# Patient Record
Sex: FEMALE | Employment: UNEMPLOYED | URBAN - NONMETROPOLITAN AREA
[De-identification: names, ages, dates, MRNs, and addresses within clinical notes are randomized per-mention and may not be internally consistent; named-entity substitution may affect disease eponyms.]

---

## 2017-01-03 ENCOUNTER — MED REC SCAN ONLY (OUTPATIENT)
Dept: FAMILY MEDICINE CLINIC | Facility: CLINIC | Age: 47
End: 2017-01-03

## 2017-01-03 ENCOUNTER — TELEPHONE (OUTPATIENT)
Dept: FAMILY MEDICINE CLINIC | Facility: CLINIC | Age: 47
End: 2017-01-03

## 2017-01-03 RX ORDER — NITROFURANTOIN 25; 75 MG/1; MG/1
100 CAPSULE ORAL 2 TIMES DAILY
Qty: 28 CAPSULE | Refills: 0 | COMMUNITY
Start: 2017-01-03

## 2017-01-03 NOTE — TELEPHONE ENCOUNTER
Per Dr. Elias Henderson start Macrobid 100 mg BID for 14 days and recheck UA/CS in 72 hrs after treatment is complete, Verbal order given to [facility] and result/order faxed to [facility] [phone].

## 2017-01-23 ENCOUNTER — TELEPHONE (OUTPATIENT)
Dept: FAMILY MEDICINE CLINIC | Facility: CLINIC | Age: 47
End: 2017-01-23

## 2017-01-24 NOTE — TELEPHONE ENCOUNTER
OK per Dr. Bakari Maldonado, Order faxed to [facility] at [phone].

## 2017-01-26 ENCOUNTER — TELEPHONE (OUTPATIENT)
Dept: FAMILY MEDICINE CLINIC | Facility: CLINIC | Age: 47
End: 2017-01-26

## 2017-01-30 NOTE — TELEPHONE ENCOUNTER
Per Dr Tena start Augmentin 500 mg BID for 15 days and refer to Urology. Verbal order given to [facility] and verbal confirmation received.

## 2017-02-01 ENCOUNTER — MED REC SCAN ONLY (OUTPATIENT)
Dept: FAMILY MEDICINE CLINIC | Facility: CLINIC | Age: 47
End: 2017-02-01

## 2017-02-02 ENCOUNTER — CHARTING TRANS (OUTPATIENT)
Dept: NEPHROLOGY | Age: 47
End: 2017-02-02

## 2017-02-13 RX ORDER — CLONAZEPAM 1 MG/1
1 TABLET ORAL DAILY PRN
Qty: 90 TABLET | Refills: 0 | COMMUNITY
Start: 2017-02-13 | End: 2017-02-21

## 2017-02-16 ENCOUNTER — TELEPHONE (OUTPATIENT)
Dept: FAMILY MEDICINE CLINIC | Facility: CLINIC | Age: 47
End: 2017-02-16

## 2017-02-16 NOTE — TELEPHONE ENCOUNTER
Patient has a temp of 102.0, having abdominal pain that she is rating a 10/10 on the pain scale, that is worse with any activity. Patient had her supra pubic catheter changed yesterday but she has had adequate output per ZINA Awad.  Patient is asking to go to

## 2017-02-21 ENCOUNTER — TELEPHONE (OUTPATIENT)
Dept: FAMILY MEDICINE CLINIC | Facility: CLINIC | Age: 47
End: 2017-02-21

## 2017-02-21 RX ORDER — HYDROCODONE BITARTRATE AND ACETAMINOPHEN 10; 325 MG/1; MG/1
1 TABLET ORAL EVERY 4 HOURS PRN
Qty: 120 TABLET | Refills: 0 | COMMUNITY
Start: 2017-02-21 | End: 2017-03-14

## 2017-02-21 RX ORDER — CLONAZEPAM 1 MG/1
1 TABLET ORAL DAILY PRN
Qty: 90 TABLET | Refills: 0 | COMMUNITY
Start: 2017-02-21 | End: 2017-03-14

## 2017-02-21 NOTE — TELEPHONE ENCOUNTER
Per discharge orders, they wrote 12.5 mg but for patient to take 50 mg , they would like an order for patient to resume metoprolol 50 mg? Also, they did not resume her Trazadone or Seroquel BID, would you like to resume ?    The hospital discontinued the pa

## 2017-03-02 ENCOUNTER — TELEPHONE (OUTPATIENT)
Dept: FAMILY MEDICINE CLINIC | Facility: CLINIC | Age: 47
End: 2017-03-02

## 2017-03-13 ENCOUNTER — TELEPHONE (OUTPATIENT)
Dept: FAMILY MEDICINE CLINIC | Facility: CLINIC | Age: 47
End: 2017-03-13

## 2017-03-13 NOTE — TELEPHONE ENCOUNTER
Per Dr. Elias Henderson this was ordered by Dr. Shayy Miles [phone]  Infectious disease MD and since she is following up with labs and it was changed from Vancomycin to Daptomycin they need to contact her to advise if there is an alternative.  Verbal o

## 2017-03-14 ENCOUNTER — TELEPHONE (OUTPATIENT)
Dept: FAMILY MEDICINE CLINIC | Facility: CLINIC | Age: 47
End: 2017-03-14

## 2017-03-14 RX ORDER — HYDROCODONE BITARTRATE AND ACETAMINOPHEN 10; 325 MG/1; MG/1
1 TABLET ORAL EVERY 4 HOURS PRN
Qty: 120 TABLET | Refills: 0 | COMMUNITY
Start: 2017-03-14 | End: 2017-04-22

## 2017-03-14 RX ORDER — FLUCONAZOLE 150 MG/1
150 TABLET ORAL ONCE
Qty: 2 TABLET | Refills: 0 | COMMUNITY
Start: 2017-03-14

## 2017-03-14 RX ORDER — CLONAZEPAM 1 MG/1
1 TABLET ORAL DAILY PRN
Qty: 90 TABLET | Refills: 0 | COMMUNITY
Start: 2017-03-14 | End: 2017-07-24

## 2017-03-14 NOTE — TELEPHONE ENCOUNTER
[facility] from [facility] called back stating that patient is requesting a prescription for a yeast infection due to the IV Antibiotics she is currently taking, also she was looking for a script for a powder for her groin area and a script for Triamcinolon

## 2017-03-14 NOTE — TELEPHONE ENCOUNTER
Per Arik Moser, patient did have a PRN order for Zofran. She has been nauseous and refusing to take her meds. Per Dr. Leonie Adam, Verbal order given to ZINA Aparicio for Zofran 4 mg PO every 4 hrs PRN for Nausea.

## 2017-03-27 ENCOUNTER — TELEPHONE (OUTPATIENT)
Dept: FAMILY MEDICINE CLINIC | Facility: CLINIC | Age: 47
End: 2017-03-27

## 2017-03-27 NOTE — TELEPHONE ENCOUNTER
Fax received from [facility], \"patient c/o abdominal pain & cramping. She has elvira blood per her vagina. She said, 'I haven't had my period in fifteen years.' [id number]\".     Fax returned with following orders: Needs appointment with

## 2017-03-31 ENCOUNTER — TELEPHONE (OUTPATIENT)
Dept: FAMILY MEDICINE CLINIC | Facility: CLINIC | Age: 47
End: 2017-03-31

## 2017-03-31 DIAGNOSIS — R10.30 LOWER ABDOMINAL PAIN: ICD-10-CM

## 2017-03-31 DIAGNOSIS — N93.9 VAGINAL BLEEDING: Primary | ICD-10-CM

## 2017-03-31 DIAGNOSIS — R10.9 ABDOMINAL CRAMPING: ICD-10-CM

## 2017-03-31 NOTE — TELEPHONE ENCOUNTER
Order placed and faxed to [address] [place] [address] [address]  Per 3/27/2017 phone note: Fax returned with following orders: Needs appointment with gyne.  Can schedule pelvic u/s at hospital.  V/o Dr. Merlin Garcia faxed to [facility] at [phone].

## 2017-04-03 ENCOUNTER — TELEPHONE (OUTPATIENT)
Dept: FAMILY MEDICINE CLINIC | Facility: CLINIC | Age: 47
End: 2017-04-03

## 2017-04-03 ENCOUNTER — MED REC SCAN ONLY (OUTPATIENT)
Dept: FAMILY MEDICINE CLINIC | Facility: CLINIC | Age: 47
End: 2017-04-03

## 2017-04-03 NOTE — TELEPHONE ENCOUNTER
Per Dr. Tena: pt needs to see ob/gyne. Can fax records if needed  Fabian Malik of Dr. [address] recommendation.  Per Latha pt has appointment on 4/6/2017 at [facility] on [facility]  Report faxed to [phone]

## 2017-04-04 ENCOUNTER — TELEPHONE (OUTPATIENT)
Dept: FAMILY MEDICINE CLINIC | Facility: CLINIC | Age: 47
End: 2017-04-04

## 2017-04-04 DIAGNOSIS — N93.9 VAGINAL BLEEDING: Primary | ICD-10-CM

## 2017-04-04 NOTE — TELEPHONE ENCOUNTER
Went to [facility]ER re: vaginal bleeding. They only have a verbal order, need a written order.

## 2017-04-05 ENCOUNTER — CHARTING TRANS (OUTPATIENT)
Dept: OTHER | Age: 47
End: 2017-04-05

## 2017-04-06 ENCOUNTER — LAB SERVICES (OUTPATIENT)
Dept: OTHER | Age: 47
End: 2017-04-06

## 2017-04-06 ENCOUNTER — CHARTING TRANS (OUTPATIENT)
Dept: OTHER | Age: 47
End: 2017-04-06

## 2017-04-06 LAB — FOLLICLE STIMULATING: 9.06 M[IU]/ML (ref 2–25)

## 2017-04-22 RX ORDER — HYDROCODONE BITARTRATE AND ACETAMINOPHEN 10; 325 MG/1; MG/1
1 TABLET ORAL EVERY 4 HOURS PRN
Qty: 120 TABLET | Refills: 0 | COMMUNITY
Start: 2017-04-22 | End: 2017-06-19

## 2017-04-25 ENCOUNTER — TELEPHONE (OUTPATIENT)
Dept: FAMILY MEDICINE CLINIC | Facility: CLINIC | Age: 47
End: 2017-04-25

## 2017-04-25 NOTE — TELEPHONE ENCOUNTER
Advised that his office staff instructed us that he returned from vacation on 4/20/17 and to call his office again to review symptoms.

## 2017-04-25 NOTE — TELEPHONE ENCOUNTER
Update: per Mickey Moreno stared the patient on Keflex even with her allergy to PCN and Sulfa as she has tolerated this in the past. They will monitor closely.

## 2017-04-26 ENCOUNTER — SNF VISIT (OUTPATIENT)
Dept: FAMILY MEDICINE CLINIC | Facility: CLINIC | Age: 47
End: 2017-04-26

## 2017-04-26 DIAGNOSIS — F31.9 BIPOLAR 1 DISORDER (HCC): ICD-10-CM

## 2017-04-26 DIAGNOSIS — Z79.4 TYPE 2 DIABETES MELLITUS WITHOUT COMPLICATION, WITH LONG-TERM CURRENT USE OF INSULIN (HCC): ICD-10-CM

## 2017-04-26 DIAGNOSIS — Z93.59 CHRONIC SUPRAPUBIC CATHETER (HCC): ICD-10-CM

## 2017-04-26 DIAGNOSIS — G82.20 PARAPLEGIA (HCC): ICD-10-CM

## 2017-04-26 DIAGNOSIS — E11.9 TYPE 2 DIABETES MELLITUS WITHOUT COMPLICATION, WITH LONG-TERM CURRENT USE OF INSULIN (HCC): ICD-10-CM

## 2017-04-26 DIAGNOSIS — L03.311 CELLULITIS OF ABDOMINAL WALL: Primary | ICD-10-CM

## 2017-04-26 DIAGNOSIS — G56.03 BILATERAL CARPAL TUNNEL SYNDROME: ICD-10-CM

## 2017-04-26 DIAGNOSIS — F31.30 BIPOLAR I DISORDER DEPRESSED WITH ATYPICAL FEATURES (HCC): ICD-10-CM

## 2017-04-26 PROCEDURE — 99309 SBSQ NF CARE MODERATE MDM 30: CPT | Performed by: FAMILY MEDICINE

## 2017-04-28 ENCOUNTER — TELEPHONE (OUTPATIENT)
Dept: FAMILY MEDICINE CLINIC | Facility: CLINIC | Age: 47
End: 2017-04-28

## 2017-04-28 RX ORDER — FLUCONAZOLE 100 MG/1
100 TABLET ORAL DAILY
Qty: 3 TABLET | Refills: 0 | OUTPATIENT
Start: 2017-04-28

## 2017-04-28 NOTE — PROGRESS NOTES
Robel Calix is a 55year old female. No chief complaint on file.       HPI:   EXAM DONE AT [facility], [facility]  Denies pain and also denies  Issues with sleep and feeding    entire chart reviewed  And POC reviewed with the nu STRENGTH) 500 MG Oral Tab Take 2 tablets q 6 hrs as needed for pain Disp:  Rfl: 0   modafinil 200 MG Oral Tab Take 1 tablet (200 mg total) by mouth daily.  Disp:  Rfl: 0   Ciprofloxacin HCl 250 MG Oral Tab Take 1 tablet (250 mg total) by mouth 2 (two) times daily. Disp:  Rfl:    furosemide 40 MG Oral Tab Take 40 mg by mouth daily. Disp:  Rfl:    SITagliptin Phosphate 100 MG Oral Tab Take 100 mg by mouth daily. Disp:  Rfl:    Nystatin External Powder Apply topically as needed.  Disp:  Rfl:    gabapentin 400 MG Smoking Status: Former Smoker                   Packs/Day: 1.00  Years: 5       Comment: quit 2006    Alcohol Use: No                 BP Readings from Last 6 Encounters:  08/15/16 : 119/69      Wt Readings from Last 6 Encounters:  08/15/16 : 229 lb 15 oz BS's,no masses, HSM or tenderness  --  Suprapubic in place with mild cellulitis  Is resolving    EXTREMITIES: no cyanosis, clubbing or edema    ASSESSMENT AND PLAN:     Cellulitis of abdominal wall  (primary encounter diagnosis)  Chronic suprapubic cathete

## 2017-04-28 NOTE — TELEPHONE ENCOUNTER
Pt is currently on Keflex and ss requesting a diflucan for itching.    Dr. Wojciech Santizo ok'd diflucan 100mg x3 days  Aneudy Sandoval at [facility] advised and verbalized understanding

## 2017-05-04 ENCOUNTER — TELEPHONE (OUTPATIENT)
Dept: FAMILY MEDICINE CLINIC | Facility: CLINIC | Age: 47
End: 2017-05-04

## 2017-05-04 NOTE — TELEPHONE ENCOUNTER
Patient is on her last day of Keflex and the staff noticed that she has a rash on both arms, would it be ok to discontinue the Keflex and get an order for PRN Benadryl 25 mg Q 6 hrs PRN for rash/allergy , per Dr. Huy Brock ok for these orders , verbal order

## 2017-05-08 ENCOUNTER — TELEPHONE (OUTPATIENT)
Dept: FAMILY MEDICINE CLINIC | Facility: CLINIC | Age: 47
End: 2017-05-08

## 2017-05-08 NOTE — TELEPHONE ENCOUNTER
Per Dr. Suhas Shepard apply BID until rash is gone, verbal order given to [facility].

## 2017-05-23 ENCOUNTER — TELEPHONE (OUTPATIENT)
Dept: FAMILY MEDICINE CLINIC | Facility: CLINIC | Age: 47
End: 2017-05-23

## 2017-05-23 NOTE — TELEPHONE ENCOUNTER
Patient was prescribed Cipro for UTI however it can cause a prolonged QT if taken with Zofran and Trazodone, ok to give together or would you like the Zofran and Trazodone held during antibiotic course?

## 2017-05-25 ENCOUNTER — TELEPHONE (OUTPATIENT)
Dept: FAMILY MEDICINE CLINIC | Facility: CLINIC | Age: 47
End: 2017-05-25

## 2017-05-25 NOTE — TELEPHONE ENCOUNTER
LathaRN advised that the patient can not be refused care and if she wants to go to the ER, we can not stop her. She voiced understanding. FyI to Dr. Lynda Sarah.

## 2017-05-26 ENCOUNTER — LAB SERVICES (OUTPATIENT)
Dept: OTHER | Age: 47
End: 2017-05-26

## 2017-05-26 ENCOUNTER — CHARTING TRANS (OUTPATIENT)
Dept: OTHER | Age: 47
End: 2017-05-26

## 2017-05-26 LAB — MRSA SCREEN PCR: ABNORMAL

## 2017-05-27 ENCOUNTER — LAB SERVICES (OUTPATIENT)
Dept: OTHER | Age: 47
End: 2017-05-27

## 2017-05-27 ENCOUNTER — CHARTING TRANS (OUTPATIENT)
Dept: OTHER | Age: 47
End: 2017-05-27

## 2017-05-28 LAB — CULTURE URINE: ABNORMAL

## 2017-05-29 ENCOUNTER — CHARTING TRANS (OUTPATIENT)
Dept: OTHER | Age: 47
End: 2017-05-29

## 2017-05-31 LAB
CULTURE BLOOD: NORMAL
CULTURE BLOOD: NORMAL

## 2017-06-01 LAB
CULTURE BLOOD: NORMAL
CULTURE BLOOD: NORMAL

## 2017-06-06 ENCOUNTER — TELEPHONE (OUTPATIENT)
Dept: FAMILY MEDICINE CLINIC | Facility: CLINIC | Age: 47
End: 2017-06-06

## 2017-06-19 ENCOUNTER — TELEPHONE (OUTPATIENT)
Dept: FAMILY MEDICINE CLINIC | Facility: CLINIC | Age: 47
End: 2017-06-19

## 2017-06-19 RX ORDER — HYDROCODONE BITARTRATE AND ACETAMINOPHEN 10; 325 MG/1; MG/1
1 TABLET ORAL EVERY 6 HOURS PRN
Qty: 120 TABLET | Refills: 0 | COMMUNITY
Start: 2017-06-19 | End: 2017-08-02

## 2017-06-24 ENCOUNTER — TELEPHONE (OUTPATIENT)
Dept: FAMILY MEDICINE CLINIC | Facility: CLINIC | Age: 47
End: 2017-06-24

## 2017-06-24 DIAGNOSIS — Z00.00 PREVENTATIVE HEALTH CARE: Primary | ICD-10-CM

## 2017-07-03 ENCOUNTER — TELEPHONE (OUTPATIENT)
Dept: FAMILY MEDICINE CLINIC | Facility: CLINIC | Age: 47
End: 2017-07-03

## 2017-07-03 RX ORDER — AMPICILLIN 500 MG/1
500 CAPSULE ORAL 4 TIMES DAILY
Qty: 40 CAPSULE | Refills: 0 | OUTPATIENT
Start: 2017-07-03 | End: 2017-07-03 | Stop reason: CLARIF

## 2017-07-03 RX ORDER — NITROFURANTOIN 25; 75 MG/1; MG/1
100 CAPSULE ORAL 2 TIMES DAILY
Qty: 20 CAPSULE | Refills: 0 | OUTPATIENT
Start: 2017-07-03

## 2017-07-03 NOTE — TELEPHONE ENCOUNTER
Urine culture results received , patient is not currently taking any antibiotics per 1300 Raffaele Dia RN. Placed for Dr. So Lorenzo to review . Patient has allergies to sulfa and PCN, please advise. Sensitive to : Ampicillin, Nitrofurantoin, Vancomycin.

## 2017-07-03 NOTE — TELEPHONE ENCOUNTER
Macrobid 500 mg BID X 10 days, written order faxed to [facility]. Informed Zerita Mohs that order was faxed and she will call if it is not received.

## 2017-07-05 ENCOUNTER — TELEPHONE (OUTPATIENT)
Dept: FAMILY MEDICINE CLINIC | Facility: CLINIC | Age: 47
End: 2017-07-05

## 2017-07-05 ENCOUNTER — MED REC SCAN ONLY (OUTPATIENT)
Dept: FAMILY MEDICINE CLINIC | Facility: CLINIC | Age: 47
End: 2017-07-05

## 2017-07-06 NOTE — TELEPHONE ENCOUNTER
Patient is refusing CPAP Tx a NOC. Patient states ' I dont need it\". Order faxed to [facility] : notify the patient that Dr. Benitez Kennedy thinks it is very important and Dr. Kira Fabian should be notified as he is managing.

## 2017-07-07 ENCOUNTER — MED REC SCAN ONLY (OUTPATIENT)
Dept: FAMILY MEDICINE CLINIC | Facility: CLINIC | Age: 47
End: 2017-07-07

## 2017-07-12 ENCOUNTER — TELEPHONE (OUTPATIENT)
Dept: FAMILY MEDICINE CLINIC | Facility: CLINIC | Age: 47
End: 2017-07-12

## 2017-07-12 NOTE — TELEPHONE ENCOUNTER
Centrahoma Duane left great toe is red, tender and swollen around the nail, possible ingrown toe nail. Can we have Dr. Donavan Ghosh evaluate when he is here? Per Dr. Ct Martinez ok for Dr. Donavan Ghosh to evaluate and treat. Order faxed to [facility].

## 2017-07-17 ENCOUNTER — TELEPHONE (OUTPATIENT)
Dept: FAMILY MEDICINE CLINIC | Facility: CLINIC | Age: 47
End: 2017-07-17

## 2017-07-17 NOTE — TELEPHONE ENCOUNTER
Patient complaining of indigestion and gas.    Brigette Richardson is requesting an order for Skye SIMMONS to treat both-  May speak with Sergio Ziegler regarding order

## 2017-07-20 ENCOUNTER — TELEPHONE (OUTPATIENT)
Dept: FAMILY MEDICINE CLINIC | Facility: CLINIC | Age: 47
End: 2017-07-20

## 2017-07-20 NOTE — TELEPHONE ENCOUNTER
Recent uti  Is  VRE  sens only to daptomycin  And  linezolid    He will refer to dr alondra rolle      Inf disease

## 2017-07-21 ENCOUNTER — TELEPHONE (OUTPATIENT)
Dept: FAMILY MEDICINE CLINIC | Facility: CLINIC | Age: 47
End: 2017-07-21

## 2017-07-21 NOTE — TELEPHONE ENCOUNTER
The facility podiatrist  Or,  Dr Margorie Hatchet  [phone]    Or    Dr Marika Valles  [phone]        But most podiatrists are not allowed to see medicaid patients  .

## 2017-07-24 ENCOUNTER — TELEPHONE (OUTPATIENT)
Dept: FAMILY MEDICINE CLINIC | Facility: CLINIC | Age: 47
End: 2017-07-24

## 2017-07-24 RX ORDER — CLONAZEPAM 1 MG/1
1 TABLET ORAL DAILY PRN
Qty: 90 TABLET | Refills: 0 | COMMUNITY
Start: 2017-07-24 | End: 2017-08-21

## 2017-07-24 NOTE — TELEPHONE ENCOUNTER
Patient is having multiple watery loose stools daily for 7 days. Resident recently + VRE in Urine not on current treatment for this. Per Dr. Nirmala Bailon, for the VRE patient is to see an infectious disease MD for RX.  Per ZINA Azul this order was already Time Jamey

## 2017-07-25 ENCOUNTER — MED REC SCAN ONLY (OUTPATIENT)
Dept: FAMILY MEDICINE CLINIC | Facility: CLINIC | Age: 47
End: 2017-07-25

## 2017-08-01 ENCOUNTER — SNF VISIT (OUTPATIENT)
Dept: FAMILY MEDICINE CLINIC | Facility: CLINIC | Age: 47
End: 2017-08-01

## 2017-08-01 DIAGNOSIS — Z79.899 ENCOUNTER FOR LONG-TERM (CURRENT) USE OF MEDICATIONS: Primary | ICD-10-CM

## 2017-08-01 DIAGNOSIS — N39.0 RECURRENT UTI: ICD-10-CM

## 2017-08-01 DIAGNOSIS — Z93.59 CHRONIC SUPRAPUBIC CATHETER (HCC): ICD-10-CM

## 2017-08-01 DIAGNOSIS — Z22.9 COLONIZATION STATUS: ICD-10-CM

## 2017-08-01 PROCEDURE — 99309 SBSQ NF CARE MODERATE MDM 30: CPT | Performed by: FAMILY MEDICINE

## 2017-08-01 NOTE — PROGRESS NOTES
Susanna Small is a 55year old female.     EXAM DONE AT [facility]  Denies pain and also denies  Issues with sleep and feeding    entire chart reviewed  And POC reviewed with the nursing staff      HPI:     Patient Rfl: 0   HYDROcodone-acetaminophen  MG Oral Tab Take 1 tablet by mouth every 6 (six) hours as needed for Pain. Disp: 120 tablet Rfl: 0   fluconazole (DIFLUCAN) 100 MG Oral Tab Take 1 tablet (100 mg total) by mouth daily.  Disp: 3 tablet Rfl: 0   [facility]  Disp:  Rfl:    divalproex Sodium  MG Oral Tablet 24 Hr Take 500 mg by mouth 2 (two) times daily. Disp:  Rfl:    Metoprolol Tartrate 50 MG Oral Tab Take 50 mg by mouth 2 (two) times daily.  Disp:  Rfl:    Potassium Chloride ER 10 MEQ Oral MG Oral Tab Take 1 tablet by mouth 2 (two) times daily. Disp:  Rfl: 0   hydrALAzine HCl (APRESOLINE) 25 MG Oral Tab Take 1 tablet by mouth 2 (two) times daily. Disp:  Rfl: 0   Polyethylene Glycol 3350 (MIRALAX) Oral Powder Take 17 g by mouth daily.    Disp: icterus    LUNGS: clear to auscultation  CARDIO: RRR without murmur  GI: good BS's,no masses, HSM or tenderness  Obese  Soft no tympany no mass no guarding no rebound  EXTREMITIES: no cyanosis, clubbing or edema  Amount of hyperemic skin coloration of the

## 2017-08-01 NOTE — ASSESSMENT & PLAN NOTE
Dr. Taya Gipson manages all of her antibiotic care and catheter care issues. I will not be prescribing any care for urologic issues.   This includes phenazopyridine, which I will leave to the judgment of Dr. Taya Gipson

## 2017-08-02 RX ORDER — HYDROCODONE BITARTRATE AND ACETAMINOPHEN 10; 325 MG/1; MG/1
1 TABLET ORAL EVERY 6 HOURS PRN
Qty: 120 TABLET | Refills: 0 | COMMUNITY
Start: 2017-08-02 | End: 2017-08-22

## 2017-08-12 ENCOUNTER — TELEPHONE (OUTPATIENT)
Dept: FAMILY MEDICINE CLINIC | Facility: CLINIC | Age: 47
End: 2017-08-12

## 2017-08-12 NOTE — TELEPHONE ENCOUNTER
S\Patient is insisting she have a GI appointment for \"colon pain\". She also reports a change in bowel habits x 2 weeks. Staff has not noted any change in her bowel habits. Francie Villafuerte explained to her that her PCP would have to give her a referral for that.  Ayden Stallworth

## 2017-08-21 RX ORDER — CLONAZEPAM 1 MG/1
1 TABLET ORAL DAILY PRN
Qty: 90 TABLET | Refills: 0 | COMMUNITY
Start: 2017-08-21 | End: 2017-10-09

## 2017-08-22 RX ORDER — HYDROCODONE BITARTRATE AND ACETAMINOPHEN 10; 325 MG/1; MG/1
1 TABLET ORAL EVERY 6 HOURS PRN
Qty: 120 TABLET | Refills: 0 | COMMUNITY
Start: 2017-08-22 | End: 2017-10-19

## 2017-08-23 ENCOUNTER — TELEPHONE (OUTPATIENT)
Dept: FAMILY MEDICINE CLINIC | Facility: CLINIC | Age: 47
End: 2017-08-23
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
## 2017-08-31 LAB
AMB EXT CHOL/HDL RATIO: 5
AMB EXT CHOLESTEROL, TOTAL: 140 MG/DL
AMB EXT HDL CHOLESTEROL: 31 MG/DL
AMB EXT LDL CHOLESTEROL, DIRECT: 55 MG/DL
AMB EXT NON HDL CHOL: 109 MG/DL
AMB EXT TRIGLYCERIDES: 271 MG/DL
AMB EXT VLDL: 54 MG/DL

## 2017-09-01 ENCOUNTER — TELEPHONE (OUTPATIENT)
Dept: FAMILY MEDICINE CLINIC | Facility: CLINIC | Age: 47
End: 2017-09-01

## 2017-09-01 NOTE — TELEPHONE ENCOUNTER
Sara Clark at [facility] advised of Dr. Daly Whitney comments on lipids  Per Dr. Tena: no change recheck in 6 months  Labs entered

## 2017-09-18 ENCOUNTER — CHARTING TRANS (OUTPATIENT)
Dept: OTHER | Age: 47
End: 2017-09-18

## 2017-10-09 ENCOUNTER — TELEPHONE (OUTPATIENT)
Dept: FAMILY MEDICINE CLINIC | Facility: CLINIC | Age: 47
End: 2017-10-09

## 2017-10-09 RX ORDER — CLONAZEPAM 1 MG/1
1 TABLET ORAL DAILY PRN
Qty: 90 TABLET | Refills: 0 | COMMUNITY
Start: 2017-10-09

## 2017-10-10 RX ORDER — CLONAZEPAM 1 MG/1
1 TABLET ORAL 2 TIMES DAILY PRN
Qty: 60 TABLET | Refills: 1 | COMMUNITY
Start: 2017-10-10

## 2017-10-11 ENCOUNTER — SNF VISIT (OUTPATIENT)
Dept: FAMILY MEDICINE CLINIC | Facility: CLINIC | Age: 47
End: 2017-10-11

## 2017-10-11 DIAGNOSIS — Z93.59 CHRONIC SUPRAPUBIC CATHETER (HCC): ICD-10-CM

## 2017-10-11 DIAGNOSIS — N39.0 RECURRENT UTI: ICD-10-CM

## 2017-10-11 DIAGNOSIS — Z79.899 ENCOUNTER FOR LONG-TERM (CURRENT) USE OF MEDICATIONS: Primary | ICD-10-CM

## 2017-10-11 DIAGNOSIS — E11.9 TYPE 2 DIABETES MELLITUS WITHOUT COMPLICATION, WITH LONG-TERM CURRENT USE OF INSULIN (HCC): ICD-10-CM

## 2017-10-11 DIAGNOSIS — Z79.4 TYPE 2 DIABETES MELLITUS WITHOUT COMPLICATION, WITH LONG-TERM CURRENT USE OF INSULIN (HCC): ICD-10-CM

## 2017-10-11 DIAGNOSIS — D36.9 TUBULAR ADENOMA: ICD-10-CM

## 2017-10-11 PROCEDURE — 99309 SBSQ NF CARE MODERATE MDM 30: CPT | Performed by: FAMILY MEDICINE

## 2017-10-11 NOTE — PROGRESS NOTES
Marshall Sandoval is a 55year old female.     EXAM DONE AT [facility]  Denies pain and also denies  Issues with sleep and feeding    entire chart reviewed  And POC reviewed with the nursing staff      HPI:   Patient st capsule (100 mg total) by mouth 2 (two) times daily.  Disp: 28 capsule Rfl: 0   Insulin Aspart 100 UNIT/ML Subcutaneous Solution Cartridge SS: 150-200 2units, 201-250 4 units, 251-300 6 units, 301-350 8 units, 351-400 10 units, over 401 call md Disp: 3 mL R mg by mouth every 4 (four) hours as needed for Nausea. Disp:  Rfl:    TraZODone HCl 100 MG Oral Tab Take 100 mg by mouth nightly as needed for Sleep.  Disp:  Rfl:    fenofibrate micronized 200 MG Oral Cap Take 200 mg by mouth every morning before breakfast. (generalized anxiety disorder)    • High blood pressure    • History of diverticulitis of colon 10/26/2014   • Manipulative behavior 10/26/2014   • Neurogenic bladder    • Obesity (BMI 30-39.9) 10/26/2014   • Paraplegia ([address]    • Sepsis following intra-ab Encounter: [id number] Location: [place] Person Dr: Gabriela Jamison  Attending and Copy To Physician(s):   [facility]          Diagnosis:    A.  DUODENUM BIOPSY:      - DUODENAL MUCOSA WITH MILD NONSPECIFIC CHRONIC IN BIOPSY  B:  GASTRIC BIOPSY  C:  LOWER ESOPHAGUS BIOPSY  D:  UPPER ESOPHAGUS BIOPSY  E:  TRANSVERSE COLON POLYP  F:  RANDOM COLON BIOPSY    Preoperative Diagnosis:  Nausea, change in bowel habits.      Gross Description:  A.  Received in formalin are two bro FAAFP

## 2017-10-16 ENCOUNTER — TELEPHONE (OUTPATIENT)
Dept: FAMILY MEDICINE CLINIC | Facility: CLINIC | Age: 47
End: 2017-10-16

## 2017-10-16 NOTE — TELEPHONE ENCOUNTER
Fax received from [facility]; Sergio RN: patient has been refusing CPAP mask every night for more than 2 weeks. According to pt, she doesn't  need it. Also refusing fluticasone nasal spray every morning.  According to pt causes sinus HA

## 2017-10-18 ENCOUNTER — TELEPHONE (OUTPATIENT)
Dept: FAMILY MEDICINE CLINIC | Facility: CLINIC | Age: 47
End: 2017-10-18

## 2017-10-18 NOTE — TELEPHONE ENCOUNTER
Per Evelin Schroeder at [facility]; order dent for med list update  Med list updated

## 2017-10-19 RX ORDER — HYDROCODONE BITARTRATE AND ACETAMINOPHEN 10; 325 MG/1; MG/1
1 TABLET ORAL EVERY 6 HOURS PRN
Qty: 120 TABLET | Refills: 0 | COMMUNITY
Start: 2017-10-19

## 2017-11-14 ENCOUNTER — TELEPHONE (OUTPATIENT)
Dept: FAMILY MEDICINE CLINIC | Facility: CLINIC | Age: 47
End: 2017-11-14

## 2017-11-14 NOTE — TELEPHONE ENCOUNTER
Attempted to call back. Office closed. Will try tomorrow  Called [facility] and spoke with [facility] RN who states that pt is taking provigil 200mg every morning.  [facility] states that she is not sure who has been refilling the medication but she will have the DON me kit

## 2017-11-16 NOTE — TELEPHONE ENCOUNTER
Kenan Fulton called from Dr. Toussaint Jessie office who states that the modanifil was approved from todays' date to 11/16/2018

## 2017-11-21 ENCOUNTER — TELEPHONE (OUTPATIENT)
Dept: FAMILY MEDICINE CLINIC | Facility: CLINIC | Age: 47
End: 2017-11-21

## 2017-11-21 NOTE — TELEPHONE ENCOUNTER
I have noted all of these things---    No treatment for the rectal pain  Unless stools become hard      No treatment for yeast infection  There is no syndrome that this fits    She may have seen neurology in the past    Often her speech has been rushed or

## 2017-11-21 NOTE — TELEPHONE ENCOUNTER
Zaid Yip at [facility] advised and verbalized understanding of the information provided

## 2017-11-21 NOTE — TELEPHONE ENCOUNTER
Fax received from [facility]: Sarkis Bolaños, pt is c/o rectal pain and increased BM/s. BM's are not hard. No redness noted. Resident also states that she feels she is \"losing my words\" and requests referral for a neurology consult.  Resident also feels that she has

## 2017-11-29 ENCOUNTER — TELEPHONE (OUTPATIENT)
Dept: FAMILY MEDICINE CLINIC | Facility: CLINIC | Age: 47
End: 2017-11-29

## 2017-11-29 NOTE — TELEPHONE ENCOUNTER
Deya Eric states that pt is currently at the ER for bipolar issues and the hospital is wanting to discharge her.  Deya Eric is requesting an order to have pt discharged to [facility] as patient has become accusatory of the staff, become argumentative, monopol

## 2017-11-29 NOTE — TELEPHONE ENCOUNTER
They are wanting to know if they can send Scott Rollins to [facility] from the hospital. They are asking for a order so they can send her to [facility].

## 2017-12-01 ENCOUNTER — TELEPHONE (OUTPATIENT)
Dept: FAMILY MEDICINE CLINIC | Facility: CLINIC | Age: 47
End: 2017-12-01

## 2017-12-01 NOTE — TELEPHONE ENCOUNTER
Keiko Tatum noticed on the patient's hospital discharge papers that the [facility] directions were different (QID PRN)  then how Dr Keyonna Barry has prescribed in the past.  Will send over form to be signed but wants clarification of the [facility]

## 2017-12-04 ENCOUNTER — TELEPHONE (OUTPATIENT)
Dept: FAMILY MEDICINE CLINIC | Facility: CLINIC | Age: 47
End: 2017-12-04

## 2017-12-04 NOTE — TELEPHONE ENCOUNTER
Received second fax stating: can we have an order for a type of powder to help her w/ brennen \"excoriation\" in her pelvis area. She has been using excessive amounts of A+D ointment.  I believe this is making it worse    Per Dr. Tena: only nystatin powder

## 2017-12-04 NOTE — TELEPHONE ENCOUNTER
Spoke with Ene Lewis at [facility] who states that pt is c/o chest pain with a burning sensation in her lungs. Ene Lewis states vitals are WNL, lungs are clear bilat. Pt is demanding to be sent out.  Ene Lewis is wanting to know if pt can be sent to [place] ED wh

## 2017-12-05 ENCOUNTER — MED REC SCAN ONLY (OUTPATIENT)
Dept: FAMILY MEDICINE CLINIC | Facility: CLINIC | Age: 47
End: 2017-12-05

## 2017-12-18 ENCOUNTER — CHARTING TRANS (OUTPATIENT)
Dept: OBGYN | Age: 47
End: 2017-12-18

## 2017-12-18 ENCOUNTER — LAB SERVICES (OUTPATIENT)
Dept: OTHER | Age: 47
End: 2017-12-18

## 2017-12-18 LAB — FOLLICLE STIMULATING: 13.8 M[IU]/ML (ref 2–25)

## 2017-12-19 ENCOUNTER — TELEPHONE (OUTPATIENT)
Dept: FAMILY MEDICINE CLINIC | Facility: CLINIC | Age: 47
End: 2017-12-19

## 2017-12-19 NOTE — TELEPHONE ENCOUNTER
MICHAEL CAN BE TRANSFERRED TO [facility] ON 1/1/18 AND THEY ARE ASKING IF YOU WILL STILL FOLLOW HER

## 2017-12-26 ENCOUNTER — TELEPHONE (OUTPATIENT)
Dept: FAMILY MEDICINE CLINIC | Facility: CLINIC | Age: 47
End: 2017-12-26

## 2017-12-26 NOTE — TELEPHONE ENCOUNTER
Fax received from [facility]: Viral RN: residents family brought in [place]. Resident would like an order for this for urinary trait pain.  Please advise

## 2017-12-29 ENCOUNTER — TELEPHONE (OUTPATIENT)
Dept: FAMILY MEDICINE CLINIC | Facility: CLINIC | Age: 47
End: 2017-12-29

## 2017-12-29 NOTE — TELEPHONE ENCOUNTER
Currently on macrobid for another 2-3 days. Was treated in the [facility] ER , had pyridium, ran out, now she wants it refilled. Advised Dr. Junior Hernández out of the office this afternoon, message from 12/26 requesting pyridium was refused.  Dr. Junior Hernández deferred to

## 2018-01-02 ENCOUNTER — CHARTING TRANS (OUTPATIENT)
Dept: OTHER | Age: 48
End: 2018-01-02

## 2018-01-09 NOTE — TELEPHONE ENCOUNTER
Received a call from [facility] regarding Patient's Supra pubic Catheter. Spoke with the nurse who states Patient has a UTI. States her suprapubic catheter was replaced today and somehow it got displaced.  States Patient is in a lot of vivek

## 2018-03-07 ENCOUNTER — TELEPHONE (OUTPATIENT)
Dept: FAMILY MEDICINE CLINIC | Facility: CLINIC | Age: 48
End: 2018-03-07

## 2018-03-07 NOTE — TELEPHONE ENCOUNTER
Phone call to April at [facility]. Confirmed that patient has transferred to another facility and is now under the care of a different physician. Dr. Leonie Adam is no longer her attending physician.

## 2018-11-23 ENCOUNTER — IMAGING SERVICES (OUTPATIENT)
Dept: OTHER | Age: 48
End: 2018-11-23

## 2018-11-27 VITALS
SYSTOLIC BLOOD PRESSURE: 118 MMHG | BODY MASS INDEX: 45.16 KG/M2 | HEIGHT: 60 IN | WEIGHT: 230 LBS | DIASTOLIC BLOOD PRESSURE: 70 MMHG

## 2018-11-29 VITALS
SYSTOLIC BLOOD PRESSURE: 96 MMHG | WEIGHT: 230 LBS | BODY MASS INDEX: 45.16 KG/M2 | HEART RATE: 80 BPM | HEIGHT: 60 IN | DIASTOLIC BLOOD PRESSURE: 60 MMHG

## 2023-12-05 ENCOUNTER — HOSPITAL ENCOUNTER (INPATIENT)
Facility: HOSPITAL | Age: 53
LOS: 6 days | Discharge: NURSING, REHAB OR LONG-TERM CARE | End: 2023-12-12
Attending: EMERGENCY MEDICINE | Admitting: HOSPITALIST
Payer: MEDICARE

## 2023-12-05 ENCOUNTER — APPOINTMENT (OUTPATIENT)
Dept: GENERAL RADIOLOGY | Facility: HOSPITAL | Age: 53
End: 2023-12-05
Attending: EMERGENCY MEDICINE
Payer: MEDICARE

## 2023-12-05 DIAGNOSIS — I26.99 OTHER ACUTE PULMONARY EMBOLISM, UNSPECIFIED WHETHER ACUTE COR PULMONALE PRESENT (HCC): Primary | ICD-10-CM

## 2023-12-05 DIAGNOSIS — R73.9 HYPERGLYCEMIA: ICD-10-CM

## 2023-12-05 LAB
ALBUMIN SERPL-MCNC: 3.6 G/DL (ref 3.2–4.8)
ALP LIVER SERPL-CCNC: 106 U/L
ALT SERPL-CCNC: 11 U/L
ANION GAP SERPL CALC-SCNC: 7 MMOL/L (ref 0–18)
AST SERPL-CCNC: 24 U/L (ref ?–34)
BILIRUB DIRECT SERPL-MCNC: <0.1 MG/DL (ref ?–0.3)
BILIRUB SERPL-MCNC: 0.2 MG/DL (ref 0.3–1.2)
BUN BLD-MCNC: 13 MG/DL (ref 9–23)
BUN/CREAT SERPL: 12.7 (ref 10–20)
CALCIUM BLD-MCNC: 8.8 MG/DL (ref 8.7–10.4)
CHLORIDE SERPL-SCNC: 97 MMOL/L (ref 98–112)
CO2 SERPL-SCNC: 24 MMOL/L (ref 21–32)
CREAT BLD-MCNC: 1.02 MG/DL
D DIMER PPP FEU-MCNC: 0.92 UG/ML FEU (ref ?–0.53)
DEPRECATED RDW RBC AUTO: 49.6 FL (ref 35.1–46.3)
EGFRCR SERPLBLD CKD-EPI 2021: 66 ML/MIN/1.73M2 (ref 60–?)
ERYTHROCYTE [DISTWIDTH] IN BLOOD BY AUTOMATED COUNT: 15.3 % (ref 11–15)
GLUCOSE BLD-MCNC: 443 MG/DL (ref 70–99)
HCT VFR BLD AUTO: 34 %
HGB BLD-MCNC: 11.1 G/DL
LIPASE SERPL-CCNC: 36 U/L (ref 13–75)
MCH RBC QN AUTO: 29.3 PG (ref 26–34)
MCHC RBC AUTO-ENTMCNC: 32.6 G/DL (ref 31–37)
MCV RBC AUTO: 89.7 FL
NEUTROPHILS # BLD AUTO: 2.79 X10 (3) UL (ref 1.5–7.7)
OSMOLALITY SERPL CALC.SUM OF ELEC: 285 MOSM/KG (ref 275–295)
PLATELET # BLD AUTO: 210 10(3)UL (ref 150–450)
POTASSIUM SERPL-SCNC: 4.6 MMOL/L (ref 3.5–5.1)
PROT SERPL-MCNC: 6.8 G/DL (ref 5.7–8.2)
RBC # BLD AUTO: 3.79 X10(6)UL
SODIUM SERPL-SCNC: 128 MMOL/L (ref 136–145)
TROPONIN I SERPL HS-MCNC: <3 NG/L
WBC # BLD AUTO: 6.6 X10(3) UL (ref 4–11)

## 2023-12-05 PROCEDURE — 99285 EMERGENCY DEPT VISIT HI MDM: CPT

## 2023-12-05 PROCEDURE — 85379 FIBRIN DEGRADATION QUANT: CPT | Performed by: EMERGENCY MEDICINE

## 2023-12-05 PROCEDURE — 93005 ELECTROCARDIOGRAM TRACING: CPT

## 2023-12-05 PROCEDURE — 85730 THROMBOPLASTIN TIME PARTIAL: CPT | Performed by: EMERGENCY MEDICINE

## 2023-12-05 PROCEDURE — 80048 BASIC METABOLIC PNL TOTAL CA: CPT | Performed by: EMERGENCY MEDICINE

## 2023-12-05 PROCEDURE — 85025 COMPLETE CBC W/AUTO DIFF WBC: CPT | Performed by: EMERGENCY MEDICINE

## 2023-12-05 PROCEDURE — 85007 BL SMEAR W/DIFF WBC COUNT: CPT | Performed by: EMERGENCY MEDICINE

## 2023-12-05 PROCEDURE — 85027 COMPLETE CBC AUTOMATED: CPT | Performed by: EMERGENCY MEDICINE

## 2023-12-05 PROCEDURE — 94640 AIRWAY INHALATION TREATMENT: CPT

## 2023-12-05 PROCEDURE — 80076 HEPATIC FUNCTION PANEL: CPT | Performed by: EMERGENCY MEDICINE

## 2023-12-05 PROCEDURE — 96372 THER/PROPH/DIAG INJ SC/IM: CPT

## 2023-12-05 PROCEDURE — 94799 UNLISTED PULMONARY SVC/PX: CPT

## 2023-12-05 PROCEDURE — 84484 ASSAY OF TROPONIN QUANT: CPT | Performed by: EMERGENCY MEDICINE

## 2023-12-05 PROCEDURE — 93010 ELECTROCARDIOGRAM REPORT: CPT

## 2023-12-05 PROCEDURE — 83036 HEMOGLOBIN GLYCOSYLATED A1C: CPT | Performed by: HOSPITALIST

## 2023-12-05 PROCEDURE — 83690 ASSAY OF LIPASE: CPT | Performed by: EMERGENCY MEDICINE

## 2023-12-05 PROCEDURE — 71045 X-RAY EXAM CHEST 1 VIEW: CPT | Performed by: EMERGENCY MEDICINE

## 2023-12-05 RX ORDER — IPRATROPIUM BROMIDE AND ALBUTEROL SULFATE 2.5; .5 MG/3ML; MG/3ML
3 SOLUTION RESPIRATORY (INHALATION) EVERY 6 HOURS PRN
Status: DISCONTINUED | OUTPATIENT
Start: 2023-12-05 | End: 2023-12-12

## 2023-12-06 ENCOUNTER — APPOINTMENT (OUTPATIENT)
Dept: CV DIAGNOSTICS | Facility: HOSPITAL | Age: 53
End: 2023-12-06
Attending: EMERGENCY MEDICINE
Payer: MEDICARE

## 2023-12-06 ENCOUNTER — APPOINTMENT (OUTPATIENT)
Dept: ULTRASOUND IMAGING | Facility: HOSPITAL | Age: 53
End: 2023-12-06
Attending: HOSPITALIST
Payer: MEDICARE

## 2023-12-06 ENCOUNTER — APPOINTMENT (OUTPATIENT)
Dept: CT IMAGING | Facility: HOSPITAL | Age: 53
End: 2023-12-06
Attending: EMERGENCY MEDICINE
Payer: MEDICARE

## 2023-12-06 PROBLEM — I26.99 OTHER ACUTE PULMONARY EMBOLISM, UNSPECIFIED WHETHER ACUTE COR PULMONALE PRESENT (HCC): Status: ACTIVE | Noted: 2023-12-06

## 2023-12-06 PROBLEM — R73.9 HYPERGLYCEMIA: Status: ACTIVE | Noted: 2023-12-06

## 2023-12-06 LAB
APTT PPP: 26.4 SECONDS (ref 23.3–35.6)
ATRIAL RATE: 101 BPM
BASOPHILS # BLD: 0 X10(3) UL (ref 0–0.2)
BASOPHILS NFR BLD: 0 %
BILIRUB UR QL: NEGATIVE
BNP SERPL-MCNC: 15 PG/ML
CLARITY UR: CLEAR
COLOR UR: COLORLESS
EOSINOPHIL # BLD: 0.07 X10(3) UL (ref 0–0.7)
EOSINOPHIL NFR BLD: 1 %
EST. AVERAGE GLUCOSE BLD GHB EST-MCNC: 151 MG/DL (ref 68–126)
GLUCOSE BLDC GLUCOMTR-MCNC: 287 MG/DL (ref 70–99)
GLUCOSE BLDC GLUCOMTR-MCNC: 295 MG/DL (ref 70–99)
GLUCOSE BLDC GLUCOMTR-MCNC: 319 MG/DL (ref 70–99)
GLUCOSE BLDC GLUCOMTR-MCNC: 331 MG/DL (ref 70–99)
GLUCOSE BLDC GLUCOMTR-MCNC: 332 MG/DL (ref 70–99)
GLUCOSE BLDC GLUCOMTR-MCNC: 371 MG/DL (ref 70–99)
GLUCOSE BLDC GLUCOMTR-MCNC: 374 MG/DL (ref 70–99)
GLUCOSE BLDC GLUCOMTR-MCNC: 425 MG/DL (ref 70–99)
GLUCOSE BLDC GLUCOMTR-MCNC: 428 MG/DL (ref 70–99)
GLUCOSE BLDC GLUCOMTR-MCNC: 439 MG/DL (ref 70–99)
GLUCOSE UR-MCNC: >1000 MG/DL
HBA1C MFR BLD: 6.9 % (ref ?–5.7)
KETONES UR-MCNC: NEGATIVE MG/DL
LEUKOCYTE ESTERASE UR QL STRIP.AUTO: 75
LYMPHOCYTES NFR BLD: 3.04 X10(3) UL (ref 1–4)
LYMPHOCYTES NFR BLD: 46 %
METAMYELOCYTES # BLD: 0.07 X10(3) UL
METAMYELOCYTES NFR BLD: 1 %
MONOCYTES # BLD: 0.4 X10(3) UL (ref 0.1–1)
MONOCYTES NFR BLD: 6 %
MORPHOLOGY: NORMAL
MRSA DNA SPEC QL NAA+PROBE: POSITIVE
MYELOCYTES # BLD: 0.07 X10(3) UL
MYELOCYTES NFR BLD: 1 %
NEUTROPHILS NFR BLD: 44 %
NEUTS BAND NFR BLD: 1 %
NEUTS HYPERSEG # BLD: 2.97 X10(3) UL (ref 1.5–7.7)
NITRITE UR QL STRIP.AUTO: NEGATIVE
P AXIS: 42 DEGREES
P-R INTERVAL: 184 MS
PH UR: 6.5 [PH] (ref 5–8)
PLATELET MORPHOLOGY: NORMAL
PROT UR-MCNC: NEGATIVE MG/DL
Q-T INTERVAL: 382 MS
QRS DURATION: 82 MS
QTC CALCULATION (BEZET): 495 MS
R AXIS: -16 DEGREES
SARS-COV-2 RNA RESP QL NAA+PROBE: NOT DETECTED
SP GR UR STRIP: 1.02 (ref 1–1.03)
T AXIS: 49 DEGREES
TOTAL CELLS COUNTED BLD: 100
TROPONIN I SERPL HS-MCNC: <3 NG/L
UROBILINOGEN UR STRIP-ACNC: NORMAL
VENTRICULAR RATE: 101 BPM

## 2023-12-06 PROCEDURE — 83880 ASSAY OF NATRIURETIC PEPTIDE: CPT | Performed by: INTERNAL MEDICINE

## 2023-12-06 PROCEDURE — 96375 TX/PRO/DX INJ NEW DRUG ADDON: CPT

## 2023-12-06 PROCEDURE — 81001 URINALYSIS AUTO W/SCOPE: CPT | Performed by: HOSPITALIST

## 2023-12-06 PROCEDURE — 82962 GLUCOSE BLOOD TEST: CPT

## 2023-12-06 PROCEDURE — 93970 EXTREMITY STUDY: CPT | Performed by: HOSPITALIST

## 2023-12-06 PROCEDURE — 96374 THER/PROPH/DIAG INJ IV PUSH: CPT

## 2023-12-06 PROCEDURE — 84484 ASSAY OF TROPONIN QUANT: CPT | Performed by: HOSPITALIST

## 2023-12-06 PROCEDURE — 87641 MR-STAPH DNA AMP PROBE: CPT | Performed by: EMERGENCY MEDICINE

## 2023-12-06 PROCEDURE — 71260 CT THORAX DX C+: CPT | Performed by: EMERGENCY MEDICINE

## 2023-12-06 PROCEDURE — 93306 TTE W/DOPPLER COMPLETE: CPT | Performed by: EMERGENCY MEDICINE

## 2023-12-06 PROCEDURE — S0028 INJECTION, FAMOTIDINE, 20 MG: HCPCS | Performed by: EMERGENCY MEDICINE

## 2023-12-06 PROCEDURE — 94660 CPAP INITIATION&MGMT: CPT

## 2023-12-06 RX ORDER — CYCLOBENZAPRINE HCL 5 MG
10 TABLET ORAL 3 TIMES DAILY PRN
Status: DISCONTINUED | OUTPATIENT
Start: 2023-12-06 | End: 2023-12-12

## 2023-12-06 RX ORDER — MONTELUKAST SODIUM 10 MG/1
10 TABLET ORAL DAILY
Status: DISCONTINUED | OUTPATIENT
Start: 2023-12-06 | End: 2023-12-12

## 2023-12-06 RX ORDER — BUPROPION HYDROCHLORIDE 150 MG/1
300 TABLET ORAL DAILY
Status: DISCONTINUED | OUTPATIENT
Start: 2023-12-06 | End: 2023-12-12

## 2023-12-06 RX ORDER — ACETAMINOPHEN 325 MG/1
325 TABLET ORAL EVERY 4 HOURS PRN
Status: DISCONTINUED | OUTPATIENT
Start: 2023-12-06 | End: 2023-12-12

## 2023-12-06 RX ORDER — FAMOTIDINE 10 MG/ML
20 INJECTION, SOLUTION INTRAVENOUS ONCE
Status: COMPLETED | OUTPATIENT
Start: 2023-12-06 | End: 2023-12-06

## 2023-12-06 RX ORDER — OXYBUTYNIN CHLORIDE 5 MG/1
5 TABLET, EXTENDED RELEASE ORAL DAILY
COMMUNITY

## 2023-12-06 RX ORDER — METHENAMINE HIPPURATE 1000 MG/1
0.5 TABLET ORAL 2 TIMES DAILY WITH MEALS
COMMUNITY

## 2023-12-06 RX ORDER — GABAPENTIN 600 MG/1
600 TABLET ORAL 3 TIMES DAILY
Status: DISCONTINUED | OUTPATIENT
Start: 2023-12-06 | End: 2023-12-12

## 2023-12-06 RX ORDER — CYCLOBENZAPRINE HCL 10 MG
10 TABLET ORAL 3 TIMES DAILY PRN
COMMUNITY

## 2023-12-06 RX ORDER — PHENAZOPYRIDINE HYDROCHLORIDE 200 MG/1
200 TABLET, FILM COATED ORAL 3 TIMES DAILY
COMMUNITY

## 2023-12-06 RX ORDER — ALBUTEROL SULFATE 90 UG/1
2 AEROSOL, METERED RESPIRATORY (INHALATION) EVERY 6 HOURS PRN
Status: DISCONTINUED | OUTPATIENT
Start: 2023-12-06 | End: 2023-12-12

## 2023-12-06 RX ORDER — DEXTROSE MONOHYDRATE 25 G/50ML
50 INJECTION, SOLUTION INTRAVENOUS
Status: DISCONTINUED | OUTPATIENT
Start: 2023-12-06 | End: 2023-12-12

## 2023-12-06 RX ORDER — BETHANECHOL CHLORIDE 10 MG/1
10 TABLET ORAL 3 TIMES DAILY
COMMUNITY

## 2023-12-06 RX ORDER — ACETAMINOPHEN 500 MG
500 TABLET ORAL EVERY 4 HOURS PRN
Status: DISCONTINUED | OUTPATIENT
Start: 2023-12-06 | End: 2023-12-12

## 2023-12-06 RX ORDER — ONDANSETRON 2 MG/ML
4 INJECTION INTRAMUSCULAR; INTRAVENOUS EVERY 6 HOURS PRN
Status: DISCONTINUED | OUTPATIENT
Start: 2023-12-06 | End: 2023-12-12

## 2023-12-06 RX ORDER — ENOXAPARIN SODIUM 100 MG/ML
100 INJECTION SUBCUTANEOUS ONCE
Status: COMPLETED | OUTPATIENT
Start: 2023-12-06 | End: 2023-12-06

## 2023-12-06 RX ORDER — PROCHLORPERAZINE EDISYLATE 5 MG/ML
5 INJECTION INTRAMUSCULAR; INTRAVENOUS EVERY 8 HOURS PRN
Status: DISCONTINUED | OUTPATIENT
Start: 2023-12-06 | End: 2023-12-12

## 2023-12-06 RX ORDER — INSULIN ASPART 100 [IU]/ML
0.2 INJECTION, SOLUTION INTRAVENOUS; SUBCUTANEOUS ONCE
Status: COMPLETED | OUTPATIENT
Start: 2023-12-06 | End: 2023-12-06

## 2023-12-06 RX ORDER — CLONAZEPAM 1 MG/1
1 TABLET ORAL 2 TIMES DAILY
Status: DISCONTINUED | OUTPATIENT
Start: 2023-12-06 | End: 2023-12-12

## 2023-12-06 RX ORDER — NICOTINE POLACRILEX 4 MG
30 LOZENGE BUCCAL
Status: DISCONTINUED | OUTPATIENT
Start: 2023-12-06 | End: 2023-12-12

## 2023-12-06 RX ORDER — DIPHENHYDRAMINE HYDROCHLORIDE 50 MG/ML
25 INJECTION INTRAMUSCULAR; INTRAVENOUS ONCE
Status: COMPLETED | OUTPATIENT
Start: 2023-12-06 | End: 2023-12-06

## 2023-12-06 RX ORDER — CLONAZEPAM 1 MG/1
1 TABLET ORAL DAILY PRN
Status: DISCONTINUED | OUTPATIENT
Start: 2023-12-06 | End: 2023-12-12

## 2023-12-06 RX ORDER — QUETIAPINE FUMARATE 300 MG/1
300 TABLET, FILM COATED ORAL 2 TIMES DAILY
COMMUNITY

## 2023-12-06 RX ORDER — ENOXAPARIN SODIUM 100 MG/ML
1 INJECTION SUBCUTANEOUS EVERY 12 HOURS SCHEDULED
Status: DISCONTINUED | OUTPATIENT
Start: 2023-12-06 | End: 2023-12-12

## 2023-12-06 RX ORDER — DIPHENHYDRAMINE HCL 25 MG
25 CAPSULE ORAL EVERY 8 HOURS PRN
Status: DISCONTINUED | OUTPATIENT
Start: 2023-12-06 | End: 2023-12-12

## 2023-12-06 RX ORDER — OXYBUTYNIN CHLORIDE 5 MG/1
5 TABLET, EXTENDED RELEASE ORAL DAILY
Status: DISCONTINUED | OUTPATIENT
Start: 2023-12-06 | End: 2023-12-12

## 2023-12-06 RX ORDER — BETHANECHOL CHLORIDE 5 MG
10 TABLET ORAL 3 TIMES DAILY
Status: DISCONTINUED | OUTPATIENT
Start: 2023-12-06 | End: 2023-12-12

## 2023-12-06 RX ORDER — ALBUTEROL SULFATE 90 UG/1
2 AEROSOL, METERED RESPIRATORY (INHALATION) EVERY 6 HOURS PRN
COMMUNITY

## 2023-12-06 RX ORDER — ERGOCALCIFEROL 1.25 MG/1
50000 CAPSULE ORAL
COMMUNITY

## 2023-12-06 RX ORDER — INSULIN ASPART 100 [IU]/ML
0.15 INJECTION, SOLUTION INTRAVENOUS; SUBCUTANEOUS ONCE
Status: COMPLETED | OUTPATIENT
Start: 2023-12-06 | End: 2023-12-06

## 2023-12-06 RX ORDER — LORATADINE 10 MG/1
10 TABLET ORAL DAILY
COMMUNITY

## 2023-12-06 RX ORDER — TRAZODONE HYDROCHLORIDE 50 MG/1
100 TABLET ORAL NIGHTLY PRN
Status: DISCONTINUED | OUTPATIENT
Start: 2023-12-06 | End: 2023-12-12

## 2023-12-06 RX ORDER — DIVALPROEX SODIUM 500 MG/1
500 TABLET, EXTENDED RELEASE ORAL 2 TIMES DAILY
Status: DISCONTINUED | OUTPATIENT
Start: 2023-12-06 | End: 2023-12-12

## 2023-12-06 RX ORDER — INSULIN GLARGINE 100 [IU]/ML
20 INJECTION, SOLUTION SUBCUTANEOUS EVERY MORNING
COMMUNITY

## 2023-12-06 RX ORDER — INSULIN LISPRO 100 [IU]/ML
14 INJECTION, SOLUTION INTRAVENOUS; SUBCUTANEOUS DAILY
COMMUNITY
End: 2023-12-12

## 2023-12-06 RX ORDER — NICOTINE POLACRILEX 4 MG
15 LOZENGE BUCCAL
Status: DISCONTINUED | OUTPATIENT
Start: 2023-12-06 | End: 2023-12-12

## 2023-12-06 RX ORDER — METHENAMINE HIPPURATE 1000 MG/1
0.5 TABLET ORAL 2 TIMES DAILY WITH MEALS
Status: DISCONTINUED | OUTPATIENT
Start: 2023-12-06 | End: 2023-12-12

## 2023-12-06 RX ORDER — PHENAZOPYRIDINE HYDROCHLORIDE 100 MG/1
200 TABLET, FILM COATED ORAL 3 TIMES DAILY
Status: COMPLETED | OUTPATIENT
Start: 2023-12-06 | End: 2023-12-07

## 2023-12-06 RX ORDER — INSULIN GLARGINE 100 [IU]/ML
56 INJECTION, SOLUTION SUBCUTANEOUS NIGHTLY
COMMUNITY

## 2023-12-06 NOTE — ED INITIAL ASSESSMENT (HPI)
Pt to ED via [facility] for c/o abdominal pain and diarrhea. Pt also states that she has a UTI and is COVID positive. Pt reported chest pain PTA to ED. Pt reports difficulty breathing, SPO2 in the upper 90's on room air, pt speaking in full sentences with no difficulty breathing.

## 2023-12-06 NOTE — PLAN OF CARE
AxOx4, RA CPAP at night, no home O2, NSR/ST, supra pubiv cath chronic, Wheelchair bound baseline. ACHS. PLAN: Anticoagulation, ECHO was 65-70%, US doppler BLE neg for DVT  Problem: Patient Centered Care  Goal: Patient preferences are identified and integrated in the patient's plan of care  Description: Interventions:  - What would you like us to know as we care for you? From [facility]  - Provide timely, complete, and accurate information to patient/family  - Incorporate patient and family knowledge, values, beliefs, and cultural backgrounds into the planning and delivery of care  - Encourage patient/family to participate in care and decision-making at the level they choose  - Honor patient and family perspectives and choices  Outcome: Progressing     Problem: SAFETY ADULT - FALL  Goal: Free from fall injury  Description: INTERVENTIONS:  - Assess pt frequently for physical needs  - Identify cognitive and physical deficits and behaviors that affect risk of falls.   - Sophia fall precautions as indicated by assessment.  - Educate pt/family on patient safety including physical limitations  - Instruct pt to call for assistance with activity based on assessment  - Modify environment to reduce risk of injury  - Provide assistive devices as appropriate  - Consider OT/PT consult to assist with strengthening/mobility  - Encourage toileting schedule  Outcome: Progressing     Problem: Patient/Family Goals  Goal: Patient/Family Long Term Goal  Description: Patient's Long Term Goal: to go back to [place]    Interventions:  - See additional Care Plan goals for specific interventions  Outcome: Progressing  Goal: Patient/Family Short Term Goal  Description: Patient's Short Term Goal: have less pain to my bladder    Interventions:   - resume pyridium  - See additional Care Plan goals for specific interventions  Outcome: Progressing     Problem: RESPIRATORY - ADULT  Goal: Achieves optimal ventilation and oxygenation  Description: INTERVENTIONS:  - Assess for changes in respiratory status  - Assess for changes in mentation and behavior  - Position to facilitate oxygenation and minimize respiratory effort  - Oxygen supplementation based on oxygen saturation or ABGs  - Provide Smoking Cessation handout, if applicable  - Encourage broncho-pulmonary hygiene including cough, deep breathe, Incentive Spirometry  - Assess the need for suctioning and perform as needed  - Assess and instruct to report SOB or any respiratory difficulty  - Respiratory Therapy support as indicated  - Manage/alleviate anxiety  - Monitor for signs/symptoms of CO2 retention  Outcome: Progressing

## 2023-12-06 NOTE — IMAGING NOTE
IR Brief Note    47 y/o F with PMHx of  ANAID, HTN,  COPD now presenting with SOB/Chest pain. Currently on room air, HD stable, slight tachycardia. Imaging Personally Reviewed  CT PE prelim:  Acute PE in right lower lobe >upper lobe branches with minimal extension to right main pulmonary artery. Small to moderate clot burden (per radiology). Equivocal Findings for right heart strain (per radiology). 12/6/2023     1:32 AM 12/6/2023     1:45 AM   Vitals History   BP  132/91   Pulse  107   Resp  18   SpO2  97 %   Weight 208 lbs 12 oz    BMI 40.77 kg/m2           Recent Labs   Lab 12/05/23  2220   TROPHS <3       Recent Labs   Lab 12/05/23  2220   RBC 3.79*   HGB 11.1*   HCT 34.0*   MCV 89.7   MCH 29.3   MCHC 32.6   RDW 15.3*   NEPRELIM 2.79   WBC 6.6   .0       Recent Labs   Lab 12/05/23  2220   *   BUN 13   CREATSERUM 1.02   EGFRCR 66   CA 8.8   *   K 4.6   CL 97*   CO2 24.0       Plan:  47 y/o F with PMHx of  ANAID, HTN,  COPD now presenting with chest pain/SOB with segmental right lower>right upper lobe PE with minimal extension to right main pulmonary artery. Equivocal findings of right heart strain on imaging, with normal troponin. --recommend treatment with systemic anticoagulation per primary service, no indication for IR thrombectomy/thrombolytics at this time.   --plan discussed with Dr. Caity Paz  --please call with questions    Saida Nichole MD

## 2023-12-06 NOTE — CDS QUERY
How to Answer this Query    1.) Click \"Edit Button\" on the toolbar  2.) Type an \"X\" in the bracket for the diagnosis that applies. (You may also add additional clinical details as you feel necessary to substantiate your response). 3.) Finally click \"Sign\" to complete response. Thank you     Alessio Kennedy  Dear Doctor Mona Nava:  Clinical information (provided below) suggests condition(s) associated with Potential for Impaired Pulmonary and Cardiac Function. PLEASE (X) ALL DIAGNOSES THAT APPLY. SELECTION BY PHYSICIAN ONLY      ( )  Acute Cor Pulmonale    ( )  Chronic Cor Pulmonale    ( )  Pulmonary Hypertension with Acute Right Ventricular Strain and/or Failure    ( x )  Pulmonary Hypertension without Right Ventricular Strain and/or Failure    ( )  Right Heart Failure due to Left Heart Failure    ( )  Other (please specify):           Clinical Indicators:     ED provider: Other acute pulmonary embolism, unspecified whether acute cor pulmonale present     H&P: Pulmonary embolus  - likely 2/2 recent COVID infection  - concern for RH strain, IR consulted and review images, no need for catheter directed thrombolytics, normal troponin    Echo: 12-6 Left ventricle: The cavity size was normal. Wall thickness was normal.      Systolic function was normal. The estimated ejection fraction was 65-70%,  by visual assessment. Wall motion is normal; there are no regional wall  motion abnormalities. Doppler parameters are consistent with abnormal  left ventricular relaxation - grade 1 diastolic dysfunction. 2. Right ventricle: The cavity size was normal. Systolic function was  normal.   3. Pulmonary arteries: The peak systolic pressure is 07YB Hg. 4. Pericardium, extracardiac: A trivial pericardial effusion was identified. 12-6 CT Chest: Acute branching embolus extending from the right main pulmonary artery into segmental branches in the right upper and lower lobes.   There is a small to moderate clot burden. The RV and LV ratio measures slightly greater than 1 with bowing of the interventricular septum. No reflux of contrast into the IVC. Findings are equivocal for right heart strain. Meds include lovenox  If you have any questions, please contact Clinical :  Nurys Leos RN CCDS at [phone]     Thank You!     THIS FORM IS A PERMANENT PART OF THE MEDICAL RECORD

## 2023-12-06 NOTE — PLAN OF CARE
Patient admitted from ED, A&Ox4 on 2L/NC, no home O2. Lovenox to resume on floor for small acute PE. Wounds to left flank and Bottom traced and dressed. Suprapubic cathether present on admission. [address] of [place] notified of admission. Safety precautions maintained, call light and personal belongings within reach. Plan is for Echocardiogram today. Problem: Patient Centered Care  Goal: Patient preferences are identified and integrated in the patient's plan of care  Description: Interventions:  - What would you like us to know as we care for you? From [facility] care [facility]  - Provide timely, complete, and accurate information to patient/family  - Incorporate patient and family knowledge, values, beliefs, and cultural backgrounds into the planning and delivery of care  - Encourage patient/family to participate in care and decision-making at the level they choose  - Honor patient and family perspectives and choices  12/6/2023 0736 by Marilyn Quintanilla RN  Outcome: Progressing  12/6/2023 0736 by Marilyn Quintanilla RN  Outcome: Progressing     Problem: SAFETY ADULT - FALL  Goal: Free from fall injury  Description: INTERVENTIONS:  - Assess pt frequently for physical needs  - Identify cognitive and physical deficits and behaviors that affect risk of falls.   - [place] fall precautions as indicated by assessment.  - Educate pt/family on patient safety including physical limitations  - Instruct pt to call for assistance with activity based on assessment  - Modify environment to reduce risk of injury  - Provide assistive devices as appropriate  - Consider OT/PT consult to assist with strengthening/mobility  - Encourage toileting schedule  12/6/2023 0736 by Marilyn Quintanilla RN  Outcome: Progressing  12/6/2023 0736 by Marilyn Quintanilla RN  Outcome: Progressing     Problem: Patient/Family Goals  Goal: Patient/Family Long Term Goal  Description: Patient's Long Term Goal: to go back to [facility]    Interventions:  - See additional Care Plan goals for specific interventions  12/6/2023 0736 by Stefania Reyez RN  Outcome: Progressing  12/6/2023 0736 by Stefania Reyez RN  Outcome: Progressing  Goal: Patient/Family Short Term Goal  Description: Patient's Short Term Goal: have less pain to my bladder    Interventions:   - resume pyridium  - See additional Care Plan goals for specific interventions  12/6/2023 0736 by Stefania Reyez RN  Outcome: Progressing  12/6/2023 0736 by Stefania Reyez RN  Outcome: Progressing     Problem: RESPIRATORY - ADULT  Goal: Achieves optimal ventilation and oxygenation  Description: INTERVENTIONS:  - Assess for changes in respiratory status  - Assess for changes in mentation and behavior  - Position to facilitate oxygenation and minimize respiratory effort  - Oxygen supplementation based on oxygen saturation or ABGs  - Provide Smoking Cessation handout, if applicable  - Encourage broncho-pulmonary hygiene including cough, deep breathe, Incentive Spirometry  - Assess the need for suctioning and perform as needed  - Assess and instruct to report SOB or any respiratory difficulty  - Respiratory Therapy support as indicated  - Manage/alleviate anxiety  - Monitor for signs/symptoms of CO2 retention  12/6/2023 0736 by Stefania Reyez RN  Outcome: Progressing  12/6/2023 0736 by Stefania Reyez RN  Outcome: Progressing

## 2023-12-06 NOTE — PROGRESS NOTES
PT orders received via functional mobility screen, chart reviewed. Pt is a LTC resident and complete care in 2016 per previous rehab notes. Pt is care home level of care. PT will complete current orders. Return to LTC is recommended as medical progress allows.

## 2023-12-06 NOTE — CDS QUERY
How to answer this Query:     1.) DON'T CLICK COSIGN BUTTON FIRST  2.) Click \"3 dots. Jailyn Led Jailyn Led \" to the right of cosign button and click EDIT on the toolbar.  2.) Type an \"X\" in the bracket for the diagnosis that applies. (You may also add additional clinical details as you feel necessary to substantiate your response). 3.) Finally click \"Sign\" to complete response. Thank Brian Milan    Dear Dr. Yulisa Robles   Please provide additional documentation in the patient's medical record supportive of your documented diagnosis of Acute respiratory failure         (  )     __ Acute Respiratory Failure remains a known or suspected condition for this patient and is further supported by ( provide additional documentation ) ________________                 ( x )     Acute Respiratory Failure was ruled out         (  )     other: please specify _______________     __________________________________________________________________   Clinical Indicators:  ED provider: pulmonary embolus    H&P: Pulmonary Embolus - likely 2/2 recent COVID infection  - concern for RH strain,  Pulmonary consult:   Acute respiratory failure - due to PE  - off oxygen this morning  - further as below  Acute PE - hemodynamically stable, normal troponin and BNP, no prior VTE,  provoking factors are immobility     12-5 Admit O2 sat 94% on room air   12-6 placed on 2 L O2/NC during night O2 sat ranged 95-97%  12-6 O2 sat 91% on room air. Remained on room air O2 sat 93%  12-6 CT Chest: Acute branching embolus extending from the right main pulmonary artery into segmental branches in the right upper and lower lobes. There is a small to moderate clot burden. The RV and LV ratio measures slightly greater than 1 with bowing of the interventricular septum. No reflux of contrast into the IVC. Findings are equivocal for right heart strain.   If you have any questions, please contact Clinical : Kyrie Novoa RN CCDS  at  [phone]   Thank You!      THIS FORM IS A PERMANENT PART OF THE MEDICAL RECORD

## 2023-12-06 NOTE — ED QUICK NOTES
Orders for admission, patient is aware of plan and ready to go upstairs. Any questions, please call ED RN rock at extension [phone].      Patient Covid vaccination status: Unvaccinated     COVID Test Ordered in ED: None    COVID Suspicion at Admission: N/A    Running Infusions:  None    Mental Status/LOC at time of transport: a & o x4    Other pertinent information:   CIWA score: N/A   NIH score:  N/A

## 2023-12-06 NOTE — OCCUPATIONAL THERAPY NOTE
Orders received via functional mobility screen. Pt is a LTC resident and complete care in 2016 per previous OT notes. Pt continues with status. Will complete current orders.       Annie Rodriguez, [address]  Inpatient Rehabilitation  Occupational Therapy  [phone]

## 2023-12-07 LAB
ANION GAP SERPL CALC-SCNC: 7 MMOL/L (ref 0–18)
BILIRUB UR QL: NEGATIVE
BUN BLD-MCNC: 13 MG/DL (ref 9–23)
BUN/CREAT SERPL: 12.5 (ref 10–20)
CALCIUM BLD-MCNC: 9.6 MG/DL (ref 8.7–10.4)
CHLORIDE SERPL-SCNC: 97 MMOL/L (ref 98–112)
CLARITY UR: CLEAR
CO2 SERPL-SCNC: 28 MMOL/L (ref 21–32)
COLOR UR: YELLOW
CREAT BLD-MCNC: 1.04 MG/DL
DEPRECATED RDW RBC AUTO: 49.6 FL (ref 35.1–46.3)
EGFRCR SERPLBLD CKD-EPI 2021: 64 ML/MIN/1.73M2 (ref 60–?)
ERYTHROCYTE [DISTWIDTH] IN BLOOD BY AUTOMATED COUNT: 15.4 % (ref 11–15)
GLUCOSE BLD-MCNC: 306 MG/DL (ref 70–99)
GLUCOSE BLDC GLUCOMTR-MCNC: 275 MG/DL (ref 70–99)
GLUCOSE BLDC GLUCOMTR-MCNC: 312 MG/DL (ref 70–99)
GLUCOSE BLDC GLUCOMTR-MCNC: 365 MG/DL (ref 70–99)
GLUCOSE BLDC GLUCOMTR-MCNC: 379 MG/DL (ref 70–99)
GLUCOSE UR-MCNC: 500 MG/DL
HCT VFR BLD AUTO: 33.9 %
HGB BLD-MCNC: 11.2 G/DL
KETONES UR-MCNC: NEGATIVE MG/DL
LEUKOCYTE ESTERASE UR QL STRIP.AUTO: 25
MCH RBC QN AUTO: 29.6 PG (ref 26–34)
MCHC RBC AUTO-ENTMCNC: 33 G/DL (ref 31–37)
MCV RBC AUTO: 89.7 FL
NITRITE UR QL STRIP.AUTO: NEGATIVE
OSMOLALITY SERPL CALC.SUM OF ELEC: 286 MOSM/KG (ref 275–295)
PH UR: 7 [PH] (ref 5–8)
PLATELET # BLD AUTO: 179 10(3)UL (ref 150–450)
POTASSIUM SERPL-SCNC: 4.6 MMOL/L (ref 3.5–5.1)
PROT UR-MCNC: NEGATIVE MG/DL
RBC # BLD AUTO: 3.78 X10(6)UL
SODIUM SERPL-SCNC: 132 MMOL/L (ref 136–145)
SP GR UR STRIP: <1.005 (ref 1–1.03)
UROBILINOGEN UR STRIP-ACNC: NORMAL
WBC # BLD AUTO: 7.5 X10(3) UL (ref 4–11)

## 2023-12-07 PROCEDURE — 94799 UNLISTED PULMONARY SVC/PX: CPT

## 2023-12-07 PROCEDURE — 87086 URINE CULTURE/COLONY COUNT: CPT | Performed by: HOSPITALIST

## 2023-12-07 PROCEDURE — 82962 GLUCOSE BLOOD TEST: CPT

## 2023-12-07 PROCEDURE — 80048 BASIC METABOLIC PNL TOTAL CA: CPT | Performed by: HOSPITALIST

## 2023-12-07 PROCEDURE — 85027 COMPLETE CBC AUTOMATED: CPT | Performed by: HOSPITALIST

## 2023-12-07 PROCEDURE — 81001 URINALYSIS AUTO W/SCOPE: CPT | Performed by: HOSPITALIST

## 2023-12-07 PROCEDURE — 87493 C DIFF AMPLIFIED PROBE: CPT | Performed by: INTERNAL MEDICINE

## 2023-12-07 RX ORDER — VANCOMYCIN HYDROCHLORIDE 50 MG/ML
125 KIT ORAL DAILY
Status: DISCONTINUED | OUTPATIENT
Start: 2023-12-07 | End: 2023-12-12

## 2023-12-07 RX ORDER — DOXYCYCLINE HYCLATE 100 MG/1
100 CAPSULE ORAL EVERY 12 HOURS SCHEDULED
Status: DISCONTINUED | OUTPATIENT
Start: 2023-12-07 | End: 2023-12-12

## 2023-12-07 NOTE — PLAN OF CARE
Problem: Patient Centered Care  Goal: Patient preferences are identified and integrated in the patient's plan of care  Description: Interventions:  - What would you like us to know as we care for you? From [facility]  - Provide timely, complete, and accurate information to patient/family  - Incorporate patient and family knowledge, values, beliefs, and cultural backgrounds into the planning and delivery of care  - Encourage patient/family to participate in care and decision-making at the level they choose  - Honor patient and family perspectives and choices  Outcome: Progressing     Problem: SAFETY ADULT - FALL  Goal: Free from fall injury  Description: INTERVENTIONS:  - Assess pt frequently for physical needs  - Identify cognitive and physical deficits and behaviors that affect risk of falls.   - [place] fall precautions as indicated by assessment.  - Educate pt/family on patient safety including physical limitations  - Instruct pt to call for assistance with activity based on assessment  - Modify environment to reduce risk of injury  - Provide assistive devices as appropriate  - Consider OT/PT consult to assist with strengthening/mobility  - Encourage toileting schedule  Outcome: Progressing     Problem: Patient/Family Goals  Goal: Patient/Family Long Term Goal  Description: Patient's Long Term Goal: to go back to [facility]    Interventions:  - See additional Care Plan goals for specific interventions  Outcome: Progressing  Goal: Patient/Family Short Term Goal  Description: Patient's Short Term Goal: have less pain to my bladder    Interventions:   - resume pyridium  - See additional Care Plan goals for specific interventions  Outcome: Progressing     Problem: RESPIRATORY - ADULT  Goal: Achieves optimal ventilation and oxygenation  Description: INTERVENTIONS:  - Assess for changes in respiratory status  - Assess for changes in mentation and behavior  - Position to facilitate oxygenation and minimize respiratory effort  - Oxygen supplementation based on oxygen saturation or ABGs  - Provide Smoking Cessation handout, if applicable  - Encourage broncho-pulmonary hygiene including cough, deep breathe, Incentive Spirometry  - Assess the need for suctioning and perform as needed  - Assess and instruct to report SOB or any respiratory difficulty  - Respiratory Therapy support as indicated  - Manage/alleviate anxiety  - Monitor for signs/symptoms of CO2 retention  Outcome: Progressing     Problem: CARDIOVASCULAR - ADULT  Goal: Maintains optimal cardiac output and hemodynamic stability  Description: INTERVENTIONS:  - Monitor vital signs, rhythm, and trends  - Monitor for bleeding, hypotension and signs of decreased cardiac output  - Evaluate effectiveness of vasoactive medications to optimize hemodynamic stability  - Monitor arterial and/or venous puncture sites for bleeding and/or hematoma  - Assess quality of pulses, skin color and temperature  - Assess for signs of decreased coronary artery perfusion - ex. Angina  - Evaluate fluid balance, assess for edema, trend weights  Outcome: Progressing  Goal: Absence of cardiac arrhythmias or at baseline  Description: INTERVENTIONS:  - Continuous cardiac monitoring, monitor vital signs, obtain 12 lead EKG if indicated  - Evaluate effectiveness of antiarrhythmic and heart rate control medications as ordered  - Initiate emergency measures for life threatening arrhythmias  - Monitor electrolytes and administer replacement therapy as ordered  Outcome: Progressing     Monitoring vital signs per unit routine. UA/CX sent. Suprapubic catheter in place and draining, split gauze changed. Blood glucose monitoring. Denies pain. Safety precautions in place, call light within reach. Bed locked and in lowest position. Frequent rounding by nursing staff.

## 2023-12-07 NOTE — CM/SW NOTE
12/07/23 1100   CM/SW Referral Data   Referral Source    Reason for Referral Discharge planning   Informant EMR     Chart reviewed for discharge planning needs. Noted pt from [facility]- updates sent by SW via [employer]. / to remain available for support and/or discharge planning.      Thais Gaston MBA MSN, RN CTL/  [id number]

## 2023-12-07 NOTE — PLAN OF CARE
Problem: SAFETY ADULT - FALL  Goal: Free from fall injury  Description: INTERVENTIONS:  - Assess pt frequently for physical needs  - Identify cognitive and physical deficits and behaviors that affect risk of falls. - [place] fall precautions as indicated by assessment.  - Educate pt/family on patient safety including physical limitations  - Instruct pt to call for assistance with activity based on assessment  - Modify environment to reduce risk of injury  - Provide assistive devices as appropriate  - Consider OT/PT consult to assist with strengthening/mobility  - Encourage toileting schedule  Outcome: Progressing     Problem: RESPIRATORY - ADULT  Goal: Achieves optimal ventilation and oxygenation  Description: INTERVENTIONS:  - Assess for changes in respiratory status  - Assess for changes in mentation and behavior  - Position to facilitate oxygenation and minimize respiratory effort  - Oxygen supplementation based on oxygen saturation or ABGs  - Provide Smoking Cessation handout, if applicable  - Encourage broncho-pulmonary hygiene including cough, deep breathe, Incentive Spirometry  - Assess the need for suctioning and perform as needed  - Assess and instruct to report SOB or any respiratory difficulty  - Respiratory Therapy support as indicated  - Manage/alleviate anxiety  - Monitor for signs/symptoms of CO2 retention  Outcome: Progressing    Received awake,oriented. Slept fairly during the night. HR 's. Blood sugar still elevated-insulin coverage givne. Plans to go back to LTAC once cleared. Suprapubic catheter maintained. Will continue to monitor patient.

## 2023-12-08 LAB
ANION GAP SERPL CALC-SCNC: 6 MMOL/L (ref 0–18)
BUN BLD-MCNC: 19 MG/DL (ref 9–23)
BUN/CREAT SERPL: 15.4 (ref 10–20)
C DIFF TOX B STL QL: NEGATIVE
CALCIUM BLD-MCNC: 9.8 MG/DL (ref 8.7–10.4)
CHLORIDE SERPL-SCNC: 93 MMOL/L (ref 98–112)
CO2 SERPL-SCNC: 29 MMOL/L (ref 21–32)
CREAT BLD-MCNC: 1.23 MG/DL
DEPRECATED RDW RBC AUTO: 49.6 FL (ref 35.1–46.3)
EGFRCR SERPLBLD CKD-EPI 2021: 53 ML/MIN/1.73M2 (ref 60–?)
ERYTHROCYTE [DISTWIDTH] IN BLOOD BY AUTOMATED COUNT: 15.4 % (ref 11–15)
GLUCOSE BLD-MCNC: 342 MG/DL (ref 70–99)
GLUCOSE BLDC GLUCOMTR-MCNC: 272 MG/DL (ref 70–99)
GLUCOSE BLDC GLUCOMTR-MCNC: 323 MG/DL (ref 70–99)
GLUCOSE BLDC GLUCOMTR-MCNC: 334 MG/DL (ref 70–99)
GLUCOSE BLDC GLUCOMTR-MCNC: 349 MG/DL (ref 70–99)
GLUCOSE BLDC GLUCOMTR-MCNC: 357 MG/DL (ref 70–99)
GLUCOSE BLDC GLUCOMTR-MCNC: 383 MG/DL (ref 70–99)
GLUCOSE BLDC GLUCOMTR-MCNC: 419 MG/DL (ref 70–99)
HCT VFR BLD AUTO: 36.2 %
HGB BLD-MCNC: 11.9 G/DL
MCH RBC QN AUTO: 29.3 PG (ref 26–34)
MCHC RBC AUTO-ENTMCNC: 32.9 G/DL (ref 31–37)
MCV RBC AUTO: 89.2 FL
OSMOLALITY SERPL CALC.SUM OF ELEC: 282 MOSM/KG (ref 275–295)
PLATELET # BLD AUTO: 204 10(3)UL (ref 150–450)
POTASSIUM SERPL-SCNC: 4.5 MMOL/L (ref 3.5–5.1)
RBC # BLD AUTO: 4.06 X10(6)UL
SODIUM SERPL-SCNC: 128 MMOL/L (ref 136–145)
WBC # BLD AUTO: 11 X10(3) UL (ref 4–11)

## 2023-12-08 PROCEDURE — 82962 GLUCOSE BLOOD TEST: CPT

## 2023-12-08 PROCEDURE — 80048 BASIC METABOLIC PNL TOTAL CA: CPT | Performed by: HOSPITALIST

## 2023-12-08 PROCEDURE — 85027 COMPLETE CBC AUTOMATED: CPT | Performed by: HOSPITALIST

## 2023-12-08 NOTE — CM/SW NOTE
Per mounika Sanchez w/ [address] - they will need rapid COVID test completed prior to DC. SW sent test from 12/6 - pending if that is sufficient for them or if additional needed. JAH spoke to [place] w/ [address] (bed bound) set on WILL CALL through 12/10. PCS completed and will need date added day of actual DC. UPDATE: Per mounika Frances - additional COVID test is NOT needed prior to DC. PLAN: [facility], Ambulance on [facility], PCS needs date - pending med clear      SW/JASON to remain available for support and/or discharge planning.          Yudi Caballero, MSW, [address] [facility]

## 2023-12-08 NOTE — PLAN OF CARE
Patient A&Ox4 on room air. Hypergylcemic overnight, MD paged blood sugar over sliding scale parameters. Additional novolog given at midnight when blood sugar rechecked per MD orders. Stool sample sent for rule out c.diff. Tylenol also given prn headache. Split gauze for suprapubic catheter changed due to thick yellow drainage and saturated. Urine culture still pending. Safety precautions maintained, call light and personal belongings within reach. Plan is for back to [address] [place] [address] when medically cleared. Problem: Patient Centered Care  Goal: Patient preferences are identified and integrated in the patient's plan of care  Description: Interventions:  - What would you like us to know as we care for you? From [facility]  - Provide timely, complete, and accurate information to patient/family  - Incorporate patient and family knowledge, values, beliefs, and cultural backgrounds into the planning and delivery of care  - Encourage patient/family to participate in care and decision-making at the level they choose  - Honor patient and family perspectives and choices  Outcome: Progressing     Problem: SAFETY ADULT - FALL  Goal: Free from fall injury  Description: INTERVENTIONS:  - Assess pt frequently for physical needs  - Identify cognitive and physical deficits and behaviors that affect risk of falls.   - [place] fall precautions as indicated by assessment.  - Educate pt/family on patient safety including physical limitations  - Instruct pt to call for assistance with activity based on assessment  - Modify environment to reduce risk of injury  - Provide assistive devices as appropriate  - Consider OT/PT consult to assist with strengthening/mobility  - Encourage toileting schedule  Outcome: Progressing     Problem: Patient/Family Goals  Goal: Patient/Family Long Term Goal  Description: Patient's Long Term Goal: to go back to [facility]    Interventions:  - See additional Care Plan goals for specific interventions  Outcome: Progressing  Goal: Patient/Family Short Term Goal  Description: Patient's Short Term Goal: have less pain to my bladder    Interventions:   - resume pyridium  - See additional Care Plan goals for specific interventions  Outcome: Progressing     Problem: RESPIRATORY - ADULT  Goal: Achieves optimal ventilation and oxygenation  Description: INTERVENTIONS:  - Assess for changes in respiratory status  - Assess for changes in mentation and behavior  - Position to facilitate oxygenation and minimize respiratory effort  - Oxygen supplementation based on oxygen saturation or ABGs  - Provide Smoking Cessation handout, if applicable  - Encourage broncho-pulmonary hygiene including cough, deep breathe, Incentive Spirometry  - Assess the need for suctioning and perform as needed  - Assess and instruct to report SOB or any respiratory difficulty  - Respiratory Therapy support as indicated  - Manage/alleviate anxiety  - Monitor for signs/symptoms of CO2 retention  Outcome: Progressing     Problem: CARDIOVASCULAR - ADULT  Goal: Maintains optimal cardiac output and hemodynamic stability  Description: INTERVENTIONS:  - Monitor vital signs, rhythm, and trends  - Monitor for bleeding, hypotension and signs of decreased cardiac output  - Evaluate effectiveness of vasoactive medications to optimize hemodynamic stability  - Monitor arterial and/or venous puncture sites for bleeding and/or hematoma  - Assess quality of pulses, skin color and temperature  - Assess for signs of decreased coronary artery perfusion - ex.  Angina  - Evaluate fluid balance, assess for edema, trend weights  Outcome: Progressing  Goal: Absence of cardiac arrhythmias or at baseline  Description: INTERVENTIONS:  - Continuous cardiac monitoring, monitor vital signs, obtain 12 lead EKG if indicated  - Evaluate effectiveness of antiarrhythmic and heart rate control medications as ordered  - Initiate emergency measures for life threatening arrhythmias  - Monitor electrolytes and administer replacement therapy as ordered  Outcome: Progressing

## 2023-12-09 LAB
ANION GAP SERPL CALC-SCNC: 9 MMOL/L (ref 0–18)
ANION GAP SERPL CALC-SCNC: 9 MMOL/L (ref 0–18)
BUN BLD-MCNC: 21 MG/DL (ref 9–23)
BUN BLD-MCNC: 21 MG/DL (ref 9–23)
BUN/CREAT SERPL: 17.8 (ref 10–20)
BUN/CREAT SERPL: 20 (ref 10–20)
CALCIUM BLD-MCNC: 9 MG/DL (ref 8.7–10.4)
CALCIUM BLD-MCNC: 9.4 MG/DL (ref 8.7–10.4)
CHLORIDE SERPL-SCNC: 90 MMOL/L (ref 98–112)
CHLORIDE SERPL-SCNC: 93 MMOL/L (ref 98–112)
CO2 SERPL-SCNC: 25 MMOL/L (ref 21–32)
CO2 SERPL-SCNC: 26 MMOL/L (ref 21–32)
CREAT BLD-MCNC: 1.05 MG/DL
CREAT BLD-MCNC: 1.18 MG/DL
DEPRECATED RDW RBC AUTO: 49.3 FL (ref 35.1–46.3)
EGFRCR SERPLBLD CKD-EPI 2021: 55 ML/MIN/1.73M2 (ref 60–?)
EGFRCR SERPLBLD CKD-EPI 2021: 64 ML/MIN/1.73M2 (ref 60–?)
ERYTHROCYTE [DISTWIDTH] IN BLOOD BY AUTOMATED COUNT: 15.6 % (ref 11–15)
GLUCOSE BLD-MCNC: 288 MG/DL (ref 70–99)
GLUCOSE BLD-MCNC: 380 MG/DL (ref 70–99)
GLUCOSE BLDC GLUCOMTR-MCNC: 301 MG/DL (ref 70–99)
GLUCOSE BLDC GLUCOMTR-MCNC: 348 MG/DL (ref 70–99)
GLUCOSE BLDC GLUCOMTR-MCNC: 387 MG/DL (ref 70–99)
GLUCOSE BLDC GLUCOMTR-MCNC: 415 MG/DL (ref 70–99)
HCT VFR BLD AUTO: 35.7 %
HGB BLD-MCNC: 11.8 G/DL
MCH RBC QN AUTO: 28.9 PG (ref 26–34)
MCHC RBC AUTO-ENTMCNC: 33.1 G/DL (ref 31–37)
MCV RBC AUTO: 87.3 FL
OSMOLALITY SERPL CALC.SUM OF ELEC: 278 MOSM/KG (ref 275–295)
OSMOLALITY SERPL CALC.SUM OF ELEC: 279 MOSM/KG (ref 275–295)
PLATELET # BLD AUTO: 173 10(3)UL (ref 150–450)
POTASSIUM SERPL-SCNC: 3.9 MMOL/L (ref 3.5–5.1)
POTASSIUM SERPL-SCNC: 4.2 MMOL/L (ref 3.5–5.1)
RBC # BLD AUTO: 4.09 X10(6)UL
SODIUM SERPL-SCNC: 125 MMOL/L (ref 136–145)
SODIUM SERPL-SCNC: 127 MMOL/L (ref 136–145)
WBC # BLD AUTO: 9.2 X10(3) UL (ref 4–11)

## 2023-12-09 PROCEDURE — 82962 GLUCOSE BLOOD TEST: CPT

## 2023-12-09 PROCEDURE — 80048 BASIC METABOLIC PNL TOTAL CA: CPT | Performed by: INTERNAL MEDICINE

## 2023-12-09 PROCEDURE — 80048 BASIC METABOLIC PNL TOTAL CA: CPT | Performed by: HOSPITALIST

## 2023-12-09 PROCEDURE — 94799 UNLISTED PULMONARY SVC/PX: CPT

## 2023-12-09 PROCEDURE — 85027 COMPLETE CBC AUTOMATED: CPT | Performed by: HOSPITALIST

## 2023-12-09 RX ORDER — SODIUM CHLORIDE 9 MG/ML
INJECTION, SOLUTION INTRAVENOUS CONTINUOUS
Status: DISCONTINUED | OUTPATIENT
Start: 2023-12-09 | End: 2023-12-12

## 2023-12-09 NOTE — PLAN OF CARE
Problem: Patient Centered Care  Goal: Patient preferences are identified and integrated in the patient's plan of care  Description: Interventions:  - What would you like us to know as we care for you? From [facility]  - Provide timely, complete, and accurate information to patient/family  - Incorporate patient and family knowledge, values, beliefs, and cultural backgrounds into the planning and delivery of care  - Encourage patient/family to participate in care and decision-making at the level they choose  - Honor patient and family perspectives and choices  Outcome: Progressing     Problem: SAFETY ADULT - FALL  Goal: Free from fall injury  Description: INTERVENTIONS:  - Assess pt frequently for physical needs  - Identify cognitive and physical deficits and behaviors that affect risk of falls.   - [place] fall precautions as indicated by assessment.  - Educate pt/family on patient safety including physical limitations  - Instruct pt to call for assistance with activity based on assessment  - Modify environment to reduce risk of injury  - Provide assistive devices as appropriate  - Consider OT/PT consult to assist with strengthening/mobility  - Encourage toileting schedule  Outcome: Progressing     Problem: Patient/Family Goals  Goal: Patient/Family Long Term Goal  Description: Patient's Long Term Goal: to go back to [facility]    Interventions:  - See additional Care Plan goals for specific interventions  Outcome: Progressing  Goal: Patient/Family Short Term Goal  Description: Patient's Short Term Goal: have less pain to my bladder    Interventions:   - resume pyridium  - See additional Care Plan goals for specific interventions  Outcome: Progressing     Problem: RESPIRATORY - ADULT  Goal: Achieves optimal ventilation and oxygenation  Description: INTERVENTIONS:  - Assess for changes in respiratory status  - Assess for changes in mentation and behavior  - Position to facilitate oxygenation and minimize respiratory effort  - Oxygen supplementation based on oxygen saturation or ABGs  - Provide Smoking Cessation handout, if applicable  - Encourage broncho-pulmonary hygiene including cough, deep breathe, Incentive Spirometry  - Assess the need for suctioning and perform as needed  - Assess and instruct to report SOB or any respiratory difficulty  - Respiratory Therapy support as indicated  - Manage/alleviate anxiety  - Monitor for signs/symptoms of CO2 retention  Outcome: Progressing     Problem: CARDIOVASCULAR - ADULT  Goal: Maintains optimal cardiac output and hemodynamic stability  Description: INTERVENTIONS:  - Monitor vital signs, rhythm, and trends  - Monitor for bleeding, hypotension and signs of decreased cardiac output  - Evaluate effectiveness of vasoactive medications to optimize hemodynamic stability  - Monitor arterial and/or venous puncture sites for bleeding and/or hematoma  - Assess quality of pulses, skin color and temperature  - Assess for signs of decreased coronary artery perfusion - ex.  Angina  - Evaluate fluid balance, assess for edema, trend weights  Outcome: Progressing  Goal: Absence of cardiac arrhythmias or at baseline  Description: INTERVENTIONS:  - Continuous cardiac monitoring, monitor vital signs, obtain 12 lead EKG if indicated  - Evaluate effectiveness of antiarrhythmic and heart rate control medications as ordered  - Initiate emergency measures for life threatening arrhythmias  - Monitor electrolytes and administer replacement therapy as ordered  Outcome: Progressing

## 2023-12-09 NOTE — PLAN OF CARE
Problem: Patient Centered Care  Goal: Patient preferences are identified and integrated in the patient's plan of care  Description: Interventions:  - What would you like us to know as we care for you?  From [facility]  - Provide timely, complete, and accurate information to patient/family  - Incorporate patient and family knowledge, values, beliefs, and cultural backgrounds into the planning and delivery of care  - Encourage patient/family to participate in care and decision-making at the level they choose  - Honor patient and family perspectives and choices  Outcome: Progressing

## 2023-12-10 LAB
ANION GAP SERPL CALC-SCNC: 6 MMOL/L (ref 0–18)
BUN BLD-MCNC: 18 MG/DL (ref 9–23)
BUN/CREAT SERPL: 17.8 (ref 10–20)
CALCIUM BLD-MCNC: 9 MG/DL (ref 8.7–10.4)
CHLORIDE SERPL-SCNC: 99 MMOL/L (ref 98–112)
CO2 SERPL-SCNC: 26 MMOL/L (ref 21–32)
CREAT BLD-MCNC: 1.01 MG/DL
DEPRECATED RDW RBC AUTO: 52.9 FL (ref 35.1–46.3)
EGFRCR SERPLBLD CKD-EPI 2021: 67 ML/MIN/1.73M2 (ref 60–?)
ERYTHROCYTE [DISTWIDTH] IN BLOOD BY AUTOMATED COUNT: 15.9 % (ref 11–15)
GLUCOSE BLD-MCNC: 313 MG/DL (ref 70–99)
GLUCOSE BLDC GLUCOMTR-MCNC: 185 MG/DL (ref 70–99)
GLUCOSE BLDC GLUCOMTR-MCNC: 228 MG/DL (ref 70–99)
GLUCOSE BLDC GLUCOMTR-MCNC: 313 MG/DL (ref 70–99)
GLUCOSE BLDC GLUCOMTR-MCNC: 314 MG/DL (ref 70–99)
HCT VFR BLD AUTO: 34.3 %
HGB BLD-MCNC: 10.8 G/DL
MCH RBC QN AUTO: 29 PG (ref 26–34)
MCHC RBC AUTO-ENTMCNC: 31.5 G/DL (ref 31–37)
MCV RBC AUTO: 92 FL
OSMOLALITY SERPL CALC.SUM OF ELEC: 286 MOSM/KG (ref 275–295)
PLATELET # BLD AUTO: 173 10(3)UL (ref 150–450)
POTASSIUM SERPL-SCNC: 4.4 MMOL/L (ref 3.5–5.1)
RBC # BLD AUTO: 3.73 X10(6)UL
SODIUM SERPL-SCNC: 131 MMOL/L (ref 136–145)
WBC # BLD AUTO: 9.4 X10(3) UL (ref 4–11)

## 2023-12-10 PROCEDURE — 82962 GLUCOSE BLOOD TEST: CPT

## 2023-12-10 PROCEDURE — 94799 UNLISTED PULMONARY SVC/PX: CPT

## 2023-12-10 PROCEDURE — 80048 BASIC METABOLIC PNL TOTAL CA: CPT | Performed by: HOSPITALIST

## 2023-12-10 PROCEDURE — 85027 COMPLETE CBC AUTOMATED: CPT | Performed by: HOSPITALIST

## 2023-12-10 RX ORDER — FLUCONAZOLE 150 MG/1
150 TABLET ORAL ONCE
Status: COMPLETED | OUTPATIENT
Start: 2023-12-10 | End: 2023-12-10

## 2023-12-10 NOTE — PLAN OF CARE
Problem: Patient Centered Care  Goal: Patient preferences are identified and integrated in the patient's plan of care  Description: Interventions:  - What would you like us to know as we care for you? From [facility]  - Provide timely, complete, and accurate information to patient/family  - Incorporate patient and family knowledge, values, beliefs, and cultural backgrounds into the planning and delivery of care  - Encourage patient/family to participate in care and decision-making at the level they choose  - Honor patient and family perspectives and choices  Outcome: Progressing   Patient has been having high blood sugar, insulin coverage was adjusted few times during the shift. Patient has been having large urine output. started on IV fluid. Repeat BMP done this evening, Md aware of results. Safety precautions in place.

## 2023-12-10 NOTE — PLAN OF CARE
Problem: SAFETY ADULT - FALL  Goal: Free from fall injury  Description: INTERVENTIONS:  - Assess pt frequently for physical needs  - Identify cognitive and physical deficits and behaviors that affect risk of falls.   - [place] fall precautions as indicated by assessment.  - Educate pt/family on patient safety including physical limitations  - Instruct pt to call for assistance with activity based on assessment  - Modify environment to reduce risk of injury  - Provide assistive devices as appropriate  - Consider OT/PT consult to assist with strengthening/mobility  - Encourage toileting schedule  Outcome: Progressing     Problem: Patient/Family Goals  Goal: Patient/Family Long Term Goal  Description: Patient's Long Term Goal: to go back to [place]    Interventions:  - See additional Care Plan goals for specific interventions  Outcome: Progressing  Goal: Patient/Family Short Term Goal  Description: Patient's Short Term Goal: have less pain to my bladder    Interventions:   - resume pyridium  - See additional Care Plan goals for specific interventions  Outcome: Progressing     Problem: RESPIRATORY - ADULT  Goal: Achieves optimal ventilation and oxygenation  Description: INTERVENTIONS:  - Assess for changes in respiratory status  - Assess for changes in mentation and behavior  - Position to facilitate oxygenation and minimize respiratory effort  - Oxygen supplementation based on oxygen saturation or ABGs  - Provide Smoking Cessation handout, if applicable  - Encourage broncho-pulmonary hygiene including cough, deep breathe, Incentive Spirometry  - Assess the need for suctioning and perform as needed  - Assess and instruct to report SOB or any respiratory difficulty  - Respiratory Therapy support as indicated  - Manage/alleviate anxiety  - Monitor for signs/symptoms of CO2 retention  Outcome: Progressing     Problem: CARDIOVASCULAR - ADULT  Goal: Maintains optimal cardiac output and hemodynamic stability  Description: INTERVENTIONS:  - Monitor vital signs, rhythm, and trends  - Monitor for bleeding, hypotension and signs of decreased cardiac output  - Evaluate effectiveness of vasoactive medications to optimize hemodynamic stability  - Monitor arterial and/or venous puncture sites for bleeding and/or hematoma  - Assess quality of pulses, skin color and temperature  - Assess for signs of decreased coronary artery perfusion - ex.  Angina  - Evaluate fluid balance, assess for edema, trend weights  Outcome: Progressing  Goal: Absence of cardiac arrhythmias or at baseline  Description: INTERVENTIONS:  - Continuous cardiac monitoring, monitor vital signs, obtain 12 lead EKG if indicated  - Evaluate effectiveness of antiarrhythmic and heart rate control medications as ordered  - Initiate emergency measures for life threatening arrhythmias  - Monitor electrolytes and administer replacement therapy as ordered  Outcome: Progressing

## 2023-12-10 NOTE — PLAN OF CARE
Pt alert and oriented x4. Max assist, wheelchair bound at baseline. Suprapubic catheter in place. Pt updated on plan of care. Plan to continue anticoagulation. Consult to endocrinology. Safety precautions in place. Call light within reach. Problem: Patient Centered Care  Goal: Patient preferences are identified and integrated in the patient's plan of care  Description: Interventions:  - What would you like us to know as we care for you? From [facility]  - Provide timely, complete, and accurate information to patient/family  - Incorporate patient and family knowledge, values, beliefs, and cultural backgrounds into the planning and delivery of care  - Encourage patient/family to participate in care and decision-making at the level they choose  - Honor patient and family perspectives and choices  Outcome: Progressing     Problem: SAFETY ADULT - FALL  Goal: Free from fall injury  Description: INTERVENTIONS:  - Assess pt frequently for physical needs  - Identify cognitive and physical deficits and behaviors that affect risk of falls.   - [place] fall precautions as indicated by assessment.  - Educate pt/family on patient safety including physical limitations  - Instruct pt to call for assistance with activity based on assessment  - Modify environment to reduce risk of injury  - Provide assistive devices as appropriate  - Consider OT/PT consult to assist with strengthening/mobility  - Encourage toileting schedule  Outcome: Progressing     Problem: Patient/Family Goals  Goal: Patient/Family Long Term Goal  Description: Patient's Long Term Goal: to go back to [place]    Interventions:  - See additional Care Plan goals for specific interventions  Outcome: Progressing  Goal: Patient/Family Short Term Goal  Description: Patient's Short Term Goal: have less pain to my bladder    Interventions:   - resume pyridium  - See additional Care Plan goals for specific interventions  Outcome: Progressing     Problem: RESPIRATORY - ADULT  Goal: Achieves optimal ventilation and oxygenation  Description: INTERVENTIONS:  - Assess for changes in respiratory status  - Assess for changes in mentation and behavior  - Position to facilitate oxygenation and minimize respiratory effort  - Oxygen supplementation based on oxygen saturation or ABGs  - Provide Smoking Cessation handout, if applicable  - Encourage broncho-pulmonary hygiene including cough, deep breathe, Incentive Spirometry  - Assess the need for suctioning and perform as needed  - Assess and instruct to report SOB or any respiratory difficulty  - Respiratory Therapy support as indicated  - Manage/alleviate anxiety  - Monitor for signs/symptoms of CO2 retention  Outcome: Progressing     Problem: CARDIOVASCULAR - ADULT  Goal: Maintains optimal cardiac output and hemodynamic stability  Description: INTERVENTIONS:  - Monitor vital signs, rhythm, and trends  - Monitor for bleeding, hypotension and signs of decreased cardiac output  - Evaluate effectiveness of vasoactive medications to optimize hemodynamic stability  - Monitor arterial and/or venous puncture sites for bleeding and/or hematoma  - Assess quality of pulses, skin color and temperature  - Assess for signs of decreased coronary artery perfusion - ex.  Angina  - Evaluate fluid balance, assess for edema, trend weights  Outcome: Progressing  Goal: Absence of cardiac arrhythmias or at baseline  Description: INTERVENTIONS:  - Continuous cardiac monitoring, monitor vital signs, obtain 12 lead EKG if indicated  - Evaluate effectiveness of antiarrhythmic and heart rate control medications as ordered  - Initiate emergency measures for life threatening arrhythmias  - Monitor electrolytes and administer replacement therapy as ordered  Outcome: Progressing

## 2023-12-11 LAB
ANION GAP SERPL CALC-SCNC: 8 MMOL/L (ref 0–18)
BUN BLD-MCNC: 19 MG/DL (ref 9–23)
CALCIUM BLD-MCNC: 8.7 MG/DL (ref 8.7–10.4)
CHLORIDE SERPL-SCNC: 99 MMOL/L (ref 98–112)
CO2 SERPL-SCNC: 23 MMOL/L (ref 21–32)
CREAT BLD-MCNC: 0.97 MG/DL
EGFRCR SERPLBLD CKD-EPI 2021: 70 ML/MIN/1.73M2 (ref 60–?)
GLUCOSE BLD-MCNC: 293 MG/DL (ref 70–99)
GLUCOSE BLDC GLUCOMTR-MCNC: 267 MG/DL (ref 70–99)
GLUCOSE BLDC GLUCOMTR-MCNC: 279 MG/DL (ref 70–99)
GLUCOSE BLDC GLUCOMTR-MCNC: 280 MG/DL (ref 70–99)
GLUCOSE BLDC GLUCOMTR-MCNC: 314 MG/DL (ref 70–99)
POTASSIUM SERPL-SCNC: 4.6 MMOL/L (ref 3.5–5.1)
SODIUM SERPL-SCNC: 130 MMOL/L (ref 136–145)

## 2023-12-11 PROCEDURE — 84520 ASSAY OF UREA NITROGEN: CPT | Performed by: INTERNAL MEDICINE

## 2023-12-11 PROCEDURE — 84132 ASSAY OF SERUM POTASSIUM: CPT | Performed by: INTERNAL MEDICINE

## 2023-12-11 PROCEDURE — 94799 UNLISTED PULMONARY SVC/PX: CPT

## 2023-12-11 PROCEDURE — 80048 BASIC METABOLIC PNL TOTAL CA: CPT | Performed by: INTERNAL MEDICINE

## 2023-12-11 PROCEDURE — 82962 GLUCOSE BLOOD TEST: CPT

## 2023-12-12 VITALS
DIASTOLIC BLOOD PRESSURE: 83 MMHG | TEMPERATURE: 99 F | BODY MASS INDEX: 40.99 KG/M2 | HEIGHT: 60 IN | RESPIRATION RATE: 18 BRPM | OXYGEN SATURATION: 95 % | WEIGHT: 208.81 LBS | SYSTOLIC BLOOD PRESSURE: 111 MMHG | HEART RATE: 99 BPM

## 2023-12-12 LAB
ANION GAP SERPL CALC-SCNC: 6 MMOL/L (ref 0–18)
BUN BLD-MCNC: 17 MG/DL (ref 9–23)
BUN/CREAT SERPL: 17.2 (ref 10–20)
CALCIUM BLD-MCNC: 9.4 MG/DL (ref 8.7–10.4)
CHLORIDE SERPL-SCNC: 100 MMOL/L (ref 98–112)
CO2 SERPL-SCNC: 26 MMOL/L (ref 21–32)
CREAT BLD-MCNC: 0.99 MG/DL
EGFRCR SERPLBLD CKD-EPI 2021: 68 ML/MIN/1.73M2 (ref 60–?)
GLUCOSE BLD-MCNC: 273 MG/DL (ref 70–99)
GLUCOSE BLDC GLUCOMTR-MCNC: 207 MG/DL (ref 70–99)
GLUCOSE BLDC GLUCOMTR-MCNC: 256 MG/DL (ref 70–99)
GLUCOSE BLDC GLUCOMTR-MCNC: 266 MG/DL (ref 70–99)
GLUCOSE BLDC GLUCOMTR-MCNC: 289 MG/DL (ref 70–99)
MAGNESIUM SERPL-MCNC: 1.7 MG/DL (ref 1.6–2.6)
OSMOLALITY SERPL CALC.SUM OF ELEC: 285 MOSM/KG (ref 275–295)
POTASSIUM SERPL-SCNC: 5.1 MMOL/L (ref 3.5–5.1)
SARS-COV-2 RNA RESP QL NAA+PROBE: NOT DETECTED
SODIUM SERPL-SCNC: 132 MMOL/L (ref 136–145)

## 2023-12-12 PROCEDURE — 83735 ASSAY OF MAGNESIUM: CPT | Performed by: INTERNAL MEDICINE

## 2023-12-12 PROCEDURE — 82962 GLUCOSE BLOOD TEST: CPT

## 2023-12-12 PROCEDURE — 80048 BASIC METABOLIC PNL TOTAL CA: CPT | Performed by: INTERNAL MEDICINE

## 2023-12-12 RX ORDER — MAGNESIUM OXIDE 400 MG/1
400 TABLET ORAL ONCE
Status: COMPLETED | OUTPATIENT
Start: 2023-12-12 | End: 2023-12-12

## 2023-12-12 RX ORDER — ACETAMINOPHEN 500 MG
500 TABLET ORAL EVERY 4 HOURS PRN
Status: DISCONTINUED | OUTPATIENT
Start: 2023-12-12 | End: 2023-12-12

## 2023-12-12 NOTE — PLAN OF CARE
Patient affirms pain and discomfort( Headache). IV Fluids, safety precautions in place call light within reach, reinforced use of call light. Plan monitor blood sugar, metformin 500 bid. Problem: Patient Centered Care  Goal: Patient preferences are identified and integrated in the patient's plan of care  Description: Interventions:  - What would you like us to know as we care for you? From [facility] [place]  - Provide timely, complete, and accurate information to patient/family  - Incorporate patient and family knowledge, values, beliefs, and cultural backgrounds into the planning and delivery of care  - Encourage patient/family to participate in care and decision-making at the level they choose  - Honor patient and family perspectives and choices  Outcome: Progressing     Problem: SAFETY ADULT - FALL  Goal: Free from fall injury  Description: INTERVENTIONS:  - Assess pt frequently for physical needs  - Identify cognitive and physical deficits and behaviors that affect risk of falls.   - [place] fall precautions as indicated by assessment.  - Educate pt/family on patient safety including physical limitations  - Instruct pt to call for assistance with activity based on assessment  - Modify environment to reduce risk of injury  - Provide assistive devices as appropriate  - Consider OT/PT consult to assist with strengthening/mobility  - Encourage toileting schedule  Outcome: Progressing     Problem: Patient/Family Goals  Goal: Patient/Family Long Term Goal  Description: Patient's Long Term Goal: to go back to [place]    Interventions:  - See additional Care Plan goals for specific interventions  Outcome: Progressing  Goal: Patient/Family Short Term Goal  Description: Patient's Short Term Goal: have less pain to my bladder    Interventions:   - resume pyridium  - See additional Care Plan goals for specific interventions  Outcome: Progressing     Problem: RESPIRATORY - ADULT  Goal: Achieves optimal ventilation and oxygenation  Description: INTERVENTIONS:  - Assess for changes in respiratory status  - Assess for changes in mentation and behavior  - Position to facilitate oxygenation and minimize respiratory effort  - Oxygen supplementation based on oxygen saturation or ABGs  - Provide Smoking Cessation handout, if applicable  - Encourage broncho-pulmonary hygiene including cough, deep breathe, Incentive Spirometry  - Assess the need for suctioning and perform as needed  - Assess and instruct to report SOB or any respiratory difficulty  - Respiratory Therapy support as indicated  - Manage/alleviate anxiety  - Monitor for signs/symptoms of CO2 retention  Outcome: Progressing     Problem: CARDIOVASCULAR - ADULT  Goal: Maintains optimal cardiac output and hemodynamic stability  Description: INTERVENTIONS:  - Monitor vital signs, rhythm, and trends  - Monitor for bleeding, hypotension and signs of decreased cardiac output  - Evaluate effectiveness of vasoactive medications to optimize hemodynamic stability  - Monitor arterial and/or venous puncture sites for bleeding and/or hematoma  - Assess quality of pulses, skin color and temperature  - Assess for signs of decreased coronary artery perfusion - ex.  Angina  - Evaluate fluid balance, assess for edema, trend weights  Outcome: Progressing  Goal: Absence of cardiac arrhythmias or at baseline  Description: INTERVENTIONS:  - Continuous cardiac monitoring, monitor vital signs, obtain 12 lead EKG if indicated  - Evaluate effectiveness of antiarrhythmic and heart rate control medications as ordered  - Initiate emergency measures for life threatening arrhythmias  - Monitor electrolytes and administer replacement therapy as ordered  Outcome: Progressing

## 2023-12-12 NOTE — CM/SW NOTE
12/12/23 1517   Discharge disposition   Expected discharge disposition [facility] Provider Other  [facility])   Discharge transportation [facility]     The patient received a MDO for discharge. The patient will be transported to [facility] via [address] [place] [address] at 6pm.  PCS complete. The number to call report is [phone]  Mario Cox in admissions has been notified of transport time. RN to inform patient. SW/CM to remain available for support and/or discharge planning.      Qamar Currie MSW, [facility]  Discharge Planner [id number]

## 2023-12-12 NOTE — PROGRESS NOTES
Writer called [facility] and spoke with nurse Tg Duran to give report. Patient is scheduled for discharge and  via [facility] to transport to [place] [id number].

## 2023-12-12 NOTE — DISCHARGE INSTRUCTIONS
Keep all follow up appointments and continue current medications. Call to make an appointment for breast ultrasound.

## 2024-01-06 ENCOUNTER — APPOINTMENT (OUTPATIENT)
Dept: CT IMAGING | Facility: HOSPITAL | Age: 54
End: 2024-01-06
Attending: EMERGENCY MEDICINE
Payer: MEDICARE

## 2024-01-06 ENCOUNTER — HOSPITAL ENCOUNTER (INPATIENT)
Facility: HOSPITAL | Age: 54
LOS: 4 days | Discharge: NURSING, REHAB OR LONG-TERM CARE | End: 2024-01-11
Attending: EMERGENCY MEDICINE | Admitting: HOSPITALIST
Payer: MEDICARE

## 2024-01-06 ENCOUNTER — APPOINTMENT (OUTPATIENT)
Dept: GENERAL RADIOLOGY | Facility: HOSPITAL | Age: 54
End: 2024-01-06
Attending: EMERGENCY MEDICINE
Payer: MEDICARE

## 2024-01-06 DIAGNOSIS — F41.1 GENERALIZED ANXIETY DISORDER: ICD-10-CM

## 2024-01-06 DIAGNOSIS — A41.9 SEPSIS DUE TO UNDETERMINED ORGANISM (HCC): Primary | ICD-10-CM

## 2024-01-06 DIAGNOSIS — R46.89 MANIPULATIVE BEHAVIOR: ICD-10-CM

## 2024-01-06 DIAGNOSIS — F31.30 BIPOLAR I DISORDER DEPRESSED WITH ATYPICAL FEATURES (HCC): ICD-10-CM

## 2024-01-06 DIAGNOSIS — F33.2 SEVERE EPISODE OF RECURRENT MAJOR DEPRESSIVE DISORDER, WITHOUT PSYCHOTIC FEATURES (HCC): ICD-10-CM

## 2024-01-06 LAB
ALBUMIN SERPL-MCNC: 4 G/DL (ref 3.2–4.8)
ALBUMIN/GLOB SERPL: 1.3 {RATIO} (ref 1–2)
ALP LIVER SERPL-CCNC: 91 U/L
ALT SERPL-CCNC: 20 U/L
ANION GAP SERPL CALC-SCNC: 11 MMOL/L (ref 0–18)
AST SERPL-CCNC: 35 U/L (ref ?–34)
BASOPHILS # BLD AUTO: 0.08 X10(3) UL (ref 0–0.2)
BASOPHILS NFR BLD AUTO: 0.9 %
BILIRUB SERPL-MCNC: 0.4 MG/DL (ref 0.3–1.2)
BILIRUB UR QL: NEGATIVE
BUN BLD-MCNC: 15 MG/DL (ref 9–23)
BUN/CREAT SERPL: 14.3 (ref 10–20)
CALCIUM BLD-MCNC: 9.4 MG/DL (ref 8.7–10.4)
CHLORIDE SERPL-SCNC: 99 MMOL/L (ref 98–112)
CLARITY UR: CLEAR
CO2 SERPL-SCNC: 27 MMOL/L (ref 21–32)
CREAT BLD-MCNC: 1.05 MG/DL
DEPRECATED RDW RBC AUTO: 57.7 FL (ref 35.1–46.3)
EGFRCR SERPLBLD CKD-EPI 2021: 64 ML/MIN/1.73M2 (ref 60–?)
EOSINOPHIL # BLD AUTO: 0.25 X10(3) UL (ref 0–0.7)
EOSINOPHIL NFR BLD AUTO: 2.8 %
ERYTHROCYTE [DISTWIDTH] IN BLOOD BY AUTOMATED COUNT: 17.3 % (ref 11–15)
FLUAV + FLUBV RNA SPEC NAA+PROBE: NEGATIVE
FLUAV + FLUBV RNA SPEC NAA+PROBE: NEGATIVE
GLOBULIN PLAS-MCNC: 3.1 G/DL (ref 2.8–4.4)
GLUCOSE BLD-MCNC: 242 MG/DL (ref 70–99)
GLUCOSE UR-MCNC: 200 MG/DL
HCT VFR BLD AUTO: 36.7 %
HGB BLD-MCNC: 11.3 G/DL
IMM GRANULOCYTES # BLD AUTO: 0.27 X10(3) UL (ref 0–1)
IMM GRANULOCYTES NFR BLD: 3 %
KETONES UR-MCNC: NEGATIVE MG/DL
LACTATE SERPL-SCNC: 3.7 MMOL/L (ref 0.5–2)
LEUKOCYTE ESTERASE UR QL STRIP.AUTO: NEGATIVE
LYMPHOCYTES # BLD AUTO: 3.59 X10(3) UL (ref 1–4)
LYMPHOCYTES NFR BLD AUTO: 39.7 %
MCH RBC QN AUTO: 27.8 PG (ref 26–34)
MCHC RBC AUTO-ENTMCNC: 30.8 G/DL (ref 31–37)
MCV RBC AUTO: 90.4 FL
MONOCYTES # BLD AUTO: 0.43 X10(3) UL (ref 0.1–1)
MONOCYTES NFR BLD AUTO: 4.8 %
NEUTROPHILS # BLD AUTO: 4.42 X10 (3) UL (ref 1.5–7.7)
NEUTROPHILS # BLD AUTO: 4.42 X10(3) UL (ref 1.5–7.7)
NEUTROPHILS NFR BLD AUTO: 48.8 %
OSMOLALITY SERPL CALC.SUM OF ELEC: 293 MOSM/KG (ref 275–295)
PH UR: 5.5 [PH] (ref 5–8)
PLATELET # BLD AUTO: 268 10(3)UL (ref 150–450)
POTASSIUM SERPL-SCNC: 4.6 MMOL/L (ref 3.5–5.1)
PROT SERPL-MCNC: 7.1 G/DL (ref 5.7–8.2)
PROT UR-MCNC: NEGATIVE MG/DL
RBC # BLD AUTO: 4.06 X10(6)UL
RSV RNA SPEC NAA+PROBE: NEGATIVE
SARS-COV-2 RNA RESP QL NAA+PROBE: NOT DETECTED
SODIUM SERPL-SCNC: 137 MMOL/L (ref 136–145)
SP GR UR STRIP: <1.005 (ref 1–1.03)
UROBILINOGEN UR STRIP-ACNC: NORMAL
WBC # BLD AUTO: 9 X10(3) UL (ref 4–11)

## 2024-01-06 PROCEDURE — 71045 X-RAY EXAM CHEST 1 VIEW: CPT | Performed by: EMERGENCY MEDICINE

## 2024-01-06 PROCEDURE — 74176 CT ABD & PELVIS W/O CONTRAST: CPT | Performed by: EMERGENCY MEDICINE

## 2024-01-06 RX ORDER — METHENAMINE HIPPURATE 1000 MG/1
0.5 TABLET ORAL ONCE
Status: COMPLETED | OUTPATIENT
Start: 2024-01-06 | End: 2024-01-07

## 2024-01-07 ENCOUNTER — APPOINTMENT (OUTPATIENT)
Dept: CT IMAGING | Facility: HOSPITAL | Age: 54
End: 2024-01-07
Attending: INTERNAL MEDICINE
Payer: MEDICARE

## 2024-01-07 LAB
GLUCOSE BLDC GLUCOMTR-MCNC: 278 MG/DL (ref 70–99)
GLUCOSE BLDC GLUCOMTR-MCNC: 296 MG/DL (ref 70–99)
GLUCOSE BLDC GLUCOMTR-MCNC: 321 MG/DL (ref 70–99)
GLUCOSE BLDC GLUCOMTR-MCNC: 327 MG/DL (ref 70–99)
GLUCOSE BLDC GLUCOMTR-MCNC: 354 MG/DL (ref 70–99)
LACTATE SERPL-SCNC: 3 MMOL/L (ref 0.5–2)
LACTATE SERPL-SCNC: 3.3 MMOL/L (ref 0.5–2)
LACTATE SERPL-SCNC: 3.5 MMOL/L (ref 0.5–2)
LACTATE SERPL-SCNC: 3.8 MMOL/L (ref 0.5–2)
LACTATE SERPL-SCNC: 3.8 MMOL/L (ref 0.5–2)

## 2024-01-07 PROCEDURE — 70486 CT MAXILLOFACIAL W/O DYE: CPT | Performed by: INTERNAL MEDICINE

## 2024-01-07 RX ORDER — ALBUTEROL SULFATE 90 UG/1
2 AEROSOL, METERED RESPIRATORY (INHALATION) EVERY 6 HOURS PRN
Status: DISCONTINUED | OUTPATIENT
Start: 2024-01-07 | End: 2024-01-11

## 2024-01-07 RX ORDER — HYDROMORPHONE HYDROCHLORIDE 1 MG/ML
1 INJECTION, SOLUTION INTRAMUSCULAR; INTRAVENOUS; SUBCUTANEOUS ONCE AS NEEDED
Status: ACTIVE | OUTPATIENT
Start: 2024-01-08 | End: 2024-01-08

## 2024-01-07 RX ORDER — ICOSAPENT ETHYL 1000 MG/1
1 CAPSULE ORAL 4 TIMES DAILY
Status: DISCONTINUED | OUTPATIENT
Start: 2024-01-07 | End: 2024-01-07 | Stop reason: RX

## 2024-01-07 RX ORDER — QUETIAPINE FUMARATE 100 MG/1
300 TABLET, FILM COATED ORAL 2 TIMES DAILY
Status: DISCONTINUED | OUTPATIENT
Start: 2024-01-07 | End: 2024-01-09

## 2024-01-07 RX ORDER — GLUCAGON 3 MG/1
3 POWDER NASAL ONCE AS NEEDED
COMMUNITY

## 2024-01-07 RX ORDER — ONDANSETRON 2 MG/ML
4 INJECTION INTRAMUSCULAR; INTRAVENOUS EVERY 6 HOURS PRN
Status: DISCONTINUED | OUTPATIENT
Start: 2024-01-07 | End: 2024-01-11

## 2024-01-07 RX ORDER — PROCHLORPERAZINE EDISYLATE 5 MG/ML
5 INJECTION INTRAMUSCULAR; INTRAVENOUS EVERY 8 HOURS PRN
Status: DISCONTINUED | OUTPATIENT
Start: 2024-01-07 | End: 2024-01-11

## 2024-01-07 RX ORDER — DEXTROSE MONOHYDRATE 25 G/50ML
50 INJECTION, SOLUTION INTRAVENOUS
Status: DISCONTINUED | OUTPATIENT
Start: 2024-01-07 | End: 2024-01-11

## 2024-01-07 RX ORDER — TRAZODONE HYDROCHLORIDE 50 MG/1
100 TABLET ORAL EVERY 12 HOURS PRN
Status: DISCONTINUED | OUTPATIENT
Start: 2024-01-07 | End: 2024-01-11

## 2024-01-07 RX ORDER — ACETAMINOPHEN 500 MG
500 TABLET ORAL EVERY 4 HOURS PRN
Status: DISCONTINUED | OUTPATIENT
Start: 2024-01-07 | End: 2024-01-07

## 2024-01-07 RX ORDER — NYSTATIN 100000 [USP'U]/G
1 POWDER TOPICAL AS NEEDED
COMMUNITY

## 2024-01-07 RX ORDER — ATORVASTATIN CALCIUM 40 MG/1
40 TABLET, FILM COATED ORAL NIGHTLY
Status: DISCONTINUED | OUTPATIENT
Start: 2024-01-07 | End: 2024-01-11

## 2024-01-07 RX ORDER — ATORVASTATIN CALCIUM 40 MG/1
40 TABLET, FILM COATED ORAL NIGHTLY
COMMUNITY

## 2024-01-07 RX ORDER — NICOTINE POLACRILEX 4 MG
30 LOZENGE BUCCAL
Status: DISCONTINUED | OUTPATIENT
Start: 2024-01-07 | End: 2024-01-11

## 2024-01-07 RX ORDER — CETIRIZINE HYDROCHLORIDE 10 MG/1
10 TABLET ORAL DAILY
Status: DISCONTINUED | OUTPATIENT
Start: 2024-01-07 | End: 2024-01-11

## 2024-01-07 RX ORDER — BUPROPION HYDROCHLORIDE 150 MG/1
300 TABLET ORAL DAILY
Status: DISCONTINUED | OUTPATIENT
Start: 2024-01-07 | End: 2024-01-09

## 2024-01-07 RX ORDER — SODIUM CHLORIDE 9 MG/ML
INJECTION, SOLUTION INTRAVENOUS CONTINUOUS
Status: DISCONTINUED | OUTPATIENT
Start: 2024-01-07 | End: 2024-01-09

## 2024-01-07 RX ORDER — TRAMADOL HYDROCHLORIDE 50 MG/1
50 TABLET ORAL EVERY 6 HOURS PRN
Status: DISCONTINUED | OUTPATIENT
Start: 2024-01-07 | End: 2024-01-11

## 2024-01-07 RX ORDER — ACETAMINOPHEN 500 MG
500 TABLET ORAL EVERY 4 HOURS PRN
Status: DISCONTINUED | OUTPATIENT
Start: 2024-01-07 | End: 2024-01-11

## 2024-01-07 RX ORDER — CLOTRIMAZOLE 1 %
CREAM (GRAM) TOPICAL 2 TIMES DAILY
Status: DISCONTINUED | OUTPATIENT
Start: 2024-01-07 | End: 2024-01-11

## 2024-01-07 RX ORDER — ICOSAPENT ETHYL 1000 MG/1
1 CAPSULE ORAL 4 TIMES DAILY
COMMUNITY

## 2024-01-07 RX ORDER — VANCOMYCIN HYDROCHLORIDE 125 MG/1
125 CAPSULE ORAL DAILY
Status: DISCONTINUED | OUTPATIENT
Start: 2024-01-08 | End: 2024-01-11

## 2024-01-07 RX ORDER — CLONAZEPAM 1 MG/1
1 TABLET ORAL EVERY 6 HOURS
Status: DISCONTINUED | OUTPATIENT
Start: 2024-01-07 | End: 2024-01-11

## 2024-01-07 RX ORDER — GABAPENTIN 600 MG/1
600 TABLET ORAL 3 TIMES DAILY
Status: DISCONTINUED | OUTPATIENT
Start: 2024-01-07 | End: 2024-01-11

## 2024-01-07 RX ORDER — GUAIFENESIN 600 MG/1
600 TABLET, EXTENDED RELEASE ORAL 2 TIMES DAILY
Status: DISCONTINUED | OUTPATIENT
Start: 2024-01-07 | End: 2024-01-11

## 2024-01-07 RX ORDER — BETHANECHOL CHLORIDE 5 MG
10 TABLET ORAL 3 TIMES DAILY
Status: DISCONTINUED | OUTPATIENT
Start: 2024-01-07 | End: 2024-01-11

## 2024-01-07 RX ORDER — CLONAZEPAM 1 MG/1
1 TABLET ORAL 2 TIMES DAILY PRN
Status: DISCONTINUED | OUTPATIENT
Start: 2024-01-07 | End: 2024-01-07

## 2024-01-07 RX ORDER — DIVALPROEX SODIUM 500 MG/1
500 TABLET, EXTENDED RELEASE ORAL 2 TIMES DAILY
Status: DISCONTINUED | OUTPATIENT
Start: 2024-01-07 | End: 2024-01-11

## 2024-01-07 RX ORDER — NICOTINE POLACRILEX 4 MG
15 LOZENGE BUCCAL
Status: DISCONTINUED | OUTPATIENT
Start: 2024-01-07 | End: 2024-01-11

## 2024-01-07 RX ORDER — ACETAMINOPHEN 325 MG/1
650 TABLET ORAL EVERY 6 HOURS PRN
COMMUNITY

## 2024-01-07 RX ORDER — SENNOSIDES 8.6 MG
17.2 TABLET ORAL 2 TIMES DAILY
COMMUNITY

## 2024-01-07 RX ORDER — LEVOFLOXACIN 250 MG/1
250 TABLET, FILM COATED ORAL DAILY
COMMUNITY
Start: 2024-01-05 | End: 2024-01-11

## 2024-01-07 RX ORDER — OXYBUTYNIN CHLORIDE 5 MG/1
5 TABLET, EXTENDED RELEASE ORAL DAILY
Status: DISCONTINUED | OUTPATIENT
Start: 2024-01-07 | End: 2024-01-11

## 2024-01-07 NOTE — H&P
[facility]   INTERNAL MEDICINE HISTORY & PHYSICAL      CHIEF COMPLAINT   UTI    HISTORY OF THE PRESENT ILLNESS    Clari Rosa - 53 year old female presenting with above CC.    History obtained from patient w/ additional history from review of outside records in care everywhere.    LTC resident that recently moved to the area    Primarily came in because she thinks she has UTI. States this is her 20th admission for sepsis & she felt septic. Has had multiple pseudomonas infections in past. Has chronic suprapubic mark, can't tell me when it was changed last. Doesn;t have urologist in area.Has needed zosyn for infections in past    Also with months long sinus infection, has taken multiple abx courses to no avail.    Pain all over, catheter hurts, head hurts - chronic, morphine doesn't usually help    Athletes foot - needs cream    Anxiety - takes klonopin 1mg 4x daily - wants to see psychiatry    In ED, afebrile, , RR 22, lactate 3.7 -> 3.8. CMP w elev glucose. CBC stable. Cov/flu neg. CXR w/o acute process. CT A/P w perivesical stranding. Started on ceftriaxone, admitted for further care.      REVIEW OF SYSTEMS   Remainder of 12-point ROS negative unless discussed in HPI.     PATIENT HISTORIES  PMHx:  She has a past medical history of Bipolar 1 disorder ([facility]), Dental caries (10/26/2014), Diabetes ([facility]), MIKE (generalized anxiety disorder), High blood pressure, History of diverticulitis of colon (10/26/2014), Manipulative behavior (10/26/2014), Neurogenic bladder, Obesity (BMI 30-39.9) (10/26/2014), Paraplegia ([facility]), Sepsis following intra-abdominal surgery ([facility]) (10/26/2014), Serotonin syndrome, Sleep apnea, Suprapubic catheter ([facility]), and Transverse myelitis ([facility]).  PSHx:  She has a past surgical history that includes part removal colon w end colostomy; tonsillectomy; arthroscopy of joint unlisted; cholecystectomy; and .   FHx:  Her family history is not on file. She was adopted.   SHx: She  reports that she has quit smoking. She has a 5.00 pack-year smoking history. She does not have any smokeless tobacco history on file. She reports that she does not drink alcohol and does not use drugs.    Allergies: She is allergic to pcn [penicillins], radiology contrast iodinated dyes, and sulfa antibiotics.     Current Outpatient Medications   Medication Instructions    acetaminophen (TYLENOL EXTRA STRENGTH) 325 mg, Oral, Every 4 hours PRN    acetaminophen (TYLENOL) 650 mg, Oral, Every 6 hours PRN    Acidophilus/Pectin Oral Cap 1 capsule, Oral, 2 times daily    albuterol 108 (90 Base) MCG/ACT Inhalation Aero Soln 2 puffs, Inhalation, Every 6 hours PRN    atorvastatin (LIPITOR) 40 mg, Oral, Nightly    Baqsimi One Pack 3 mg, Nasal, Once as needed    bethanechol (URECHOLINE) 10 mg, Oral, 3 times daily    buPROPion ER (WELLBUTRIN XL) 300 mg, Oral, Daily    clonazePAM (KLONOPIN) 1 mg, Oral, 4 times daily    cyclobenzaprine (FLEXERIL) 10 mg, Oral, 3 times daily PRN    divalproex ER (DEPAKOTE ER) 500 mg, Oral, 2 times daily    ergocalciferol (VITAMIN D2) 50,000 Units, Oral, Every 7 days, Every monday    gabapentin (NEURONTIN) 600 mg, Oral, 3 times daily    Icosapent Ethyl 1 g Oral Cap 1 capsule, Oral, 4 times daily    insulin aspart (NOVOLOG) 24 Units, Subcutaneous, 3 times daily with meals    Insulin Aspart 100 UNIT/ML Subcutaneous Solution Cartridge SS: 150-200 2units, 201-250 4 units, 251-300 6 units, 301-350 8 units, 351-400 10 units, over 401 call md    insulin glargine (LANTUS) 56 Units, Subcutaneous, Nightly    insulin glargine (LANTUS) 20 Units, Subcutaneous, Every morning    levoFLOXacin (LEVAQUIN) 250 mg, Oral, Daily    loratadine (CLARITIN) 10 mg, Oral, Daily    Melatonin 3 mg, Oral, Nightly    metFORMIN ER (GLUCOPHAGE XR) 750 mg, Oral, Daily with breakfast    methenamine (HIPREX) 0.5 g, Oral, 2 times daily with meals    montelukast (SINGULAIR) 10 mg, Oral, Daily    Multiple Vitamins-Minerals (THERA-M) Oral  Tab 1 tablet, Oral, Daily    Nystatin 317309 UNIT/GM External Powder 1 Application, Topical, As needed, Under breast and groin area<BR>    ondansetron (ZOFRAN-ODT) 8 mg, Oral, Every 8 hours PRN    oxybutynin ER (DITROPAN-XL) 5 mg, Oral, Daily    phenazopyridine (PYRIDUM) 200 mg, Oral, 3 times daily    psyllium 28 % Oral Powd Pack 1 packet, Oral, Every 8 hours PRN    QUEtiapine (SEROQUEL) 300 mg, Oral, 2 times daily    rivaroxaban (XARELTO) 20 mg, Oral, Every 24 hours    sennosides (SENOKOT) 17.2 mg, Oral, 2 times daily    traZODone (DESYREL) 100 mg, Oral, Every 12 hours PRN       PHYSICAL EXAMINATION   Vitals: /81 (BP Location: Right arm)   Pulse 104   Temp 98.2 °F (36.8 °C) (Oral)   Resp 20   Wt 219 lb 11.2 oz (99.7 kg)   SpO2 95%   BMI 42.91 kg/m²   Gen: NAD, morbidly obese, appears chronically ill, verbose  Eyes: PERRLA, normal conjunctivae  ENMT: Moist mucous membranes, trachea midline, no pharyngeal erythema, no thyromegaly  CV: RRR, no M/R/G, no peripheral edema  Resp: CTAB, non-labored respirations, symmetric expansion  GI: Soft, NT, ND, + BS, no masses, no HSM  MSK:  No C/C/E, normal active/passive ROM in C-spine, 5/5 strength in all extremities  Skin: No rashes, visible skin w/o worrisome lesions   Neuro: CN 2-12 grossly intact, sensation intact   Psych: A&Ox3, appropriate mood, conversant w/ linear thought process     LABORATORY VALUES   Recent Labs   Lab 01/06/24 2018      K 4.6   CL 99   CO2 27.0   BUN 15   CREATSERUM 1.05*   ANIONGAP 11   *   CA 9.4   TP 7.1   ALB 4.0   AST 35*   ALT 20   ALKPHO 91   BILT 0.4   EGFRCR 64     Recent Labs   Lab 01/06/24 2018   WBC 9.0   HGB 11.3*   HCT 36.7   .0   MCV 90.4   MOPERCENT 4.8   EOPERCENT 2.8   BAPERCENT 0.9     ASSESSMENT & PLAN   Clari Rosa - 53 year old female, LTC resident w neurogenic bladder & chronic suprapubic catheter w recurring pseudomonas UTIs, bipolar d/o MIKE, benzo dependence, DM2, ANAID, [place]  admitted 1/6/2024 for  recurring UTI    Neurogenic bladder  Indwelling suprapubic catheter  Presumed catheter assoc UTI  H/o recurring pseudomonas UTIs  - Afebrile, not tachycardic, no leukocytosis  - Ua w poss infection  [  ] F/u cx  - Cefepime ( 1/7 - )  - Not established w urology in area. Needs catheter exchanged. Urology consulted, d/w Dr. Golden. 1 time IVP dilaudid for catheter exchange  [  ] Refusing catheter exchange today. PRN dilaudid for tomorrow    Hx. C.diff  - PO vanc prophy dose for now while on abx for UTI    Chronic sinusitis  - Ongoing for months - terrible headaches  [  ] CT sinus  - abx as above  - Tramadol for headaches    H/o pulmonary embolus  - Continue PTA xarelto      Bipolar  MIKE  Benzo dependence  - Continue PTA meds  - Klonopin  - Pt requesting psych consult     ANAID  - CPAP    [place] [facility]  Chronic hunger  - Body mass index is 42.91 kg/m².  - Healthy diet & wt loss encouraged   - RD consult for diet strategies  - Consider OP endo eval (grehlin/leptin levels checked etc)    Ppx: PTA meds  Dispo  EDoD: Tentative plan for DC back to LTC facility pending UTI tx. Suspect ~ 1/9     F/u:   - PCP: Brian Bob MD  - Urology  - Psych  - Endo    Available via bubble chat or perfect serve 7A - 7P.     Zeus Rodriguez MD  Internal Medicine - Hospitalist  [facility]

## 2024-01-07 NOTE — PHYSICAL THERAPY NOTE
Therapy orders received via functional mobility screen automatic order, chart reviewed. Patient is a LTC resident, per old documentation has been functionally bedbound since 2016. Acute care PT services not indicated at this time, will sign off. Thank you.     Megan Colunga, PT

## 2024-01-07 NOTE — ED QUICK NOTES
Orders for admission, patient is aware of plan and ready to go upstairs. Any questions, please call ED ZINA Storm at extension [phone].     Patient Covid vaccination status: Unvaccinated     COVID Test Ordered in ED: SARS-CoV-2/Flu A and B/RSV by PCR (GeneXpert)    COVID Suspicion at Admission: N/A    Running Infusions:      Mental Status/LOC at time of transport: A&Ox4,   Other pertinent information: suprapubic cath, on 2L NC, assistance with ADL's   CIWA score: N/A   NIH score:  N/A

## 2024-01-07 NOTE — ED QUICK NOTES
Pt dislodged IV while eating   Pt is difficult to establish PIV  Will attempt to place PIV prior to pt transported to IP ready bed

## 2024-01-07 NOTE — OCCUPATIONAL THERAPY NOTE
Therapy orders received via functional mobility screen automatic order, chart reviewed. Patient is a LTC resident, per previous documentation has been functionally bedbound since 2016. Acute care OT services not indicated at this time, will sign off. Thank you.

## 2024-01-07 NOTE — PLAN OF CARE
Problem: Patient Centered Care  Goal: Patient preferences are identified and integrated in the patient's plan of care  Description: Interventions:  - What would you like us to know as we care for you? \"I am from [facility].\"  - Provide timely, complete, and accurate information to patient/family  - Incorporate patient and family knowledge, values, beliefs, and cultural backgrounds into the planning and delivery of care  - Encourage patient/family to participate in care and decision-making at the level they choose  - Honor patient and family perspectives and choices  Outcome: Progressing     Problem: RESPIRATORY - ADULT  Goal: Achieves optimal ventilation and oxygenation  Description: INTERVENTIONS:  - Assess for changes in respiratory status  - Assess for changes in mentation and behavior  - Position to facilitate oxygenation and minimize respiratory effort  - Oxygen supplementation based on oxygen saturation or ABGs  - Provide Smoking Cessation handout, if applicable  - Encourage broncho-pulmonary hygiene including cough, deep breathe, Incentive Spirometry  - Assess the need for suctioning and perform as needed  - Assess and instruct to report SOB or any respiratory difficulty  - Respiratory Therapy support as indicated  - Manage/alleviate anxiety  - Monitor for signs/symptoms of CO2 retention  Outcome: Progressing     Problem: GENITOURINARY - ADULT  Goal: Absence of urinary retention  Description: INTERVENTIONS:  - Assess patient’s ability to void and empty bladder  - Monitor intake/output and perform bladder scan as needed  - Follow urinary retention protocol/standard of care  - Consider collaborating with pharmacy to review patient's medication profile  - Implement strategies to promote bladder emptying  Outcome: Progressing     Problem: SKIN/TISSUE INTEGRITY - ADULT  Goal: Skin integrity remains intact  Description: INTERVENTIONS  - Assess and document risk factors for pressure ulcer development  -  Assess and document skin integrity  - Monitor for areas of redness and/or skin breakdown  - Initiate interventions, skin care algorithm/standards of care as needed  Outcome: Progressing  Goal: Incision(s), wounds(s) or drain site(s) healing without S/S of infection  Description: INTERVENTIONS:  - Assess and document risk factors for pressure ulcer development  - Assess and document skin integrity  - Assess and document dressing/incision, wound bed, drain sites and surrounding tissue  - Implement wound care per orders  - Initiate isolation precautions as appropriate  - Initiate Pressure Ulcer prevention bundle as indicated  Outcome: Progressing     Problem: PAIN - ADULT  Goal: Verbalizes/displays adequate comfort level or patient's stated pain goal  Description: INTERVENTIONS:  - Encourage pt to monitor pain and request assistance  - Assess pain using appropriate pain scale  - Administer analgesics based on type and severity of pain and evaluate response  - Implement non-pharmacological measures as appropriate and evaluate response  - Consider cultural and social influences on pain and pain management  - Manage/alleviate anxiety  - Utilize distraction and/or relaxation techniques  - Monitor for opioid side effects  - Notify MD/LIP if interventions unsuccessful or patient reports new pain  - Anticipate increased pain with activity and pre-medicate as appropriate  Outcome: Progressing     Problem: RISK FOR INFECTION - ADULT  Goal: Absence of fever/infection during anticipated neutropenic period  Description: INTERVENTIONS  - Monitor WBC  - Administer growth factors as ordered  - Implement neutropenic guidelines  Outcome: Progressing     Problem: SAFETY ADULT - FALL  Goal: Free from fall injury  Description: INTERVENTIONS:  - Assess pt frequently for physical needs  - Identify cognitive and physical deficits and behaviors that affect risk of falls.  - Moody fall precautions as indicated by assessment.  - Educate  pt/family on patient safety including physical limitations  - Instruct pt to call for assistance with activity based on assessment  - Modify environment to reduce risk of injury  - Provide assistive devices as appropriate  - Consider OT/PT consult to assist with strengthening/mobility  - Encourage toileting schedule  Outcome: Progressing     Problem: DISCHARGE PLANNING  Goal: Discharge to home or other facility with appropriate resources  Description: INTERVENTIONS:  - Identify barriers to discharge w/pt and caregiver  - Include patient/family/discharge partner in discharge planning  - Arrange for needed discharge resources and transportation as appropriate  - Identify discharge learning needs (meds, wound care, etc)  - Arrange for interpreters to assist at discharge as needed  - Consider post-discharge preferences of patient/family/discharge partner  - Complete POLST form as appropriate  - Assess patient's ability to be responsible for managing their own health  - Refer to Case Management Department for coordinating discharge planning if the patient needs post-hospital services based on physician/LIP order or complex needs related to functional status, cognitive ability or social support system  Outcome: Progressing     Patient is alert and oriented x 4, admitted with dx of sepsis d/t UTI. She reported 7/10 pain. Pt has suprapubic catheter in place, draining kenia urine. Pt has elevated lactic acid, MD aware and ordered for IV bolus. Oriented to room. Safety and fall precautions initiated bed alarm on. Call light within reach. Frequent rounding by nursing staff.

## 2024-01-07 NOTE — ED QUICK NOTES
Recent message history  IP pharmacy messaged to fill medication Methenamine (See MAR)   View All Kvng as unread   Status: Unread by pharmacy   Reason: Refill Request   Message: Please send medication to ED Tube # [phone]    Thank you   Sent: Today [id number] by Barbi Schmitz RN

## 2024-01-07 NOTE — CM/SW NOTE
Pt is resides at [facility], and is bed bound at baseline.  Return referral uploaded via Aidin.    JAH/JASON to remain available for support and/or discharge planning.      JACKIE Pinzon, LSW  Social Work   Ext:#[phone]

## 2024-01-07 NOTE — ED QUICK NOTES
This Rn at pt's bedside for 15+ minutes tending to all pt's needsd  Pt provided with, new gown, linen, sandwich (assisted pt with tray setup, and feeding, pt personal belongings cleaned per pt's request, pt given multiple gloved of hand  per pt request.   Pt updated on POC: Need PIV for transport to IP ready bed.   ED charge notified and aware need US IV d/t pt difficult to establish PIV.

## 2024-01-07 NOTE — ED PROVIDER NOTES
Patient Seen in: [facility] Emergency Department      History     Chief Complaint   Patient presents with    UTI     Stated Complaint:     Subjective:   HPI  53-year-old female with indwelling suprapubic catheter for neurogenic bladder, diabetes, ANAID, bipolar, recent diagnosis of PE on Xarelto, presents for evaluation of urinary symptoms.  She reports burning with urination, and left low back pain.  She also notes that the urine in her catheter bag has been more clear than normal.  She also states she has ongoing upper respiratory infection and sinusitis symptoms for which she was treated during recent hospitalization.  No cough or shortness of breath.  She has been compliant with her DOAC and has not missed any doses from the nursing home.  She reports a history of urosepsis several years ago.    Objective:   No pertinent past medical history.            No pertinent past surgical history.              No pertinent social history.            Review of Systems    Positive for stated complaint:   Other systems are as noted in HPI.  Constitutional and vital signs reviewed.      All other systems reviewed and negative except as noted above.    Physical Exam     ED Triage Vitals [01/06/24 1841]   /80   Pulse (!) 121   Resp 22   Temp 98.8 °F (37.1 °C)   Temp src Oral   SpO2 91 %   O2 Device None (Room air)       Current:/70   Pulse 107   Temp 98.8 °F (37.1 °C) (Oral)   Resp 22   SpO2 (S) 96%         Physical Exam  Vitals and nursing note reviewed.   Constitutional:       Appearance: She is well-developed. She is obese.   HENT:      Head: Normocephalic and atraumatic.      Nose: Nose normal.      Mouth/Throat:      Mouth: Mucous membranes are moist.      Pharynx: Oropharynx is clear.   Eyes:      Extraocular Movements: Extraocular movements intact.      Pupils: Pupils are equal, round, and reactive to light.   Cardiovascular:      Rate and Rhythm: Regular rhythm. Tachycardia present.      Heart  sounds: Normal heart sounds.   Pulmonary:      Effort: Pulmonary effort is normal.      Breath sounds: Normal breath sounds.   Abdominal:      General: There is no distension.      Palpations: Abdomen is soft.      Tenderness: There is no abdominal tenderness.   Genitourinary:     Comments: Indwelling suprapubic catheter in place without surrounding erythema or discharge  Musculoskeletal:         General: Normal range of motion.      Cervical back: Normal range of motion.   Skin:     General: Skin is warm.      Capillary Refill: Capillary refill takes less than 2 seconds.   Neurological:      Mental Status: She is alert.      Comments: No focal deficits       Differential diagnosis includes but is not limited to UTI, pyelonephritis, obstructing ureteral stone, sepsis, bacteremia        ED Course     Labs Reviewed   COMP METABOLIC PANEL (14) - Abnormal; Notable for the following components:       Result Value    Glucose 242 (*)     Creatinine 1.05 (*)     AST 35 (*)     All other components within normal limits   URINALYSIS WITH CULTURE REFLEX - Abnormal; Notable for the following components:    Spec Gravity <1.005 (*)     Glucose Urine 200 (*)     Blood Urine 1+ (*)     Nitrite Urine 2+ (*)     Bacteria Urine 3+ (*)     All other components within normal limits   LACTIC ACID, PLASMA - Abnormal; Notable for the following components:    Lactic Acid 3.7 (*)     All other components within normal limits   CBC W/ DIFFERENTIAL - Abnormal; Notable for the following components:    HGB 11.3 (*)     MCHC 30.8 (*)     RDW-SD 57.7 (*)     RDW 17.3 (*)     All other components within normal limits   SARS-COV-2/FLU A AND B/RSV BY PCR (GENEXPERT) - Normal    Narrative:     This test is intended for the qualitative detection and differentiation of SARS-CoV-2, influenza A, influenza B, and respiratory syncytial virus (RSV) viral RNA in nasopharyngeal or nares swabs from individuals suspected of respiratory viral infection consistent  with COVID-19 by their healthcare provider. Signs and symptoms of respiratory viral infection due to SARS-CoV-2, influenza, and RSV can be similar.    Test performed using the Xpert Xpress SARS-CoV-2/FLU/RSV (real time RT-PCR)  assay on the GeneXpert instrument, [employer], [place] [address].   This test is being used under the Food and Drug Administration's Emergency Use Authorization.    The authorized Fact Sheet for Healthcare Providers for this assay is available upon request from the laboratory.   CBC WITH DIFFERENTIAL WITH PLATELET    Narrative:     The following orders were created for panel order CBC With Differential With Platelet.  Procedure                               Abnormality         Status                     ---------                               -----------         ------                     CBC W/ DIFFERENTIAL[[id number]]          Abnormal            Final result                 Please view results for these tests on the individual orders.   LACTIC ACID REFLEX POST POSTIVE   RAINBOW DRAW BLUE   RAINBOW DRAW GOLD   URINE CULTURE, ROUTINE   BLOOD CULTURE   BLOOD CULTURE               Preliminary Radiology Report  [facility], [facility]  [phone] - Phone    [facility]    NAME: SHELBIE GUTIERREZ    DATE OF EXAM: 01/06/2024  Patient No:  [id number]  Physician:  LENKA  YOB: 1989    Past Medical History (entered by Technologist):    Reason For Exam (entered by Technologist):  dizziness, forgetful  Other Notes (entered by Technologist): [phone]    Additional Information (per Vision Radiologist):    CT head without contrast    Comparison: Head CT of 11/15/22    IMPRESSION:    No acute intracranial process.  No intracranial bleed.    No calvarial fracture.       Results finalized at 11:32 PM KULWANT Hopkins M.D.  This report has been electronically signed and verified by the Radiologist whose name is printed above.    DD:  01/06/2024/DT:  01/06/2024        MDM        Admission disposition: 1/6/2024 10:41 PM                  [facility]      Sepsis Reassessment Note    /72   Pulse 112   Temp 98.8 °F (37.1 °C) (Oral)   Resp 22   SpO2 93%      I completed the sepsis reassessment at 10:15pm    Cardiac:  Regularity: Regular  Rate: Tachycardic  Heart Sounds: S1,S2    Lungs:   Right: Clear  Left: Clear    Peripheral Pulses:  Radial: Right 1+ or Left 1+      Capillary Refill:  <3 Secs    Skin:  Temp/Moisture: Warm and Dry  Color: Normal      Bibi Ellis MD  1/6/2024  10:41 PM        Medical Decision Making  Patient is well-appearing, speaks in full sentences, no acute distress.  Lactic acid elevated to 3.7 and sepsis fluid bolus initiated.  There is no leukocytosis.  She is afebrile but is tachycardic.  Urinalysis concerning for UTI and urine culture will be sent.  Blood cultures also in process.  Will treat with Rocephin.  Per my independent interpretation of CT and pelvis there is no obstructing ureteral stone.  Patient will be admitted to the hospital for further management.  Discussed with Dr. Brandon baron hospitalist.    Problems Addressed:  Sepsis due to undetermined organism (HCC): complicated acute illness or injury with systemic symptoms that poses a threat to life or bodily functions    Amount and/or Complexity of Data Reviewed  External Data Reviewed: notes.     Details:   I reviewed discharge summary from 12/12/2023 where patient was diagnosed with PE, placed on Xarelto for treatment, also treated with course of doxycycline for sinusitis and completed 5 days    Labs: ordered. Decision-making details documented in ED Course.  Radiology: ordered and independent interpretation performed. Decision-making details documented in ED Course.  Discussion of management or test interpretation with external provider(s): As above    Risk  Decision regarding hospitalization.        Disposition and Plan     Clinical Impression:  1. Sepsis due to  undetermined organism (HCC)         Disposition:  Admit  1/6/2024 10:41 pm    Follow-up:  No follow-up provider specified.  We recommend that you schedule follow up care with a primary care provider within the next three months to obtain basic health screening including reassessment of your blood pressure.      Medications Prescribed:  Current Discharge Medication List                            Hospital Problems       Present on Admission             ICD-10-CM Noted POA    * (Principal) Sepsis due to undetermined organism (HCC) A41.9 1/6/2024 Unknown

## 2024-01-07 NOTE — CONSULTS
[facility]    Consultation    Clari Rosa Patient Status:  Inpatient    1970 MRN [id number]   Location [facility] 5SW/SE Attending Zeus Rodriguez MD   Hosp Day # 0 PCP Brian Bob MD     History of Present Illness:  Clari Rosa is a(n) 53 year old female. She has had a neurogenic bladder and [facility]    History:  Past Medical History:   Diagnosis Date    Bipolar 1 disorder (HCC)     Dental caries 10/26/2014    Diabetes (HCC)     MIKE (generalized anxiety disorder)     High blood pressure     History of diverticulitis of colon 10/26/2014    Manipulative behavior 10/26/2014    Neurogenic bladder     Obesity (BMI 30-39.9) 10/26/2014    Paraplegia ([facility])     Sepsis following intra-abdominal surgery ([facility]) 10/26/2014    Serotonin syndrome     Sleep apnea     Suprapubic catheter (HCC)     Transverse myelitis (HCC)      Past Surgical History:   Procedure Laterality Date    ARTHROSCOPY OF JOINT UNLISTED      knee          CHOLECYSTECTOMY      PART REMOVAL COLON W END COLOSTOMY          TONSILLECTOMY       No family history on file.   reports that she has quit smoking. She has a 5.00 pack-year smoking history. She does not have any smokeless tobacco history on file. She reports that she does not drink alcohol and does not use drugs.    Allergies:  Allergies   Allergen Reactions    Pcn [Penicillins]     Radiology Contrast Iodinated Dyes     Sulfa Antibiotics        Home Medications:  No current outpatient medications on file.       Review of Systems:  Pertinent items are noted in HPI.    Physical Exam:  /60 (BP Location: Left arm)   Pulse 94   Temp 98 °F (36.7 °C) (Oral)   Resp 20   Wt 219 lb 11.2 oz (99.7 kg)   SpO2 96%   BMI 42.91 kg/m²     General Appearance: Alert, cooperative, no distress, appears stated age  Head: Normocephalic, without obvious abnormality, atraumatic  Abdomen: Soft, non-tender, bowel sounds active all four quadrants, no  masses,  no organomegaly-18 Fr SPT in place and draining      Laboratory Data:   Lab Results   Component Value Date    WBC 9.0 01/06/2024    HGB 11.3 01/06/2024    HCT 36.7 01/06/2024    .0 01/06/2024    CREATSERUM 1.05 01/06/2024    BUN 15 01/06/2024     01/06/2024    K 4.6 01/06/2024    CL 99 01/06/2024    CO2 27.0 01/06/2024     01/06/2024    CA 9.4 01/06/2024    ALB 4.0 01/06/2024    ALKPHO 91 01/06/2024    BILT 0.4 01/06/2024    TP 7.1 01/06/2024    AST 35 01/06/2024    ALT 20 01/06/2024    PGLU 278 01/07/2024     Lab Results   Component Value Date    COLORUR Dark-Yellow 01/06/2024    CLARITY Clear 01/06/2024    SPECGRAVITY <1.005 01/06/2024    GLUUR 200 01/06/2024    BILUR Negative 01/06/2024    KETUR Negative 01/06/2024    BLOODURINE 1+ 01/06/2024    PHURINE 5.5 01/06/2024    PROUR Negative 01/06/2024    UROBILINOGEN Normal 01/06/2024    NITRITE 2+ 01/06/2024    LEUUR Negative 01/06/2024       Imaging:  Impression   CONCLUSION:  1. No urolithiasis or renal obstruction.  Left renal atrophy.  2. Right paramedian suprapubic catheter with decompressed urinary bladder.  Perivesical stranding may be related to cystitis or scarring.  3. Post right hemicolectomy.  Large amount of stool in the colon suggests constipation.  4. Moderate hepatic steatosis and mild hepatomegaly.  5. Mild splenomegaly       Assessment:    Neurogenic Bladder    Plan:  -treat UTI  -she refuses an SPT change at present-this can be changed in the future    Nolvia Golden MD  1/7/2024  11:59 AM

## 2024-01-08 LAB
ANION GAP SERPL CALC-SCNC: 9 MMOL/L (ref 0–18)
BASOPHILS # BLD AUTO: 0.05 X10(3) UL (ref 0–0.2)
BASOPHILS NFR BLD AUTO: 0.8 %
BUN BLD-MCNC: 12 MG/DL (ref 9–23)
BUN/CREAT SERPL: 12.8 (ref 10–20)
CALCIUM BLD-MCNC: 9.3 MG/DL (ref 8.7–10.4)
CHLORIDE SERPL-SCNC: 102 MMOL/L (ref 98–112)
CO2 SERPL-SCNC: 22 MMOL/L (ref 21–32)
CREAT BLD-MCNC: 0.94 MG/DL
DEPRECATED RDW RBC AUTO: 57.9 FL (ref 35.1–46.3)
EGFRCR SERPLBLD CKD-EPI 2021: 73 ML/MIN/1.73M2 (ref 60–?)
EOSINOPHIL # BLD AUTO: 0.16 X10(3) UL (ref 0–0.7)
EOSINOPHIL NFR BLD AUTO: 2.6 %
ERYTHROCYTE [DISTWIDTH] IN BLOOD BY AUTOMATED COUNT: 17.2 % (ref 11–15)
GLUCOSE BLD-MCNC: 362 MG/DL (ref 70–99)
GLUCOSE BLDC GLUCOMTR-MCNC: 257 MG/DL (ref 70–99)
GLUCOSE BLDC GLUCOMTR-MCNC: 265 MG/DL (ref 70–99)
GLUCOSE BLDC GLUCOMTR-MCNC: 352 MG/DL (ref 70–99)
GLUCOSE BLDC GLUCOMTR-MCNC: 386 MG/DL (ref 70–99)
HCT VFR BLD AUTO: 35.5 %
HGB BLD-MCNC: 11.3 G/DL
IMM GRANULOCYTES # BLD AUTO: 0.12 X10(3) UL (ref 0–1)
IMM GRANULOCYTES NFR BLD: 1.9 %
LYMPHOCYTES # BLD AUTO: 1.56 X10(3) UL (ref 1–4)
LYMPHOCYTES NFR BLD AUTO: 25 %
MCH RBC QN AUTO: 29.1 PG (ref 26–34)
MCHC RBC AUTO-ENTMCNC: 31.8 G/DL (ref 31–37)
MCV RBC AUTO: 91.5 FL
MONOCYTES # BLD AUTO: 0.35 X10(3) UL (ref 0.1–1)
MONOCYTES NFR BLD AUTO: 5.6 %
NEUTROPHILS # BLD AUTO: 3.99 X10 (3) UL (ref 1.5–7.7)
NEUTROPHILS # BLD AUTO: 3.99 X10(3) UL (ref 1.5–7.7)
NEUTROPHILS NFR BLD AUTO: 64.1 %
OSMOLALITY SERPL CALC.SUM OF ELEC: 290 MOSM/KG (ref 275–295)
PLATELET # BLD AUTO: 214 10(3)UL (ref 150–450)
POTASSIUM SERPL-SCNC: 4.1 MMOL/L (ref 3.5–5.1)
PROCALCITONIN SERPL-MCNC: 0.1 NG/ML (ref ?–0.05)
RBC # BLD AUTO: 3.88 X10(6)UL
SODIUM SERPL-SCNC: 133 MMOL/L (ref 136–145)
WBC # BLD AUTO: 6.2 X10(3) UL (ref 4–11)

## 2024-01-08 RX ORDER — CHOLECALCIFEROL (VITAMIN D3) 125 MCG
1000 CAPSULE ORAL DAILY
Status: DISCONTINUED | OUTPATIENT
Start: 2024-01-08 | End: 2024-01-11

## 2024-01-08 NOTE — DIETARY NOTE
Education Nutrition Note    Received order for diet education d/t increased food intake beyond 1800/60 gcarb controlled diet.   BG this am 362 , 356 mg/dl.  A1C 6.9% on 12/5/23. Hyperglycemia likely r/t infection and no basal insulin given last night.    Pt on Wellbutrin, also on seroquel, Basal (levemir 20 units in am)  and other boluses insulin noted. Today, new order added  Levemir at 56 units at HS ( same regimen now as in [facility], where pt resides). Anticipate better blood sugar tomorrow AM.   Provided pt diet instructions on basic DM diet therapy and hand out on \" Ready, Set, start counting\" and Diabetic Patient education survival skill packet for balancing meal intake to match in Insulin and OHA regimen.   Allowed Protein (no simple carbs)  snacks in PM. --discussed with RN. Pt appreciative of visit and diet information.     Pt verbalized understanding. Contact information provided if with questions.     Angelica Roblero RD, LDN, [facility]  Clinical Dietitian  [phone]

## 2024-01-08 NOTE — CONSULTS
Spiritual Care Visit Note    Visited With: Patient    Writer report consulting with RN. Writer offered empathic listening and support. Patient spoke about her current health situation and love for God and her family. Patient mentioned she wants to get better to be able to continually help family and others. Writer extended words of comfort and prayer for healing. Patient requested a Bible. Writer delivered a Bible to patient. Patient thanked writer for prayer, Bible and visit. No other need at this time. Follow up as requested.    Spiritual Care Taxonomy:    Intended Effects: Establish rapport and connectedness    Methods: Collaborate with care team member;Offer spiritual/Scientology support;Assist with spiritual/Scientology practices    Interventions: Active listening;Ask guided questions;Prayer for healing;Provide a Scientology item(s);Provide compassionate touch      WESTLEY Ventura   [id number]     On call  services [phone]

## 2024-01-08 NOTE — PROGRESS NOTES
[facility]   INTERNAL MEDICINE PROGRESS NOTE    CHIEF COMPLAINT   UTI    SUBJECTIVE  24 HR EVENTS   Lots of pain - couldn't do catheter exchange  Not feeling better  Requesting psych consult for uncontrolled anxiety  Requesting to bj hays  Requesting B12    INPATIENT MEDICATIONS  Scheduled Medications:  insulin detemir, 20 Units, Daily  insulin aspart, 1-11 Units, TID CC  insulin aspart, 20 Units, TID CC  insulin detemir, 56 Units, Nightly  insulin aspart, 10 Units, Once  atorvastatin, 40 mg, Nightly  bethanechol, 10 mg, TID  buPROPion ER, 300 mg, Daily  divalproex ER, 500 mg, BID  gabapentin, 600 mg, TID  oxybutynin ER, 5 mg, Daily  QUEtiapine, 300 mg, BID  rivaroxaban, 20 mg, QPM  cetirizine, 10 mg, Daily  clotrimazole, , BID  clonazePAM, 1 mg, q6h  guaiFENesin ER, 600 mg, BID  cefepime, 2 g, Q12H  vancomycin, 125 mg, Daily     Drips:   sodium chloride, Last Rate: 75 mL/hr at 01/07/24 0200       PRN Medications:   ondansetron, 4 mg, Q6H PRN  prochlorperazine, 5 mg, Q8H PRN  albuterol, 2 puff, Q6H PRN  traZODone, 100 mg, Q12H PRN  glucose, 15 g, Q15 Min PRN   Or  glucose, 15 g, Q15 Min PRN   Or  glucose-vitamin C, 4 tablet, Q15 Min PRN   Or  dextrose, 50 mL, Q15 Min PRN   Or  glucose, 30 g, Q15 Min PRN   Or  glucose, 30 g, Q15 Min PRN   Or  glucose-vitamin C, 8 tablet, Q15 Min PRN  traMADol, 50 mg, Q6H PRN  HYDROmorphone, 1 mg, Once PRN  acetaminophen, 500 mg, Q4H PRN       PHYSICAL EXAMINATION   Vitals: /81 (BP Location: Right arm)   Pulse 108   Temp 98.3 °F (36.8 °C) (Oral)   Resp 20   Wt 216 lb 1.6 oz (98 kg)   SpO2 95%   BMI 42.20 kg/m²   Gen: NAD, morbidly obese, appears chronically ill, verbose  Eyes: PERRLA, normal conjunctivae  ENMT: Moist mucous membranes, trachea midline  CV: RRR, no M/R/G, no peripheral edema  Resp: CTAB, non-labored respirations, symmetric expansion  GI: Soft, NT, ND, + BS  MSK:  No C/C/E, normal active/passive ROM in C-spine  Skin: No rashes, visible skin  w/o worrisome lesions   Neuro: CN 2-12 grossly intact, sensation intact   Psych: A&Ox3, appropriate mood, conversant w/ linear thought process     LABORATORY VALUES   Recent Labs   Lab 01/06/24  2018 01/08/24  0643    133*   K 4.6 4.1   CL 99 102   CO2 27.0 22.0   BUN 15 12   CREATSERUM 1.05* 0.94   ANIONGAP 11 9   * 362*   CA 9.4 9.3   TP 7.1  --    ALB 4.0  --    AST 35*  --    ALT 20  --    ALKPHO 91  --    BILT 0.4  --    EGFRCR 64 73     Recent Labs   Lab 01/06/24 2018 01/08/24  0643   WBC 9.0 6.2   HGB 11.3* 11.3*   HCT 36.7 35.5   .0 214.0   MCV 90.4 91.5   MOPERCENT 4.8 5.6   EOPERCENT 2.8 2.6   BAPERCENT 0.9 0.8     11/2023 E. Coli     Antibiotic                 RSLT#1    RSLT#2    RSLT#3    RSLT#4   Amoxicillin/Clavulanic Acid    S =4   Ampicillin                     R>=32   Ampicillin/Sulbactam          S =4   Cefazolin                      R>=64   Cefepime                      S<=1   Cefoxitin                     S<=4   Ceftazidime                   S<=1   Ceftriaxone                   R>=64   Ciprofloxacin                 R>=4   Ertapenem                     S<=0.5   Gentamicin                    S<=1   Imipenem                      S<=0.25   Levofloxacin                  R>=8   Meropenem                     S<=0.25   Nitrofurantoin                S<=16   Piperacillin/Tazobactam       S<=4   Tobramycin                    S<=1   Trimethoprim/Sulfa            R>=320     ASSESSMENT & PLAN   Clari Rosa - 53 year old female, LTC resident w neurogenic bladder & chronic suprapubic catheter w recurring pseudomonas UTIs, bipolar d/o MIKE, benzo dependence, DM2, ANAID, [place]  admitted 1/6/2024 for recurring UTI    Neurogenic bladder  Indwelling suprapubic catheter  Catheter assoc UTI - providencia stuartii  H/o recurring UTIs - pseudomonas per pt, MDRO e. Coli & VRE noted in careeverywhere  - Afebrile, not tachycardic, no leukocytosis  - Ua infection  [  ] F/u cx  - Cefepime ( 1/7 - )  - Not  established w urology in area. Needs catheter exchanged. Urology consulted, d/w Dr. Golden.   [  ] Agreeable for catheter exchange today, please give IV dilaudid 1 hr prior (ordered)  - Complicated prior UTIs. Poss ESBL organisms. Per PharmD - carbapenems interact with valproate. ID consulted - d/w Dr. Billy Carroll C.diff  - PO vanc prophy dose for now while on abx for UTI    Chronic sinusitis   - Ongoing for months - terrible headaches  - CT sinus w/o infection  - APAP/tramadol for headaches    H/o pulmonary embolus  - Continue PTA xarelto      Bipolar  MIKE  Benzo dependence  - Continue PTA meds  - Klonopin  - Pt requesting psych consult - message sent to Dr. Qureshi     ANAID  - CPAP    Morbid obesity w [facility]  Chronic hunger  - Body mass index is 42.2 kg/m².  - Healthy diet & wt loss encouraged   - RD consult for diet strategies  - Consider OP endo eval (grehlin/leptin levels checked etc)    B12 tabs per pt request    Ppx: PTA meds  Dispo  EDoD: Tentative plan for DC back to LTC facility pending UTI tx. Suspect ~ 1/10    F/u:   - PCP: Brian Bob MD  - Urology  - Psych  - Endo  - ID    Available via bubble chat or perfect serve 7A - 7P.     Zeus Rodriguez MD  Internal Medicine - Hospitalist  [facility]

## 2024-01-08 NOTE — CONSULTS
[facility] Infectious Disease  Report of Consultation    Clari Rosa Patient Status:  Inpatient    1970 MRN [id number]   Location [facility] 5SW/SE Attending Zeus Rodriguez MD   Hosp Day # 1 PCP Brian Bob MD     Date of Admission:  2024  Date of Consult:  2024    Reason for Consultation:  ESBL UTI    History of Present Illness:  Clari Rosa is a a(n) 53 year old female being seen at your request regarding and ESBL UTI.  Patient with a h/o very frequent presentations for sepsis and, upon arrival, declared that she felt herself to be septic and has had \"20 admissions\" for the same.  She has a h/o chronic SP tube in place with recurrent pseudomonas infections.  She had been residing in [place] in the past and just moved into the area.      Also of note, patient complains of a sinus infection for the past month.  CT with minimal mucosal thickening.    In the ED she also had a CT done showing some perivesical stranding.  Bacteriauria noted without pyuria.  Cultures with >100K providencia with inducible beta lactamases.    She has been started on IV cefepime and we are asked to see and assist.    History:  Past Medical History:   Diagnosis Date    Bipolar 1 disorder (HCC)     Dental caries 10/26/2014    Diabetes (HCC)     MIKE (generalized anxiety disorder)     High blood pressure     History of diverticulitis of colon 10/26/2014    Manipulative behavior 10/26/2014    Neurogenic bladder     Obesity (BMI 30-39.9) 10/26/2014    Paraplegia (HCC)     Sepsis following intra-abdominal surgery (HCC) 10/26/2014    Serotonin syndrome     Sleep apnea     Suprapubic catheter (HCC)     Transverse myelitis (HCC)      Past Surgical History:   Procedure Laterality Date    ARTHROSCOPY OF JOINT UNLISTED      knee          CHOLECYSTECTOMY      PART REMOVAL COLON W END COLOSTOMY          TONSILLECTOMY       Family History   Adopted: Yes      reports that she has  quit smoking. She has a 5.00 pack-year smoking history. She does not have any smokeless tobacco history on file. She reports that she does not drink alcohol and does not use drugs.    Allergies:  Allergies   Allergen Reactions    Pcn [Penicillins]     Radiology Contrast Iodinated Dyes     Sulfa Antibiotics        Medications:    Current Facility-Administered Medications:     insulin detemir (Levemir) 100 UNIT/ML FlexPen/FlexTouch 20 Units, 20 Units, Subcutaneous, Daily    insulin aspart (NovoLOG) 100 Units/mL FlexPen 1-11 Units, 1-11 Units, Subcutaneous, TID CC    insulin aspart (NovoLOG) 100 Units/mL FlexPen 20 Units, 20 Units, Subcutaneous, TID CC    insulin detemir (Levemir) 100 UNIT/ML FlexPen/FlexTouch 56 Units, 56 Units, Subcutaneous, Nightly    cyanocobalamin (Vitamin B12) tab 1,000 mcg, 1,000 mcg, Oral, Daily    sodium chloride 0.9% infusion, , Intravenous, Continuous    ondansetron (Zofran) 4 MG/2ML injection 4 mg, 4 mg, Intravenous, Q6H PRN    prochlorperazine (Compazine) 10 MG/2ML injection 5 mg, 5 mg, Intravenous, Q8H PRN    albuterol (Ventolin HFA) 108 (90 Base) MCG/ACT inhaler 2 puff, 2 puff, Inhalation, Q6H PRN    atorvastatin (Lipitor) tab 40 mg, 40 mg, Oral, Nightly    bethanechol (Urecholine) tab 10 mg, 10 mg, Oral, TID    buPROPion ER (Wellbutrin XL) 24 hr tab 300 mg, 300 mg, Oral, Daily    divalproex ER (Depakote ER) 24 hr tab 500 mg, 500 mg, Oral, BID    gabapentin (Neurontin) tab 600 mg, 600 mg, Oral, TID    traZODone (Desyrel) tab 100 mg, 100 mg, Oral, Q12H PRN    oxybutynin ER (Ditropan-XL) 24 hr tab 5 mg, 5 mg, Oral, Daily    QUEtiapine (SEROquel) tab 300 mg, 300 mg, Oral, BID    rivaroxaban (Xarelto) tab 20 mg, 20 mg, Oral, QPM    glucose (Dex4) 15 GM/59ML oral liquid 15 g, 15 g, Oral, Q15 Min PRN **OR** glucose (Glutose) 40% oral gel 15 g, 15 g, Oral, Q15 Min PRN **OR** glucose-vitamin C (Dex-4) chewable tab 4 tablet, 4 tablet, Oral, Q15 Min PRN **OR** dextrose 50% injection 50 mL, 50 mL,  Intravenous, Q15 Min PRN **OR** glucose (Dex4) 15 GM/59ML oral liquid 30 g, 30 g, Oral, Q15 Min PRN **OR** glucose (Glutose) 40% oral gel 30 g, 30 g, Oral, Q15 Min PRN **OR** glucose-vitamin C (Dex-4) chewable tab 8 tablet, 8 tablet, Oral, Q15 Min PRN    cetirizine (ZyrTEC) tab 10 mg, 10 mg, Oral, Daily    clotrimazole (Lotrimin) 1 % cream, , Topical, BID    clonazePAM (KlonoPIN) tab 1 mg, 1 mg, Oral, q6h    traMADol (Ultram) tab 50 mg, 50 mg, Oral, Q6H PRN    HYDROmorphone (Dilaudid) 1 MG/ML injection 1 mg, 1 mg, Intravenous, Once PRN    guaiFENesin ER (Mucinex) 12 hr tab 600 mg, 600 mg, Oral, BID    ceFEPIme (Maxpime) 2 g in sodium chloride 0.9% 100 mL IVPB-MBP, 2 g, Intravenous, Q12H    acetaminophen (Tylenol Extra Strength) tab 500 mg, 500 mg, Oral, Q4H PRN    vancomycin (Vancocin) cap 125 mg, 125 mg, Oral, Daily    Review of Systems:    Constitutional:  No fevers, chills, diaphoresis, weight changes.   HEENT:  No visual changes, oral ulcers, sore throat, difficulty swallowing.   Respiratory: Negative for cough, sputum, hemoptysis, chest pain, wheezing, dyspnea on exertion, or stridor.   Cardiovascular: Negative for chest pain, palpitations, irregular heart beats.   Gastrointestinal:  No abdominal pain, nausea, vomiting, diarrhea, or constipation.   Genitourinary:  SP tube, concern for  infection.   Integument/breast: Negative for rash, skin lesions, and pruritus.   Hematologic/lymphatic: Negative for easy bruising, bleeding, and lymphadenopathy.   Musculoskeletal: Negative for myalgias, arthralgias, muscle weakness.   Neurological: No focal neurologic deficits, seizures, tremors.   Psych:  No h/o anxiety, depression, other psych d/o.   Endocrine: No history of of diabetes, thyroid disorder.    Remainder of 12 point review of systems otherwise negative.    Vital signs in last 24 hours:  Patient Vitals for the past 24 hrs:   BP Temp Temp src Pulse Resp SpO2 Weight   01/08/24 0811 133/81 98.3 °F (36.8 °C) Oral  108 20 95 % --   01/08/24 0559 130/88 98.2 °F (36.8 °C) Oral 106 18 94 % 216 lb 1.6 oz (98 kg)   01/07/24 2200 (!) 126/99 98.2 °F (36.8 °C) Oral 98 18 94 % --       Intake/Output:  I/O this shift:  In: 700 [P.O.:700]  Out: 1535 [Urine:1535]    Physical Exam:   General: Awake, alert, non-tox and in NAD.   Head: Normocephalic, without obvious abnormality, atraumatic.   Eyes: Conjunctivae/corneas clear.  No scleral icterus.  No conjunctival     hemorrhage.   Nose: Nares normal.   Throat:  Oropharynx clear, MMs moist.   Neck: Trachea ML, no masses.   Lungs: CTA b/l no rhonchi, rales, wheezes.   Chest wall: No tenderness or deformity.   Heart: Regular rate and rhythm, normal S1S2, no murmurs.   Abdomen: SP tube in place, some purulent drainage noted.   Extremity: No edema.   Skin: No rashes or lesions.   Neurological: No focal neurologic deficits.    Lab Data Review:  Lab Results   Component Value Date    WBC 6.2 01/08/2024    HGB 11.3 01/08/2024    HCT 35.5 01/08/2024    .0 01/08/2024    CREATSERUM 0.94 01/08/2024    BUN 12 01/08/2024     01/08/2024    K 4.1 01/08/2024     01/08/2024    CO2 22.0 01/08/2024     01/08/2024    CA 9.3 01/08/2024      Cultures:   Urine >100K providencia    Blood cultures negative    Flu/COVID/RSV negative    Radiology:  CONCLUSION:   1. No urolithiasis or renal obstruction.  Left renal atrophy.   2. Right paramedian suprapubic catheter with decompressed urinary bladder.  Perivesical stranding may be related to cystitis or scarring.   3. Post right hemicolectomy.  Large amount of stool in the colon suggests constipation.   4. Moderate hepatic steatosis and mild hepatomegaly.   5. Mild splenomegaly.     Assessment and Plan:    Neurogenic bladder with SP tube in place and chronic bacterial colonization suspected  - Patient reports a history of \"20 admissions for sepsis\" in the past, primarily with pseudomonas  - Current urine with only 1-5 WBCs but with 3+ bacteria,  patient currently declined SP tube change per urology  - Cultures with providencia with inducible beta lactamases  - IV cefepime day #3 ongoing    2.  No F/C or leukocytosis to suggest sepsis  - Will check PCT    3.  Chronic sinusitis  - CT sinuses without evidence of infection, minimal mucosal thickening only    4.  H/o c.diff  - p.o. vancomycin ppx    5.  Disposition - inpatient.  Multiple medical issues noted.  Continue IV cefepime for now, day #3.  Plan for eventual discharge on cefdinir 300mg p.o. BID to complete a short course as colonization>infection by my view in this instance.  Needs SP tube exchanged and this is planned for today.  Will follow.    Francine Cervantes DO, [facility] Infectious Disease  [phone]    1/8/2024  3:00 PM

## 2024-01-08 NOTE — PLAN OF CARE
Problem: Patient Centered Care  Goal: Patient preferences are identified and integrated in the patient's plan of care  Description: Interventions:  - What would you like us to know as we care for you? \"I am from [facility].\"  - Provide timely, complete, and accurate information to patient/family  - Incorporate patient and family knowledge, values, beliefs, and cultural backgrounds into the planning and delivery of care  - Encourage patient/family to participate in care and decision-making at the level they choose  - Honor patient and family perspectives and choices  Outcome: Progressing     Problem: RESPIRATORY - ADULT  Goal: Achieves optimal ventilation and oxygenation  Description: INTERVENTIONS:  - Assess for changes in respiratory status  - Assess for changes in mentation and behavior  - Position to facilitate oxygenation and minimize respiratory effort  - Oxygen supplementation based on oxygen saturation or ABGs  - Provide Smoking Cessation handout, if applicable  - Encourage broncho-pulmonary hygiene including cough, deep breathe, Incentive Spirometry  - Assess the need for suctioning and perform as needed  - Assess and instruct to report SOB or any respiratory difficulty  - Respiratory Therapy support as indicated  - Manage/alleviate anxiety  - Monitor for signs/symptoms of CO2 retention  Outcome: Progressing     Problem: GENITOURINARY - ADULT  Goal: Absence of urinary retention  Description: INTERVENTIONS:  - Assess patient’s ability to void and empty bladder  - Monitor intake/output and perform bladder scan as needed  - Follow urinary retention protocol/standard of care  - Consider collaborating with pharmacy to review patient's medication profile  - Implement strategies to promote bladder emptying  Outcome: Progressing     Problem: SKIN/TISSUE INTEGRITY - ADULT  Goal: Skin integrity remains intact  Description: INTERVENTIONS  - Assess and document risk factors for pressure ulcer development  -  Assess and document skin integrity  - Monitor for areas of redness and/or skin breakdown  - Initiate interventions, skin care algorithm/standards of care as needed  Outcome: Progressing  Goal: Incision(s), wounds(s) or drain site(s) healing without S/S of infection  Description: INTERVENTIONS:  - Assess and document risk factors for pressure ulcer development  - Assess and document skin integrity  - Assess and document dressing/incision, wound bed, drain sites and surrounding tissue  - Implement wound care per orders  - Initiate isolation precautions as appropriate  - Initiate Pressure Ulcer prevention bundle as indicated  Outcome: Progressing     Problem: PAIN - ADULT  Goal: Verbalizes/displays adequate comfort level or patient's stated pain goal  Description: INTERVENTIONS:  - Encourage pt to monitor pain and request assistance  - Assess pain using appropriate pain scale  - Administer analgesics based on type and severity of pain and evaluate response  - Implement non-pharmacological measures as appropriate and evaluate response  - Consider cultural and social influences on pain and pain management  - Manage/alleviate anxiety  - Utilize distraction and/or relaxation techniques  - Monitor for opioid side effects  - Notify MD/LIP if interventions unsuccessful or patient reports new pain  - Anticipate increased pain with activity and pre-medicate as appropriate  Outcome: Progressing     Problem: RISK FOR INFECTION - ADULT  Goal: Absence of fever/infection during anticipated neutropenic period  Description: INTERVENTIONS  - Monitor WBC  - Administer growth factors as ordered  - Implement neutropenic guidelines  Outcome: Progressing     Problem: SAFETY ADULT - FALL  Goal: Free from fall injury  Description: INTERVENTIONS:  - Assess pt frequently for physical needs  - Identify cognitive and physical deficits and behaviors that affect risk of falls.  - [place] fall precautions as indicated by assessment.  - Educate  pt/family on patient safety including physical limitations  - Instruct pt to call for assistance with activity based on assessment  - Modify environment to reduce risk of injury  - Provide assistive devices as appropriate  - Consider OT/PT consult to assist with strengthening/mobility  - Encourage toileting schedule  Outcome: Progressing     Problem: DISCHARGE PLANNING  Goal: Discharge to home or other facility with appropriate resources  Description: INTERVENTIONS:  - Identify barriers to discharge w/pt and caregiver  - Include patient/family/discharge partner in discharge planning  - Arrange for needed discharge resources and transportation as appropriate  - Identify discharge learning needs (meds, wound care, etc)  - Arrange for interpreters to assist at discharge as needed  - Consider post-discharge preferences of patient/family/discharge partner  - Complete POLST form as appropriate  - Assess patient's ability to be responsible for managing their own health  - Refer to Case Management Department for coordinating discharge planning if the patient needs post-hospital services based on physician/LIP order or complex needs related to functional status, cognitive ability or social support system  Outcome: Progressing   PRN pain meds given, pt went for CT overnight. Safety precautions in place, call light within reach

## 2024-01-08 NOTE — PLAN OF CARE
Pt a/o x 4. Pt states always hungry and thirsty. General diet changed to diabetic diet. Urine clear yellow. Suprapubic insertion site purulent drainage. Urologist arrived today to change catheter. Pt refused. Said too painful. Refused premedication for catheter change. Dr. Rodriguez notified. Site cleaned with saline and split gauze applied. Pt moves frequently in the bed. Tramadol ordered for pain. Tylenol for headache.     Problem: GENITOURINARY - ADULT  Goal: Absence of urinary retention  Description: INTERVENTIONS:  - Assess patient’s ability to void and empty bladder  - Monitor intake/output and perform bladder scan as needed  - Follow urinary retention protocol/standard of care  - Consider collaborating with pharmacy to review patient's medication profile  - Implement strategies to promote bladder emptying  Outcome: Not Progressing     Problem: PAIN - ADULT  Goal: Verbalizes/displays adequate comfort level or patient's stated pain goal  Description: INTERVENTIONS:  - Encourage pt to monitor pain and request assistance  - Assess pain using appropriate pain scale  - Administer analgesics based on type and severity of pain and evaluate response  - Implement non-pharmacological measures as appropriate and evaluate response  - Consider cultural and social influences on pain and pain management  - Manage/alleviate anxiety  - Utilize distraction and/or relaxation techniques  - Monitor for opioid side effects  - Notify MD/LIP if interventions unsuccessful or patient reports new pain  - Anticipate increased pain with activity and pre-medicate as appropriate  Outcome: Progressing

## 2024-01-09 PROBLEM — F33.2 SEVERE EPISODE OF RECURRENT MAJOR DEPRESSIVE DISORDER, WITHOUT PSYCHOTIC FEATURES (HCC): Status: ACTIVE | Noted: 2024-01-09

## 2024-01-09 PROBLEM — F41.1 GENERALIZED ANXIETY DISORDER: Status: ACTIVE | Noted: 2024-01-09

## 2024-01-09 LAB
GLUCOSE BLDC GLUCOMTR-MCNC: 133 MG/DL (ref 70–99)
GLUCOSE BLDC GLUCOMTR-MCNC: 157 MG/DL (ref 70–99)
GLUCOSE BLDC GLUCOMTR-MCNC: 207 MG/DL (ref 70–99)
GLUCOSE BLDC GLUCOMTR-MCNC: 308 MG/DL (ref 70–99)

## 2024-01-09 PROCEDURE — 90792 PSYCH DIAG EVAL W/MED SRVCS: CPT | Performed by: OTHER

## 2024-01-09 PROCEDURE — 0T2BX0Z CHANGE DRAINAGE DEVICE IN BLADDER, EXTERNAL APPROACH: ICD-10-PCS | Performed by: UROLOGY

## 2024-01-09 RX ORDER — QUETIAPINE FUMARATE 100 MG/1
200 TABLET, FILM COATED ORAL 2 TIMES DAILY
Status: DISCONTINUED | OUTPATIENT
Start: 2024-01-10 | End: 2024-01-11

## 2024-01-09 RX ORDER — ARIPIPRAZOLE 2 MG/1
2 TABLET ORAL DAILY
Status: DISCONTINUED | OUTPATIENT
Start: 2024-01-09 | End: 2024-01-11

## 2024-01-09 RX ORDER — BUPROPION HYDROCHLORIDE 150 MG/1
150 TABLET ORAL DAILY
Status: DISCONTINUED | OUTPATIENT
Start: 2024-01-10 | End: 2024-01-11

## 2024-01-09 RX ORDER — HYDROMORPHONE HYDROCHLORIDE 1 MG/ML
1 INJECTION, SOLUTION INTRAMUSCULAR; INTRAVENOUS; SUBCUTANEOUS
Status: COMPLETED | OUTPATIENT
Start: 2024-01-09 | End: 2024-01-09

## 2024-01-09 RX ORDER — DULOXETIN HYDROCHLORIDE 20 MG/1
20 CAPSULE, DELAYED RELEASE ORAL DAILY
Status: DISCONTINUED | OUTPATIENT
Start: 2024-01-10 | End: 2024-01-11

## 2024-01-09 NOTE — CONSULTS
Consults    [facility]    Report of Consultation    Clari Rosa Patient Status:  Inpatient    1970 MRN [id number]   Location [facility] 5SW/SE Attending Zeus Rodriguez MD   Hosp Day # 2 PCP Brian Bob MD     Date of Admission:  2024  Date of Consult:  24   Reason for Consultation:   Patient presented with anxiety and depression, Zeus Soares MD requested psychiatric consult for evaluation and advice.    Consult Duration     The patient seen for initial psychiatric consult evaluation.   Record reviewed, communication with attending, communication with RN and patient seen face to face evaluation.    History of Present Illness:   Patient is a 53 year old, , single, female with medical history of DM, Bipolar 1 disorder, Depression, MIKE, HTN, Diverticulitis, Neurogenic bladder, Obesity, sepsis, suprapubic catheter and transverse myelitis who was admitted to the hospital for Sepsis due to undetermined organism (HCC): The patient has been demonstrating increased anxiety and depressive symptoms.  Patient indicated for psych consult for evaluation and advise.    Per chart review, the patient presented to the hospital due to concern for UTI. She states this is a common occurrence which if left untreated develops into sepsis. She has a suprapubic catheter. In the ED she also had a CT done showing some perivesical stranding.  Bacteriauria noted without pyuria.  Cultures with >100K providencia with inducible beta lactamases. She has been started on IV cefepime, ceftriaxone, meropenem and was admitted to the floor for ESBL UTI. The patient was demonstrating increased anxiety and depression and requested to see psychiatry.     The patient received the following psychotropic medications Wellbutrin XL 24 hr tab 300mg, Klonopin 1mg q6h, Seroquel 300mg BID,     Labs and imaging reviewed: Glucose 207, AST 35, HGB 11.3     The patient seen today laying in hospital bed mildly  distressed, visibly upset and crying.     The patient is alert and oriented to person, place, date and condition. The patient answers questions and engages in appropriate conversation with no impairment in cognition or distortion in thought process.     The patient reporting feelings of hopelessness, helplessness, worthlessness, low mood, low energy, decreased motivation.    The patient reporting increased anxiety, worry, ruminations with impairment in sleep. States she has difficulty falling asleep which causes her to sleep late and wake up late. She displays concerns that she has difficulty waking up and doesn't feel better until the afternoon.     Patient states she has been living in a nursing home due to her suprapubic catheter and recurrent episodes of UTIs. She is single and has never  but has one living son. States she has been depressed \"for a very long time\" and does not know why her depression has worsened recently. She states the Nursing home has an psychiatrist that comes every 2 weeks and has been managing her depression and anxiety. Since a young age she states she had panic attacks and OCD and would rearrange and clean her room and house everyday.     The patient is not demonstrating any carmel or psychosis  The patient denies auditory or visual hallucinations  The patient denies any current or past suicidal ideations or self harm.     The patient has been demonstrating feelings of constant sadness, restlessness, hopelessness, helplessness, worthlessness, low mood, low energy, decreased motivation, increased anxiety, worry, ruminations with impairment in sleep. The patient denied any history of self harm or any suicidal ideations. She is agreeable to medication changed to help balance her mood and emotions.     Past Psychiatric/Medication History:  1. Prior diagnoses: MIKE, depression, Bipolar 1 disorder, Manipulative behavior. Patient states she also has OCD and Panic attacks sine adolescent  years.  2. Past psychiatric inpatient: none reported  3. Past outpatient history: Patient reports seeing a psychiatris t at Nursing Home   4. Past suicide history: Patient denies   5. Medication history: Wellbutrin XL 24 hr tab 300mg, Klonopin 1mg q6h, Seroquel 300mg BID,     Social History:   The patient states the she lives in a Nursing home due to medical conditions. She is single and has never  but has one living son.     Denies any ETOH abuse, or illicit substance abuse. Previous smoker (5 pack history)    Family History:  No family history reported   Medical History:   Past Medical History  Past Medical History:   Diagnosis Date    Bipolar 1 disorder (HCC)     Dental caries 10/26/2014    Diabetes (HCC)     MIKE (generalized anxiety disorder)     High blood pressure     History of diverticulitis of colon 10/26/2014    Manipulative behavior 10/26/2014    Neurogenic bladder     Obesity (BMI 30-39.9) 10/26/2014    Paraplegia (HCC)     Sepsis following intra-abdominal surgery ([facility]) 10/26/2014    Serotonin syndrome     Sleep apnea     Suprapubic catheter (HCC)     Transverse myelitis (HCC)        Past Surgical History  Past Surgical History:   Procedure Laterality Date    ARTHROSCOPY OF JOINT UNLISTED      knee          CHOLECYSTECTOMY      PART REMOVAL COLON W END COLOSTOMY      2013    TONSILLECTOMY         Family History  Family History   Adopted: Yes       Social History  Social History     Socioeconomic History    Marital status: Single   Tobacco Use    Smoking status: Former     Packs/day: 1.00     Years: 5.00     Additional pack years: 0.00     Total pack years: 5.00     Types: Cigarettes    Tobacco comments:     quit 2006   Substance and Sexual Activity    Alcohol use: No    Drug use: No     Social Determinants of Health     Food Insecurity: No Food Insecurity (2024)    Food Insecurity     Food Insecurity: Never true   Transportation Needs: No Transportation Needs (2024)     Transportation Needs     Lack of Transportation: No   Housing Stability: Low Risk  (1/7/2024)    Housing Stability     Housing Instability: No           Current Medications:  Current Facility-Administered Medications   Medication Dose Route Frequency    [START ON 1/10/2024] insulin detemir (Levemir) 100 UNIT/ML FlexPen/FlexTouch 25 Units  25 Units Subcutaneous Daily    insulin aspart (NovoLOG) 100 Units/mL FlexPen 25 Units  25 Units Subcutaneous TID CC    insulin detemir (Levemir) 100 UNIT/ML FlexPen/FlexTouch 60 Units  60 Units Subcutaneous Nightly    insulin aspart (NovoLOG) 100 Units/mL FlexPen 1-11 Units  1-11 Units Subcutaneous TID CC    cyanocobalamin (Vitamin B12) tab 1,000 mcg  1,000 mcg Oral Daily    psyllium (Metamucil) 28 % oral packet 1 packet  1 packet Oral TID    ondansetron (Zofran) 4 MG/2ML injection 4 mg  4 mg Intravenous Q6H PRN    prochlorperazine (Compazine) 10 MG/2ML injection 5 mg  5 mg Intravenous Q8H PRN    albuterol (Ventolin HFA) 108 (90 Base) MCG/ACT inhaler 2 puff  2 puff Inhalation Q6H PRN    atorvastatin (Lipitor) tab 40 mg  40 mg Oral Nightly    bethanechol (Urecholine) tab 10 mg  10 mg Oral TID    buPROPion ER (Wellbutrin XL) 24 hr tab 300 mg  300 mg Oral Daily    divalproex ER (Depakote ER) 24 hr tab 500 mg  500 mg Oral BID    gabapentin (Neurontin) tab 600 mg  600 mg Oral TID    traZODone (Desyrel) tab 100 mg  100 mg Oral Q12H PRN    oxybutynin ER (Ditropan-XL) 24 hr tab 5 mg  5 mg Oral Daily    QUEtiapine (SEROquel) tab 300 mg  300 mg Oral BID    rivaroxaban (Xarelto) tab 20 mg  20 mg Oral QPM    glucose (Dex4) 15 GM/59ML oral liquid 15 g  15 g Oral Q15 Min PRN    Or    glucose (Glutose) 40% oral gel 15 g  15 g Oral Q15 Min PRN    Or    glucose-vitamin C (Dex-4) chewable tab 4 tablet  4 tablet Oral Q15 Min PRN    Or    dextrose 50% injection 50 mL  50 mL Intravenous Q15 Min PRN    Or    glucose (Dex4) 15 GM/59ML oral liquid 30 g  30 g Oral Q15 Min PRN    Or    glucose (Glutose) 40%  oral gel 30 g  30 g Oral Q15 Min PRN    Or    glucose-vitamin C (Dex-4) chewable tab 8 tablet  8 tablet Oral Q15 Min PRN    cetirizine (ZyrTEC) tab 10 mg  10 mg Oral Daily    clotrimazole (Lotrimin) 1 % cream   Topical BID    clonazePAM (KlonoPIN) tab 1 mg  1 mg Oral q6h    traMADol (Ultram) tab 50 mg  50 mg Oral Q6H PRN    guaiFENesin ER (Mucinex) 12 hr tab 600 mg  600 mg Oral BID    ceFEPIme (Maxpime) 2 g in sodium chloride 0.9% 100 mL IVPB-MBP  2 g Intravenous Q12H    acetaminophen (Tylenol Extra Strength) tab 500 mg  500 mg Oral Q4H PRN    vancomycin (Vancocin) cap 125 mg  125 mg Oral Daily     Medications Prior to Admission   Medication Sig    psyllium 28 % Oral Powd Pack Take 1 packet by mouth every 8 (eight) hours as needed (constipation).    Nystatin 225797 UNIT/GM External Powder Apply 1 Application topically as needed for Dry Skin. Under breast and groin area    glucagon (BAQSIMI ONE PACK) 3 MG/DOSE Nasal nasal powder 3 mg by Nasal route once as needed for Low blood glucose.    levoFLOXacin 250 MG Oral Tab Take 1 tablet (250 mg total) by mouth daily.    atorvastatin 40 MG Oral Tab Take 1 tablet (40 mg total) by mouth nightly.    sennosides 8.6 MG Oral Tab Take 2 tablets (17.2 mg total) by mouth 2 (two) times daily.    acetaminophen 325 MG Oral Tab Take 2 tablets (650 mg total) by mouth every 6 (six) hours as needed for Pain.    Icosapent Ethyl 1 g Oral Cap Take 1 capsule by mouth 4 (four) times daily.    insulin aspart 100 Units/mL Subcutaneous Solution Pen-injector Inject 24 Units into the skin 3 (three) times daily with meals.    rivaroxaban (XARELTO) 20 MG Oral Tab Take 1 tablet (20 mg total) by mouth daily.    QUEtiapine 300 MG Oral Tab Take 1 tablet (300 mg total) by mouth 2 (two) times daily.    insulin glargine 100 UNIT/ML Subcutaneous Solution Inject 56 Units into the skin nightly.    insulin glargine 100 UNIT/ML Subcutaneous Solution Inject 20 Units into the skin every morning.    Melatonin 3 MG  Oral Cap Take 1 capsule (3 mg total) by mouth at bedtime.    ergocalciferol 1.25 MG (38552 UT) Oral Cap Take 1 capsule (50,000 Units total) by mouth every 7 days. Every monday    oxybutynin ER 5 MG Oral Tablet 24 Hr Take 1 tablet (5 mg total) by mouth daily.    methenamine 1 g Oral Tab Take 0.5 tablets (0.5 g total) by mouth 2 (two) times daily with meals.    bethanechol 10 MG Oral Tab Take 1 tablet (10 mg total) by mouth 3 (three) times daily.    loratadine 10 MG Oral Tab Take 1 tablet (10 mg total) by mouth daily.    cyclobenzaprine 10 MG Oral Tab Take 1 tablet (10 mg total) by mouth 3 (three) times daily as needed for Muscle spasms.    albuterol 108 (90 Base) MCG/ACT Inhalation Aero Soln Inhale 2 puffs into the lungs every 6 (six) hours as needed for Wheezing or Shortness of Breath.    phenazopyridine 200 MG Oral Tab Take 1 tablet (200 mg total) by mouth 3 (three) times daily.    ClonazePAM 1 MG Oral Tab Take 1 tablet (1 mg total) by mouth 4 (four) times daily.    Insulin Aspart 100 UNIT/ML Subcutaneous Solution Cartridge SS: 150-200 2units, 201-250 4 units, 251-300 6 units, 301-350 8 units, 351-400 10 units, over 401 call md    acetaminophen (TYLENOL EXTRA STRENGTH) 500 MG Oral Tab Take 325 mg by mouth every 4 (four) hours as needed for Pain or Fever.    Montelukast Sodium 10 MG Oral Tab Take 1 tablet (10 mg total) by mouth daily.    BuPROPion HCl ER, XL, 300 MG Oral Tablet 24 Hr Take 1 tablet (300 mg total) by mouth daily.    divalproex Sodium  MG Oral Tablet 24 Hr Take 1 tablet (500 mg total) by mouth 2 (two) times daily.    Acidophilus/Pectin Oral Cap Take 1 capsule by mouth 2 (two) times daily.    ondansetron 4 MG Oral Tablet Dispersible Take 2 tablets (8 mg total) by mouth every 8 (eight) hours as needed for Nausea.    TraZODone HCl 100 MG Oral Tab Take 1 tablet (100 mg total) by mouth every 12 (twelve) hours as needed for Sleep.    gabapentin 600 MG Oral Tab Take 1 tablet (600 mg total) by mouth 3  (three) times daily.    MetFORMIN HCl ER (GLUCOPHAGE-XR) 750 MG Oral Tablet 24 Hr Take 1 tablet (750 mg total) by mouth daily with breakfast.    Multiple Vitamins-Minerals (THERA-M) Oral Tab Take 1 tablet by mouth daily.       Allergies  Allergies   Allergen Reactions    Pcn [Penicillins]     Radiology Contrast Iodinated Dyes     Sulfa Antibiotics        Review of Systems:   As by Admitting/Attending    Results:   Laboratory Data:  Lab Results   Component Value Date    WBC 6.2 01/08/2024    HGB 11.3 (L) 01/08/2024    HCT 35.5 01/08/2024    .0 01/08/2024    CREATSERUM 0.94 01/08/2024    BUN 12 01/08/2024     (L) 01/08/2024    K 4.1 01/08/2024     01/08/2024    CO2 22.0 01/08/2024     (H) 01/08/2024    CA 9.3 01/08/2024    ALB 4.0 01/06/2024    ALKPHO 91 01/06/2024    TP 7.1 01/06/2024    AST 35 (H) 01/06/2024    ALT 20 01/06/2024    PTT 26.4 12/05/2023    LIP 36 12/05/2023    DDIMER 0.92 (H) 12/05/2023    MG 1.7 12/12/2023         Imaging:  CT SINUS (CPT=70486)    Result Date: 1/8/2024  CONCLUSION: Minimal mucosal thickening in the right maxillary sinus  [facility] provided a prelim report for this exam. This final report has no significant discrepancies with the [facility] report.    Dictated by (CST): Yordan Guerrero MD on 1/08/2024 at 8:07 AM     Finalized by (CST): Yordan Guerrero MD on 1/08/2024 at 8:14 AM          CT ABDOMEN+PELVIS KIDNEYSTONE 2D RNDR(NO IV,NO ORAL)(CPT=74176)    Result Date: 1/7/2024  CONCLUSION:  1. No urolithiasis or renal obstruction.  Left renal atrophy. 2. Right paramedian suprapubic catheter with decompressed urinary bladder.  Perivesical stranding may be related to cystitis or scarring. 3. Post right hemicolectomy.  Large amount of stool in the colon suggests constipation. 4. Moderate hepatic steatosis and mild hepatomegaly. 5. Mild splenomegaly.    Dictated by (CST): Parish Pritchard MD on 1/07/2024 at 6:27 AM     Finalized by (CST): Parish Pritchard MD on 1/07/2024  at 6:34 AM          XR CHEST AP PORTABLE  (CPT=71045)    Result Date: 1/6/2024  CONCLUSION:  No radiographic evidence of acute cardiopulmonary process.    Dictated by (CST): Ene Pérez MD on 1/06/2024 at 8:22 PM     Finalized by (CST): Ene Pérez MD on 1/06/2024 at 8:23 PM           Vital Signs:   Blood pressure 115/84, pulse 93, temperature 98.5 °F (36.9 °C), temperature source Oral, resp. rate 18, weight 217 lb 3.2 oz, SpO2 92%.    Mental Status Exam:   Appearance: Stated age 53, in hospital gown, laying down in hospital bed.  Psychomotor: No psychomotor agitation, or retardation.   Orientation: Alert and oriented to person, place, time and condition.  Gait: Not evaluated.  Attitude/Coorperation: Cooperative and attentive.  Behavior: Appropriate.  Speech: Regular rate and rhythm speech.  Mood: Patient reporting depressed and anxious mood.  Affect: Congruent with the mood.  Thought process: Linear and appropriate.  Thought content: Patient denies any suicidal or homicidal ideation. Patient denies any history of self harm.   Perceptions: Patient denies any auditory or visual hallucinations.  Concentration: Grossly intact.  Memory: Grossly intact.  Intellect: Average.  Judgment and Insight: Questionable.     Impression:     Depression  Anxiety     Sepsis due to undetermined organism (HCC)    Patient is a 53 year old, , single, female with medical history of DM, Bipolar 1 disorder, Depression, MIKE, HTN, Diverticulitis, Neurogenic bladder, Obesity, sepsis, suprapubic catheter and transverse myelitis who was admitted to the hospital for Sepsis due to undetermined organism (HCC): The patient has been demonstrating increased anxiety and depressive symptoms.    The patient has been demonstrating feelings of constant sadness, restlessness, hopelessness, helplessness, worthlessness, low mood, low energy, decreased motivation, increased anxiety, worry, ruminations with impairment in sleep. The patient  denied any history of self harm or any suicidal ideations. She is agreeable to medication changed to help balance her mood and emotions.     Discussed risk and benefit, acknowledging the current symptom and severity.  At this point, I would recommend the following approach:     Focus on safety  Focus on education and support.  Focus on insight orientation helping the patient understand diagnosis and treatment plan.  Changes in psychiatric medication regime   Processed with patient at length, the initiation of the above psychotropic medications I advised the patient of the risks, benefits, alternatives and potential side effects. The patient consents to administration of the medications and understands the right to refuse medications at any time. The patient verbalized understanding.   Coordinate plan with team    Orders This Visit:  Orders Placed This Encounter   Procedures    CBC With Differential With Platelet    Comp Metabolic Panel (14)    Urinalysis with Culture Reflex    Lactic Acid, Plasma    Lactic Acid Reflex Post Positive    Lactic Acid Post Positive    Lactic Acid, Plasma    Lactic Acid Reflex Post Positive    Lactic Acid Post Positive    Basic Metabolic Panel (8)    CBC With Differential With Platelet    Procalcitonin    SARS-CoV-2/Flu A and B/RSV by PCR (GeneXpert)    Urine Culture, Routine    Blood Culture       Meds This Visit:  Requested Prescriptions      No prescriptions requested or ordered in this encounter       Fede Briceñogunjanjung  1/9/2024    Note to Patient: The 21st Century Cures Act makes medical notes like these available to patients in the interest of transparency. However, be advised this is a medical document. It is intended as peer to peer communication. It is written in medical language and may contain abbreviations or verbiage that are unfamiliar. It may appear blunt or direct. Medical documents are intended to carry relevant information, facts as evident, and the clinical opinion of  the practitioner. This note may have been transcribed using a voice dictation system. Voice recognition errors may occur. This should not be taken to alter the content or meaning of this note.

## 2024-01-09 NOTE — PLAN OF CARE
Pt hungry all night, upset about not having more food for dinner. Educated on topic of diabetes. Frequent rounding by nursing staff. Safety precautions in place, call light within reach.    Problem: Patient Centered Care  Goal: Patient preferences are identified and integrated in the patient's plan of care  Description: Interventions:  - What would you like us to know as we care for you? \"I am from [facility].\"  - Provide timely, complete, and accurate information to patient/family  - Incorporate patient and family knowledge, values, beliefs, and cultural backgrounds into the planning and delivery of care  - Encourage patient/family to participate in care and decision-making at the level they choose  - Honor patient and family perspectives and choices  Outcome: Progressing     Problem: RESPIRATORY - ADULT  Goal: Achieves optimal ventilation and oxygenation  Description: INTERVENTIONS:  - Assess for changes in respiratory status  - Assess for changes in mentation and behavior  - Position to facilitate oxygenation and minimize respiratory effort  - Oxygen supplementation based on oxygen saturation or ABGs  - Provide Smoking Cessation handout, if applicable  - Encourage broncho-pulmonary hygiene including cough, deep breathe, Incentive Spirometry  - Assess the need for suctioning and perform as needed  - Assess and instruct to report SOB or any respiratory difficulty  - Respiratory Therapy support as indicated  - Manage/alleviate anxiety  - Monitor for signs/symptoms of CO2 retention  Outcome: Progressing     Problem: GENITOURINARY - ADULT  Goal: Absence of urinary retention  Description: INTERVENTIONS:  - Assess patient’s ability to void and empty bladder  - Monitor intake/output and perform bladder scan as needed  - Follow urinary retention protocol/standard of care  - Consider collaborating with pharmacy to review patient's medication profile  - Implement strategies to promote bladder emptying  Outcome:  Progressing     Problem: SKIN/TISSUE INTEGRITY - ADULT  Goal: Skin integrity remains intact  Description: INTERVENTIONS  - Assess and document risk factors for pressure ulcer development  - Assess and document skin integrity  - Monitor for areas of redness and/or skin breakdown  - Initiate interventions, skin care algorithm/standards of care as needed  Outcome: Progressing  Goal: Incision(s), wounds(s) or drain site(s) healing without S/S of infection  Description: INTERVENTIONS:  - Assess and document risk factors for pressure ulcer development  - Assess and document skin integrity  - Assess and document dressing/incision, wound bed, drain sites and surrounding tissue  - Implement wound care per orders  - Initiate isolation precautions as appropriate  - Initiate Pressure Ulcer prevention bundle as indicated  Outcome: Progressing     Problem: PAIN - ADULT  Goal: Verbalizes/displays adequate comfort level or patient's stated pain goal  Description: INTERVENTIONS:  - Encourage pt to monitor pain and request assistance  - Assess pain using appropriate pain scale  - Administer analgesics based on type and severity of pain and evaluate response  - Implement non-pharmacological measures as appropriate and evaluate response  - Consider cultural and social influences on pain and pain management  - Manage/alleviate anxiety  - Utilize distraction and/or relaxation techniques  - Monitor for opioid side effects  - Notify MD/LIP if interventions unsuccessful or patient reports new pain  - Anticipate increased pain with activity and pre-medicate as appropriate  Outcome: Progressing     Problem: RISK FOR INFECTION - ADULT  Goal: Absence of fever/infection during anticipated neutropenic period  Description: INTERVENTIONS  - Monitor WBC  - Administer growth factors as ordered  - Implement neutropenic guidelines  Outcome: Progressing     Problem: SAFETY ADULT - FALL  Goal: Free from fall injury  Description: INTERVENTIONS:  - Assess  pt frequently for physical needs  - Identify cognitive and physical deficits and behaviors that affect risk of falls.  - [place] fall precautions as indicated by assessment.  - Educate pt/family on patient safety including physical limitations  - Instruct pt to call for assistance with activity based on assessment  - Modify environment to reduce risk of injury  - Provide assistive devices as appropriate  - Consider OT/PT consult to assist with strengthening/mobility  - Encourage toileting schedule  Outcome: Progressing     Problem: DISCHARGE PLANNING  Goal: Discharge to home or other facility with appropriate resources  Description: INTERVENTIONS:  - Identify barriers to discharge w/pt and caregiver  - Include patient/family/discharge partner in discharge planning  - Arrange for needed discharge resources and transportation as appropriate  - Identify discharge learning needs (meds, wound care, etc)  - Arrange for interpreters to assist at discharge as needed  - Consider post-discharge preferences of patient/family/discharge partner  - Complete POLST form as appropriate  - Assess patient's ability to be responsible for managing their own health  - Refer to Case Management Department for coordinating discharge planning if the patient needs post-hospital services based on physician/LIP order or complex needs related to functional status, cognitive ability or social support system  Outcome: Progressing

## 2024-01-09 NOTE — PROGRESS NOTES
[facility]   INTERNAL MEDICINE PROGRESS NOTE    CHIEF COMPLAINT   UTI    SUBJECTIVE  24 HR EVENTS   Lots of pain - cath exchange today  Not feeling better  Requesting psych consult for uncontrolled anxiety  Requesting to bj hays  Requesting B12    INPATIENT MEDICATIONS  Scheduled Medications:  [START ON 1/10/2024] insulin detemir, 25 Units, Daily  insulin detemir, 60 Units, Nightly  [START ON 1/10/2024] insulin aspart, 20 Units, TID CC  insulin aspart, 1-11 Units, TID CC  cyanocobalamin, 1,000 mcg, Daily  psyllium, 1 packet, TID  atorvastatin, 40 mg, Nightly  bethanechol, 10 mg, TID  buPROPion ER, 300 mg, Daily  divalproex ER, 500 mg, BID  gabapentin, 600 mg, TID  oxybutynin ER, 5 mg, Daily  QUEtiapine, 300 mg, BID  rivaroxaban, 20 mg, QPM  cetirizine, 10 mg, Daily  clotrimazole, , BID  clonazePAM, 1 mg, q6h  guaiFENesin ER, 600 mg, BID  cefepime, 2 g, Q12H  vancomycin, 125 mg, Daily     Drips:         PRN Medications:   ondansetron, 4 mg, Q6H PRN  prochlorperazine, 5 mg, Q8H PRN  albuterol, 2 puff, Q6H PRN  traZODone, 100 mg, Q12H PRN  glucose, 15 g, Q15 Min PRN   Or  glucose, 15 g, Q15 Min PRN   Or  glucose-vitamin C, 4 tablet, Q15 Min PRN   Or  dextrose, 50 mL, Q15 Min PRN   Or  glucose, 30 g, Q15 Min PRN   Or  glucose, 30 g, Q15 Min PRN   Or  glucose-vitamin C, 8 tablet, Q15 Min PRN  traMADol, 50 mg, Q6H PRN  acetaminophen, 500 mg, Q4H PRN       PHYSICAL EXAMINATION   Vitals: BP (!) 142/93 (BP Location: Right arm)   Pulse 105   Temp 98.3 °F (36.8 °C) (Oral)   Resp 18   Wt 217 lb 3.2 oz (98.5 kg)   SpO2 93%   BMI 42.42 kg/m²   Gen: NAD, morbidly obese, appears chronically ill, verbose  Eyes: PERRLA, normal conjunctivae  ENMT: Moist mucous membranes, trachea midline  CV: RRR, no M/R/G, no peripheral edema  Resp: CTAB, non-labored respirations, symmetric expansion  GI: Soft, NT, ND, + BS  MSK:  No C/C/E, normal active/passive ROM in C-spine  Skin: No rashes, visible skin w/o worrisome  lesions   Neuro: CN 2-12 grossly intact, sensation intact   Psych: A&Ox3, appropriate mood, conversant w/ linear thought process     LABORATORY VALUES   Recent Labs   Lab 01/06/24  2018 01/08/24  0643    133*   K 4.6 4.1   CL 99 102   CO2 27.0 22.0   BUN 15 12   CREATSERUM 1.05* 0.94   ANIONGAP 11 9   * 362*   CA 9.4 9.3   TP 7.1  --    ALB 4.0  --    AST 35*  --    ALT 20  --    ALKPHO 91  --    BILT 0.4  --    EGFRCR 64 73     Recent Labs   Lab 01/06/24 2018 01/08/24  0643   WBC 9.0 6.2   HGB 11.3* 11.3*   HCT 36.7 35.5   .0 214.0   MCV 90.4 91.5   MOPERCENT 4.8 5.6   EOPERCENT 2.8 2.6   BAPERCENT 0.9 0.8     11/2023 E. Coli     Antibiotic                 RSLT#1    RSLT#2    RSLT#3    RSLT#4   Amoxicillin/Clavulanic Acid    S =4   Ampicillin                     R>=32   Ampicillin/Sulbactam          S =4   Cefazolin                      R>=64   Cefepime                      S<=1   Cefoxitin                     S<=4   Ceftazidime                   S<=1   Ceftriaxone                   R>=64   Ciprofloxacin                 R>=4   Ertapenem                     S<=0.5   Gentamicin                    S<=1   Imipenem                      S<=0.25   Levofloxacin                  R>=8   Meropenem                     S<=0.25   Nitrofurantoin                S<=16   Piperacillin/Tazobactam       S<=4   Tobramycin                    S<=1   Trimethoprim/Sulfa            R>=320     ASSESSMENT & PLAN   Clari Rosa - 53 year old female, LTC resident w neurogenic bladder & chronic suprapubic catheter w recurring pseudomonas UTIs, bipolar d/o MIKE, benzo dependence, DM2, ANAID, [place]  admitted 1/6/2024 for recurring UTI    Neurogenic bladder  Indwelling suprapubic catheter  Catheter assoc UTI - providencia stuartii  H/o recurring UTIs - pseudomonas per pt. MDRO e. Coli & VRE noted in careeverywhere  - Afebrile, not tachycardic, no leukocytosis  - Ua infection  [  ] F/u cx  - Cefepime ( 1/7 - )  - Urology consulted, s/p  SPC exchange 1/9  - Complicated prior UTIs. Poss ESBL organisms. Per PharmD - carbapenems interact with valproate. ID consulted - d/w Dr. Billy Carroll C.diff  - PO vanc prophy dose for now while on abx for UTI    Chronic sinusitis   - Ongoing for months - terrible headaches  - CT sinus w/o infection  - APAP/tramadol for headaches    H/o pulmonary embolus  - Continue PTA xarelto      Bipolar  MIKE  Benzo dependence  - Continue PTA meds  - Klonopin  - Pt requesting psych consult - message sent to Dr. Qureshi     ANAID  - CPAP    Morbid obesity w prison  Chronic hunger  - Body mass index is 42.42 kg/m².  - Healthy diet & wt loss encouraged   - RD consult for diet strategies  - Consider OP endo eval (grehlin/leptin levels checked etc)    B12 tabs per pt request    Ppx: PTA meds  Dispo  EDoD: Tentative plan for DC back to LTC facility pending UTI tx. Suspect ~ 1/10    F/u:   - PCP: Brian Bob MD  - Urology  - Psych  - Endo  - ID    Available via bubble chat or perfect serve 7A - 7P.     Zeus Rodriguez MD  Internal Medicine - Hospitalist  [facility]

## 2024-01-09 NOTE — PROGRESS NOTES
[facility]  Urology Progress Note    Clari Rosa Patient Status:  Inpatient    1970 MRN [id number]   Location [facility] 5SW/SE Attending Zeus Rodriguez MD   Hosp Day # 2 PCP Brian Bob MD     Subjective:  Clari Rosa is a(n) 53 year old female.      Current complaints: No new  complaints. The patient agreed to have her SP tube changed today.    Objective:  General appearance: alert, cooperative, and no distress  Blood pressure 115/84, pulse 93, temperature 98.5 °F (36.9 °C), temperature source Oral, resp. rate 18, weight 217 lb 3.2 oz (98.5 kg), SpO2 92%.  Lungs:  Cardiac:  Abdomen:  Lab Results   Component Value Date    PGLU 308 2024     The SP tube was changed to another 18 Yi catheter without difficulty.     Assessment:  Neurogenic bladder    Plan:  The SP tube was changed and she may resume catheter changes monthly in her care facility. Complete antibiotics per ID.    Terrell Liz MD  2024  1:16 PM

## 2024-01-09 NOTE — PROGRESS NOTES
[facility] Infectious Disease  Progress Note    Clari Rosa Patient Status:  Inpatient    1970 MRN [id number]   Location [facility] 5SW/SE Attending Zeus Rodriguez MD   Hosp Day # 2 PCP Brian Bob MD     Subjective:  Patient seen and examined in bed. Ongoing fatigue and malaise. No acute overnight events. Tolerating abx therapy. Otherwise no new complaints.     Objective:  Blood pressure 124/74, pulse 106, temperature 98 °F (36.7 °C), temperature source Oral, resp. rate 18, weight 216 lb 1.6 oz (98 kg), SpO2 94%.    Intake/Output:    Intake/Output Summary (Last 24 hours) at 2024 0838  Last data filed at 2024 0643  Gross per 24 hour   Intake 1040 ml   Output 6985 ml   Net -5945 ml       Physical Exam:  General: Awake, alert, non-tox, NAD.  HEENT:  Oropharynx clear, trachea ML.  Heart: RRR S1S2 no murmurs.  Lungs: Essentially CTA b/l, no rhonchi, rales, wheezes.  Abdomen: Soft, NT/ND.  SP tube in place.  Extremity: No edema.  Neurological: No focal deficits.  Derm:  Warm and dry.    Lab Data Review:        Cultures:  Hospital Encounter on 24   1. Blood Culture     Status: None (Preliminary result)    Collection Time: 24  9:33 PM    Specimen: Blood,peripheral   Result Value Ref Range    Blood Culture Result No Growth 2 Days N/A   2. Urine Culture, Routine     Status: Abnormal    Collection Time: 24  8:17 PM    Specimen: Urine, clean catch   Result Value Ref Range    Urine Culture >100,000 CFU/ML Providencia stuartii (A) N/A       Susceptibility    Providencia stuartii -  (no method available)     Amikacin  Resistant      Ampicillin 16 Intermediate      Cefazolin >=64 Resistant      Cefepime <=1 Sensitive      Ceftriaxone <=1 Sensitive      Ciprofloxacin >=4 Resistant      Gentamicin  Resistant      Meropenem <=0.25 Sensitive      Levofloxacin 4 Intermediate      Nitrofurantoin 256 Resistant      Piperacillin + Tazobactam <=4 Sensitive       Tobramycin  Resistant      Trimethoprim/Sulfa <=20 Sensitive        Radiology:  CT SINUS (DSC=43676)    Result Date: 1/8/2024  CONCLUSION: Minimal mucosal thickening in the right maxillary sinus  [facility] provided a prelim report for this exam. This final report has no significant discrepancies with the [facility] report.    Dictated by (CST): Yordan Guerrero MD on 1/08/2024 at 8:07 AM     Finalized by (CST): Yordan Guerrero MD on 1/08/2024 at 8:14 AM          CT ABDOMEN+PELVIS KIDNEYSTONE 2D RNDR(NO IV,NO ORAL)(CPT=74176)    Result Date: 1/7/2024  CONCLUSION:  1. No urolithiasis or renal obstruction.  Left renal atrophy. 2. Right paramedian suprapubic catheter with decompressed urinary bladder.  Perivesical stranding may be related to cystitis or scarring. 3. Post right hemicolectomy.  Large amount of stool in the colon suggests constipation. 4. Moderate hepatic steatosis and mild hepatomegaly. 5. Mild splenomegaly.    Dictated by (CST): Parish Pritchard MD on 1/07/2024 at 6:27 AM     Finalized by (CST): Parish Pritchard MD on 1/07/2024 at 6:34 AM          XR CHEST AP PORTABLE  (CPT=71045)    Result Date: 1/6/2024  CONCLUSION:  No radiographic evidence of acute cardiopulmonary process.    Dictated by (CST): Ene Pérez MD on 1/06/2024 at 8:22 PM     Finalized by (CST): Ene Pérez MD on 1/06/2024 at 8:23 PM           Assessment and Plan:  1.  Neurogenic bladder with SP tube in place and suspected chronic bacterial colonization  - Patient reports h/o \"20 admissions for sepsis,\" primarily with Pseudomonas.  - UA with 1-5 WBCs, but with 3+ bacteria -- patient declined SP tube exchange per urology.  - Urine culture isolating Providencia stuartii -- this isolate is known to possess inducible beta-lactamases.  - CT ab/pelvis with perivesical stranding may be r/t cystitis or scarring.  Moderate hepatic steatosis and mild hepatosplenomegaly.  No urolithiasis or renal obstruction.  - Urology following.  - IV  cefepime, ongoing, day #4.    2.  No F/C or leukocytosis to suggest sepsis  - Procalcitonin mildly elevated at 0.1  - WBC WNL.  - Temp (24hrs), Av.4 °F (36.9 °C), Min:98 °F (36.7 °C), Max:98.6 °F (37 °C)    3.  Chronic sinusitis  - CT sinus with minimal mucosal thickening in the right maxillary sinus without evidence of infection.    4.  History of C. difficile  - Oral vancomycin ppx, ongoing.    5.  Recs  - Continue IV cefepime for now.  Plan to discharge on oral cefdinir 300 mg twice daily on discharge when ready.  To complete a short course as colonization > infection at this time.   - F/u WBC and fever curve.  - Supportive care as per the primary team.  - Discussed plan of care with nursing.   - Discussed plan of care with patient. All questions addressed and understanding verbalized.  - Further recommendations pending clinical course.    Please note that this report has been produced using speech recognition software and may contain errors related to that system including, but not limited to, errors in grammar, punctuation, and spelling, as well as words and phrases that possibly may have been recognized inappropriately.  If there are any questions or concerns, contact the dictating provider for clarification.     The 21st Century Cures Act makes medical notes like these available to patients in the interest of transparency. Please be advised this is a medical document. Medical documents are intended to carry relevant information, facts as evident, and the clinical opinion of the practitioner. The medical note is intended as peer to peer communication and may appear blunt or direct. It is written in medical language and may contain abbreviations or verbiage that are unfamiliar.     If you have any questions or concerns please call [facility] Infectious Disease at [phone].     Jeison Hopper, APRN    2024  8:38 AM

## 2024-01-09 NOTE — PLAN OF CARE
Patient updated on plan of care. PRN tramadol and tylenol given throughout shift for sinus headache. Patient repositioned. Suprapubic catheter in place. Urology made aware that patient is now agreeable to having catheter exchanged. Patient with hyperglycemia episodes, MD made aware, insulin adjusted. Plan to continue IV antibiotics and switch to PO antibiotics at discharge when medically cleared. Call light within reach.     Problem: Patient Centered Care  Goal: Patient preferences are identified and integrated in the patient's plan of care  Description: Interventions:  - What would you like us to know as we care for you? \"I am from [facility].\"  - Provide timely, complete, and accurate information to patient/family  - Incorporate patient and family knowledge, values, beliefs, and cultural backgrounds into the planning and delivery of care  - Encourage patient/family to participate in care and decision-making at the level they choose  - Honor patient and family perspectives and choices  Outcome: Progressing     Problem: RESPIRATORY - ADULT  Goal: Achieves optimal ventilation and oxygenation  Description: INTERVENTIONS:  - Assess for changes in respiratory status  - Assess for changes in mentation and behavior  - Position to facilitate oxygenation and minimize respiratory effort  - Oxygen supplementation based on oxygen saturation or ABGs  - Provide Smoking Cessation handout, if applicable  - Encourage broncho-pulmonary hygiene including cough, deep breathe, Incentive Spirometry  - Assess the need for suctioning and perform as needed  - Assess and instruct to report SOB or any respiratory difficulty  - Respiratory Therapy support as indicated  - Manage/alleviate anxiety  - Monitor for signs/symptoms of CO2 retention  Outcome: Progressing     Problem: GENITOURINARY - ADULT  Goal: Absence of urinary retention  Description: INTERVENTIONS:  - Assess patient’s ability to void and empty bladder  - Monitor  intake/output and perform bladder scan as needed  - Follow urinary retention protocol/standard of care  - Consider collaborating with pharmacy to review patient's medication profile  - Implement strategies to promote bladder emptying  Outcome: Progressing     Problem: SKIN/TISSUE INTEGRITY - ADULT  Goal: Skin integrity remains intact  Description: INTERVENTIONS  - Assess and document risk factors for pressure ulcer development  - Assess and document skin integrity  - Monitor for areas of redness and/or skin breakdown  - Initiate interventions, skin care algorithm/standards of care as needed  Outcome: Progressing  Goal: Incision(s), wounds(s) or drain site(s) healing without S/S of infection  Description: INTERVENTIONS:  - Assess and document risk factors for pressure ulcer development  - Assess and document skin integrity  - Assess and document dressing/incision, wound bed, drain sites and surrounding tissue  - Implement wound care per orders  - Initiate isolation precautions as appropriate  - Initiate Pressure Ulcer prevention bundle as indicated  Outcome: Progressing     Problem: PAIN - ADULT  Goal: Verbalizes/displays adequate comfort level or patient's stated pain goal  Description: INTERVENTIONS:  - Encourage pt to monitor pain and request assistance  - Assess pain using appropriate pain scale  - Administer analgesics based on type and severity of pain and evaluate response  - Implement non-pharmacological measures as appropriate and evaluate response  - Consider cultural and social influences on pain and pain management  - Manage/alleviate anxiety  - Utilize distraction and/or relaxation techniques  - Monitor for opioid side effects  - Notify MD/LIP if interventions unsuccessful or patient reports new pain  - Anticipate increased pain with activity and pre-medicate as appropriate  Outcome: Progressing     Problem: RISK FOR INFECTION - ADULT  Goal: Absence of fever/infection during anticipated neutropenic  period  Description: INTERVENTIONS  - Monitor WBC  - Administer growth factors as ordered  - Implement neutropenic guidelines  Outcome: Progressing     Problem: SAFETY ADULT - FALL  Goal: Free from fall injury  Description: INTERVENTIONS:  - Assess pt frequently for physical needs  - Identify cognitive and physical deficits and behaviors that affect risk of falls.  - Turner fall precautions as indicated by assessment.  - Educate pt/family on patient safety including physical limitations  - Instruct pt to call for assistance with activity based on assessment  - Modify environment to reduce risk of injury  - Provide assistive devices as appropriate  - Consider OT/PT consult to assist with strengthening/mobility  - Encourage toileting schedule  Outcome: Progressing     Problem: DISCHARGE PLANNING  Goal: Discharge to home or other facility with appropriate resources  Description: INTERVENTIONS:  - Identify barriers to discharge w/pt and caregiver  - Include patient/family/discharge partner in discharge planning  - Arrange for needed discharge resources and transportation as appropriate  - Identify discharge learning needs (meds, wound care, etc)  - Arrange for interpreters to assist at discharge as needed  - Consider post-discharge preferences of patient/family/discharge partner  - Complete POLST form as appropriate  - Assess patient's ability to be responsible for managing their own health  - Refer to Case Management Department for coordinating discharge planning if the patient needs post-hospital services based on physician/LIP order or complex needs related to functional status, cognitive ability or social support system  Outcome: Progressing

## 2024-01-10 LAB
ANION GAP SERPL CALC-SCNC: 6 MMOL/L (ref 0–18)
BILIRUB UR QL: NEGATIVE
BUN BLD-MCNC: 14 MG/DL (ref 9–23)
BUN/CREAT SERPL: 16.1 (ref 10–20)
CALCIUM BLD-MCNC: 9.7 MG/DL (ref 8.7–10.4)
CHLORIDE SERPL-SCNC: 99 MMOL/L (ref 98–112)
CLARITY UR: CLEAR
CO2 SERPL-SCNC: 28 MMOL/L (ref 21–32)
COLOR UR: COLORLESS
CREAT BLD-MCNC: 0.87 MG/DL
DEPRECATED RDW RBC AUTO: 54.6 FL (ref 35.1–46.3)
EGFRCR SERPLBLD CKD-EPI 2021: 80 ML/MIN/1.73M2 (ref 60–?)
ERYTHROCYTE [DISTWIDTH] IN BLOOD BY AUTOMATED COUNT: 17.1 % (ref 11–15)
GLUCOSE BLD-MCNC: 232 MG/DL (ref 70–99)
GLUCOSE BLDC GLUCOMTR-MCNC: 205 MG/DL (ref 70–99)
GLUCOSE BLDC GLUCOMTR-MCNC: 227 MG/DL (ref 70–99)
GLUCOSE BLDC GLUCOMTR-MCNC: 251 MG/DL (ref 70–99)
GLUCOSE BLDC GLUCOMTR-MCNC: 291 MG/DL (ref 70–99)
GLUCOSE UR-MCNC: 300 MG/DL
HCT VFR BLD AUTO: 37.5 %
HGB BLD-MCNC: 12.5 G/DL
KETONES UR-MCNC: NEGATIVE MG/DL
LEUKOCYTE ESTERASE UR QL STRIP.AUTO: 25
MCH RBC QN AUTO: 29.3 PG (ref 26–34)
MCHC RBC AUTO-ENTMCNC: 33.3 G/DL (ref 31–37)
MCV RBC AUTO: 88 FL
NITRITE UR QL STRIP.AUTO: NEGATIVE
OSMOLALITY SERPL CALC.SUM OF ELEC: 284 MOSM/KG (ref 275–295)
PH UR: 6.5 [PH] (ref 5–8)
PLATELET # BLD AUTO: 232 10(3)UL (ref 150–450)
POTASSIUM SERPL-SCNC: 4.1 MMOL/L (ref 3.5–5.1)
PROT UR-MCNC: NEGATIVE MG/DL
RBC # BLD AUTO: 4.26 X10(6)UL
SODIUM SERPL-SCNC: 133 MMOL/L (ref 136–145)
SP GR UR STRIP: 1 (ref 1–1.03)
UROBILINOGEN UR STRIP-ACNC: NORMAL
WBC # BLD AUTO: 12.4 X10(3) UL (ref 4–11)

## 2024-01-10 PROCEDURE — 99233 SBSQ HOSP IP/OBS HIGH 50: CPT | Performed by: OTHER

## 2024-01-10 RX ORDER — SUMATRIPTAN 6 MG/.5ML
6 INJECTION, SOLUTION SUBCUTANEOUS ONCE
Status: COMPLETED | OUTPATIENT
Start: 2024-01-10 | End: 2024-01-10

## 2024-01-10 RX ORDER — PHENAZOPYRIDINE HYDROCHLORIDE 100 MG/1
100 TABLET, FILM COATED ORAL
Status: DISCONTINUED | OUTPATIENT
Start: 2024-01-10 | End: 2024-01-11

## 2024-01-10 RX ORDER — SODIUM CHLORIDE 9 MG/ML
INJECTION, SOLUTION INTRAVENOUS CONTINUOUS
Status: DISCONTINUED | OUTPATIENT
Start: 2024-01-10 | End: 2024-01-11

## 2024-01-10 RX ORDER — CEFDINIR 300 MG/1
300 CAPSULE ORAL 2 TIMES DAILY
Qty: 10 CAPSULE | Refills: 0 | Status: SHIPPED | OUTPATIENT
Start: 2024-01-10 | End: 2024-01-15

## 2024-01-10 NOTE — CONSULTS
[facility]    Report of Consultation    Clari Rosa Patient Status:  Inpatient    1970 MRN [id number]   Location [facility] 5SW/SE Attending Zeus Rodriguez MD   Hosp Day # 2 PCP Brian Bob MD     Date of Admission:  2024  Date of Consult: 2024  Reason for Consultation:   Patient presented with anxiety and depression, Zeus Soares MD requested psychiatric consult for evaluation and advice.    Consult Duration     The patient seen for initial psychiatric consult evaluation.   Record reviewed, communication with attending, communication with RN and patient seen face to face evaluation.    History of Present Illness:   Patient is a 53 year old , single, female with medical history of DM, Bipolar 1 disorder, Depression, MIKE, HTN, Diverticulitis, Neurogenic bladder, Obesity, sepsis, suprapubic catheter and transverse myelitis who was admitted to the hospital for Sepsis due to undetermined organism (HCC): The patient has been demonstrating increased anxiety and depressive symptoms.  Patient indicated for psych consult for evaluation and advise.     Per chart review, the patient presented to the hospital due to concern for UTI. She states this is a common occurrence which if left untreated develops into sepsis. She has a suprapubic catheter. In the ED she also had a CT done showing some perivesical stranding.  Bacteriauria noted without pyuria.  Cultures with >100K providencia with inducible beta lactamases. She has been started on IV cefepime, ceftriaxone, meropenem and was admitted to the floor for ESBL UTI. The patient was demonstrating increased anxiety and depression and requested to see psychiatry.      The patient received the following psychotropic medications Wellbutrin XL 24 hr tab 300mg, Klonopin 1mg q6h, Seroquel 300mg BID,      Labs and imaging reviewed: Glucose 207, AST 35, HGB 11.3      The patient seen today laying in hospital bed mildly distressed,  visibly upset and crying.      The patient is alert and oriented to person, place, date and condition. The patient answers questions and engages in appropriate conversation with no impairment in cognition or distortion in thought process.      The patient reporting feelings of hopelessness, helplessness, worthlessness, low mood, low energy, decreased motivation.     The patient reporting increased anxiety, worry, ruminations with impairment in sleep. States she has difficulty falling asleep which causes her to sleep late and wake up late. She displays concerns that she has difficulty waking up and doesn't feel better until the afternoon.      Patient states she has been living in a nursing home due to her suprapubic catheter and recurrent episodes of UTIs. She is single and has never  but has one living son. States she has been depressed \"for a very long time\" and does not know why her depression has worsened recently. She states the Nursing home has an psychiatrist that comes every 2 weeks and has been managing her depression and anxiety. Since a young age she states she had panic attacks and OCD and would rearrange and clean her room and house everyday.      The patient is not demonstrating any carmel or psychosis  The patient denies auditory or visual hallucinations  The patient denies any current or past suicidal ideations or self harm.      The patient has been demonstrating feelings of constant sadness, restlessness, hopelessness, helplessness, worthlessness, low mood, low energy, decreased motivation, increased anxiety, worry, ruminations with impairment in sleep. The patient denied any history of self harm or any suicidal ideations. She is agreeable to medication changed to help balance her mood and emotions.      Past Psychiatric/Medication History:  1. Prior diagnoses: MIKE, depression, Bipolar 1 disorder, Manipulative behavior. Patient states she also has OCD and Panic attacks sine adolescent  years.  2. Past psychiatric inpatient: none reported  3. Past outpatient history: Patient reports seeing a psychiatris       t at Nursing Home   4. Past suicide history: Patient denies   5. Medication history: Wellbutrin XL 24 hr tab 300mg, Klonopin 1mg q6h, Seroquel 300mg BID,      Social History:   The patient states the she lives in a Nursing home due to medical conditions. She is single and has never  but has one living son.      Denies any ETOH abuse, or illicit substance abuse. Previous smoker (5 pack history)     Family History:  No family history reported       Medical History:   Past Medical History  Past Medical History:   Diagnosis Date    Bipolar 1 disorder (HCC)     Dental caries 10/26/2014    Diabetes (HCC)     MIKE (generalized anxiety disorder)     High blood pressure     History of diverticulitis of colon 10/26/2014    Manipulative behavior 10/26/2014    Neurogenic bladder     Obesity (BMI 30-39.9) 10/26/2014    Paraplegia (HCC)     Sepsis following intra-abdominal surgery ([facility]) 10/26/2014    Serotonin syndrome     Sleep apnea     Suprapubic catheter (HCC)     Transverse myelitis (HCC)        Past Surgical History  Past Surgical History:   Procedure Laterality Date    ARTHROSCOPY OF JOINT UNLISTED      knee          CHOLECYSTECTOMY      PART REMOVAL COLON W END COLOSTOMY      2013    TONSILLECTOMY         Family History  Family History   Adopted: Yes       Social History  Social History     Socioeconomic History    Marital status: Single   Tobacco Use    Smoking status: Former     Packs/day: 1.00     Years: 5.00     Additional pack years: 0.00     Total pack years: 5.00     Types: Cigarettes    Tobacco comments:     quit 2006   Substance and Sexual Activity    Alcohol use: No    Drug use: No     Social Determinants of Health     Food Insecurity: No Food Insecurity (2024)    Food Insecurity     Food Insecurity: Never true   Transportation Needs: No Transportation Needs (2024)     Transportation Needs     Lack of Transportation: No   Housing Stability: Low Risk  (1/7/2024)    Housing Stability     Housing Instability: No           Current Medications:  Current Facility-Administered Medications   Medication Dose Route Frequency    [START ON 1/10/2024] insulin detemir (Levemir) 100 UNIT/ML FlexPen/FlexTouch 25 Units  25 Units Subcutaneous Daily    insulin detemir (Levemir) 100 UNIT/ML FlexPen/FlexTouch 60 Units  60 Units Subcutaneous Nightly    [START ON 1/10/2024] insulin aspart (NovoLOG) 100 Units/mL FlexPen 20 Units  20 Units Subcutaneous TID CC    insulin aspart (NovoLOG) 100 Units/mL FlexPen 1-11 Units  1-11 Units Subcutaneous TID CC    cyanocobalamin (Vitamin B12) tab 1,000 mcg  1,000 mcg Oral Daily    psyllium (Metamucil) 28 % oral packet 1 packet  1 packet Oral TID    ondansetron (Zofran) 4 MG/2ML injection 4 mg  4 mg Intravenous Q6H PRN    prochlorperazine (Compazine) 10 MG/2ML injection 5 mg  5 mg Intravenous Q8H PRN    albuterol (Ventolin HFA) 108 (90 Base) MCG/ACT inhaler 2 puff  2 puff Inhalation Q6H PRN    atorvastatin (Lipitor) tab 40 mg  40 mg Oral Nightly    bethanechol (Urecholine) tab 10 mg  10 mg Oral TID    buPROPion ER (Wellbutrin XL) 24 hr tab 300 mg  300 mg Oral Daily    divalproex ER (Depakote ER) 24 hr tab 500 mg  500 mg Oral BID    gabapentin (Neurontin) tab 600 mg  600 mg Oral TID    traZODone (Desyrel) tab 100 mg  100 mg Oral Q12H PRN    oxybutynin ER (Ditropan-XL) 24 hr tab 5 mg  5 mg Oral Daily    QUEtiapine (SEROquel) tab 300 mg  300 mg Oral BID    rivaroxaban (Xarelto) tab 20 mg  20 mg Oral QPM    glucose (Dex4) 15 GM/59ML oral liquid 15 g  15 g Oral Q15 Min PRN    Or    glucose (Glutose) 40% oral gel 15 g  15 g Oral Q15 Min PRN    Or    glucose-vitamin C (Dex-4) chewable tab 4 tablet  4 tablet Oral Q15 Min PRN    Or    dextrose 50% injection 50 mL  50 mL Intravenous Q15 Min PRN    Or    glucose (Dex4) 15 GM/59ML oral liquid 30 g  30 g Oral Q15 Min PRN    Or     glucose (Glutose) 40% oral gel 30 g  30 g Oral Q15 Min PRN    Or    glucose-vitamin C (Dex-4) chewable tab 8 tablet  8 tablet Oral Q15 Min PRN    cetirizine (ZyrTEC) tab 10 mg  10 mg Oral Daily    clotrimazole (Lotrimin) 1 % cream   Topical BID    clonazePAM (KlonoPIN) tab 1 mg  1 mg Oral q6h    traMADol (Ultram) tab 50 mg  50 mg Oral Q6H PRN    guaiFENesin ER (Mucinex) 12 hr tab 600 mg  600 mg Oral BID    ceFEPIme (Maxpime) 2 g in sodium chloride 0.9% 100 mL IVPB-MBP  2 g Intravenous Q12H    acetaminophen (Tylenol Extra Strength) tab 500 mg  500 mg Oral Q4H PRN    vancomycin (Vancocin) cap 125 mg  125 mg Oral Daily     Medications Prior to Admission   Medication Sig    psyllium 28 % Oral Powd Pack Take 1 packet by mouth every 8 (eight) hours as needed (constipation).    Nystatin 424236 UNIT/GM External Powder Apply 1 Application topically as needed for Dry Skin. Under breast and groin area    glucagon (BAQSIMI ONE PACK) 3 MG/DOSE Nasal nasal powder 3 mg by Nasal route once as needed for Low blood glucose.    levoFLOXacin 250 MG Oral Tab Take 1 tablet (250 mg total) by mouth daily.    atorvastatin 40 MG Oral Tab Take 1 tablet (40 mg total) by mouth nightly.    sennosides 8.6 MG Oral Tab Take 2 tablets (17.2 mg total) by mouth 2 (two) times daily.    acetaminophen 325 MG Oral Tab Take 2 tablets (650 mg total) by mouth every 6 (six) hours as needed for Pain.    Icosapent Ethyl 1 g Oral Cap Take 1 capsule by mouth 4 (four) times daily.    insulin aspart 100 Units/mL Subcutaneous Solution Pen-injector Inject 24 Units into the skin 3 (three) times daily with meals.    rivaroxaban (XARELTO) 20 MG Oral Tab Take 1 tablet (20 mg total) by mouth daily.    QUEtiapine 300 MG Oral Tab Take 1 tablet (300 mg total) by mouth 2 (two) times daily.    insulin glargine 100 UNIT/ML Subcutaneous Solution Inject 56 Units into the skin nightly.    insulin glargine 100 UNIT/ML Subcutaneous Solution Inject 20 Units into the skin every  morning.    Melatonin 3 MG Oral Cap Take 1 capsule (3 mg total) by mouth at bedtime.    ergocalciferol 1.25 MG (37723 UT) Oral Cap Take 1 capsule (50,000 Units total) by mouth every 7 days. Every monday    oxybutynin ER 5 MG Oral Tablet 24 Hr Take 1 tablet (5 mg total) by mouth daily.    methenamine 1 g Oral Tab Take 0.5 tablets (0.5 g total) by mouth 2 (two) times daily with meals.    bethanechol 10 MG Oral Tab Take 1 tablet (10 mg total) by mouth 3 (three) times daily.    loratadine 10 MG Oral Tab Take 1 tablet (10 mg total) by mouth daily.    cyclobenzaprine 10 MG Oral Tab Take 1 tablet (10 mg total) by mouth 3 (three) times daily as needed for Muscle spasms.    albuterol 108 (90 Base) MCG/ACT Inhalation Aero Soln Inhale 2 puffs into the lungs every 6 (six) hours as needed for Wheezing or Shortness of Breath.    phenazopyridine 200 MG Oral Tab Take 1 tablet (200 mg total) by mouth 3 (three) times daily.    ClonazePAM 1 MG Oral Tab Take 1 tablet (1 mg total) by mouth 4 (four) times daily.    Insulin Aspart 100 UNIT/ML Subcutaneous Solution Cartridge SS: 150-200 2units, 201-250 4 units, 251-300 6 units, 301-350 8 units, 351-400 10 units, over 401 call md    acetaminophen (TYLENOL EXTRA STRENGTH) 500 MG Oral Tab Take 325 mg by mouth every 4 (four) hours as needed for Pain or Fever.    Montelukast Sodium 10 MG Oral Tab Take 1 tablet (10 mg total) by mouth daily.    BuPROPion HCl ER, XL, 300 MG Oral Tablet 24 Hr Take 1 tablet (300 mg total) by mouth daily.    divalproex Sodium  MG Oral Tablet 24 Hr Take 1 tablet (500 mg total) by mouth 2 (two) times daily.    Acidophilus/Pectin Oral Cap Take 1 capsule by mouth 2 (two) times daily.    ondansetron 4 MG Oral Tablet Dispersible Take 2 tablets (8 mg total) by mouth every 8 (eight) hours as needed for Nausea.    TraZODone HCl 100 MG Oral Tab Take 1 tablet (100 mg total) by mouth every 12 (twelve) hours as needed for Sleep.    gabapentin 600 MG Oral Tab Take 1 tablet  (600 mg total) by mouth 3 (three) times daily.    MetFORMIN HCl ER (GLUCOPHAGE-XR) 750 MG Oral Tablet 24 Hr Take 1 tablet (750 mg total) by mouth daily with breakfast.    Multiple Vitamins-Minerals (THERA-M) Oral Tab Take 1 tablet by mouth daily.       Allergies  Allergies   Allergen Reactions    Pcn [Penicillins]     Radiology Contrast Iodinated Dyes     Sulfa Antibiotics        Review of Systems:   As by Admitting/Attending    Results:   Laboratory Data:  Lab Results   Component Value Date    WBC 6.2 01/08/2024    HGB 11.3 (L) 01/08/2024    HCT 35.5 01/08/2024    .0 01/08/2024    CREATSERUM 0.94 01/08/2024    BUN 12 01/08/2024     (L) 01/08/2024    K 4.1 01/08/2024     01/08/2024    CO2 22.0 01/08/2024     (H) 01/08/2024    CA 9.3 01/08/2024    ALB 4.0 01/06/2024    ALKPHO 91 01/06/2024    TP 7.1 01/06/2024    AST 35 (H) 01/06/2024    ALT 20 01/06/2024    PTT 26.4 12/05/2023    LIP 36 12/05/2023    DDIMER 0.92 (H) 12/05/2023    MG 1.7 12/12/2023         Imaging:  CT SINUS (CPT=70486)    Result Date: 1/8/2024  CONCLUSION: Minimal mucosal thickening in the right maxillary sinus  [facility] provided a prelim report for this exam. This final report has no significant discrepancies with the [facility] report.    Dictated by (CST): Yordan Guerrero MD on 1/08/2024 at 8:07 AM     Finalized by (CST): Yordan Guerrero MD on 1/08/2024 at 8:14 AM          CT ABDOMEN+PELVIS KIDNEYSTONE 2D RNDR(NO IV,NO ORAL)(CPT=74176)    Result Date: 1/7/2024  CONCLUSION:  1. No urolithiasis or renal obstruction.  Left renal atrophy. 2. Right paramedian suprapubic catheter with decompressed urinary bladder.  Perivesical stranding may be related to cystitis or scarring. 3. Post right hemicolectomy.  Large amount of stool in the colon suggests constipation. 4. Moderate hepatic steatosis and mild hepatomegaly. 5. Mild splenomegaly.    Dictated by (CST): Parish Pritchard MD on 1/07/2024 at 6:27 AM     Finalized by (CST):  Parish Pritchard MD on 1/07/2024 at 6:34 AM           Vital Signs:   Blood pressure (!) 142/93, pulse 105, temperature 98.3 °F (36.8 °C), temperature source Oral, resp. rate 18, weight 98.5 kg (217 lb 3.2 oz), SpO2 93%.    Mental Status Exam:   Appearance: Stated age 53, in hospital gown, obese female, in hospital gown laying down in hospital bed.  Psychomotor: No psychomotor agitation, or retardation.   Orientation: Alert and oriented to person, place, time and condition.  Gait: Not evaluated.  Attitude/Coorperation: Cooperative and attentive.  Behavior: Appropriate.  Tearful throughout the evaluation  Speech: Regular rate and rhythm speech.  Mood: Patient reporting depressed and anxious mood.  Affect: Dysphoric affect, congruent with the mood.  Very tearful  Thought process: Linear and appropriate.  Thought content: Patient denies any suicidal or homicidal ideation. Patient denies any history of self harm.  Otherwise the patient has been reporting hopelessness and helplessness.  Perceptions: Patient denies any auditory or visual hallucinations.  Concentration: Grossly intact.  Memory: Grossly intact.  Intellect: Average.  Judgment and Insight: Questionable.     Impression:     Major depressive disorder, recurrent severe without psychotic feature.  Generalized anxiety order.  Sepsis due to undetermined organism (HCC)      Patient is a 53 year old, , single, female with medical history of DM, Bipolar 1 disorder, Depression, MIKE, HTN, Diverticulitis, Neurogenic bladder, Obesity, sepsis, suprapubic catheter and transverse myelitis who was admitted to the hospital for Sepsis due to undetermined organism (HCC): The patient has been demonstrating increased anxiety and depressive symptoms.     The patient has been demonstrating feelings of constant sadness, restlessness, hopelessness, helplessness, worthlessness, low mood, low energy, decreased motivation, increased anxiety, worry, ruminations with impairment in  sleep. The patient denied any history of self harm or any suicidal ideations. She is agreeable to medication changed to help balance her mood and emotions.      Discussed risk and benefit, acknowledging the current symptom and severity.  At this point, I would recommend the following approach:      Focus on safety  Focus on education and support.  Focus on insight orientation helping the patient understand diagnosis and treatment plan.  Start Abilify 2 mg daily.  Lower Seroquel to 200 mg nightly.  Start Cymbalta 30 mg nightly.  Lower Wellbutrin XL to 150 mg daily.  Processed with patient at length, the initiation of the above psychotropic medications I advised the patient of the risks, benefits, alternatives and potential side effects. The patient consents to administration of the medications and understands the right to refuse medications at any time. The patient verbalized understanding.   Coordinate plan with team    Orders This Visit:  Orders Placed This Encounter   Procedures    CBC With Differential With Platelet    Comp Metabolic Panel (14)    Urinalysis with Culture Reflex    Lactic Acid, Plasma    Lactic Acid Reflex Post Positive    Lactic Acid Post Positive    Lactic Acid, Plasma    Lactic Acid Reflex Post Positive    Lactic Acid Post Positive    Basic Metabolic Panel (8)    CBC With Differential With Platelet    Procalcitonin    SARS-CoV-2/Flu A and B/RSV by PCR (GeneXpert)    Urine Culture, Routine    Blood Culture       Meds This Visit:  Requested Prescriptions      No prescriptions requested or ordered in this encounter       Víctor Qureshi MD  1/9/2024    This note was prepared using Dragon Medical voice recognition dictation software and as a result, errors may occur. When identified, these errors have been corrected. While every attempt is made to correct errors during dictation, discrepancies may still exist.

## 2024-01-10 NOTE — PROGRESS NOTES
[facility] Infectious Disease  Progress Note    Clari Rosa Patient Status:  Inpatient    1970 MRN [id number]   Location [facility] 5SW/SE Attending Zeus Rodriguez MD   Hosp Day # 3 PCP Brian Bob MD     Subjective:  Patient seen/examined.  She is up in bed in NAD.  Psych input appreciated but patient is refusing recommendations saying that Abilify causes hyperglycemia and she was once hospitalized for 3 months with blood sugars >500.  Cath tube exchanged by Dr. Liz 24 - patient reports vomiting for 5 hours afterward.    Patient tells me she has \"not turned the corner\" and knows she is still septic.  Feels that the antibiotics are not yet working.    Objective:  Blood pressure (!) 128/95, pulse 120, temperature 99.3 °F (37.4 °C), temperature source Oral, resp. rate 16, weight 213 lb (96.6 kg), SpO2 92%.    Intake/Output:    Intake/Output Summary (Last 24 hours) at 1/10/2024 1230  Last data filed at 1/10/2024 0915  Gross per 24 hour   Intake 840 ml   Output 2150 ml   Net -1310 ml       Physical Exam:  General: Awake, alert, non-tox, NAD.  HEENT:  Oropharynx clear, trachea ML.  Heart: RRR S1S2 no murmurs.  Lungs: Essentially CTA b/l, no rhonchi, rales, wheezes.  Abdomen: Soft, NT/ND.  BS present.  No organomegaly.  Extremity: No edema.  Neurological: No focal deficits.  Derm:  Warm, dry, free from rashes.    Lab Data Review:  Lab Results   Component Value Date    WBC 12.4 01/10/2024    HGB 12.5 01/10/2024    HCT 37.5 01/10/2024    .0 01/10/2024    CREATSERUM 0.87 01/10/2024    BUN 14 01/10/2024     01/10/2024    K 4.1 01/10/2024    CL 99 01/10/2024    CO2 28.0 01/10/2024     01/10/2024    CA 9.7 01/10/2024      Cultures:   Urine >100K providencia     Blood cultures negative     Flu/COVID/RSV negative    Radiology:  CONCLUSION:   1. No urolithiasis or renal obstruction.  Left renal atrophy.   2. Right paramedian suprapubic catheter with  decompressed urinary bladder.  Perivesical stranding may be related to cystitis or scarring.   3. Post right hemicolectomy.  Large amount of stool in the colon suggests constipation.   4. Moderate hepatic steatosis and mild hepatomegaly.   5. Mild splenomegaly.      Assessment and Plan:     Neurogenic bladder with SP tube in place and chronic bacterial colonization suspected  - Patient reports a history of \"20 admissions for sepsis\" in the past, primarily with pseudomonas  - Current urine with only 1-5 WBCs but with 3+ bacteria, patient currently declined SP tube change per urology  - Cultures with providencia with inducible beta lactamases  - PCT 0.10  - IV cefepime day #5 ongoing     2.  Low grade leukocytosis after SP tube exchange  - At 12.4, will trend     3.  Chronic sinusitis  - CT sinuses without evidence of infection, minimal mucosal thickening only     4.  H/o c.diff  - p.o. vancomycin ppx     5.  Disposition - stable from ID.  Multiple medical issues noted.  Continue IV cefepime for now, day #5.  SP tube exchanged.  Will repeat UA.  Planning to step down to cefdinir 300mg p.o. BID x to complete 10 day total course of Rx.  Orders entered.    Francine Cervantes DO, [facility] Infectious Disease  [phone]    1/10/2024  12:30 PM

## 2024-01-10 NOTE — PROGRESS NOTES
Patient is a 53 year old , single, female with medical history of DM, Bipolar 1 disorder, Depression, MIKE, HTN, Diverticulitis, Neurogenic bladder, Obesity, sepsis, suprapubic catheter and transverse myelitis who was admitted to the hospital for Sepsis due to undetermined organism (HCC): The patient has been demonstrating increased anxiety and depressive symptoms.  Patient indicated for psych consult for evaluation and advise.    Consult Duration     The patient seen for for over 50 minutes, follow-up evaluation, over 50% counseling and coordinating care addressing depression, anxiety, discussing medication management and disposition.    Record reviewed, communication with attending, communication with RN and patient seen face to face evaluation.    History of Present Illness:   The patient seen yesterday and medication adjusted by adding Abilify 2 mg daily, starting Cymbalta 30 mg nightly, lowering Wellbutrin XL to 150 mg daily and lowering Seroquel to 200 mg nightly.    Communicating with the team and reviewing progress note indicated that the patient had episodes of emesis last night.  No PM medications were taken.     Patient was seen today laying in hospital bed.  She was given Dilaudid yesterday for a suprapubic catheter change which made her dizzy causing her to have episodes of emesis last night.  She states she is feeling better.  She reports anxious feelings about being discharged too soon from the hospital and that her UTI will not be cleared in time.  Reports feeling slightly better in spirits than yesterday.        The patient seen today laying in hospital bed mildly distressed, visibly upset and crying.      The patient is alert and oriented to person, place, date and condition. The patient answers questions and engages in appropriate conversation with no impairment in cognition or distortion in thought process.      The patient reporting feelings of hopelessness, helplessness, worthlessness,  low mood, low energy, decreased motivation.     The patient reporting increased anxiety, worry, ruminations with impairment in sleep. States she has difficulty falling asleep which causes her to sleep late and wake up late. She displays concerns that she has difficulty waking up and doesn't feel better until the afternoon.       The patient is not demonstrating any carmel or psychosis  The patient denies auditory or visual hallucinations  The patient denies any current or past suicidal ideations or self harm.      The patient has been demonstrating feelings of constant sadness, restlessness, hopelessness, helplessness, worthlessness, low mood, low energy, decreased motivation, increased anxiety, worry, ruminations with impairment in sleep. The patient denied any history of self harm or any suicidal ideations. She is agreeable to medication changed to help balance her mood and emotions.      Past Psychiatric/Medication History:  1. Prior diagnoses: MIKE, depression, Bipolar 1 disorder, Manipulative behavior. Patient states she also has OCD and Panic attacks since adolescent years.  2. Past psychiatric inpatient: none reported  3. Past outpatient history: Patient reports seeing a psychiatris       t at Nursing Home   4. Past suicide history: Patient denies   5. Medication history: Wellbutrin XL 24 hr tab 300mg, Klonopin 1mg q6h, Seroquel 300mg BID,      Social History:   The patient states the she lives in a Nursing home due to medical conditions. She is single and has never  but has one living son.      Denies any ETOH abuse, or illicit substance abuse. Previous smoker (5 pack history)     Family History:  No family history reported       Medical History:   Past Medical History  Past Medical History:   Diagnosis Date    Bipolar 1 disorder (HCC)     Dental caries 10/26/2014    Diabetes (HCC)     MIKE (generalized anxiety disorder)     High blood pressure     History of diverticulitis of colon 10/26/2014     Manipulative behavior 10/26/2014    Neurogenic bladder     Obesity (BMI 30-39.9) 10/26/2014    Paraplegia (HCC)     Sepsis following intra-abdominal surgery (HCC) 10/26/2014    Serotonin syndrome     Sleep apnea     Suprapubic catheter (HCC)     Transverse myelitis (HCC)        Past Surgical History  Past Surgical History:   Procedure Laterality Date    ARTHROSCOPY OF JOINT UNLISTED      knee          CHOLECYSTECTOMY      PART REMOVAL COLON W END COLOSTOMY          TONSILLECTOMY         Family History  Family History   Adopted: Yes       Social History  Social History     Socioeconomic History    Marital status: Single   Tobacco Use    Smoking status: Former     Packs/day: 1.00     Years: 5.00     Additional pack years: 0.00     Total pack years: 5.00     Types: Cigarettes    Tobacco comments:     quit    Substance and Sexual Activity    Alcohol use: No    Drug use: No     Social Determinants of Health     Food Insecurity: No Food Insecurity (2024)    Food Insecurity     Food Insecurity: Never true   Transportation Needs: No Transportation Needs (2024)    Transportation Needs     Lack of Transportation: No   Housing Stability: Low Risk  (2024)    Housing Stability     Housing Instability: No           Current Medications:      Medications Prior to Admission   Medication Sig    psyllium 28 % Oral Powd Pack Take 1 packet by mouth every 8 (eight) hours as needed (constipation).    Nystatin 875716 UNIT/GM External Powder Apply 1 Application topically as needed for Dry Skin. Under breast and groin area    glucagon (BAQSIMI ONE PACK) 3 MG/DOSE Nasal nasal powder 3 mg by Nasal route once as needed for Low blood glucose.    levoFLOXacin 250 MG Oral Tab Take 1 tablet (250 mg total) by mouth daily.    atorvastatin 40 MG Oral Tab Take 1 tablet (40 mg total) by mouth nightly.    sennosides 8.6 MG Oral Tab Take 2 tablets (17.2 mg total) by mouth 2 (two) times daily.    acetaminophen 325 MG Oral Tab  Take 2 tablets (650 mg total) by mouth every 6 (six) hours as needed for Pain.    Icosapent Ethyl 1 g Oral Cap Take 1 capsule by mouth 4 (four) times daily.    insulin aspart 100 Units/mL Subcutaneous Solution Pen-injector Inject 24 Units into the skin 3 (three) times daily with meals.    rivaroxaban (XARELTO) 20 MG Oral Tab Take 1 tablet (20 mg total) by mouth daily.    QUEtiapine 300 MG Oral Tab Take 1 tablet (300 mg total) by mouth 2 (two) times daily.    insulin glargine 100 UNIT/ML Subcutaneous Solution Inject 56 Units into the skin nightly.    insulin glargine 100 UNIT/ML Subcutaneous Solution Inject 20 Units into the skin every morning.    Melatonin 3 MG Oral Cap Take 1 capsule (3 mg total) by mouth at bedtime.    ergocalciferol 1.25 MG (65607 UT) Oral Cap Take 1 capsule (50,000 Units total) by mouth every 7 days. Every monday    oxybutynin ER 5 MG Oral Tablet 24 Hr Take 1 tablet (5 mg total) by mouth daily.    methenamine 1 g Oral Tab Take 0.5 tablets (0.5 g total) by mouth 2 (two) times daily with meals.    bethanechol 10 MG Oral Tab Take 1 tablet (10 mg total) by mouth 3 (three) times daily.    loratadine 10 MG Oral Tab Take 1 tablet (10 mg total) by mouth daily.    cyclobenzaprine 10 MG Oral Tab Take 1 tablet (10 mg total) by mouth 3 (three) times daily as needed for Muscle spasms.    albuterol 108 (90 Base) MCG/ACT Inhalation Aero Soln Inhale 2 puffs into the lungs every 6 (six) hours as needed for Wheezing or Shortness of Breath.    phenazopyridine 200 MG Oral Tab Take 1 tablet (200 mg total) by mouth 3 (three) times daily.    ClonazePAM 1 MG Oral Tab Take 1 tablet (1 mg total) by mouth 4 (four) times daily.    Insulin Aspart 100 UNIT/ML Subcutaneous Solution Cartridge SS: 150-200 2units, 201-250 4 units, 251-300 6 units, 301-350 8 units, 351-400 10 units, over 401 call md    acetaminophen (TYLENOL EXTRA STRENGTH) 500 MG Oral Tab Take 325 mg by mouth every 4 (four) hours as needed for Pain or Fever.     Montelukast Sodium 10 MG Oral Tab Take 1 tablet (10 mg total) by mouth daily.    BuPROPion HCl ER, XL, 300 MG Oral Tablet 24 Hr Take 1 tablet (300 mg total) by mouth daily.    divalproex Sodium  MG Oral Tablet 24 Hr Take 1 tablet (500 mg total) by mouth 2 (two) times daily.    Acidophilus/Pectin Oral Cap Take 1 capsule by mouth 2 (two) times daily.    ondansetron 4 MG Oral Tablet Dispersible Take 2 tablets (8 mg total) by mouth every 8 (eight) hours as needed for Nausea.    TraZODone HCl 100 MG Oral Tab Take 1 tablet (100 mg total) by mouth every 12 (twelve) hours as needed for Sleep.    gabapentin 600 MG Oral Tab Take 1 tablet (600 mg total) by mouth 3 (three) times daily.    MetFORMIN HCl ER (GLUCOPHAGE-XR) 750 MG Oral Tablet 24 Hr Take 1 tablet (750 mg total) by mouth daily with breakfast.    Multiple Vitamins-Minerals (THERA-M) Oral Tab Take 1 tablet by mouth daily.       Allergies  Allergies   Allergen Reactions    Pcn [Penicillins]     Radiology Contrast Iodinated Dyes     Sulfa Antibiotics        Review of Systems:   As by Admitting/Attending    Results:   Laboratory Data:  Lab Results   Component Value Date    WBC 6.2 01/08/2024    HGB 11.3 (L) 01/08/2024    HCT 35.5 01/08/2024    .0 01/08/2024    CREATSERUM 0.94 01/08/2024    BUN 12 01/08/2024     (L) 01/08/2024    K 4.1 01/08/2024     01/08/2024    CO2 22.0 01/08/2024     (H) 01/08/2024    CA 9.3 01/08/2024    ALB 4.0 01/06/2024    ALKPHO 91 01/06/2024    TP 7.1 01/06/2024    AST 35 (H) 01/06/2024    ALT 20 01/06/2024    PTT 26.4 12/05/2023    LIP 36 12/05/2023    DDIMER 0.92 (H) 12/05/2023    MG 1.7 12/12/2023         Imaging:  CT SINUS (CPT=70486)    Result Date: 1/8/2024  CONCLUSION: Minimal mucosal thickening in the right maxillary sinus  [facility] provided a prelim report for this exam. This final report has no significant discrepancies with the [facility] report.    Dictated by (CST): Yordan Guerrero MD on 1/08/2024  at 8:07 AM     Finalized by (CST): Yordan Guerrero MD on 1/08/2024 at 8:14 AM           Vital Signs:   Blood pressure (!) 128/95, pulse 120, temperature 99.3 °F (37.4 °C), temperature source Oral, resp. rate 16, weight 96.6 kg (213 lb), SpO2 92%.    Mental Status Exam:   Appearance: Stated age 53, in hospital gown, obese female, in hospital gown laying down in hospital bed.  Psychomotor: No psychomotor agitation, or retardation.   Orientation: Alert and oriented to person, place, time and condition.  Gait: Not evaluated.  Attitude/Coorperation: Cooperative and attentive.  Behavior: Appropriate.   Speech: Regular rate and rhythm speech.  Mood: Patient reporting depressed and anxious mood.  Affect: Dysphoric affect, congruent with the mood.  Not tearful today with slight improvement  Thought process: Linear and appropriate.  Thought content: Patient denies any suicidal or homicidal ideation. Patient denies any history of self harm.  Otherwise the patient has been reporting hopelessness and helplessness.  Perceptions: Patient denies any auditory or visual hallucinations.  Concentration: Grossly intact.  Memory: Grossly intact.  Intellect: Average.  Judgment and Insight: Questionable.     Impression:     Major depressive disorder, recurrent severe without psychotic feature.  Generalized anxiety order.  Sepsis due to undetermined organism (HCC)      Patient is a 53 year old, , single, female with medical history of DM, Bipolar 1 disorder, Depression, MIKE, HTN, Diverticulitis, Neurogenic bladder, Obesity, sepsis, suprapubic catheter and transverse myelitis who was admitted to the hospital for Sepsis due to undetermined organism (HCC): The patient has been demonstrating increased anxiety and depressive symptoms.     The patient has been demonstrating feelings of constant sadness, restlessness, hopelessness, helplessness, worthlessness, low mood, low energy, decreased motivation, increased anxiety, worry, ruminations  with impairment in sleep. The patient denied any history of self harm or any suicidal ideations. She is agreeable to medication regime to help balance her mood and emotions.      1/10/2024: The patient has been demonstrating improvement in her mood otherwise continue reporting anxiety and worry about early discharge.  Patient has been struggling and finding it limited support.  Patient feels more secure being in the hospital.  Otherwise no side effect to the medication with improvement.    Discussed risk and benefit, acknowledging the current symptom and severity.  At this point, I would recommend the following approach:      Focus on safety  Focus on education and support.  Focus on insight orientation helping the patient understand diagnosis and treatment plan.  Continue Abilify 2 mg daily.  Continue Seroquel to 200 mg nightly.  Continue Cymbalta 30 mg nightly.  Continue Wellbutrin XL to 150 mg daily.  Processed with patient at length, the initiation of the above psychotropic medications I advised the patient of the risks, benefits, alternatives and potential side effects. The patient consents to administration of the medications and understands the right to refuse medications at any time. The patient verbalized understanding.   Coordinate plan with team    Orders This Visit:  Orders Placed This Encounter   Procedures    CBC With Differential With Platelet    Comp Metabolic Panel (14)    Urinalysis with Culture Reflex    Lactic Acid, Plasma    Lactic Acid Reflex Post Positive    Lactic Acid Post Positive    Lactic Acid, Plasma    Lactic Acid Reflex Post Positive    Lactic Acid Post Positive    Basic Metabolic Panel (8)    CBC With Differential With Platelet    Procalcitonin    SARS-CoV-2/Flu A and B/RSV by PCR (GeneXpert)    Urine Culture, Routine    Blood Culture       Meds This Visit:  Requested Prescriptions      No prescriptions requested or ordered in this encounter       Víctor Qureshi  MD  1/10/2024    This note was prepared using Dragon Medical voice recognition dictation software and as a result, errors may occur. When identified, these errors have been corrected. While every attempt is made to correct errors during dictation, discrepancies may still exist.

## 2024-01-10 NOTE — PROGRESS NOTES
[facility]   INTERNAL MEDICINE PROGRESS NOTE    CHIEF COMPLAINT   UTI    SUBJECTIVE  24 HR EVENTS     Not feeling better  Massive headaches  Bladder spasms - sharp pains in bladder  HR 120s      INPATIENT MEDICATIONS  Scheduled Medications:  sodium chloride, 1,000 mL, Once  insulin detemir, 25 Units, Daily  insulin detemir, 60 Units, Nightly  ARIPiprazole, 2 mg, Daily  insulin aspart, 20 Units, TID CC  DULoxetine, 20 mg, Daily  buPROPion ER, 150 mg, Daily  QUEtiapine, 200 mg, BID  insulin aspart, 1-11 Units, TID CC  cyanocobalamin, 1,000 mcg, Daily  psyllium, 1 packet, TID  atorvastatin, 40 mg, Nightly  bethanechol, 10 mg, TID  divalproex ER, 500 mg, BID  gabapentin, 600 mg, TID  oxybutynin ER, 5 mg, Daily  rivaroxaban, 20 mg, QPM  cetirizine, 10 mg, Daily  clotrimazole, , BID  clonazePAM, 1 mg, q6h  guaiFENesin ER, 600 mg, BID  cefepime, 2 g, Q12H  vancomycin, 125 mg, Daily     Drips:         PRN Medications:   ondansetron, 4 mg, Q6H PRN  prochlorperazine, 5 mg, Q8H PRN  albuterol, 2 puff, Q6H PRN  traZODone, 100 mg, Q12H PRN  glucose, 15 g, Q15 Min PRN   Or  glucose, 15 g, Q15 Min PRN   Or  glucose-vitamin C, 4 tablet, Q15 Min PRN   Or  dextrose, 50 mL, Q15 Min PRN   Or  glucose, 30 g, Q15 Min PRN   Or  glucose, 30 g, Q15 Min PRN   Or  glucose-vitamin C, 8 tablet, Q15 Min PRN  traMADol, 50 mg, Q6H PRN  acetaminophen, 500 mg, Q4H PRN       PHYSICAL EXAMINATION   Vitals: BP (!) 128/95 (BP Location: Right arm)   Pulse 120   Temp 99.3 °F (37.4 °C) (Oral)   Resp 16   Wt 213 lb (96.6 kg)   SpO2 92%   BMI 41.60 kg/m²   Gen: NAD, morbidly obese, appears chronically ill, verbose  Eyes: PERRLA, normal conjunctivae  ENMT: Moist mucous membranes, trachea midline  CV: RRR, no M/R/G, no peripheral edema  Resp: CTAB, non-labored respirations, symmetric expansion  GI: Soft, NT, ND, + BS  MSK:  No C/C/E, normal active/passive ROM in C-spine  Skin: No rashes, visible skin w/o worrisome lesions   Neuro: CN 2-12  grossly intact, sensation intact   Psych: A&Ox3, appropriate mood, conversant w/ linear thought process     LABORATORY VALUES   Recent Labs   Lab 01/06/24 2018 01/08/24  0643 01/10/24  1138    133* 133*   K 4.6 4.1 4.1   CL 99 102 99   CO2 27.0 22.0 28.0   BUN 15 12 14   CREATSERUM 1.05* 0.94 0.87   ANIONGAP 11 9 6   * 362* 232*   CA 9.4 9.3 9.7   TP 7.1  --   --    ALB 4.0  --   --    AST 35*  --   --    ALT 20  --   --    ALKPHO 91  --   --    BILT 0.4  --   --    EGFRCR 64 73 80     Recent Labs   Lab 01/06/24 2018 01/08/24 0643 01/10/24  1138   WBC 9.0 6.2 12.4*   HGB 11.3* 11.3* 12.5   HCT 36.7 35.5 37.5   .0 214.0 232.0   MCV 90.4 91.5 88.0   MOPERCENT 4.8 5.6  --    EOPERCENT 2.8 2.6  --    BAPERCENT 0.9 0.8  --      ASSESSMENT & PLAN   MasoodClari vergara - 53 year old female, LTC resident w neurogenic bladder & chronic suprapubic catheter w recurring pseudomonas UTIs, bipolar d/o MIKE, benzo dependence, DM2, ANAID, [place]  admitted 1/6/2024 for recurring UTI    Neurogenic bladder  Indwelling suprapubic catheter  Catheter assoc UTI - providencia stuartii  H/o recurring UTIs - pseudomonas per pt. MDRO e. Coli & VRE noted in careeverywhere  - Afebrile, not tachycardic, no leukocytosis  - Ua infection  [  ] F/u cx  - Cefepime ( 1/7 - )  - Urology consulted, s/p SPC exchange 1/9  - Complicated prior UTIs. Poss ESBL organisms. Per PharmD - carbapenems interact with valproate. ID consulted - d/w Dr. Cervantes  - Add phenazopyridine for bladder spasms    Tachycardia  - HR 120s on tele.   - Suspect dehydrated iso N/V. 1L IVF, continue mIVF after    Headaches  - ?migraine. Dehydration as above  - Can't take PO. Trial Imitrex injection    Hx C.diff  - PO vanc prophy dose for now while on abx for UTI    Chronic sinusitis   - Ongoing for months - terrible headaches  - CT sinus w/o infection  - APAP/tramadol for headaches    H/o pulmonary embolus  - Continue PTA xarelto      Bipolar  MIKE  Benzo dependence  -  Continue PTA meds  - Klonopin  - Pt requesting psych consult - message sent to Dr. Qureshi     ANAID  - CPAP    Morbid obesity w [facility]  Chronic hunger  - Body mass index is 41.6 kg/m².  - Healthy diet & wt loss encouraged   - RD consult for diet strategies  - Consider OP endo eval (grehlin/leptin levels checked etc)    B12 tabs per pt request    Ppx: PTA meds  Dispo  EDoD: Tentative plan for DC back to LTC facility pending UTI tx. Suspect ~ 1/11 - 1/12    F/u:   - PCP: Brian Bob MD  - Urology  - Psych  - Endo  - ID    Available via bubble chat or perfect serve 7A - 7P.     Zeus Rodriguez MD  Internal Medicine - Hospitalist  [facility]

## 2024-01-10 NOTE — PROGRESS NOTES
01/10/24 1553   Clinical Encounter Type   Visited With Patient;Health care provider   Routine Visit Introduction   Referral From Nurse   Rastafarian Encounters   Spiritual Requests During Visit / Hospitalization Druze Communion;Communion   Taxonomy   Intended Effects Convey a calming presence   Methods Encourage sharing of feelings   Interventions Acknowledge current situation;Active listening     Summary:  received referral from RN. Writer visited with patient, they shared clinton experience and asked questions about clinton. Spiritual Care support can be requested via an Epic consult.     -Chaplain Marcos.

## 2024-01-10 NOTE — PLAN OF CARE
Pt had two episodes of emesis during night shift. Pt encouraged to not eat when nauseous and dry heaving. Pt ate regardless and vomited. Pt continued to eat while having a bowel movement. Pt refused PM meds, nausea meds given. Safety precautions in place, call light within reach.       Problem: Patient Centered Care  Goal: Patient preferences are identified and integrated in the patient's plan of care  Description: Interventions:  - What would you like us to know as we care for you? \"I am from [facility].\"  - Provide timely, complete, and accurate information to patient/family  - Incorporate patient and family knowledge, values, beliefs, and cultural backgrounds into the planning and delivery of care  - Encourage patient/family to participate in care and decision-making at the level they choose  - Honor patient and family perspectives and choices  Outcome: Progressing     Problem: RESPIRATORY - ADULT  Goal: Achieves optimal ventilation and oxygenation  Description: INTERVENTIONS:  - Assess for changes in respiratory status  - Assess for changes in mentation and behavior  - Position to facilitate oxygenation and minimize respiratory effort  - Oxygen supplementation based on oxygen saturation or ABGs  - Provide Smoking Cessation handout, if applicable  - Encourage broncho-pulmonary hygiene including cough, deep breathe, Incentive Spirometry  - Assess the need for suctioning and perform as needed  - Assess and instruct to report SOB or any respiratory difficulty  - Respiratory Therapy support as indicated  - Manage/alleviate anxiety  - Monitor for signs/symptoms of CO2 retention  Outcome: Progressing     Problem: GENITOURINARY - ADULT  Goal: Absence of urinary retention  Description: INTERVENTIONS:  - Assess patient’s ability to void and empty bladder  - Monitor intake/output and perform bladder scan as needed  - Follow urinary retention protocol/standard of care  - Consider collaborating with pharmacy to  review patient's medication profile  - Implement strategies to promote bladder emptying  Outcome: Progressing     Problem: SKIN/TISSUE INTEGRITY - ADULT  Goal: Skin integrity remains intact  Description: INTERVENTIONS  - Assess and document risk factors for pressure ulcer development  - Assess and document skin integrity  - Monitor for areas of redness and/or skin breakdown  - Initiate interventions, skin care algorithm/standards of care as needed  Outcome: Progressing  Goal: Incision(s), wounds(s) or drain site(s) healing without S/S of infection  Description: INTERVENTIONS:  - Assess and document risk factors for pressure ulcer development  - Assess and document skin integrity  - Assess and document dressing/incision, wound bed, drain sites and surrounding tissue  - Implement wound care per orders  - Initiate isolation precautions as appropriate  - Initiate Pressure Ulcer prevention bundle as indicated  Outcome: Progressing     Problem: PAIN - ADULT  Goal: Verbalizes/displays adequate comfort level or patient's stated pain goal  Description: INTERVENTIONS:  - Encourage pt to monitor pain and request assistance  - Assess pain using appropriate pain scale  - Administer analgesics based on type and severity of pain and evaluate response  - Implement non-pharmacological measures as appropriate and evaluate response  - Consider cultural and social influences on pain and pain management  - Manage/alleviate anxiety  - Utilize distraction and/or relaxation techniques  - Monitor for opioid side effects  - Notify MD/LIP if interventions unsuccessful or patient reports new pain  - Anticipate increased pain with activity and pre-medicate as appropriate  Outcome: Progressing     Problem: RISK FOR INFECTION - ADULT  Goal: Absence of fever/infection during anticipated neutropenic period  Description: INTERVENTIONS  - Monitor WBC  - Administer growth factors as ordered  - Implement neutropenic guidelines  Outcome: Progressing      Problem: SAFETY ADULT - FALL  Goal: Free from fall injury  Description: INTERVENTIONS:  - Assess pt frequently for physical needs  - Identify cognitive and physical deficits and behaviors that affect risk of falls.  - [place] fall precautions as indicated by assessment.  - Educate pt/family on patient safety including physical limitations  - Instruct pt to call for assistance with activity based on assessment  - Modify environment to reduce risk of injury  - Provide assistive devices as appropriate  - Consider OT/PT consult to assist with strengthening/mobility  - Encourage toileting schedule  Outcome: Progressing     Problem: DISCHARGE PLANNING  Goal: Discharge to home or other facility with appropriate resources  Description: INTERVENTIONS:  - Identify barriers to discharge w/pt and caregiver  - Include patient/family/discharge partner in discharge planning  - Arrange for needed discharge resources and transportation as appropriate  - Identify discharge learning needs (meds, wound care, etc)  - Arrange for interpreters to assist at discharge as needed  - Consider post-discharge preferences of patient/family/discharge partner  - Complete POLST form as appropriate  - Assess patient's ability to be responsible for managing their own health  - Refer to Case Management Department for coordinating discharge planning if the patient needs post-hospital services based on physician/LIP order or complex needs related to functional status, cognitive ability or social support system  Outcome: Progressing

## 2024-01-10 NOTE — PLAN OF CARE
Patient updated on plan of care. Continues on IV antibiotics. Suprapubic catheter exchanged by urology at bedside. X 1 dose IV dilaudid given prior to exchange. Patient feeling nauseous after dilaudid. X 2 episodes of emesis, MD made aware. Insulin order changed for dinner. Prn Zofran given. Plan to switch to PO antibiotics at discharge, when medically cleared.     Problem: Patient Centered Care  Goal: Patient preferences are identified and integrated in the patient's plan of care  Description: Interventions:  - What would you like us to know as we care for you? \"I am from [facility].\"  - Provide timely, complete, and accurate information to patient/family  - Incorporate patient and family knowledge, values, beliefs, and cultural backgrounds into the planning and delivery of care  - Encourage patient/family to participate in care and decision-making at the level they choose  - Honor patient and family perspectives and choices  Outcome: Progressing     Problem: RESPIRATORY - ADULT  Goal: Achieves optimal ventilation and oxygenation  Description: INTERVENTIONS:  - Assess for changes in respiratory status  - Assess for changes in mentation and behavior  - Position to facilitate oxygenation and minimize respiratory effort  - Oxygen supplementation based on oxygen saturation or ABGs  - Provide Smoking Cessation handout, if applicable  - Encourage broncho-pulmonary hygiene including cough, deep breathe, Incentive Spirometry  - Assess the need for suctioning and perform as needed  - Assess and instruct to report SOB or any respiratory difficulty  - Respiratory Therapy support as indicated  - Manage/alleviate anxiety  - Monitor for signs/symptoms of CO2 retention  Outcome: Progressing     Problem: GENITOURINARY - ADULT  Goal: Absence of urinary retention  Description: INTERVENTIONS:  - Assess patient’s ability to void and empty bladder  - Monitor intake/output and perform bladder scan as needed  - Follow urinary  retention protocol/standard of care  - Consider collaborating with pharmacy to review patient's medication profile  - Implement strategies to promote bladder emptying  Outcome: Progressing     Problem: SKIN/TISSUE INTEGRITY - ADULT  Goal: Skin integrity remains intact  Description: INTERVENTIONS  - Assess and document risk factors for pressure ulcer development  - Assess and document skin integrity  - Monitor for areas of redness and/or skin breakdown  - Initiate interventions, skin care algorithm/standards of care as needed  Outcome: Progressing  Goal: Incision(s), wounds(s) or drain site(s) healing without S/S of infection  Description: INTERVENTIONS:  - Assess and document risk factors for pressure ulcer development  - Assess and document skin integrity  - Assess and document dressing/incision, wound bed, drain sites and surrounding tissue  - Implement wound care per orders  - Initiate isolation precautions as appropriate  - Initiate Pressure Ulcer prevention bundle as indicated  Outcome: Progressing     Problem: PAIN - ADULT  Goal: Verbalizes/displays adequate comfort level or patient's stated pain goal  Description: INTERVENTIONS:  - Encourage pt to monitor pain and request assistance  - Assess pain using appropriate pain scale  - Administer analgesics based on type and severity of pain and evaluate response  - Implement non-pharmacological measures as appropriate and evaluate response  - Consider cultural and social influences on pain and pain management  - Manage/alleviate anxiety  - Utilize distraction and/or relaxation techniques  - Monitor for opioid side effects  - Notify MD/LIP if interventions unsuccessful or patient reports new pain  - Anticipate increased pain with activity and pre-medicate as appropriate  Outcome: Progressing     Problem: RISK FOR INFECTION - ADULT  Goal: Absence of fever/infection during anticipated neutropenic period  Description: INTERVENTIONS  - Monitor WBC  - Administer growth  factors as ordered  - Implement neutropenic guidelines  Outcome: Progressing     Problem: SAFETY ADULT - FALL  Goal: Free from fall injury  Description: INTERVENTIONS:  - Assess pt frequently for physical needs  - Identify cognitive and physical deficits and behaviors that affect risk of falls.  - [place] fall precautions as indicated by assessment.  - Educate pt/family on patient safety including physical limitations  - Instruct pt to call for assistance with activity based on assessment  - Modify environment to reduce risk of injury  - Provide assistive devices as appropriate  - Consider OT/PT consult to assist with strengthening/mobility  - Encourage toileting schedule  Outcome: Progressing     Problem: DISCHARGE PLANNING  Goal: Discharge to home or other facility with appropriate resources  Description: INTERVENTIONS:  - Identify barriers to discharge w/pt and caregiver  - Include patient/family/discharge partner in discharge planning  - Arrange for needed discharge resources and transportation as appropriate  - Identify discharge learning needs (meds, wound care, etc)  - Arrange for interpreters to assist at discharge as needed  - Consider post-discharge preferences of patient/family/discharge partner  - Complete POLST form as appropriate  - Assess patient's ability to be responsible for managing their own health  - Refer to Case Management Department for coordinating discharge planning if the patient needs post-hospital services based on physician/LIP order or complex needs related to functional status, cognitive ability or social support system  Outcome: Progressing

## 2024-01-11 VITALS
WEIGHT: 211.5 LBS | OXYGEN SATURATION: 97 % | HEART RATE: 108 BPM | RESPIRATION RATE: 18 BRPM | SYSTOLIC BLOOD PRESSURE: 139 MMHG | TEMPERATURE: 98 F | BODY MASS INDEX: 41 KG/M2 | DIASTOLIC BLOOD PRESSURE: 85 MMHG

## 2024-01-11 LAB
EST. AVERAGE GLUCOSE BLD GHB EST-MCNC: 143 MG/DL (ref 68–126)
GLUCOSE BLDC GLUCOMTR-MCNC: 257 MG/DL (ref 70–99)
GLUCOSE BLDC GLUCOMTR-MCNC: 306 MG/DL (ref 70–99)
GLUCOSE BLDC GLUCOMTR-MCNC: 323 MG/DL (ref 70–99)
HBA1C MFR BLD: 6.6 % (ref ?–5.7)

## 2024-01-11 PROCEDURE — 99233 SBSQ HOSP IP/OBS HIGH 50: CPT | Performed by: OTHER

## 2024-01-11 RX ORDER — QUETIAPINE FUMARATE 200 MG/1
200 TABLET, FILM COATED ORAL 2 TIMES DAILY
Status: SHIPPED | COMMUNITY
Start: 2024-01-11

## 2024-01-11 RX ORDER — GABAPENTIN 600 MG/1
300 TABLET ORAL 3 TIMES DAILY
Status: DISCONTINUED | OUTPATIENT
Start: 2024-01-11 | End: 2024-01-11

## 2024-01-11 RX ORDER — CLONAZEPAM 1 MG/1
1 TABLET ORAL 4 TIMES DAILY
Qty: 1 TABLET | Refills: 1 | Status: SHIPPED | OUTPATIENT
Start: 2024-01-11 | End: 2024-01-11

## 2024-01-11 RX ORDER — CLONAZEPAM 0.5 MG/1
0.5 TABLET ORAL 2 TIMES DAILY
Status: DISCONTINUED | OUTPATIENT
Start: 2024-01-11 | End: 2024-01-11

## 2024-01-11 RX ORDER — CLONAZEPAM 0.5 MG/1
0.5 TABLET ORAL 2 TIMES DAILY
Qty: 1 TABLET | Refills: 0 | Status: SHIPPED | OUTPATIENT
Start: 2024-01-11

## 2024-01-11 RX ORDER — DULOXETIN HYDROCHLORIDE 30 MG/1
30 CAPSULE, DELAYED RELEASE ORAL DAILY
Status: DISCONTINUED | OUTPATIENT
Start: 2024-01-12 | End: 2024-01-11

## 2024-01-11 RX ORDER — ARIPIPRAZOLE 2 MG/1
2 TABLET ORAL ONCE
Status: COMPLETED | OUTPATIENT
Start: 2024-01-11 | End: 2024-01-11

## 2024-01-11 RX ORDER — DULOXETIN HYDROCHLORIDE 30 MG/1
30 CAPSULE, DELAYED RELEASE ORAL DAILY
Status: SHIPPED | COMMUNITY
Start: 2024-01-12

## 2024-01-11 NOTE — PLAN OF CARE
Clari complained of headache during shift. Medications given as charted. Patient had call light placed within reach at all times with bed locked in lowest position and bed alarm set. Patient called appropriately and was rounded on frequently. Patient was discharge to facility and was stable at discharge. This RN gave Nirmala report.    Problem: Patient Centered Care  Goal: Patient preferences are identified and integrated in the patient's plan of care  Description: Interventions:  - What would you like us to know as we care for you? \"I am from [facility].\"  - Provide timely, complete, and accurate information to patient/family  - Incorporate patient and family knowledge, values, beliefs, and cultural backgrounds into the planning and delivery of care  - Encourage patient/family to participate in care and decision-making at the level they choose  - Honor patient and family perspectives and choices  Outcome: Progressing     Problem: RESPIRATORY - ADULT  Goal: Achieves optimal ventilation and oxygenation  Description: INTERVENTIONS:  - Assess for changes in respiratory status  - Assess for changes in mentation and behavior  - Position to facilitate oxygenation and minimize respiratory effort  - Oxygen supplementation based on oxygen saturation or ABGs  - Provide Smoking Cessation handout, if applicable  - Encourage broncho-pulmonary hygiene including cough, deep breathe, Incentive Spirometry  - Assess the need for suctioning and perform as needed  - Assess and instruct to report SOB or any respiratory difficulty  - Respiratory Therapy support as indicated  - Manage/alleviate anxiety  - Monitor for signs/symptoms of CO2 retention  Outcome: Progressing     Problem: GENITOURINARY - ADULT  Goal: Absence of urinary retention  Description: INTERVENTIONS:  - Assess patient’s ability to void and empty bladder  - Monitor intake/output and perform bladder scan as needed  - Follow urinary retention protocol/standard of  care  - Consider collaborating with pharmacy to review patient's medication profile  - Implement strategies to promote bladder emptying  Outcome: Progressing     Problem: SKIN/TISSUE INTEGRITY - ADULT  Goal: Skin integrity remains intact  Description: INTERVENTIONS  - Assess and document risk factors for pressure ulcer development  - Assess and document skin integrity  - Monitor for areas of redness and/or skin breakdown  - Initiate interventions, skin care algorithm/standards of care as needed  Outcome: Progressing  Goal: Incision(s), wounds(s) or drain site(s) healing without S/S of infection  Description: INTERVENTIONS:  - Assess and document risk factors for pressure ulcer development  - Assess and document skin integrity  - Assess and document dressing/incision, wound bed, drain sites and surrounding tissue  - Implement wound care per orders  - Initiate isolation precautions as appropriate  - Initiate Pressure Ulcer prevention bundle as indicated  Outcome: Progressing     Problem: PAIN - ADULT  Goal: Verbalizes/displays adequate comfort level or patient's stated pain goal  Description: INTERVENTIONS:  - Encourage pt to monitor pain and request assistance  - Assess pain using appropriate pain scale  - Administer analgesics based on type and severity of pain and evaluate response  - Implement non-pharmacological measures as appropriate and evaluate response  - Consider cultural and social influences on pain and pain management  - Manage/alleviate anxiety  - Utilize distraction and/or relaxation techniques  - Monitor for opioid side effects  - Notify MD/LIP if interventions unsuccessful or patient reports new pain  - Anticipate increased pain with activity and pre-medicate as appropriate  Outcome: Progressing     Problem: RISK FOR INFECTION - ADULT  Goal: Absence of fever/infection during anticipated neutropenic period  Description: INTERVENTIONS  - Monitor WBC  - Administer growth factors as ordered  - Implement  neutropenic guidelines  Outcome: Progressing     Problem: SAFETY ADULT - FALL  Goal: Free from fall injury  Description: INTERVENTIONS:  - Assess pt frequently for physical needs  - Identify cognitive and physical deficits and behaviors that affect risk of falls.  - [place] fall precautions as indicated by assessment.  - Educate pt/family on patient safety including physical limitations  - Instruct pt to call for assistance with activity based on assessment  - Modify environment to reduce risk of injury  - Provide assistive devices as appropriate  - Consider OT/PT consult to assist with strengthening/mobility  - Encourage toileting schedule  Outcome: Progressing     Problem: DISCHARGE PLANNING  Goal: Discharge to home or other facility with appropriate resources  Description: INTERVENTIONS:  - Identify barriers to discharge w/pt and caregiver  - Include patient/family/discharge partner in discharge planning  - Arrange for needed discharge resources and transportation as appropriate  - Identify discharge learning needs (meds, wound care, etc)  - Arrange for interpreters to assist at discharge as needed  - Consider post-discharge preferences of patient/family/discharge partner  - Complete POLST form as appropriate  - Assess patient's ability to be responsible for managing their own health  - Refer to Case Management Department for coordinating discharge planning if the patient needs post-hospital services based on physician/LIP order or complex needs related to functional status, cognitive ability or social support system  Outcome: Progressing

## 2024-01-11 NOTE — DISCHARGE INSTRUCTIONS
Please have patient follow up with Infectious Disease MD, Urologist, and Endocrinologist at [facility]

## 2024-01-11 NOTE — PROGRESS NOTES
[facility] Infectious Disease  Progress Note    Clari Rosa Patient Status:  Inpatient    1970 MRN [id number]   Location [facility] 5SW/SE Attending Nelida Escobar MD   Hosp Day # 4 PCP Brian Bob MD     Subjective:  Patient seen and examined sitting up in bed this morning. Reports ongoing generalized malaise and sinus pressure. Symptoms mildly improved, but states, \"I don't feel like I have really turned the corner yet.\" No acute overnight events. No new complaints.      Objective:  Blood pressure 133/87, pulse 104, temperature 98.4 °F (36.9 °C), temperature source Oral, resp. rate 18, weight 211 lb 8 oz (95.9 kg), SpO2 94%.    Intake/Output:    Intake/Output Summary (Last 24 hours) at 2024 0900  Last data filed at 2024 0749  Gross per 24 hour   Intake 2104 ml   Output 2200 ml   Net -96 ml       Physical Exam:  General: Awake, alert, non-tox, NAD.  HEENT:  Oropharynx clear, trachea ML.  Heart: RRR S1S2 no murmurs.  Lungs: Essentially CTA b/l, no rhonchi, rales, wheezes.  Abdomen: Soft, NT/ND.  BS present.  No guarding or rebound.  Extremity: No edema.  Neurological: No focal deficits.  Derm:  Warm and dry.    Lab Data Review:  Lab Results   Component Value Date    WBC 12.4 01/10/2024    HGB 12.5 01/10/2024    HCT 37.5 01/10/2024    .0 01/10/2024    CREATSERUM 0.87 01/10/2024    BUN 14 01/10/2024     01/10/2024    K 4.1 01/10/2024    CL 99 01/10/2024    CO2 28.0 01/10/2024     01/10/2024    CA 9.7 01/10/2024        Cultures:  Hospital Encounter on 24   1. Blood Culture     Status: None (Preliminary result)    Collection Time: 24  9:33 PM    Specimen: Blood,peripheral   Result Value Ref Range    Blood Culture Result No Growth 4 Days N/A   2. Urine Culture, Routine     Status: Abnormal    Collection Time: 24  8:17 PM    Specimen: Urine, clean catch   Result Value Ref Range    Urine Culture >100,000 CFU/ML  Providencia stuartii (A) N/A       Susceptibility    Providencia stuartii -  (no method available)     Amikacin  Resistant      Ampicillin 16 Intermediate      Cefazolin >=64 Resistant      Cefepime <=1 Sensitive      Ceftriaxone <=1 Sensitive      Ciprofloxacin >=4 Resistant      Gentamicin  Resistant      Meropenem <=0.25 Sensitive      Levofloxacin 4 Intermediate      Nitrofurantoin 256 Resistant      Piperacillin + Tazobactam <=4 Sensitive      Tobramycin  Resistant      Trimethoprim/Sulfa <=20 Sensitive        Radiology:  No results found.      Assessment and Plan:  1.  Neurogenic bladder with SP tube in place and suspected chronic bacterial colonization  - Patient reports h/o \"20 admissions for sepsis,\" primarily with Pseudomonas.  - UA with 1-5 WBCs, but 3+ bacteria -- patient initially declined SP tube exchange per urology.  - Repeat UA with 1-5 WBCs, trace leukocyte esterase and rare bacteria.  - Urine culture isolating Providencia stgrahamrtii -- this isolate is known to possess inducible beta-lactamases.  - Repeat urine culture pending.  - CT ab/pelvis with perivesical stranding may be r/t cystitis or scarring.  Moderate hepatic steatosis and mild hepatosplenomegaly.  No urolithiasis or renal obstruction.  - Procalcitonin mildly elevated at 0.1  - Now s/p SP tube exchange on 1/9/24.  - Urology following.  - IV cefepime, ongoing, day #6.    2.  Low grade leukocytosis s/p SP tube exchange  - WBC at 12.4K.  - Afebrile.  - Will trend.    3.  Chronic sinusitis  - CT sinus with minimal mucosal thickening in the right maxillary sinus without evidence of infection.    4.  History of C. difficile  - Oral vancomycin ppx, ongoing.    5.  Recs  - Continue IV cefepime while inpatient.  Plan to transition to PO cefdinir 300 mg twice daily on discharge, when ready, to complete a total of 10-days.  Order in med rec.  - F/u WBC and fever curve.  - F/u pending cultures.   - Supportive care as per the primary team.  - Discussed  plan of care with nursing.   - Discussed plan of care with patient. All questions addressed and understanding verbalized.  - Further recommendations pending clinical course.    Please note that this report has been produced using speech recognition software and may contain errors related to that system including, but not limited to, errors in grammar, punctuation, and spelling, as well as words and phrases that possibly may have been recognized inappropriately.  If there are any questions or concerns, contact the dictating provider for clarification.     The 21st Century Cures Act makes medical notes like these available to patients in the interest of transparency. Please be advised this is a medical document. Medical documents are intended to carry relevant information, facts as evident, and the clinical opinion of the practitioner. The medical note is intended as peer to peer communication and may appear blunt or direct. It is written in medical language and may contain abbreviations or verbiage that are unfamiliar.     If you have any questions or concerns please call [facility] Infectious Disease at [phone].     Jeison Hopper, AMBAR    1/11/2024  9:00 AM

## 2024-01-11 NOTE — PROGRESS NOTES
Patient is a 53 year old , single, female with medical history of DM, Bipolar 1 disorder, Depression, MIKE, HTN, Diverticulitis, Neurogenic bladder, Obesity, sepsis, suprapubic catheter and transverse myelitis who was admitted to the hospital for Sepsis due to undetermined organism (HCC): The patient has continued to demonstrate increased anxiety and depressive symptoms.    Consult Duration     The patient seen for for over 50 minutes, follow-up evaluation, over 50% counseling and coordinating care addressing depression, anxiety, discussing medication management and disposition.    Record reviewed, communication with attending, communication with RN and patient seen face to face evaluation.    History of Present Illness:   The patient seen yesterday and agreed to medication regime of by Abilify 2 mg daily, Cymbalta 20 mg nightly, Wellbutrin XL to 150 mg daily and Seroquel to 200 mg nightly.    Communicating with the team and reviewing progress note indicated that the patient had no acute events overnight patient refused Abilify 2 mg daily today again?    Patient was seen today laying in hospital bed. She expressed concern about taking Abilify and refused to take it this morning.  Even we talk about yesterday?. She stated that she was researching side effects and was concerned that Abilify was going to increase her blood sugar levels, cause weight gain and kidney damage.       The patient is alert and oriented to person, place, date and condition. The patient answers questions and engages in appropriate conversation with no impairment in cognition or distortion in thought process.      The patient continues reporting feelings of hopelessness, helplessness, worthlessness, low mood, low energy, decreased motivation.  No tearfulness today.  The patient continue reporting anxiety and want to stay longer worry about having recurrent UTI.  Patient confronted about her unhealthy behavior by postponing appropriate  treatment and holding into medication not effective yes because do not have weight gain side effects such Wellbutrin.  The patient educated about the medication management and the need to increase Cymbalta and discontinue Wellbutrin with the need for Abilify with decrease in her Seroquel which can cause increased blood sugar and weight gain.  After long discussion agreed.      The patient is not demonstrating any carmel or psychosis  The patient denies auditory or visual hallucinations  The patient denies any current or past suicidal ideations or self harm.      The patient has been demonstrating feelings of constant sadness, restlessness, hopelessness, helplessness, worthlessness, low mood, low energy, decreased motivation, increased anxiety, worry, ruminations with impairment in sleep. The patient denied any history of self harm or any suicidal ideations. She is agreeable to medication changes to help balance her mood and emotions.      Past Psychiatric/Medication History:  1. Prior diagnoses: MIKE, depression, Bipolar 1 disorder, Manipulative behavior. Patient states she also has OCD and Panic attacks since adolescent years.  2. Past psychiatric inpatient: none reported  3. Past outpatient history: Patient reports seeing a psychiatris       t at Nursing Home   4. Past suicide history: Patient denies   5. Medication history: Wellbutrin XL 24 hr tab 300mg, Klonopin 1mg q6h, Seroquel 300mg BID,      Social History:   The patient states the she lives in a Nursing home due to medical conditions. She is single and has never  but has one living son.      Denies any ETOH abuse, or illicit substance abuse. Previous smoker (5 pack history)     Family History:  No family history reported   Medical History:   Past Medical History  Past Medical History:   Diagnosis Date    Bipolar 1 disorder (HCC)     Dental caries 10/26/2014    Diabetes (HCC)     MIKE (generalized anxiety disorder)     High blood pressure     History of  diverticulitis of colon 10/26/2014    Manipulative behavior 10/26/2014    Neurogenic bladder     Obesity (BMI 30-39.9) 10/26/2014    Paraplegia (HCC)     Sepsis following intra-abdominal surgery (HCC) 10/26/2014    Serotonin syndrome     Sleep apnea     Suprapubic catheter (HCC)     Transverse myelitis (HCC)        Past Surgical History  Past Surgical History:   Procedure Laterality Date    ARTHROSCOPY OF JOINT UNLISTED      knee          CHOLECYSTECTOMY      PART REMOVAL COLON W END COLOSTOMY          TONSILLECTOMY         Family History  Family History   Adopted: Yes       Social History  Social History     Socioeconomic History    Marital status: Single   Tobacco Use    Smoking status: Former     Packs/day: 1.00     Years: 5.00     Additional pack years: 0.00     Total pack years: 5.00     Types: Cigarettes    Tobacco comments:     quit    Substance and Sexual Activity    Alcohol use: No    Drug use: No     Social Determinants of Health     Food Insecurity: No Food Insecurity (2024)    Food Insecurity     Food Insecurity: Never true   Transportation Needs: No Transportation Needs (2024)    Transportation Needs     Lack of Transportation: No   Housing Stability: Low Risk  (2024)    Housing Stability     Housing Instability: No           Current Medications:  Current Facility-Administered Medications   Medication Dose Route Frequency    phenazopyridine (Pyridium) tab 100 mg  100 mg Oral TID CC    sodium chloride 0.9% infusion   Intravenous Continuous    insulin detemir (Levemir) 100 UNIT/ML FlexPen/FlexTouch 25 Units  25 Units Subcutaneous Daily    insulin detemir (Levemir) 100 UNIT/ML FlexPen/FlexTouch 60 Units  60 Units Subcutaneous Nightly    ARIPiprazole (Abilify) tab 2 mg  2 mg Oral Daily    insulin aspart (NovoLOG) 100 Units/mL FlexPen 20 Units  20 Units Subcutaneous TID CC    DULoxetine (Cymbalta) DR cap 20 mg  20 mg Oral Daily    buPROPion ER (Wellbutrin XL) 24 hr tab 150 mg   150 mg Oral Daily    QUEtiapine (SEROquel) tab 200 mg  200 mg Oral BID    insulin aspart (NovoLOG) 100 Units/mL FlexPen 1-11 Units  1-11 Units Subcutaneous TID CC    cyanocobalamin (Vitamin B12) tab 1,000 mcg  1,000 mcg Oral Daily    psyllium (Metamucil) 28 % oral packet 1 packet  1 packet Oral TID    ondansetron (Zofran) 4 MG/2ML injection 4 mg  4 mg Intravenous Q6H PRN    prochlorperazine (Compazine) 10 MG/2ML injection 5 mg  5 mg Intravenous Q8H PRN    albuterol (Ventolin HFA) 108 (90 Base) MCG/ACT inhaler 2 puff  2 puff Inhalation Q6H PRN    atorvastatin (Lipitor) tab 40 mg  40 mg Oral Nightly    bethanechol (Urecholine) tab 10 mg  10 mg Oral TID    divalproex ER (Depakote ER) 24 hr tab 500 mg  500 mg Oral BID    gabapentin (Neurontin) tab 600 mg  600 mg Oral TID    traZODone (Desyrel) tab 100 mg  100 mg Oral Q12H PRN    oxybutynin ER (Ditropan-XL) 24 hr tab 5 mg  5 mg Oral Daily    rivaroxaban (Xarelto) tab 20 mg  20 mg Oral QPM    glucose (Dex4) 15 GM/59ML oral liquid 15 g  15 g Oral Q15 Min PRN    Or    glucose (Glutose) 40% oral gel 15 g  15 g Oral Q15 Min PRN    Or    glucose-vitamin C (Dex-4) chewable tab 4 tablet  4 tablet Oral Q15 Min PRN    Or    dextrose 50% injection 50 mL  50 mL Intravenous Q15 Min PRN    Or    glucose (Dex4) 15 GM/59ML oral liquid 30 g  30 g Oral Q15 Min PRN    Or    glucose (Glutose) 40% oral gel 30 g  30 g Oral Q15 Min PRN    Or    glucose-vitamin C (Dex-4) chewable tab 8 tablet  8 tablet Oral Q15 Min PRN    cetirizine (ZyrTEC) tab 10 mg  10 mg Oral Daily    clotrimazole (Lotrimin) 1 % cream   Topical BID    clonazePAM (KlonoPIN) tab 1 mg  1 mg Oral q6h    traMADol (Ultram) tab 50 mg  50 mg Oral Q6H PRN    guaiFENesin ER (Mucinex) 12 hr tab 600 mg  600 mg Oral BID    ceFEPIme (Maxpime) 2 g in sodium chloride 0.9% 100 mL IVPB-MBP  2 g Intravenous Q12H    acetaminophen (Tylenol Extra Strength) tab 500 mg  500 mg Oral Q4H PRN    vancomycin (Vancocin) cap 125 mg  125 mg Oral Daily      Medications Prior to Admission   Medication Sig    psyllium 28 % Oral Powd Pack Take 1 packet by mouth every 8 (eight) hours as needed (constipation).    Nystatin 826948 UNIT/GM External Powder Apply 1 Application topically as needed for Dry Skin. Under breast and groin area    glucagon (BAQSIMI ONE PACK) 3 MG/DOSE Nasal nasal powder 3 mg by Nasal route once as needed for Low blood glucose.    levoFLOXacin 250 MG Oral Tab Take 1 tablet (250 mg total) by mouth daily.    atorvastatin 40 MG Oral Tab Take 1 tablet (40 mg total) by mouth nightly.    sennosides 8.6 MG Oral Tab Take 2 tablets (17.2 mg total) by mouth 2 (two) times daily.    acetaminophen 325 MG Oral Tab Take 2 tablets (650 mg total) by mouth every 6 (six) hours as needed for Pain.    Icosapent Ethyl 1 g Oral Cap Take 1 capsule by mouth 4 (four) times daily.    insulin aspart 100 Units/mL Subcutaneous Solution Pen-injector Inject 24 Units into the skin 3 (three) times daily with meals.    rivaroxaban (XARELTO) 20 MG Oral Tab Take 1 tablet (20 mg total) by mouth daily.    QUEtiapine 300 MG Oral Tab Take 1 tablet (300 mg total) by mouth 2 (two) times daily.    insulin glargine 100 UNIT/ML Subcutaneous Solution Inject 56 Units into the skin nightly.    insulin glargine 100 UNIT/ML Subcutaneous Solution Inject 20 Units into the skin every morning.    Melatonin 3 MG Oral Cap Take 1 capsule (3 mg total) by mouth at bedtime.    ergocalciferol 1.25 MG (84498 UT) Oral Cap Take 1 capsule (50,000 Units total) by mouth every 7 days. Every monday    oxybutynin ER 5 MG Oral Tablet 24 Hr Take 1 tablet (5 mg total) by mouth daily.    methenamine 1 g Oral Tab Take 0.5 tablets (0.5 g total) by mouth 2 (two) times daily with meals.    bethanechol 10 MG Oral Tab Take 1 tablet (10 mg total) by mouth 3 (three) times daily.    loratadine 10 MG Oral Tab Take 1 tablet (10 mg total) by mouth daily.    cyclobenzaprine 10 MG Oral Tab Take 1 tablet (10 mg total) by mouth 3 (three)  times daily as needed for Muscle spasms.    albuterol 108 (90 Base) MCG/ACT Inhalation Aero Soln Inhale 2 puffs into the lungs every 6 (six) hours as needed for Wheezing or Shortness of Breath.    phenazopyridine 200 MG Oral Tab Take 1 tablet (200 mg total) by mouth 3 (three) times daily.    ClonazePAM 1 MG Oral Tab Take 1 tablet (1 mg total) by mouth 4 (four) times daily.    Insulin Aspart 100 UNIT/ML Subcutaneous Solution Cartridge SS: 150-200 2units, 201-250 4 units, 251-300 6 units, 301-350 8 units, 351-400 10 units, over 401 call md    acetaminophen (TYLENOL EXTRA STRENGTH) 500 MG Oral Tab Take 325 mg by mouth every 4 (four) hours as needed for Pain or Fever.    Montelukast Sodium 10 MG Oral Tab Take 1 tablet (10 mg total) by mouth daily.    BuPROPion HCl ER, XL, 300 MG Oral Tablet 24 Hr Take 1 tablet (300 mg total) by mouth daily.    divalproex Sodium  MG Oral Tablet 24 Hr Take 1 tablet (500 mg total) by mouth 2 (two) times daily.    Acidophilus/Pectin Oral Cap Take 1 capsule by mouth 2 (two) times daily.    ondansetron 4 MG Oral Tablet Dispersible Take 2 tablets (8 mg total) by mouth every 8 (eight) hours as needed for Nausea.    TraZODone HCl 100 MG Oral Tab Take 1 tablet (100 mg total) by mouth every 12 (twelve) hours as needed for Sleep.    gabapentin 600 MG Oral Tab Take 1 tablet (600 mg total) by mouth 3 (three) times daily.    MetFORMIN HCl ER (GLUCOPHAGE-XR) 750 MG Oral Tablet 24 Hr Take 1 tablet (750 mg total) by mouth daily with breakfast.    Multiple Vitamins-Minerals (THERA-M) Oral Tab Take 1 tablet by mouth daily.       Allergies  Allergies   Allergen Reactions    Pcn [Penicillins]     Radiology Contrast Iodinated Dyes     Sulfa Antibiotics        Review of Systems:   As by Admitting/Attending    Results:   Laboratory Data:  Lab Results   Component Value Date    WBC 12.4 (H) 01/10/2024    HGB 12.5 01/10/2024    HCT 37.5 01/10/2024    .0 01/10/2024    CREATSERUM 0.87 01/10/2024    BUN  14 01/10/2024     (L) 01/10/2024    K 4.1 01/10/2024    CL 99 01/10/2024    CO2 28.0 01/10/2024     (H) 01/10/2024    CA 9.7 01/10/2024    ALB 4.0 01/06/2024    ALKPHO 91 01/06/2024    TP 7.1 01/06/2024    AST 35 (H) 01/06/2024    ALT 20 01/06/2024    PTT 26.4 12/05/2023    LIP 36 12/05/2023    DDIMER 0.92 (H) 12/05/2023    MG 1.7 12/12/2023         Imaging:  No results found.    Vital Signs:   Blood pressure 133/87, pulse 104, temperature 98.4 °F (36.9 °C), temperature source Oral, resp. rate 18, weight 95.9 kg (211 lb 8 oz), SpO2 94%.    Mental Status Exam:   Appearance: Stated age 53, in hospital gown, obese female, in hospital gown laying down in hospital bed.  Patient just took shower this morning.  Psychomotor: No psychomotor agitation, or retardation.   Orientation: Alert and oriented to person, place, time and condition.  Gait: Not evaluated.  Attitude/Coorperation: Superficial cooperation otherwise she demonstrates some difficulty trusting.  Behavior: Appropriate.  Some manipulation and medication management  Speech: Regular rate and rhythm speech.  Mood: Patient reporting depressed and anxious mood.  Affect: Dysphoric affect, congruent with the mood.  Not tearful today with slight improvement  Thought process: Linear and appropriate.  Thought content: Patient denies any suicidal or homicidal ideation. Patient denies any history of self harm.  Otherwise the patient has been reporting hopelessness and helplessness.  Perceptions: Patient denies any auditory or visual hallucinations.  Concentration: Grossly intact.  Memory: Grossly intact.  Intellect: Average.  Judgment and Insight: Questionable.     Impression:     Major depressive disorder, recurrent severe without psychotic feature.  Generalized anxiety order.  Sepsis due to undetermined organism (HCC)        Patient is a 53 year old, , single, female with medical history of DM, Bipolar 1 disorder, Depression, MIKE, HTN, Diverticulitis,  Neurogenic bladder, Obesity, sepsis, suprapubic catheter and transverse myelitis who was admitted to the hospital for Sepsis due to undetermined organism. The patient has been demonstrating increased anxiety and depressive symptoms.     The patient has been demonstrating feelings of constant sadness, restlessness, hopelessness, helplessness, worthlessness, low mood, low energy, decreased motivation, increased anxiety, worry, ruminations with impairment in sleep. The patient denied any history of self harm or any suicidal ideations. She is agreeable to medication regime to help balance her mood and emotions.      1/11/2024: The patient has been demonstrating improvement in her mood otherwise continue reporting anxiety and worry about her chronic medical conditions.  Patient was educated on Abilify and agrees to be compliant and agrees to changes in medicine regime. Patient feels more secure being in the hospital and still expresses concerns for early discharge.  Otherwise no side effect to the medication with improvement.     Discussed risk and benefit, acknowledging the current symptom and severity.  At this point, I would recommend the following approach:      Focus on safety  Focus on education and support.  Focus on insight orientation helping the patient understand diagnosis and treatment plan.  Decrease Klonopin to 0.5 mg 4 times daily  Decrease Neurontin to 300 mg TD  Continue Abilify 2 mg daily  Abilify 2 mg given once this AM for missed dose   Continue Seroquel 200 mg nightly.  Increase Cymbalta to 30 mg nightly.  Discontinue Wellbutrin  mg.   Processed with patient at length, the initiation of the above psychotropic medications I advised the patient of the risks, benefits, alternatives and potential side effects. The patient consents to administration of the medications and understands the right to refuse medications at any time. The patient verbalized understanding.   Coordinate plan with  team    Orders This Visit:  Orders Placed This Encounter   Procedures    CBC With Differential With Platelet    Comp Metabolic Panel (14)    Urinalysis with Culture Reflex    Lactic Acid, Plasma    Lactic Acid Reflex Post Positive    Lactic Acid Post Positive    Lactic Acid, Plasma    Lactic Acid Reflex Post Positive    Lactic Acid Post Positive    Basic Metabolic Panel (8)    CBC With Differential With Platelet    Procalcitonin    CBC, Platelet; No Differential    Basic Metabolic Panel (8)    Urinalysis with Culture Reflex    SARS-CoV-2/Flu A and B/RSV by PCR (GeneXpert)    Urine Culture, Routine    Blood Culture    Urine Culture, Routine       Meds This Visit:  Requested Prescriptions     Signed Prescriptions Disp Refills    cefdinir 300 MG Oral Cap 10 capsule 0     Sig: Take 1 capsule (300 mg total) by mouth 2 (two) times daily for 5 days.       Víctor Qureshi MD  1/11/2024    Note to Patient: The 21st Century Cures Act makes medical notes like these available to patients in the interest of transparency. However, be advised this is a medical document. It is intended as peer to peer communication. It is written in medical language and may contain abbreviations or verbiage that are unfamiliar. It may appear blunt or direct. Medical documents are intended to carry relevant information, facts as evident, and the clinical opinion of the practitioner. This note may have been transcribed using a voice dictation system. Voice recognition errors may occur. This should not be taken to alter the content or meaning of this note.

## 2024-01-11 NOTE — PROGRESS NOTES
Patient is a 53 year old , single, female with medical history of DM, Bipolar 1 disorder, Depression, MIKE, HTN, Diverticulitis, Neurogenic bladder, Obesity, sepsis, suprapubic catheter and transverse myelitis who was admitted to the hospital for Sepsis due to undetermined organism (HCC): The patient has continued to demonstrate increased anxiety and depressive symptoms.    Consult Duration     The patient seen for for over 50 minutes, follow-up evaluation, over 50% counseling and coordinating care addressing depression, anxiety, discussing medication management and disposition.    Record reviewed, communication with attending, communication with RN and patient seen face to face evaluation.    History of Present Illness:   The patient seen yesterday and agreed to medication regime of by Abilify 2 mg daily, Cymbalta 30 mg nightly, Wellbutrin XL to 150 mg daily and Seroquel to 200 mg nightly.    Communicating with the team and reviewing progress note indicated that the patient had episodes of emesis last night.  No PM medications were taken.     Patient was seen today laying in hospital bed. She expressed concern about taking Abilify and refused to take it last night. She states that a nurse gave her a medical packet showing the side effects and noticed that Abilify can increase blood sugar levels, weight gain and is not good for kidneys.      She was given Dilaudid yesterday for a suprapubic catheter change which made her dizzy causing her to have episodes of emesis last night.  She states she is feeling better.  She reports anxious feelings about being discharged too soon from the hospital and that her UTI will not be cleared in time.  Reports feeling slightly better in spirits than yesterday.        The patient seen today laying in hospital bed mildly distressed, visibly upset and crying.      The patient is alert and oriented to person, place, date and condition. The patient answers questions and engages  in appropriate conversation with no impairment in cognition or distortion in thought process.      The patient reporting feelings of hopelessness, helplessness, worthlessness, low mood, low energy, decreased motivation.     The patient reporting increased anxiety, worry, ruminations with impairment in sleep. States she has difficulty falling asleep which causes her to sleep late and wake up late. She displays concerns that she has difficulty waking up and doesn't feel better until the afternoon.       The patient is not demonstrating any carmel or psychosis  The patient denies auditory or visual hallucinations  The patient denies any current or past suicidal ideations or self harm.      The patient has been demonstrating feelings of constant sadness, restlessness, hopelessness, helplessness, worthlessness, low mood, low energy, decreased motivation, increased anxiety, worry, ruminations with impairment in sleep. The patient denied any history of self harm or any suicidal ideations. She is agreeable to medication changed to help balance her mood and emotions.      Past Psychiatric/Medication History:  1. Prior diagnoses: MIKE, depression, Bipolar 1 disorder, Manipulative behavior. Patient states she also has OCD and Panic attacks since adolescent years.  2. Past psychiatric inpatient: none reported  3. Past outpatient history: Patient reports seeing a psychiatris       t at Nursing Home   4. Past suicide history: Patient denies   5. Medication history: Wellbutrin XL 24 hr tab 300mg, Klonopin 1mg q6h, Seroquel 300mg BID,      Social History:   The patient states the she lives in a Nursing home due to medical conditions. She is single and has never  but has one living son.      Denies any ETOH abuse, or illicit substance abuse. Previous smoker (5 pack history)     Family History:  No family history reported       Medical History:   Past Medical History  Past Medical History:   Diagnosis Date    Bipolar 1 disorder  ([facility])     Dental caries 10/26/2014    Diabetes ([facility])     MIKE (generalized anxiety disorder)     High blood pressure     History of diverticulitis of colon 10/26/2014    Manipulative behavior 10/26/2014    Neurogenic bladder     Obesity (BMI 30-39.9) 10/26/2014    Paraplegia (HCC)     Sepsis following intra-abdominal surgery ([facility]) 10/26/2014    Serotonin syndrome     Sleep apnea     Suprapubic catheter (HCC)     Transverse myelitis ([facility])        Past Surgical History  Past Surgical History:   Procedure Laterality Date    ARTHROSCOPY OF JOINT UNLISTED      knee          CHOLECYSTECTOMY      PART REMOVAL COLON W END COLOSTOMY          TONSILLECTOMY         Family History  Family History   Adopted: Yes       Social History  Social History     Socioeconomic History    Marital status: Single   Tobacco Use    Smoking status: Former     Packs/day: 1.00     Years: 5.00     Additional pack years: 0.00     Total pack years: 5.00     Types: Cigarettes    Tobacco comments:     quit    Substance and Sexual Activity    Alcohol use: No    Drug use: No     Social Determinants of Health     Food Insecurity: No Food Insecurity (2024)    Food Insecurity     Food Insecurity: Never true   Transportation Needs: No Transportation Needs (2024)    Transportation Needs     Lack of Transportation: No   Housing Stability: Low Risk  (2024)    Housing Stability     Housing Instability: No           Current Medications:      Medications Prior to Admission   Medication Sig    psyllium 28 % Oral Powd Pack Take 1 packet by mouth every 8 (eight) hours as needed (constipation).    Nystatin 616162 UNIT/GM External Powder Apply 1 Application topically as needed for Dry Skin. Under breast and groin area    glucagon (BAQSIMI ONE PACK) 3 MG/DOSE Nasal nasal powder 3 mg by Nasal route once as needed for Low blood glucose.    levoFLOXacin 250 MG Oral Tab Take 1 tablet (250 mg total) by mouth daily.    atorvastatin 40 MG Oral Tab  Take 1 tablet (40 mg total) by mouth nightly.    sennosides 8.6 MG Oral Tab Take 2 tablets (17.2 mg total) by mouth 2 (two) times daily.    acetaminophen 325 MG Oral Tab Take 2 tablets (650 mg total) by mouth every 6 (six) hours as needed for Pain.    Icosapent Ethyl 1 g Oral Cap Take 1 capsule by mouth 4 (four) times daily.    insulin aspart 100 Units/mL Subcutaneous Solution Pen-injector Inject 24 Units into the skin 3 (three) times daily with meals.    rivaroxaban (XARELTO) 20 MG Oral Tab Take 1 tablet (20 mg total) by mouth daily.    QUEtiapine 300 MG Oral Tab Take 1 tablet (300 mg total) by mouth 2 (two) times daily.    insulin glargine 100 UNIT/ML Subcutaneous Solution Inject 56 Units into the skin nightly.    insulin glargine 100 UNIT/ML Subcutaneous Solution Inject 20 Units into the skin every morning.    Melatonin 3 MG Oral Cap Take 1 capsule (3 mg total) by mouth at bedtime.    ergocalciferol 1.25 MG (50349 UT) Oral Cap Take 1 capsule (50,000 Units total) by mouth every 7 days. Every monday    oxybutynin ER 5 MG Oral Tablet 24 Hr Take 1 tablet (5 mg total) by mouth daily.    methenamine 1 g Oral Tab Take 0.5 tablets (0.5 g total) by mouth 2 (two) times daily with meals.    bethanechol 10 MG Oral Tab Take 1 tablet (10 mg total) by mouth 3 (three) times daily.    loratadine 10 MG Oral Tab Take 1 tablet (10 mg total) by mouth daily.    cyclobenzaprine 10 MG Oral Tab Take 1 tablet (10 mg total) by mouth 3 (three) times daily as needed for Muscle spasms.    albuterol 108 (90 Base) MCG/ACT Inhalation Aero Soln Inhale 2 puffs into the lungs every 6 (six) hours as needed for Wheezing or Shortness of Breath.    phenazopyridine 200 MG Oral Tab Take 1 tablet (200 mg total) by mouth 3 (three) times daily.    ClonazePAM 1 MG Oral Tab Take 1 tablet (1 mg total) by mouth 4 (four) times daily.    Insulin Aspart 100 UNIT/ML Subcutaneous Solution Cartridge SS: 150-200 2units, 201-250 4 units, 251-300 6 units, 301-350 8  units, 351-400 10 units, over 401 call md    acetaminophen (TYLENOL EXTRA STRENGTH) 500 MG Oral Tab Take 325 mg by mouth every 4 (four) hours as needed for Pain or Fever.    Montelukast Sodium 10 MG Oral Tab Take 1 tablet (10 mg total) by mouth daily.    BuPROPion HCl ER, XL, 300 MG Oral Tablet 24 Hr Take 1 tablet (300 mg total) by mouth daily.    divalproex Sodium  MG Oral Tablet 24 Hr Take 1 tablet (500 mg total) by mouth 2 (two) times daily.    Acidophilus/Pectin Oral Cap Take 1 capsule by mouth 2 (two) times daily.    ondansetron 4 MG Oral Tablet Dispersible Take 2 tablets (8 mg total) by mouth every 8 (eight) hours as needed for Nausea.    TraZODone HCl 100 MG Oral Tab Take 1 tablet (100 mg total) by mouth every 12 (twelve) hours as needed for Sleep.    gabapentin 600 MG Oral Tab Take 1 tablet (600 mg total) by mouth 3 (three) times daily.    MetFORMIN HCl ER (GLUCOPHAGE-XR) 750 MG Oral Tablet 24 Hr Take 1 tablet (750 mg total) by mouth daily with breakfast.    Multiple Vitamins-Minerals (THERA-M) Oral Tab Take 1 tablet by mouth daily.       Allergies  Allergies   Allergen Reactions    Pcn [Penicillins]     Radiology Contrast Iodinated Dyes     Sulfa Antibiotics        Review of Systems:   As by Admitting/Attending    Results:   Laboratory Data:  Lab Results   Component Value Date    WBC 12.4 (H) 01/10/2024    HGB 12.5 01/10/2024    HCT 37.5 01/10/2024    .0 01/10/2024    CREATSERUM 0.87 01/10/2024    BUN 14 01/10/2024     (L) 01/10/2024    K 4.1 01/10/2024    CL 99 01/10/2024    CO2 28.0 01/10/2024     (H) 01/10/2024    CA 9.7 01/10/2024    ALB 4.0 01/06/2024    ALKPHO 91 01/06/2024    TP 7.1 01/06/2024    AST 35 (H) 01/06/2024    ALT 20 01/06/2024    PTT 26.4 12/05/2023    LIP 36 12/05/2023    DDIMER 0.92 (H) 12/05/2023    MG 1.7 12/12/2023         Imaging:  No results found.    Vital Signs:   Blood pressure 133/87, pulse 104, temperature 98.4 °F (36.9 °C), temperature source Oral,  resp. rate 18, weight 211 lb 8 oz, SpO2 94%.    Mental Status Exam:   Appearance: Stated age 53, in hospital gown, obese female, in hospital gown laying down in hospital bed.  Psychomotor: No psychomotor agitation, or retardation.   Orientation: Alert and oriented to person, place, time and condition.  Gait: Not evaluated.  Attitude/Coorperation: Cooperative and attentive.  Behavior: Appropriate.   Speech: Regular rate and rhythm speech.  Mood: Patient reporting depressed and anxious mood.  Affect: Dysphoric affect, congruent with the mood.  Not tearful today with slight improvement  Thought process: Linear and appropriate.  Thought content: Patient denies any suicidal or homicidal ideation. Patient denies any history of self harm.  Otherwise the patient has been reporting hopelessness and helplessness.  Perceptions: Patient denies any auditory or visual hallucinations.  Concentration: Grossly intact.  Memory: Grossly intact.  Intellect: Average.  Judgment and Insight: Questionable.     Impression:     Major depressive disorder, recurrent severe without psychotic feature.  Generalized anxiety order.  Sepsis due to undetermined organism (HCC)      Patient is a 53 year old, , single, female with medical history of DM, Bipolar 1 disorder, Depression, MIKE, HTN, Diverticulitis, Neurogenic bladder, Obesity, sepsis, suprapubic catheter and transverse myelitis who was admitted to the hospital for Sepsis due to undetermined organism (HCC): The patient has been demonstrating increased anxiety and depressive symptoms.     The patient has been demonstrating feelings of constant sadness, restlessness, hopelessness, helplessness, worthlessness, low mood, low energy, decreased motivation, increased anxiety, worry, ruminations with impairment in sleep. The patient denied any history of self harm or any suicidal ideations. She is agreeable to medication regime to help balance her mood and emotions.      1/10/2024: The  patient has been demonstrating improvement in her mood otherwise continue reporting anxiety and worry about early discharge.  Patient has been struggling and finding it limited support.  Patient feels more secure being in the hospital.  Otherwise no side effect to the medication with improvement.    Discussed risk and benefit, acknowledging the current symptom and severity.  At this point, I would recommend the following approach:      Focus on safety  Focus on education and support.  Focus on insight orientation helping the patient understand diagnosis and treatment plan.  Continue Abilify 2 mg daily.  Continue Seroquel to 200 mg nightly.  Continue Cymbalta 30 mg nightly.  Continue Wellbutrin XL to 150 mg daily.  Processed with patient at length, the initiation of the above psychotropic medications I advised the patient of the risks, benefits, alternatives and potential side effects. The patient consents to administration of the medications and understands the right to refuse medications at any time. The patient verbalized understanding.   Coordinate plan with team    Orders This Visit:  Orders Placed This Encounter   Procedures    CBC With Differential With Platelet    Comp Metabolic Panel (14)    Urinalysis with Culture Reflex    Lactic Acid, Plasma    Lactic Acid Reflex Post Positive    Lactic Acid Post Positive    Lactic Acid, Plasma    Lactic Acid Reflex Post Positive    Lactic Acid Post Positive    Basic Metabolic Panel (8)    CBC With Differential With Platelet    Procalcitonin    CBC, Platelet; No Differential    Basic Metabolic Panel (8)    Urinalysis with Culture Reflex    SARS-CoV-2/Flu A and B/RSV by PCR (GeneXpert)    Urine Culture, Routine    Blood Culture    Urine Culture, Routine       Meds This Visit:  Requested Prescriptions     Signed Prescriptions Disp Refills    cefdinir 300 MG Oral Cap 10 capsule 0     Sig: Take 1 capsule (300 mg total) by mouth 2 (two) times daily for 5 days.       Víctor  MD Titus  1/10/2024    This note was prepared using Dragon Medical voice recognition dictation software and as a result, errors may occur. When identified, these errors have been corrected. While every attempt is made to correct errors during dictation, discrepancies may still exist.

## 2024-01-11 NOTE — CM/SW NOTE
01/11/24 1322   Discharge disposition   Expected discharge disposition [facility]   Post Acute Care Provider   ([facility])   Discharge transportation [facility]     Pt discussed in RN DC rounds. Pt received MDO for discharge. Pt requires an ambulance according to the RN due to being a max assist. SW called and ordered an Ambulance from [facility] to transport pt to  [facility]  at 6:00 PM.  PCS flow sheet completed. RN to attached to AVS and print out. RN is to call Phone: [phone] for report.  Pt is alert and oriented, and made aware for DC at 6pm.         SW/JASON to remain available for support and/or discharge planning.     Marsha Inman, MSW, LSW   x [phone]

## 2024-01-11 NOTE — SPIRITUAL CARE NOTE
Spiritual Care Visit Note    Visited With: Patient    Writer report receiving verbal request to visit patient. Writer was Chaplain Jonna boggs on visit. Writer offered empathic listening and support to patient. Patient expressed her concerns about being discharged to early. Patient mentioned being discharged on one occasion and had to be readmitted. Patient also expressed concerns about her aging parents. Patient requested prayer for self and parents. Writer extended words of comfort and prayer for wisdom and healing. Writer gave patient a commemorative prayer coin. Patient was thankful for prayer, prayer coin and visit. No other need at this time. Follow up as requested.    Spiritual Care Taxonomy:    Intended Effects: Demonstrate caring and concern    Methods: Collaborate with care team member;Offer emotional support    Interventions: Active listening;Ask guided questions;Provide a Voodoo item(s);Prayer for healing     WESTLEY Sanford   [id number]     On call  services [phone]

## 2024-01-11 NOTE — PLAN OF CARE
Problem: Patient Centered Care  Goal: Patient preferences are identified and integrated in the patient's plan of care  Description: Interventions:  - What would you like us to know as we care for you? \"I am from [facility].\"  - Provide timely, complete, and accurate information to patient/family  - Incorporate patient and family knowledge, values, beliefs, and cultural backgrounds into the planning and delivery of care  - Encourage patient/family to participate in care and decision-making at the level they choose  - Honor patient and family perspectives and choices  Outcome: Progressing     Problem: RESPIRATORY - ADULT  Goal: Achieves optimal ventilation and oxygenation  Description: INTERVENTIONS:  - Assess for changes in respiratory status  - Assess for changes in mentation and behavior  - Position to facilitate oxygenation and minimize respiratory effort  - Oxygen supplementation based on oxygen saturation or ABGs  - Provide Smoking Cessation handout, if applicable  - Encourage broncho-pulmonary hygiene including cough, deep breathe, Incentive Spirometry  - Assess the need for suctioning and perform as needed  - Assess and instruct to report SOB or any respiratory difficulty  - Respiratory Therapy support as indicated  - Manage/alleviate anxiety  - Monitor for signs/symptoms of CO2 retention  Outcome: Progressing     Problem: GENITOURINARY - ADULT  Goal: Absence of urinary retention  Description: INTERVENTIONS:  - Assess patient’s ability to void and empty bladder  - Monitor intake/output and perform bladder scan as needed  - Follow urinary retention protocol/standard of care  - Consider collaborating with pharmacy to review patient's medication profile  - Implement strategies to promote bladder emptying  Outcome: Progressing     Problem: SKIN/TISSUE INTEGRITY - ADULT  Goal: Skin integrity remains intact  Description: INTERVENTIONS  - Assess and document risk factors for pressure ulcer development  -  Assess and document skin integrity  - Monitor for areas of redness and/or skin breakdown  - Initiate interventions, skin care algorithm/standards of care as needed  Outcome: Progressing  Goal: Incision(s), wounds(s) or drain site(s) healing without S/S of infection  Description: INTERVENTIONS:  - Assess and document risk factors for pressure ulcer development  - Assess and document skin integrity  - Assess and document dressing/incision, wound bed, drain sites and surrounding tissue  - Implement wound care per orders  - Initiate isolation precautions as appropriate  - Initiate Pressure Ulcer prevention bundle as indicated  Outcome: Progressing     Problem: PAIN - ADULT  Goal: Verbalizes/displays adequate comfort level or patient's stated pain goal  Description: INTERVENTIONS:  - Encourage pt to monitor pain and request assistance  - Assess pain using appropriate pain scale  - Administer analgesics based on type and severity of pain and evaluate response  - Implement non-pharmacological measures as appropriate and evaluate response  - Consider cultural and social influences on pain and pain management  - Manage/alleviate anxiety  - Utilize distraction and/or relaxation techniques  - Monitor for opioid side effects  - Notify MD/LIP if interventions unsuccessful or patient reports new pain  - Anticipate increased pain with activity and pre-medicate as appropriate  Outcome: Progressing     Problem: RISK FOR INFECTION - ADULT  Goal: Absence of fever/infection during anticipated neutropenic period  Description: INTERVENTIONS  - Monitor WBC  - Administer growth factors as ordered  - Implement neutropenic guidelines  Outcome: Progressing     Problem: SAFETY ADULT - FALL  Goal: Free from fall injury  Description: INTERVENTIONS:  - Assess pt frequently for physical needs  - Identify cognitive and physical deficits and behaviors that affect risk of falls.  - Scheller fall precautions as indicated by assessment.  - Educate  pt/family on patient safety including physical limitations  - Instruct pt to call for assistance with activity based on assessment  - Modify environment to reduce risk of injury  - Provide assistive devices as appropriate  - Consider OT/PT consult to assist with strengthening/mobility  - Encourage toileting schedule  Outcome: Progressing     Problem: DISCHARGE PLANNING  Goal: Discharge to home or other facility with appropriate resources  Description: INTERVENTIONS:  - Identify barriers to discharge w/pt and caregiver  - Include patient/family/discharge partner in discharge planning  - Arrange for needed discharge resources and transportation as appropriate  - Identify discharge learning needs (meds, wound care, etc)  - Arrange for interpreters to assist at discharge as needed  - Consider post-discharge preferences of patient/family/discharge partner  - Complete POLST form as appropriate  - Assess patient's ability to be responsible for managing their own health  - Refer to Case Management Department for coordinating discharge planning if the patient needs post-hospital services based on physician/LIP order or complex needs related to functional status, cognitive ability or social support system  Outcome: Progressing   Patient in no acute distress, verbalizes pain is adequately controlled, diet advanced to full liquid, tolerating well, denies nausea, no vomiting.

## 2024-01-11 NOTE — PLAN OF CARE
Patient updated on plan of care. Patient upset about clear liquid diet. Patient educated about nausea and vomiting and MD recommending clear liquid diet. Patient verbalized understanding. Patient very demanding throughout shift. When items patient requested brought to her, patient would state \"oh no right now\" \"oh I don't need that now\". Patient continuous requesting things and then changed mind when returning to room with items requested throughout shift. Started on IV fluids. Continues on IV antibiotics. PRN tramadol given as needed for pain. UA pending results. Call light within reach.     Problem: Patient Centered Care  Goal: Patient preferences are identified and integrated in the patient's plan of care  Description: Interventions:  - What would you like us to know as we care for you? \"I am from [facility].\"  - Provide timely, complete, and accurate information to patient/family  - Incorporate patient and family knowledge, values, beliefs, and cultural backgrounds into the planning and delivery of care  - Encourage patient/family to participate in care and decision-making at the level they choose  - Honor patient and family perspectives and choices  Outcome: Progressing     Problem: RESPIRATORY - ADULT  Goal: Achieves optimal ventilation and oxygenation  Description: INTERVENTIONS:  - Assess for changes in respiratory status  - Assess for changes in mentation and behavior  - Position to facilitate oxygenation and minimize respiratory effort  - Oxygen supplementation based on oxygen saturation or ABGs  - Provide Smoking Cessation handout, if applicable  - Encourage broncho-pulmonary hygiene including cough, deep breathe, Incentive Spirometry  - Assess the need for suctioning and perform as needed  - Assess and instruct to report SOB or any respiratory difficulty  - Respiratory Therapy support as indicated  - Manage/alleviate anxiety  - Monitor for signs/symptoms of CO2 retention  Outcome:  Progressing     Problem: GENITOURINARY - ADULT  Goal: Absence of urinary retention  Description: INTERVENTIONS:  - Assess patient’s ability to void and empty bladder  - Monitor intake/output and perform bladder scan as needed  - Follow urinary retention protocol/standard of care  - Consider collaborating with pharmacy to review patient's medication profile  - Implement strategies to promote bladder emptying  Outcome: Progressing     Problem: SKIN/TISSUE INTEGRITY - ADULT  Goal: Skin integrity remains intact  Description: INTERVENTIONS  - Assess and document risk factors for pressure ulcer development  - Assess and document skin integrity  - Monitor for areas of redness and/or skin breakdown  - Initiate interventions, skin care algorithm/standards of care as needed  Outcome: Progressing  Goal: Incision(s), wounds(s) or drain site(s) healing without S/S of infection  Description: INTERVENTIONS:  - Assess and document risk factors for pressure ulcer development  - Assess and document skin integrity  - Assess and document dressing/incision, wound bed, drain sites and surrounding tissue  - Implement wound care per orders  - Initiate isolation precautions as appropriate  - Initiate Pressure Ulcer prevention bundle as indicated  Outcome: Progressing     Problem: PAIN - ADULT  Goal: Verbalizes/displays adequate comfort level or patient's stated pain goal  Description: INTERVENTIONS:  - Encourage pt to monitor pain and request assistance  - Assess pain using appropriate pain scale  - Administer analgesics based on type and severity of pain and evaluate response  - Implement non-pharmacological measures as appropriate and evaluate response  - Consider cultural and social influences on pain and pain management  - Manage/alleviate anxiety  - Utilize distraction and/or relaxation techniques  - Monitor for opioid side effects  - Notify MD/LIP if interventions unsuccessful or patient reports new pain  - Anticipate increased pain  with activity and pre-medicate as appropriate  Outcome: Progressing     Problem: RISK FOR INFECTION - ADULT  Goal: Absence of fever/infection during anticipated neutropenic period  Description: INTERVENTIONS  - Monitor WBC  - Administer growth factors as ordered  - Implement neutropenic guidelines  Outcome: Progressing     Problem: SAFETY ADULT - FALL  Goal: Free from fall injury  Description: INTERVENTIONS:  - Assess pt frequently for physical needs  - Identify cognitive and physical deficits and behaviors that affect risk of falls.  - [place] fall precautions as indicated by assessment.  - Educate pt/family on patient safety including physical limitations  - Instruct pt to call for assistance with activity based on assessment  - Modify environment to reduce risk of injury  - Provide assistive devices as appropriate  - Consider OT/PT consult to assist with strengthening/mobility  - Encourage toileting schedule  Outcome: Progressing     Problem: DISCHARGE PLANNING  Goal: Discharge to home or other facility with appropriate resources  Description: INTERVENTIONS:  - Identify barriers to discharge w/pt and caregiver  - Include patient/family/discharge partner in discharge planning  - Arrange for needed discharge resources and transportation as appropriate  - Identify discharge learning needs (meds, wound care, etc)  - Arrange for interpreters to assist at discharge as needed  - Consider post-discharge preferences of patient/family/discharge partner  - Complete POLST form as appropriate  - Assess patient's ability to be responsible for managing their own health  - Refer to Case Management Department for coordinating discharge planning if the patient needs post-hospital services based on physician/LIP order or complex needs related to functional status, cognitive ability or social support system  Outcome: Progressing

## 2024-01-12 NOTE — DISCHARGE SUMMARY
General Medicine Discharge Summary     Patient ID:  Clari Rosa  53 year old  11/17/1970    Admit date: 1/6/2024    Discharge date and time: 01/11/24    Attending Physician: No att. providers found     Primary Care Physician: Brian Bob MD     Reason for admission: reported fever    Discharge Diagnoses:  migraine, abnormal UA/colonization    Discharged Condition: stable    Disposition: long term care facility    Consults:   Consultants         Provider   Role Specialty     Víctor Qureshi MD  Consulting Physician Psychiatry     Francine Cervantes DO  Consulting Physician INFECTIOUS DISEASES     Nolvia Golden MD  Consulting Physician UROLOGY     Fatuma Taylor DO  Consulting Physician HOSPITALIST              HPI: Per Dr. Rodriguez    Clari Rosa - 53 year old female presenting with above CC.     History obtained from patient w/ additional history from review of outside records in care everywhere.     LTC resident that recently moved to the area     Primarily came in because she thinks she has UTI. States this is her 20th admission for sepsis & she felt septic. Has had multiple pseudomonas infections in past. Has chronic suprapubic mark, can't tell me when it was changed last. Doesn;t have urologist in area.Has needed zosyn for infections in past     Also with months long sinus infection, has taken multiple abx courses to no avail.     Pain all over, catheter hurts, head hurts - chronic, morphine doesn't usually help     Athletes foot - needs cream     Anxiety - takes klonopin 1mg 4x daily - wants to see psychiatry     In ED, afebrile, , RR 22, lactate 3.7 -> 3.8. CMP w elev glucose. CBC stable. Cov/flu neg. CXR w/o acute process. CT A/P w perivesical stranding. Started on ceftriaxone, admitted for further care.      Hospital Course:     Clari Rosa - 53 year old female, LTC resident w neurogenic bladder & chronic suprapubic catheter w recurring pseudomonas UTIs, bipolar d/o MIKE, benzo dependence,  DM2, ANAID, [place]  admitted 1/6/2024 for recurring UTI, Ucx with providencia stuartii, d/w ID, likely colonization. Patient requesting to stay in hospital, states she does not like her LTC as staff is not as responsive as they are in the hospital, concern for a component of malingering/secondary gain. Medically stable for discharge to SNF on 1/11/24. Needs follow-up with her psychiatrist.      Neurogenic bladder  Indwelling suprapubic catheter  H/o recurring UTIs - pseudomonas per pt. MDRO e. Coli & VRE noted in careeverywhere  - likely colonization with providencia stuartii; d/w ID, do not feel she had a true CAUTI/sepsis ruled out  - Afebrile, not tachycardic, no leukocytosis  - Cefepime ( 1/7 - )  - Urology consulted, s/p SPC exchange 1/9  - Complicated prior UTIs. Poss ESBL organisms. Per PharmD - carbapenems interact with valproate. ID consulted - d/w Dr. Cervantes  - Add phenazopyridine for bladder spasms     Tachycardia  - HR 120s on tele.   - Suspect dehydrated iso N/V. 1L IVF, continue mIVF after     Headaches  - ?migraine. Dehydration as above  - Can't take PO. Trial Imitrex injection     Hx C.diff  - PO vanc prophy dose for now while on abx for UTI     Chronic sinusitis   - Ongoing for months - terrible headaches  - CT sinus w/o infection  - APAP/tramadol for headaches     H/o pulmonary embolus  - Continue PTA xarelto      Bipolar  MIKE  Benzo dependence  - Continue PTA meds  - Klonopin decreased per psych  - Pt requesting psych consult - message sent to Dr. Titus WORRELL  - CPAP     Morbid obesity w custodial  Chronic hunger  - Body mass index is 41.6 kg/m².  - Healthy diet & wt loss encouraged   - RD consult for diet strategies  - Consider OP endo eval (grehlin/leptin levels checked etc)     B12 tabs per pt request    Exam   GEN: NAD  HEENT: EOMI  Pulm: CTAB, no crackles or wheezes  CV: RRR, no murmurs  ABD: Soft, non-tender, non-distended, +BS  Psych: anxious  SKIN: warm, dry  EXT: no edema    Operative  Procedures:      Imaging: No results found.        Home Medication Changes:     I reconciled current and discharge medications on day of discharge. These medication changes have been made as below         Medication List        START taking these medications      cefdinir 300 MG Caps  Commonly known as: Omnicef  Take 1 capsule (300 mg total) by mouth 2 (two) times daily for 5 days.     DULoxetine 30 MG Cpep  Commonly known as: Cymbalta            CHANGE how you take these medications      clonazePAM 0.5 MG Tabs  Commonly known as: KlonoPIN  Take 1 tablet (0.5 mg total) by mouth 2 (two) times daily.  What changed:   medication strength  how much to take  when to take this     Gabapentin 50 MG Tabs  What changed:   medication strength  how much to take     QUEtiapine 200 MG Tabs  Commonly known as: SEROquel  What changed:   medication strength  how much to take            CONTINUE taking these medications      acidophilus-pectin Caps  Commonly known as: Probiotic     albuterol 108 (90 Base) MCG/ACT Aers  Commonly known as: Ventolin HFA     atorvastatin 40 MG Tabs  Commonly known as: Lipitor     Baqsimi One Pack 3 MG/DOSE nasal powder  Generic drug: glucagon     bethanechol 10 MG Tabs  Commonly known as: Urecholine     buPROPion  MG Tb24  Commonly known as: Wellbutrin XL     cyclobenzaprine 10 MG Tabs  Commonly known as: Flexeril     divalproex  MG Tb24  Commonly known as: Depakote ER     ergocalciferol 1.25 MG (90518 UT) Caps  Commonly known as: Vitamin D2     Icosapent Ethyl 1 g Caps     * insulin aspart 100 UNIT/ML Soct  Commonly known as: NovoLOG  SS: 150-200 2units, 201-250 4 units, 251-300 6 units, 301-350 8 units, 351-400 10 units, over 401 call md     * insulin aspart 100 Units/mL Sopn  Commonly known as: NovoLOG  Inject 24 Units into the skin 3 (three) times daily with meals.     * insulin glargine 100 UNIT/ML Soln  Commonly known as: Lantus     * insulin glargine 100 UNIT/ML Soln  Commonly known  as: Lantus     loratadine 10 MG Tabs  Commonly known as: Claritin     Melatonin 3 MG Caps     metFORMIN  MG Tb24  Commonly known as: Glucophage XR     methenamine 1 g Tabs  Commonly known as: Hiprex     montelukast 10 MG Tabs  Commonly known as: Singulair     Nystatin 302447 UNIT/GM Powd     ondansetron 4 MG Tbdp  Commonly known as: Zofran-ODT     oxybutynin ER 5 MG Tb24  Commonly known as: Ditropan-XL     phenazopyridine 200 MG Tabs  Commonly known as: Pyridum     psyllium 28 % Pack  Commonly known as: Metamucil     rivaroxaban 20 MG Tabs  Commonly known as: Xarelto  Take 1 tablet (20 mg total) by mouth daily.     sennosides 8.6 MG Tabs  Commonly known as: Senokot     Thera-M Tabs     traZODone 100 MG Tabs  Commonly known as: Desyrel     * acetaminophen 325 MG Tabs  Commonly known as: Tylenol     * Tylenol Extra Strength 500 MG Tabs  Generic drug: acetaminophen           * This list has 6 medication(s) that are the same as other medications prescribed for you. Read the directions carefully, and ask your doctor or other care provider to review them with you.                STOP taking these medications      levoFLOXacin 250 MG Tabs  Commonly known as: Levaquin               Where to Get Your Medications        These medications were sent to [address] [phone], [phone]  [address] [place] [id number]      Phone: [phone]   cefdinir 300 MG Caps       You can get these medications from any pharmacy    Bring a paper prescription for each of these medications  clonazePAM 0.5 MG Tabs         Activity: activity as tolerated  Diet: diabetic diet  Wound Care: none needed  Code Status: Full Code  O2: n/a    Follow-up with:    PCP   Specialist  psych      JOSÉ IBARRA instructions:      Other Discharge Instructions:         Please have patient follow up with Infectious Disease MD, Urologist, and Endocrinologist at [facility]           Follow-up with labs: none    Total Time Coordinating Care: 31 minutes    Patient had opportunity to ask questions and state understand and agree with therapeutic plan as outlined      Nelida Escobar MD  [facility] Hospitalist

## 2024-01-18 ENCOUNTER — TELEMEDICINE (OUTPATIENT)
Dept: TELEHEALTH | Age: 54
End: 2024-01-18
Payer: MEDICARE

## 2024-01-18 DIAGNOSIS — Z02.9 ADMINISTRATIVE ENCOUNTER: Primary | ICD-10-CM

## 2024-01-18 NOTE — PROGRESS NOTES
Patient has not completed e-check in for video visit. Attempted twice to notify. Also called patient's mobile, not able to leave message. No visit charge. Visit canceled.

## 2024-01-23 ENCOUNTER — APPOINTMENT (OUTPATIENT)
Dept: GENERAL RADIOLOGY | Facility: HOSPITAL | Age: 54
End: 2024-01-23
Attending: EMERGENCY MEDICINE
Payer: MEDICARE

## 2024-01-23 ENCOUNTER — HOSPITAL ENCOUNTER (EMERGENCY)
Facility: HOSPITAL | Age: 54
Discharge: HOME | End: 2024-01-23
Attending: EMERGENCY MEDICINE
Payer: MEDICARE

## 2024-01-23 VITALS
DIASTOLIC BLOOD PRESSURE: 82 MMHG | OXYGEN SATURATION: 91 % | HEART RATE: 85 BPM | SYSTOLIC BLOOD PRESSURE: 128 MMHG | RESPIRATION RATE: 18 BRPM | TEMPERATURE: 98 F

## 2024-01-23 DIAGNOSIS — R07.9 CHEST PAIN OF UNCERTAIN ETIOLOGY: Primary | ICD-10-CM

## 2024-01-23 LAB
ALBUMIN SERPL-MCNC: 3.9 G/DL (ref 3.2–4.8)
ALBUMIN/GLOB SERPL: 1.4 {RATIO} (ref 1–2)
ALP LIVER SERPL-CCNC: 90 U/L
ALT SERPL-CCNC: 20 U/L
ANION GAP SERPL CALC-SCNC: 11 MMOL/L (ref 0–18)
AST SERPL-CCNC: 21 U/L (ref ?–34)
BASOPHILS # BLD AUTO: 0.07 X10(3) UL (ref 0–0.2)
BASOPHILS NFR BLD AUTO: 0.6 %
BILIRUB SERPL-MCNC: 0.4 MG/DL (ref 0.3–1.2)
BUN BLD-MCNC: 12 MG/DL (ref 9–23)
BUN/CREAT SERPL: 14 (ref 10–20)
CALCIUM BLD-MCNC: 9 MG/DL (ref 8.7–10.4)
CHLORIDE SERPL-SCNC: 99 MMOL/L (ref 98–112)
CO2 SERPL-SCNC: 25 MMOL/L (ref 21–32)
CREAT BLD-MCNC: 0.86 MG/DL
DEPRECATED RDW RBC AUTO: 51.7 FL (ref 35.1–46.3)
EGFRCR SERPLBLD CKD-EPI 2021: 81 ML/MIN/1.73M2 (ref 60–?)
EOSINOPHIL # BLD AUTO: 0.32 X10(3) UL (ref 0–0.7)
EOSINOPHIL NFR BLD AUTO: 2.8 %
ERYTHROCYTE [DISTWIDTH] IN BLOOD BY AUTOMATED COUNT: 15.7 % (ref 11–15)
GLOBULIN PLAS-MCNC: 2.7 G/DL (ref 2.8–4.4)
GLUCOSE BLD-MCNC: 203 MG/DL (ref 70–99)
GLUCOSE BLDC GLUCOMTR-MCNC: 209 MG/DL (ref 70–99)
HCT VFR BLD AUTO: 36.9 %
HGB BLD-MCNC: 11.5 G/DL
IMM GRANULOCYTES # BLD AUTO: 0.1 X10(3) UL (ref 0–1)
IMM GRANULOCYTES NFR BLD: 0.9 %
LYMPHOCYTES # BLD AUTO: 4.11 X10(3) UL (ref 1–4)
LYMPHOCYTES NFR BLD AUTO: 36.4 %
MCH RBC QN AUTO: 28 PG (ref 26–34)
MCHC RBC AUTO-ENTMCNC: 31.2 G/DL (ref 31–37)
MCV RBC AUTO: 90 FL
MONOCYTES # BLD AUTO: 0.43 X10(3) UL (ref 0.1–1)
MONOCYTES NFR BLD AUTO: 3.8 %
NEUTROPHILS # BLD AUTO: 6.27 X10 (3) UL (ref 1.5–7.7)
NEUTROPHILS # BLD AUTO: 6.27 X10(3) UL (ref 1.5–7.7)
NEUTROPHILS NFR BLD AUTO: 55.5 %
OSMOLALITY SERPL CALC.SUM OF ELEC: 286 MOSM/KG (ref 275–295)
PLATELET # BLD AUTO: 238 10(3)UL (ref 150–450)
POTASSIUM SERPL-SCNC: 4.1 MMOL/L (ref 3.5–5.1)
PROT SERPL-MCNC: 6.6 G/DL (ref 5.7–8.2)
RBC # BLD AUTO: 4.1 X10(6)UL
SODIUM SERPL-SCNC: 135 MMOL/L (ref 136–145)
TROPONIN I SERPL HS-MCNC: <3 NG/L
TROPONIN I SERPL HS-MCNC: <3 NG/L
WBC # BLD AUTO: 11.3 X10(3) UL (ref 4–11)

## 2024-01-23 PROCEDURE — 99285 EMERGENCY DEPT VISIT HI MDM: CPT

## 2024-01-23 PROCEDURE — 82962 GLUCOSE BLOOD TEST: CPT

## 2024-01-23 PROCEDURE — 71045 X-RAY EXAM CHEST 1 VIEW: CPT | Performed by: EMERGENCY MEDICINE

## 2024-01-23 PROCEDURE — 93010 ELECTROCARDIOGRAM REPORT: CPT

## 2024-01-23 PROCEDURE — 96374 THER/PROPH/DIAG INJ IV PUSH: CPT

## 2024-01-23 PROCEDURE — S0028 INJECTION, FAMOTIDINE, 20 MG: HCPCS | Performed by: EMERGENCY MEDICINE

## 2024-01-23 PROCEDURE — 85025 COMPLETE CBC W/AUTO DIFF WBC: CPT | Performed by: EMERGENCY MEDICINE

## 2024-01-23 PROCEDURE — 80053 COMPREHEN METABOLIC PANEL: CPT | Performed by: EMERGENCY MEDICINE

## 2024-01-23 PROCEDURE — 84484 ASSAY OF TROPONIN QUANT: CPT | Performed by: EMERGENCY MEDICINE

## 2024-01-23 PROCEDURE — 93005 ELECTROCARDIOGRAM TRACING: CPT

## 2024-01-23 RX ORDER — FAMOTIDINE 10 MG/ML
20 INJECTION, SOLUTION INTRAVENOUS ONCE
Status: COMPLETED | OUTPATIENT
Start: 2024-01-23 | End: 2024-01-23

## 2024-01-23 RX ORDER — ACETAMINOPHEN 325 MG/1
650 TABLET ORAL ONCE
Status: COMPLETED | OUTPATIENT
Start: 2024-01-23 | End: 2024-01-23

## 2024-01-23 RX ORDER — MAGNESIUM HYDROXIDE/ALUMINUM HYDROXICE/SIMETHICONE 120; 1200; 1200 MG/30ML; MG/30ML; MG/30ML
30 SUSPENSION ORAL ONCE
Status: COMPLETED | OUTPATIENT
Start: 2024-01-23 | End: 2024-01-23

## 2024-01-24 LAB
ATRIAL RATE: 91 BPM
ATRIAL RATE: 92 BPM
P AXIS: 48 DEGREES
P AXIS: 54 DEGREES
P-R INTERVAL: 182 MS
P-R INTERVAL: 184 MS
Q-T INTERVAL: 372 MS
Q-T INTERVAL: 388 MS
QRS DURATION: 88 MS
QRS DURATION: 92 MS
QTC CALCULATION (BEZET): 460 MS
QTC CALCULATION (BEZET): 477 MS
R AXIS: -15 DEGREES
R AXIS: -16 DEGREES
T AXIS: 45 DEGREES
T AXIS: 47 DEGREES
VENTRICULAR RATE: 91 BPM
VENTRICULAR RATE: 92 BPM

## 2024-01-24 NOTE — ED PROVIDER NOTES
Patient Seen in: [facility] Emergency Department    History     Chief Complaint   Patient presents with    Chest Pain       HPI    53-year-old female with a history of bipolar disorder, diabetes, hypertension who presents to the emergency department with 30 minutes of generalized slight sharp chest pain, nonexertional, not associated with dyspnea or diaphoresis or nausea.  Denies any abdominal pain.  She reports that she started treatment for urinary tract infection yesterday.  Denies any flank pain or fevers    History reviewed.   Past Medical History:   Diagnosis Date    Bipolar 1 disorder (HCC)     Dental caries 10/26/2014    Diabetes (HCC)     MIKE (generalized anxiety disorder)     High blood pressure     History of diverticulitis of colon 10/26/2014    Manipulative behavior 10/26/2014    Neurogenic bladder     Obesity (BMI 30-39.9) 10/26/2014    Paraplegia ([facility])     Sepsis following intra-abdominal surgery ([facility]) 10/26/2014    Serotonin syndrome     Sleep apnea     Suprapubic catheter ([facility])     Transverse myelitis ([facility])        History reviewed.   Past Surgical History:   Procedure Laterality Date    ARTHROSCOPY OF JOINT UNLISTED      knee          CHOLECYSTECTOMY      PART REMOVAL COLON W END COLOSTOMY          TONSILLECTOMY           Medications :  (Not in a hospital admission)       Family History   Adopted: Yes       Smoking Status:   Social History     Socioeconomic History    Marital status: Single   Tobacco Use    Smoking status: Former     Packs/day: 1.00     Years: 5.00     Additional pack years: 0.00     Total pack years: 5.00     Types: Cigarettes    Tobacco comments:     quit    Substance and Sexual Activity    Alcohol use: No    Drug use: No       Constitutional and vital signs reviewed.      Social History and Family History elements reviewed from today, pertinent positives to the presenting problem noted.    Physical Exam     ED Triage Vitals [24 1717]   /84    Pulse 91   Resp 18   Temp 98.2 °F (36.8 °C)   Temp src    SpO2 93 %   O2 Device None (Room air)       All measures to prevent infection transmission during my interaction with the patient were taken. The patient was already wearing a droplet mask on my arrival to the room. Personal protective equipment was worn throughout the duration of the exam.  Handwashing was performed prior to and after the exam.  Stethoscope and any equipment used during my examination was cleaned with super sani-cloth germicidal wipes following the exam.     Physical Exam    General: NAD  Head: Normocephalic and atraumatic.  Mouth/Throat/Ears/Nose: Oropharynx is clear   Eyes: Conjunctivae and EOM are normal.   Neck: Normal range of motion. Supple.   Cardiovascular: Normal rate, regular rhythm, normal heart sounds.  Respiratory/Chest: Clear and equal bilaterally. Exhibits no tenderness.  Gastrointestinal: Soft, non-tender, non-distended. Bowel sounds are normal.   Musculoskeletal:No swelling or deformity.   Neurological: Alert and appropriate.   Skin: Skin is warm and dry. No pallor.  Psychiatric: Has a normal mood       ED Course        Labs Reviewed   COMP METABOLIC PANEL (14) - Abnormal; Notable for the following components:       Result Value    Glucose 203 (*)     Sodium 135 (*)     Globulin  2.7 (*)     All other components within normal limits   POCT GLUCOSE - Abnormal; Notable for the following components:    POC Glucose  209 (*)     All other components within normal limits   CBC W/ DIFFERENTIAL - Abnormal; Notable for the following components:    WBC 11.3 (*)     HGB 11.5 (*)     RDW-SD 51.7 (*)     RDW 15.7 (*)     Lymphocyte Absolute 4.11 (*)     All other components within normal limits   TROPONIN I HIGH SENSITIVITY - Normal   TROPONIN I HIGH SENSITIVITY - Normal   CBC WITH DIFFERENTIAL WITH PLATELET    Narrative:     The following orders were created for panel order CBC With Differential With Platelet.                   Procedure                               Abnormality         Status                                     ---------                               -----------         ------                                     CBC W/ DIFFERENTIAL[[id number]]          Abnormal            Final result                                                 Please view results for these tests on the individual orders.   RAINBOW DRAW LAVENDER   RAINBOW DRAW LIGHT GREEN   RAINBOW DRAW BLUE     EKG    Rate, intervals and axes as noted on EKG Report.  Rate: 91  Rhythm: Sinus Rhythm  Reading: No STEMI.  This is my interpretation.    EKG 7:36 PM interpretation by me: Normal sinus rhythm at rate 92, normal axis, normal intervals, no STEMI.  This is my interpretation.           As Interpreted by me    Imaging Results Available and Reviewed while in ED: XR CHEST AP PORTABLE  (CPT=71045)    Result Date: 1/23/2024  CONCLUSION:  No acute cardiopulmonary process.    Dictated by (CST): Jero Manuel MD on 1/23/2024 at 6:02 PM     Finalized by (CST): Jero Manuel MD on 1/23/2024 at 6:03 PM         ED Medications Administered:   Medications   alum-mag hydroxide-simethicone (Maalox) 200-200-20 MG/5ML oral suspension 30 mL (30 mL Oral Given 1/23/24 1829)   famotidine (Pepcid) 20 mg/2mL injection 20 mg (20 mg Intravenous Given 1/23/24 1829)   acetaminophen (Tylenol) tab 650 mg (650 mg Oral Given 1/23/24 1941)         MDM     Vitals:    01/23/24 1717 01/23/24 1800 01/23/24 1945 01/23/24 2030   BP: 121/84 107/74 (!) 117/96 128/82   Pulse: 91 100 92 85   Resp: 18 17 17 18   Temp: 98.2 °F (36.8 °C)      SpO2: 93% 92% 92% 91%     *I personally reviewed and interpreted all ED vitals.    Pulse Ox: 95%, Room air, Normal     Monitor Interpretation:   normal sinus rhythm as interpreted by me.  The cardiac monitor was ordered given chest pain.      Medical Decision Making      Differential Diagnosis/ Diagnostic Considerations: ACS,  GERD,  gastroenteritis    Complicating Factors: The patient already has does not have any pertinent problems on file. to contribute to the complexity of this ED evaluation.    I reviewed prior chart records including discharge summary from January 6, 2020 for admission.  Patient is here with chest pain without obvious cardiac features.  On review of the lab work, serial troponins were negative and presentation is inconsistent with ACS.  Chest x-ray is unremarkable for acute findings on my interpretation.  Discharged back to home.  Patient is comfortable with the discharge plan.    Disposition and Plan     Clinical Impression:  1. Chest pain of uncertain etiology        Disposition:  Discharge    Follow-up:  Brian Bob MD  [address]  [facility] [address]  [phone]    Schedule an appointment as soon as possible for a visit in 1 day(s)        Medications Prescribed:  Current Discharge Medication List

## 2024-01-28 ENCOUNTER — HOSPITAL ENCOUNTER (EMERGENCY)
Facility: HOSPITAL | Age: 54
Discharge: HOME | End: 2024-01-28
Attending: STUDENT IN AN ORGANIZED HEALTH CARE EDUCATION/TRAINING PROGRAM
Payer: MEDICARE

## 2024-01-28 VITALS
DIASTOLIC BLOOD PRESSURE: 80 MMHG | OXYGEN SATURATION: 94 % | WEIGHT: 211.63 LBS | RESPIRATION RATE: 18 BRPM | BODY MASS INDEX: 41 KG/M2 | HEART RATE: 109 BPM | SYSTOLIC BLOOD PRESSURE: 92 MMHG | TEMPERATURE: 98 F

## 2024-01-28 DIAGNOSIS — R30.0 DYSURIA: Primary | ICD-10-CM

## 2024-01-28 LAB
ANION GAP SERPL CALC-SCNC: 11 MMOL/L (ref 0–18)
BASOPHILS # BLD AUTO: 0.05 X10(3) UL (ref 0–0.2)
BASOPHILS NFR BLD AUTO: 0.6 %
BILIRUB UR QL: NEGATIVE
BUN BLD-MCNC: 13 MG/DL (ref 9–23)
BUN/CREAT SERPL: 15.1 (ref 10–20)
CALCIUM BLD-MCNC: 9.1 MG/DL (ref 8.7–10.4)
CHLORIDE SERPL-SCNC: 98 MMOL/L (ref 98–112)
CLARITY UR: CLEAR
CO2 SERPL-SCNC: 24 MMOL/L (ref 21–32)
CREAT BLD-MCNC: 0.86 MG/DL
DEPRECATED RDW RBC AUTO: 49.7 FL (ref 35.1–46.3)
EGFRCR SERPLBLD CKD-EPI 2021: 81 ML/MIN/1.73M2 (ref 60–?)
EOSINOPHIL # BLD AUTO: 0.26 X10(3) UL (ref 0–0.7)
EOSINOPHIL NFR BLD AUTO: 3.1 %
ERYTHROCYTE [DISTWIDTH] IN BLOOD BY AUTOMATED COUNT: 15.3 % (ref 11–15)
GLUCOSE BLD-MCNC: 236 MG/DL (ref 70–99)
GLUCOSE BLDC GLUCOMTR-MCNC: 247 MG/DL (ref 70–99)
GLUCOSE UR-MCNC: 200 MG/DL
HCT VFR BLD AUTO: 34.8 %
HGB BLD-MCNC: 11.4 G/DL
IMM GRANULOCYTES # BLD AUTO: 0.16 X10(3) UL (ref 0–1)
IMM GRANULOCYTES NFR BLD: 1.9 %
KETONES UR-MCNC: NEGATIVE MG/DL
LEUKOCYTE ESTERASE UR QL STRIP.AUTO: 75
LYMPHOCYTES # BLD AUTO: 2.48 X10(3) UL (ref 1–4)
LYMPHOCYTES NFR BLD AUTO: 29.2 %
MCH RBC QN AUTO: 28.9 PG (ref 26–34)
MCHC RBC AUTO-ENTMCNC: 32.8 G/DL (ref 31–37)
MCV RBC AUTO: 88.1 FL
MONOCYTES # BLD AUTO: 0.36 X10(3) UL (ref 0.1–1)
MONOCYTES NFR BLD AUTO: 4.2 %
NEUTROPHILS # BLD AUTO: 5.19 X10 (3) UL (ref 1.5–7.7)
NEUTROPHILS # BLD AUTO: 5.19 X10(3) UL (ref 1.5–7.7)
NEUTROPHILS NFR BLD AUTO: 61 %
NITRITE UR QL STRIP.AUTO: NEGATIVE
OSMOLALITY SERPL CALC.SUM OF ELEC: 284 MOSM/KG (ref 275–295)
PH UR: 5.5 [PH] (ref 5–8)
PLATELET # BLD AUTO: 198 10(3)UL (ref 150–450)
POTASSIUM SERPL-SCNC: 4.1 MMOL/L (ref 3.5–5.1)
PROT UR-MCNC: NEGATIVE MG/DL
RBC # BLD AUTO: 3.95 X10(6)UL
SODIUM SERPL-SCNC: 133 MMOL/L (ref 136–145)
SP GR UR STRIP: <1.005 (ref 1–1.03)
UROBILINOGEN UR STRIP-ACNC: NORMAL
WBC # BLD AUTO: 8.5 X10(3) UL (ref 4–11)
YEAST UR QL: PRESENT /HPF

## 2024-01-28 PROCEDURE — 82962 GLUCOSE BLOOD TEST: CPT

## 2024-01-28 PROCEDURE — 81001 URINALYSIS AUTO W/SCOPE: CPT | Performed by: STUDENT IN AN ORGANIZED HEALTH CARE EDUCATION/TRAINING PROGRAM

## 2024-01-28 PROCEDURE — 87186 SC STD MICRODIL/AGAR DIL: CPT | Performed by: STUDENT IN AN ORGANIZED HEALTH CARE EDUCATION/TRAINING PROGRAM

## 2024-01-28 PROCEDURE — 87077 CULTURE AEROBIC IDENTIFY: CPT | Performed by: STUDENT IN AN ORGANIZED HEALTH CARE EDUCATION/TRAINING PROGRAM

## 2024-01-28 PROCEDURE — 96374 THER/PROPH/DIAG INJ IV PUSH: CPT

## 2024-01-28 PROCEDURE — 85025 COMPLETE CBC W/AUTO DIFF WBC: CPT | Performed by: STUDENT IN AN ORGANIZED HEALTH CARE EDUCATION/TRAINING PROGRAM

## 2024-01-28 PROCEDURE — 99284 EMERGENCY DEPT VISIT MOD MDM: CPT

## 2024-01-28 PROCEDURE — 87086 URINE CULTURE/COLONY COUNT: CPT | Performed by: STUDENT IN AN ORGANIZED HEALTH CARE EDUCATION/TRAINING PROGRAM

## 2024-01-28 PROCEDURE — 80048 BASIC METABOLIC PNL TOTAL CA: CPT | Performed by: STUDENT IN AN ORGANIZED HEALTH CARE EDUCATION/TRAINING PROGRAM

## 2024-01-28 RX ORDER — MORPHINE SULFATE 4 MG/ML
4 INJECTION, SOLUTION INTRAMUSCULAR; INTRAVENOUS ONCE
Status: COMPLETED | OUTPATIENT
Start: 2024-01-28 | End: 2024-01-28

## 2024-01-28 RX ORDER — HYDROCODONE BITARTRATE AND ACETAMINOPHEN 7.5; 325 MG/1; MG/1
1-2 TABLET ORAL EVERY 6 HOURS PRN
Qty: 10 TABLET | Refills: 0 | Status: SHIPPED | OUTPATIENT
Start: 2024-01-28 | End: 2024-02-02

## 2024-01-28 RX ORDER — MECLIZINE HYDROCHLORIDE 25 MG/1
25 TABLET ORAL 3 TIMES DAILY PRN
Qty: 21 TABLET | Refills: 0 | Status: SHIPPED | OUTPATIENT
Start: 2024-01-28 | End: 2024-02-04

## 2024-01-28 NOTE — DISCHARGE INSTRUCTIONS
Please continue taking the antibiotics that your infectious disease provider prescribed (fosfomycin).  Start taking the pain medications as needed for discomfort and pain.  Return if you develop fevers, vomiting, or new or concerning symptoms as the signs of a worsening medical condition

## 2024-01-28 NOTE — ED INITIAL ASSESSMENT (HPI)
Patient with c/o urethra pain/ flank pain for 4-5 days. Has a suprapubic catheter in place. From [facility]. Patient started fosfomycin on 1/25, patient concerned because she is still in pain and not feeling better.

## 2024-01-28 NOTE — ED PROVIDER NOTES
[place] Emergency Department Note  Patient: Clari Rosa Age: 53 year old Sex: female      MRN: [id number]  : 1970    Patient Seen in: [facility] Emergency Department    History     Chief Complaint   Patient presents with    Eval-G     Stated Complaint: urethra pain    History obtained from: Patient    Patient is a 53-year-old female with a past medical history of neurogenic bladder with chronic suprapubic catheter (last changed 2024) with recurrent Pseudomonas UTIs/colonized, bipolar 1 disorder with generalized anxiety disorder, benzodiazepine dependency, hypertension, diabetes, transverse myelitis with resultant paraplegia presents to the emergency department for evaluation of urethral pain.  She states that she is having burning pain in her urethra stating \"it hurts all the way through to my kidneys\".  States that she is been having pain over the past 4 to 5 days.  She states that she has been having these chronic symptoms over the past several months.  She states that she has a history of \"about 20 admissions for sepsis\".  Patient states that she is currently on day 3 of a course of fosfomycin which was started by her infectious disease doctor after symptoms began.  Denies any change in symptoms.  She denies any fevers.  Denies any abdominal pain, nausea or vomiting.  Patient states that she is only taking Tylenol for her pain.  Denies any other pain medication.  States that she would like to be prescribed pain medication as well as establish care with a pain specialist    Patient also states that she has been having worsening pain of her left foot since a \"nurse bumped into it a week ago\".  Any falls or injury.  States that she wanted to get her foot checked out.        Review of Systems:  Review of Systems  Positive for stated complaint: urethra pain. Constitutional and vital signs reviewed. All other systems reviewed and negative except as noted above.    Patient History:  Past Medical  History:   Diagnosis Date    Bipolar 1 disorder (HCC)     Dental caries 10/26/2014    Diabetes (HCC)     MIKE (generalized anxiety disorder)     High blood pressure     History of diverticulitis of colon 10/26/2014    Manipulative behavior 10/26/2014    Neurogenic bladder     Obesity (BMI 30-39.9) 10/26/2014    Paraplegia (HCC)     Sepsis following intra-abdominal surgery (HCC) 10/26/2014    Serotonin syndrome     Sleep apnea     Suprapubic catheter (HCC)     Transverse myelitis (HCC)        Past Surgical History:   Procedure Laterality Date    ARTHROSCOPY OF JOINT UNLISTED      knee          CHOLECYSTECTOMY      PART REMOVAL COLON W END COLOSTOMY          TONSILLECTOMY          Family History   Adopted: Yes       Specific Social Determinants of Health:   Social History     Socioeconomic History    Marital status: Single   Tobacco Use    Smoking status: Former     Packs/day: 1.00     Years: 5.00     Additional pack years: 0.00     Total pack years: 5.00     Types: Cigarettes    Tobacco comments:     quit    Substance and Sexual Activity    Alcohol use: No    Drug use: No     Social Determinants of Health     Food Insecurity: No Food Insecurity (2024)    Food Insecurity     Food Insecurity: Never true   Transportation Needs: No Transportation Needs (2024)    Transportation Needs     Lack of Transportation: No   Housing Stability: Low Risk  (2024)    Housing Stability     Housing Instability: No           PSFH elements reviewed from today and agreed except as otherwise stated in HPI.    Physical Exam     ED Triage Vitals [24 0839]   /83   Pulse 106   Resp 18   Temp 97.8 °F (36.6 °C)   Temp src Temporal   SpO2 94 %   O2 Device None (Room air)       Current:BP 92/80   Pulse 109   Temp 97.8 °F (36.6 °C) (Temporal)   Resp 18   Wt 96 kg   SpO2 94%   BMI 41.33 kg/m²         Physical Exam  Constitutional:       General: She is not in acute distress.  HENT:      Head:  Normocephalic and atraumatic.      Mouth/Throat:      Mouth: Mucous membranes are moist.   Eyes:      Extraocular Movements: Extraocular movements intact.   Cardiovascular:      Rate and Rhythm: Regular rhythm. Tachycardia present.      Pulses: Normal pulses.      Heart sounds: Normal heart sounds.   Pulmonary:      Effort: Pulmonary effort is normal. No respiratory distress.      Breath sounds: Normal breath sounds.   Abdominal:      Palpations: Abdomen is soft.      Tenderness: There is no abdominal tenderness.      Comments: Suprapubic catheter draining clear yellow urine, no abdominal tenderness   Musculoskeletal:         General: Normal range of motion.      Comments: Left foot with no soft tissue edema, ecchymoses, and external signs of trauma, compartments are soft, DP pulses equal bilaterally, no sensory deficits   Skin:     General: Skin is warm and dry.      Capillary Refill: Capillary refill takes less than 2 seconds.      Findings: No rash.   Neurological:      General: No focal deficit present.      Mental Status: She is alert and oriented to person, place, and time.   Psychiatric:         Mood and Affect: Mood normal.         Behavior: Behavior normal.         ED Course   Labs:   Labs Reviewed   BASIC METABOLIC PANEL (8) - Abnormal; Notable for the following components:       Result Value    Glucose 236 (*)     Sodium 133 (*)     All other components within normal limits   URINALYSIS WITH CULTURE REFLEX - Abnormal; Notable for the following components:    Spec Gravity <1.005 (*)     Glucose Urine 200 (*)     Blood Urine 1+ (*)     Leukocyte Esterase Urine 75 (*)     RBC Urine 6-10 (*)     Bacteria Urine Rare (*)     Squamous Epi. Cells Few (*)     Yeast Urine Present (*)     All other components within normal limits   POCT GLUCOSE - Abnormal; Notable for the following components:    POC Glucose  247 (*)     All other components within normal limits   CBC W/ DIFFERENTIAL - Abnormal; Notable for the  following components:    HGB 11.4 (*)     HCT 34.8 (*)     RDW-SD 49.7 (*)     RDW 15.3 (*)     All other components within normal limits   CBC WITH DIFFERENTIAL WITH PLATELET    Narrative:     The following orders were created for panel order CBC With Differential With Platelet.  Procedure                               Abnormality         Status                     ---------                               -----------         ------                     CBC W/ DIFFERENTIAL[[id number]]          Abnormal            Final result                 Please view results for these tests on the individual orders.   RAINBOW DRAW LAVENDER   RAINBOW DRAW LIGHT GREEN   RAINBOW DRAW BLUE   RAINBOW DRAW GOLD   URINE CULTURE, ROUTINE     Radiology findings:  I personally reviewed the images.   No results found.      Cardiac Monitor: Interpreted by me.   Pulse Readings from Last 1 Encounters:   01/28/24 109   , sinus,     External non-ED records reviewed independently by me: Infectious disease note from 1/10/2024 reviewed showing suspected chronic bacterial colonization.  UTI previously treated with cefepime that was transition to cefdinir.  Per urology note from 1/9/2024, plan for suprapubic catheter changes on a monthly basis.  Urine culture from 1/10/2023 with no growth x 2 days.  Urine culture from 1/6/2024 growing Providencia stuartii susceptible to cefepime, Rocephin, meropenem, Zosyn, Bactrim    MDM   53-year-old female with past medical history of neurogenic bladder with chronic suprapubic catheter, recurrent UTIs and urine colonization, bipolar 1 disorder with generalized anxiety disorder, benzodiazepine dependency, hypertension, diabetes, paraplegia presenting for evaluation of chronic \"urethral pain\" worsening over the past week.  States that she was recently started on a course of fosfomycin for urinary tract infection by her infectious disease provider.  She otherwise denies any abdominal pain, fevers, nausea or  vomiting.    Upon arrival, patient stating that dark-colored gowns make her feel too warm.  Requesting that the lights be turned off to cool the temperature of the room.  Throughout stay in the emergency department, making continuous requests regarding her chronic medical conditions.  Requesting refill of her meclizine that she takes for dizziness.  Requesting prescription for Biofreeze ointment.  Patient's recent discharge summary states that during hospitalization, patient made repeated requests to remain in the hospital stating that her LTAC staff was not as responsive as they are in the hospital    Differential diagnoses considered includes, but is not limited to: UTI, dehydration, electrolyte or metabolic derangement, malingering,    Will obtain the following tests: CBC, BMP, urinalysis  Please see ED course for my independent review of these tests/imaging results.    Initial Medications/Therapeutics administered: Morphine    Chronic conditions affecting care: neurogenic bladder with chronic suprapubic catheter, recurrent UTIs and urine colonization, bipolar 1 disorder with generalized anxiety disorder, benzodiazepine dependency, hypertension, diabetes, paraplegia    Workup and medications considered but not ordered: I considered repeat imaging however patient has had chronic symptoms, no change, recent CT with no evidence of stones.    ED Course as of 01/28/24 1621  ------------------------------------------------------------  Time: 01/28 1018  Comment: Laboratory workup is reassuring.  She has no significant leukocytosis.  Urinalysis with likely contaminated sample versus borderline UTI.  Patient currently recently initiated on fosfomycin by her infectious disease provider.  Several notes note likely colonization.  She has no evidence of sepsis, severe sepsis, or septic shock.  Discussed continuation of fosfomycin.  Analgesic medications provided with improvement of symptoms.  Stable for discharge home at  this time with close ID follow-up and PCP follow-up on an outpatient basis.  Return precautions provided and all questions answered.  Patient expressed understanding and agreement with this plan.            Disposition and Plan     Clinical Impression:  1. Dysuria        Disposition:  Discharge    Follow-up:  Brian Bob MD  [address]  [facility] [address]  [phone]    Schedule an appointment as soon as possible for a visit in 2 day(s)  As needed, If symptoms worsen    Francine Cervantes DO  [address]  [place] [address] [phone]    Schedule an appointment as soon as possible for a visit in 2 day(s)        Medications Prescribed:  Discharge Medication List as of 1/28/2024 10:22 AM        START taking these medications    Details   HYDROcodone-acetaminophen 7.5-325 MG Oral Tab Take 1-2 tablets by mouth every 6 (six) hours as needed for Pain., Normal, Disp-10 tablet, R-0               This note may have been created using voice dictation technology and may include inadvertent errors.      Negar Sanders MD  Emergency Medicine

## 2024-01-29 NOTE — ED QUICK NOTES
Chart accessed by this RN. [facility], spoke with Amalia, asked to faxed over discharge paperwork.

## 2024-01-30 NOTE — PROGRESS NOTES
ED Culture Callback Results Review    Pharmacist reviewed culture results from ED visit .    Final urine culture positive for enterooccus. Patient was instructed to continue previously prescribed  fosfomycin  on discharge. Current therapy is appropriate based on reported susceptibilities. No further intervention required at this time.      Rachel Dale, Jyoti  Emergency Medicine Pharmacist Specialist  01/30/24; 1:54 PM

## 2024-05-10 ENCOUNTER — HOSPITAL ENCOUNTER (EMERGENCY)
Facility: HOSPITAL | Age: 54
Discharge: HOME | End: 2024-05-11
Attending: EMERGENCY MEDICINE

## 2024-05-10 ENCOUNTER — APPOINTMENT (OUTPATIENT)
Dept: CT IMAGING | Facility: HOSPITAL | Age: 54
End: 2024-05-10
Attending: EMERGENCY MEDICINE

## 2024-05-10 ENCOUNTER — APPOINTMENT (OUTPATIENT)
Dept: GENERAL RADIOLOGY | Facility: HOSPITAL | Age: 54
End: 2024-05-10
Attending: EMERGENCY MEDICINE

## 2024-05-10 VITALS
SYSTOLIC BLOOD PRESSURE: 125 MMHG | WEIGHT: 223 LBS | BODY MASS INDEX: 43.78 KG/M2 | OXYGEN SATURATION: 91 % | HEIGHT: 60 IN | HEART RATE: 114 BPM | DIASTOLIC BLOOD PRESSURE: 79 MMHG | RESPIRATION RATE: 20 BRPM | TEMPERATURE: 99 F

## 2024-05-10 DIAGNOSIS — N39.0 URINARY TRACT INFECTION ASSOCIATED WITH CYSTOSTOMY CATHETER, INITIAL ENCOUNTER (HCC): Primary | ICD-10-CM

## 2024-05-10 DIAGNOSIS — T83.510A URINARY TRACT INFECTION ASSOCIATED WITH CYSTOSTOMY CATHETER, INITIAL ENCOUNTER (HCC): Primary | ICD-10-CM

## 2024-05-10 DIAGNOSIS — J06.9 UPPER RESPIRATORY TRACT INFECTION, UNSPECIFIED TYPE: ICD-10-CM

## 2024-05-10 LAB
ALBUMIN SERPL-MCNC: 3.8 G/DL (ref 3.2–4.8)
ALBUMIN/GLOB SERPL: 1.4 {RATIO} (ref 1–2)
ALP LIVER SERPL-CCNC: 74 U/L
ALT SERPL-CCNC: 18 U/L
ANION GAP SERPL CALC-SCNC: 8 MMOL/L (ref 0–18)
AST SERPL-CCNC: 24 U/L (ref ?–34)
BASOPHILS # BLD AUTO: 0.06 X10(3) UL (ref 0–0.2)
BASOPHILS NFR BLD AUTO: 0.6 %
BILIRUB SERPL-MCNC: 0.6 MG/DL (ref 0.3–1.2)
BILIRUB UR QL: NEGATIVE
BUN BLD-MCNC: 10 MG/DL (ref 9–23)
BUN/CREAT SERPL: 11.9 (ref 10–20)
CALCIUM BLD-MCNC: 9.1 MG/DL (ref 8.7–10.4)
CHLORIDE SERPL-SCNC: 98 MMOL/L (ref 98–112)
CO2 SERPL-SCNC: 28 MMOL/L (ref 21–32)
CREAT BLD-MCNC: 0.84 MG/DL
DEPRECATED RDW RBC AUTO: 57.5 FL (ref 35.1–46.3)
EGFRCR SERPLBLD CKD-EPI 2021: 83 ML/MIN/1.73M2 (ref 60–?)
EOSINOPHIL # BLD AUTO: 0.23 X10(3) UL (ref 0–0.7)
EOSINOPHIL NFR BLD AUTO: 2.3 %
ERYTHROCYTE [DISTWIDTH] IN BLOOD BY AUTOMATED COUNT: 17.2 % (ref 11–15)
FLUAV + FLUBV RNA SPEC NAA+PROBE: NEGATIVE
FLUAV + FLUBV RNA SPEC NAA+PROBE: NEGATIVE
GLOBULIN PLAS-MCNC: 2.7 G/DL (ref 2–3.5)
GLUCOSE BLD-MCNC: 132 MG/DL (ref 70–99)
GLUCOSE UR-MCNC: NORMAL MG/DL
HCT VFR BLD AUTO: 32.7 %
HGB BLD-MCNC: 10.2 G/DL
IMM GRANULOCYTES # BLD AUTO: 0.2 X10(3) UL (ref 0–1)
IMM GRANULOCYTES NFR BLD: 2 %
KETONES UR-MCNC: NEGATIVE MG/DL
LEUKOCYTE ESTERASE UR QL STRIP.AUTO: 500
LYMPHOCYTES # BLD AUTO: 2.06 X10(3) UL (ref 1–4)
LYMPHOCYTES NFR BLD AUTO: 21 %
MCH RBC QN AUTO: 29.4 PG (ref 26–34)
MCHC RBC AUTO-ENTMCNC: 31.2 G/DL (ref 31–37)
MCV RBC AUTO: 94.2 FL
MONOCYTES # BLD AUTO: 0.42 X10(3) UL (ref 0.1–1)
MONOCYTES NFR BLD AUTO: 4.3 %
NEUTROPHILS # BLD AUTO: 6.82 X10 (3) UL (ref 1.5–7.7)
NEUTROPHILS # BLD AUTO: 6.82 X10(3) UL (ref 1.5–7.7)
NEUTROPHILS NFR BLD AUTO: 69.8 %
OSMOLALITY SERPL CALC.SUM OF ELEC: 279 MOSM/KG (ref 275–295)
PH UR: 6.5 [PH] (ref 5–8)
PLATELET # BLD AUTO: 227 10(3)UL (ref 150–450)
POTASSIUM SERPL-SCNC: 4.4 MMOL/L (ref 3.5–5.1)
PROCALCITONIN SERPL-MCNC: 0.15 NG/ML (ref ?–0.05)
PROT SERPL-MCNC: 6.5 G/DL (ref 5.7–8.2)
PROT UR-MCNC: 30 MG/DL
RBC # BLD AUTO: 3.47 X10(6)UL
RBC #/AREA URNS AUTO: >10 /HPF
RSV RNA SPEC NAA+PROBE: NEGATIVE
SARS-COV-2 RNA RESP QL NAA+PROBE: NOT DETECTED
SODIUM SERPL-SCNC: 134 MMOL/L (ref 136–145)
SP GR UR STRIP: 1.01 (ref 1–1.03)
UROBILINOGEN UR STRIP-ACNC: 2
WBC # BLD AUTO: 9.8 X10(3) UL (ref 4–11)
WBC #/AREA URNS AUTO: >50 /HPF
WBC CLUMPS UR QL AUTO: PRESENT /HPF

## 2024-05-10 PROCEDURE — 99284 EMERGENCY DEPT VISIT MOD MDM: CPT

## 2024-05-10 PROCEDURE — 87086 URINE CULTURE/COLONY COUNT: CPT | Performed by: EMERGENCY MEDICINE

## 2024-05-10 PROCEDURE — 80053 COMPREHEN METABOLIC PANEL: CPT | Performed by: EMERGENCY MEDICINE

## 2024-05-10 PROCEDURE — 84145 PROCALCITONIN (PCT): CPT | Performed by: EMERGENCY MEDICINE

## 2024-05-10 PROCEDURE — 87077 CULTURE AEROBIC IDENTIFY: CPT | Performed by: EMERGENCY MEDICINE

## 2024-05-10 PROCEDURE — 80053 COMPREHEN METABOLIC PANEL: CPT

## 2024-05-10 PROCEDURE — 85025 COMPLETE CBC W/AUTO DIFF WBC: CPT | Performed by: EMERGENCY MEDICINE

## 2024-05-10 PROCEDURE — 96361 HYDRATE IV INFUSION ADD-ON: CPT

## 2024-05-10 PROCEDURE — 87186 SC STD MICRODIL/AGAR DIL: CPT | Performed by: EMERGENCY MEDICINE

## 2024-05-10 PROCEDURE — 94640 AIRWAY INHALATION TREATMENT: CPT

## 2024-05-10 PROCEDURE — 85025 COMPLETE CBC W/AUTO DIFF WBC: CPT

## 2024-05-10 PROCEDURE — 96374 THER/PROPH/DIAG INJ IV PUSH: CPT

## 2024-05-10 PROCEDURE — 81001 URINALYSIS AUTO W/SCOPE: CPT | Performed by: EMERGENCY MEDICINE

## 2024-05-10 PROCEDURE — 0241U SARS-COV-2/FLU A AND B/RSV BY PCR (GENEXPERT): CPT | Performed by: EMERGENCY MEDICINE

## 2024-05-10 PROCEDURE — 71045 X-RAY EXAM CHEST 1 VIEW: CPT | Performed by: EMERGENCY MEDICINE

## 2024-05-10 RX ORDER — IPRATROPIUM BROMIDE AND ALBUTEROL SULFATE 2.5; .5 MG/3ML; MG/3ML
3 SOLUTION RESPIRATORY (INHALATION) ONCE
Status: COMPLETED | OUTPATIENT
Start: 2024-05-10 | End: 2024-05-10

## 2024-05-10 RX ORDER — CEFDINIR 300 MG/1
300 CAPSULE ORAL 2 TIMES DAILY
Qty: 14 CAPSULE | Refills: 0 | Status: SHIPPED | OUTPATIENT
Start: 2024-05-10 | End: 2024-05-17

## 2024-05-10 RX ORDER — MORPHINE SULFATE 4 MG/ML
4 INJECTION, SOLUTION INTRAMUSCULAR; INTRAVENOUS ONCE
Status: COMPLETED | OUTPATIENT
Start: 2024-05-10 | End: 2024-05-10

## 2024-05-11 NOTE — ED PROVIDER NOTES
Patient Seen in: [facility] Emergency Department      History     Chief Complaint   Patient presents with    Urinary Symptoms     Chronic issue     Stated Complaint:     Subjective:   HPI    53-year-old female presents for evaluation for pain at her urethra concerning for UTI as well as cough, shortness of breath, chest pain.  She states she has been septic \"20 times.\" She also reports a headache and sinus pressure.  She reports that she has been at 88% on room air at the long-term care facility.  They placed her on oxygen.  She denies any fevers.  She has had some nausea and vomiting.  She denies any diarrhea.    Objective:   Past Medical History:    Bipolar 1 disorder ([facility])    Dental caries    Diabetes ([facility])    MIKE (generalized anxiety disorder)    High blood pressure    History of diverticulitis of colon    Manipulative behavior    Neurogenic bladder    Obesity (BMI 30-39.9)    Paraplegia ([facility])    Sepsis following intra-abdominal surgery ([facility])    Serotonin syndrome    Sleep apnea    Suprapubic catheter ([facility])    Transverse myelitis ([facility])              Past Surgical History:   Procedure Laterality Date    Arthroscopy of joint unlisted      knee          Cholecystectomy      Part removal colon w end colostomy      2013    Tonsillectomy                  Social History     Socioeconomic History    Marital status: Single   Tobacco Use    Smoking status: Former     Current packs/day: 1.00     Average packs/day: 1 pack/day for 5.0 years (5.0 ttl pk-yrs)     Types: Cigarettes    Tobacco comments:     quit 2006   Substance and Sexual Activity    Alcohol use: No    Drug use: No     Social Determinants of Health     Food Insecurity: No Food Insecurity (2024)    Food Insecurity     Food Insecurity: Never true   Transportation Needs: No Transportation Needs (2024)    Transportation Needs     Lack of Transportation: No   Housing Stability: Low Risk  (2024)    Housing Stability     Housing  Instability: No              Review of Systems    Positive for stated complaint:   Other systems are as noted in HPI.  Constitutional and vital signs reviewed.      All other systems reviewed and negative except as noted above.    Physical Exam     ED Triage Vitals   BP 05/10/24 2054 125/79   Pulse 05/10/24 2054 114   Resp 05/10/24 2054 20   Temp 05/10/24 2052 98.6 °F (37 °C)   Temp src 05/10/24 2052 Oral   SpO2 05/10/24 2054 91 %   O2 Device 05/10/24 2054 Nasal cannula       Current Vitals:   Vital Signs  BP: 125/79  Pulse: 114  Resp: 20  Temp: 98.6 °F (37 °C)  Temp src: Oral    Oxygen Therapy  SpO2: 91 %  O2 Device: Nasal cannula  O2 Flow Rate (L/min): 2 L/min            Physical Exam  Vitals and nursing note reviewed.   Constitutional:       Appearance: Normal appearance.   HENT:      Head: Normocephalic and atraumatic.   Cardiovascular:      Rate and Rhythm: Regular rhythm. Tachycardia present.      Pulses: Normal pulses.           Radial pulses are 2+ on the right side and 2+ on the left side.      Heart sounds: Normal heart sounds.   Pulmonary:      Effort: Pulmonary effort is normal.      Breath sounds: Wheezing present.   Abdominal:      General: Bowel sounds are normal.      Palpations: Abdomen is soft.      Tenderness: There is no abdominal tenderness. There is no right CVA tenderness, left CVA tenderness, guarding or rebound.   Musculoskeletal:         General: Normal range of motion.      Cervical back: Normal range of motion.      Right lower leg: No edema.      Left lower leg: No edema.   Skin:     General: Skin is warm and dry.   Neurological:      General: No focal deficit present.      Mental Status: She is alert.               ED Course     Labs Reviewed   COMP METABOLIC PANEL (14) - Abnormal; Notable for the following components:       Result Value    Glucose 132 (*)     Sodium 134 (*)     All other components within normal limits   URINALYSIS WITH CULTURE REFLEX - Abnormal; Notable for the  following components:    Clarity Urine Turbid (*)     Blood Urine 3+ (*)     Protein Urine 30 (*)     Urobilinogen Urine 2 (*)     Nitrite Urine 1+ (*)     Leukocyte Esterase Urine 500 (*)     WBC Urine >50 (*)     RBC Urine >10 (*)     Bacteria Urine 1+ (*)     WBC Clump Present (*)     All other components within normal limits   PROCALCITONIN - Abnormal; Notable for the following components:    Procalcitonin 0.15 (*)     All other components within normal limits   CBC W/ DIFFERENTIAL - Abnormal; Notable for the following components:    RBC 3.47 (*)     HGB 10.2 (*)     HCT 32.7 (*)     RDW-SD 57.5 (*)     RDW 17.2 (*)     All other components within normal limits   SARS-COV-2/FLU A AND B/RSV BY PCR (GENEXPERT) - Normal    Narrative:     This test is intended for the qualitative detection and differentiation of SARS-CoV-2, influenza A, influenza B, and respiratory syncytial virus (RSV) viral RNA in nasopharyngeal or nares swabs from individuals suspected of respiratory viral infection consistent with COVID-19 by their healthcare provider. Signs and symptoms of respiratory viral infection due to SARS-CoV-2, influenza, and RSV can be similar.    Test performed using the Xpert Xpress SARS-CoV-2/FLU/RSV (real time RT-PCR)  assay on the [employer] instrument, [employer], [place] [address].   This test is being used under the Food and Drug Administration's Emergency Use Authorization.    The authorized Fact Sheet for Healthcare Providers for this assay is available upon request from the laboratory.   CBC WITH DIFFERENTIAL WITH PLATELET    Narrative:     The following orders were created for panel order CBC With Differential With Platelet.  Procedure                               Abnormality         Status                     ---------                               -----------         ------                     CBC W/ DIFFERENTIAL[[id number]]          Abnormal            Final result                 Please view results for  these tests on the individual orders.   URINE CULTURE, ROUTINE                      MDM                                         Medical Decision Making  Differential diagnosis includes but is not limited to urinary tract infection, pneumonia, viral upper respiratory infection, sinusitis, etc.    CBC and chemistry were unremarkable.  Patient was mildly tachycardic on arrival, she was given some IV fluids.  She was also given a breathing treatment as she was having some wheezing.  Urinalysis is concerning for UTI, there been several admissions for the same and ID suspects patient is likely colonized.  Past cultures reviewed.  Given that patient is nontoxic-appearing she will be started on some cefdinir.  Patient did refuse a CT chest PE, chart review reveals that she is properly anticoagulated and further imaging would not change medication management at this time.  CT chest discontinued.  Patient will be discharged back to the nursing home with prescription for cefdinir.  At this time she does not meet any criteria for severe sepsis or septic shock.      Problems Addressed:  Upper respiratory tract infection, unspecified type: acute illness or injury with systemic symptoms  Urinary tract infection associated with cystostomy catheter, initial encounter (HCC): acute illness or injury with systemic symptoms    Amount and/or Complexity of Data Reviewed  External Data Reviewed: notes.     Details: Infectious disease note from 1/10/2024 reviewed showing suspected chronic bacterial colonization.  UTI previously treated with cefepime that was transition to cefdinir.  Per urology note from 1/9/2024, plan for suprapubic catheter changes on a monthly basis.  Urine culture from 1/10/2023 with no growth x 2 days.  Urine culture from 1/6/2024 growing Providencia stuartii susceptible to cefepime, Rocephin, meropenem, Zosyn, Bactrim  Labs: ordered. Decision-making details documented in ED Course.  Radiology: ordered and independent  interpretation performed. Decision-making details documented in ED Course.    Risk  Prescription drug management.  Parenteral controlled substances.  Decision regarding hospitalization.  Risk Details: IV morphine        Disposition and Plan     Clinical Impression:  1. Urinary tract infection associated with cystostomy catheter, initial encounter ([facility])    2. Upper respiratory tract infection, unspecified type         Disposition:  Discharge  5/10/2024 11:24 pm    Follow-up:  Brian Bob MD  [address]  [facility] [address]  [phone]    Schedule an appointment as soon as possible for a visit  For follow up and re-evaluation    We recommend that you schedule follow up care with a primary care provider within the next three months to obtain basic health screening including reassessment of your blood pressure.      Medications Prescribed:  Current Discharge Medication List        START taking these medications    Details   cefdinir 300 MG Oral Cap Take 1 capsule (300 mg total) by mouth 2 (two) times daily for 7 days.  Qty: 14 capsule, Refills: 0

## 2024-05-11 NOTE — ED INITIAL ASSESSMENT (HPI)
Pt to ED from  [facility] via EMS for c/o chronic UTI symptoms. Pt hypoxic for EMS placed on 2L NC. Pt reports cough and SOB x2days. No home O2

## 2024-05-12 NOTE — PROGRESS NOTES
ED Culture Callback Results Review    Pharmacist reviewed culture results from ED visit .    Final urine culture positive for serratia. Patient was prescribed Cefdinir on discharge. Current therapy is appropriate based on reported susceptibilities as discussed with Dr. Orozco. No further intervention required at this time.      Rachel Dale, Jyoti  Emergency Medicine Pharmacist Specialist  05/12/24; 5:08 PM   UA, CHEST XRAY, AND ST HEAD DONE YESTERDAY. PT HAS SINCE BEEN STRAIGHT CATHED
AND STARTED ON FLOMAX RELATED TO SOME RETENTION. PSYC INDICATED THAT PT ISN'T
APPROPRIATE FOR ADMISSION TO [facility] RIGHT NOW. HOPING THAT PT WILL BE ABLE TO
PARTICIPATE MORE WITH THERAPY. BOP FOLLOWING. CM TO FOLLOW AS INDICATED WITH
DC PLANNING.

## 2024-07-09 ENCOUNTER — HOSPITAL ENCOUNTER (INPATIENT)
Facility: HOSPITAL | Age: 54
LOS: 4 days | Discharge: NURSING, REHAB OR LONG-TERM CARE | End: 2024-07-13
Attending: EMERGENCY MEDICINE | Admitting: HOSPITALIST
Payer: MEDICARE

## 2024-07-09 ENCOUNTER — APPOINTMENT (OUTPATIENT)
Dept: GENERAL RADIOLOGY | Facility: HOSPITAL | Age: 54
End: 2024-07-09
Attending: EMERGENCY MEDICINE
Payer: MEDICARE

## 2024-07-09 DIAGNOSIS — F33.2 SEVERE EPISODE OF RECURRENT MAJOR DEPRESSIVE DISORDER, WITHOUT PSYCHOTIC FEATURES (HCC): ICD-10-CM

## 2024-07-09 DIAGNOSIS — E87.20 LACTIC ACID ACIDOSIS: ICD-10-CM

## 2024-07-09 DIAGNOSIS — N39.0 SEPSIS DUE TO URINARY TRACT INFECTION (HCC): Primary | ICD-10-CM

## 2024-07-09 DIAGNOSIS — K59.00 CONSTIPATION, UNSPECIFIED CONSTIPATION TYPE: ICD-10-CM

## 2024-07-09 DIAGNOSIS — F41.1 GENERALIZED ANXIETY DISORDER: ICD-10-CM

## 2024-07-09 DIAGNOSIS — A41.9 SEPSIS DUE TO URINARY TRACT INFECTION (HCC): Primary | ICD-10-CM

## 2024-07-09 DIAGNOSIS — F31.30 BIPOLAR I DISORDER DEPRESSED WITH ATYPICAL FEATURES (HCC): ICD-10-CM

## 2024-07-09 PROBLEM — F31.32 MODERATE DEPRESSED BIPOLAR I DISORDER (HCC): Status: ACTIVE | Noted: 2024-07-09

## 2024-07-09 LAB
ANION GAP SERPL CALC-SCNC: 7 MMOL/L (ref 0–18)
ATRIAL RATE: 108 BPM
BASOPHILS # BLD AUTO: 0.08 X10(3) UL (ref 0–0.2)
BASOPHILS NFR BLD AUTO: 0.7 %
BILIRUB UR QL: NEGATIVE
BUN BLD-MCNC: 14 MG/DL (ref 9–23)
BUN/CREAT SERPL: 15.7 (ref 10–20)
CALCIUM BLD-MCNC: 9.6 MG/DL (ref 8.7–10.4)
CHLORIDE SERPL-SCNC: 98 MMOL/L (ref 98–112)
CO2 SERPL-SCNC: 30 MMOL/L (ref 21–32)
CREAT BLD-MCNC: 0.89 MG/DL
DEPRECATED RDW RBC AUTO: 55.8 FL (ref 35.1–46.3)
EGFRCR SERPLBLD CKD-EPI 2021: 77 ML/MIN/1.73M2 (ref 60–?)
EOSINOPHIL # BLD AUTO: 0.28 X10(3) UL (ref 0–0.7)
EOSINOPHIL NFR BLD AUTO: 2.3 %
ERYTHROCYTE [DISTWIDTH] IN BLOOD BY AUTOMATED COUNT: 16.3 % (ref 11–15)
GLUCOSE BLD-MCNC: 134 MG/DL (ref 70–99)
GLUCOSE BLDC GLUCOMTR-MCNC: 142 MG/DL (ref 70–99)
GLUCOSE BLDC GLUCOMTR-MCNC: 181 MG/DL (ref 70–99)
GLUCOSE BLDC GLUCOMTR-MCNC: 209 MG/DL (ref 70–99)
GLUCOSE BLDC GLUCOMTR-MCNC: 226 MG/DL (ref 70–99)
GLUCOSE BLDC GLUCOMTR-MCNC: 302 MG/DL (ref 70–99)
GLUCOSE UR-MCNC: NORMAL MG/DL
HCT VFR BLD AUTO: 34.6 %
HGB BLD-MCNC: 11 G/DL
IMM GRANULOCYTES # BLD AUTO: 0.17 X10(3) UL (ref 0–1)
IMM GRANULOCYTES NFR BLD: 1.4 %
KETONES UR-MCNC: NEGATIVE MG/DL
LACTATE SERPL-SCNC: 2.3 MMOL/L (ref 0.5–2)
LACTATE SERPL-SCNC: 2.4 MMOL/L (ref 0.5–2)
LACTATE SERPL-SCNC: 2.7 MMOL/L (ref 0.5–2)
LACTATE SERPL-SCNC: 3.4 MMOL/L (ref 0.5–2)
LACTATE SERPL-SCNC: 3.5 MMOL/L (ref 0.5–2)
LACTATE SERPL-SCNC: 3.9 MMOL/L (ref 0.5–2)
LEUKOCYTE ESTERASE UR QL STRIP.AUTO: 500
LYMPHOCYTES # BLD AUTO: 4.28 X10(3) UL (ref 1–4)
LYMPHOCYTES NFR BLD AUTO: 35.4 %
MCH RBC QN AUTO: 29.9 PG (ref 26–34)
MCHC RBC AUTO-ENTMCNC: 31.8 G/DL (ref 31–37)
MCV RBC AUTO: 94 FL
MONOCYTES # BLD AUTO: 0.51 X10(3) UL (ref 0.1–1)
MONOCYTES NFR BLD AUTO: 4.2 %
MRSA DNA SPEC QL NAA+PROBE: NEGATIVE
NEUTROPHILS # BLD AUTO: 6.78 X10 (3) UL (ref 1.5–7.7)
NEUTROPHILS # BLD AUTO: 6.78 X10(3) UL (ref 1.5–7.7)
NEUTROPHILS NFR BLD AUTO: 56 %
NITRITE UR QL STRIP.AUTO: NEGATIVE
OSMOLALITY SERPL CALC.SUM OF ELEC: 282 MOSM/KG (ref 275–295)
P AXIS: 50 DEGREES
P-R INTERVAL: 174 MS
PH UR: 6 [PH] (ref 5–8)
PLATELET # BLD AUTO: 248 10(3)UL (ref 150–450)
POTASSIUM SERPL-SCNC: 4.2 MMOL/L (ref 3.5–5.1)
PROT UR-MCNC: NEGATIVE MG/DL
Q-T INTERVAL: 348 MS
QRS DURATION: 94 MS
QTC CALCULATION (BEZET): 466 MS
R AXIS: -11 DEGREES
RBC # BLD AUTO: 3.68 X10(6)UL
RBC #/AREA URNS AUTO: >10 /HPF
SODIUM SERPL-SCNC: 135 MMOL/L (ref 136–145)
SP GR UR STRIP: <1.005 (ref 1–1.03)
T AXIS: 61 DEGREES
TROPONIN I SERPL HS-MCNC: <3 NG/L
UROBILINOGEN UR STRIP-ACNC: NORMAL
VENTRICULAR RATE: 108 BPM
WBC # BLD AUTO: 12.1 X10(3) UL (ref 4–11)
WBC #/AREA URNS AUTO: >50 /HPF
WBC CLUMPS UR QL AUTO: PRESENT /HPF

## 2024-07-09 PROCEDURE — 74018 RADEX ABDOMEN 1 VIEW: CPT | Performed by: EMERGENCY MEDICINE

## 2024-07-09 PROCEDURE — 90792 PSYCH DIAG EVAL W/MED SRVCS: CPT | Performed by: OTHER

## 2024-07-09 PROCEDURE — 99223 1ST HOSP IP/OBS HIGH 75: CPT | Performed by: HOSPITALIST

## 2024-07-09 RX ORDER — OXYBUTYNIN CHLORIDE 5 MG/1
5 TABLET, EXTENDED RELEASE ORAL DAILY
Status: DISCONTINUED | OUTPATIENT
Start: 2024-07-09 | End: 2024-07-13

## 2024-07-09 RX ORDER — TRAZODONE HYDROCHLORIDE 50 MG/1
50 TABLET ORAL NIGHTLY PRN
Status: DISCONTINUED | OUTPATIENT
Start: 2024-07-09 | End: 2024-07-13

## 2024-07-09 RX ORDER — MELATONIN
3 NIGHTLY
Status: DISCONTINUED | OUTPATIENT
Start: 2024-07-09 | End: 2024-07-13

## 2024-07-09 RX ORDER — BUPROPION HYDROCHLORIDE 300 MG/1
300 TABLET ORAL DAILY
Status: DISCONTINUED | OUTPATIENT
Start: 2024-07-09 | End: 2024-07-10

## 2024-07-09 RX ORDER — GABAPENTIN 300 MG/1
300 CAPSULE ORAL 3 TIMES DAILY
Status: DISCONTINUED | OUTPATIENT
Start: 2024-07-09 | End: 2024-07-13

## 2024-07-09 RX ORDER — PREDNISONE 50 MG/1
50 TABLET ORAL EVERY 6 HOURS
Status: COMPLETED | OUTPATIENT
Start: 2024-07-09 | End: 2024-07-10

## 2024-07-09 RX ORDER — DOCUSATE SODIUM 100 MG/1
100 CAPSULE, LIQUID FILLED ORAL 2 TIMES DAILY
Status: DISCONTINUED | OUTPATIENT
Start: 2024-07-09 | End: 2024-07-13

## 2024-07-09 RX ORDER — MONTELUKAST SODIUM 10 MG/1
10 TABLET ORAL DAILY
Status: DISCONTINUED | OUTPATIENT
Start: 2024-07-09 | End: 2024-07-13

## 2024-07-09 RX ORDER — INSULIN DEGLUDEC 100 U/ML
65 INJECTION, SOLUTION SUBCUTANEOUS DAILY
Status: DISCONTINUED | OUTPATIENT
Start: 2024-07-10 | End: 2024-07-10

## 2024-07-09 RX ORDER — HYDROCODONE BITARTRATE AND ACETAMINOPHEN 10; 325 MG/1; MG/1
1 TABLET ORAL ONCE
Status: COMPLETED | OUTPATIENT
Start: 2024-07-10 | End: 2024-07-10

## 2024-07-09 RX ORDER — POLYETHYLENE GLYCOL 3350 17 G/17G
17 POWDER, FOR SOLUTION ORAL 2 TIMES DAILY
Status: DISCONTINUED | OUTPATIENT
Start: 2024-07-09 | End: 2024-07-13

## 2024-07-09 RX ORDER — POLYETHYLENE GLYCOL 3350 17 G/17G
17 POWDER, FOR SOLUTION ORAL DAILY PRN
Status: DISCONTINUED | OUTPATIENT
Start: 2024-07-09 | End: 2024-07-13

## 2024-07-09 RX ORDER — ACETAMINOPHEN 500 MG
500 TABLET ORAL EVERY 4 HOURS PRN
Status: DISCONTINUED | OUTPATIENT
Start: 2024-07-09 | End: 2024-07-09

## 2024-07-09 RX ORDER — BISACODYL 10 MG
10 SUPPOSITORY, RECTAL RECTAL
Status: DISCONTINUED | OUTPATIENT
Start: 2024-07-09 | End: 2024-07-13

## 2024-07-09 RX ORDER — ONDANSETRON 2 MG/ML
4 INJECTION INTRAMUSCULAR; INTRAVENOUS EVERY 6 HOURS PRN
Status: DISCONTINUED | OUTPATIENT
Start: 2024-07-09 | End: 2024-07-13

## 2024-07-09 RX ORDER — NICOTINE POLACRILEX 4 MG
15 LOZENGE BUCCAL
Status: DISCONTINUED | OUTPATIENT
Start: 2024-07-09 | End: 2024-07-13

## 2024-07-09 RX ORDER — DIVALPROEX SODIUM 500 MG/1
500 TABLET, EXTENDED RELEASE ORAL 2 TIMES DAILY
Status: DISCONTINUED | OUTPATIENT
Start: 2024-07-09 | End: 2024-07-13

## 2024-07-09 RX ORDER — QUETIAPINE FUMARATE 25 MG/1
50 TABLET, FILM COATED ORAL 2 TIMES DAILY
Status: DISCONTINUED | OUTPATIENT
Start: 2024-07-09 | End: 2024-07-09

## 2024-07-09 RX ORDER — CYCLOBENZAPRINE HCL 10 MG
10 TABLET ORAL 3 TIMES DAILY PRN
Status: DISCONTINUED | OUTPATIENT
Start: 2024-07-09 | End: 2024-07-13

## 2024-07-09 RX ORDER — MECLIZINE HCL 12.5 MG/1
12.5 TABLET ORAL 3 TIMES DAILY PRN
COMMUNITY

## 2024-07-09 RX ORDER — METRONIDAZOLE 500 MG/100ML
500 INJECTION, SOLUTION INTRAVENOUS ONCE
Status: COMPLETED | OUTPATIENT
Start: 2024-07-09 | End: 2024-07-09

## 2024-07-09 RX ORDER — INSULIN DEGLUDEC 100 U/ML
15 INJECTION, SOLUTION SUBCUTANEOUS DAILY
Status: DISCONTINUED | OUTPATIENT
Start: 2024-07-09 | End: 2024-07-09

## 2024-07-09 RX ORDER — INSULIN DEGLUDEC 100 U/ML
50 INJECTION, SOLUTION SUBCUTANEOUS NIGHTLY
Status: DISCONTINUED | OUTPATIENT
Start: 2024-07-09 | End: 2024-07-09

## 2024-07-09 RX ORDER — ACETAMINOPHEN 325 MG/1
650 TABLET ORAL EVERY 6 HOURS PRN
Status: DISCONTINUED | OUTPATIENT
Start: 2024-07-09 | End: 2024-07-13

## 2024-07-09 RX ORDER — SODIUM CHLORIDE 9 MG/ML
INJECTION, SOLUTION INTRAVENOUS CONTINUOUS
Status: DISCONTINUED | OUTPATIENT
Start: 2024-07-09 | End: 2024-07-10

## 2024-07-09 RX ORDER — NICOTINE POLACRILEX 4 MG
30 LOZENGE BUCCAL
Status: DISCONTINUED | OUTPATIENT
Start: 2024-07-09 | End: 2024-07-13

## 2024-07-09 RX ORDER — ENEMA 19; 7 G/133ML; G/133ML
1 ENEMA RECTAL ONCE AS NEEDED
Status: DISCONTINUED | OUTPATIENT
Start: 2024-07-09 | End: 2024-07-13

## 2024-07-09 RX ORDER — QUETIAPINE FUMARATE 50 MG/1
50 TABLET, FILM COATED ORAL 2 TIMES DAILY
COMMUNITY
End: 2024-07-13

## 2024-07-09 RX ORDER — DEXTROSE MONOHYDRATE 25 G/50ML
50 INJECTION, SOLUTION INTRAVENOUS
Status: DISCONTINUED | OUTPATIENT
Start: 2024-07-09 | End: 2024-07-13

## 2024-07-09 RX ORDER — BETHANECHOL CHLORIDE 5 MG
10 TABLET ORAL 3 TIMES DAILY
Status: DISCONTINUED | OUTPATIENT
Start: 2024-07-09 | End: 2024-07-13

## 2024-07-09 RX ORDER — ARIPIPRAZOLE 2 MG/1
2 TABLET ORAL DAILY
Status: DISCONTINUED | OUTPATIENT
Start: 2024-07-09 | End: 2024-07-11

## 2024-07-09 RX ORDER — PROCHLORPERAZINE EDISYLATE 5 MG/ML
5 INJECTION INTRAMUSCULAR; INTRAVENOUS EVERY 8 HOURS PRN
Status: DISCONTINUED | OUTPATIENT
Start: 2024-07-09 | End: 2024-07-13

## 2024-07-09 RX ORDER — SENNOSIDES 8.6 MG
17.2 TABLET ORAL NIGHTLY PRN
Status: DISCONTINUED | OUTPATIENT
Start: 2024-07-09 | End: 2024-07-13

## 2024-07-09 RX ORDER — DIPHENHYDRAMINE HCL 25 MG
50 CAPSULE ORAL ONCE
Status: COMPLETED | OUTPATIENT
Start: 2024-07-10 | End: 2024-07-10

## 2024-07-09 RX ORDER — CLONAZEPAM 0.5 MG/1
0.5 TABLET ORAL 2 TIMES DAILY
Status: DISCONTINUED | OUTPATIENT
Start: 2024-07-09 | End: 2024-07-13

## 2024-07-09 RX ORDER — CLONAZEPAM 0.5 MG/1
0.5 TABLET ORAL ONCE
Status: COMPLETED | OUTPATIENT
Start: 2024-07-09 | End: 2024-07-09

## 2024-07-09 RX ORDER — QUETIAPINE FUMARATE 100 MG/1
200 TABLET, FILM COATED ORAL 2 TIMES DAILY
Status: DISCONTINUED | OUTPATIENT
Start: 2024-07-09 | End: 2024-07-13

## 2024-07-09 RX ORDER — ENOXAPARIN SODIUM 100 MG/ML
0.5 INJECTION SUBCUTANEOUS 2 TIMES DAILY
Status: DISCONTINUED | OUTPATIENT
Start: 2024-07-09 | End: 2024-07-09

## 2024-07-09 RX ORDER — ATORVASTATIN CALCIUM 40 MG/1
40 TABLET, FILM COATED ORAL NIGHTLY
Status: DISCONTINUED | OUTPATIENT
Start: 2024-07-09 | End: 2024-07-13

## 2024-07-09 RX ORDER — MECLIZINE HCL 12.5 MG/1
25 TABLET ORAL 3 TIMES DAILY PRN
Status: DISCONTINUED | OUTPATIENT
Start: 2024-07-09 | End: 2024-07-13

## 2024-07-09 RX ORDER — DULOXETIN HYDROCHLORIDE 60 MG/1
60 CAPSULE, DELAYED RELEASE ORAL DAILY
Status: DISCONTINUED | OUTPATIENT
Start: 2024-07-09 | End: 2024-07-10

## 2024-07-09 NOTE — SPIRITUAL CARE NOTE
Spiritual Care Visit Note    Patient Name: Clari Rosa Date of Spiritual Care Visit: 24   : 1970 Primary Dx: Sepsis due to urinary tract infection (HCC)       Referred By: Referral From: Patient    Spiritual Care Taxonomy:    Intended Effects: Aligning care plan with patient's values    Methods: Offer spiritual/Yazdanism support    Interventions: Ask guided questions;Active listening    Visit Type/Summary:     Patient requested Bahai literature and I provided a Bible, a Book of Psalms, and a book she may or may not decide to keep.  She shared about her clinton and the meaning she experiences when she connects with her clinton.  She stated no further needs at this time.   remains available.    Chaplain Plascencia [phone]

## 2024-07-09 NOTE — ED PROVIDER NOTES
Patient Seen in: [facility] Emergency Department      History   No chief complaint on file.    Stated Complaint: Dizziness    Subjective:   HPI    Pt is 54 yo F who arrives by EMS from NH who p/w multiple complaints. Constipated for 7 days despite laxatives. Thinks she ha UTI and dental infection. Getting antibiotic every other Monday. No fevers. Feels dizzy in her bed. No chest pain. No vomiting.     Objective:   Past Medical History:    Bipolar 1 disorder (HCC)    Dental caries    Diabetes (HCC)    IMKE (generalized anxiety disorder)    High blood pressure    History of diverticulitis of colon    Manipulative behavior    Neurogenic bladder    Obesity (BMI 30-39.9)    Paraplegia ([facility])    Sepsis following intra-abdominal surgery ([facility])    Serotonin syndrome    Sleep apnea    Suprapubic catheter ([facility])    Transverse myelitis ([facility])              Past Surgical History:   Procedure Laterality Date    Arthroscopy of joint unlisted      knee          Cholecystectomy      Part removal colon w end colostomy      2013    Tonsillectomy                  Social History     Socioeconomic History    Marital status: Single   Tobacco Use    Smoking status: Former     Current packs/day: 1.00     Average packs/day: 1 pack/day for 5.0 years (5.0 ttl pk-yrs)     Types: Cigarettes    Tobacco comments:     quit    Substance and Sexual Activity    Alcohol use: No    Drug use: No     Social Determinants of Health     Food Insecurity: No Food Insecurity (2024)    Food Insecurity     Food Insecurity: Never true   Transportation Needs: No Transportation Needs (2024)    Transportation Needs     Lack of Transportation: No   Housing Stability: Low Risk  (2024)    Housing Stability     Housing Instability: No              Review of Systems    Positive for stated Chief Complaint: No chief complaint on file.    Other systems are as noted in HPI.  Constitutional and vital signs reviewed.      All other systems reviewed  and negative except as noted above.    Physical Exam     ED Triage Vitals [07/09/24 0048]   BP (!) 122/95   Pulse 113   Resp 20   Temp 97.8 °F (36.6 °C)   Temp src Temporal   SpO2 93 %   O2 Device None (Room air)       Current Vitals:   Vital Signs  BP: 115/83  Pulse: 105  Resp: 11  Temp: 97.8 °F (36.6 °C)  Temp src: Temporal  MAP (mmHg): 94    Oxygen Therapy  SpO2: 94 %  O2 Device: Nasal cannula  O2 Flow Rate (L/min): 2 L/min            Physical Exam  GENERAL: No acute distress, awake and alert  HEENT: EOMI, PERRL, edentulous, no gingival swelling  Neck: supple  CV: RRR, no murmurs  Resp: CTAB, no wheezes or retractions  Ab: soft, +distension. +small amount of bleeding around suprapubic catheter site, bs hypoactive  Neuro: CN intact, normal speech  SKIN: warm, dry, no rashes      ED Course     Labs Reviewed   BASIC METABOLIC PANEL (8) - Abnormal; Notable for the following components:       Result Value    Glucose 134 (*)     Sodium 135 (*)     All other components within normal limits   LACTIC ACID, PLASMA - Abnormal; Notable for the following components:    Lactic Acid 3.9 (*)     All other components within normal limits   URINALYSIS WITH CULTURE REFLEX - Abnormal; Notable for the following components:    Clarity Urine Turbid (*)     Spec Gravity <1.005 (*)     Blood Urine 1+ (*)     Leukocyte Esterase Urine 500 (*)     WBC Urine >50 (*)     RBC Urine >10 (*)     Bacteria Urine 2+ (*)     WBC Clump Present (*)     All other components within normal limits   POCT GLUCOSE - Abnormal; Notable for the following components:    POC Glucose  142 (*)     All other components within normal limits   CBC W/ DIFFERENTIAL - Abnormal; Notable for the following components:    WBC 12.1 (*)     RBC 3.68 (*)     HGB 11.0 (*)     HCT 34.6 (*)     RDW-SD 55.8 (*)     RDW 16.3 (*)     Lymphocyte Absolute 4.28 (*)     All other components within normal limits   TROPONIN I HIGH SENSITIVITY - Normal   CBC WITH DIFFERENTIAL WITH PLATELET     Narrative:     The following orders were created for panel order CBC With Differential With Platelet.  Procedure                               Abnormality         Status                     ---------                               -----------         ------                     CBC W/ DIFFERENTIAL[[id number]]          Abnormal            Final result                 Please view results for these tests on the individual orders.   LACTIC ACID REFLEX POST POSTIVE   RAINBOW DRAW LAVENDER   RAINBOW DRAW LIGHT GREEN   RAINBOW DRAW BLUE   RAINBOW DRAW GOLD   URINE CULTURE, ROUTINE   BLOOD CULTURE   BLOOD CULTURE   ED/MRSA SCREEN BY PCR-CC   BLOOD CULTURE   BLOOD CULTURE        MDM     Admission disposition: 7/9/2024  3:27 AM                                     [facility]  part of [facility]      Sepsis Reassessment Note    /83   Pulse 105   Temp 97.8 °F (36.6 °C) (Temporal)   Resp 11   Wt 90.7 kg   SpO2 94%   BMI 39.06 kg/m²      I completed the sepsis reassessment at 0325    Cardiac:  Regularity: Regular  Rate: Normal  Heart Sounds: S1,S2    Lungs:   Right: Clear  Left: Clear    Peripheral Pulses:  Radial: Right 1+ or Left 1+      Capillary Refill:  <3 Secs    Skin:  Temp/Moisture: Warm and Dry  Color: Normal      Evita Mckeon MD  7/9/2024  3:13 AM      Medical Decision Making  Pt with constipation with no obstruction  Urine infected with elevated LA, WBC-will treat with antibiotics based on prior cultures. D/w pharmacy-recommend rocephin/flagyl.   Pt notified of plan and results  Blood cultures pending. Started ivf per sepsis protocol.    Amount and/or Complexity of Data Reviewed  External Data Reviewed: labs.     Details: Labs 5/2024 reviewed  Labs: ordered.  Radiology: ordered.     Details: KUB at 0138 hrs.    Comparison: CT from January 6, 2024      IMPRESSION:    Suboptimal evaluation due to body habitus, requiring 4 separate radiographs.    Nonspecific nonobstructive bowel gas  pattern.    There appears to be at least moderate amount of stool within the colon.    Right upper quadrant surgical clips.    Discussion of management or test interpretation with external provider(s): D/w dr Ivey    Risk  Decision regarding hospitalization.        Disposition and Plan     Clinical Impression:  1. Sepsis due to urinary tract infection (HCC)    2. Lactic acid acidosis    3. Constipation, unspecified constipation type         Disposition:  Admit  7/9/2024  3:27 am    Follow-up:  No follow-up provider specified.  We recommend that you schedule follow up care with a primary care provider within the next three months to obtain basic health screening including reassessment of your blood pressure.      Medications Prescribed:  Current Discharge Medication List                            Hospital Problems       Present on Admission  Date Reviewed: 1/18/2024            ICD-10-CM Noted POA    * (Principal) Sepsis due to urinary tract infection (HCC) A41.9, N39.0 7/9/2024 Unknown

## 2024-07-09 NOTE — CM/SW NOTE
From [facility] as a long term resident.  Return referral sent.    Clari LEIVAA BSN RN CRRN   RN Case Manager  [phone]

## 2024-07-09 NOTE — H&P
[facility]  part of [facility]    History & Physical    Clari Rosa Patient Status:  Inpatient    1970 MRN [id number]   Location [facility] 1W Attending Adonis Hager MD   Hosp Day # 0 PCP Brian Bob MD     Date:  2024  Date of Admission:  2024    History provided by:patient  Chief Complaint:     Dizziness    HPI:   Clari Rosa is a(n) 53 year old female with a history of bipolar disorder, chronic suprapubic catheter, NH resident, HTN, obesity, paraplegia, anxiety, dental caries, who presents today to ER with complaints if dizziness, constipation, and left tooth ache.    Pt says he had a BM this morning but feels she is full of stool that needs to come out.  Pt has had multiple tooth extractions but has one tooth on left side left in as oral surgeon said it was too close to blood vessels or nerves.  Pt has tooth ache in this one tooth.   Pt says appetite has been good and she eats too much.  In ER pt found to be septic with UTI.    Pt says she has been off of her psych meds and requests to see psych.     Pt requests ID consult.       History     Past Medical History:    Bipolar 1 disorder (HCC)    Dental caries    Diabetes (HCC)    MIKE (generalized anxiety disorder)    High blood pressure    History of diverticulitis of colon    Manipulative behavior    Neurogenic bladder    Obesity (BMI 30-39.9)    Paraplegia (HCC)    Sepsis following intra-abdominal surgery (HCC)    Serotonin syndrome    Sleep apnea    Suprapubic catheter (HCC)    Transverse myelitis (HCC)     Past Surgical History:   Procedure Laterality Date    Arthroscopy of joint unlisted      knee          Cholecystectomy      Part removal colon w end colostomy      2013    Tonsillectomy       Family History   Adopted: Yes     Social History:  Social History     Socioeconomic History    Marital status: Single   Tobacco Use    Smoking status: Former     Current packs/day: 1.00     Average packs/day: 1  pack/day for 5.0 years (5.0 ttl pk-yrs)     Types: Cigarettes    Tobacco comments:     quit 2006   Substance and Sexual Activity    Alcohol use: No    Drug use: No     Social Determinants of Health     Food Insecurity: No Food Insecurity (7/9/2024)    Food Insecurity     Food Insecurity: Never true   Transportation Needs: Unknown (7/9/2024)    Transportation Needs     Lack of Transportation: Patient declined   Housing Stability: Unknown (7/9/2024)    Housing Stability     Housing Instability: Patient declined     Allergies/Medications:   Allergies:   Allergies   Allergen Reactions    Pcn [Penicillins]     Radiology Contrast Iodinated Dyes     Sulfa Antibiotics        Home medications  Medications Prior to Admission   Medication Sig    QUEtiapine 50 MG Oral Tab Take 1 tablet (50 mg total) by mouth 2 (two) times daily.    clonazePAM 0.5 MG Oral Tab Take 1 tablet (0.5 mg total) by mouth 2 (two) times daily.    QUEtiapine 200 MG Oral Tab Take 1 tablet (200 mg total) by mouth 2 (two) times daily.    DULoxetine 30 MG Oral Cap DR Particles Take 2 capsules (60 mg total) by mouth daily.    gabapentin 50 MG Oral Tab Take 300 mg by mouth 3 (three) times daily.    psyllium 28 % Oral Powd Pack Take 1 packet by mouth every 8 (eight) hours as needed (constipation).    Nystatin 413996 UNIT/GM External Powder Apply 1 Application topically as needed for Dry Skin. Under breast and groin area    atorvastatin 40 MG Oral Tab Take 1 tablet (40 mg total) by mouth nightly.    sennosides 8.6 MG Oral Tab Take 2 tablets (17.2 mg total) by mouth 2 (two) times daily.    Icosapent Ethyl 1 g Oral Cap Take 1 capsule by mouth 4 (four) times daily.    insulin aspart 100 Units/mL Subcutaneous Solution Pen-injector Inject 24 Units into the skin 3 (three) times daily with meals.    rivaroxaban (XARELTO) 20 MG Oral Tab Take 1 tablet (20 mg total) by mouth daily.    insulin glargine 100 UNIT/ML Subcutaneous Solution Inject 56 Units into the skin nightly.     insulin glargine 100 UNIT/ML Subcutaneous Solution Inject 20 Units into the skin every morning.    ergocalciferol 1.25 MG (04224 UT) Oral Cap Take 1 capsule (50,000 Units total) by mouth every 7 days. Every monday    oxybutynin ER 5 MG Oral Tablet 24 Hr Take 1 tablet (5 mg total) by mouth daily.    bethanechol 10 MG Oral Tab Take 1 tablet (10 mg total) by mouth 3 (three) times daily.    loratadine 10 MG Oral Tab Take 1 tablet (10 mg total) by mouth daily.    cyclobenzaprine 10 MG Oral Tab Take 1 tablet (10 mg total) by mouth 3 (three) times daily as needed for Muscle spasms.    phenazopyridine 200 MG Oral Tab Take 1 tablet (200 mg total) by mouth 3 (three) times daily.    Insulin Aspart 100 UNIT/ML Subcutaneous Solution Cartridge SS: 150-200 2units, 201-250 4 units, 251-300 6 units, 301-350 8 units, 351-400 10 units, over 401 call md    Multiple Vitamins-Minerals (THERA-M) Oral Tab Take 1 tablet by mouth daily.    Montelukast Sodium 10 MG Oral Tab Take 1 tablet (10 mg total) by mouth daily.    BuPROPion HCl ER, XL, 300 MG Oral Tablet 24 Hr Take 1 tablet (300 mg total) by mouth daily.    divalproex Sodium  MG Oral Tablet 24 Hr Take 1 tablet (500 mg total) by mouth 2 (two) times daily.    Acidophilus/Pectin Oral Cap Take 1 capsule by mouth 2 (two) times daily.    ondansetron 4 MG Oral Tablet Dispersible Take 2 tablets (8 mg total) by mouth every 8 (eight) hours as needed for Nausea.    MetFORMIN HCl ER (GLUCOPHAGE-XR) 750 MG Oral Tablet 24 Hr Take 1 tablet (750 mg total) by mouth daily with breakfast.    meclizine 12.5 MG Oral Tab Take 1 tablet (12.5 mg total) by mouth 3 (three) times daily as needed for Dizziness.    [] cefdinir 300 MG Oral Cap Take 1 capsule (300 mg total) by mouth 2 (two) times daily for 7 days.    glucagon (BAQSIMI ONE PACK) 3 MG/DOSE Nasal nasal powder 3 mg by Nasal route once as needed for Low blood glucose.    acetaminophen 325 MG Oral Tab Take 2 tablets (650 mg total) by mouth  every 6 (six) hours as needed for Pain.    Melatonin 3 MG Oral Cap Take 1 capsule (3 mg total) by mouth at bedtime.    methenamine 1 g Oral Tab Take 0.5 tablets (0.5 g total) by mouth 2 (two) times daily with meals.    albuterol 108 (90 Base) MCG/ACT Inhalation Aero Soln Inhale 2 puffs into the lungs every 6 (six) hours as needed for Wheezing or Shortness of Breath.    acetaminophen (TYLENOL EXTRA STRENGTH) 500 MG Oral Tab Take 325 mg by mouth every 4 (four) hours as needed for Pain or Fever.       Review of Systems:     The remainder of the ROS is negative except as noted in the HPI.    Physical Exam:   Vital Signs:  Blood pressure 118/75, pulse 106, temperature 98.4 °F (36.9 °C), temperature source Oral, resp. rate 24, weight 218 lb 6.4 oz (99.1 kg), SpO2 97%.     Gen:   NAD.   A and O x 3  Body mass index is 42.65 kg/m².   Eyes:  PERRL  Moist mucus membranes.    CV:    RRR.  No m/g/r  Pulm:   CTA bilat  Abd:   +bs, soft, NT, ND  LE:   No c/c/e  Neuro:   nonfocal, paraplegia  Skin:  No rash    Results:     Lab Results   Component Value Date    WBC 12.1 (H) 07/09/2024    HGB 11.0 (L) 07/09/2024    HCT 34.6 (L) 07/09/2024    .0 07/09/2024    CREATSERUM 0.89 07/09/2024    BUN 14 07/09/2024     (L) 07/09/2024    K 4.2 07/09/2024    CL 98 07/09/2024    CO2 30.0 07/09/2024     (H) 07/09/2024    CA 9.6 07/09/2024    ALB 3.8 05/10/2024    ALKPHO 74 05/10/2024    BILT 0.6 05/10/2024    TP 6.5 05/10/2024    AST 24 05/10/2024    ALT 18 05/10/2024    PTT 26.4 12/05/2023    LIP 36 12/05/2023    DDIMER 0.92 (H) 12/05/2023    MG 1.7 12/12/2023       XR ABDOMEN (1 VIEW) (CPT=74018)    Result Date: 7/9/2024  CONCLUSION:   Dilated small bowel loop in the left hemiabdomen measuring 3.5 cm.  Differential considerations include focal ileus or early/partial small bowel obstruction.  Further assessment can be made with cross-sectional imaging as clinically indicated.  Secure message sent to Dr. Hager on 07/09/2024  at 9:10 a.m.  Nonspecific moderate gaseous distension of the stomach.  A preliminary report was issued by the [facility] teleradiology service.     Dictated by (CST): Ene Pérez MD on 7/09/2024 at 9:08 AM     Finalized by (CST): Ene Pérez MD on 7/09/2024 at 9:12 AM         EKG 12 Lead    Result Date: 7/9/2024  Sinus tachycardia Possible Left atrial enlargement Incomplete right bundle branch block Borderline ECG When compared with ECG of 23-JAN-2024 19:36, No significant change was found     Assessment/Plan:       Sepsis due to urinary tract infection  Lactic acid acidosis  Hx of chronic suprapubic catheter  - cont rocephin  - cont IVF  - follow lactic acid  - f/u urine culture  - f/u blood cultures  - ID consult per pt request    Left tooth ache  Probably infected.  Pt will be on abx as above.  Outpt oral surgeon consult.    Constipation however pt had BM today.  Abx x-ray reveals possible PSBO.  - CT abdomen/pelvis  - start colace and miralax bid    MIKE  Bipolar 1 disorder.  Pt on multiple psych meds.  Requests to see psych  - psych consult  - resume home meds    Hx of IDDM  - ISS  - resume daily and nightly long acting insulin    Hx of PE  - cont xarelto    Paraplegia  - supportive care    dvt proph - Xarelto    Code status - Full    Adonis Alejandre MD  7/9/2024

## 2024-07-09 NOTE — CONSULTS
[facility], [facility]  TEL: [phone]  FAX: [phone]    Clari Rosa Patient Status:  Inpatient    1970 MRN [id number]   Location [facility] 1W Attending Adonis Hager MD   Hosp Day # 0 PCP Brian Bob MD     Reason for Consultation:  Suprapubic catheter related urinary tract infection    History of Present Illness:  Clari Rosa is a a(n) 53 year old female.  With a longstanding history of neurogenic bladder who has had a suprapubic catheter for prolonged period of time.  She has had many infections related to this.  She has underlying history of bipolar disorder.  Lives in a skilled facility.  Previously was living in [place].  Has moved to this area recently.  She describes at least 21 episodes of UTI, some requiring hospitalizations.  Review of medical records show that she has had E. coli more recently.  She did have an episode of multidrug-resistant Pseudomonas in July of last year from urine culture.  This was resistant to carbapenems, tobramycin and cefepime.  Previously susceptible to Zosyn but with a high MARINA.  She is coming in with increased abdominal pain.  With lactic acidosis.  Started on ceftriaxone and Flagyl.  Also with some constipation.  Is unclear if constipation leads to her UTI episodes/symptoms.  She does get her catheter changed every 4 weeks by nursing team at the skilled facility.        History:  Past Medical History:    Bipolar 1 disorder (HCC)    Dental caries    Diabetes (HCC)    MIKE (generalized anxiety disorder)    High blood pressure    History of diverticulitis of colon    Manipulative behavior    Neurogenic bladder    Obesity (BMI 30-39.9)    Paraplegia (HCC)    Sepsis following intra-abdominal surgery (HCC)    Serotonin syndrome    Sleep apnea    Suprapubic catheter (HCC)    Transverse myelitis (HCC)     Past Surgical History:   Procedure Laterality Date    Arthroscopy of joint unlisted      knee          Cholecystectomy       Part removal colon w end colostomy      2013    Tonsillectomy       Family History   Adopted: Yes      reports that she has quit smoking. She has a 5 pack-year smoking history. She does not have any smokeless tobacco history on file. She reports that she does not drink alcohol and does not use drugs.    Allergies:  Allergies   Allergen Reactions    Pcn [Penicillins]     Radiology Contrast Iodinated Dyes     Sulfa Antibiotics        Medications:    Current Facility-Administered Medications:     polyethylene glycol (PEG 3350) (Miralax) 17 g oral packet 17 g, 17 g, Oral, Daily PRN    sennosides (Senokot) tab 17.2 mg, 17.2 mg, Oral, Nightly PRN    bisacodyl (Dulcolax) 10 MG rectal suppository 10 mg, 10 mg, Rectal, Daily PRN    fleet enema (Fleet) 7-19 GM/118ML rectal enema 133 mL, 1 enema, Rectal, Once PRN    ondansetron (Zofran) 4 MG/2ML injection 4 mg, 4 mg, Intravenous, Q6H PRN    prochlorperazine (Compazine) 10 MG/2ML injection 5 mg, 5 mg, Intravenous, Q8H PRN    sodium chloride 0.9% infusion, , Intravenous, Continuous    glucose (Dex4) 15 GM/59ML oral liquid 15 g, 15 g, Oral, Q15 Min PRN **OR** glucose (Glutose) 40% oral gel 15 g, 15 g, Oral, Q15 Min PRN **OR** glucose-vitamin C (Dex-4) chewable tab 4 tablet, 4 tablet, Oral, Q15 Min PRN **OR** dextrose 50% injection 50 mL, 50 mL, Intravenous, Q15 Min PRN **OR** glucose (Dex4) 15 GM/59ML oral liquid 30 g, 30 g, Oral, Q15 Min PRN **OR** glucose (Glutose) 40% oral gel 30 g, 30 g, Oral, Q15 Min PRN **OR** glucose-vitamin C (Dex-4) chewable tab 8 tablet, 8 tablet, Oral, Q15 Min PRN    insulin aspart (NovoLOG) 100 Units/mL FlexPen 1-5 Units, 1-5 Units, Subcutaneous, TID CC    polyethylene glycol (PEG 3350) (Miralax) 17 g oral packet 17 g, 17 g, Oral, BID    docusate sodium (Colace) cap 100 mg, 100 mg, Oral, BID    acetaminophen (Tylenol) tab 650 mg, 650 mg, Oral, Q6H PRN    atorvastatin (Lipitor) tab 40 mg, 40 mg, Oral, Nightly    bethanechol (Urecholine) tab 10 mg,  10 mg, Oral, TID    buPROPion ER (Wellbutrin XL) 24 hr tab 300 mg, 300 mg, Oral, Daily    clonazePAM (KlonoPIN) tab 0.5 mg, 0.5 mg, Oral, BID    cyclobenzaprine (Flexeril) tab 10 mg, 10 mg, Oral, TID PRN    divalproex ER (Depakote ER) 24 hr tab 500 mg, 500 mg, Oral, BID    DULoxetine (Cymbalta) DR cap 60 mg, 60 mg, Oral, Daily    melatonin tab 3 mg, 3 mg, Oral, Nightly    montelukast (Singulair) tab 10 mg, 10 mg, Oral, Daily    oxybutynin ER (Ditropan-XL) 24 hr tab 5 mg, 5 mg, Oral, Daily    rivaroxaban (Xarelto) tab 20 mg, 20 mg, Oral, Q24H    gabapentin (Neurontin) cap 300 mg, 300 mg, Oral, TID    QUEtiapine (SEROquel) tab 50 mg, 50 mg, Oral, BID    QUEtiapine (SEROquel) tab 200 mg, 200 mg, Oral, BID    meclizine (Antivert) tab 25 mg, 25 mg, Oral, TID PRN    ARIPiprazole (Abilify) tab 2 mg, 2 mg, Oral, Daily    traZODone (Desyrel) tab 50 mg, 50 mg, Oral, Nightly PRN    predniSONE (Deltasone) tab 50 mg, 50 mg, Oral, Q6H    [START ON 7/10/2024] diphenhydrAMINE (Benadryl) cap/tab 50 mg, 50 mg, Oral, Once    [START ON 7/10/2024] insulin degludec (Tresiba) 100 units/mL flextouch 65 Units, 65 Units, Subcutaneous, Daily    ceFEPIme (Maxipime) 1 g in sodium chloride 0.9% 100 mL IVPB-MBP, 1 g, Intravenous, Q12H    Review of Systems:    A comprehensive 10 point review of systems was completed.  Pertinent positives and negatives noted in the the HPI.    Physical Exam:    General: No acute distress. Alert and oriented x 3.  Very pleasant.  No respiratory distress  Vital signs: Blood pressure 116/75, pulse 105, temperature 97.6 °F (36.4 °C), temperature source Oral, resp. rate 20, weight 218 lb 6.4 oz (99.1 kg), SpO2 92%.  HEENT: Moist mucous membranes. Extraocular muscles are intact.  Neck: No lymphadenopathy.  No JVD. No carotid bruits.  Respiratory: Clear to auscultation bilaterally.  No wheezes. No rhonchi.  Cardiovascular: S1, S2.  Regular rate and rhythm.  No murmurs.  Abdomen: Soft, nontender, nondistended.  Positive  bowel sounds.  Obese.  No drainage from SP cath site.  Musculoskeletal: Full range of motion of all extremities.  No swelling noted.  Integument: No lesions. No erythema.  Psychiatric: Appropriate mood and affect.  Neurologic: No focal neurological deficits.    Laboratory Data:  Lab Results   Component Value Date    WBC 12.1 07/09/2024    HGB 11.0 07/09/2024    HCT 34.6 07/09/2024    .0 07/09/2024    CREATSERUM 0.89 07/09/2024    BUN 14 07/09/2024     07/09/2024    K 4.2 07/09/2024    CL 98 07/09/2024    CO2 30.0 07/09/2024     07/09/2024    CA 9.6 07/09/2024    PGLU 209 07/09/2024       Microbiologic Data:     Reviewed in EMR    Imaging:  X-Ray    Imaging results reviewed     Impression:  Patient Active Problem List   Diagnosis    Paresis of lower extremity ([facility])    Transverse myelitis ([facility])    Bipolar 1 disorder ([facility])    Manipulative behavior    History of diverticulitis of colon    Serotonin syndrome    Obesity (BMI 30-39.9)    Dental caries    Type 2 diabetes mellitus without complication, with long-term current use of insulin ([facility])    Chronic suprapubic catheter ([facility])    Bipolar I disorder depressed with atypical features ([facility])    Other acute pulmonary embolism, unspecified whether acute cor pulmonale present ([facility])    Hyperglycemia    Sepsis due to undetermined organism ([facility])    Severe episode of recurrent major depressive disorder, without psychotic features ([facility])    Generalized anxiety disorder    Sepsis due to urinary tract infection ([facility])    Lactic acid acidosis    Constipation, unspecified constipation type         ASSESSMENT:  #Probable catheter associated urinary tract infection.  Suprapubic catheter.  With a history of E. coli primary in the past.  Has had episodes of multidrug-resistant Pseudomonas over 1 year ago.  Rule out associated bacteremia.    #Neurogenic bladder.  Suprapubic catheter.    PLAN:  Will switch to cefepime.  Follow-up urine culture.  If she has any hemodynamic  changes, will need to consider changing to Zerbaxa based on previous MDR history.  Follow-up blood cultures and urine cultures.  Follow white blood cell count trend.  Trending lactic acid with primary care.  Type and duration of antibiotics depend how quickly she responds as well as any urine or blood culture results.    Thank you for allowing me to participate in the care of your patient.    Jayce Barbour MD  7/9/2024  3:31 PM

## 2024-07-09 NOTE — PLAN OF CARE
Admitted from ED. IV fluids infusing.  CT to be done tomorrow at 6am, contrast to be started at 5am. All safety measures in place.     Problem: Patient Centered Care  Goal: Patient preferences are identified and integrated in the patient's plan of care  Description: Interventions:  - What would you like us to know as we care for you? My mom is my POA  - Provide timely, complete, and accurate information to patient/family  - Incorporate patient and family knowledge, values, beliefs, and cultural backgrounds into the planning and delivery of care  - Encourage patient/family to participate in care and decision-making at the level they choose  - Honor patient and family perspectives and choices  Outcome: Progressing     Problem: GASTROINTESTINAL - ADULT  Goal: Maintains or returns to baseline bowel function  Description: INTERVENTIONS:  - Assess bowel function  - Maintain adequate hydration with IV or PO as ordered and tolerated  - Evaluate effectiveness of GI medications  - Encourage mobilization and activity  - Obtain nutritional consult as needed  - Establish a toileting routine/schedule  - Consider collaborating with pharmacy to review patient's medication profile  Outcome: Progressing     Problem: GENITOURINARY - ADULT  Goal: Absence of urinary retention  Description: INTERVENTIONS:  - Assess patient’s ability to void and empty bladder  - Monitor intake/output and perform bladder scan as needed  - Follow urinary retention protocol/standard of care  - Consider collaborating with pharmacy to review patient's medication profile  - Implement strategies to promote bladder emptying  Outcome: Progressing     Problem: SKIN/TISSUE INTEGRITY - ADULT  Goal: Skin integrity remains intact  Description: INTERVENTIONS  - Assess and document risk factors for pressure ulcer development  - Assess and document skin integrity  - Monitor for areas of redness and/or skin breakdown  - Initiate interventions, skin care algorithm/standards  of care as needed  Outcome: Progressing     Problem: Impaired Functional Mobility  Goal: Achieve highest/safest level of mobility/gait  Description: Interventions:  - Assess patient's functional ability and stability  - Promote increasing activity/tolerance for mobility and gait  - Educate and engage patient/family in tolerated activity level and precautions    Outcome: Progressing

## 2024-07-09 NOTE — ED QUICK NOTES
Pts room verified to be clean and ready.  Pt transported to floor via cart accompanied by ERT.  Pt left dept in NAD, VSS, A&O x 4.  Pt belongings verified w/ pt & accompanying pt to floor.

## 2024-07-09 NOTE — ED QUICK NOTES
Orders for admission, patient is aware of plan and ready to go upstairs. Any questions, please call ED RN sriram at extension [phone].     Patient Covid vaccination status: Unvaccinated     COVID Test Ordered in ED: None    COVID Suspicion at Admission: N/A    Running Infusions:      Mental Status/LOC at time of transport: A&Ox3    Other pertinent information:   CIWA score: N/A   NIH score:  N/A

## 2024-07-09 NOTE — ED INITIAL ASSESSMENT (HPI)
Pt coming in from [facility] c/o dizziness, CP, SOB, constipation, UTI, tooth infection and nausea that has been going on for a while but getting worse.

## 2024-07-09 NOTE — CONSULTS
[facility]  part of [facility]    Report of Consultation    Clari Rosa Patient Status:  Inpatient    1970 MRN [id number]   Location [facility] 1W Attending Adonis Hager MD   Hosp Day # 0 PCP Brian Bob MD     Date of Admission:  2024  Date of Consult:  2024   Reason for Consultation:   Patient presented with increased anxiety, Adonis Carter MD requested psychiatric consult for evaluation and advice.    Consult Duration     The patient seen for initial psychiatric consult evaluation.   Record reviewed, communication with attending, communication with RN and patient seen face to face evaluation.    History of Present Illness:   Patient is a 53 year old , single, female with past medical history of diabetes, bipolar 1 disorder, depression, MIKE, HTN, diverticulitis, neurogenic bladder, obesity, who was admitted to the hospital for Sepsis due to urinary tract infection (HCC): The patient has been demonstrating increased anxiety and depressive symptoms. Patient indicated for psych consult for evaluation and advise.    Per chart review, the patient presented to the hospital from [facility] with complaint of dizziness, chest pain, shortness of breath. Patient was admitted to the hospital and has been demonstrating increased anxiety. It was reported that the patient has been off her psychiatric medications.    Labs and imaging reviewed: Glucose 134, sodium 135, WBC 12.1, hgb 11.0    The patient is well known to the psychiatry team from previous consult. The patient was last seen  for increased anxiety and depressive symptoms.    The patient seen today presented somewhat restless and irritable otherwise cooperative.  Patient reporting \"my anxiety has been to the roof and I cannot sleep after they stop my trazodone\".    The patient reporting that through the process of transition from hospital to outpatient treatment her trazodone and Klonopin  has discontinue.  Patient admitted that her mood has been very depressed and very anxious.  Patient reporting that she has been lacking energy, motivation and find herself not able to move on.  Patient admitted increased rumination, anxiety and impairment in her sleep with occasional panic attack.    She indicated feelings of hopelessness, helplessness, worthlessness, low mood, low energy, decreased motivation.      The patient is not demonstrating any carmel or psychosis  The patient denies auditory or visual hallucinations  The patient denies suicidal or homicidal ideation.    The patient has been demonstrating increased anxiety, restlessness, irritability and difficulty managing her emotion.  No suicidal ideation or indication for inpatient admission      Past Psychiatric/Medication History:  1. Prior diagnoses: MIKE, depression, Bipolar 1 disorder, Manipulative behavior. Patient states she also has OCD and Panic attacks sine adolescent years.  2. Past psychiatric inpatient: none reported  3. Past outpatient history: Patient reports seeing a psychiatris at Nursing Home   4. Past suicide history: Patient denies   5. Medication history: Wellbutrin XL 24 hr tab 300mg, Klonopin 1mg q6h, Seroquel 300mg BID,      Social History:   The patient states the she lives in a Nursing home due to medical conditions. She is single and has never  but has one living son.      Denies any ETOH abuse, or illicit substance abuse. Previous smoker (5 pack history)     Family History:  No family history reported   Medical History:   Past Medical History  Past Medical History:    Bipolar 1 disorder (HCC)    Dental caries    Diabetes (HCC)    MIKE (generalized anxiety disorder)    High blood pressure    History of diverticulitis of colon    Manipulative behavior    Neurogenic bladder    Obesity (BMI 30-39.9)    Paraplegia (HCC)    Sepsis following intra-abdominal surgery (HCC)    Serotonin syndrome    Sleep apnea    Suprapubic catheter  (HCC)    Transverse myelitis (HCC)       Past Surgical History  Past Surgical History:   Procedure Laterality Date    Arthroscopy of joint unlisted      knee          Cholecystectomy      Part removal colon w end colostomy      2013    Tonsillectomy         Family History  Family History   Adopted: Yes       Social History  Social History     Socioeconomic History    Marital status: Single   Tobacco Use    Smoking status: Former     Current packs/day: 1.00     Average packs/day: 1 pack/day for 5.0 years (5.0 ttl pk-yrs)     Types: Cigarettes    Tobacco comments:     quit    Substance and Sexual Activity    Alcohol use: No    Drug use: No     Social Determinants of Health     Food Insecurity: No Food Insecurity (2024)    Food Insecurity     Food Insecurity: Never true   Transportation Needs: Unknown (2024)    Transportation Needs     Lack of Transportation: Patient declined   Housing Stability: Unknown (2024)    Housing Stability     Housing Instability: Patient declined           Current Medications:  Current Facility-Administered Medications   Medication Dose Route Frequency    polyethylene glycol (PEG 3350) (Miralax) 17 g oral packet 17 g  17 g Oral Daily PRN    sennosides (Senokot) tab 17.2 mg  17.2 mg Oral Nightly PRN    bisacodyl (Dulcolax) 10 MG rectal suppository 10 mg  10 mg Rectal Daily PRN    fleet enema (Fleet) 7-19 GM/118ML rectal enema 133 mL  1 enema Rectal Once PRN    ondansetron (Zofran) 4 MG/2ML injection 4 mg  4 mg Intravenous Q6H PRN    prochlorperazine (Compazine) 10 MG/2ML injection 5 mg  5 mg Intravenous Q8H PRN    [START ON 7/10/2024] cefTRIAXone (Rocephin) 1 g in sodium chloride 0.9% 100 mL IVPB-ADDV  1 g Intravenous Q24H    sodium chloride 0.9% infusion   Intravenous Continuous    glucose (Dex4) 15 GM/59ML oral liquid 15 g  15 g Oral Q15 Min PRN    Or    glucose (Glutose) 40% oral gel 15 g  15 g Oral Q15 Min PRN    Or    glucose-vitamin C (Dex-4) chewable tab 4  tablet  4 tablet Oral Q15 Min PRN    Or    dextrose 50% injection 50 mL  50 mL Intravenous Q15 Min PRN    Or    glucose (Dex4) 15 GM/59ML oral liquid 30 g  30 g Oral Q15 Min PRN    Or    glucose (Glutose) 40% oral gel 30 g  30 g Oral Q15 Min PRN    Or    glucose-vitamin C (Dex-4) chewable tab 8 tablet  8 tablet Oral Q15 Min PRN    insulin aspart (NovoLOG) 100 Units/mL FlexPen 1-5 Units  1-5 Units Subcutaneous TID CC    polyethylene glycol (PEG 3350) (Miralax) 17 g oral packet 17 g  17 g Oral BID    docusate sodium (Colace) cap 100 mg  100 mg Oral BID    acetaminophen (Tylenol) tab 650 mg  650 mg Oral Q6H PRN    atorvastatin (Lipitor) tab 40 mg  40 mg Oral Nightly    bethanechol (Urecholine) tab 10 mg  10 mg Oral TID    buPROPion ER (Wellbutrin XL) 24 hr tab 300 mg  300 mg Oral Daily    clonazePAM (KlonoPIN) tab 0.5 mg  0.5 mg Oral BID    cyclobenzaprine (Flexeril) tab 10 mg  10 mg Oral TID PRN    divalproex ER (Depakote ER) 24 hr tab 500 mg  500 mg Oral BID    DULoxetine (Cymbalta) DR cap 60 mg  60 mg Oral Daily    melatonin tab 3 mg  3 mg Oral Nightly    montelukast (Singulair) tab 10 mg  10 mg Oral Daily    oxybutynin ER (Ditropan-XL) 24 hr tab 5 mg  5 mg Oral Daily    rivaroxaban (Xarelto) tab 20 mg  20 mg Oral Q24H    insulin degludec (Tresiba) 100 units/mL flextouch 15 Units  15 Units Subcutaneous Daily    gabapentin (Neurontin) cap 300 mg  300 mg Oral TID    QUEtiapine (SEROquel) tab 50 mg  50 mg Oral BID    QUEtiapine (SEROquel) tab 200 mg  200 mg Oral BID    meclizine (Antivert) tab 25 mg  25 mg Oral TID PRN    insulin degludec (Tresiba) 100 units/mL flextouch 50 Units  50 Units Subcutaneous Nightly     Medications Prior to Admission   Medication Sig    QUEtiapine 50 MG Oral Tab Take 1 tablet (50 mg total) by mouth 2 (two) times daily.    clonazePAM 0.5 MG Oral Tab Take 1 tablet (0.5 mg total) by mouth 2 (two) times daily.    QUEtiapine 200 MG Oral Tab Take 1 tablet (200 mg total) by mouth 2 (two) times  daily.    DULoxetine 30 MG Oral Cap DR Particles Take 2 capsules (60 mg total) by mouth daily.    gabapentin 50 MG Oral Tab Take 300 mg by mouth 3 (three) times daily.    psyllium 28 % Oral Powd Pack Take 1 packet by mouth every 8 (eight) hours as needed (constipation).    Nystatin 070415 UNIT/GM External Powder Apply 1 Application topically as needed for Dry Skin. Under breast and groin area    atorvastatin 40 MG Oral Tab Take 1 tablet (40 mg total) by mouth nightly.    sennosides 8.6 MG Oral Tab Take 2 tablets (17.2 mg total) by mouth 2 (two) times daily.    Icosapent Ethyl 1 g Oral Cap Take 1 capsule by mouth 4 (four) times daily.    insulin aspart 100 Units/mL Subcutaneous Solution Pen-injector Inject 24 Units into the skin 3 (three) times daily with meals.    rivaroxaban (XARELTO) 20 MG Oral Tab Take 1 tablet (20 mg total) by mouth daily.    insulin glargine 100 UNIT/ML Subcutaneous Solution Inject 56 Units into the skin nightly.    insulin glargine 100 UNIT/ML Subcutaneous Solution Inject 20 Units into the skin every morning.    ergocalciferol 1.25 MG (22006 UT) Oral Cap Take 1 capsule (50,000 Units total) by mouth every 7 days. Every monday    oxybutynin ER 5 MG Oral Tablet 24 Hr Take 1 tablet (5 mg total) by mouth daily.    bethanechol 10 MG Oral Tab Take 1 tablet (10 mg total) by mouth 3 (three) times daily.    loratadine 10 MG Oral Tab Take 1 tablet (10 mg total) by mouth daily.    cyclobenzaprine 10 MG Oral Tab Take 1 tablet (10 mg total) by mouth 3 (three) times daily as needed for Muscle spasms.    phenazopyridine 200 MG Oral Tab Take 1 tablet (200 mg total) by mouth 3 (three) times daily.    Insulin Aspart 100 UNIT/ML Subcutaneous Solution Cartridge SS: 150-200 2units, 201-250 4 units, 251-300 6 units, 301-350 8 units, 351-400 10 units, over 401 call md    Multiple Vitamins-Minerals (THERA-M) Oral Tab Take 1 tablet by mouth daily.    Montelukast Sodium 10 MG Oral Tab Take 1 tablet (10 mg total) by  mouth daily.    BuPROPion HCl ER, XL, 300 MG Oral Tablet 24 Hr Take 1 tablet (300 mg total) by mouth daily.    divalproex Sodium  MG Oral Tablet 24 Hr Take 1 tablet (500 mg total) by mouth 2 (two) times daily.    Acidophilus/Pectin Oral Cap Take 1 capsule by mouth 2 (two) times daily.    ondansetron 4 MG Oral Tablet Dispersible Take 2 tablets (8 mg total) by mouth every 8 (eight) hours as needed for Nausea.    MetFORMIN HCl ER (GLUCOPHAGE-XR) 750 MG Oral Tablet 24 Hr Take 1 tablet (750 mg total) by mouth daily with breakfast.    meclizine 12.5 MG Oral Tab Take 1 tablet (12.5 mg total) by mouth 3 (three) times daily as needed for Dizziness.    [] cefdinir 300 MG Oral Cap Take 1 capsule (300 mg total) by mouth 2 (two) times daily for 7 days.    glucagon (BAQSIMI ONE PACK) 3 MG/DOSE Nasal nasal powder 3 mg by Nasal route once as needed for Low blood glucose.    acetaminophen 325 MG Oral Tab Take 2 tablets (650 mg total) by mouth every 6 (six) hours as needed for Pain.    Melatonin 3 MG Oral Cap Take 1 capsule (3 mg total) by mouth at bedtime.    methenamine 1 g Oral Tab Take 0.5 tablets (0.5 g total) by mouth 2 (two) times daily with meals.    albuterol 108 (90 Base) MCG/ACT Inhalation Aero Soln Inhale 2 puffs into the lungs every 6 (six) hours as needed for Wheezing or Shortness of Breath.    acetaminophen (TYLENOL EXTRA STRENGTH) 500 MG Oral Tab Take 325 mg by mouth every 4 (four) hours as needed for Pain or Fever.       Allergies  Allergies   Allergen Reactions    Pcn [Penicillins]     Radiology Contrast Iodinated Dyes     Sulfa Antibiotics        Review of Systems:   As by Admitting/Attending    Results:   Laboratory Data:  Lab Results   Component Value Date    WBC 12.1 (H) 2024    HGB 11.0 (L) 2024    HCT 34.6 (L) 2024    .0 2024    CREATSERUM 0.89 2024    BUN 14 2024     (L) 2024    K 4.2 2024    CL 98 2024    CO2 30.0 2024      (H) 07/09/2024    CA 9.6 07/09/2024    ALB 3.8 05/10/2024    ALKPHO 74 05/10/2024    TP 6.5 05/10/2024    AST 24 05/10/2024    ALT 18 05/10/2024    PTT 26.4 12/05/2023    LIP 36 12/05/2023    DDIMER 0.92 (H) 12/05/2023    MG 1.7 12/12/2023         Imaging:  XR ABDOMEN (1 VIEW) (CPT=74018)    Result Date: 7/9/2024  CONCLUSION:   Dilated small bowel loop in the left hemiabdomen measuring 3.5 cm.  Differential considerations include focal ileus or early/partial small bowel obstruction.  Further assessment can be made with cross-sectional imaging as clinically indicated.  Secure message sent to Dr. Hager on 07/09/2024 at 9:10 a.m.  Nonspecific moderate gaseous distension of the stomach.  A preliminary report was issued by the [facility] teleradiology service.     Dictated by (CST): Ene Pérez MD on 7/09/2024 at 9:08 AM     Finalized by (CST): Ene Pérez MD on 7/09/2024 at 9:12 AM           Vital Signs:   Blood pressure 118/75, pulse 106, temperature 98.4 °F (36.9 °C), temperature source Oral, resp. rate 24, weight 99.1 kg (218 lb 6.4 oz), SpO2 97%.    Mental Status Exam:   Appearance: Stated age female, obese, in hospital gown, laying down in hospital bed.  Psychomotor: No psychomotor agitation, or retardation.   Orientation: Alert and oriented to person, place, time and condition.  Gait: Not evaluated.  Attitude/Coorperation: Cooperative and attentive.  Behavior: Appropriate.  Speech: Regular rate and rhythm speech.  Mood: Patient reporting depressed and anxious mood.  Affect: Anxious and restless affect congruent with the mood.  Thought process: Circumstantial and distracted thought process.  Thought content: Patient denies any suicidal or homicidal ideation.  Perceptions: Patient denies any auditory or visual hallucinations.  Concentration: Grossly intact.  Memory: Grossly intact.  Intellect: Average.  Judgment and Insight: Questionable.     Impression:     Bipolar disorder type I recent  episode depression moderate to severe without psychotic feature.  Generalized anxiety disorder.  Sepsis due to urinary tract infection (HCC)    Lactic acid acidosis    Constipation, unspecified constipation type    Patient is a 53 year old , single, female with past medical history of diabetes, bipolar 1 disorder, depression, MIKE, HTN, diverticulitis, neurogenic bladder, obesity, who was admitted to the hospital for Sepsis due to urinary tract infection (HCC): The patient has been demonstrating increased anxiety and depressive symptoms.    7/9/2024: Patient has been demonstrating increased anxiety, depression, lack of energy, lack of motivation hopelessness, helplessness and increased panic attack.    Discussed risk and benefit, acknowledging the current symptom and severity.  At this point, I would recommend the following approach:     Focus on safety  Focus on education and support.  Focus on insight orientation helping the patient understand diagnosis and treatment plan.  Continue Wellbutrin  mg  Continue klonopin 0.5 mg BID  Continue Depakote 500 mg BID  Continue Cymbalta 60 mg  Continue seroquel 200 mg BID  Discontinue Seroquel 50 mg BID.  Start Abilify 2 mg daily.  Processed with patient at length, the initiation of the above psychotropic medications I advised the patient of the risks, benefits, alternatives and potential side effects. The patient consents to administration of the medications and understands the right to refuse medications at any time. The patient verbalized understanding.   Coordinate plan with team    Orders This Visit:  Orders Placed This Encounter   Procedures    Basic Metabolic Panel (8)    CBC With Differential With Platelet    Troponin I (High Sensitivity)    Lactic Acid, Plasma    Urinalysis with Culture Reflex    Lactic Acid Reflex Post Positive    Basic Metabolic Panel (8)    CBC With Differential With Platelet    Lactic Acid Post Positive    Hemoglobin A1C     Hemoglobin A1C    Lactic Acid, Plasma    Urine Culture, Routine    Blood Culture    Emergency MRSA Screen by PCR    Blood Culture       Meds This Visit:  Requested Prescriptions      No prescriptions requested or ordered in this encounter       Víctor Qureshi MD  7/9/2024    Note to Patient: The 21st Century Cures Act makes medical notes like these available to patients in the interest of transparency. However, be advised this is a medical document. It is intended as peer to peer communication. It is written in medical language and may contain abbreviations or verbiage that are unfamiliar. It may appear blunt or direct. Medical documents are intended to carry relevant information, facts as evident, and the clinical opinion of the practitioner. This note may have been transcribed using a voice dictation system. Voice recognition errors may occur. This should not be taken to alter the content or meaning of this note.

## 2024-07-09 NOTE — PROGRESS NOTES
[facility]ry for treatment recommendations.

## 2024-07-10 ENCOUNTER — APPOINTMENT (OUTPATIENT)
Dept: CT IMAGING | Facility: HOSPITAL | Age: 54
End: 2024-07-10
Attending: HOSPITALIST
Payer: MEDICARE

## 2024-07-10 LAB
ANION GAP SERPL CALC-SCNC: 9 MMOL/L (ref 0–18)
BASOPHILS # BLD AUTO: 0.04 X10(3) UL (ref 0–0.2)
BASOPHILS NFR BLD AUTO: 0.4 %
BUN BLD-MCNC: 21 MG/DL (ref 9–23)
BUN/CREAT SERPL: 21.2 (ref 10–20)
CALCIUM BLD-MCNC: 10.2 MG/DL (ref 8.7–10.4)
CHLORIDE SERPL-SCNC: 99 MMOL/L (ref 98–112)
CO2 SERPL-SCNC: 25 MMOL/L (ref 21–32)
CREAT BLD-MCNC: 0.99 MG/DL
DEPRECATED RDW RBC AUTO: 56.2 FL (ref 35.1–46.3)
EGFRCR SERPLBLD CKD-EPI 2021: 68 ML/MIN/1.73M2 (ref 60–?)
EOSINOPHIL # BLD AUTO: 0 X10(3) UL (ref 0–0.7)
EOSINOPHIL NFR BLD AUTO: 0 %
ERYTHROCYTE [DISTWIDTH] IN BLOOD BY AUTOMATED COUNT: 16.4 % (ref 11–15)
GLUCOSE BLD-MCNC: 358 MG/DL (ref 70–99)
GLUCOSE BLDC GLUCOMTR-MCNC: 110 MG/DL (ref 70–99)
GLUCOSE BLDC GLUCOMTR-MCNC: 199 MG/DL (ref 70–99)
GLUCOSE BLDC GLUCOMTR-MCNC: 208 MG/DL (ref 70–99)
GLUCOSE BLDC GLUCOMTR-MCNC: 380 MG/DL (ref 70–99)
GLUCOSE BLDC GLUCOMTR-MCNC: 389 MG/DL (ref 70–99)
HCT VFR BLD AUTO: 34 %
HGB BLD-MCNC: 10.9 G/DL
HGBA1C: 5.1 %
IMM GRANULOCYTES # BLD AUTO: 0.2 X10(3) UL (ref 0–1)
IMM GRANULOCYTES NFR BLD: 2 %
LYMPHOCYTES # BLD AUTO: 1.22 X10(3) UL (ref 1–4)
LYMPHOCYTES NFR BLD AUTO: 12.3 %
MCH RBC QN AUTO: 30.2 PG (ref 26–34)
MCHC RBC AUTO-ENTMCNC: 32.1 G/DL (ref 31–37)
MCV RBC AUTO: 94.2 FL
MONOCYTES # BLD AUTO: 0.07 X10(3) UL (ref 0.1–1)
MONOCYTES NFR BLD AUTO: 0.7 %
NEUTROPHILS # BLD AUTO: 8.42 X10 (3) UL (ref 1.5–7.7)
NEUTROPHILS # BLD AUTO: 8.42 X10(3) UL (ref 1.5–7.7)
NEUTROPHILS NFR BLD AUTO: 84.6 %
OSMOLALITY SERPL CALC.SUM OF ELEC: 293 MOSM/KG (ref 275–295)
PLATELET # BLD AUTO: 264 10(3)UL (ref 150–450)
POTASSIUM SERPL-SCNC: 4.8 MMOL/L (ref 3.5–5.1)
RBC # BLD AUTO: 3.61 X10(6)UL
SODIUM SERPL-SCNC: 133 MMOL/L (ref 136–145)
WBC # BLD AUTO: 10 X10(3) UL (ref 4–11)

## 2024-07-10 PROCEDURE — 99233 SBSQ HOSP IP/OBS HIGH 50: CPT | Performed by: HOSPITALIST

## 2024-07-10 PROCEDURE — 99233 SBSQ HOSP IP/OBS HIGH 50: CPT | Performed by: OTHER

## 2024-07-10 PROCEDURE — 74177 CT ABD & PELVIS W/CONTRAST: CPT | Performed by: HOSPITALIST

## 2024-07-10 RX ORDER — HYDROCODONE BITARTRATE AND ACETAMINOPHEN 5; 325 MG/1; MG/1
1 TABLET ORAL EVERY 6 HOURS PRN
Status: DISCONTINUED | OUTPATIENT
Start: 2024-07-10 | End: 2024-07-13

## 2024-07-10 RX ORDER — INSULIN DEGLUDEC 100 U/ML
75 INJECTION, SOLUTION SUBCUTANEOUS DAILY
Status: DISCONTINUED | OUTPATIENT
Start: 2024-07-11 | End: 2024-07-13

## 2024-07-10 RX ORDER — INSULIN DEGLUDEC 100 U/ML
10 INJECTION, SOLUTION SUBCUTANEOUS ONCE
Status: COMPLETED | OUTPATIENT
Start: 2024-07-10 | End: 2024-07-10

## 2024-07-10 RX ORDER — HYDROCODONE BITARTRATE AND ACETAMINOPHEN 5; 325 MG/1; MG/1
2 TABLET ORAL EVERY 6 HOURS PRN
Status: DISCONTINUED | OUTPATIENT
Start: 2024-07-10 | End: 2024-07-13

## 2024-07-10 RX ORDER — BUPROPION HYDROCHLORIDE 150 MG/1
150 TABLET ORAL DAILY
Status: DISCONTINUED | OUTPATIENT
Start: 2024-07-11 | End: 2024-07-11

## 2024-07-10 RX ORDER — HYDROXYZINE HCL 10 MG/5 ML
10 SOLUTION, ORAL ORAL 3 TIMES DAILY
Status: DISCONTINUED | OUTPATIENT
Start: 2024-07-10 | End: 2024-07-11

## 2024-07-10 RX ORDER — ECHINACEA PURPUREA EXTRACT 125 MG
1 TABLET ORAL
Status: DISCONTINUED | OUTPATIENT
Start: 2024-07-10 | End: 2024-07-13

## 2024-07-10 NOTE — OCCUPATIONAL THERAPY NOTE
OT orders received and chart reviewed. Patient is a LTC resident, per old documentation has been functionally bedbound since 2016.  Acute care OT services not appropriate at this time. Will DC OT orders.Thank you.     Eulalia Glover, Occupational Therapist, MS, OTR/L

## 2024-07-10 NOTE — PROGRESS NOTES
[facility]  Progress Note     Clari Rosa  : 1970    Status: Inpatient  Day #: 1    Attending: Johan Easton MD  PCP: Brian Bob MD     Assessment and Plan:    Sepsis due to urinary tract infection related to chronic suprapubic catheter  Lactic acidosis, leukocytosis  - cont cefepime IV per ID  - cont IVF  - follow lactic acid  - f/u urine culture - serratia marcescens, enterococcus faecalis  - f/u blood cultures  - ID consulted     Left tooth ache  Probably infected.  Pt will be on abx as above.  Outpt oral surgeon follow up     Constipation  Abx x-ray reveals possible PSBO.  - CT abdomen/pelvis without obstruction. +constipation  - pt having BMs now  - cont colace and miralax bid     MIKE  Bipolar 1 disorder.  Pt on multiple psych meds.  Requests to see psych  - psych consult  - cont, meds     IDDM - poorly controlled  - ISS - increase  - uincrease tresiba to match home dose and resume humalog 24u AC     Hx of PE  - cont xarelto     Paraplegia  - supportive care    Mild hyponatremia  -chronic  -monitor     DVT Mechanical Prophylaxis:   SCDs,    DVT Pharmacologic Prophylaxis   Medication    rivaroxaban (Xarelto) tab 20 mg               Subjective:      Initial Chief Complaint:  dizziness    Had large BM but still feels constipated. No n/v.     10 point ROS completed and was negative, except for pertinent positive and negatives stated in subjective.      Objective:      Temp:  [97.8 °F (36.6 °C)-98.7 °F (37.1 °C)] 98.3 °F (36.8 °C)  Pulse:  [] 113  Resp:  [20-25] 25  BP: ()/(52-98) 124/61  SpO2:  [93 %-95 %] 94 %  General:  Alert, no distress  HEENT:  Normocephalic, atraumatic  Cardiac:  Regular rate, regular rhythm  Pulmonary:  Clear to auscultation bilaterally, respirations unlabored  Gastrointestinal:  Soft, +BS  Musculoskeletal:  No joint swelling  Extremities:  No edema, no cyanosis, no clubbing  Neurologic:  nonfocal  Psychiatric:  Normal affect, calm and  appropriate    Intake/Output Summary (Last 24 hours) at 7/10/2024 1443  Last data filed at 7/10/2024 1158  Gross per 24 hour   Intake 1560 ml   Output 35426 ml   Net -8790 ml         Recent Labs   Lab 07/09/24  0105 07/10/24  0540   WBC 12.1* 10.0   HGB 11.0* 10.9*   HCT 34.6* 34.0*   .0 264.0   RBC 3.68* 3.61*   MCV 94.0 94.2   MCH 29.9 30.2   MCHC 31.8 32.1   RDW 16.3* 16.4*   NEPRELIM 6.78 8.42*     Recent Labs   Lab 07/09/24  0105 07/10/24  0540   BUN 14 21   CREATSERUM 0.89 0.99   CA 9.6 10.2   * 133*   K 4.2 4.8   CL 98 99   CO2 30.0 25.0   * 358*       CT ABDOMEN+PELVIS(CONTRAST ONLY)(CPT=74177)    Result Date: 7/10/2024  CONCLUSION:   Constipation.  No bowel obstruction.    Mild bilateral hydroureteronephrosis down to the level of the bladder which is thickened and decompressed around a Florentino catheter.  No obstructing calculi   Dictated by (CST): Yordan Guerrero MD on 7/10/2024 at 7:09 AM     Finalized by (CST): Yordan Guerrero MD on 7/10/2024 at 7:17 AM          XR ABDOMEN (1 VIEW) (CPT=74018)    Result Date: 7/9/2024  CONCLUSION:   Dilated small bowel loop in the left hemiabdomen measuring 3.5 cm.  Differential considerations include focal ileus or early/partial small bowel obstruction.  Further assessment can be made with cross-sectional imaging as clinically indicated.  Secure message sent to Dr. Hager on 07/09/2024 at 9:10 a.m.  Nonspecific moderate gaseous distension of the stomach.  A preliminary report was issued by the [facility] teleradiology service.     Dictated by (CST): Ene Pérez MD on 7/09/2024 at 9:08 AM     Finalized by (CST): Ene Pérez MD on 7/09/2024 at 9:12 AM         EKG 12 Lead    Result Date: 7/9/2024  Sinus tachycardia Possible Left atrial enlargement Incomplete right bundle branch block Borderline ECG When compared with ECG of 23-JAN-2024 19:36, No significant change was found Confirmed by YAHIR GUZMAN ([id number]) on 7/9/2024 11:58:30 AM    Medications:   insulin aspart  24 Units Subcutaneous TID CC    metoprolol tartrate  25 mg Oral 2x Daily(Beta Blocker)    [START ON 7/11/2024] buPROPion ER  150 mg Oral Daily    [START ON 7/11/2024] DULoxetine  90 mg Oral Daily    hydrOXYzine HCl  10 mg Oral TID    insulin aspart  1-5 Units Subcutaneous TID CC    polyethylene glycol (PEG 3350)  17 g Oral BID    docusate sodium  100 mg Oral BID    atorvastatin  40 mg Oral Nightly    bethanechol  10 mg Oral TID    clonazePAM  0.5 mg Oral BID    divalproex ER  500 mg Oral BID    melatonin  3 mg Oral Nightly    montelukast  10 mg Oral Daily    oxybutynin ER  5 mg Oral Daily    rivaroxaban  20 mg Oral Q24H    gabapentin  300 mg Oral TID    QUEtiapine  200 mg Oral BID    ARIPiprazole  2 mg Oral Daily    insulin degludec  65 Units Subcutaneous Daily    cefepime  1 g Intravenous Q12H      PRN Meds:   HYDROcodone-acetaminophen **OR** HYDROcodone-acetaminophen    polyethylene glycol (PEG 3350)    sennosides    bisacodyl    fleet enema    ondansetron    prochlorperazine    glucose **OR** glucose **OR** glucose-vitamin C **OR** dextrose **OR** glucose **OR** glucose **OR** glucose-vitamin C    acetaminophen    cyclobenzaprine    meclizine    traZODone    Supplementary Documentation:        [facility]. Time spent on chart/note review, review of labs/imaging, discussion with patient, physical exam, discussion with staff, consultants, coordinating care, writing progress note, discussion of plan of care.      Johan Easton MD

## 2024-07-10 NOTE — PROGRESS NOTES
Patient is a 53 year old , single, female with past medical history of diabetes, bipolar 1 disorder, depression, MIKE, HTN, diverticulitis, neurogenic bladder, obesity, who was admitted to the hospital for Sepsis due to urinary tract infection (HCC): The patient has been demonstrating increased anxiety and depressive symptoms. Patient indicated for psych consult for evaluation and advise.    Consult Duration   The patient seen for over 50-minute, follow-up evaluation, over 50% counseling and coordinating care addressing increased anxiety and depressive symptoms..    Record reviewed, communication with attending, communication with RN and patient seen face to face evaluation.    History of Present Illness:       According to the team the patient is very pleasant and cooperative but very restless and jumpy and keep moving.    The patient today admitted that she has been feeling slightly better but continue focusing on anxiety and the dosage of Klonopin and difficulty sleeping.    Patient had multiple discussion otherwise agree on my proposal of lowering Wellbutrin and starting hydroxyzine.    The patient continue reporting racing thoughts, irritability, rumination with excessive worry.  Patient reporting her brain does not shut down.    She indicated feelings of hopelessness, helplessness, worthlessness, low mood, low energy, decreased motivation.      The patient is not demonstrating any carmel or psychosis  The patient denies auditory or visual hallucinations  The patient denies suicidal or homicidal ideation.    The patient has been demonstrating increased anxiety, restlessness, irritability and difficulty managing her emotion.  No suicidal ideation or indication for inpatient admission      Past Psychiatric/Medication History:  1. Prior diagnoses: MIKE, depression, Bipolar 1 disorder, Manipulative behavior. Patient states she also has OCD and Panic attacks sine adolescent years.  2. Past psychiatric  inpatient: none reported  3. Past outpatient history: Patient reports seeing a psychiatris at Nursing Home   4. Past suicide history: Patient denies   5. Medication history: Wellbutrin XL 24 hr tab 300mg, Klonopin 1mg q6h, Seroquel 300mg BID,      Social History:   The patient states the she lives in a Nursing home due to medical conditions. She is single and has never  but has one living son.      Denies any ETOH abuse, or illicit substance abuse. Previous smoker (5 pack history)     Family History:  No family history reported   Medical History:   Past Medical History  Past Medical History:    Bipolar 1 disorder (HCC)    Dental caries    Diabetes (HCC)    MIKE (generalized anxiety disorder)    High blood pressure    History of diverticulitis of colon    Manipulative behavior    Neurogenic bladder    Obesity (BMI 30-39.9)    Paraplegia (HCC)    Sepsis following intra-abdominal surgery (HCC)    Serotonin syndrome    Sleep apnea    Suprapubic catheter (HCC)    Transverse myelitis (HCC)       Past Surgical History  Past Surgical History:   Procedure Laterality Date    Arthroscopy of joint unlisted      knee          Cholecystectomy      Part removal colon w end colostomy      2013    Tonsillectomy         Family History  Family History   Adopted: Yes       Social History  Social History     Socioeconomic History    Marital status: Single   Tobacco Use    Smoking status: Former     Current packs/day: 1.00     Average packs/day: 1 pack/day for 5.0 years (5.0 ttl pk-yrs)     Types: Cigarettes    Tobacco comments:     quit 2006   Substance and Sexual Activity    Alcohol use: No    Drug use: No     Social Determinants of Health     Food Insecurity: No Food Insecurity (2024)    Food Insecurity     Food Insecurity: Never true   Transportation Needs: Unknown (2024)    Transportation Needs     Lack of Transportation: Patient declined   Housing Stability: Unknown (2024)    Housing Stability     Housing  Instability: Patient declined           Current Medications:  Current Facility-Administered Medications   Medication Dose Route Frequency    HYDROcodone-acetaminophen (Norco) 5-325 MG per tab 1 tablet  1 tablet Oral Q6H PRN    Or    HYDROcodone-acetaminophen (Norco) 5-325 MG per tab 2 tablet  2 tablet Oral Q6H PRN    insulin aspart (NovoLOG) 100 Units/mL FlexPen 24 Units  24 Units Subcutaneous TID CC    metoprolol tartrate (Lopressor) tab 25 mg  25 mg Oral 2x Daily(Beta Blocker)    polyethylene glycol (PEG 3350) (Miralax) 17 g oral packet 17 g  17 g Oral Daily PRN    sennosides (Senokot) tab 17.2 mg  17.2 mg Oral Nightly PRN    bisacodyl (Dulcolax) 10 MG rectal suppository 10 mg  10 mg Rectal Daily PRN    fleet enema (Fleet) 7-19 GM/118ML rectal enema 133 mL  1 enema Rectal Once PRN    ondansetron (Zofran) 4 MG/2ML injection 4 mg  4 mg Intravenous Q6H PRN    prochlorperazine (Compazine) 10 MG/2ML injection 5 mg  5 mg Intravenous Q8H PRN    sodium chloride 0.9% infusion   Intravenous Continuous    glucose (Dex4) 15 GM/59ML oral liquid 15 g  15 g Oral Q15 Min PRN    Or    glucose (Glutose) 40% oral gel 15 g  15 g Oral Q15 Min PRN    Or    glucose-vitamin C (Dex-4) chewable tab 4 tablet  4 tablet Oral Q15 Min PRN    Or    dextrose 50% injection 50 mL  50 mL Intravenous Q15 Min PRN    Or    glucose (Dex4) 15 GM/59ML oral liquid 30 g  30 g Oral Q15 Min PRN    Or    glucose (Glutose) 40% oral gel 30 g  30 g Oral Q15 Min PRN    Or    glucose-vitamin C (Dex-4) chewable tab 8 tablet  8 tablet Oral Q15 Min PRN    insulin aspart (NovoLOG) 100 Units/mL FlexPen 1-5 Units  1-5 Units Subcutaneous TID CC    polyethylene glycol (PEG 3350) (Miralax) 17 g oral packet 17 g  17 g Oral BID    docusate sodium (Colace) cap 100 mg  100 mg Oral BID    acetaminophen (Tylenol) tab 650 mg  650 mg Oral Q6H PRN    atorvastatin (Lipitor) tab 40 mg  40 mg Oral Nightly    bethanechol (Urecholine) tab 10 mg  10 mg Oral TID    buPROPion ER (Wellbutrin  XL) 24 hr tab 300 mg  300 mg Oral Daily    clonazePAM (KlonoPIN) tab 0.5 mg  0.5 mg Oral BID    cyclobenzaprine (Flexeril) tab 10 mg  10 mg Oral TID PRN    divalproex ER (Depakote ER) 24 hr tab 500 mg  500 mg Oral BID    DULoxetine (Cymbalta) DR cap 60 mg  60 mg Oral Daily    melatonin tab 3 mg  3 mg Oral Nightly    montelukast (Singulair) tab 10 mg  10 mg Oral Daily    oxybutynin ER (Ditropan-XL) 24 hr tab 5 mg  5 mg Oral Daily    rivaroxaban (Xarelto) tab 20 mg  20 mg Oral Q24H    gabapentin (Neurontin) cap 300 mg  300 mg Oral TID    QUEtiapine (SEROquel) tab 200 mg  200 mg Oral BID    meclizine (Antivert) tab 25 mg  25 mg Oral TID PRN    ARIPiprazole (Abilify) tab 2 mg  2 mg Oral Daily    traZODone (Desyrel) tab 50 mg  50 mg Oral Nightly PRN    insulin degludec (Tresiba) 100 units/mL flextouch 65 Units  65 Units Subcutaneous Daily    ceFEPIme (Maxipime) 1 g in sodium chloride 0.9% 100 mL IVPB-MBP  1 g Intravenous Q12H     Medications Prior to Admission   Medication Sig    QUEtiapine 50 MG Oral Tab Take 1 tablet (50 mg total) by mouth 2 (two) times daily.    clonazePAM 0.5 MG Oral Tab Take 1 tablet (0.5 mg total) by mouth 2 (two) times daily.    QUEtiapine 200 MG Oral Tab Take 1 tablet (200 mg total) by mouth 2 (two) times daily.    DULoxetine 30 MG Oral Cap DR Particles Take 2 capsules (60 mg total) by mouth daily.    gabapentin 50 MG Oral Tab Take 300 mg by mouth 3 (three) times daily.    psyllium 28 % Oral Powd Pack Take 1 packet by mouth every 8 (eight) hours as needed (constipation).    Nystatin 549412 UNIT/GM External Powder Apply 1 Application topically as needed for Dry Skin. Under breast and groin area    atorvastatin 40 MG Oral Tab Take 1 tablet (40 mg total) by mouth nightly.    sennosides 8.6 MG Oral Tab Take 2 tablets (17.2 mg total) by mouth 2 (two) times daily.    Icosapent Ethyl 1 g Oral Cap Take 1 capsule by mouth 4 (four) times daily.    insulin aspart 100 Units/mL Subcutaneous Solution  Pen-injector Inject 24 Units into the skin 3 (three) times daily with meals.    rivaroxaban (XARELTO) 20 MG Oral Tab Take 1 tablet (20 mg total) by mouth daily.    insulin glargine 100 UNIT/ML Subcutaneous Solution Inject 56 Units into the skin nightly.    insulin glargine 100 UNIT/ML Subcutaneous Solution Inject 20 Units into the skin every morning.    ergocalciferol 1.25 MG (48775 UT) Oral Cap Take 1 capsule (50,000 Units total) by mouth every 7 days. Every monday    oxybutynin ER 5 MG Oral Tablet 24 Hr Take 1 tablet (5 mg total) by mouth daily.    bethanechol 10 MG Oral Tab Take 1 tablet (10 mg total) by mouth 3 (three) times daily.    loratadine 10 MG Oral Tab Take 1 tablet (10 mg total) by mouth daily.    cyclobenzaprine 10 MG Oral Tab Take 1 tablet (10 mg total) by mouth 3 (three) times daily as needed for Muscle spasms.    phenazopyridine 200 MG Oral Tab Take 1 tablet (200 mg total) by mouth 3 (three) times daily.    Insulin Aspart 100 UNIT/ML Subcutaneous Solution Cartridge SS: 150-200 2units, 201-250 4 units, 251-300 6 units, 301-350 8 units, 351-400 10 units, over 401 call md    Multiple Vitamins-Minerals (THERA-M) Oral Tab Take 1 tablet by mouth daily.    Montelukast Sodium 10 MG Oral Tab Take 1 tablet (10 mg total) by mouth daily.    BuPROPion HCl ER, XL, 300 MG Oral Tablet 24 Hr Take 1 tablet (300 mg total) by mouth daily.    divalproex Sodium  MG Oral Tablet 24 Hr Take 1 tablet (500 mg total) by mouth 2 (two) times daily.    Acidophilus/Pectin Oral Cap Take 1 capsule by mouth 2 (two) times daily.    ondansetron 4 MG Oral Tablet Dispersible Take 2 tablets (8 mg total) by mouth every 8 (eight) hours as needed for Nausea.    MetFORMIN HCl ER (GLUCOPHAGE-XR) 750 MG Oral Tablet 24 Hr Take 1 tablet (750 mg total) by mouth daily with breakfast.    meclizine 12.5 MG Oral Tab Take 1 tablet (12.5 mg total) by mouth 3 (three) times daily as needed for Dizziness.    [] cefdinir 300 MG Oral Cap Take 1  capsule (300 mg total) by mouth 2 (two) times daily for 7 days.    glucagon (BAQSIMI ONE PACK) 3 MG/DOSE Nasal nasal powder 3 mg by Nasal route once as needed for Low blood glucose.    acetaminophen 325 MG Oral Tab Take 2 tablets (650 mg total) by mouth every 6 (six) hours as needed for Pain.    Melatonin 3 MG Oral Cap Take 1 capsule (3 mg total) by mouth at bedtime.    methenamine 1 g Oral Tab Take 0.5 tablets (0.5 g total) by mouth 2 (two) times daily with meals.    albuterol 108 (90 Base) MCG/ACT Inhalation Aero Soln Inhale 2 puffs into the lungs every 6 (six) hours as needed for Wheezing or Shortness of Breath.    acetaminophen (TYLENOL EXTRA STRENGTH) 500 MG Oral Tab Take 325 mg by mouth every 4 (four) hours as needed for Pain or Fever.       Allergies  Allergies   Allergen Reactions    Pcn [Penicillins]     Radiology Contrast Iodinated Dyes     Sulfa Antibiotics        Review of Systems:   As by Admitting/Attending    Results:   Laboratory Data:  Lab Results   Component Value Date    WBC 10.0 07/10/2024    HGB 10.9 (L) 07/10/2024    HCT 34.0 (L) 07/10/2024    .0 07/10/2024    CREATSERUM 0.99 07/10/2024    BUN 21 07/10/2024     (L) 07/10/2024    K 4.8 07/10/2024    CL 99 07/10/2024    CO2 25.0 07/10/2024     (H) 07/10/2024    CA 10.2 07/10/2024    ALB 3.8 05/10/2024    ALKPHO 74 05/10/2024    TP 6.5 05/10/2024    AST 24 05/10/2024    ALT 18 05/10/2024    PTT 26.4 12/05/2023    LIP 36 12/05/2023    DDIMER 0.92 (H) 12/05/2023    MG 1.7 12/12/2023         Imaging:  CT ABDOMEN+PELVIS(CONTRAST ONLY)(CPT=74177)    Result Date: 7/10/2024  CONCLUSION:   Constipation.  No bowel obstruction.    Mild bilateral hydroureteronephrosis down to the level of the bladder which is thickened and decompressed around a Florentino catheter.  No obstructing calculi   Dictated by (CST): Yordan Guerrero MD on 7/10/2024 at 7:09 AM     Finalized by (CST): Yordan Guerrero MD on 7/10/2024 at 7:17 AM          XR ABDOMEN (1 VIEW)  (CPT=74018)    Result Date: 7/9/2024  CONCLUSION:   Dilated small bowel loop in the left hemiabdomen measuring 3.5 cm.  Differential considerations include focal ileus or early/partial small bowel obstruction.  Further assessment can be made with cross-sectional imaging as clinically indicated.  Secure message sent to Dr. Hager on 07/09/2024 at 9:10 a.m.  Nonspecific moderate gaseous distension of the stomach.  A preliminary report was issued by the [facility] include [facility] beds at the [facility] Radiology teleradiology service.     Dictated by (CST): Ene Pérez MD on 7/09/2024 at 9:08 AM     Finalized by (CST): Ene Pérez MD on 7/09/2024 at 9:12 AM           Vital Signs:   Blood pressure 124/61, pulse (!) 127, temperature 98.3 °F (36.8 °C), temperature source Oral, resp. rate 25, weight 99.1 kg (218 lb 6.4 oz), SpO2 94%.    Mental Status Exam:   Appearance: Stated age female, obese, in hospital gown, laying down in hospital bed.  Psychomotor: No psychomotor agitation, or retardation.   Orientation: Alert and oriented to person, place, time and condition.  Gait: Not evaluated.  Attitude/Coorperation: Cooperative and attentive.  Behavior: Appropriate.  Speech: Regular rate and rhythm speech.  Mood: Patient reporting depressed and anxious mood.  Affect: Anxious and restless affect..  Thought process: Circumstantial and distracted thought process.  Thought content: Patient denies any suicidal or homicidal ideation.  Perceptions: Patient denies any auditory or visual hallucinations.  Concentration: Grossly intact.  Memory: Grossly intact.  Intellect: Average.  Judgment and Insight: Questionable.     Impression:     Bipolar disorder type I recent episode depression moderate to severe without psychotic feature.  Generalized anxiety disorder.  Sepsis due to urinary tract infection (HCC)    Lactic acid acidosis    Constipation, unspecified constipation type    Patient is a 53 year old , single, female with past medical history of diabetes,  bipolar 1 disorder, depression, MIKE, HTN, diverticulitis, neurogenic bladder, obesity, who was admitted to the hospital for Sepsis due to urinary tract infection (HCC): The patient has been demonstrating increased anxiety and depressive symptoms.    7/9/2024: Patient has been demonstrating increased anxiety, depression, lack of energy, lack of motivation hopelessness, helplessness and increased panic attack.    7/10/2024: The patient continued reporting restlessness, anxiety and difficulty relaxing with increased physical hyperness.    Discussed risk and benefit, acknowledging the current symptom and severity.  At this point, I would recommend the following approach:     Focus on safety  Focus on education and support.  Focus on insight orientation helping the patient understand diagnosis and treatment plan.  Lower Wellbutrin  mg daily.  Continue klonopin 0.5 mg BID  Continue Depakote 500 mg BID  Increase Cymbalta to 90 mg daily.  Start hydroxyzine 10 mg 3 times daily.  Continue seroquel 200 mg nightly.  Continue Abilify 2 mg daily.  Processed with patient at length, the initiation of the above psychotropic medications I advised the patient of the risks, benefits, alternatives and potential side effects. The patient consents to administration of the medications and understands the right to refuse medications at any time. The patient verbalized understanding.   Coordinate plan with team    Orders This Visit:  Orders Placed This Encounter   Procedures    Basic Metabolic Panel (8)    CBC With Differential With Platelet    Troponin I (High Sensitivity)    Lactic Acid, Plasma    Urinalysis with Culture Reflex    Lactic Acid Reflex Post Positive    Basic Metabolic Panel (8)    CBC With Differential With Platelet    Lactic Acid Post Positive    Hemoglobin A1C    Hemoglobin A1C    Lactic Acid, Plasma    Lactic Acid Reflex Post Positive    Lactic Acid Post Positive    Urine Culture, Routine    Blood Culture     Emergency MRSA Screen by PCR    Blood Culture       Meds This Visit:  Requested Prescriptions      No prescriptions requested or ordered in this encounter       Víctor Qureshi MD  7/10/2024    Note to Patient: The 21st Century Cures Act makes medical notes like these available to patients in the interest of transparency. However, be advised this is a medical document. It is intended as peer to peer communication. It is written in medical language and may contain abbreviations or verbiage that are unfamiliar. It may appear blunt or direct. Medical documents are intended to carry relevant information, facts as evident, and the clinical opinion of the practitioner. This note may have been transcribed using a voice dictation system. Voice recognition errors may occur. This should not be taken to alter the content or meaning of this note.

## 2024-07-10 NOTE — PLAN OF CARE
Problem: Patient Centered Care  Goal: Patient preferences are identified and integrated in the patient's plan of care  Description: Interventions:  - What would you like us to know as we care for you? My mom is my POA  - Provide timely, complete, and accurate information to patient/family  - Incorporate patient and family knowledge, values, beliefs, and cultural backgrounds into the planning and delivery of care  - Encourage patient/family to participate in care and decision-making at the level they choose  - Honor patient and family perspectives and choices  Outcome: Progressing     Problem: GASTROINTESTINAL - ADULT  Goal: Maintains or returns to baseline bowel function  Description: INTERVENTIONS:  - Assess bowel function  - Maintain adequate hydration with IV or PO as ordered and tolerated  - Evaluate effectiveness of GI medications  - Encourage mobilization and activity  - Obtain nutritional consult as needed  - Establish a toileting routine/schedule  - Consider collaborating with pharmacy to review patient's medication profile  Outcome: Progressing     Problem: GENITOURINARY - ADULT  Goal: Absence of urinary retention  Description: INTERVENTIONS:  - Assess patient’s ability to void and empty bladder  - Monitor intake/output and perform bladder scan as needed  - Follow urinary retention protocol/standard of care  - Consider collaborating with pharmacy to review patient's medication profile  - Implement strategies to promote bladder emptying  Outcome: Progressing     Problem: SKIN/TISSUE INTEGRITY - ADULT  Goal: Skin integrity remains intact  Description: INTERVENTIONS  - Assess and document risk factors for pressure ulcer development  - Assess and document skin integrity  - Monitor for areas of redness and/or skin breakdown  - Initiate interventions, skin care algorithm/standards of care as needed  Outcome: Progressing     Problem: Impaired Functional Mobility  Goal: Achieve highest/safest level of  mobility/gait  Description: Interventions:  - Assess patient's functional ability and stability  - Promote increasing activity/tolerance for mobility and gait  - Educate and engage patient/family in tolerated activity level and precautions  Outcome: Progressing     Assumed care at 2300. Plan for CT scan with contrast at 6AM completed. Per pt no noted allergic reaction. Scheduled meds given per order. Safety and fall precautions maintained- bed alarm on, bed locked in lowest position, call light within reach.

## 2024-07-10 NOTE — PLAN OF CARE
Problem: Patient Centered Care  Goal: Patient preferences are identified and integrated in the patient's plan of care  Description: Interventions:  - What would you like us to know as we care for you? My mom is my POA  - Provide timely, complete, and accurate information to patient/family  - Incorporate patient and family knowledge, values, beliefs, and cultural backgrounds into the planning and delivery of care  - Encourage patient/family to participate in care and decision-making at the level they choose  - Honor patient and family perspectives and choices  Outcome: Progressing     Problem: GASTROINTESTINAL - ADULT  Goal: Maintains or returns to baseline bowel function  Description: INTERVENTIONS:  - Assess bowel function  - Maintain adequate hydration with IV or PO as ordered and tolerated  - Evaluate effectiveness of GI medications  - Encourage mobilization and activity  - Obtain nutritional consult as needed  - Establish a toileting routine/schedule  - Consider collaborating with pharmacy to review patient's medication profile  Outcome: Progressing     Problem: GENITOURINARY - ADULT  Goal: Absence of urinary retention  Description: INTERVENTIONS:  - Assess patient’s ability to void and empty bladder  - Monitor intake/output and perform bladder scan as needed  - Follow urinary retention protocol/standard of care  - Consider collaborating with pharmacy to review patient's medication profile  - Implement strategies to promote bladder emptying  Outcome: Progressing     Problem: SKIN/TISSUE INTEGRITY - ADULT  Goal: Skin integrity remains intact  Description: INTERVENTIONS  - Assess and document risk factors for pressure ulcer development  - Assess and document skin integrity  - Monitor for areas of redness and/or skin breakdown  - Initiate interventions, skin care algorithm/standards of care as needed  Outcome: Progressing     Problem: Impaired Functional Mobility  Goal: Achieve highest/safest level of  mobility/gait  Description: Interventions:  - Assess patient's functional ability and stability  - Promote increasing activity/tolerance for mobility and gait  - Educate and engage patient/family in tolerated activity level and precautions  - Recommend use of chair position in bed 3 times per day  Outcome: Progressing

## 2024-07-10 NOTE — PROGRESS NOTES
Therapy orders received via functional mobility screen automatic order, chart reviewed. Patient is a LTC resident, per old documentation has been functionally bedbound since 2016. Acute care PT services not indicated at this time, will sign off. Communicated this with RN.     Thank you,  Rosario Nelson, PT, DPT

## 2024-07-10 NOTE — CM/SW NOTE
07/10/24 1400   CM/SW Referral Data   Referral Source    Reason for Referral Discharge planning   Informant Patient   Patient Info   [facility]   Discharge Needs   Anticipated D/C needs Long term care facility     Confirmed with Clari that she resides at [facility].  Plan is to return to [facility] [place].    Clari DEUTSCHN RN CRRN   RN Case Manager  [phone]

## 2024-07-11 LAB
ANION GAP SERPL CALC-SCNC: 6 MMOL/L (ref 0–18)
BASOPHILS # BLD AUTO: 0.09 X10(3) UL (ref 0–0.2)
BASOPHILS NFR BLD AUTO: 0.7 %
BUN BLD-MCNC: 24 MG/DL (ref 9–23)
BUN/CREAT SERPL: 26.7 (ref 10–20)
CALCIUM BLD-MCNC: 9.4 MG/DL (ref 8.7–10.4)
CHLORIDE SERPL-SCNC: 103 MMOL/L (ref 98–112)
CO2 SERPL-SCNC: 28 MMOL/L (ref 21–32)
CREAT BLD-MCNC: 0.9 MG/DL
DEPRECATED RDW RBC AUTO: 57.1 FL (ref 35.1–46.3)
EGFRCR SERPLBLD CKD-EPI 2021: 76 ML/MIN/1.73M2 (ref 60–?)
EOSINOPHIL # BLD AUTO: 0.16 X10(3) UL (ref 0–0.7)
EOSINOPHIL NFR BLD AUTO: 1.2 %
ERYTHROCYTE [DISTWIDTH] IN BLOOD BY AUTOMATED COUNT: 17.1 % (ref 11–15)
GLUCOSE BLD-MCNC: 143 MG/DL (ref 70–99)
GLUCOSE BLDC GLUCOMTR-MCNC: 113 MG/DL (ref 70–99)
GLUCOSE BLDC GLUCOMTR-MCNC: 134 MG/DL (ref 70–99)
GLUCOSE BLDC GLUCOMTR-MCNC: 184 MG/DL (ref 70–99)
GLUCOSE BLDC GLUCOMTR-MCNC: 94 MG/DL (ref 70–99)
HCT VFR BLD AUTO: 32 %
HGB BLD-MCNC: 10.4 G/DL
IMM GRANULOCYTES # BLD AUTO: 0.29 X10(3) UL (ref 0–1)
IMM GRANULOCYTES NFR BLD: 2.1 %
LYMPHOCYTES # BLD AUTO: 4.91 X10(3) UL (ref 1–4)
LYMPHOCYTES NFR BLD AUTO: 35.6 %
MCH RBC QN AUTO: 30.1 PG (ref 26–34)
MCHC RBC AUTO-ENTMCNC: 32.5 G/DL (ref 31–37)
MCV RBC AUTO: 92.5 FL
MONOCYTES # BLD AUTO: 0.73 X10(3) UL (ref 0.1–1)
MONOCYTES NFR BLD AUTO: 5.3 %
NEUTROPHILS # BLD AUTO: 7.61 X10 (3) UL (ref 1.5–7.7)
NEUTROPHILS # BLD AUTO: 7.61 X10(3) UL (ref 1.5–7.7)
NEUTROPHILS NFR BLD AUTO: 55.1 %
OSMOLALITY SERPL CALC.SUM OF ELEC: 291 MOSM/KG (ref 275–295)
PLATELET # BLD AUTO: 261 10(3)UL (ref 150–450)
POTASSIUM SERPL-SCNC: 4.2 MMOL/L (ref 3.5–5.1)
RBC # BLD AUTO: 3.46 X10(6)UL
SODIUM SERPL-SCNC: 137 MMOL/L (ref 136–145)
WBC # BLD AUTO: 13.8 X10(3) UL (ref 4–11)

## 2024-07-11 PROCEDURE — 99233 SBSQ HOSP IP/OBS HIGH 50: CPT | Performed by: HOSPITALIST

## 2024-07-11 PROCEDURE — 99233 SBSQ HOSP IP/OBS HIGH 50: CPT | Performed by: OTHER

## 2024-07-11 RX ORDER — FAMOTIDINE 20 MG/1
20 TABLET, FILM COATED ORAL 2 TIMES DAILY
Status: DISCONTINUED | OUTPATIENT
Start: 2024-07-11 | End: 2024-07-12

## 2024-07-11 RX ORDER — HYDROXYZINE HYDROCHLORIDE 10 MG/1
10 TABLET, FILM COATED ORAL 3 TIMES DAILY
Status: DISCONTINUED | OUTPATIENT
Start: 2024-07-11 | End: 2024-07-13

## 2024-07-11 RX ORDER — HYDROXYZINE HYDROCHLORIDE 10 MG/1
10 TABLET, FILM COATED ORAL 3 TIMES DAILY PRN
Status: DISCONTINUED | OUTPATIENT
Start: 2024-07-11 | End: 2024-07-13

## 2024-07-11 RX ORDER — ARIPIPRAZOLE 5 MG/1
5 TABLET ORAL DAILY
Status: DISCONTINUED | OUTPATIENT
Start: 2024-07-12 | End: 2024-07-13

## 2024-07-11 NOTE — CM/SW NOTE
On 7/10/2024, CM sent return referral via aidin to [facility].       SW/CM to remain available for support and/or discharge planning.     Marsha Inman, MSW, LSW   x [phone]

## 2024-07-11 NOTE — SPIRITUAL CARE NOTE
The  provided active listening and compassion to pt. Spiritual care is available as needed.    natalia Chen

## 2024-07-11 NOTE — PLAN OF CARE
Problem: Patient Centered Care  Goal: Patient preferences are identified and integrated in the patient's plan of care  Description: Interventions:  - What would you like us to know as we care for you? My mom is my POA  - Provide timely, complete, and accurate information to patient/family  - Incorporate patient and family knowledge, values, beliefs, and cultural backgrounds into the planning and delivery of care  - Encourage patient/family to participate in care and decision-making at the level they choose  - Honor patient and family perspectives and choices  Outcome: Progressing     Problem: GASTROINTESTINAL - ADULT  Goal: Maintains or returns to baseline bowel function  Description: INTERVENTIONS:  - Assess bowel function  - Maintain adequate hydration with IV or PO as ordered and tolerated  - Evaluate effectiveness of GI medications  - Encourage mobilization and activity  - Obtain nutritional consult as needed  - Establish a toileting routine/schedule  - Consider collaborating with pharmacy to review patient's medication profile  Outcome: Progressing     Problem: GENITOURINARY - ADULT  Goal: Absence of urinary retention  Description: INTERVENTIONS:  - Assess patient’s ability to void and empty bladder  - Monitor intake/output and perform bladder scan as needed  - Follow urinary retention protocol/standard of care  - Consider collaborating with pharmacy to review patient's medication profile  - Implement strategies to promote bladder emptying  Outcome: Progressing     Problem: SKIN/TISSUE INTEGRITY - ADULT  Goal: Skin integrity remains intact  Description: INTERVENTIONS  - Assess and document risk factors for pressure ulcer development  - Assess and document skin integrity  - Monitor for areas of redness and/or skin breakdown  - Initiate interventions, skin care algorithm/standards of care as needed  Outcome: Progressing     Problem: Impaired Functional Mobility  Goal: Achieve highest/safest level of  mobility/gait  Description: Interventions:  - Assess patient's functional ability and stability  - Promote increasing activity/tolerance for mobility and gait  - Educate and engage patient/family in tolerated activity level and precautions  utcome: Progressing

## 2024-07-11 NOTE — PROGRESS NOTES
[facility]  Progress Note     Clari Rosa  : 1970    Status: Inpatient  Day #: 2    Attending: Johan Easton MD  PCP: Brian Bob MD     Assessment and Plan:    Sepsis due to urinary tract infection related to chronic suprapubic catheter  Lactic acidosis, leukocytosis  - cont cefepime IV per ID  - cont IVF  - follow lactic acid  - f/u urine culture - serratia marcescens, enterococcus faecalis  - f/u blood cultures  - ID consulted     Left tooth ache  Probably infected.  Pt will be on abx as above.  Outpt oral surgeon follow up     Constipation  Abx x-ray reveals possible PSBO.  - CT abdomen/pelvis without obstruction. +constipation  - pt having BMs now  - cont colace and miralax bid     MIKE  Bipolar 1 disorder.  Pt on multiple psych meds.  Requests to see psych  - psych consult  - cont, meds     IDDM - poorly controlled  -cont tresiba, novolog +ISS - adjusted dose     Hx of PE  - cont xarelto     Paraplegia  - supportive care    Mild hyponatremia  -chronic  -monitor     DVT Mechanical Prophylaxis:   SCDs,    DVT Pharmacologic Prophylaxis   Medication    rivaroxaban (Xarelto) tab 20 mg               Subjective:      Initial Chief Complaint:  dizziness    Had 1 large BMs yesterday. Eating ok. No n/v.     10 point ROS completed and was negative, except for pertinent positive and negatives stated in subjective.      Objective:      Temp:  [97.8 °F (36.6 °C)-98.5 °F (36.9 °C)] 97.8 °F (36.6 °C)  Pulse:  [] 96  Resp:  [16-25] 16  BP: (119-135)/(72-98) 129/98  SpO2:  [93 %-95 %] 95 %  General:  Alert, no distress  HEENT:  Normocephalic, atraumatic  Cardiac:  Regular rate, regular rhythm  Pulmonary:  Clear to auscultation bilaterally, respirations unlabored  Gastrointestinal:  Soft, +BS  Musculoskeletal:  No joint swelling  Extremities:  No edema, no cyanosis, no clubbing  Neurologic:  nonfocal  Psychiatric:  Normal affect, calm and appropriate    Intake/Output Summary (Last 24 hours) at  7/11/2024 1438  Last data filed at 7/11/2024 0952  Gross per 24 hour   Intake 480 ml   Output 1400 ml   Net -920 ml         Recent Labs   Lab 07/09/24  0105 07/10/24  0540 07/11/24  0451   WBC 12.1* 10.0 13.8*   HGB 11.0* 10.9* 10.4*   HCT 34.6* 34.0* 32.0*   .0 264.0 261.0   RBC 3.68* 3.61* 3.46*   MCV 94.0 94.2 92.5   MCH 29.9 30.2 30.1   MCHC 31.8 32.1 32.5   RDW 16.3* 16.4* 17.1*   NEPRELIM 6.78 8.42* 7.61     Recent Labs   Lab 07/09/24  0105 07/10/24  0540 07/11/24  0451   BUN 14 21 24*   CREATSERUM 0.89 0.99 0.90   CA 9.6 10.2 9.4   * 133* 137   K 4.2 4.8 4.2   CL 98 99 103   CO2 30.0 25.0 28.0   * 358* 143*       CT ABDOMEN+PELVIS(CONTRAST ONLY)(CPT=74177)    Result Date: 7/10/2024  CONCLUSION:   Constipation.  No bowel obstruction.    Mild bilateral hydroureteronephrosis down to the level of the bladder which is thickened and decompressed around a Florentino catheter.  No obstructing calculi   Dictated by (CST): Yordan Guerrero MD on 7/10/2024 at 7:09 AM     Finalized by (CST): Yordan Guerrero MD on 7/10/2024 at 7:17 AM             Medications:   famotidine  20 mg Oral BID    [START ON 7/12/2024] ARIPiprazole  5 mg Oral Daily    hydrOXYzine  10 mg Oral TID    insulin aspart  24 Units Subcutaneous TID CC    metoprolol tartrate  25 mg Oral 2x Daily(Beta Blocker)    buPROPion ER  150 mg Oral Daily    DULoxetine  90 mg Oral Daily    insulin aspart  1-11 Units Subcutaneous TID CC and HS    insulin degludec  75 Units Subcutaneous Daily    polyethylene glycol (PEG 3350)  17 g Oral BID    docusate sodium  100 mg Oral BID    atorvastatin  40 mg Oral Nightly    bethanechol  10 mg Oral TID    clonazePAM  0.5 mg Oral BID    divalproex ER  500 mg Oral BID    melatonin  3 mg Oral Nightly    montelukast  10 mg Oral Daily    oxybutynin ER  5 mg Oral Daily    rivaroxaban  20 mg Oral Q24H    gabapentin  300 mg Oral TID    QUEtiapine  200 mg Oral BID    cefepime  1 g Intravenous Q12H      PRN Meds:   hydrOXYzine     HYDROcodone-acetaminophen **OR** HYDROcodone-acetaminophen    sodium chloride    polyethylene glycol (PEG 3350)    sennosides    bisacodyl    fleet enema    ondansetron    prochlorperazine    glucose **OR** glucose **OR** glucose-vitamin C **OR** dextrose **OR** glucose **OR** glucose **OR** glucose-vitamin C    acetaminophen    cyclobenzaprine    meclizine    traZODone    Supplementary Documentation:        MDM High. Time spent on chart/note review, review of labs/imaging, discussion with patient, physical exam, discussion with staff, consultants, coordinating care, writing progress note, discussion of plan of care.      Johan Easton MD

## 2024-07-11 NOTE — PROGRESS NOTES
Patient is a 53 year old , single, female with past medical history of diabetes, bipolar 1 disorder, depression, MIKE, HTN, diverticulitis, neurogenic bladder, obesity, who was admitted to the hospital for Sepsis due to urinary tract infection (HCC): The patient has been demonstrating increased anxiety and depressive symptoms. Patient indicated for psych consult for evaluation and advise.    Consult Duration   The patient seen for over 50-minute, follow-up evaluation, over 50% counseling and coordinating care addressing increased anxiety and depressive symptoms..    Record reviewed, communication with attending, communication with RN and patient seen face to face evaluation.    History of Present Illness:       According to the team the patient doing much better but she frequently reporting anxiety and asking for medication and waiting for my visit.      The patient today presented with much better calmness with lack of restlessness and not better cooperation otherwise providing multiple story indicating intense somatization and some drug-seeking behavior.  Patient reporting multiple episodes of the day \"outside of the anxiety where I feel I am almost passed out\".  The patient reporting that possibly hypoglycemia and rationalizing for not taking her insulin when been off her this afternoon.    The patient asking for higher dosage of Klonopin and upon refusing agreed to take additional as needed medication of hydroxyzine.    Patient overall seem improved in her mood and emotion otherwise continue asking for more.    The patient continue reporting racing thoughts, irritability, rumination with excessive worry.  Patient reporting her brain does not shut down.    She indicated feelings of hopelessness, helplessness, worthlessness, low mood, low energy, decreased motivation.      The patient is not demonstrating any carmel or psychosis  The patient denies auditory or visual hallucinations  The patient denies  suicidal or homicidal ideation.    The patient has been demonstrating increased anxiety, restlessness, irritability and difficulty managing her emotion.  No suicidal ideation or indication for inpatient admission      Past Psychiatric/Medication History:  1. Prior diagnoses: MIKE, depression, Bipolar 1 disorder, Manipulative behavior. Patient states she also has OCD and Panic attacks sine adolescent years.  2. Past psychiatric inpatient: none reported  3. Past outpatient history: Patient reports seeing a psychiatris at Nursing Home   4. Past suicide history: Patient denies   5. Medication history: Wellbutrin XL 24 hr tab 300mg, Klonopin 1mg q6h, Seroquel 300mg BID,      Social History:   The patient states the she lives in a Nursing home due to medical conditions. She is single and has never  but has one living son.      Denies any ETOH abuse, or illicit substance abuse. Previous smoker (5 pack history)     Family History:  No family history reported   Medical History:   Past Medical History  Past Medical History:    Bipolar 1 disorder (HCC)    Dental caries    Diabetes (HCC)    MIKE (generalized anxiety disorder)    High blood pressure    History of diverticulitis of colon    Manipulative behavior    Neurogenic bladder    Obesity (BMI 30-39.9)    Paraplegia (HCC)    Sepsis following intra-abdominal surgery (HCC)    Serotonin syndrome    Sleep apnea    Suprapubic catheter (HCC)    Transverse myelitis (HCC)       Past Surgical History  Past Surgical History:   Procedure Laterality Date    Arthroscopy of joint unlisted      knee          Cholecystectomy      Part removal colon w end colostomy      2013    Tonsillectomy         Family History  Family History   Adopted: Yes       Social History  Social History     Socioeconomic History    Marital status: Single   Tobacco Use    Smoking status: Former     Current packs/day: 1.00     Average packs/day: 1 pack/day for 5.0 years (5.0 ttl pk-yrs)     Types:  Cigarettes    Tobacco comments:     quit 2006   Substance and Sexual Activity    Alcohol use: No    Drug use: No     Social Determinants of Health     Food Insecurity: No Food Insecurity (7/9/2024)    Food Insecurity     Food Insecurity: Never true   Transportation Needs: Unknown (7/9/2024)    Transportation Needs     Lack of Transportation: Patient declined   Housing Stability: Unknown (7/9/2024)    Housing Stability     Housing Instability: Patient declined           Current Medications:  Current Facility-Administered Medications   Medication Dose Route Frequency    famotidine (Pepcid) tab 20 mg  20 mg Oral BID    HYDROcodone-acetaminophen (Norco) 5-325 MG per tab 1 tablet  1 tablet Oral Q6H PRN    Or    HYDROcodone-acetaminophen (Norco) 5-325 MG per tab 2 tablet  2 tablet Oral Q6H PRN    insulin aspart (NovoLOG) 100 Units/mL FlexPen 24 Units  24 Units Subcutaneous TID CC    metoprolol tartrate (Lopressor) tab 25 mg  25 mg Oral 2x Daily(Beta Blocker)    buPROPion ER (Wellbutrin XL) 24 hr tab 150 mg  150 mg Oral Daily    DULoxetine (Cymbalta) DR cap 90 mg  90 mg Oral Daily    hydrOXYzine HCl (ATARAX) 10 MG/5ML syrup 10 mg  10 mg Oral TID    insulin aspart (NovoLOG) 100 Units/mL FlexPen 1-11 Units  1-11 Units Subcutaneous TID CC and HS    insulin degludec (Tresiba) 100 units/mL flextouch 75 Units  75 Units Subcutaneous Daily    sodium chloride (Saline Mist) 0.65 % nasal solution 1 spray  1 spray Each Nare Q3H PRN    polyethylene glycol (PEG 3350) (Miralax) 17 g oral packet 17 g  17 g Oral Daily PRN    sennosides (Senokot) tab 17.2 mg  17.2 mg Oral Nightly PRN    bisacodyl (Dulcolax) 10 MG rectal suppository 10 mg  10 mg Rectal Daily PRN    fleet enema (Fleet) 7-19 GM/118ML rectal enema 133 mL  1 enema Rectal Once PRN    ondansetron (Zofran) 4 MG/2ML injection 4 mg  4 mg Intravenous Q6H PRN    prochlorperazine (Compazine) 10 MG/2ML injection 5 mg  5 mg Intravenous Q8H PRN    glucose (Dex4) 15 GM/59ML oral liquid 15 g   15 g Oral Q15 Min PRN    Or    glucose (Glutose) 40% oral gel 15 g  15 g Oral Q15 Min PRN    Or    glucose-vitamin C (Dex-4) chewable tab 4 tablet  4 tablet Oral Q15 Min PRN    Or    dextrose 50% injection 50 mL  50 mL Intravenous Q15 Min PRN    Or    glucose (Dex4) 15 GM/59ML oral liquid 30 g  30 g Oral Q15 Min PRN    Or    glucose (Glutose) 40% oral gel 30 g  30 g Oral Q15 Min PRN    Or    glucose-vitamin C (Dex-4) chewable tab 8 tablet  8 tablet Oral Q15 Min PRN    polyethylene glycol (PEG 3350) (Miralax) 17 g oral packet 17 g  17 g Oral BID    docusate sodium (Colace) cap 100 mg  100 mg Oral BID    acetaminophen (Tylenol) tab 650 mg  650 mg Oral Q6H PRN    atorvastatin (Lipitor) tab 40 mg  40 mg Oral Nightly    bethanechol (Urecholine) tab 10 mg  10 mg Oral TID    clonazePAM (KlonoPIN) tab 0.5 mg  0.5 mg Oral BID    cyclobenzaprine (Flexeril) tab 10 mg  10 mg Oral TID PRN    divalproex ER (Depakote ER) 24 hr tab 500 mg  500 mg Oral BID    melatonin tab 3 mg  3 mg Oral Nightly    montelukast (Singulair) tab 10 mg  10 mg Oral Daily    oxybutynin ER (Ditropan-XL) 24 hr tab 5 mg  5 mg Oral Daily    rivaroxaban (Xarelto) tab 20 mg  20 mg Oral Q24H    gabapentin (Neurontin) cap 300 mg  300 mg Oral TID    QUEtiapine (SEROquel) tab 200 mg  200 mg Oral BID    meclizine (Antivert) tab 25 mg  25 mg Oral TID PRN    ARIPiprazole (Abilify) tab 2 mg  2 mg Oral Daily    traZODone (Desyrel) tab 50 mg  50 mg Oral Nightly PRN    ceFEPIme (Maxipime) 1 g in sodium chloride 0.9% 100 mL IVPB-MBP  1 g Intravenous Q12H     Medications Prior to Admission   Medication Sig    QUEtiapine 50 MG Oral Tab Take 1 tablet (50 mg total) by mouth 2 (two) times daily.    clonazePAM 0.5 MG Oral Tab Take 1 tablet (0.5 mg total) by mouth 2 (two) times daily.    QUEtiapine 200 MG Oral Tab Take 1 tablet (200 mg total) by mouth 2 (two) times daily.    DULoxetine 30 MG Oral Cap DR Particles Take 2 capsules (60 mg total) by mouth daily.    gabapentin 50 MG  Oral Tab Take 300 mg by mouth 3 (three) times daily.    psyllium 28 % Oral Powd Pack Take 1 packet by mouth every 8 (eight) hours as needed (constipation).    Nystatin 737400 UNIT/GM External Powder Apply 1 Application topically as needed for Dry Skin. Under breast and groin area    atorvastatin 40 MG Oral Tab Take 1 tablet (40 mg total) by mouth nightly.    sennosides 8.6 MG Oral Tab Take 2 tablets (17.2 mg total) by mouth 2 (two) times daily.    Icosapent Ethyl 1 g Oral Cap Take 1 capsule by mouth 4 (four) times daily.    insulin aspart 100 Units/mL Subcutaneous Solution Pen-injector Inject 24 Units into the skin 3 (three) times daily with meals.    rivaroxaban (XARELTO) 20 MG Oral Tab Take 1 tablet (20 mg total) by mouth daily.    insulin glargine 100 UNIT/ML Subcutaneous Solution Inject 56 Units into the skin nightly.    insulin glargine 100 UNIT/ML Subcutaneous Solution Inject 20 Units into the skin every morning.    ergocalciferol 1.25 MG (12929 UT) Oral Cap Take 1 capsule (50,000 Units total) by mouth every 7 days. Every monday    oxybutynin ER 5 MG Oral Tablet 24 Hr Take 1 tablet (5 mg total) by mouth daily.    bethanechol 10 MG Oral Tab Take 1 tablet (10 mg total) by mouth 3 (three) times daily.    loratadine 10 MG Oral Tab Take 1 tablet (10 mg total) by mouth daily.    cyclobenzaprine 10 MG Oral Tab Take 1 tablet (10 mg total) by mouth 3 (three) times daily as needed for Muscle spasms.    phenazopyridine 200 MG Oral Tab Take 1 tablet (200 mg total) by mouth 3 (three) times daily.    Insulin Aspart 100 UNIT/ML Subcutaneous Solution Cartridge SS: 150-200 2units, 201-250 4 units, 251-300 6 units, 301-350 8 units, 351-400 10 units, over 401 call md    Multiple Vitamins-Minerals (THERA-M) Oral Tab Take 1 tablet by mouth daily.    Montelukast Sodium 10 MG Oral Tab Take 1 tablet (10 mg total) by mouth daily.    BuPROPion HCl ER, XL, 300 MG Oral Tablet 24 Hr Take 1 tablet (300 mg total) by mouth daily.     divalproex Sodium  MG Oral Tablet 24 Hr Take 1 tablet (500 mg total) by mouth 2 (two) times daily.    Acidophilus/Pectin Oral Cap Take 1 capsule by mouth 2 (two) times daily.    ondansetron 4 MG Oral Tablet Dispersible Take 2 tablets (8 mg total) by mouth every 8 (eight) hours as needed for Nausea.    MetFORMIN HCl ER (GLUCOPHAGE-XR) 750 MG Oral Tablet 24 Hr Take 1 tablet (750 mg total) by mouth daily with breakfast.    meclizine 12.5 MG Oral Tab Take 1 tablet (12.5 mg total) by mouth 3 (three) times daily as needed for Dizziness.    [] cefdinir 300 MG Oral Cap Take 1 capsule (300 mg total) by mouth 2 (two) times daily for 7 days.    glucagon (BAQSIMI ONE PACK) 3 MG/DOSE Nasal nasal powder 3 mg by Nasal route once as needed for Low blood glucose.    acetaminophen 325 MG Oral Tab Take 2 tablets (650 mg total) by mouth every 6 (six) hours as needed for Pain.    Melatonin 3 MG Oral Cap Take 1 capsule (3 mg total) by mouth at bedtime.    methenamine 1 g Oral Tab Take 0.5 tablets (0.5 g total) by mouth 2 (two) times daily with meals.    albuterol 108 (90 Base) MCG/ACT Inhalation Aero Soln Inhale 2 puffs into the lungs every 6 (six) hours as needed for Wheezing or Shortness of Breath.    acetaminophen (TYLENOL EXTRA STRENGTH) 500 MG Oral Tab Take 325 mg by mouth every 4 (four) hours as needed for Pain or Fever.       Allergies  Allergies   Allergen Reactions    Pcn [Penicillins]     Radiology Contrast Iodinated Dyes     Sulfa Antibiotics        Review of Systems:   As by Admitting/Attending    Results:   Laboratory Data:  Lab Results   Component Value Date    WBC 13.8 (H) 2024    HGB 10.4 (L) 2024    HCT 32.0 (L) 2024    .0 2024    CREATSERUM 0.90 2024    BUN 24 (H) 2024     2024    K 4.2 2024     2024    CO2 28.0 2024     (H) 2024    CA 9.4 2024    ALB 3.8 05/10/2024    ALKPHO 74 05/10/2024    TP 6.5 05/10/2024     AST 24 05/10/2024    ALT 18 05/10/2024    PTT 26.4 12/05/2023    LIP 36 12/05/2023    DDIMER 0.92 (H) 12/05/2023    MG 1.7 12/12/2023         Imaging:  CT ABDOMEN+PELVIS(CONTRAST ONLY)(CPT=74177)    Result Date: 7/10/2024  CONCLUSION:   Constipation.  No bowel obstruction.    Mild bilateral hydroureteronephrosis down to the level of the bladder which is thickened and decompressed around a Florentino catheter.  No obstructing calculi   Dictated by (CST): Yordan Guerrero MD on 7/10/2024 at 7:09 AM     Finalized by (CST): Yordan Guerrero MD on 7/10/2024 at 7:17 AM          XR ABDOMEN (1 VIEW) (CPT=74018)    Result Date: 7/9/2024  CONCLUSION:   Dilated small bowel loop in the left hemiabdomen measuring 3.5 cm.  Differential considerations include focal ileus or early/partial small bowel obstruction.  Further assessment can be made with cross-sectional imaging as clinically indicated.  Secure message sent to Dr. Hager on 07/09/2024 at 9:10 a.m.  Nonspecific moderate gaseous distension of the stomach.  A preliminary report was issued by the [facility] teleradiology service.     Dictated by (CST): Ene Pérez MD on 7/09/2024 at 9:08 AM     Finalized by (CST): Ene Pérez MD on 7/09/2024 at 9:12 AM           Vital Signs:   Blood pressure (!) 129/98, pulse 96, temperature 97.8 °F (36.6 °C), temperature source Oral, resp. rate 16, weight 99.1 kg (218 lb 6.4 oz), SpO2 95%.    Mental Status Exam:   Appearance: Stated age female, obese, in hospital gown, laying down in hospital bed.  Psychomotor: No psychomotor agitation, or retardation.   Orientation: Alert and oriented to person, place, time and condition.  Gait: Not evaluated.  Attitude/Coorperation: Cooperative and attentive.  Behavior: Appropriate.  Patient found less restless otherwise some drug-seeking behavior  Speech: Regular rate and rhythm speech.  Mood: Patient reporting depressed and anxious mood.  Affect: Anxious and restless affect..  Thought process:  Circumstantial and distracted thought process.  Thought content: Patient denies any suicidal or homicidal ideation.  Perceptions: Patient denies any auditory or visual hallucinations.  Concentration: Grossly intact.  Memory: Grossly intact.  Intellect: Average.  Judgment and Insight: Questionable.     Impression:     Bipolar disorder type I recent episode depression moderate to severe without psychotic feature.  Generalized anxiety disorder.  Sepsis due to urinary tract infection (HCC)    Lactic acid acidosis    Constipation, unspecified constipation type    Patient is a 53 year old , single, female with past medical history of diabetes, bipolar 1 disorder, depression, MIKE, HTN, diverticulitis, neurogenic bladder, obesity, who was admitted to the hospital for Sepsis due to urinary tract infection (HCC): The patient has been demonstrating increased anxiety and depressive symptoms.    7/9/2024: Patient has been demonstrating increased anxiety, depression, lack of energy, lack of motivation hopelessness, helplessness and increased panic attack.    7/10/2024: The patient continued reporting restlessness, anxiety and difficulty relaxing with increased physical hyperness.    7/11/2024: The patient demonstrating improvement in her mood and demeanor with less restlessness and more calmness otherwise she continue verbalizing increased anxiety and restlessness with the need for more medication?  Otherwise patient reporting she slept 6-hour only?    Discussed risk and benefit, acknowledging the current symptom and severity.  At this point, I would recommend the following approach:     Focus on safety  Focus on education and support.  Focus on insight orientation helping the patient understand diagnosis and treatment plan.  Discontinue Wellbutrin  Continue klonopin 0.5 mg BID  Continue Depakote 500 mg BID  Continue Cymbalta 90 mg daily.  Continue hydroxyzine 10 mg 3 times daily.  Continue seroquel 200 mg  nightly.  Increase Abilify to 5 mg daily.  Utilize hydroxyzine 10 mg 3 times daily as needed for anxiety.  Processed with patient at length, the initiation of the above psychotropic medications I advised the patient of the risks, benefits, alternatives and potential side effects. The patient consents to administration of the medications and understands the right to refuse medications at any time. The patient verbalized understanding.   Coordinate plan with team    Orders This Visit:  Orders Placed This Encounter   Procedures    Basic Metabolic Panel (8)    CBC With Differential With Platelet    Troponin I (High Sensitivity)    Lactic Acid, Plasma    Urinalysis with Culture Reflex    Lactic Acid Reflex Post Positive    Basic Metabolic Panel (8)    CBC With Differential With Platelet    Lactic Acid Post Positive    Hemoglobin A1C    Hemoglobin A1C    Lactic Acid, Plasma    Lactic Acid Reflex Post Positive    Lactic Acid Post Positive    Urine Culture, Routine    Blood Culture    Emergency MRSA Screen by PCR    Blood Culture       Meds This Visit:  Requested Prescriptions      No prescriptions requested or ordered in this encounter       Víctor Qureshi MD  7/11/2024    Note to Patient: The 21st Century Cures Act makes medical notes like these available to patients in the interest of transparency. However, be advised this is a medical document. It is intended as peer to peer communication. It is written in medical language and may contain abbreviations or verbiage that are unfamiliar. It may appear blunt or direct. Medical documents are intended to carry relevant information, facts as evident, and the clinical opinion of the practitioner. This note may have been transcribed using a voice dictation system. Voice recognition errors may occur. This should not be taken to alter the content or meaning of this note.

## 2024-07-11 NOTE — PLAN OF CARE
No acute changes during shift. Patient given bed bath this evening. Safety precautions in place, call light within reach. Plan pending.  Problem: Patient Centered Care  Goal: Patient preferences are identified and integrated in the patient's plan of care  Description: Interventions:  - What would you like us to know as we care for you? My mom is my POA  - Provide timely, complete, and accurate information to patient/family  - Incorporate patient and family knowledge, values, beliefs, and cultural backgrounds into the planning and delivery of care  - Encourage patient/family to participate in care and decision-making at the level they choose  - Honor patient and family perspectives and choices  Outcome: Progressing     Problem: GASTROINTESTINAL - ADULT  Goal: Maintains or returns to baseline bowel function  Description: INTERVENTIONS:  - Assess bowel function  - Maintain adequate hydration with IV or PO as ordered and tolerated  - Evaluate effectiveness of GI medications  - Encourage mobilization and activity  - Obtain nutritional consult as needed  - Establish a toileting routine/schedule  - Consider collaborating with pharmacy to review patient's medication profile  Outcome: Progressing     Problem: GENITOURINARY - ADULT  Goal: Absence of urinary retention  Description: INTERVENTIONS:  - Assess patient’s ability to void and empty bladder  - Monitor intake/output and perform bladder scan as needed  - Follow urinary retention protocol/standard of care  - Consider collaborating with pharmacy to review patient's medication profile  - Implement strategies to promote bladder emptying  Outcome: Progressing     Problem: SKIN/TISSUE INTEGRITY - ADULT  Goal: Skin integrity remains intact  Description: INTERVENTIONS  - Assess and document risk factors for pressure ulcer development  - Assess and document skin integrity  - Monitor for areas of redness and/or skin breakdown  - Initiate interventions, skin care  algorithm/standards of care as needed  Outcome: Progressing     Problem: Impaired Functional Mobility  Goal: Achieve highest/safest level of mobility/gait  Description: Interventions:  - Assess patient's functional ability and stability  - Promote increasing activity/tolerance for mobility and gait  - Educate and engage patient/family in tolerated activity level and precautions    Outcome: Progressing

## 2024-07-11 NOTE — PROGRESS NOTES
[facility], [facility]  TEL: [phone]  FAX: [phone]    Clari Rosa Patient Status:  Inpatient    1970 MRN [id number]   Location [facility] 1W Attending Adonis Hager MD   Hosp Day # 2 PCP Brian Bob MD     Reason for Consultation:  Suprapubic catheter related urinary tract infection    History of Present Illness:  Clari Rosa is a a(n) 53 year old female.  With a longstanding history of neurogenic bladder who has had a suprapubic catheter for prolonged period of time.  She has had many infections related to this.  She has underlying history of bipolar disorder.  Lives in a skilled facility.  Previously was living in [place].  Has moved to this area recently.  She describes at least 21 episodes of UTI, some requiring hospitalizations.  Review of medical records show that she has had E. coli more recently.  She did have an episode of multidrug-resistant Pseudomonas in July of last year from urine culture.  This was resistant to carbapenems, tobramycin and cefepime.  Previously susceptible to Zosyn but with a high MARINA.  She is coming in with increased abdominal pain.  With lactic acidosis.  Started on ceftriaxone and Flagyl.  Also with some constipation.  Is unclear if constipation leads to her UTI episodes/symptoms.  She does get her catheter changed every 4 weeks by nursing team at the skilled facility.        History:  Past Medical History:    Bipolar 1 disorder (HCC)    Dental caries    Diabetes (HCC)    MIKE (generalized anxiety disorder)    High blood pressure    History of diverticulitis of colon    Manipulative behavior    Neurogenic bladder    Obesity (BMI 30-39.9)    Paraplegia (HCC)    Sepsis following intra-abdominal surgery (HCC)    Serotonin syndrome    Sleep apnea    Suprapubic catheter (HCC)    Transverse myelitis (HCC)     Past Surgical History:   Procedure Laterality Date    Arthroscopy of joint unlisted      knee          Cholecystectomy       Part removal colon w end colostomy      2013    Tonsillectomy       Family History   Adopted: Yes      reports that she has quit smoking. She has a 5 pack-year smoking history. She does not have any smokeless tobacco history on file. She reports that she does not drink alcohol and does not use drugs.    Allergies:  Allergies   Allergen Reactions    Pcn [Penicillins]     Radiology Contrast Iodinated Dyes     Sulfa Antibiotics        Medications:    Current Facility-Administered Medications:     famotidine (Pepcid) tab 20 mg, 20 mg, Oral, BID    HYDROcodone-acetaminophen (Norco) 5-325 MG per tab 1 tablet, 1 tablet, Oral, Q6H PRN **OR** HYDROcodone-acetaminophen (Norco) 5-325 MG per tab 2 tablet, 2 tablet, Oral, Q6H PRN    insulin aspart (NovoLOG) 100 Units/mL FlexPen 24 Units, 24 Units, Subcutaneous, TID CC    metoprolol tartrate (Lopressor) tab 25 mg, 25 mg, Oral, 2x Daily(Beta Blocker)    buPROPion ER (Wellbutrin XL) 24 hr tab 150 mg, 150 mg, Oral, Daily    DULoxetine (Cymbalta) DR cap 90 mg, 90 mg, Oral, Daily    hydrOXYzine HCl (ATARAX) 10 MG/5ML syrup 10 mg, 10 mg, Oral, TID    insulin aspart (NovoLOG) 100 Units/mL FlexPen 1-11 Units, 1-11 Units, Subcutaneous, TID CC and HS    insulin degludec (Tresiba) 100 units/mL flextouch 75 Units, 75 Units, Subcutaneous, Daily    sodium chloride (Saline Mist) 0.65 % nasal solution 1 spray, 1 spray, Each Nare, Q3H PRN    polyethylene glycol (PEG 3350) (Miralax) 17 g oral packet 17 g, 17 g, Oral, Daily PRN    sennosides (Senokot) tab 17.2 mg, 17.2 mg, Oral, Nightly PRN    bisacodyl (Dulcolax) 10 MG rectal suppository 10 mg, 10 mg, Rectal, Daily PRN    fleet enema (Fleet) 7-19 GM/118ML rectal enema 133 mL, 1 enema, Rectal, Once PRN    ondansetron (Zofran) 4 MG/2ML injection 4 mg, 4 mg, Intravenous, Q6H PRN    prochlorperazine (Compazine) 10 MG/2ML injection 5 mg, 5 mg, Intravenous, Q8H PRN    glucose (Dex4) 15 GM/59ML oral liquid 15 g, 15 g, Oral, Q15 Min PRN **OR** glucose  (Glutose) 40% oral gel 15 g, 15 g, Oral, Q15 Min PRN **OR** glucose-vitamin C (Dex-4) chewable tab 4 tablet, 4 tablet, Oral, Q15 Min PRN **OR** dextrose 50% injection 50 mL, 50 mL, Intravenous, Q15 Min PRN **OR** glucose (Dex4) 15 GM/59ML oral liquid 30 g, 30 g, Oral, Q15 Min PRN **OR** glucose (Glutose) 40% oral gel 30 g, 30 g, Oral, Q15 Min PRN **OR** glucose-vitamin C (Dex-4) chewable tab 8 tablet, 8 tablet, Oral, Q15 Min PRN    polyethylene glycol (PEG 3350) (Miralax) 17 g oral packet 17 g, 17 g, Oral, BID    docusate sodium (Colace) cap 100 mg, 100 mg, Oral, BID    acetaminophen (Tylenol) tab 650 mg, 650 mg, Oral, Q6H PRN    atorvastatin (Lipitor) tab 40 mg, 40 mg, Oral, Nightly    bethanechol (Urecholine) tab 10 mg, 10 mg, Oral, TID    clonazePAM (KlonoPIN) tab 0.5 mg, 0.5 mg, Oral, BID    cyclobenzaprine (Flexeril) tab 10 mg, 10 mg, Oral, TID PRN    divalproex ER (Depakote ER) 24 hr tab 500 mg, 500 mg, Oral, BID    melatonin tab 3 mg, 3 mg, Oral, Nightly    montelukast (Singulair) tab 10 mg, 10 mg, Oral, Daily    oxybutynin ER (Ditropan-XL) 24 hr tab 5 mg, 5 mg, Oral, Daily    rivaroxaban (Xarelto) tab 20 mg, 20 mg, Oral, Q24H    gabapentin (Neurontin) cap 300 mg, 300 mg, Oral, TID    QUEtiapine (SEROquel) tab 200 mg, 200 mg, Oral, BID    meclizine (Antivert) tab 25 mg, 25 mg, Oral, TID PRN    ARIPiprazole (Abilify) tab 2 mg, 2 mg, Oral, Daily    traZODone (Desyrel) tab 50 mg, 50 mg, Oral, Nightly PRN    ceFEPIme (Maxipime) 1 g in sodium chloride 0.9% 100 mL IVPB-MBP, 1 g, Intravenous, Q12H    Review of Systems:    A comprehensive 10 point review of systems was completed.  Pertinent positives and negatives noted in the the HPI.    Physical Exam:    General: No acute distress. Alert and oriented x 3.  Very pleasant.  No respiratory distress  Vital signs: Blood pressure (!) 129/98, pulse 96, temperature 97.8 °F (36.6 °C), temperature source Oral, resp. rate 16, weight 218 lb 6.4 oz (99.1 kg), SpO2 95%.  HEENT:  Moist mucous membranes. Extraocular muscles are intact.  Neck: No lymphadenopathy.  No JVD. No carotid bruits.  Respiratory: Clear to auscultation bilaterally.  No wheezes. No rhonchi.  Cardiovascular: S1, S2.  Regular rate and rhythm.  No murmurs.  Abdomen: Soft, nontender, nondistended.  Positive bowel sounds.  Obese.  No drainage from SP cath site.  Musculoskeletal: Full range of motion of all extremities.  No swelling noted.  Integument: No lesions. No erythema.  Psychiatric: Appropriate mood and affect.  Neurologic: No focal neurological deficits.    Laboratory Data:  Lab Results   Component Value Date    WBC 13.8 07/11/2024    HGB 10.4 07/11/2024    HCT 32.0 07/11/2024    .0 07/11/2024    CREATSERUM 0.90 07/11/2024    BUN 24 07/11/2024     07/11/2024    K 4.2 07/11/2024     07/11/2024    CO2 28.0 07/11/2024     07/11/2024    CA 9.4 07/11/2024    PGLU 94 07/11/2024       Microbiologic Data:     Reviewed in EMR    Imaging:  X-Ray    Imaging results reviewed     Impression:  Patient Active Problem List   Diagnosis    Paresis of lower extremity ([facility])    Transverse myelitis ([facility])    Bipolar 1 disorder ([facility])    Manipulative behavior    History of diverticulitis of colon    Serotonin syndrome    Obesity (BMI 30-39.9)    Dental caries    Type 2 diabetes mellitus without complication, with long-term current use of insulin ([facility])    Chronic suprapubic catheter ([facility])    Bipolar I disorder depressed with atypical features ([facility])    Other acute pulmonary embolism, unspecified whether acute cor pulmonale present ([facility])    Hyperglycemia    Sepsis due to undetermined organism ([facility])    Severe episode of recurrent major depressive disorder, without psychotic features ([facility])    Generalized anxiety disorder    Sepsis due to urinary tract infection ([facility])    Lactic acid acidosis    Constipation, unspecified constipation type    Moderate depressed bipolar I disorder ([facility])         ASSESSMENT:  #Probable catheter  associated urinary tract infection.  Suprapubic catheter.  With a history of E. coli primary in the past.  Has had episodes of multidrug-resistant Pseudomonas over 1 year ago.  Rule out associated bacteremia.    #Neurogenic bladder.  Suprapubic catheter.    PLAN:  Continue cefepime.  Follow-up urine culture.  If she has any hemodynamic changes, will need to consider changing to Zerbaxa based on previous MDR history.  Follow-up blood cultures and urine cultures.      Jayce Barbour MD  7/9/2024  3:31 PM

## 2024-07-12 LAB
ANION GAP SERPL CALC-SCNC: 6 MMOL/L (ref 0–18)
BASOPHILS # BLD AUTO: 0.1 X10(3) UL (ref 0–0.2)
BASOPHILS NFR BLD AUTO: 1 %
BUN BLD-MCNC: 28 MG/DL (ref 9–23)
BUN/CREAT SERPL: 28 (ref 10–20)
CALCIUM BLD-MCNC: 9.1 MG/DL (ref 8.7–10.4)
CHLORIDE SERPL-SCNC: 102 MMOL/L (ref 98–112)
CO2 SERPL-SCNC: 30 MMOL/L (ref 21–32)
CREAT BLD-MCNC: 1 MG/DL
DEPRECATED RDW RBC AUTO: 56.7 FL (ref 35.1–46.3)
EGFRCR SERPLBLD CKD-EPI 2021: 67 ML/MIN/1.73M2 (ref 60–?)
EOSINOPHIL # BLD AUTO: 0.38 X10(3) UL (ref 0–0.7)
EOSINOPHIL NFR BLD AUTO: 3.7 %
ERYTHROCYTE [DISTWIDTH] IN BLOOD BY AUTOMATED COUNT: 16.7 % (ref 11–15)
GLUCOSE BLD-MCNC: 177 MG/DL (ref 70–99)
GLUCOSE BLDC GLUCOMTR-MCNC: 116 MG/DL (ref 70–99)
GLUCOSE BLDC GLUCOMTR-MCNC: 165 MG/DL (ref 70–99)
GLUCOSE BLDC GLUCOMTR-MCNC: 240 MG/DL (ref 70–99)
HCT VFR BLD AUTO: 32.4 %
HGB BLD-MCNC: 10.7 G/DL
IMM GRANULOCYTES # BLD AUTO: 0.26 X10(3) UL (ref 0–1)
IMM GRANULOCYTES NFR BLD: 2.5 %
LYMPHOCYTES # BLD AUTO: 4.29 X10(3) UL (ref 1–4)
LYMPHOCYTES NFR BLD AUTO: 41.8 %
MCH RBC QN AUTO: 30.8 PG (ref 26–34)
MCHC RBC AUTO-ENTMCNC: 33 G/DL (ref 31–37)
MCV RBC AUTO: 93.4 FL
MONOCYTES # BLD AUTO: 0.58 X10(3) UL (ref 0.1–1)
MONOCYTES NFR BLD AUTO: 5.6 %
NEUTROPHILS # BLD AUTO: 4.66 X10 (3) UL (ref 1.5–7.7)
NEUTROPHILS # BLD AUTO: 4.66 X10(3) UL (ref 1.5–7.7)
NEUTROPHILS NFR BLD AUTO: 45.4 %
OSMOLALITY SERPL CALC.SUM OF ELEC: 296 MOSM/KG (ref 275–295)
PLATELET # BLD AUTO: 245 10(3)UL (ref 150–450)
POTASSIUM SERPL-SCNC: 4.2 MMOL/L (ref 3.5–5.1)
RBC # BLD AUTO: 3.47 X10(6)UL
SODIUM SERPL-SCNC: 138 MMOL/L (ref 136–145)
WBC # BLD AUTO: 10.3 X10(3) UL (ref 4–11)

## 2024-07-12 PROCEDURE — 99233 SBSQ HOSP IP/OBS HIGH 50: CPT | Performed by: HOSPITALIST

## 2024-07-12 PROCEDURE — 99233 SBSQ HOSP IP/OBS HIGH 50: CPT | Performed by: OTHER

## 2024-07-12 RX ORDER — LACTULOSE 20 G/30ML
20 SOLUTION ORAL 3 TIMES DAILY
Status: DISPENSED | OUTPATIENT
Start: 2024-07-12 | End: 2024-07-13

## 2024-07-12 RX ORDER — LACTULOSE 20 G/30ML
20 SOLUTION ORAL EVERY 6 HOURS PRN
Status: SHIPPED | COMMUNITY
Start: 2024-07-12

## 2024-07-12 RX ORDER — CEFDINIR 300 MG/1
300 CAPSULE ORAL 2 TIMES DAILY
Status: DISCONTINUED | OUTPATIENT
Start: 2024-07-12 | End: 2024-07-13

## 2024-07-12 RX ORDER — POLYETHYLENE GLYCOL 3350 17 G/17G
17 POWDER, FOR SOLUTION ORAL DAILY PRN
Status: SHIPPED | COMMUNITY
Start: 2024-07-12

## 2024-07-12 RX ORDER — FAMOTIDINE 20 MG/1
20 TABLET, FILM COATED ORAL DAILY
Status: DISCONTINUED | OUTPATIENT
Start: 2024-07-13 | End: 2024-07-13

## 2024-07-12 RX ORDER — TRAZODONE HYDROCHLORIDE 50 MG/1
50 TABLET ORAL NIGHTLY PRN
Status: SHIPPED | COMMUNITY
Start: 2024-07-12

## 2024-07-12 RX ORDER — PSEUDOEPHEDRINE HCL 30 MG
100 TABLET ORAL 2 TIMES DAILY
Status: SHIPPED | COMMUNITY
Start: 2024-07-12

## 2024-07-12 RX ORDER — HYDROXYZINE HYDROCHLORIDE 10 MG/1
10 TABLET, FILM COATED ORAL 3 TIMES DAILY PRN
Status: SHIPPED | COMMUNITY
Start: 2024-07-12

## 2024-07-12 NOTE — PLAN OF CARE
Problem: Patient Centered Care  Goal: Patient preferences are identified and integrated in the patient's plan of care  Description: Interventions:  - What would you like us to know as we care for you? My mom is my POA  - Provide timely, complete, and accurate information to patient/family  - Incorporate patient and family knowledge, values, beliefs, and cultural backgrounds into the planning and delivery of care  - Encourage patient/family to participate in care and decision-making at the level they choose  - Honor patient and family perspectives and choices  Outcome: Progressing     Problem: GASTROINTESTINAL - ADULT  Goal: Maintains or returns to baseline bowel function  Description: INTERVENTIONS:  - Assess bowel function  - Maintain adequate hydration with IV or PO as ordered and tolerated  - Evaluate effectiveness of GI medications  - Encourage mobilization and activity  - Obtain nutritional consult as needed  - Establish a toileting routine/schedule  - Consider collaborating with pharmacy to review patient's medication profile  Outcome: Progressing     Problem: GENITOURINARY - ADULT  Goal: Absence of urinary retention  Description: INTERVENTIONS:  - Assess patient’s ability to void and empty bladder  - Monitor intake/output and perform bladder scan as needed  - Follow urinary retention protocol/standard of care  - Consider collaborating with pharmacy to review patient's medication profile  - Implement strategies to promote bladder emptying  Outcome: Progressing     Problem: SKIN/TISSUE INTEGRITY - ADULT  Goal: Skin integrity remains intact  Description: INTERVENTIONS  - Assess and document risk factors for pressure ulcer development  - Assess and document skin integrity  - Monitor for areas of redness and/or skin breakdown  - Initiate interventions, skin care algorithm/standards of care as needed  Outcome: Progressing     Problem: Impaired Functional Mobility  Goal: Achieve highest/safest level of  mobility/gait  Description: Interventions:  - Assess patient's functional ability and stability  - Promote increasing activity/tolerance for mobility and gait  - Educate and engage patient/family in tolerated activity level and precautions  - Recommend use of total lift for transfers  Outcome: Progressing

## 2024-07-12 NOTE — CM/SW NOTE
Pt discussed in RN DC Rounds. Sw is form [facility]. Pt requires an ambulance according to the RN due to being a max assist. SW called and ordered an Ambulance from [facility] to transport pt to [facility].   at WILL CALL 7/12/2024-7//15/2024.  PCS flow sheet completed. RN to attached to AVS and print out.     Plan: Pending medical clearance, DC to [facility], PCS form completed, *PCS needs a date, *ambulance on will call from 7/12-7/15    JAH/JASON to remain available for support and/or discharge planning.     Marsha Inman, MSW, LSW   x [phone]

## 2024-07-12 NOTE — PROGRESS NOTES
[facility]  Progress Note     Clari Rosa  : 1970    Status: Inpatient  Day #: 3    Attending: Johan Easton MD  PCP: Brian Bob MD     Assessment and Plan:    Sepsis due to urinary tract infection related to chronic suprapubic catheter  Lactic acidosis, leukocytosis  - cont abx per ID cefepime IV -> ceftriaxone -> oral on discharge  - f/u urine culture - serratia marcescens, enterococcus faecalis  - f/u blood cultures - NGTD     Left tooth ache  Probably infected.  Pt will be on abx as above.  Outpt oral surgeon follow up     Constipation  Abx x-ray reveals possible PSBO.  - CT abdomen/pelvis without obstruction. +constipation  - pt having BMs now  - cont colace and miralax bid. Add lactulose today     MIKE  Bipolar 1 disorder.  Pt on multiple psych meds.  Requests to see psych  - psych consult  - cont, meds     IDDM - poorly controlled  -cont tresiba, novolog +ISS - adjusted dose     Hx of PE  - cont xarelto     Paraplegia  - supportive care    Mild hyponatremia  -chronic  -monitor     DVT Mechanical Prophylaxis:   SCDs,    DVT Pharmacologic Prophylaxis   Medication    rivaroxaban (Xarelto) tab 20 mg               Subjective:      Initial Chief Complaint:  dizziness    C/o constipation. Anxious about discharging \"too soon\".     10 point ROS completed and was negative, except for pertinent positive and negatives stated in subjective.      Objective:      Temp:  [98 °F (36.7 °C)-99 °F (37.2 °C)] 98 °F (36.7 °C)  Pulse:  [] 96  Resp:  [16-18] 18  BP: ()/(75-98) 122/91  SpO2:  [91 %-97 %] 96 %  General:  Alert, no distress  HEENT:  Normocephalic, atraumatic  Cardiac:  Regular rate, regular rhythm  Pulmonary:  Clear to auscultation bilaterally, respirations unlabored  Gastrointestinal:  Soft, +BS  Musculoskeletal:  No joint swelling  Extremities:  No edema, no cyanosis, no clubbing  Neurologic:  nonfocal  Psychiatric:  Normal affect, calm and appropriate    Intake/Output Summary (Last  24 hours) at 7/12/2024 1432  Last data filed at 7/12/2024 1407  Gross per 24 hour   Intake 600 ml   Output 4175 ml   Net -3575 ml         Recent Labs   Lab 07/10/24  0540 07/11/24  0451 07/12/24  0442   WBC 10.0 13.8* 10.3   HGB 10.9* 10.4* 10.7*   HCT 34.0* 32.0* 32.4*   .0 261.0 245.0   RBC 3.61* 3.46* 3.47*   MCV 94.2 92.5 93.4   MCH 30.2 30.1 30.8   MCHC 32.1 32.5 33.0   RDW 16.4* 17.1* 16.7*   NEPRELIM 8.42* 7.61 4.66     Recent Labs   Lab 07/10/24  0540 07/11/24  0451 07/12/24  0442   BUN 21 24* 28*   CREATSERUM 0.99 0.90 1.00   CA 10.2 9.4 9.1   * 137 138   K 4.8 4.2 4.2   CL 99 103 102   CO2 25.0 28.0 30.0   * 143* 177*       No results found.      Medications:   lactulose  20 g Oral TID    [START ON 7/13/2024] famotidine  20 mg Oral Daily    ARIPiprazole  5 mg Oral Daily    hydrOXYzine  10 mg Oral TID    insulin aspart  18 Units Subcutaneous TID CC    cefTRIAXone  2 g Intravenous Q24H    metoprolol tartrate  25 mg Oral 2x Daily(Beta Blocker)    DULoxetine  90 mg Oral Daily    insulin aspart  1-11 Units Subcutaneous TID CC and HS    insulin degludec  75 Units Subcutaneous Daily    polyethylene glycol (PEG 3350)  17 g Oral BID    docusate sodium  100 mg Oral BID    atorvastatin  40 mg Oral Nightly    bethanechol  10 mg Oral TID    clonazePAM  0.5 mg Oral BID    divalproex ER  500 mg Oral BID    melatonin  3 mg Oral Nightly    montelukast  10 mg Oral Daily    oxybutynin ER  5 mg Oral Daily    rivaroxaban  20 mg Oral Q24H    gabapentin  300 mg Oral TID    QUEtiapine  200 mg Oral BID      PRN Meds:   hydrOXYzine    HYDROcodone-acetaminophen **OR** HYDROcodone-acetaminophen    sodium chloride    polyethylene glycol (PEG 3350)    sennosides    bisacodyl    fleet enema    ondansetron    prochlorperazine    glucose **OR** glucose **OR** glucose-vitamin C **OR** dextrose **OR** glucose **OR** glucose **OR** glucose-vitamin C    acetaminophen    cyclobenzaprine    meclizine     traZODone    Supplementary Documentation:        MDM High. Time spent on chart/note review, review of labs/imaging, discussion with patient, physical exam, discussion with staff, consultants, coordinating care, writing progress note, discussion of plan of care.      Johan Easton MD

## 2024-07-12 NOTE — SPIRITUAL CARE NOTE
Spiritual Care Visit Note    Patient Name: Clari Rosa Date of Spiritual Care Visit: 24   : 1970 Primary Dx: Sepsis due to urinary tract infection (HCC)       Referred By: Referral From: Patient    Spiritual Care Taxonomy:    Intended Effects: Lessen anxiety    Methods: Offer spiritual/Worship support;Collaborate with care team member;Encourage sharing of feelings    Interventions: Active listening;Ask guided questions;[place]    Visit Type/Summary:     - Spiritual Care: Responded to a request for spiritual care and assessed the patient for spiritual care needs. Patient and family expressed appreciation for  visit.  remains available as needed for follow up.    Spiritual Care support can be requested via an Epic consult. For urgent/immediate needs, please contact the On Call  at: [place]: ext [phone]         Rev.  Janett Diamond

## 2024-07-12 NOTE — PROGRESS NOTES
[facility], [facility]  TEL: [phone]  FAX: [phone]    Clari Rosa Patient Status:  Inpatient    1970 MRN [id number]   Location [facility] 1W Attending Adonis Hager MD   Hosp Day # 2 PCP Brian Bob MD     Reason for Consultation:  Suprapubic catheter related urinary tract infection    Noted fevers.  Tolerating antibiotics.  Having some dizziness.    History of Present Illness:  Clari Rosa is a a(n) 53 year old female.  With a longstanding history of neurogenic bladder who has had a suprapubic catheter for prolonged period of time.  She has had many infections related to this.  She has underlying history of bipolar disorder.  Lives in a skilled facility.  Previously was living in [place].  Has moved to this area recently.  She describes at least 21 episodes of UTI, some requiring hospitalizations.  Review of medical records show that she has had E. coli more recently.  She did have an episode of multidrug-resistant Pseudomonas in July of last year from urine culture.  This was resistant to carbapenems, tobramycin and cefepime.  Previously susceptible to Zosyn but with a high MARINA.  She is coming in with increased abdominal pain.  With lactic acidosis.  Started on ceftriaxone and Flagyl.  Also with some constipation.  Is unclear if constipation leads to her UTI episodes/symptoms.  She does get her catheter changed every 4 weeks by nursing team at the skilled facility.        History:  Past Medical History:    Bipolar 1 disorder (HCC)    Dental caries    Diabetes (HCC)    MIKE (generalized anxiety disorder)    High blood pressure    History of diverticulitis of colon    Manipulative behavior    Neurogenic bladder    Obesity (BMI 30-39.9)    Paraplegia (HCC)    Sepsis following intra-abdominal surgery (HCC)    Serotonin syndrome    Sleep apnea    Suprapubic catheter (HCC)    Transverse myelitis (HCC)     Past Surgical History:   Procedure Laterality Date     Arthroscopy of joint unlisted      knee          Cholecystectomy      Part removal colon w end colostomy      2013    Tonsillectomy       Family History   Adopted: Yes      reports that she has quit smoking. She has a 5 pack-year smoking history. She does not have any smokeless tobacco history on file. She reports that she does not drink alcohol and does not use drugs.    Allergies:  Allergies   Allergen Reactions    Pcn [Penicillins]     Radiology Contrast Iodinated Dyes     Sulfa Antibiotics        Medications:    Current Facility-Administered Medications:     famotidine (Pepcid) tab 20 mg, 20 mg, Oral, BID    [START ON 2024] ARIPiprazole (Abilify) tab 5 mg, 5 mg, Oral, Daily    hydrOXYzine (Atarax) tab 10 mg, 10 mg, Oral, TID PRN    hydrOXYzine (Atarax) tab 10 mg, 10 mg, Oral, TID    insulin aspart (NovoLOG) 100 Units/mL FlexPen 18 Units, 18 Units, Subcutaneous, TID CC    HYDROcodone-acetaminophen (Norco) 5-325 MG per tab 1 tablet, 1 tablet, Oral, Q6H PRN **OR** HYDROcodone-acetaminophen (Norco) 5-325 MG per tab 2 tablet, 2 tablet, Oral, Q6H PRN    metoprolol tartrate (Lopressor) tab 25 mg, 25 mg, Oral, 2x Daily(Beta Blocker)    DULoxetine (Cymbalta) DR cap 90 mg, 90 mg, Oral, Daily    insulin aspart (NovoLOG) 100 Units/mL FlexPen 1-11 Units, 1-11 Units, Subcutaneous, TID CC and HS    insulin degludec (Tresiba) 100 units/mL flextouch 75 Units, 75 Units, Subcutaneous, Daily    sodium chloride (Saline Mist) 0.65 % nasal solution 1 spray, 1 spray, Each Nare, Q3H PRN    polyethylene glycol (PEG 3350) (Miralax) 17 g oral packet 17 g, 17 g, Oral, Daily PRN    sennosides (Senokot) tab 17.2 mg, 17.2 mg, Oral, Nightly PRN    bisacodyl (Dulcolax) 10 MG rectal suppository 10 mg, 10 mg, Rectal, Daily PRN    fleet enema (Fleet) 7-19 GM/118ML rectal enema 133 mL, 1 enema, Rectal, Once PRN    ondansetron (Zofran) 4 MG/2ML injection 4 mg, 4 mg, Intravenous, Q6H PRN    prochlorperazine (Compazine) 10 MG/2ML  injection 5 mg, 5 mg, Intravenous, Q8H PRN    glucose (Dex4) 15 GM/59ML oral liquid 15 g, 15 g, Oral, Q15 Min PRN **OR** glucose (Glutose) 40% oral gel 15 g, 15 g, Oral, Q15 Min PRN **OR** glucose-vitamin C (Dex-4) chewable tab 4 tablet, 4 tablet, Oral, Q15 Min PRN **OR** dextrose 50% injection 50 mL, 50 mL, Intravenous, Q15 Min PRN **OR** glucose (Dex4) 15 GM/59ML oral liquid 30 g, 30 g, Oral, Q15 Min PRN **OR** glucose (Glutose) 40% oral gel 30 g, 30 g, Oral, Q15 Min PRN **OR** glucose-vitamin C (Dex-4) chewable tab 8 tablet, 8 tablet, Oral, Q15 Min PRN    polyethylene glycol (PEG 3350) (Miralax) 17 g oral packet 17 g, 17 g, Oral, BID    docusate sodium (Colace) cap 100 mg, 100 mg, Oral, BID    acetaminophen (Tylenol) tab 650 mg, 650 mg, Oral, Q6H PRN    atorvastatin (Lipitor) tab 40 mg, 40 mg, Oral, Nightly    bethanechol (Urecholine) tab 10 mg, 10 mg, Oral, TID    clonazePAM (KlonoPIN) tab 0.5 mg, 0.5 mg, Oral, BID    cyclobenzaprine (Flexeril) tab 10 mg, 10 mg, Oral, TID PRN    divalproex ER (Depakote ER) 24 hr tab 500 mg, 500 mg, Oral, BID    melatonin tab 3 mg, 3 mg, Oral, Nightly    montelukast (Singulair) tab 10 mg, 10 mg, Oral, Daily    oxybutynin ER (Ditropan-XL) 24 hr tab 5 mg, 5 mg, Oral, Daily    rivaroxaban (Xarelto) tab 20 mg, 20 mg, Oral, Q24H    gabapentin (Neurontin) cap 300 mg, 300 mg, Oral, TID    QUEtiapine (SEROquel) tab 200 mg, 200 mg, Oral, BID    meclizine (Antivert) tab 25 mg, 25 mg, Oral, TID PRN    traZODone (Desyrel) tab 50 mg, 50 mg, Oral, Nightly PRN    ceFEPIme (Maxipime) 1 g in sodium chloride 0.9% 100 mL IVPB-MBP, 1 g, Intravenous, Q12H    Review of Systems:    A comprehensive 10 point review of systems was completed.  Pertinent positives and negatives noted in the the HPI.    Physical Exam:    General: No acute distress. Alert and oriented x 3.  Very pleasant.  No respiratory distress  Vital signs: Blood pressure 95/76, pulse 92, temperature 99 °F (37.2 °C), temperature source  Oral, resp. rate 18, weight 218 lb 6.4 oz (99.1 kg), SpO2 94%.  HEENT: Moist mucous membranes. Extraocular muscles are intact.  Neck: No lymphadenopathy.  No JVD. No carotid bruits.  Respiratory: Clear to auscultation bilaterally.  No wheezes. No rhonchi.  Cardiovascular: S1, S2.  Regular rate and rhythm.  No murmurs.  Abdomen: Soft, nontender, nondistended.  Positive bowel sounds.  Obese.  No drainage from SP cath site.  Musculoskeletal: Full range of motion of all extremities.  No swelling noted.  Integument: No lesions. No erythema.  Psychiatric: Appropriate mood and affect.  Neurologic: No focal neurological deficits.    Laboratory Data:  Lab Results   Component Value Date    WBC 13.8 07/11/2024    HGB 10.4 07/11/2024    HCT 32.0 07/11/2024    .0 07/11/2024    CREATSERUM 0.90 07/11/2024    BUN 24 07/11/2024     07/11/2024    K 4.2 07/11/2024     07/11/2024    CO2 28.0 07/11/2024     07/11/2024    CA 9.4 07/11/2024    PGLU 113 07/11/2024       Microbiologic Data:     Reviewed in EMR    Imaging:  X-Ray    Imaging results reviewed     Impression:  Patient Active Problem List   Diagnosis    Paresis of lower extremity (HCC)    Transverse myelitis ([facility])    Bipolar 1 disorder ([facility])    Manipulative behavior    History of diverticulitis of colon    Serotonin syndrome    Obesity (BMI 30-39.9)    Dental caries    Type 2 diabetes mellitus without complication, with long-term current use of insulin ([facility])    Chronic suprapubic catheter ([facility])    Bipolar I disorder depressed with atypical features ([facility])    Other acute pulmonary embolism, unspecified whether acute cor pulmonale present ([facility])    Hyperglycemia    Sepsis due to undetermined organism ([facility])    Severe episode of recurrent major depressive disorder, without psychotic features ([facility])    Generalized anxiety disorder    Sepsis due to urinary tract infection ([facility])    Lactic acid acidosis    Constipation, unspecified constipation type    Moderate  depressed bipolar I disorder (HCC)         ASSESSMENT:  #Probable catheter associated urinary tract infection.  Suprapubic catheter.  With a history of E. coli primary in the past.  Has had episodes of multidrug-resistant Pseudomonas over 1 year ago.  Rule out associated bacteremia.    #Neurogenic bladder.  Suprapubic catheter.    PLAN:  Change cefepime to ceftriaxone.  Follow-up for any contributing to the disease although this predated the cefepime.  Anticipating oral stepdown therapy based on susceptibilities.    Jayce Barbour MD  7/9/2024  3:31 PM

## 2024-07-12 NOTE — PROGRESS NOTES
[facility], [facility]  TEL: [phone]  FAX: [phone]    Clari Rosa Patient Status:  Inpatient    1970 MRN [id number]   Location [facility] 1W Attending Adonis Hager MD   Hosp Day # 3 PCP Brian Bob MD     Reason for Consultation:  Suprapubic catheter related urinary tract infection    No new fevers.  Anxious about going back to skilled facility.  No abdominal discomfort..    History of Present Illness:  Clari Rosa is a a(n) 53 year old female.  With a longstanding history of neurogenic bladder who has had a suprapubic catheter for prolonged period of time.  She has had many infections related to this.  She has underlying history of bipolar disorder.  Lives in a skilled facility.  Previously was living in [place].  Has moved to this area recently.  She describes at least 21 episodes of UTI, some requiring hospitalizations.  Review of medical records show that she has had E. coli more recently.  She did have an episode of multidrug-resistant Pseudomonas in July of last year from urine culture.  This was resistant to carbapenems, tobramycin and cefepime.  Previously susceptible to Zosyn but with a high MARINA.  She is coming in with increased abdominal pain.  With lactic acidosis.  Started on ceftriaxone and Flagyl.  Also with some constipation.  Is unclear if constipation leads to her UTI episodes/symptoms.  She does get her catheter changed every 4 weeks by nursing team at the skilled facility.        History:  Past Medical History:    Bipolar 1 disorder (HCC)    Dental caries    Diabetes (HCC)    MIKE (generalized anxiety disorder)    High blood pressure    History of diverticulitis of colon    Manipulative behavior    Neurogenic bladder    Obesity (BMI 30-39.9)    Paraplegia (HCC)    Sepsis following intra-abdominal surgery (HCC)    Serotonin syndrome    Sleep apnea    Suprapubic catheter (HCC)    Transverse myelitis (HCC)     Past Surgical History:   Procedure  Laterality Date    Arthroscopy of joint unlisted      knee          Cholecystectomy      Part removal colon w end colostomy      2013    Tonsillectomy       Family History   Adopted: Yes      reports that she has quit smoking. She has a 5 pack-year smoking history. She does not have any smokeless tobacco history on file. She reports that she does not drink alcohol and does not use drugs.    Allergies:  Allergies   Allergen Reactions    Pcn [Penicillins]     Radiology Contrast Iodinated Dyes     Sulfa Antibiotics        Medications:    Current Facility-Administered Medications:     lactulose (ENULOSE) solution 20 g, 20 g, Oral, TID    [START ON 2024] famotidine (Pepcid) tab 20 mg, 20 mg, Oral, Daily    ARIPiprazole (Abilify) tab 5 mg, 5 mg, Oral, Daily    hydrOXYzine (Atarax) tab 10 mg, 10 mg, Oral, TID PRN    hydrOXYzine (Atarax) tab 10 mg, 10 mg, Oral, TID    insulin aspart (NovoLOG) 100 Units/mL FlexPen 18 Units, 18 Units, Subcutaneous, TID CC    cefTRIAXone (Rocephin) 2 g in sodium chloride 0.9% 100 mL IVPB-ADDV, 2 g, Intravenous, Q24H    HYDROcodone-acetaminophen (Norco) 5-325 MG per tab 1 tablet, 1 tablet, Oral, Q6H PRN **OR** HYDROcodone-acetaminophen (Norco) 5-325 MG per tab 2 tablet, 2 tablet, Oral, Q6H PRN    metoprolol tartrate (Lopressor) tab 25 mg, 25 mg, Oral, 2x Daily(Beta Blocker)    DULoxetine (Cymbalta) DR cap 90 mg, 90 mg, Oral, Daily    insulin aspart (NovoLOG) 100 Units/mL FlexPen 1-11 Units, 1-11 Units, Subcutaneous, TID CC and HS    insulin degludec (Tresiba) 100 units/mL flextouch 75 Units, 75 Units, Subcutaneous, Daily    sodium chloride (Saline Mist) 0.65 % nasal solution 1 spray, 1 spray, Each Nare, Q3H PRN    polyethylene glycol (PEG 3350) (Miralax) 17 g oral packet 17 g, 17 g, Oral, Daily PRN    sennosides (Senokot) tab 17.2 mg, 17.2 mg, Oral, Nightly PRN    bisacodyl (Dulcolax) 10 MG rectal suppository 10 mg, 10 mg, Rectal, Daily PRN    fleet enema (Fleet) 7-19 GM/118ML  rectal enema 133 mL, 1 enema, Rectal, Once PRN    ondansetron (Zofran) 4 MG/2ML injection 4 mg, 4 mg, Intravenous, Q6H PRN    prochlorperazine (Compazine) 10 MG/2ML injection 5 mg, 5 mg, Intravenous, Q8H PRN    glucose (Dex4) 15 GM/59ML oral liquid 15 g, 15 g, Oral, Q15 Min PRN **OR** glucose (Glutose) 40% oral gel 15 g, 15 g, Oral, Q15 Min PRN **OR** glucose-vitamin C (Dex-4) chewable tab 4 tablet, 4 tablet, Oral, Q15 Min PRN **OR** dextrose 50% injection 50 mL, 50 mL, Intravenous, Q15 Min PRN **OR** glucose (Dex4) 15 GM/59ML oral liquid 30 g, 30 g, Oral, Q15 Min PRN **OR** glucose (Glutose) 40% oral gel 30 g, 30 g, Oral, Q15 Min PRN **OR** glucose-vitamin C (Dex-4) chewable tab 8 tablet, 8 tablet, Oral, Q15 Min PRN    polyethylene glycol (PEG 3350) (Miralax) 17 g oral packet 17 g, 17 g, Oral, BID    docusate sodium (Colace) cap 100 mg, 100 mg, Oral, BID    acetaminophen (Tylenol) tab 650 mg, 650 mg, Oral, Q6H PRN    atorvastatin (Lipitor) tab 40 mg, 40 mg, Oral, Nightly    bethanechol (Urecholine) tab 10 mg, 10 mg, Oral, TID    clonazePAM (KlonoPIN) tab 0.5 mg, 0.5 mg, Oral, BID    cyclobenzaprine (Flexeril) tab 10 mg, 10 mg, Oral, TID PRN    divalproex ER (Depakote ER) 24 hr tab 500 mg, 500 mg, Oral, BID    melatonin tab 3 mg, 3 mg, Oral, Nightly    montelukast (Singulair) tab 10 mg, 10 mg, Oral, Daily    oxybutynin ER (Ditropan-XL) 24 hr tab 5 mg, 5 mg, Oral, Daily    rivaroxaban (Xarelto) tab 20 mg, 20 mg, Oral, Q24H    gabapentin (Neurontin) cap 300 mg, 300 mg, Oral, TID    QUEtiapine (SEROquel) tab 200 mg, 200 mg, Oral, BID    meclizine (Antivert) tab 25 mg, 25 mg, Oral, TID PRN    traZODone (Desyrel) tab 50 mg, 50 mg, Oral, Nightly PRN    Review of Systems:    A comprehensive 10 point review of systems was completed.  Pertinent positives and negatives noted in the the HPI.    Physical Exam:    General: No acute distress. Alert and oriented x 3.  Very pleasant.  No respiratory distress  Vital signs: Blood  pressure 113/46, pulse 102, temperature 98.6 °F (37 °C), temperature source Oral, resp. rate 18, weight 218 lb 6.4 oz (99.1 kg), SpO2 95%.  HEENT: Moist mucous membranes. Extraocular muscles are intact.  Neck: No lymphadenopathy.  No JVD. No carotid bruits.  Respiratory: Clear to auscultation bilaterally.  No wheezes. No rhonchi.  Cardiovascular: S1, S2.  Regular rate and rhythm.  No murmurs.  Abdomen: Soft, nontender, nondistended.  Positive bowel sounds.  Obese.  No drainage from SP cath site.  Musculoskeletal: Full range of motion of all extremities.  No swelling noted.  Integument: No lesions. No erythema.  Psychiatric: Appropriate mood and affect.  Neurologic: No focal neurological deficits.    Laboratory Data:  Lab Results   Component Value Date    WBC 10.3 07/12/2024    HGB 10.7 07/12/2024    HCT 32.4 07/12/2024    .0 07/12/2024    CREATSERUM 1.00 07/12/2024    BUN 28 07/12/2024     07/12/2024    K 4.2 07/12/2024     07/12/2024    CO2 30.0 07/12/2024     07/12/2024    CA 9.1 07/12/2024    PGLU 116 07/12/2024       Microbiologic Data:     Reviewed in EMR    Imaging:  X-Ray    Imaging results reviewed     Impression:  Patient Active Problem List   Diagnosis    Paresis of lower extremity (HCC)    Transverse myelitis ([facility])    Bipolar 1 disorder ([facility])    Manipulative behavior    History of diverticulitis of colon    Serotonin syndrome    Obesity (BMI 30-39.9)    Dental caries    Type 2 diabetes mellitus without complication, with long-term current use of insulin (HCC)    Chronic suprapubic catheter ([facility])    Bipolar I disorder depressed with atypical features ([facility])    Other acute pulmonary embolism, unspecified whether acute cor pulmonale present ([facility])    Hyperglycemia    Sepsis due to undetermined organism ([facility])    Severe episode of recurrent major depressive disorder, without psychotic features ([facility])    Generalized anxiety disorder    Sepsis due to urinary tract infection ([facility])    Lactic  acid acidosis    Constipation, unspecified constipation type    Moderate depressed bipolar I disorder (HCC)         ASSESSMENT:  #Probable catheter associated urinary tract infection.  Suprapubic catheter.  With a history of E. coli primary in the past.  Has had episodes of multidrug-resistant Pseudomonas over 1 year ago.  Rule out associated bacteremia.    #Neurogenic bladder.  Suprapubic catheter.    PLAN:  Will switch to oral stepdown therapy today to ensure she tolerates it.  Omnicef 300 bid x 7 day.  Will plan additional 7 days.  Discussed with Dr. Rustam Blunt for discharge tomorrow from ID standpoint.    Jayce Barbour MD  7/9/2024  3:31 PM

## 2024-07-13 VITALS
HEART RATE: 89 BPM | TEMPERATURE: 99 F | DIASTOLIC BLOOD PRESSURE: 89 MMHG | OXYGEN SATURATION: 95 % | BODY MASS INDEX: 41 KG/M2 | RESPIRATION RATE: 17 BRPM | SYSTOLIC BLOOD PRESSURE: 123 MMHG | WEIGHT: 210.13 LBS

## 2024-07-13 LAB
GLUCOSE BLDC GLUCOMTR-MCNC: 100 MG/DL (ref 70–99)
GLUCOSE BLDC GLUCOMTR-MCNC: 122 MG/DL (ref 70–99)
GLUCOSE BLDC GLUCOMTR-MCNC: 180 MG/DL (ref 70–99)
GLUCOSE BLDC GLUCOMTR-MCNC: 196 MG/DL (ref 70–99)

## 2024-07-13 PROCEDURE — 99239 HOSP IP/OBS DSCHRG MGMT >30: CPT | Performed by: HOSPITALIST

## 2024-07-13 RX ORDER — PYRIDOXINE HCL (VITAMIN B6) 100 MG
TABLET ORAL
Status: SHIPPED | COMMUNITY
Start: 2024-07-13

## 2024-07-13 RX ORDER — ARIPIPRAZOLE 5 MG/1
5 TABLET ORAL DAILY
Status: SHIPPED | COMMUNITY
Start: 2024-07-14

## 2024-07-13 RX ORDER — CLONAZEPAM 0.5 MG/1
0.5 TABLET ORAL 2 TIMES DAILY
Qty: 15 TABLET | Refills: 0 | Status: SHIPPED | OUTPATIENT
Start: 2024-07-13

## 2024-07-13 RX ORDER — CEFDINIR 300 MG/1
300 CAPSULE ORAL 2 TIMES DAILY
Qty: 14 CAPSULE | Refills: 0 | Status: SHIPPED | OUTPATIENT
Start: 2024-07-13 | End: 2024-07-20

## 2024-07-13 RX ORDER — HEPARIN SODIUM (PORCINE) LOCK FLUSH IV SOLN 100 UNIT/ML 100 UNIT/ML
500 SOLUTION INTRAVENOUS ONCE
Status: COMPLETED | OUTPATIENT
Start: 2024-07-13 | End: 2024-07-13

## 2024-07-13 NOTE — DISCHARGE SUMMARY
[facility]  Discharge Summary     Clari Rosa  : 1970    Status: Inpatient  Day #: 4    Attending: Johan Easton MD  PCP: Brian Bob MD     Date of Admission:  2024  Date of Discharge:  2024     Hospital Discharge Diagnoses:     Sepsis due to urinary tract infection related to chronic suprapubic catheter  Lactic acidosis, leukocytosis  Constipation  MIKE  Bipolar 1 disorder  IDDM  H/o PE  Paraplegia  Hyponatremia  BPPV      History of Present Illness:     Copied from Admission H&P:    Clari Rosa is a(n) 53 year old female with a history of bipolar disorder, chronic suprapubic catheter, NH resident, HTN, obesity, paraplegia, anxiety, dental caries, who presents today to ER with complaints if dizziness, constipation, and left tooth ache.    Pt says he had a BM this morning but feels she is full of stool that needs to come out.  Pt has had multiple tooth extractions but has one tooth on left side left in as oral surgeon said it was too close to blood vessels or nerves.  Pt has tooth ache in this one tooth.   Pt says appetite has been good and she eats too much.  In ER pt found to be septic with UTI.    Pt says she has been off of her psych meds and requests to see psych.     Pt requests ID consult.        Hospital Course:     Sepsis due to urinary tract infection related to chronic suprapubic catheter  Lactic acidosis, leukocytosis  - cont abx per ID cefepime IV -> ceftriaxone -> oral cefdinir on discharge  - f/u urine culture - serratia marcescens, enterococcus faecalis  - f/u blood cultures - NGTD     Left tooth ache  Probably infected.  Pt will be on abx as above.  Outpt oral surgeon follow up     Constipation  Abx x-ray reveals possible PSBO.  - CT abdomen/pelvis without obstruction. +constipation  - pt having BMs now  - cont colace and miralax bid.     MIKE  Bipolar 1 disorder.  Pt on multiple psych meds.  Requests to see psych  - psych consult  - cont, meds     IDDM - poorly  controlled  -cont insulin regimen     Hx of PE  - cont xarelto     Paraplegia  - supportive care     Mild hyponatremia  -chronic  -monitor     Consultants         Provider   Role Specialty     Víctor Qureshi MD      Consulting Physician Psychiatry     Jayce Barbour MD      Consulting Physician INFECTIOUS DISEASES             Physical Exam:   Blood pressure 123/89, pulse 89, temperature 98.6 °F (37 °C), temperature source Oral, resp. rate 17, weight 210 lb 1.6 oz (95.3 kg), SpO2 95%.  General:  Alert, no distress  HEENT:  Normocephalic, atraumatic  Cardiac:  Regular rate, regular rhythm  Pulmonary:  Clear to auscultation bilaterally, respirations unlabored  Gastrointestinal:  Soft, non-tender, normal bowel sounds  Musculoskeletal:  No joint swelling  Extremities:  No edema, no cyanosis, no clubbing  Neurologic:  nonfocal  Psychiatric:  Normal affect, calm and appropriate         Discharge Medications        START taking these medications        Instructions Prescription details   ARIPiprazole 5 MG Tabs  Commonly known as: Abilify  Start taking on: July 14, 2024      Take 1 tablet (5 mg total) by mouth daily.   Refills: 0     docusate sodium 100 MG Caps  Commonly known as: COLACE      Take 100 mg by mouth 2 (two) times daily.   Refills: 0     hydrOXYzine 10 MG Tabs  Commonly known as: Atarax      Take 1 tablet (10 mg total) by mouth 3 (three) times daily as needed for Anxiety.   Refills: 0     Insulin Pen Needle 32G X 4 MM Misc      May substitute if not on insurance formulary   Quantity: 100 each  Refills: 5     lactulose 20 GM/30ML Soln  Commonly known as: ENULOSE      Take 30 mL (20 g total) by mouth every 6 (six) hours as needed (constipation).   Refills: 0     metoprolol tartrate 25 MG Tabs  Commonly known as: Lopressor      Take 1 tablet (25 mg total) by mouth 2x Daily(Beta Blocker).   Refills: 0     Polyethylene Glycol 3350 17 g Pack  Commonly known as: MIRALAX      Take 17 g by mouth daily as needed (If  no bowel movement in last 24 hours).   Refills: 0     traZODone 50 MG Tabs  Commonly known as: Desyrel      Take 1 tablet (50 mg total) by mouth nightly as needed for Sleep.   Refills: 0            CHANGE how you take these medications        Instructions Prescription details   acetaminophen 325 MG Tabs  Commonly known as: Tylenol  What changed: Another medication with the same name was removed. Continue taking this medication, and follow the directions you see here.      Take 2 tablets (650 mg total) by mouth every 6 (six) hours as needed for Pain.   Refills: 0     insulin aspart 100 UNIT/ML Soct  Commonly known as: NovoLOG  What changed: Another medication with the same name was changed. Make sure you understand how and when to take each.      SS: 150-200 2units, 201-250 4 units, 251-300 6 units, 301-350 8 units, 351-400 10 units, over 401 call md   Quantity: 3 mL  Refills: 0     insulin aspart 100 Units/mL Sopn  Commonly known as: NovoLOG  What changed: how much to take      Inject 18 Units into the skin 3 (three) times daily with meals.   Refills: 0     QUEtiapine 200 MG Tabs  Commonly known as: SEROquel  What changed: Another medication with the same name was removed. Continue taking this medication, and follow the directions you see here.      Take 1 tablet (200 mg total) by mouth 2 (two) times daily.   Refills: 0            CONTINUE taking these medications        Instructions Prescription details   acidophilus-pectin Caps  Commonly known as: Probiotic      Take 1 capsule by mouth 2 (two) times daily.   Refills: 0     albuterol 108 (90 Base) MCG/ACT Aers  Commonly known as: Ventolin HFA      Inhale 2 puffs into the lungs every 6 (six) hours as needed for Wheezing or Shortness of Breath.   Refills: 0     atorvastatin 40 MG Tabs  Commonly known as: Lipitor      Take 1 tablet (40 mg total) by mouth nightly.   Refills: 0     Baqsimi One Pack 3 MG/DOSE nasal powder  Generic drug: glucagon      3 mg by Nasal route  once as needed for Low blood glucose.   Refills: 0     bethanechol 10 MG Tabs  Commonly known as: Urecholine      Take 1 tablet (10 mg total) by mouth 3 (three) times daily.   Refills: 0     cefdinir 300 MG Caps  Commonly known as: Omnicef      Take 1 capsule (300 mg total) by mouth 2 (two) times daily for 7 days.   Stop taking on: July 20, 2024  Quantity: 14 capsule  Refills: 0     clonazePAM 0.5 MG Tabs  Commonly known as: KlonoPIN      Take 1 tablet (0.5 mg total) by mouth 2 (two) times daily.   Quantity: 15 tablet  Refills: 0     Cranberry 500 MG Caps      1 capsule PO daily   Refills: 0     cyclobenzaprine 10 MG Tabs  Commonly known as: Flexeril      Take 1 tablet (10 mg total) by mouth 3 (three) times daily as needed for Muscle spasms.   Refills: 0     divalproex  MG Tb24  Commonly known as: Depakote ER      Take 1 tablet (500 mg total) by mouth 2 (two) times daily.   Refills: 0     DULoxetine 30 MG Cpep  Commonly known as: Cymbalta      Take 2 capsules (60 mg total) by mouth daily.   Refills: 0     ergocalciferol 1.25 MG (51905 UT) Caps  Commonly known as: Vitamin D2      Take 1 capsule (50,000 Units total) by mouth every 7 days. Every monday   Refills: 0     Gabapentin 50 MG Tabs      Take 300 mg by mouth 3 (three) times daily.   Refills: 0     Icosapent Ethyl 1 g Caps      Take 1 capsule by mouth 4 (four) times daily.   Refills: 0     insulin glargine 100 UNIT/ML Soln  Commonly known as: Lantus      Inject 56 Units into the skin nightly.   Refills: 0     insulin glargine 100 UNIT/ML Soln  Commonly known as: Lantus      Inject 20 Units into the skin every morning.   Refills: 0     loratadine 10 MG Tabs  Commonly known as: Claritin      Take 1 tablet (10 mg total) by mouth daily.   Refills: 0     meclizine 12.5 MG Tabs  Commonly known as: Antivert      Take 1 tablet (12.5 mg total) by mouth 3 (three) times daily as needed for Dizziness.   Refills: 0     Melatonin 3 MG Caps      Take 1 capsule (3 mg  total) by mouth at bedtime.   Refills: 0     metFORMIN  MG Tb24  Commonly known as: Glucophage XR      Take 1 tablet (750 mg total) by mouth daily with breakfast.   Quantity: 30 tablet  Refills: 0     methenamine 1 g Tabs  Commonly known as: Hiprex      Take 0.5 tablets (0.5 g total) by mouth 2 (two) times daily with meals.   Refills: 0     montelukast 10 MG Tabs  Commonly known as: Singulair      Take 1 tablet (10 mg total) by mouth daily.   Refills: 0     Nystatin 075685 UNIT/GM Powd      Apply 1 Application topically as needed for Dry Skin. Under breast and groin area   Refills: 0     ondansetron 4 MG Tbdp  Commonly known as: Zofran-ODT      Take 2 tablets (8 mg total) by mouth every 8 (eight) hours as needed for Nausea.   Refills: 0     oxybutynin ER 5 MG Tb24  Commonly known as: Ditropan-XL      Take 1 tablet (5 mg total) by mouth daily.   Refills: 0     rivaroxaban 20 MG Tabs  Commonly known as: Xarelto      Take 1 tablet (20 mg total) by mouth daily.   Quantity: 30 tablet  Refills: 0     sennosides 8.6 MG Tabs  Commonly known as: Senokot      Take 2 tablets (17.2 mg total) by mouth 2 (two) times daily.   Refills: 0     Thera-M Tabs      Take 1 tablet by mouth daily.   Refills: 0            STOP taking these medications      buPROPion  MG Tb24  Commonly known as: Wellbutrin XL        phenazopyridine 200 MG Tabs  Commonly known as: Pyridum        psyllium 28 % Pack  Commonly known as: Metamucil                  Where to Get Your Medications        [facility] [place] [facility] [address] [facility] [phone], [phone]  [address] [facility] [address]      Phone: [phone]   Insulin Pen Needle 32G X 4 MM Misc       Please  your prescriptions at the location directed by your doctor or nurse    Bring a paper prescription for each of these medications  cefdinir 300 MG Caps  clonazePAM 0.5 MG Tabs            Hospital Discharge Diagnoses:  Acute UTI    Lace+ Score: 59  59-90 High Risk  29-58 Medium Risk  0-28   Low Risk.    TCM Follow-Up Recommendation:  LACE > 58: High Risk of readmission after discharge from the hospital.        I spent >30 minutes on this discharge. Discussed treatment and discharge plans.       Johan Easton MD

## 2024-07-13 NOTE — PLAN OF CARE
Problem: Patient Centered Care  Goal: Patient preferences are identified and integrated in the patient's plan of care  Description: Interventions:  - What would you like us to know as we care for you? My mom is my POA  - Provide timely, complete, and accurate information to patient/family  - Incorporate patient and family knowledge, values, beliefs, and cultural backgrounds into the planning and delivery of care  - Encourage patient/family to participate in care and decision-making at the level they choose  - Honor patient and family perspectives and choices  Outcome: Progressing     Problem: GASTROINTESTINAL - ADULT  Goal: Maintains or returns to baseline bowel function  Description: INTERVENTIONS:  - Assess bowel function  - Maintain adequate hydration with IV or PO as ordered and tolerated  - Evaluate effectiveness of GI medications  - Encourage mobilization and activity  - Obtain nutritional consult as needed  - Establish a toileting routine/schedule  - Consider collaborating with pharmacy to review patient's medication profile  Outcome: Progressing     Problem: GENITOURINARY - ADULT  Goal: Absence of urinary retention  Description: INTERVENTIONS:  - Assess patient’s ability to void and empty bladder  - Monitor intake/output and perform bladder scan as needed  - Follow urinary retention protocol/standard of care  - Consider collaborating with pharmacy to review patient's medication profile  - Implement strategies to promote bladder emptying  Outcome: Progressing     Problem: SKIN/TISSUE INTEGRITY - ADULT  Goal: Skin integrity remains intact  Description: INTERVENTIONS  - Assess and document risk factors for pressure ulcer development  - Assess and document skin integrity  - Monitor for areas of redness and/or skin breakdown  - Initiate interventions, skin care algorithm/standards of care as needed  Outcome: Progressing     Problem: Impaired Functional Mobility  Goal: Achieve highest/safest level of  mobility/gait  Description: Interventions:  - Assess patient's functional ability and stability  - Promote increasing activity/tolerance for mobility and gait  - Educate and engage patient/family in tolerated activity level and precautions  - Recommend use of  karin-walker for transfers and ambulation  Outcome: Progressing     Patient is alert and oriented x 4. She complained of generalized pain, pain meds given as needed. No acute changes at this time. Safety and fall precautions maintained, bed alarm on. Call light within reach. Frequent rounding by nursing staff.

## 2024-07-13 NOTE — PROGRESS NOTES
Patient is a 53 year old , single, female with past medical history of diabetes, bipolar 1 disorder, depression, MIKE, HTN, diverticulitis, neurogenic bladder, obesity, who was admitted to the hospital for Sepsis due to urinary tract infection (HCC): The patient has been demonstrating increased anxiety and depressive symptoms. Patient indicated for psych consult for evaluation and advise.    Consult Duration   The patient seen for over 50-minute, follow-up evaluation, over 50% counseling and coordinating care addressing increased anxiety and depressive symptoms..    Record reviewed, communication with attending, communication with RN and patient seen face to face evaluation.    History of Present Illness:       According to the team the patient doing much better with depressed affect but continue asking for things.    The patient today presented calm and cooperative and pleasant.  No restlessness has been exhibited and the patient demonstrating improvement in her mood and emotion.  Patient could not deny that she is doing much better otherwise reporting \"a doctor want to be discharged tomorrow but I feel I still need more time\".    Patient encouraged that she has been looking much better and staying in the hospital for longer is not healthy for possible picking up other diseases.    The patient reporting that she has been sleeping 7 to 8-hour.  Patient reporting that her anxiety has been under control and she has not been demonstrating any anxiety attack.  Patient reporting the current regiment has been very helpful.    Patient denying any homicidal or suicidal ideation.  Patient very anxious about going to nursing home and not receiving the current medication.  Patient denying any side effect.    With all the improvement the patient continue mistreating some obsessive thoughts, rumination, excessive worry, racing thoughts and need to find some physical difficulty to complain about otherwise noticeably  significantly better.    Therapy focus on encouraging positive behavior and positive mood.    Past Psychiatric/Medication History:  1. Prior diagnoses: MIKE, depression, Bipolar 1 disorder, Manipulative behavior. Patient states she also has OCD and Panic attacks sine adolescent years.  2. Past psychiatric inpatient: none reported  3. Past outpatient history: Patient reports seeing a psychiatris at Nursing Home   4. Past suicide history: Patient denies   5. Medication history: Wellbutrin XL 24 hr tab 300mg, Klonopin 1mg q6h, Seroquel 300mg BID,      Social History:   The patient states the she lives in a Nursing home due to medical conditions. She is single and has never  but has one living son.      Denies any ETOH abuse, or illicit substance abuse. Previous smoker (5 pack history)     Family History:  No family history reported   Medical History:   Past Medical History  Past Medical History:    Bipolar 1 disorder (HCC)    Dental caries    Diabetes (HCC)    MIKE (generalized anxiety disorder)    High blood pressure    History of diverticulitis of colon    Manipulative behavior    Neurogenic bladder    Obesity (BMI 30-39.9)    Paraplegia (HCC)    Sepsis following intra-abdominal surgery (HCC)    Serotonin syndrome    Sleep apnea    Suprapubic catheter (HCC)    Transverse myelitis (HCC)       Past Surgical History  Past Surgical History:   Procedure Laterality Date    Arthroscopy of joint unlisted      knee          Cholecystectomy      Part removal colon w end colostomy      2013    Tonsillectomy         Family History  Family History   Adopted: Yes       Social History  Social History     Socioeconomic History    Marital status: Single   Tobacco Use    Smoking status: Former     Current packs/day: 1.00     Average packs/day: 1 pack/day for 5.0 years (5.0 ttl pk-yrs)     Types: Cigarettes    Tobacco comments:     quit 2006   Substance and Sexual Activity    Alcohol use: No    Drug use: No     Social  Determinants of Health     Food Insecurity: No Food Insecurity (7/9/2024)    Food Insecurity     Food Insecurity: Never true   Transportation Needs: Unknown (7/9/2024)    Transportation Needs     Lack of Transportation: Patient declined   Housing Stability: Unknown (7/9/2024)    Housing Stability     Housing Instability: Patient declined           Current Medications:  Current Facility-Administered Medications   Medication Dose Route Frequency    lactulose (ENULOSE) solution 20 g  20 g Oral TID    [START ON 7/13/2024] famotidine (Pepcid) tab 20 mg  20 mg Oral Daily    cefdinir (Omnicef) cap 300 mg  300 mg Oral BID    ARIPiprazole (Abilify) tab 5 mg  5 mg Oral Daily    hydrOXYzine (Atarax) tab 10 mg  10 mg Oral TID PRN    hydrOXYzine (Atarax) tab 10 mg  10 mg Oral TID    insulin aspart (NovoLOG) 100 Units/mL FlexPen 18 Units  18 Units Subcutaneous TID CC    HYDROcodone-acetaminophen (Norco) 5-325 MG per tab 1 tablet  1 tablet Oral Q6H PRN    Or    HYDROcodone-acetaminophen (Norco) 5-325 MG per tab 2 tablet  2 tablet Oral Q6H PRN    metoprolol tartrate (Lopressor) tab 25 mg  25 mg Oral 2x Daily(Beta Blocker)    DULoxetine (Cymbalta) DR cap 90 mg  90 mg Oral Daily    insulin aspart (NovoLOG) 100 Units/mL FlexPen 1-11 Units  1-11 Units Subcutaneous TID CC and HS    insulin degludec (Tresiba) 100 units/mL flextouch 75 Units  75 Units Subcutaneous Daily    sodium chloride (Saline Mist) 0.65 % nasal solution 1 spray  1 spray Each Nare Q3H PRN    polyethylene glycol (PEG 3350) (Miralax) 17 g oral packet 17 g  17 g Oral Daily PRN    sennosides (Senokot) tab 17.2 mg  17.2 mg Oral Nightly PRN    bisacodyl (Dulcolax) 10 MG rectal suppository 10 mg  10 mg Rectal Daily PRN    fleet enema (Fleet) 7-19 GM/118ML rectal enema 133 mL  1 enema Rectal Once PRN    ondansetron (Zofran) 4 MG/2ML injection 4 mg  4 mg Intravenous Q6H PRN    prochlorperazine (Compazine) 10 MG/2ML injection 5 mg  5 mg Intravenous Q8H PRN    glucose (Dex4) 15  GM/59ML oral liquid 15 g  15 g Oral Q15 Min PRN    Or    glucose (Glutose) 40% oral gel 15 g  15 g Oral Q15 Min PRN    Or    glucose-vitamin C (Dex-4) chewable tab 4 tablet  4 tablet Oral Q15 Min PRN    Or    dextrose 50% injection 50 mL  50 mL Intravenous Q15 Min PRN    Or    glucose (Dex4) 15 GM/59ML oral liquid 30 g  30 g Oral Q15 Min PRN    Or    glucose (Glutose) 40% oral gel 30 g  30 g Oral Q15 Min PRN    Or    glucose-vitamin C (Dex-4) chewable tab 8 tablet  8 tablet Oral Q15 Min PRN    polyethylene glycol (PEG 3350) (Miralax) 17 g oral packet 17 g  17 g Oral BID    docusate sodium (Colace) cap 100 mg  100 mg Oral BID    acetaminophen (Tylenol) tab 650 mg  650 mg Oral Q6H PRN    atorvastatin (Lipitor) tab 40 mg  40 mg Oral Nightly    bethanechol (Urecholine) tab 10 mg  10 mg Oral TID    clonazePAM (KlonoPIN) tab 0.5 mg  0.5 mg Oral BID    cyclobenzaprine (Flexeril) tab 10 mg  10 mg Oral TID PRN    divalproex ER (Depakote ER) 24 hr tab 500 mg  500 mg Oral BID    melatonin tab 3 mg  3 mg Oral Nightly    montelukast (Singulair) tab 10 mg  10 mg Oral Daily    oxybutynin ER (Ditropan-XL) 24 hr tab 5 mg  5 mg Oral Daily    rivaroxaban (Xarelto) tab 20 mg  20 mg Oral Q24H    gabapentin (Neurontin) cap 300 mg  300 mg Oral TID    QUEtiapine (SEROquel) tab 200 mg  200 mg Oral BID    meclizine (Antivert) tab 25 mg  25 mg Oral TID PRN    traZODone (Desyrel) tab 50 mg  50 mg Oral Nightly PRN     Medications Prior to Admission   Medication Sig    QUEtiapine 50 MG Oral Tab Take 1 tablet (50 mg total) by mouth 2 (two) times daily.    clonazePAM 0.5 MG Oral Tab Take 1 tablet (0.5 mg total) by mouth 2 (two) times daily.    QUEtiapine 200 MG Oral Tab Take 1 tablet (200 mg total) by mouth 2 (two) times daily.    DULoxetine 30 MG Oral Cap DR Particles Take 2 capsules (60 mg total) by mouth daily.    gabapentin 50 MG Oral Tab Take 300 mg by mouth 3 (three) times daily.    psyllium 28 % Oral Powd Pack Take 1 packet by mouth every 8  (eight) hours as needed (constipation).    Nystatin 201764 UNIT/GM External Powder Apply 1 Application topically as needed for Dry Skin. Under breast and groin area    atorvastatin 40 MG Oral Tab Take 1 tablet (40 mg total) by mouth nightly.    sennosides 8.6 MG Oral Tab Take 2 tablets (17.2 mg total) by mouth 2 (two) times daily.    Icosapent Ethyl 1 g Oral Cap Take 1 capsule by mouth 4 (four) times daily.    insulin aspart 100 Units/mL Subcutaneous Solution Pen-injector Inject 24 Units into the skin 3 (three) times daily with meals.    rivaroxaban (XARELTO) 20 MG Oral Tab Take 1 tablet (20 mg total) by mouth daily.    insulin glargine 100 UNIT/ML Subcutaneous Solution Inject 56 Units into the skin nightly.    insulin glargine 100 UNIT/ML Subcutaneous Solution Inject 20 Units into the skin every morning.    ergocalciferol 1.25 MG (31672 UT) Oral Cap Take 1 capsule (50,000 Units total) by mouth every 7 days. Every monday    oxybutynin ER 5 MG Oral Tablet 24 Hr Take 1 tablet (5 mg total) by mouth daily.    bethanechol 10 MG Oral Tab Take 1 tablet (10 mg total) by mouth 3 (three) times daily.    loratadine 10 MG Oral Tab Take 1 tablet (10 mg total) by mouth daily.    cyclobenzaprine 10 MG Oral Tab Take 1 tablet (10 mg total) by mouth 3 (three) times daily as needed for Muscle spasms.    phenazopyridine 200 MG Oral Tab Take 1 tablet (200 mg total) by mouth 3 (three) times daily.    Insulin Aspart 100 UNIT/ML Subcutaneous Solution Cartridge SS: 150-200 2units, 201-250 4 units, 251-300 6 units, 301-350 8 units, 351-400 10 units, over 401 call md    Multiple Vitamins-Minerals (THERA-M) Oral Tab Take 1 tablet by mouth daily.    Montelukast Sodium 10 MG Oral Tab Take 1 tablet (10 mg total) by mouth daily.    BuPROPion HCl ER, XL, 300 MG Oral Tablet 24 Hr Take 1 tablet (300 mg total) by mouth daily.    divalproex Sodium  MG Oral Tablet 24 Hr Take 1 tablet (500 mg total) by mouth 2 (two) times daily.     Acidophilus/Pectin Oral Cap Take 1 capsule by mouth 2 (two) times daily.    ondansetron 4 MG Oral Tablet Dispersible Take 2 tablets (8 mg total) by mouth every 8 (eight) hours as needed for Nausea.    MetFORMIN HCl ER (GLUCOPHAGE-XR) 750 MG Oral Tablet 24 Hr Take 1 tablet (750 mg total) by mouth daily with breakfast.    meclizine 12.5 MG Oral Tab Take 1 tablet (12.5 mg total) by mouth 3 (three) times daily as needed for Dizziness.    [] cefdinir 300 MG Oral Cap Take 1 capsule (300 mg total) by mouth 2 (two) times daily for 7 days.    glucagon (BAQSIMI ONE PACK) 3 MG/DOSE Nasal nasal powder 3 mg by Nasal route once as needed for Low blood glucose.    acetaminophen 325 MG Oral Tab Take 2 tablets (650 mg total) by mouth every 6 (six) hours as needed for Pain.    Melatonin 3 MG Oral Cap Take 1 capsule (3 mg total) by mouth at bedtime.    methenamine 1 g Oral Tab Take 0.5 tablets (0.5 g total) by mouth 2 (two) times daily with meals.    albuterol 108 (90 Base) MCG/ACT Inhalation Aero Soln Inhale 2 puffs into the lungs every 6 (six) hours as needed for Wheezing or Shortness of Breath.    acetaminophen (TYLENOL EXTRA STRENGTH) 500 MG Oral Tab Take 325 mg by mouth every 4 (four) hours as needed for Pain or Fever.       Allergies  Allergies   Allergen Reactions    Pcn [Penicillins]     Radiology Contrast Iodinated Dyes     Sulfa Antibiotics        Review of Systems:   As by Admitting/Attending    Results:   Laboratory Data:  Lab Results   Component Value Date    WBC 10.3 2024    HGB 10.7 (L) 2024    HCT 32.4 (L) 2024    .0 2024    CREATSERUM 1.00 2024    BUN 28 (H) 2024     2024    K 4.2 2024     2024    CO2 30.0 2024     (H) 2024    CA 9.1 2024    ALB 3.8 05/10/2024    ALKPHO 74 05/10/2024    TP 6.5 05/10/2024    AST 24 05/10/2024    ALT 18 05/10/2024    PTT 26.4 2023    LIP 36 2023    DDIMER 0.92 (H)  12/05/2023    MG 1.7 12/12/2023         Imaging:  CT ABDOMEN+PELVIS(CONTRAST ONLY)(CPT=74177)    Result Date: 7/10/2024  CONCLUSION:   Constipation.  No bowel obstruction.    Mild bilateral hydroureteronephrosis down to the level of the bladder which is thickened and decompressed around a Florentino catheter.  No obstructing calculi   Dictated by (CST): Yordan Guerrero MD on 7/10/2024 at 7:09 AM     Finalized by (CST): Yordan Guerrero MD on 7/10/2024 at 7:17 AM           Vital Signs:   Blood pressure 112/76, pulse 94, temperature 98.5 °F (36.9 °C), temperature source Oral, resp. rate 18, weight 99.1 kg (218 lb 6.4 oz), SpO2 96%.    Mental Status Exam:   Appearance: Stated age female, obese, in hospital gown, laying down in hospital bed.  Psychomotor: No psychomotor agitation, or retardation.  No restlessness or tremors observed.  Orientation: Alert and oriented to person, place, time and condition.  Gait: Not evaluated.  Attitude/Coorperation: Cooperative and attentive.  Behavior: Appropriate.  Patient is smiling.  Speech: Regular rate and rhythm speech.  Mood: Patient reporting depressed and anxious mood with significant improvement.  Affect: Anxious with significant improvement.  Thought process: Circumstantial and distracted thought process.  Thought content: Patient denies any suicidal or homicidal ideation.  Perceptions: Patient denies any auditory or visual hallucinations.  Concentration: Grossly intact.  Memory: Grossly intact.  Intellect: Average.  Judgment and Insight: Questionable.     Impression:     Bipolar disorder type I recent episode depression moderate to severe without psychotic feature.  Generalized anxiety disorder.  Sepsis due to urinary tract infection (HCC)    Lactic acid acidosis    Constipation, unspecified constipation type    Patient is a 53 year old , single, female with past medical history of diabetes, bipolar 1 disorder, depression, MIKE, HTN, diverticulitis, neurogenic bladder,  obesity, who was admitted to the hospital for Sepsis due to urinary tract infection (HCC): The patient has been demonstrating increased anxiety and depressive symptoms.    7/9/2024: Patient has been demonstrating increased anxiety, depression, lack of energy, lack of motivation hopelessness, helplessness and increased panic attack.    7/10/2024: The patient continued reporting restlessness, anxiety and difficulty relaxing with increased physical hyperness.    7/11/2024: The patient demonstrating improvement in her mood and demeanor with less restlessness and more calmness otherwise she continue verbalizing increased anxiety and restlessness with the need for more medication?  Otherwise patient reporting she slept 6-hour only?    7/12/2024: The patient has been demonstrating good response to the treatment with encouragement to continue the current regiment of medication and not to go back to the old regiment of Wellbutrin and hide Seroquel dosage.  Discussed risk and benefit, acknowledging the current symptom and severity.  At this point, I would recommend the following approach:     Focus on safety  Focus on education and support.  Focus on insight orientation helping the patient understand diagnosis and treatment plan.  Continue klonopin 0.5 mg BID  Continue Depakote 500 mg BID  Continue Cymbalta 90 mg daily.  Continue hydroxyzine 10 mg 3 times daily.  Continue seroquel 200 mg nightly.  Continue Abilify 5 mg daily.  Utilize hydroxyzine 10 mg 3 times daily as needed for anxiety.  Patient appropriate to discharge to her nursing home back on current medication.  Sign off the case  Coordinate plan with team    Orders This Visit:  Orders Placed This Encounter   Procedures    Basic Metabolic Panel (8)    CBC With Differential With Platelet    Troponin I (High Sensitivity)    Lactic Acid, Plasma    Urinalysis with Culture Reflex    Lactic Acid Reflex Post Positive    Lactic Acid Post Positive    Hemoglobin A1C     Hemoglobin A1C    Lactic Acid, Plasma    Lactic Acid Reflex Post Positive    Lactic Acid Post Positive    CBC With Differential With Platelet    Basic Metabolic Panel (8)    Urine Culture, Routine    Blood Culture    Emergency MRSA Screen by PCR    Blood Culture       Meds This Visit:  Requested Prescriptions      No prescriptions requested or ordered in this encounter       Víctor Qureshi MD  7/12/2024    Note to Patient: The 21st Century Cures Act makes medical notes like these available to patients in the interest of transparency. However, be advised this is a medical document. It is intended as peer to peer communication. It is written in medical language and may contain abbreviations or verbiage that are unfamiliar. It may appear blunt or direct. Medical documents are intended to carry relevant information, facts as evident, and the clinical opinion of the practitioner. This note may have been transcribed using a voice dictation system. Voice recognition errors may occur. This should not be taken to alter the content or meaning of this note.

## 2024-07-13 NOTE — CM/SW NOTE
CM was notified by RN pt is cleared for dc.    CM notified SNF and req to coordinate a time for return.    Plan  Return to [facility] 4pm  [facility]  Pcs done  RN report : [phone]    RN to upd pt/family once dc time confirmed    / to remain available for support and/or discharge planning.     Elle Wheeler RN    Ext [phone]

## 2024-07-13 NOTE — PLAN OF CARE
Patient has been resting in bed w/ call light within reach. Pain is being controlled w/ Norco. Is tolerating general diet; is ACHS. Per pt she still has been feeling dizzy, meclizine given. MD made aware. Has supraubic catheter in place. Has been able to have multiple bowel movements on shift. Is on xarelto and has scds at the bedside for dvt prophylaxis. Pt very anxious and constantly seeking staff in room. Has been cleared to dc back to [facility]. Report given to nurse. Rx and dc instructions given to [facility].    Problem: Patient Centered Care  Goal: Patient preferences are identified and integrated in the patient's plan of care  Description: Interventions:  - What would you like us to know as we care for you? My mom is my POA  - Provide timely, complete, and accurate information to patient/family  - Incorporate patient and family knowledge, values, beliefs, and cultural backgrounds into the planning and delivery of care  - Encourage patient/family to participate in care and decision-making at the level they choose  - Honor patient and family perspectives and choices  Outcome: Adequate for Discharge     Problem: GASTROINTESTINAL - ADULT  Goal: Maintains or returns to baseline bowel function  Description: INTERVENTIONS:  - Assess bowel function  - Maintain adequate hydration with IV or PO as ordered and tolerated  - Evaluate effectiveness of GI medications  - Encourage mobilization and activity  - Obtain nutritional consult as needed  - Establish a toileting routine/schedule  - Consider collaborating with pharmacy to review patient's medication profile  Outcome: Adequate for Discharge     Problem: GENITOURINARY - ADULT  Goal: Absence of urinary retention  Description: INTERVENTIONS:  - Assess patient’s ability to void and empty bladder  - Monitor intake/output and perform bladder scan as needed  - Follow urinary retention protocol/standard of care  - Consider collaborating with pharmacy to review patient's  medication profile  - Implement strategies to promote bladder emptying  Outcome: Adequate for Discharge     Problem: SKIN/TISSUE INTEGRITY - ADULT  Goal: Skin integrity remains intact  Description: INTERVENTIONS  - Assess and document risk factors for pressure ulcer development  - Assess and document skin integrity  - Monitor for areas of redness and/or skin breakdown  - Initiate interventions, skin care algorithm/standards of care as needed  Outcome: Adequate for Discharge     Problem: Impaired Functional Mobility  Goal: Achieve highest/safest level of mobility/gait  Description: Interventions:  - Assess patient's functional ability and stability  - Promote increasing activity/tolerance for mobility and gait  - Educate and engage patient/family in tolerated activity level and precautions  - Recommend use of chair position in bed 3 times per day  Outcome: Adequate for Discharge     Problem: PAIN - ADULT  Goal: Verbalizes/displays adequate comfort level or patient's stated pain goal  Description: INTERVENTIONS:  - Encourage pt to monitor pain and request assistance  - Assess pain using appropriate pain scale  - Administer analgesics based on type and severity of pain and evaluate response  - Implement non-pharmacological measures as appropriate and evaluate response  - Consider cultural and social influences on pain and pain management  - Manage/alleviate anxiety  - Utilize distraction and/or relaxation techniques  - Monitor for opioid side effects  - Notify MD/LIP if interventions unsuccessful or patient reports new pain  - Anticipate increased pain with activity and pre-medicate as appropriate  Outcome: Adequate for Discharge     Problem: RISK FOR INFECTION - ADULT  Goal: Absence of fever/infection during anticipated neutropenic period  Description: INTERVENTIONS  - Monitor WBC  - Administer growth factors as ordered  - Implement neutropenic guidelines  Outcome: Adequate for Discharge     Problem: SAFETY ADULT -  FALL  Goal: Free from fall injury  Description: INTERVENTIONS:  - Assess pt frequently for physical needs  - Identify cognitive and physical deficits and behaviors that affect risk of falls.  - [place] fall precautions as indicated by assessment.  - Educate pt/family on patient safety including physical limitations  - Instruct pt to call for assistance with activity based on assessment  - Modify environment to reduce risk of injury  - Provide assistive devices as appropriate  - Consider OT/PT consult to assist with strengthening/mobility  - Encourage toileting schedule  Outcome: Adequate for Discharge     Problem: DISCHARGE PLANNING  Goal: Discharge to home or other facility with appropriate resources  Description: INTERVENTIONS:  - Identify barriers to discharge w/pt and caregiver  - Include patient/family/discharge partner in discharge planning  - Arrange for needed discharge resources and transportation as appropriate  - Identify discharge learning needs (meds, wound care, etc)  - Arrange for interpreters to assist at discharge as needed  - Consider post-discharge preferences of patient/family/discharge partner  - Complete POLST form as appropriate  - Assess patient's ability to be responsible for managing their own health  - Refer to Case Management Department for coordinating discharge planning if the patient needs post-hospital services based on physician/LIP order or complex needs related to functional status, cognitive ability or social support system  Outcome: Adequate for Discharge

## 2024-07-19 ENCOUNTER — APPOINTMENT (OUTPATIENT)
Dept: GENERAL RADIOLOGY | Facility: HOSPITAL | Age: 54
End: 2024-07-19
Attending: EMERGENCY MEDICINE
Payer: MEDICARE

## 2024-07-19 ENCOUNTER — HOSPITAL ENCOUNTER (EMERGENCY)
Facility: HOSPITAL | Age: 54
Discharge: HOME | End: 2024-07-19
Attending: EMERGENCY MEDICINE
Payer: MEDICARE

## 2024-07-19 VITALS
HEART RATE: 114 BPM | BODY MASS INDEX: 41.23 KG/M2 | DIASTOLIC BLOOD PRESSURE: 91 MMHG | WEIGHT: 210 LBS | RESPIRATION RATE: 17 BRPM | OXYGEN SATURATION: 98 % | TEMPERATURE: 97 F | HEIGHT: 60 IN | SYSTOLIC BLOOD PRESSURE: 151 MMHG

## 2024-07-19 DIAGNOSIS — R07.9 CHEST PAIN OF UNCERTAIN ETIOLOGY: Primary | ICD-10-CM

## 2024-07-19 DIAGNOSIS — E87.1 HYPONATREMIA: ICD-10-CM

## 2024-07-19 LAB
ALBUMIN SERPL-MCNC: 4.1 G/DL (ref 3.2–4.8)
ALBUMIN/GLOB SERPL: 1.5 {RATIO} (ref 1–2)
ALP LIVER SERPL-CCNC: 71 U/L
ALT SERPL-CCNC: 21 U/L
ANION GAP SERPL CALC-SCNC: 5 MMOL/L (ref 0–18)
AST SERPL-CCNC: 17 U/L (ref ?–34)
ATRIAL RATE: 104 BPM
ATRIAL RATE: 106 BPM
BASOPHILS # BLD AUTO: 0.05 X10(3) UL (ref 0–0.2)
BASOPHILS NFR BLD AUTO: 0.4 %
BILIRUB SERPL-MCNC: 0.3 MG/DL (ref 0.3–1.2)
BUN BLD-MCNC: 10 MG/DL (ref 9–23)
BUN/CREAT SERPL: 11.4 (ref 10–20)
CALCIUM BLD-MCNC: 9.2 MG/DL (ref 8.7–10.4)
CHLORIDE SERPL-SCNC: 94 MMOL/L (ref 98–112)
CO2 SERPL-SCNC: 28 MMOL/L (ref 21–32)
CREAT BLD-MCNC: 0.88 MG/DL
DEPRECATED RDW RBC AUTO: 50.4 FL (ref 35.1–46.3)
EGFRCR SERPLBLD CKD-EPI 2021: 79 ML/MIN/1.73M2 (ref 60–?)
EOSINOPHIL # BLD AUTO: 0.28 X10(3) UL (ref 0–0.7)
EOSINOPHIL NFR BLD AUTO: 2.5 %
ERYTHROCYTE [DISTWIDTH] IN BLOOD BY AUTOMATED COUNT: 15.1 % (ref 11–15)
GLOBULIN PLAS-MCNC: 2.7 G/DL (ref 2–3.5)
GLUCOSE BLD-MCNC: 230 MG/DL (ref 70–99)
HCT VFR BLD AUTO: 34.8 %
HGB BLD-MCNC: 11.8 G/DL
IMM GRANULOCYTES # BLD AUTO: 0.09 X10(3) UL (ref 0–1)
IMM GRANULOCYTES NFR BLD: 0.8 %
LYMPHOCYTES # BLD AUTO: 2.97 X10(3) UL (ref 1–4)
LYMPHOCYTES NFR BLD AUTO: 26.1 %
MCH RBC QN AUTO: 30.8 PG (ref 26–34)
MCHC RBC AUTO-ENTMCNC: 33.9 G/DL (ref 31–37)
MCV RBC AUTO: 90.9 FL
MONOCYTES # BLD AUTO: 0.59 X10(3) UL (ref 0.1–1)
MONOCYTES NFR BLD AUTO: 5.2 %
NEUTROPHILS # BLD AUTO: 7.4 X10 (3) UL (ref 1.5–7.7)
NEUTROPHILS # BLD AUTO: 7.4 X10(3) UL (ref 1.5–7.7)
NEUTROPHILS NFR BLD AUTO: 65 %
OSMOLALITY SERPL CALC.SUM OF ELEC: 270 MOSM/KG (ref 275–295)
P AXIS: 49 DEGREES
P AXIS: 52 DEGREES
P-R INTERVAL: 166 MS
P-R INTERVAL: 172 MS
PLATELET # BLD AUTO: 241 10(3)UL (ref 150–450)
POTASSIUM SERPL-SCNC: 4.2 MMOL/L (ref 3.5–5.1)
PROT SERPL-MCNC: 6.8 G/DL (ref 5.7–8.2)
Q-T INTERVAL: 344 MS
Q-T INTERVAL: 370 MS
QRS DURATION: 78 MS
QRS DURATION: 78 MS
QTC CALCULATION (BEZET): 456 MS
QTC CALCULATION (BEZET): 486 MS
R AXIS: -20 DEGREES
R AXIS: -21 DEGREES
RBC # BLD AUTO: 3.83 X10(6)UL
SODIUM SERPL-SCNC: 127 MMOL/L (ref 136–145)
T AXIS: 56 DEGREES
T AXIS: 60 DEGREES
TROPONIN I SERPL HS-MCNC: <3 NG/L
TROPONIN I SERPL HS-MCNC: <3 NG/L
VENTRICULAR RATE: 104 BPM
VENTRICULAR RATE: 106 BPM
WBC # BLD AUTO: 11.4 X10(3) UL (ref 4–11)

## 2024-07-19 PROCEDURE — 99285 EMERGENCY DEPT VISIT HI MDM: CPT

## 2024-07-19 PROCEDURE — 84484 ASSAY OF TROPONIN QUANT: CPT | Performed by: EMERGENCY MEDICINE

## 2024-07-19 PROCEDURE — 71045 X-RAY EXAM CHEST 1 VIEW: CPT | Performed by: EMERGENCY MEDICINE

## 2024-07-19 PROCEDURE — 96360 HYDRATION IV INFUSION INIT: CPT

## 2024-07-19 PROCEDURE — 85025 COMPLETE CBC W/AUTO DIFF WBC: CPT | Performed by: EMERGENCY MEDICINE

## 2024-07-19 PROCEDURE — 93005 ELECTROCARDIOGRAM TRACING: CPT

## 2024-07-19 PROCEDURE — 93010 ELECTROCARDIOGRAM REPORT: CPT

## 2024-07-19 PROCEDURE — 96361 HYDRATE IV INFUSION ADD-ON: CPT

## 2024-07-19 PROCEDURE — 80053 COMPREHEN METABOLIC PANEL: CPT | Performed by: EMERGENCY MEDICINE

## 2024-07-19 RX ORDER — LORAZEPAM 1 MG/1
1 TABLET ORAL ONCE
Status: COMPLETED | OUTPATIENT
Start: 2024-07-19 | End: 2024-07-19

## 2024-07-19 RX ORDER — HYDROCODONE BITARTRATE AND ACETAMINOPHEN 5; 325 MG/1; MG/1
1 TABLET ORAL ONCE
Status: COMPLETED | OUTPATIENT
Start: 2024-07-19 | End: 2024-07-19

## 2024-07-19 RX ORDER — MAGNESIUM HYDROXIDE/ALUMINUM HYDROXICE/SIMETHICONE 120; 1200; 1200 MG/30ML; MG/30ML; MG/30ML
30 SUSPENSION ORAL ONCE
Status: COMPLETED | OUTPATIENT
Start: 2024-07-19 | End: 2024-07-19

## 2024-07-19 RX ORDER — PHENAZOPYRIDINE HYDROCHLORIDE 100 MG/1
100 TABLET, FILM COATED ORAL 3 TIMES DAILY PRN
Qty: 5 TABLET | Refills: 0 | Status: SHIPPED | OUTPATIENT
Start: 2024-07-19

## 2024-07-19 NOTE — ED INITIAL ASSESSMENT (HPI)
Pt arrived via [facility] from [facility]. Pt states she had diarrhea last night and felt dehydration. States that this morning when staff did not get her water right away she started to get a chest pain and felt sharp pain. States she has hx of anxiety.

## 2024-07-19 NOTE — ED QUICK NOTES
Pt discharged via [facility] to [facility] in [place] in stable condition. Port access removed. Let pt know rx was sent to pharmacy.    Anemia Fall, initial encounter

## 2024-07-19 NOTE — ED PROVIDER NOTES
Patient Seen in: [facility] Emergency Department    History     Chief Complaint   Patient presents with    Chest Pain Angina       HPI    53 year old female with a history of bipolar disorder her after substernal burning nonradiating nonexertional chest pain started shortly after eating breakfast, lasting 15 minutes. No dyspnea. Compliant with anticoagulation. Had several watery stools since last night but these have resolved.  States she feels dehydrated.       History reviewed.   Past Medical History:    Bipolar 1 disorder (HCC)    Dental caries    Diabetes (HCC)    MIKE (generalized anxiety disorder)    High blood pressure    History of diverticulitis of colon    Manipulative behavior    Neurogenic bladder    Obesity (BMI 30-39.9)    Paraplegia (HCC)    Sepsis following intra-abdominal surgery (HCC)    Serotonin syndrome    Sleep apnea    Suprapubic catheter (HCC)    Transverse myelitis (HCC)       History reviewed.   Past Surgical History:   Procedure Laterality Date    Arthroscopy of joint unlisted      knee          Cholecystectomy      Part removal colon w end colostomy          Tonsillectomy           Medications :  (Not in a hospital admission)       Family History   Adopted: Yes       Smoking Status:   Social History     Socioeconomic History    Marital status: Single   Tobacco Use    Smoking status: Former     Current packs/day: 1.00     Average packs/day: 1 pack/day for 5.0 years (5.0 ttl pk-yrs)     Types: Cigarettes    Tobacco comments:     quit    Substance and Sexual Activity    Alcohol use: No    Drug use: No       Constitutional and vital signs reviewed.      Social History and Family History elements reviewed from today, pertinent positives to the presenting problem noted.    Physical Exam     ED Triage Vitals [24 0848]   BP (!) 145/98   Pulse 108   Resp 13   Temp 97.4 °F (36.3 °C)   Temp src Temporal   SpO2 100 %   O2 Device None (Room air)       All measures to  prevent infection transmission during my interaction with the patient were taken. The patient was already wearing a droplet mask on my arrival to the room. Personal protective equipment was worn throughout the duration of the exam.  Handwashing was performed prior to and after the exam.  Stethoscope and any equipment used during my examination was cleaned with super sani-cloth germicidal wipes following the exam.     Physical Exam    General: NAD  Head: Normocephalic and atraumatic.  Mouth/Throat/Ears/Nose: Oropharynx is clear and moist.   Eyes: Conjunctivae and EOM are normal.   Neck: Normal range of motion. Supple.   Cardiovascular: Normal rate, regular rhythm, normal heart sounds.  Respiratory/Chest: Clear and equal bilaterally. Exhibits no tenderness.  Gastrointestinal: Soft, non-tender, non-distended. Bowel sounds are normal.   Musculoskeletal:No swelling or deformity.   Neurological: Alert and appropriate. No focal deficits.         ED Course        Labs Reviewed   COMP METABOLIC PANEL (14) - Abnormal; Notable for the following components:       Result Value    Glucose 230 (*)     Sodium 127 (*)     Chloride 94 (*)     Calculated Osmolality 270 (*)     All other components within normal limits   CBC W/ DIFFERENTIAL - Abnormal; Notable for the following components:    WBC 11.4 (*)     HGB 11.8 (*)     HCT 34.8 (*)     RDW-SD 50.4 (*)     RDW 15.1 (*)     All other components within normal limits   TROPONIN I HIGH SENSITIVITY - Normal   TROPONIN I HIGH SENSITIVITY - Normal   CBC WITH DIFFERENTIAL WITH PLATELET    Narrative:     The following orders were created for panel order CBC With Differential With Platelet.                  Procedure                               Abnormality         Status                                     ---------                               -----------         ------                                     CBC W/ DIFFERENTIAL[[id number]]          Abnormal            Final result                                                  Please view results for these tests on the individual orders.   RAINBOW DRAW LAVENDER   RAINBOW DRAW LIGHT GREEN   RAINBOW DRAW BLUE   RAINBOW DRAW GOLD     EKG    Rate, intervals and axes as noted on EKG Report.  Rate: 104  Rhythm: Sinus Rhythm  Reading: Sinus tachycardia, no STEMI.  This is my interpretation.    EKG at 10:55 AM interpretation by me: Sinus tachycardia rate 106, normal axis, normal intervals, no STEMI.  This is my interpretation.           As Interpreted by me    Imaging Results Available and Reviewed while in ED: No results found.  ED Medications Administered:   Medications   alum-mag hydroxide-simethicone (Maalox) 200-200-20 MG/5ML oral suspension 30 mL (30 mL Oral Given 7/19/24 0948)   LORazepam (Ativan) tab 1 mg (1 mg Oral Given 7/19/24 0949)   HYDROcodone-acetaminophen (Norco) 5-325 MG per tab 1 tablet (1 tablet Oral Given 7/19/24 0949)   sodium chloride 0.9 % IV bolus 500 mL (0 mL Intravenous Stopped 7/19/24 1105)   sodium chloride 0.9 % IV bolus 500 mL (0 mL Intravenous Stopped 7/19/24 1149)         MDM     Vitals:    07/19/24 0848 07/19/24 1000 07/19/24 1045 07/19/24 1115   BP: (!) 145/98 124/87 (!) 137/91 (!) 151/91   Pulse: 108 108 112 114   Resp: 13 23 15 17   Temp: 97.4 °F (36.3 °C)      TempSrc: Temporal      SpO2: 100% 95% 98% 98%   Weight: 95.3 kg      Height: 152.4 cm (5')        *I personally reviewed and interpreted all ED vitals.    Pulse Ox: 98%, Room air, Normal     Monitor Interpretation:   normal sinus rhythm as interpreted by me.  The cardiac monitor was ordered given chest pain.      Medical Decision Making      Differential Diagnosis/ Diagnostic Considerations: ACS, GERD, PE    Complicating Factors: The patient already has bipolar disorder to contribute to the complexity of this ED evaluation.    I reviewed prior chart records including discharge summary from July 9, 2020 for admission.  Patient here with chest pain without cardiac  features, serial troponins negative, inconsistent with ACS.  Compliant with anticoagulation, presentation inconsistent with PE.  Rest of lab work notable for mild hyponatremia, signs of mild dehydration, provided IV fluid bolus and recommended to her to have sodium rechecked within the next 2 days.  Chest x-ray unremarkable for acute findings on my interpretation.  She is discharged home in stable condition and instructed follow-up closely with PCP in the outpatient setting.    Disposition and Plan     Clinical Impression:  1. Chest pain of uncertain etiology    2. Hyponatremia        Disposition:  Discharge    Follow-up:  Brian Bob MD  [address]  [facility] [address]  [phone]    Schedule an appointment as soon as possible for a visit in 1 day(s)  YOU NEED TO HAVE YOUR SODIUM RECHECKED WITHIN THE NEXT 2 DAYS      Medications Prescribed:  Discharge Medication List as of 7/19/2024 12:23 PM        START taking these medications    Details   phenazopyridine 100 MG Oral Tab Take 1 tablet (100 mg total) by mouth 3 (three) times daily as needed for Pain., Normal, Disp-5 tablet, R-0

## 2024-07-22 ENCOUNTER — HOSPITAL ENCOUNTER (EMERGENCY)
Facility: HOSPITAL | Age: 54
Discharge: HOME | End: 2024-07-22
Attending: EMERGENCY MEDICINE
Payer: MEDICARE

## 2024-07-22 VITALS
TEMPERATURE: 98 F | OXYGEN SATURATION: 96 % | HEART RATE: 92 BPM | HEIGHT: 60 IN | BODY MASS INDEX: 41.23 KG/M2 | DIASTOLIC BLOOD PRESSURE: 78 MMHG | RESPIRATION RATE: 18 BRPM | WEIGHT: 210 LBS | SYSTOLIC BLOOD PRESSURE: 120 MMHG

## 2024-07-22 DIAGNOSIS — N39.0 URINARY TRACT INFECTION WITHOUT HEMATURIA, SITE UNSPECIFIED: Primary | ICD-10-CM

## 2024-07-22 LAB
ALBUMIN SERPL-MCNC: 4 G/DL (ref 3.2–4.8)
ALBUMIN/GLOB SERPL: 1.5 {RATIO} (ref 1–2)
ALP LIVER SERPL-CCNC: 76 U/L
ALT SERPL-CCNC: 21 U/L
ANION GAP SERPL CALC-SCNC: 6 MMOL/L (ref 0–18)
AST SERPL-CCNC: 18 U/L (ref ?–34)
ATRIAL RATE: 88 BPM
BASOPHILS # BLD AUTO: 0.06 X10(3) UL (ref 0–0.2)
BASOPHILS NFR BLD AUTO: 0.7 %
BILIRUB SERPL-MCNC: 0.2 MG/DL (ref 0.3–1.2)
BILIRUB UR QL: NEGATIVE
BUN BLD-MCNC: 9 MG/DL (ref 9–23)
BUN/CREAT SERPL: 11.1 (ref 10–20)
CALCIUM BLD-MCNC: 9 MG/DL (ref 8.7–10.4)
CHLORIDE SERPL-SCNC: 97 MMOL/L (ref 98–112)
CLARITY UR: CLEAR
CO2 SERPL-SCNC: 28 MMOL/L (ref 21–32)
COLOR UR: COLORLESS
CREAT BLD-MCNC: 0.81 MG/DL
DEPRECATED RDW RBC AUTO: 47.8 FL (ref 35.1–46.3)
EGFRCR SERPLBLD CKD-EPI 2021: 87 ML/MIN/1.73M2 (ref 60–?)
EOSINOPHIL # BLD AUTO: 0.35 X10(3) UL (ref 0–0.7)
EOSINOPHIL NFR BLD AUTO: 4.1 %
ERYTHROCYTE [DISTWIDTH] IN BLOOD BY AUTOMATED COUNT: 14.7 % (ref 11–15)
GLOBULIN PLAS-MCNC: 2.6 G/DL (ref 2–3.5)
GLUCOSE BLD-MCNC: 260 MG/DL (ref 70–99)
GLUCOSE UR-MCNC: 200 MG/DL
HCT VFR BLD AUTO: 32.6 %
HGB BLD-MCNC: 11.4 G/DL
IMM GRANULOCYTES # BLD AUTO: 0.06 X10(3) UL (ref 0–1)
IMM GRANULOCYTES NFR BLD: 0.7 %
KETONES UR-MCNC: NEGATIVE MG/DL
LEUKOCYTE ESTERASE UR QL STRIP.AUTO: 75
LYMPHOCYTES # BLD AUTO: 3.99 X10(3) UL (ref 1–4)
LYMPHOCYTES NFR BLD AUTO: 46.3 %
MCH RBC QN AUTO: 31 PG (ref 26–34)
MCHC RBC AUTO-ENTMCNC: 35 G/DL (ref 31–37)
MCV RBC AUTO: 88.6 FL
MONOCYTES # BLD AUTO: 0.53 X10(3) UL (ref 0.1–1)
MONOCYTES NFR BLD AUTO: 6.2 %
NEUTROPHILS # BLD AUTO: 3.62 X10 (3) UL (ref 1.5–7.7)
NEUTROPHILS # BLD AUTO: 3.62 X10(3) UL (ref 1.5–7.7)
NEUTROPHILS NFR BLD AUTO: 42 %
OSMOLALITY SERPL CALC.SUM OF ELEC: 280 MOSM/KG (ref 275–295)
P AXIS: -9 DEGREES
P-R INTERVAL: 172 MS
PH UR: 5.5 [PH] (ref 5–8)
PLATELET # BLD AUTO: 194 10(3)UL (ref 150–450)
POTASSIUM SERPL-SCNC: 4.3 MMOL/L (ref 3.5–5.1)
PROT SERPL-MCNC: 6.6 G/DL (ref 5.7–8.2)
PROT UR-MCNC: NEGATIVE MG/DL
Q-T INTERVAL: 390 MS
QRS DURATION: 82 MS
QTC CALCULATION (BEZET): 471 MS
R AXIS: 65 DEGREES
RBC # BLD AUTO: 3.68 X10(6)UL
SODIUM SERPL-SCNC: 131 MMOL/L (ref 136–145)
SP GR UR STRIP: <1.005 (ref 1–1.03)
T AXIS: -3 DEGREES
TROPONIN I SERPL HS-MCNC: <3 NG/L
UROBILINOGEN UR STRIP-ACNC: NORMAL
VENTRICULAR RATE: 88 BPM
WBC # BLD AUTO: 8.6 X10(3) UL (ref 4–11)

## 2024-07-22 PROCEDURE — 36415 COLL VENOUS BLD VENIPUNCTURE: CPT

## 2024-07-22 PROCEDURE — 84484 ASSAY OF TROPONIN QUANT: CPT | Performed by: EMERGENCY MEDICINE

## 2024-07-22 PROCEDURE — 99283 EMERGENCY DEPT VISIT LOW MDM: CPT

## 2024-07-22 PROCEDURE — 99284 EMERGENCY DEPT VISIT MOD MDM: CPT

## 2024-07-22 PROCEDURE — 81001 URINALYSIS AUTO W/SCOPE: CPT | Performed by: EMERGENCY MEDICINE

## 2024-07-22 PROCEDURE — 93010 ELECTROCARDIOGRAM REPORT: CPT

## 2024-07-22 PROCEDURE — 85025 COMPLETE CBC W/AUTO DIFF WBC: CPT | Performed by: EMERGENCY MEDICINE

## 2024-07-22 PROCEDURE — 87086 URINE CULTURE/COLONY COUNT: CPT | Performed by: EMERGENCY MEDICINE

## 2024-07-22 PROCEDURE — 93005 ELECTROCARDIOGRAM TRACING: CPT

## 2024-07-22 PROCEDURE — 80053 COMPREHEN METABOLIC PANEL: CPT | Performed by: EMERGENCY MEDICINE

## 2024-07-22 RX ORDER — AMOXICILLIN AND CLAVULANATE POTASSIUM 875; 125 MG/1; MG/1
1 TABLET, FILM COATED ORAL 2 TIMES DAILY
Qty: 20 TABLET | Refills: 0 | Status: SHIPPED | OUTPATIENT
Start: 2024-07-22 | End: 2024-07-25

## 2024-07-22 RX ORDER — FLUTICASONE PROPIONATE 50 MCG
2 SPRAY, SUSPENSION (ML) NASAL DAILY
Qty: 16 G | Refills: 0 | Status: SHIPPED | OUTPATIENT
Start: 2024-07-22 | End: 2024-08-21

## 2024-07-22 NOTE — ED PROVIDER NOTES
Patient Seen in: [facility] Emergency Department      History     Chief Complaint   Patient presents with   • Well Adult     Stated Complaint: mult complaints    Subjective:   HPI    Pt is 52 yo F who arrives by EMS from NH for 1 day of intermittent left sided chest pains. Pt also has 5 days of diarrhea. +sinus congestion and burning with urination despite recent admission for UTI. Currently not on antibiotics. +palpitations at rest. Chronic sob and states she was in hospital earlier this week also for dehydration. No vomiting or fevers. No cough. Pt also has had recent symptoms of feeling like her sugar is low because she gets dizzy.   Pt also states she feels manic. She says can't stop talking. She states she called dr chelsea mina. Denies SI.       Objective:   Past Medical History:   • Bipolar 1 disorder ([facility])   • Dental caries   • Diabetes ([facility])   • MIKE (generalized anxiety disorder)   • High blood pressure   • History of diverticulitis of colon   • Manipulative behavior   • Neurogenic bladder   • Obesity (BMI 30-39.9)   • Paraplegia ([facility])   • Sepsis following intra-abdominal surgery ([facility])   • Serotonin syndrome   • Sleep apnea   • Suprapubic catheter ([facility])   • Transverse myelitis ([facility])              Past Surgical History:   Procedure Laterality Date   • Arthroscopy of joint unlisted      knee   •      • Cholecystectomy     • Part removal colon w end colostomy         • Tonsillectomy                  Social History     Socioeconomic History   • Marital status: Single   Tobacco Use   • Smoking status: Former     Current packs/day: 1.00     Average packs/day: 1 pack/day for 5.0 years (5.0 ttl pk-yrs)     Types: Cigarettes   • Tobacco comments:     quit    Substance and Sexual Activity   • Alcohol use: No   • Drug use: No     Social Determinants of Health     Food Insecurity: No Food Insecurity (2024)    Food Insecurity    • Food Insecurity: Never true   Transportation Needs:  Unknown (7/9/2024)    Transportation Needs    • Lack of Transportation: Patient declined   Housing Stability: Unknown (7/9/2024)    Housing Stability    • Housing Instability: Patient declined              Review of Systems    Positive for stated Chief Complaint: Well Adult    Other systems are as noted in HPI.  Constitutional and vital signs reviewed.      All other systems reviewed and negative except as noted above.    Physical Exam     ED Triage Vitals [07/22/24 0349]   BP (!) 123/105   Pulse 98   Resp 18   Temp 97.8 °F (36.6 °C)   Temp src Oral   SpO2 98 %   O2 Device None (Room air)       Current Vitals:   Vital Signs  BP: (!) 139/91  Pulse: 90  Resp: 17  Temp: 97.8 °F (36.6 °C)  Temp src: Oral  MAP (mmHg): (!) 107    Oxygen Therapy  SpO2: 98 %  O2 Device: None (Room air)            Physical Exam  GENERAL: No acute distress, awake and alert  HEENT: EOMI, PERRL, MMM  Neck: supple  CV: RRR, no murmurs  Resp: CTAB, no wheezes or retractions  Ab: soft, nontender, no distension  Extremities: FROM of all extremities  Neuro: CN intact, normal speech, 5/5 motor strength in all extremities, no focal deficits  SKIN: warm, dry, +healing ulceration great toe with no surrounding erythema      ED Course     Labs Reviewed   COMP METABOLIC PANEL (14) - Abnormal; Notable for the following components:       Result Value    Glucose 260 (*)     Sodium 131 (*)     Chloride 97 (*)     Bilirubin, Total 0.2 (*)     All other components within normal limits   URINALYSIS WITH CULTURE REFLEX - Abnormal; Notable for the following components:    Urine Color Colorless (*)     Spec Gravity <1.005 (*)     Glucose Urine 200 (*)     Blood Urine 2+ (*)     Nitrite Urine 1+ (*)     Leukocyte Esterase Urine 75 (*)     Bacteria Urine 1+ (*)     Squamous Epi. Cells Few (*)     All other components within normal limits   CBC W/ DIFFERENTIAL - Abnormal; Notable for the following components:    RBC 3.68 (*)     HGB 11.4 (*)     HCT 32.6 (*)     RDW-SD  47.8 (*)     All other components within normal limits   TROPONIN I HIGH SENSITIVITY - Normal   CBC WITH DIFFERENTIAL WITH PLATELET    Narrative:     The following orders were created for panel order CBC With Differential With Platelet.  Procedure                               Abnormality         Status                     ---------                               -----------         ------                     CBC W/ DIFFERENTIAL[[id number]]          Abnormal            Final result                 Please view results for these tests on the individual orders.   RAINBOW DRAW LAVENDER   RAINBOW DRAW LIGHT GREEN   RAINBOW DRAW BLUE   URINE CULTURE, ROUTINE     EKG    Rate, intervals and axes as noted on EKG Report.  Rate: 88  Rhythm: Sinus Rhythm  Reading: no PRIMO           MDM         Medical Decision Making  Pt with acute on chronic chest pain  -negative workup in ED  -negative recent workup earlier in July    No distress in ED  Pt with bacteria in urine and c/o sinus infection-requesting new antibiotic and flonase for home.     Amount and/or Complexity of Data Reviewed  External Data Reviewed: labs and notes.     Details: Recent labs, discharge summary from 7/2024 reviewed-pt was admitted for UTI  Labs: ordered.  Radiology: ordered.    Risk  Prescription drug management.        Disposition and Plan     Clinical Impression:  1. Urinary tract infection without hematuria, site unspecified         Disposition:  Discharge  7/22/2024  4:53 am    Follow-up:  Brian Bob MD  [address]  [facility] [address]  [phone]    Follow up      We recommend that you schedule follow up care with a primary care provider within the next three months to obtain basic health screening including reassessment of your blood pressure.      Medications Prescribed:  Current Discharge Medication List        START taking these medications    Details   amoxicillin clavulanate 875-125 MG Oral Tab Take 1 tablet by mouth 2 (two) times daily  for 10 days.  Qty: 20 tablet, Refills: 0      fluticasone propionate 50 MCG/ACT Nasal Suspension 2 sprays by Nasal route daily.  Qty: 16 g, Refills: 0

## 2024-07-22 NOTE — ED QUICK NOTES
Spoke with Bri RN from [facility] who is aware that pt will be returning to facility

## 2024-07-22 NOTE — ED INITIAL ASSESSMENT (HPI)
Patient arrives from [facility] with multiple complaints. Reports left chest pain, burning with urination, left foot pain and states 'I'm maniac; I called Dr. Qureshi today and he told me to come to the hospital'.

## 2024-07-23 ENCOUNTER — HOSPITAL ENCOUNTER (INPATIENT)
Facility: HOSPITAL | Age: 54
LOS: 1 days | Discharge: NURSING, REHAB OR LONG-TERM CARE | End: 2024-07-25
Attending: EMERGENCY MEDICINE | Admitting: HOSPITALIST
Payer: MEDICARE

## 2024-07-23 ENCOUNTER — APPOINTMENT (OUTPATIENT)
Dept: GENERAL RADIOLOGY | Facility: HOSPITAL | Age: 54
End: 2024-07-23
Attending: EMERGENCY MEDICINE
Payer: MEDICARE

## 2024-07-23 DIAGNOSIS — F33.2 SEVERE EPISODE OF RECURRENT MAJOR DEPRESSIVE DISORDER, WITHOUT PSYCHOTIC FEATURES (HCC): ICD-10-CM

## 2024-07-23 DIAGNOSIS — F31.30 BIPOLAR I DISORDER DEPRESSED WITH ATYPICAL FEATURES (HCC): ICD-10-CM

## 2024-07-23 DIAGNOSIS — R07.9 ACUTE CHEST PAIN: Primary | ICD-10-CM

## 2024-07-23 DIAGNOSIS — F41.1 GENERALIZED ANXIETY DISORDER: ICD-10-CM

## 2024-07-23 DIAGNOSIS — F32.A DEPRESSION, UNSPECIFIED DEPRESSION TYPE: ICD-10-CM

## 2024-07-23 PROBLEM — L03.90 CELLULITIS: Status: ACTIVE | Noted: 2024-07-23

## 2024-07-23 LAB
ANION GAP SERPL CALC-SCNC: 6 MMOL/L (ref 0–18)
BASOPHILS # BLD AUTO: 0.07 X10(3) UL (ref 0–0.2)
BASOPHILS NFR BLD AUTO: 0.7 %
BUN BLD-MCNC: 15 MG/DL (ref 9–23)
BUN/CREAT SERPL: 19.5 (ref 10–20)
CALCIUM BLD-MCNC: 8.5 MG/DL (ref 8.7–10.4)
CHLORIDE SERPL-SCNC: 98 MMOL/L (ref 98–112)
CO2 SERPL-SCNC: 29 MMOL/L (ref 21–32)
CREAT BLD-MCNC: 0.77 MG/DL
DEPRECATED RDW RBC AUTO: 48.8 FL (ref 35.1–46.3)
EGFRCR SERPLBLD CKD-EPI 2021: 92 ML/MIN/1.73M2 (ref 60–?)
EOSINOPHIL # BLD AUTO: 0.31 X10(3) UL (ref 0–0.7)
EOSINOPHIL NFR BLD AUTO: 3 %
ERYTHROCYTE [DISTWIDTH] IN BLOOD BY AUTOMATED COUNT: 14.7 % (ref 11–15)
ETHANOL SERPL-MCNC: <3 MG/DL (ref ?–3)
FLUAV + FLUBV RNA SPEC NAA+PROBE: NEGATIVE
FLUAV + FLUBV RNA SPEC NAA+PROBE: NEGATIVE
GLUCOSE BLD-MCNC: 111 MG/DL (ref 70–99)
GLUCOSE BLDC GLUCOMTR-MCNC: 118 MG/DL (ref 70–99)
HCT VFR BLD AUTO: 33.3 %
HGB BLD-MCNC: 11.1 G/DL
IMM GRANULOCYTES # BLD AUTO: 0.06 X10(3) UL (ref 0–1)
IMM GRANULOCYTES NFR BLD: 0.6 %
LYMPHOCYTES # BLD AUTO: 4.3 X10(3) UL (ref 1–4)
LYMPHOCYTES NFR BLD AUTO: 42.1 %
MCH RBC QN AUTO: 29.8 PG (ref 26–34)
MCHC RBC AUTO-ENTMCNC: 33.3 G/DL (ref 31–37)
MCV RBC AUTO: 89.5 FL
MONOCYTES # BLD AUTO: 0.55 X10(3) UL (ref 0.1–1)
MONOCYTES NFR BLD AUTO: 5.4 %
MRSA DNA SPEC QL NAA+PROBE: NEGATIVE
NEUTROPHILS # BLD AUTO: 4.93 X10 (3) UL (ref 1.5–7.7)
NEUTROPHILS # BLD AUTO: 4.93 X10(3) UL (ref 1.5–7.7)
NEUTROPHILS NFR BLD AUTO: 48.2 %
OSMOLALITY SERPL CALC.SUM OF ELEC: 278 MOSM/KG (ref 275–295)
PLATELET # BLD AUTO: 221 10(3)UL (ref 150–450)
POTASSIUM SERPL-SCNC: 3.8 MMOL/L (ref 3.5–5.1)
RBC # BLD AUTO: 3.72 X10(6)UL
RSV RNA SPEC NAA+PROBE: NEGATIVE
SARS-COV-2 RNA RESP QL NAA+PROBE: NOT DETECTED
SODIUM SERPL-SCNC: 133 MMOL/L (ref 136–145)
TROPONIN I SERPL HS-MCNC: <3 NG/L
WBC # BLD AUTO: 10.2 X10(3) UL (ref 4–11)

## 2024-07-23 PROCEDURE — 99222 1ST HOSP IP/OBS MODERATE 55: CPT | Performed by: HOSPITALIST

## 2024-07-23 PROCEDURE — 71045 X-RAY EXAM CHEST 1 VIEW: CPT | Performed by: EMERGENCY MEDICINE

## 2024-07-23 RX ORDER — ASPIRIN 81 MG/1
324 TABLET, CHEWABLE ORAL ONCE
Status: COMPLETED | OUTPATIENT
Start: 2024-07-23 | End: 2024-07-23

## 2024-07-23 RX ORDER — NICOTINE POLACRILEX 4 MG
15 LOZENGE BUCCAL
Status: DISCONTINUED | OUTPATIENT
Start: 2024-07-23 | End: 2024-07-26

## 2024-07-23 RX ORDER — HYDROCODONE BITARTRATE AND ACETAMINOPHEN 5; 325 MG/1; MG/1
1 TABLET ORAL EVERY 4 HOURS PRN
Status: DISCONTINUED | OUTPATIENT
Start: 2024-07-23 | End: 2024-07-26

## 2024-07-23 RX ORDER — DEXTROSE MONOHYDRATE 25 G/50ML
50 INJECTION, SOLUTION INTRAVENOUS
Status: DISCONTINUED | OUTPATIENT
Start: 2024-07-23 | End: 2024-07-26

## 2024-07-23 RX ORDER — NICOTINE POLACRILEX 4 MG
30 LOZENGE BUCCAL
Status: DISCONTINUED | OUTPATIENT
Start: 2024-07-23 | End: 2024-07-26

## 2024-07-23 RX ORDER — QUETIAPINE FUMARATE 100 MG/1
200 TABLET, FILM COATED ORAL 2 TIMES DAILY
Status: DISCONTINUED | OUTPATIENT
Start: 2024-07-24 | End: 2024-07-26

## 2024-07-23 RX ORDER — ONDANSETRON 2 MG/ML
4 INJECTION INTRAMUSCULAR; INTRAVENOUS EVERY 6 HOURS PRN
Status: DISCONTINUED | OUTPATIENT
Start: 2024-07-23 | End: 2024-07-26

## 2024-07-23 RX ORDER — TEMAZEPAM 15 MG/1
15 CAPSULE ORAL NIGHTLY PRN
Status: DISCONTINUED | OUTPATIENT
Start: 2024-07-23 | End: 2024-07-26

## 2024-07-23 RX ORDER — ACETAMINOPHEN 500 MG
500 TABLET ORAL EVERY 4 HOURS PRN
Status: DISCONTINUED | OUTPATIENT
Start: 2024-07-23 | End: 2024-07-26

## 2024-07-23 RX ORDER — ACETAMINOPHEN 325 MG/1
650 TABLET ORAL EVERY 4 HOURS PRN
Status: DISCONTINUED | OUTPATIENT
Start: 2024-07-23 | End: 2024-07-26

## 2024-07-23 RX ORDER — TRAZODONE HYDROCHLORIDE 50 MG/1
50 TABLET ORAL NIGHTLY PRN
Status: DISCONTINUED | OUTPATIENT
Start: 2024-07-23 | End: 2024-07-26

## 2024-07-23 RX ORDER — HYDROCODONE BITARTRATE AND ACETAMINOPHEN 5; 325 MG/1; MG/1
2 TABLET ORAL EVERY 4 HOURS PRN
Status: DISCONTINUED | OUTPATIENT
Start: 2024-07-23 | End: 2024-07-26

## 2024-07-23 RX ORDER — PROCHLORPERAZINE EDISYLATE 5 MG/ML
5 INJECTION INTRAMUSCULAR; INTRAVENOUS EVERY 8 HOURS PRN
Status: DISCONTINUED | OUTPATIENT
Start: 2024-07-23 | End: 2024-07-26

## 2024-07-23 RX ORDER — CYCLOBENZAPRINE HCL 5 MG
10 TABLET ORAL 3 TIMES DAILY PRN
Status: DISCONTINUED | OUTPATIENT
Start: 2024-07-23 | End: 2024-07-26

## 2024-07-23 RX ORDER — FLUTICASONE PROPIONATE 50 MCG
2 SPRAY, SUSPENSION (ML) NASAL DAILY
Status: DISCONTINUED | OUTPATIENT
Start: 2024-07-24 | End: 2024-07-26

## 2024-07-23 NOTE — ED INITIAL ASSESSMENT (HPI)
Via ems from [facility] c/o cp for the last hour. Seen here for same problem on 07/19/24

## 2024-07-23 NOTE — ED PROVIDER NOTES
Patient Seen in: [facility] Emergency Department      History     Chief Complaint   Patient presents with    Chest Pain Angina     Stated Complaint:     Subjective:   HPI    Patient is a 53-year-old female who arrives from nursing home by EMS for persistent left-sided chest pains.  No exacerbating or relieving factors.  She denies any shortness of breath or fevers but has been having cough and sinus congestion.  Was started on antibiotic earlier today and was just admitted several weeks ago for similar symptoms.  Patient also denies any suicidal ideation but feels manic and depressed and states she cannot stop talking.  She does not feel safe back at the nursing home and wants to see psychiatry.    Objective:   Past Medical History:    Bipolar 1 disorder (HCC)    Dental caries    Diabetes (HCC)    MIKE (generalized anxiety disorder)    High blood pressure    History of diverticulitis of colon    Manipulative behavior    Neurogenic bladder    Obesity (BMI 30-39.9)    Paraplegia (HCC)    Sepsis following intra-abdominal surgery (HCC)    Serotonin syndrome    Sleep apnea    Suprapubic catheter (HCC)    Transverse myelitis (HCC)              Past Surgical History:   Procedure Laterality Date    Arthroscopy of joint unlisted      knee          Cholecystectomy      Part removal colon w end colostomy      2013    Tonsillectomy                  Social History     Socioeconomic History    Marital status: Single   Tobacco Use    Smoking status: Former     Current packs/day: 1.00     Average packs/day: 1 pack/day for 5.0 years (5.0 ttl pk-yrs)     Types: Cigarettes    Tobacco comments:     quit    Substance and Sexual Activity    Alcohol use: No    Drug use: No     Social Determinants of Health     Food Insecurity: No Food Insecurity (2024)    Food Insecurity     Food Insecurity: Never true   Transportation Needs: Unknown (2024)    Transportation Needs     Lack of Transportation: Patient declined    Housing Stability: Unknown (7/9/2024)    Housing Stability     Housing Instability: Patient declined              Review of Systems    Positive for stated Chief Complaint: Chest Pain Angina    Other systems are as noted in HPI.  Constitutional and vital signs reviewed.      All other systems reviewed and negative except as noted above.    Physical Exam     ED Triage Vitals   BP 07/23/24 1758 (!) 126/101   Pulse 07/23/24 1758 104   Resp 07/23/24 1758 20   Temp 07/23/24 1801 98.4 °F (36.9 °C)   Temp src 07/23/24 1801 Temporal   SpO2 07/23/24 1758 97 %   O2 Device 07/23/24 1758 None (Room air)       Current Vitals:   Vital Signs  BP: (!) 126/101  Pulse: 104  Resp: 20  Temp: 98.4 °F (36.9 °C)  Temp src: Temporal    Oxygen Therapy  SpO2: 97 %  O2 Device: None (Room air)            Physical Exam    GENERAL: awake and alert, anxious/crying  HEENT: EOMI, PERRL  Neck: supple  CV: RRR, no murmurs  Resp: CTAB, no wheezes or retractions  Ab: soft, nontender, no distension  Extremities: FROM of all extremities  Neuro: CN intact, 5/5 motor strength in all extremities, no focal deficits  SKIN: warm, dry, +Erythema lower extremities      ED Course     Labs Reviewed   BASIC METABOLIC PANEL (8) - Abnormal; Notable for the following components:       Result Value    Glucose 111 (*)     Sodium 133 (*)     Calcium, Total 8.5 (*)     All other components within normal limits   CBC W/ DIFFERENTIAL - Abnormal; Notable for the following components:    RBC 3.72 (*)     HGB 11.1 (*)     HCT 33.3 (*)     RDW-SD 48.8 (*)     Lymphocyte Absolute 4.30 (*)     All other components within normal limits   TROPONIN I HIGH SENSITIVITY - Normal   CBC WITH DIFFERENTIAL WITH PLATELET    Narrative:     The following orders were created for panel order CBC With Differential With Platelet.  Procedure                               Abnormality         Status                     ---------                               -----------         ------                      CBC W/ DIFFERENTIAL[[id number]]          Abnormal            Final result                 Please view results for these tests on the individual orders.   ETHYL ALCOHOL   RAINBOW DRAW LAVENDER   RAINBOW DRAW LIGHT GREEN   SARS-COV-2/FLU A AND B/RSV BY PCR ([facility])   ED/MRSA SCREEN BY PCR-CC     EKG    Rate, intervals and axes as noted on EKG Report.  Rate: 95  Rhythm: Sinus Rhythm  Reading: no PRIMO           MDM         Medical Decision Making  Pt with ongoing chest pains and multiple recent visits  Workup reassuring today in ED  Recommend rule out admission with psych consult given pt's carmel/depression and request for psychiatric help  Aspirin given for chest pain    Amount and/or Complexity of Data Reviewed  External Data Reviewed: labs and radiology.     Details: Recent urine culture, labs, cxr from yesterday reviewed  Labs: ordered.  Radiology: ordered.     Details: XR CHEST AP PORTABLE  (CPT=71045)    Result Date: 7/23/2024  CONCLUSION: No radiographic evidence of acute cardiopulmonary abnormality.    elm-remote    Dictated by (CST): Obi Gomez MD on 7/23/2024 at 6:47 PM     Finalized by (CST): Obi Gomez MD on 7/23/2024 at 6:47 PM            Discussion of management or test interpretation with external provider(s): D/w dr evans-requests ancef for lower extremity cellulitis    Risk  Decision regarding hospitalization.        Disposition and Plan     Clinical Impression:  1. Acute chest pain    2. Depression, unspecified depression type         Disposition:  Admit  7/23/2024  6:59 pm    Follow-up:  No follow-up provider specified.  We recommend that you schedule follow up care with a primary care provider within the next three months to obtain basic health screening including reassessment of your blood pressure.      Medications Prescribed:  Current Discharge Medication List                            Hospital Problems       Present on Admission  Date Reviewed: 1/18/2024            ICD-10-CM  Noted POA    * (Principal) Acute chest pain R07.9 7/23/2024 Unknown

## 2024-07-23 NOTE — ED QUICK NOTES
Orders for admission, patient is aware of plan and ready to go upstairs. Any questions, please call ED RN daljit at extension [phone].     Patient Covid vaccination status: Unvaccinated     COVID Test Ordered in ED: SARS-CoV-2/Flu A and B/RSV by PCR ([facility])    COVID Suspicion at Admission: N/A    Running Infusions:  None    Mental Status/LOC at time of transport: x4    Other pertinent information:   CIWA score: N/A   NIH score:  N/A

## 2024-07-24 PROBLEM — F41.1 GENERALIZED ANXIETY DISORDER WITH PANIC ATTACKS: Status: ACTIVE | Noted: 2024-01-09

## 2024-07-24 PROBLEM — F31.63 BIPOLAR DISORDER, CURRENT EPISODE MIXED, SEVERE, WITHOUT PSYCHOTIC FEATURES (HCC): Status: ACTIVE | Noted: 2024-07-24

## 2024-07-24 PROBLEM — F41.0 GENERALIZED ANXIETY DISORDER WITH PANIC ATTACKS: Status: ACTIVE | Noted: 2024-01-09

## 2024-07-24 LAB
ANION GAP SERPL CALC-SCNC: 3 MMOL/L (ref 0–18)
ATRIAL RATE: 95 BPM
BASOPHILS # BLD AUTO: 0.05 X10(3) UL (ref 0–0.2)
BASOPHILS NFR BLD AUTO: 0.7 %
BUN BLD-MCNC: 13 MG/DL (ref 9–23)
BUN/CREAT SERPL: 15.9 (ref 10–20)
CALCIUM BLD-MCNC: 9.4 MG/DL (ref 8.7–10.4)
CHLORIDE SERPL-SCNC: 103 MMOL/L (ref 98–112)
CO2 SERPL-SCNC: 32 MMOL/L (ref 21–32)
CREAT BLD-MCNC: 0.82 MG/DL
DEPRECATED RDW RBC AUTO: 48.6 FL (ref 35.1–46.3)
EGFRCR SERPLBLD CKD-EPI 2021: 85 ML/MIN/1.73M2 (ref 60–?)
EOSINOPHIL # BLD AUTO: 0.33 X10(3) UL (ref 0–0.7)
EOSINOPHIL NFR BLD AUTO: 4.6 %
ERYTHROCYTE [DISTWIDTH] IN BLOOD BY AUTOMATED COUNT: 14.8 % (ref 11–15)
GLUCOSE BLD-MCNC: 160 MG/DL (ref 70–99)
GLUCOSE BLDC GLUCOMTR-MCNC: 204 MG/DL (ref 70–99)
GLUCOSE BLDC GLUCOMTR-MCNC: 226 MG/DL (ref 70–99)
GLUCOSE BLDC GLUCOMTR-MCNC: 242 MG/DL (ref 70–99)
GLUCOSE BLDC GLUCOMTR-MCNC: 243 MG/DL (ref 70–99)
GLUCOSE BLDC GLUCOMTR-MCNC: 262 MG/DL (ref 70–99)
HCT VFR BLD AUTO: 32.5 %
HGB BLD-MCNC: 11.3 G/DL
IMM GRANULOCYTES # BLD AUTO: 0.04 X10(3) UL (ref 0–1)
IMM GRANULOCYTES NFR BLD: 0.6 %
LYMPHOCYTES # BLD AUTO: 2.84 X10(3) UL (ref 1–4)
LYMPHOCYTES NFR BLD AUTO: 39.8 %
MCH RBC QN AUTO: 30.9 PG (ref 26–34)
MCHC RBC AUTO-ENTMCNC: 34.8 G/DL (ref 31–37)
MCV RBC AUTO: 88.8 FL
MONOCYTES # BLD AUTO: 0.29 X10(3) UL (ref 0.1–1)
MONOCYTES NFR BLD AUTO: 4.1 %
NEUTROPHILS # BLD AUTO: 3.58 X10 (3) UL (ref 1.5–7.7)
NEUTROPHILS # BLD AUTO: 3.58 X10(3) UL (ref 1.5–7.7)
NEUTROPHILS NFR BLD AUTO: 50.2 %
OSMOLALITY SERPL CALC.SUM OF ELEC: 290 MOSM/KG (ref 275–295)
P AXIS: 57 DEGREES
P-R INTERVAL: 186 MS
PLATELET # BLD AUTO: 194 10(3)UL (ref 150–450)
POTASSIUM SERPL-SCNC: 3.6 MMOL/L (ref 3.5–5.1)
Q-T INTERVAL: 380 MS
QRS DURATION: 88 MS
QTC CALCULATION (BEZET): 477 MS
R AXIS: -3 DEGREES
RBC # BLD AUTO: 3.66 X10(6)UL
SODIUM SERPL-SCNC: 138 MMOL/L (ref 136–145)
T AXIS: 49 DEGREES
VENTRICULAR RATE: 95 BPM
WBC # BLD AUTO: 7.1 X10(3) UL (ref 4–11)

## 2024-07-24 PROCEDURE — 90792 PSYCH DIAG EVAL W/MED SRVCS: CPT | Performed by: OTHER

## 2024-07-24 PROCEDURE — 99233 SBSQ HOSP IP/OBS HIGH 50: CPT | Performed by: HOSPITALIST

## 2024-07-24 RX ORDER — LACTULOSE 20 G/30ML
20 SOLUTION ORAL EVERY 6 HOURS PRN
Status: DISCONTINUED | OUTPATIENT
Start: 2024-07-24 | End: 2024-07-26

## 2024-07-24 RX ORDER — MONTELUKAST SODIUM 10 MG/1
10 TABLET ORAL DAILY
Status: DISCONTINUED | OUTPATIENT
Start: 2024-07-24 | End: 2024-07-26

## 2024-07-24 RX ORDER — MECLIZINE HCL 12.5 MG/1
12.5 TABLET ORAL 3 TIMES DAILY PRN
Status: DISCONTINUED | OUTPATIENT
Start: 2024-07-24 | End: 2024-07-26

## 2024-07-24 RX ORDER — GABAPENTIN 300 MG/1
300 CAPSULE ORAL 3 TIMES DAILY
Status: DISCONTINUED | OUTPATIENT
Start: 2024-07-24 | End: 2024-07-26

## 2024-07-24 RX ORDER — ALBUTEROL SULFATE 90 UG/1
2 AEROSOL, METERED RESPIRATORY (INHALATION) EVERY 6 HOURS PRN
Status: DISCONTINUED | OUTPATIENT
Start: 2024-07-24 | End: 2024-07-26

## 2024-07-24 RX ORDER — ECHINACEA PURPUREA EXTRACT 125 MG
1 TABLET ORAL
Status: DISCONTINUED | OUTPATIENT
Start: 2024-07-24 | End: 2024-07-26

## 2024-07-24 RX ORDER — HYDROXYZINE HYDROCHLORIDE 10 MG/1
10 TABLET, FILM COATED ORAL 3 TIMES DAILY PRN
Status: DISCONTINUED | OUTPATIENT
Start: 2024-07-24 | End: 2024-07-26

## 2024-07-24 RX ORDER — DOCUSATE SODIUM 100 MG/1
100 CAPSULE, LIQUID FILLED ORAL 2 TIMES DAILY
Status: DISCONTINUED | OUTPATIENT
Start: 2024-07-24 | End: 2024-07-26

## 2024-07-24 RX ORDER — ARIPIPRAZOLE 5 MG/1
5 TABLET ORAL DAILY
Status: DISCONTINUED | OUTPATIENT
Start: 2024-07-24 | End: 2024-07-26

## 2024-07-24 RX ORDER — CETIRIZINE HYDROCHLORIDE 10 MG/1
10 TABLET ORAL DAILY
Status: DISCONTINUED | OUTPATIENT
Start: 2024-07-24 | End: 2024-07-26

## 2024-07-24 RX ORDER — ATORVASTATIN CALCIUM 40 MG/1
40 TABLET, FILM COATED ORAL NIGHTLY
Status: DISCONTINUED | OUTPATIENT
Start: 2024-07-24 | End: 2024-07-26

## 2024-07-24 RX ORDER — SENNOSIDES 8.6 MG
17.2 TABLET ORAL 2 TIMES DAILY
Status: DISCONTINUED | OUTPATIENT
Start: 2024-07-24 | End: 2024-07-26

## 2024-07-24 RX ORDER — CLONAZEPAM 0.5 MG/1
0.5 TABLET ORAL 2 TIMES DAILY
Status: DISCONTINUED | OUTPATIENT
Start: 2024-07-24 | End: 2024-07-25

## 2024-07-24 RX ORDER — DULOXETIN HYDROCHLORIDE 60 MG/1
60 CAPSULE, DELAYED RELEASE ORAL DAILY
Status: DISCONTINUED | OUTPATIENT
Start: 2024-07-24 | End: 2024-07-26

## 2024-07-24 RX ORDER — DIVALPROEX SODIUM 500 MG/1
500 TABLET, EXTENDED RELEASE ORAL 2 TIMES DAILY
Status: DISCONTINUED | OUTPATIENT
Start: 2024-07-24 | End: 2024-07-26

## 2024-07-24 RX ORDER — INSULIN DEGLUDEC 100 U/ML
20 INJECTION, SOLUTION SUBCUTANEOUS DAILY
Status: DISCONTINUED | OUTPATIENT
Start: 2024-07-24 | End: 2024-07-25

## 2024-07-24 RX ORDER — LACTOBACILLUS ACIDOPHILUS 500MM CELL
1 CAPSULE ORAL DAILY
Status: DISCONTINUED | OUTPATIENT
Start: 2024-07-24 | End: 2024-07-26

## 2024-07-24 RX ORDER — MELATONIN
3 NIGHTLY
Status: DISCONTINUED | OUTPATIENT
Start: 2024-07-24 | End: 2024-07-26

## 2024-07-24 RX ORDER — BETHANECHOL CHLORIDE 5 MG
10 TABLET ORAL 3 TIMES DAILY
Status: DISCONTINUED | OUTPATIENT
Start: 2024-07-24 | End: 2024-07-26

## 2024-07-24 NOTE — H&P
[facility]    PATIENT'S NAME: MICHAEL RICH   ATTENDING PHYSICIAN: Evita Keen MD   PATIENT ACCOUNT#:   [id number]    LOCATION:  [place] [address]  MEDICAL RECORD #:   [id number]       YOB: 1970  ADMISSION DATE:       2024    HISTORY AND PHYSICAL EXAMINATION    CHIEF COMPLAINT:  Right lower extremity cellulitis, self-reported manic episode.    HISTORY OF PRESENT ILLNESS:  The patient is a 53-year-old  female who lives at [facility].  She is bedbound with musculoskeletal deconditioning.  Patient brought in today because she was complaining of left upper and lower extremity pain radiating from her neck.  She denies any fever or chills.  No abdominal pain.  Also noted cellulitic change on her right leg.  CBC today showed white blood cell count of 10.2, no left shift.  Chemistry still pending.  Chest x-ray still pending.    PAST MEDICAL HISTORY:  Bipolar affective disorder.  Record indicates paraplegia, but patient said she just lies in bed and she had musculoskeletal deconditioning.  She is not really paraplegic.  She had neurogenic bladder with suprapubic catheter, recent hospitalization for urinary tract infection and sepsis.  Severe anxiety disorder; diabetes mellitus type 2; morbid obesity; obstructive sleep apnea; hypertension; history of DVT, anticoagulated with Xarelto.    PAST SURGICAL HISTORY:  Knee arthroscopic procedure, , cholecystectomy, tonsillectomy, hemicolectomy for bowel perforation.    MEDICATIONS:  Please see medication reconciliation list.     ALLERGIES:  Penicillin, sulfa, and radiology contrast.    FAMILY HISTORY:  Positive for hypertension.    SOCIAL HISTORY:  Ex-tobacco user.  No current tobacco, alcohol, or drug use.  Almost bedbound, wears Depends.  Requires assistance in her basic activities of daily living.     REVIEW OF SYSTEMS:  Patient had multiple complaints.  First, she said she is having a manic episode and she wanted to see  a psychiatrist.  She mentioned Dr. Qureshi by name.  Also, she said she has been noticing erythema and discomfort in both legs, right more than left, plus complaint of left neck pain radiating to her left arm and also her left leg.  Patient denies any weakness, tingling, or numbness.  Other 12-point review of systems is negative.       PHYSICAL EXAMINATION:    GENERAL:  Alert and oriented to time, place, and person.  No acute distress, very anxious.  VITAL SIGNS:  Temperature 98.4, pulse 104, respiratory rate 20, blood pressure 126/100, pulse ox 97% on room air.    HEENT:  Atraumatic.  Oropharynx clear.  Dry mucous membranes.  Normal hard and soft palate.  Eyes:  Anicteric sclerae.   NECK:  Supple.  No lymphadenopathy.  Trachea midline.  Full range of motion.  LUNGS:  Clear to auscultation bilaterally.  Normal respiratory effort.    HEART:  Regular rate, rhythm.  S1 and S2 auscultated.  No murmur.   ABDOMEN:  Soft, nondistended.  No tenderness.  Positive bowel sounds.    EXTREMITIES:  Edema +1 both legs with erythema involving both legs without open wounds, right more than left.   NEUROLOGIC:  No clear focal defects.    ASSESSMENT:    1.   Bilateral lower extremity cellulitis.  2.   Self-reported manic episode.  3.   Morbid obesity.  4.   Possible radicular pain, left upper extremity.    PLAN:  Patient will be admitted to general medical floor.  IV Ancef.  Pain control.  Psychiatry consult.  Monitor Accu-Cheks.  Further recommendations to follow.     Dictated By Ho Lancaster MD  d: 07/23/2024 18:44:26  t: 07/23/2024 19:22:25  Job [id number]  /

## 2024-07-24 NOTE — PROGRESS NOTES
[facility]  Progress Note     Clari Rosa  : 1970    Status: Observation  Day #: 0    Attending: Johan Easton MD  PCP: Brian Bob MD     Assessment and Plan:    Bilateral lower extremity cellulitis  - looks better today  - cont ancef IV    Bipolar 1 disorder with reported manic episode  Severe anxiety d/o  - psychiatry consult  - cont psych meds    Recent Suprapubic catheter related UTI  Neurogenic bladder  -finished treatment    Morbid obesity Body mass index is 39.24 kg/m².    IDDM  -cont insulin  -monitor BG    H/o PE  -cont xarelto    Reported paraplegia  -SNF on discharge    H/o constipation  -bowel regimen     DVT Mechanical Prophylaxis:        DVT Pharmacologic Prophylaxis   Medication    rivaroxaban (Xarelto) tab 20 mg               Subjective:      Initial Chief Complaint:  manic episode    C/o anxiety. Leg redness improved today. No dysuria.     10 point ROS completed and was negative, except for pertinent positive and negatives stated in subjective.      Objective:      Temp:  [98 °F (36.7 °C)-99 °F (37.2 °C)] 99 °F (37.2 °C)  Pulse:  [] 97  Resp:  [15-22] 18  BP: ()/() 116/85  SpO2:  [91 %-98 %] 97 %  General:  Alert, no distress  HEENT:  Normocephalic, atraumatic  Cardiac:  Regular rate, regular rhythm  Pulmonary:  Clear to auscultation bilaterally, respirations unlabored  Gastrointestinal:  Soft, non-tender, normal bowel sounds  Musculoskeletal:  No joint swelling  Extremities:  No edema, no cyanosis, no clubbing  Neurologic:  nonfocal  Psychiatric:  Normal affect, calm and appropriate    Intake/Output Summary (Last 24 hours) at 2024 1228  Last data filed at 2024 1157  Gross per 24 hour   Intake 270 ml   Output 3750 ml   Net -3480 ml         Recent Labs   Lab 24  0352 24  1807 24  0349   WBC 8.6 10.2 7.1   HGB 11.4* 11.1* 11.3*   HCT 32.6* 33.3* 32.5*   .0 221.0 194.0   RBC 3.68* 3.72* 3.66*   MCV 88.6 89.5 88.8   MCH 31.0  29.8 30.9   MCHC 35.0 33.3 34.8   RDW 14.7 14.7 14.8   NEPRELIM 3.62 4.93 3.58     Recent Labs   Lab 07/19/24  0921 07/22/24  0352 07/23/24  1807 07/23/24  1811 07/24/24  0349   BUN 10 9 15  --  13   CREATSERUM 0.88 0.81 0.77  --  0.82   CA 9.2 9.0 8.5*  --  9.4   ALB 4.1 4.0  --   --   --    * 131* 133*  --  138   K 4.2 4.3 3.8  --  3.6   CL 94* 97* 98  --  103   CO2 28.0 28.0 29.0  --  32.0   * 260* 111*  --  160*   BILT 0.3 0.2*  --   --   --    AST 17 18  --   --   --    ALT 21 21  --   --   --    ALKPHO 71 76  --   --   --    TP 6.8 6.6  --   --   --    ETOH  --   --   --  <3  --        XR CHEST AP PORTABLE  (CPT=71045)    Result Date: 7/23/2024  CONCLUSION: No radiographic evidence of acute cardiopulmonary abnormality.    [facility]    Dictated by (CST): Obi Gomez MD on 7/23/2024 at 6:47 PM     Finalized by (CST): Obi Gomez MD on 7/23/2024 at 6:47 PM         EKG 12 Lead    Result Date: 7/24/2024  Normal sinus rhythm Possible Septal infarct , age undetermined Abnormal ECG When compared with ECG of 22-JUL-2024 03:46, Questionable change in The axis T wave inversion no longer evident in Inferior leads Confirmed by ANNA SMITH, SARAH ([id number]) on 7/24/2024 7:06:36 AM   Medications:   acidophilus  1 each Oral Daily    ARIPiprazole  5 mg Oral Daily    atorvastatin  40 mg Oral Nightly    bethanechol  10 mg Oral TID    clonazePAM  0.5 mg Oral BID    divalproex ER  500 mg Oral BID    docusate sodium  100 mg Oral BID    DULoxetine  60 mg Oral Daily    gabapentin  300 mg Oral TID    cetirizine  10 mg Oral Daily    melatonin  3 mg Oral Nightly    metoprolol tartrate  25 mg Oral 2x Daily(Beta Blocker)    montelukast  10 mg Oral Daily    sennosides  17.2 mg Oral BID    rivaroxaban  20 mg Oral Q24H    insulin degludec  20 Units Subcutaneous Daily    ceFAZolin  2 g Intravenous Q8H    insulin aspart  1-7 Units Subcutaneous TID CC    fluticasone propionate  2 spray Nasal Daily    QUEtiapine  200 mg Oral  BID      PRN Meds:   albuterol    hydrOXYzine    meclizine    lactulose    acetaminophen    acetaminophen **OR** HYDROcodone-acetaminophen **OR** HYDROcodone-acetaminophen    ondansetron    prochlorperazine    temazepam    glucose **OR** glucose **OR** glucose-vitamin C **OR** dextrose **OR** glucose **OR** glucose **OR** glucose-vitamin C    cyclobenzaprine    miconazole    traZODone    Supplementary Documentation:        MDM High. Time spent on chart/note review, review of labs/imaging, discussion with patient, physical exam, discussion with staff, consultants, coordinating care, writing progress note, discussion of plan of care.      Johan Easton MD

## 2024-07-24 NOTE — PLAN OF CARE
A&Ox4, room air. No report of chest pain. Suprapubic catheter in place. Call light within reach.  Problem: Diabetes/Glucose Control  Goal: Glucose maintained within prescribed range  Description: INTERVENTIONS:  - Monitor Blood Glucose as ordered  - Assess for signs and symptoms of hyperglycemia and hypoglycemia  - Administer ordered medications to maintain glucose within target range  - Assess barriers to adequate nutritional intake and initiate nutrition consult as needed  - Instruct patient on self management of diabetes  Outcome: Progressing     Problem: CARDIOVASCULAR - ADULT  Goal: Maintains optimal cardiac output and hemodynamic stability  Description: INTERVENTIONS:  - Monitor vital signs, rhythm, and trends  - Monitor for bleeding, hypotension and signs of decreased cardiac output  - Evaluate effectiveness of vasoactive medications to optimize hemodynamic stability  - Monitor arterial and/or venous puncture sites for bleeding and/or hematoma  - Assess quality of pulses, skin color and temperature  - Assess for signs of decreased coronary artery perfusion - ex. Angina  - Evaluate fluid balance, assess for edema, trend weights  Outcome: Progressing  Goal: Absence of cardiac arrhythmias or at baseline  Description: INTERVENTIONS:  - Continuous cardiac monitoring, monitor vital signs, obtain 12 lead EKG if indicated  - Evaluate effectiveness of antiarrhythmic and heart rate control medications as ordered  - Initiate emergency measures for life threatening arrhythmias  - Monitor electrolytes and administer replacement therapy as ordered  Outcome: Progressing     Problem: METABOLIC/FLUID AND ELECTROLYTES - ADULT  Goal: Glucose maintained within prescribed range  Description: INTERVENTIONS:  - Monitor Blood Glucose as ordered  - Assess for signs and symptoms of hyperglycemia and hypoglycemia  - Administer ordered medications to maintain glucose within target range  - Assess barriers to adequate nutritional intake  and initiate nutrition consult as needed  - Instruct patient on self management of diabetes  Outcome: Progressing     Problem: SKIN/TISSUE INTEGRITY - ADULT  Goal: Incision(s), wounds(s) or drain site(s) healing without S/S of infection  Description: INTERVENTIONS:  - Assess and document risk factors for pressure ulcer development  - Assess and document skin integrity  - Assess and document dressing/incision, wound bed, drain sites and surrounding tissue  - Implement wound care per orders  - Initiate isolation precautions as appropriate  - Initiate Pressure Ulcer prevention bundle as indicated  Outcome: Progressing

## 2024-07-24 NOTE — PLAN OF CARE
Patient seen by psychiatry, plan to transfer to [facility] when medically clear. Transfer to medical floor pending availability.     Problem: Diabetes/Glucose Control  Goal: Glucose maintained within prescribed range  Description: INTERVENTIONS:  - Monitor Blood Glucose as ordered  - Assess for signs and symptoms of hyperglycemia and hypoglycemia  - Administer ordered medications to maintain glucose within target range  - Assess barriers to adequate nutritional intake and initiate nutrition consult as needed  - Instruct patient on self management of diabetes  Outcome: Progressing     Problem: CARDIOVASCULAR - ADULT  Goal: Maintains optimal cardiac output and hemodynamic stability  Description: INTERVENTIONS:  - Monitor vital signs, rhythm, and trends  - Monitor for bleeding, hypotension and signs of decreased cardiac output  - Evaluate effectiveness of vasoactive medications to optimize hemodynamic stability  - Monitor arterial and/or venous puncture sites for bleeding and/or hematoma  - Assess quality of pulses, skin color and temperature  - Assess for signs of decreased coronary artery perfusion - ex. Angina  - Evaluate fluid balance, assess for edema, trend weights  Outcome: Progressing  Goal: Absence of cardiac arrhythmias or at baseline  Description: INTERVENTIONS:  - Continuous cardiac monitoring, monitor vital signs, obtain 12 lead EKG if indicated  - Evaluate effectiveness of antiarrhythmic and heart rate control medications as ordered  - Initiate emergency measures for life threatening arrhythmias  - Monitor electrolytes and administer replacement therapy as ordered  Outcome: Progressing     Problem: METABOLIC/FLUID AND ELECTROLYTES - ADULT  Goal: Glucose maintained within prescribed range  Description: INTERVENTIONS:  - Monitor Blood Glucose as ordered  - Assess for signs and symptoms of hyperglycemia and hypoglycemia  - Administer ordered medications to maintain glucose within target range  - Assess  barriers to adequate nutritional intake and initiate nutrition consult as needed  - Instruct patient on self management of diabetes  Outcome: Progressing     Problem: SKIN/TISSUE INTEGRITY - ADULT  Goal: Incision(s), wounds(s) or drain site(s) healing without S/S of infection  Description: INTERVENTIONS:  - Assess and document risk factors for pressure ulcer development  - Assess and document skin integrity  - Assess and document dressing/incision, wound bed, drain sites and surrounding tissue  - Implement wound care per orders  - Initiate isolation precautions as appropriate  - Initiate Pressure Ulcer prevention bundle as indicated  Outcome: Progressing

## 2024-07-24 NOTE — CM/SW NOTE
07/24/24 0900   CM/SW Referral Data   Referral Source Physician   Reason for Referral   (SDOH)   Informant Patient   Readmission Assessment   Factors that patient feels contributed to this readmission Acute/Chronic Clinical Presentation   Pt's living situation prior to admission? Long term care facility  ([facility])   Pt's level of independence at discharge? Total assist (max)   Was full assessment completed by SW/JASON on prior admission? Yes   Was the recommended discharge plan achieved? Yes   Was pt. discharged w/out services? No   Medical Hx   Does patient have an established PCP? Yes  (Brian Bob)   Patient Info   Patient's Current Mental Status at Time of Assessment Alert;Oriented   Patient's Home Environment [facility]   Post Acute Care Provider Upon Admission   ([facility])   Patient lives with Alone   Patient Status Prior to Admission   Independent with ADLs and Mobility No   Pt. requires assistance with Housework;Driving;Meals;Bathing;Ambulating;Dressing;Medications;Toileting   Discharge Needs   Anticipated D/C needs Long term care facility   Services Requested   Submitted to [facility] n/a   PASRR Level 1 Submitted No - Current within 90 days   Choice of Post-Acute Provider   Informed patient of right to choose their preferred provider Yes   List of appropriate post-acute services provided to patient/family with quality data   ([facility] referral pending - needs f/up)       09:40AM  Received MDO for SDOH - see that note as well.    Met w/ pt at bedside. Above assessment completed.    Pt confirmed living at [facility]. Pt disclosed psychiatric dx of BiPolar but confirming she is not suicidal, homicidal and never has hallucinations.    Pt then informed SW that her roommate fell or crawled out of bed at the NH and was saying\"Mama\". Per pt, roommate has yelled out before but never this. Pt confirmed she saw pt on the floor but was then told she was \"lying\" by NH staff  and \"bullied.\"     SW did confirm pt was never harmed physically.    When asked about an alternate LTC facility - pt stating \"well I don't know.  The therapy department at [facility] is really good. But I also might want to go closer to home in [place].\"     JAH obtained zip code for [place], [place] and informed pt that referral will be sent to some facilities there as well. Pt is agreeable.     MD walked in towards end of conversation.    Referrals sent in Aidin. PASRR valid as pt is current LTC resident at [facility].    02:40PM  SW met w/ pt at bedside and provided LTC list for review and choice. Per pt, she will likely go to Inpatient Psych before going to a LTC facility.    SW informed her that if she provides SW w/ LTC facility choice prior to DC, the facility can be informed of her disposition and f/up for admission post inpatient psych.    Pt confirmed she will review and update SW.      PLAN: [facility] vs New LTC - pending choice & med clear      SW/CM to remain available for support and/or discharge planning.       Sera Ley, MSW, LSW [id number]

## 2024-07-24 NOTE — CM/SW NOTE
07/24/24 0933   SDOH Outreach Status   SDOH Outreach Status Complete   SDOH Referred for Other;Stress/Depression/Anxiety   SDOH Resource Details   SDOH Resource (Stress/Mental Health) Psychiatry on consult   SDOH Resource (Other) Pt \"might\" be interested in a new LTC facility. Process explained to pt.       Received MDO for SDOH \"pt doesn't feel safe at [facility]\".    Met w/ pt at bedside. Confirmed she isn't sure if she wants to return to [facility] or not. Discussed referral process for alternative LTC facilities. Pt is agreeable.      Sera Ley, MSW, LSW [id number]

## 2024-07-24 NOTE — CONSULTS
[facility]  part of [facility]    Report of Consultation    Clari Rosa Patient Status:  Observation    1970 MRN [id number]   Location [facility] 3W/SW Attending Johan Easton MD   Hosp Day # 0 PCP Brian Bob MD     Date of Admission:  2024  Date of Consult:  2024   Reason for Consultation:   Patient presented with chest pain, increased anxiety and manic symptoms, Johan Chamberlain MD requested psychiatric consult for evaluation and advice.    Consult Duration     The patient seen for initial psychiatric consult evaluation.   Record reviewed, communication with attending, communication with RN and patient seen face to face evaluation.    History of Present Illness:   Patient is a 53 year old , single, female with past medical history of diabetes, bipolar 1 disorder, depression, MIKE, HTN, diverticulitis, neurogenic bladder and obesity, who was admitted to the hospital for chest pain.  The patient has been demonstrating increased anxiety and depressive symptoms. Patient indicated for psych consult for evaluation and advise.     Per chart review, the patient presented to the hospital from [facility] with complaint of dizziness, chest pain and shortness of breath. Patient was admitted to the hospital resolved her medical data presenting reasonable.  The patient indicated psych evaluation talk about carmel and anxiety.     The patient is well known to the psychiatry team from previous consult.  The patient seen 2 weeks ago for similar condition in the hospital.  At that admission the patient does not want to go back to the nursing home presenting with more somatic manifestation.    The patient seen today in her room and the patient in hospital gown noticeably anxious or restless with racing thought process.  \"I am manic and I need to be in a psychiatric hospital.  I am very anxious and I am I am very racing\".    After the patient seen 2 weeks ago in the hospital and  medication has been adjusted patient returned back to [facility].  Patient called my answering service in the hospital over the weekend reporting that she has been very panicky and manicky and they do not give her medication or make her see the  or the psychiatrist.    The patient today reporting frequent intrusive thought process, difficulty sleeping, excessive fear and worry with panic attack and believing there is something seriously wrong with her and she is dying.  Patient has not been responding to reassurance..    Patient reporting increased hopelessness, helplessness, lack of energy, lack of motivation increased depressed mood and believing being in the nursing home she starts for the rest of her life and she is going to die there.       The patient is not demonstrating any carmel or psychosis  The patient denies auditory or visual hallucinations  The patient denies suicidal or homicidal ideation.     No substance abuse history.  Patient compliant on her medication and has been taking the Klonopin 0.5 mg twice daily.  With that she continue finding herself anxious with racing thoughts and difficulty calming her emotion.    The patient has been demonstrating increased anxiety, restlessness, irritability and difficulty managing her emotion.  No suicidal ideation or indication for inpatient admission        Past Psychiatric/Medication History:  1. Prior diagnoses: MIKE, depression, Bipolar 1 disorder, Manipulative behavior. Patient states she also has OCD and Panic attacks sine adolescent years.  2. Past psychiatric inpatient: Patient has been seen in the consult service multiple times including January 2024 and July 2024 2 weeks ago.  3. Past outpatient history: Patient reports seeing a psychiatris at Nursing Home   4. Past suicide history: Patient denies   5. Medication history: Wellbutrin XL 24 hr tab 300mg, Klonopin 1mg q6h, Seroquel 300mg BID,      Social History:   The patient states the she lives  in a Nursing home due to medical conditions. She is single and has never  but has one living son.      Denies any ETOH abuse, or illicit substance abuse. Previous smoker (5 pack history)     Family History:  No family history reported     Medical History:   Past Medical History  Past Medical History:    Bipolar 1 disorder (HCC)    Dental caries    Diabetes (HCC)    MIKE (generalized anxiety disorder)    High blood pressure    History of diverticulitis of colon    Manipulative behavior    Neurogenic bladder    Obesity (BMI 30-39.9)    Paraplegia (HCC)    Sepsis following intra-abdominal surgery (HCC)    Serotonin syndrome    Sleep apnea    Suprapubic catheter (HCC)    Transverse myelitis (HCC)       Past Surgical History  Past Surgical History:   Procedure Laterality Date    Arthroscopy of joint unlisted      knee          Cholecystectomy      Part removal colon w end colostomy      2013    Tonsillectomy         Family History  Family History   Adopted: Yes       Social History  Social History     Socioeconomic History    Marital status: Single   Tobacco Use    Smoking status: Former     Current packs/day: 1.00     Average packs/day: 1 pack/day for 5.0 years (5.0 ttl pk-yrs)     Types: Cigarettes    Tobacco comments:     quit    Substance and Sexual Activity    Alcohol use: No    Drug use: No     Social Determinants of Health     Food Insecurity: No Food Insecurity (2024)    Food Insecurity     Food Insecurity: Never true   Transportation Needs: No Transportation Needs (2024)    Transportation Needs     Lack of Transportation: No   Housing Stability: Low Risk  (2024)    Housing Stability     Housing Instability: No           Current Medications:  Current Facility-Administered Medications   Medication Dose Route Frequency    albuterol (Ventolin HFA) 108 (90 Base) MCG/ACT inhaler 2 puff  2 puff Inhalation Q6H PRN    acidophilus (Probiotic) cap/tab 1 each  1 each Oral Daily     ARIPiprazole (Abilify) tab 5 mg  5 mg Oral Daily    atorvastatin (Lipitor) tab 40 mg  40 mg Oral Nightly    bethanechol (Urecholine) tab 10 mg  10 mg Oral TID    clonazePAM (KlonoPIN) tab 0.5 mg  0.5 mg Oral BID    divalproex ER (Depakote ER) 24 hr tab 500 mg  500 mg Oral BID    docusate sodium (Colace) cap 100 mg  100 mg Oral BID    DULoxetine (Cymbalta) DR cap 60 mg  60 mg Oral Daily    gabapentin (Neurontin) cap 300 mg  300 mg Oral TID    hydrOXYzine (Atarax) tab 10 mg  10 mg Oral TID PRN    cetirizine (ZyrTEC) tab 10 mg  10 mg Oral Daily    meclizine (Antivert) tab 12.5 mg  12.5 mg Oral TID PRN    melatonin tab 3 mg  3 mg Oral Nightly    metoprolol tartrate (Lopressor) tab 25 mg  25 mg Oral 2x Daily(Beta Blocker)    montelukast (Singulair) tab 10 mg  10 mg Oral Daily    sennosides (Senokot) tab 17.2 mg  17.2 mg Oral BID    rivaroxaban (Xarelto) tab 20 mg  20 mg Oral Q24H    lactulose (ENULOSE) solution 20 g  20 g Oral Q6H PRN    insulin degludec (Tresiba) 100 units/mL flextouch 20 Units  20 Units Subcutaneous Daily    sodium chloride (Saline Mist) 0.65 % nasal solution 1 spray  1 spray Each Nare Q3H PRN    acetaminophen (Tylenol Extra Strength) tab 500 mg  500 mg Oral Q4H PRN    acetaminophen (Tylenol) tab 650 mg  650 mg Oral Q4H PRN    Or    HYDROcodone-acetaminophen (Norco) 5-325 MG per tab 1 tablet  1 tablet Oral Q4H PRN    Or    HYDROcodone-acetaminophen (Norco) 5-325 MG per tab 2 tablet  2 tablet Oral Q4H PRN    ondansetron (Zofran) 4 MG/2ML injection 4 mg  4 mg Intravenous Q6H PRN    prochlorperazine (Compazine) 10 MG/2ML injection 5 mg  5 mg Intravenous Q8H PRN    temazepam (Restoril) cap 15 mg  15 mg Oral Nightly PRN    ceFAZolin (Ancef) 2g in 10mL IV syringe premix  2 g Intravenous Q8H    glucose (Dex4) 15 GM/59ML oral liquid 15 g  15 g Oral Q15 Min PRN    Or    glucose (Glutose) 40% oral gel 15 g  15 g Oral Q15 Min PRN    Or    glucose-vitamin C (Dex-4) chewable tab 4 tablet  4 tablet Oral Q15 Min PRN     Or    dextrose 50% injection 50 mL  50 mL Intravenous Q15 Min PRN    Or    glucose (Dex4) 15 GM/59ML oral liquid 30 g  30 g Oral Q15 Min PRN    Or    glucose (Glutose) 40% oral gel 30 g  30 g Oral Q15 Min PRN    Or    glucose-vitamin C (Dex-4) chewable tab 8 tablet  8 tablet Oral Q15 Min PRN    insulin aspart (NovoLOG) 100 Units/mL FlexPen 1-7 Units  1-7 Units Subcutaneous TID CC    cyclobenzaprine (Flexeril) tab 10 mg  10 mg Oral TID PRN    fluticasone propionate (Flonase) 50 MCG/ACT nasal suspension 2 spray  2 spray Nasal Daily    miconazole 2 % powder   Topical PRN    QUEtiapine (SEROquel) tab 200 mg  200 mg Oral BID    traZODone (Desyrel) tab 50 mg  50 mg Oral Nightly PRN     Medications Prior to Admission   Medication Sig    fluticasone propionate 50 MCG/ACT Nasal Suspension 2 sprays by Nasal route daily.    clonazePAM 0.5 MG Oral Tab Take 1 tablet (0.5 mg total) by mouth 2 (two) times daily.    ARIPiprazole 5 MG Oral Tab Take 1 tablet (5 mg total) by mouth daily.    insulin aspart 100 Units/mL Subcutaneous Solution Pen-injector Inject 18 Units into the skin 3 (three) times daily with meals.    Cranberry 500 MG Oral Cap 1 capsule PO daily    metoprolol tartrate 25 MG Oral Tab Take 1 tablet (25 mg total) by mouth 2x Daily(Beta Blocker).    traZODone 50 MG Oral Tab Take 1 tablet (50 mg total) by mouth nightly as needed for Sleep.    hydrOXYzine 10 MG Oral Tab Take 1 tablet (10 mg total) by mouth 3 (three) times daily as needed for Anxiety.    QUEtiapine 200 MG Oral Tab Take 1 tablet (200 mg total) by mouth 2 (two) times daily.    DULoxetine 30 MG Oral Cap DR Particles Take 2 capsules (60 mg total) by mouth daily.    gabapentin 50 MG Oral Tab Take 300 mg by mouth 3 (three) times daily.    atorvastatin 40 MG Oral Tab Take 1 tablet (40 mg total) by mouth nightly.    sennosides 8.6 MG Oral Tab Take 2 tablets (17.2 mg total) by mouth 2 (two) times daily.    Icosapent Ethyl 1 g Oral Cap Take 2 capsules by mouth 4  (four) times daily.    rivaroxaban (XARELTO) 20 MG Oral Tab Take 1 tablet (20 mg total) by mouth daily.    insulin glargine 100 UNIT/ML Subcutaneous Solution Inject 20 Units into the skin every morning.    Melatonin 3 MG Oral Cap Take 1 capsule (3 mg total) by mouth at bedtime.    oxybutynin ER 5 MG Oral Tablet 24 Hr Take 1 tablet (5 mg total) by mouth daily.    methenamine 1 g Oral Tab Take 0.5 tablets (0.5 g total) by mouth 2 (two) times daily with meals.    bethanechol 10 MG Oral Tab Take 1 tablet (10 mg total) by mouth 3 (three) times daily.    loratadine 10 MG Oral Tab Take 1 tablet (10 mg total) by mouth in the morning, at noon, and at bedtime.    cyclobenzaprine 10 MG Oral Tab Take 1 tablet (10 mg total) by mouth 3 (three) times daily as needed for Muscle spasms.    Insulin Aspart 100 UNIT/ML Subcutaneous Solution Cartridge SS: 150-200 2units, 201-250 4 units, 251-300 6 units, 301-350 8 units, 351-400 10 units, over 401 call md    Montelukast Sodium 10 MG Oral Tab Take 1 tablet (10 mg total) by mouth daily.    divalproex Sodium  MG Oral Tablet 24 Hr Take 1 tablet (500 mg total) by mouth 2 (two) times daily.    Acidophilus/Pectin Oral Cap Take 1 capsule by mouth 2 (two) times daily.    MetFORMIN HCl ER (GLUCOPHAGE-XR) 750 MG Oral Tablet 24 Hr Take 1 tablet (750 mg total) by mouth daily with breakfast.    amoxicillin clavulanate 875-125 MG Oral Tab Take 1 tablet by mouth 2 (two) times daily for 10 days.    phenazopyridine 100 MG Oral Tab Take 1 tablet (100 mg total) by mouth 3 (three) times daily as needed for Pain.    [] cefdinir 300 MG Oral Cap Take 1 capsule (300 mg total) by mouth 2 (two) times daily for 7 days.    Insulin Pen Needle 32G X 4 MM Does not apply Misc May substitute if not on insurance formulary    polyethylene glycol, PEG 3350, 17 g Oral Powd Pack Take 17 g by mouth daily as needed (If no bowel movement in last 24 hours).    lactulose 20 GM/30ML Oral Solution Take 30 mL (20 g  total) by mouth every 6 (six) hours as needed (constipation).    docusate sodium 100 MG Oral Cap Take 100 mg by mouth 2 (two) times daily.    meclizine 12.5 MG Oral Tab Take 1 tablet (12.5 mg total) by mouth 3 (three) times daily as needed for Dizziness.    Nystatin 053961 UNIT/GM External Powder Apply 1 Application topically as needed for Dry Skin. Under breast and groin area    glucagon (BAQSIMI ONE PACK) 3 MG/DOSE Nasal nasal powder 3 mg by Nasal route once as needed for Low blood glucose.    acetaminophen 325 MG Oral Tab Take 2 tablets (650 mg total) by mouth every 6 (six) hours as needed for Pain.    insulin glargine 100 UNIT/ML Subcutaneous Solution Inject 56 Units into the skin nightly.    ergocalciferol 1.25 MG (30486 UT) Oral Cap Take 1 capsule (50,000 Units total) by mouth every 7 days. Every monday    albuterol 108 (90 Base) MCG/ACT Inhalation Aero Soln Inhale 2 puffs into the lungs every 6 (six) hours as needed for Wheezing or Shortness of Breath.    Multiple Vitamins-Minerals (THERA-M) Oral Tab Take 1 tablet by mouth daily.    ondansetron 4 MG Oral Tablet Dispersible Take 2 tablets (8 mg total) by mouth every 8 (eight) hours as needed for Nausea.       Allergies  Allergies   Allergen Reactions    Pcn [Penicillins] HIVES    Sulfa Antibiotics HIVES    Radiology Contrast Iodinated Dyes        Review of Systems:   As by Admitting/Attending    Results:   Laboratory Data:  Lab Results   Component Value Date    WBC 7.1 07/24/2024    HGB 11.3 (L) 07/24/2024    HCT 32.5 (L) 07/24/2024    .0 07/24/2024    CREATSERUM 0.82 07/24/2024    BUN 13 07/24/2024     07/24/2024    K 3.6 07/24/2024     07/24/2024    CO2 32.0 07/24/2024     (H) 07/24/2024    CA 9.4 07/24/2024    ALB 4.0 07/22/2024    ALKPHO 76 07/22/2024    TP 6.6 07/22/2024    AST 18 07/22/2024    ALT 21 07/22/2024    PTT 26.4 12/05/2023    LIP 36 12/05/2023    DDIMER 0.92 (H) 12/05/2023    MG 1.7 12/12/2023    ETOH <3 07/23/2024          Imaging:  XR CHEST AP PORTABLE  (CPT=71045)    Result Date: 7/23/2024  CONCLUSION: No radiographic evidence of acute cardiopulmonary abnormality.    elm-remote    Dictated by (CST): Obi Gomez MD on 7/23/2024 at 6:47 PM     Finalized by (CST): Obi Gomez MD on 7/23/2024 at 6:47 PM           Vital Signs:   Blood pressure 105/51, pulse 95, temperature 98.9 °F (37.2 °C), temperature source Oral, resp. rate 18, height 60\", weight 91.1 kg (200 lb 14.4 oz), SpO2 99%.    Mental Status Exam:   Appearance: Stated age female, obese, in hospital gown, laying down in hospital bed.  Psychomotor: No psychomotor agitation, or retardation.   Orientation: Alert and oriented to person, place, time and condition.  Gait: Not evaluated.  Attitude/Coorperation: Cooperative and attentive.  Behavior: Appropriate.  Intense somatic manifestation.  Speech: Regular rate and rhythm speech.  Mood: Patient reporting depressed and anxious mood with hopelessness and helplessness.  Affect: Anxious and restless affect congruent with the mood.  Thought process: Circumstantial and distracted thought process.  Thought content: Patient denies any suicidal or homicidal ideation.  Perceptions: Patient denies any auditory or visual hallucinations.  Concentration: Grossly intact.  Memory: Grossly intact.  Intellect: Average.  Judgment and Insight: Questionable.     Impression:      Bipolar disorder type I recent episode mixed severe without psychotic feature.  Generalized anxiety disorder.  Cellulitis    Patient is a 53 year old , single, female with past medical history of diabetes, bipolar 1 disorder, depression, MIKE, HTN, diverticulitis, neurogenic bladder, obesity, who was admitted to the hospital for Sepsis due to urinary tract infection (HCC): The patient has been demonstrating increased anxiety and depressive symptoms stating increasing manic symptoms, restlessness, difficulty sleeping with increasing intrusive thoughts.  Patient  believing her best treatment should be in inpatient psych facility for she need longer and more total psychiatric management.    Discussed risk and benefit, acknowledging the current symptom and severity.  At this point, I would recommend the following approach:     Focus on safety.  Patient demonstrating exacerbation her symptomology and I approve inpatient admission to psychiatric hospital.  Focus on education and support.  Focus on insight orientation helping the patient understand diagnosis and treatment plan.  Continue Abilify 5 mg daily.  Continue Klonopin 0.5 mg twice daily.  Continue Depakote 500 mg twice daily.  Continue duloxetine 60 mg daily.  Continue Seroquel 200 mg nightly.  Check Depakote level in a.m.  Communicate with psych liaison to proceed into inpatient psych admission when patient is medically cleared.  Processed with patient at length, the initiation of the above psychotropic medications I advised the patient of the risks, benefits, alternatives and potential side effects. The patient consents to administration of the medications and understands the right to refuse medications at any time. The patient verbalized understanding.   Coordinate plan with team    Orders This Visit:  Orders Placed This Encounter   Procedures    Basic Metabolic Panel (8)    CBC With Differential With Platelet    Troponin I (High Sensitivity)    Ethyl Alcohol    Basic Metabolic Panel (8)    CBC With Differential With Platelet    Valproic Acid, (Depakene)    CBC With Differential With Platelet    Basic Metabolic Panel (8)    SARS-CoV-2/Flu A and B/RSV by PCR (GeneXpert)    Emergency MRSA Screen by PCR       Meds This Visit:  Requested Prescriptions      No prescriptions requested or ordered in this encounter       Víctor Qureshi MD  7/24/2024    Note to Patient: The 21st Century Cures Act makes medical notes like these available to patients in the interest of transparency. However, be advised this is a medical  document. It is intended as peer to peer communication. It is written in medical language and may contain abbreviations or verbiage that are unfamiliar. It may appear blunt or direct. Medical documents are intended to carry relevant information, facts as evident, and the clinical opinion of the practitioner. This note may have been transcribed using a voice dictation system. Voice recognition errors may occur. This should not be taken to alter the content or meaning of this note.

## 2024-07-25 VITALS
BODY MASS INDEX: 39.44 KG/M2 | HEART RATE: 84 BPM | WEIGHT: 200.88 LBS | HEIGHT: 60 IN | TEMPERATURE: 98 F | DIASTOLIC BLOOD PRESSURE: 62 MMHG | OXYGEN SATURATION: 97 % | RESPIRATION RATE: 18 BRPM | SYSTOLIC BLOOD PRESSURE: 123 MMHG

## 2024-07-25 PROBLEM — R07.9 CHEST PAIN: Status: ACTIVE | Noted: 2024-07-25

## 2024-07-25 LAB
ANION GAP SERPL CALC-SCNC: 5 MMOL/L (ref 0–18)
BASOPHILS # BLD AUTO: 0.05 X10(3) UL (ref 0–0.2)
BASOPHILS NFR BLD AUTO: 0.7 %
BUN BLD-MCNC: 14 MG/DL (ref 9–23)
BUN/CREAT SERPL: 15.4 (ref 10–20)
CALCIUM BLD-MCNC: 9.5 MG/DL (ref 8.7–10.4)
CHLORIDE SERPL-SCNC: 102 MMOL/L (ref 98–112)
CO2 SERPL-SCNC: 32 MMOL/L (ref 21–32)
CREAT BLD-MCNC: 0.91 MG/DL
DEPRECATED RDW RBC AUTO: 49.4 FL (ref 35.1–46.3)
EGFRCR SERPLBLD CKD-EPI 2021: 75 ML/MIN/1.73M2 (ref 60–?)
EOSINOPHIL # BLD AUTO: 0.25 X10(3) UL (ref 0–0.7)
EOSINOPHIL NFR BLD AUTO: 3.6 %
ERYTHROCYTE [DISTWIDTH] IN BLOOD BY AUTOMATED COUNT: 14.7 % (ref 11–15)
GLUCOSE BLD-MCNC: 209 MG/DL (ref 70–99)
GLUCOSE BLDC GLUCOMTR-MCNC: 199 MG/DL (ref 70–99)
GLUCOSE BLDC GLUCOMTR-MCNC: 203 MG/DL (ref 70–99)
GLUCOSE BLDC GLUCOMTR-MCNC: 260 MG/DL (ref 70–99)
GLUCOSE BLDC GLUCOMTR-MCNC: 281 MG/DL (ref 70–99)
GLUCOSE BLDC GLUCOMTR-MCNC: 299 MG/DL (ref 70–99)
HCT VFR BLD AUTO: 37.6 %
HGB BLD-MCNC: 12.2 G/DL
IMM GRANULOCYTES # BLD AUTO: 0.04 X10(3) UL (ref 0–1)
IMM GRANULOCYTES NFR BLD: 0.6 %
LYMPHOCYTES # BLD AUTO: 3.03 X10(3) UL (ref 1–4)
LYMPHOCYTES NFR BLD AUTO: 43.2 %
MCH RBC QN AUTO: 29.7 PG (ref 26–34)
MCHC RBC AUTO-ENTMCNC: 32.4 G/DL (ref 31–37)
MCV RBC AUTO: 91.5 FL
MONOCYTES # BLD AUTO: 0.35 X10(3) UL (ref 0.1–1)
MONOCYTES NFR BLD AUTO: 5 %
NEUTROPHILS # BLD AUTO: 3.29 X10 (3) UL (ref 1.5–7.7)
NEUTROPHILS # BLD AUTO: 3.29 X10(3) UL (ref 1.5–7.7)
NEUTROPHILS NFR BLD AUTO: 46.9 %
OSMOLALITY SERPL CALC.SUM OF ELEC: 295 MOSM/KG (ref 275–295)
PLATELET # BLD AUTO: 240 10(3)UL (ref 150–450)
POTASSIUM SERPL-SCNC: 4.9 MMOL/L (ref 3.5–5.1)
RBC # BLD AUTO: 4.11 X10(6)UL
SODIUM SERPL-SCNC: 139 MMOL/L (ref 136–145)
VALPROATE SERPL-MCNC: 61.2 UG/ML (ref 50–100)
WBC # BLD AUTO: 7 X10(3) UL (ref 4–11)

## 2024-07-25 PROCEDURE — 99239 HOSP IP/OBS DSCHRG MGMT >30: CPT | Performed by: HOSPITALIST

## 2024-07-25 PROCEDURE — 99233 SBSQ HOSP IP/OBS HIGH 50: CPT | Performed by: OTHER

## 2024-07-25 RX ORDER — CLONAZEPAM 0.5 MG/1
0.5 TABLET ORAL 3 TIMES DAILY
Status: DISCONTINUED | OUTPATIENT
Start: 2024-07-25 | End: 2024-07-26

## 2024-07-25 RX ORDER — INSULIN DEGLUDEC 100 U/ML
30 INJECTION, SOLUTION SUBCUTANEOUS DAILY
Status: DISCONTINUED | OUTPATIENT
Start: 2024-07-25 | End: 2024-07-26

## 2024-07-25 RX ORDER — CLONAZEPAM 0.5 MG/1
0.5 TABLET ORAL 2 TIMES DAILY
Qty: 15 TABLET | Refills: 0 | Status: SHIPPED | OUTPATIENT
Start: 2024-07-25

## 2024-07-25 RX ORDER — CEPHALEXIN 500 MG/1
500 CAPSULE ORAL 3 TIMES DAILY
Status: SHIPPED | COMMUNITY
Start: 2024-07-25 | End: 2024-07-31

## 2024-07-25 RX ORDER — INSULIN GLARGINE 100 [IU]/ML
35 INJECTION, SOLUTION SUBCUTANEOUS EVERY MORNING
Status: SHIPPED | COMMUNITY
Start: 2024-07-25

## 2024-07-25 NOTE — CERTIFICATION
Ref: [facility] 5/3-403, 5/3-602, 5/3-607, 5/3-610    5/3-702, 5/3-813, 5/4-306, 5/4-402, 5/4-403    5/4-405, 5/4-501, 5/4-611, 5/4-705   Inpatient Certificate  Re: Clari Rosa    (name)     I personally informed the above-named individual of the purpose of this examination and that he or she did not have to speak to me, and that any statements made might be related in court as to the individual's clinical condition or need for services.  Additionally, if this examination was for the purpose of determining that the above-named individual is developmentally disabled and dangerous, I informed the individual of his or her right to speak with a relative, friend or  before the examination, and of his or her right to have an  appointed for him or her if he or she so desired.     Electronically signed by Víctor Qureshi MD    Signature of Examiner     On 7/25 , 2024 , at 2: 07  [] a.m. [x] p.m.,  I personally examined the    (date)  (year)  (time)    above-named individual. The examination was conducted in person at [facility].  Or   [] Via Interactive Communication System (telepsychiatry)      Based on the foregoing examination, it is my opinion that he or she is:  []  A person with mental illness who, because of his or her illness is reasonably expected, unless treated on an inpatient basis, to engage in conduct placing such person or another in physical harm or in reasonable expectation of being physically harmed;   [x]  A person with mental illness who, because of his or her illness is unable to provide for his or her basic physical needs so as to guard himself or herself from serious harm, without the assistance of family or others, unless treated on an inpatient basis;   []  A person with mental illness who: refuses treatment or is not adhering adequately to prescribed treatment; because of the nature of his or her illness is unable to understand his or her need for treatment; and if not  treated on an inpatient basis, is reasonably expected based on his or her behavioral history, to suffer mental or emotional deterioration and is reasonably expected, after such deterioration, to meet the criteria of either paragraph one or paragraph two above;   []  An individual who is developmentally disabled and unless treated on an in-patient basis is reasonably expected to inflict serious physical harm upon himself or herself or others in the near future, and/or   [x]  Is in need of immediate hospitalization for the prevention of such harm.   I base my opinion on the following (including clinical observations, factual information):  The patient with history of bipolar disorder and severe anxiety who has been frequently admitted to the medical floor with a somatic manifestation demonstrating manic symptoms with impairment in her ability to care for self.  Patient indicated for inpatient mission for safety and stabilization.  I believe that the individual is subject to: []  Involuntary inpatient admission and is not in need of immediate hospitalization   (check one) [x]  Involuntary inpatient admission and is in need of immediate hospitalization    []  Judicial inpatient admission and is not in need of immediate hospitalization    []  Judicial inpatient admission and is in need of immediate hospitalization     Date: 7/25/2024 Signature: Electronically signed by Víctor Qureshi MD   Title: MD Printed Name: Víctor Qureshi MD     -2006 (R-12-22) Inpatient Certificate  Printed by Authority of the [facility] -0- Copies Page 1 of 1

## 2024-07-25 NOTE — PROGRESS NOTES
Patient is a 53 year old , single, female with past medical history of diabetes, bipolar 1 disorder, depression, MIKE, HTN, diverticulitis, neurogenic bladder and obesity, who was admitted to the hospital for chest pain.  The patient has been demonstrating increased anxiety and depressive symptoms. Patient indicated for psych consult for evaluation and advise.    Consult Duration     The patient seen for over 50-minute, follow-up evaluation, over 50% counseling and coordinating care addressing increased anxiety, manic symptoms and discuss disposition.    Record reviewed, communication with attending, communication with RN and patient seen face to face evaluation.    History of Present Illness:      According to the team the patient continued demonstrating some somatic manifestation and demonstrating desire to stay in the hospital or transfer to inpatient psych facility.  No agitation.    The patient today in her bed laying down.  Patient slightly restless and frequently interrupting with difficult to communicate due to her racing thoughts.    Otherwise the patient reporting that she slept the best night last night because she had CPAP and she slept from 8 PM to 8 AM.    The patient reporting that her mind continues to be racing and finding herself very anxious and restless.  Patient asking to increase Klonopin.  Patient reporting difficulty with her emotion and intrusive thought process.    Patient demonstrate pressured speech with physical to interact.  The patient had difficulty sitting still and continue repositioning and jumping into process of frequent ideation and tangential thought process.    Patient reporting increased hopelessness, helplessness, lack of energy, lack of motivation increased depressed mood and believing being in the nursing home she starts for the rest of her life and she is going to die there.       Patient feel the medication need to be adjusted and continue insisting on going to  inpatient psych facility?  Upon asking the patient what she want to achieve from inpatient psych facility she is stating that she want to group therapy and she want to be able to work on coping skills and control her thoughts.    The patient denies auditory or visual hallucinations  The patient denies suicidal or homicidal ideation.     No substance abuse history.  Patient compliant on her medication and has been taking the Klonopin 0.5 mg twice daily.  With that she continue finding herself anxious with racing thoughts and difficulty calming her emotion.    The patient has been demonstrating increased anxiety, restlessness, irritability and difficulty managing her emotion.  No suicidal ideation or indication for inpatient admission        Past Psychiatric/Medication History:  1. Prior diagnoses: MIKE, depression, Bipolar 1 disorder, Manipulative behavior. Patient states she also has OCD and Panic attacks sine adolescent years.  2. Past psychiatric inpatient: Patient has been seen in the consult service multiple times including January 2024 and July 2024 2 weeks ago.  3. Past outpatient history: Patient reports seeing a psychiatris at Nursing Home   4. Past suicide history: Patient denies   5. Medication history: Wellbutrin XL 24 hr tab 300mg, Klonopin 1mg q6h, Seroquel 300mg BID,      Social History:   The patient states the she lives in a Nursing home due to medical conditions. She is single and has never  but has one living son.      Denies any ETOH abuse, or illicit substance abuse. Previous smoker (5 pack history)     Family History:  No family history reported     Medical History:   Past Medical History  Past Medical History:    Bipolar 1 disorder (HCC)    Dental caries    Diabetes (HCC)    MIKE (generalized anxiety disorder)    High blood pressure    History of diverticulitis of colon    Manipulative behavior    Neurogenic bladder    Obesity (BMI 30-39.9)    Paraplegia (HCC)    Sepsis following  intra-abdominal surgery (HCC)    Serotonin syndrome    Sleep apnea    Suprapubic catheter (HCC)    Transverse myelitis (HCC)       Past Surgical History  Past Surgical History:   Procedure Laterality Date    Arthroscopy of joint unlisted      knee          Cholecystectomy      Part removal colon w end colostomy      2013    Tonsillectomy         Family History  Family History   Adopted: Yes       Social History  Social History     Socioeconomic History    Marital status: Single   Tobacco Use    Smoking status: Former     Current packs/day: 1.00     Average packs/day: 1 pack/day for 5.0 years (5.0 ttl pk-yrs)     Types: Cigarettes    Tobacco comments:     quit    Substance and Sexual Activity    Alcohol use: No    Drug use: No     Social Determinants of Health     Food Insecurity: No Food Insecurity (2024)    Food Insecurity     Food Insecurity: Never true   Transportation Needs: No Transportation Needs (2024)    Transportation Needs     Lack of Transportation: No   Housing Stability: Low Risk  (2024)    Housing Stability     Housing Instability: No           Current Medications:  Current Facility-Administered Medications   Medication Dose Route Frequency    insulin degludec (Tresiba) 100 units/mL flextouch 30 Units  30 Units Subcutaneous Daily    clonazePAM (KlonoPIN) tab 0.5 mg  0.5 mg Oral TID    albuterol (Ventolin HFA) 108 (90 Base) MCG/ACT inhaler 2 puff  2 puff Inhalation Q6H PRN    acidophilus (Probiotic) cap/tab 1 each  1 each Oral Daily    ARIPiprazole (Abilify) tab 5 mg  5 mg Oral Daily    atorvastatin (Lipitor) tab 40 mg  40 mg Oral Nightly    bethanechol (Urecholine) tab 10 mg  10 mg Oral TID    divalproex ER (Depakote ER) 24 hr tab 500 mg  500 mg Oral BID    docusate sodium (Colace) cap 100 mg  100 mg Oral BID    DULoxetine (Cymbalta) DR cap 60 mg  60 mg Oral Daily    gabapentin (Neurontin) cap 300 mg  300 mg Oral TID    hydrOXYzine (Atarax) tab 10 mg  10 mg Oral TID PRN     cetirizine (ZyrTEC) tab 10 mg  10 mg Oral Daily    meclizine (Antivert) tab 12.5 mg  12.5 mg Oral TID PRN    melatonin tab 3 mg  3 mg Oral Nightly    metoprolol tartrate (Lopressor) tab 25 mg  25 mg Oral 2x Daily(Beta Blocker)    montelukast (Singulair) tab 10 mg  10 mg Oral Daily    sennosides (Senokot) tab 17.2 mg  17.2 mg Oral BID    rivaroxaban (Xarelto) tab 20 mg  20 mg Oral Q24H    lactulose (ENULOSE) solution 20 g  20 g Oral Q6H PRN    sodium chloride (Saline Mist) 0.65 % nasal solution 1 spray  1 spray Each Nare Q3H PRN    acetaminophen (Tylenol Extra Strength) tab 500 mg  500 mg Oral Q4H PRN    acetaminophen (Tylenol) tab 650 mg  650 mg Oral Q4H PRN    Or    HYDROcodone-acetaminophen (Norco) 5-325 MG per tab 1 tablet  1 tablet Oral Q4H PRN    Or    HYDROcodone-acetaminophen (Norco) 5-325 MG per tab 2 tablet  2 tablet Oral Q4H PRN    ondansetron (Zofran) 4 MG/2ML injection 4 mg  4 mg Intravenous Q6H PRN    prochlorperazine (Compazine) 10 MG/2ML injection 5 mg  5 mg Intravenous Q8H PRN    temazepam (Restoril) cap 15 mg  15 mg Oral Nightly PRN    ceFAZolin (Ancef) 2g in 10mL IV syringe premix  2 g Intravenous Q8H    glucose (Dex4) 15 GM/59ML oral liquid 15 g  15 g Oral Q15 Min PRN    Or    glucose (Glutose) 40% oral gel 15 g  15 g Oral Q15 Min PRN    Or    glucose-vitamin C (Dex-4) chewable tab 4 tablet  4 tablet Oral Q15 Min PRN    Or    dextrose 50% injection 50 mL  50 mL Intravenous Q15 Min PRN    Or    glucose (Dex4) 15 GM/59ML oral liquid 30 g  30 g Oral Q15 Min PRN    Or    glucose (Glutose) 40% oral gel 30 g  30 g Oral Q15 Min PRN    Or    glucose-vitamin C (Dex-4) chewable tab 8 tablet  8 tablet Oral Q15 Min PRN    insulin aspart (NovoLOG) 100 Units/mL FlexPen 1-7 Units  1-7 Units Subcutaneous TID CC    cyclobenzaprine (Flexeril) tab 10 mg  10 mg Oral TID PRN    fluticasone propionate (Flonase) 50 MCG/ACT nasal suspension 2 spray  2 spray Nasal Daily    miconazole 2 % powder   Topical PRN    QUEtiapine  (SEROquel) tab 200 mg  200 mg Oral BID    traZODone (Desyrel) tab 50 mg  50 mg Oral Nightly PRN     Medications Prior to Admission   Medication Sig    fluticasone propionate 50 MCG/ACT Nasal Suspension 2 sprays by Nasal route daily.    ARIPiprazole 5 MG Oral Tab Take 1 tablet (5 mg total) by mouth daily.    Cranberry 500 MG Oral Cap 1 capsule PO daily    metoprolol tartrate 25 MG Oral Tab Take 1 tablet (25 mg total) by mouth 2x Daily(Beta Blocker).    traZODone 50 MG Oral Tab Take 1 tablet (50 mg total) by mouth nightly as needed for Sleep.    hydrOXYzine 10 MG Oral Tab Take 1 tablet (10 mg total) by mouth 3 (three) times daily as needed for Anxiety.    QUEtiapine 200 MG Oral Tab Take 1 tablet (200 mg total) by mouth 2 (two) times daily.    DULoxetine 30 MG Oral Cap DR Particles Take 2 capsules (60 mg total) by mouth daily.    gabapentin 50 MG Oral Tab Take 300 mg by mouth 3 (three) times daily.    atorvastatin 40 MG Oral Tab Take 1 tablet (40 mg total) by mouth nightly.    sennosides 8.6 MG Oral Tab Take 2 tablets (17.2 mg total) by mouth 2 (two) times daily.    Icosapent Ethyl 1 g Oral Cap Take 2 capsules by mouth 4 (four) times daily.    rivaroxaban (XARELTO) 20 MG Oral Tab Take 1 tablet (20 mg total) by mouth daily.    Melatonin 3 MG Oral Cap Take 1 capsule (3 mg total) by mouth at bedtime.    oxybutynin ER 5 MG Oral Tablet 24 Hr Take 1 tablet (5 mg total) by mouth daily.    methenamine 1 g Oral Tab Take 0.5 tablets (0.5 g total) by mouth 2 (two) times daily with meals.    bethanechol 10 MG Oral Tab Take 1 tablet (10 mg total) by mouth 3 (three) times daily.    loratadine 10 MG Oral Tab Take 1 tablet (10 mg total) by mouth in the morning, at noon, and at bedtime.    cyclobenzaprine 10 MG Oral Tab Take 1 tablet (10 mg total) by mouth 3 (three) times daily as needed for Muscle spasms.    Insulin Aspart 100 UNIT/ML Subcutaneous Solution Cartridge SS: 150-200 2units, 201-250 4 units, 251-300 6 units, 301-350 8  units, 351-400 10 units, over 401 call md    Montelukast Sodium 10 MG Oral Tab Take 1 tablet (10 mg total) by mouth daily.    divalproex Sodium  MG Oral Tablet 24 Hr Take 1 tablet (500 mg total) by mouth 2 (two) times daily.    Acidophilus/Pectin Oral Cap Take 1 capsule by mouth 2 (two) times daily.    MetFORMIN HCl ER (GLUCOPHAGE-XR) 750 MG Oral Tablet 24 Hr Take 1 tablet (750 mg total) by mouth daily with breakfast.    amoxicillin clavulanate 875-125 MG Oral Tab Take 1 tablet by mouth 2 (two) times daily for 10 days.    phenazopyridine 100 MG Oral Tab Take 1 tablet (100 mg total) by mouth 3 (three) times daily as needed for Pain.    [DISCONTINUED] clonazePAM 0.5 MG Oral Tab Take 1 tablet (0.5 mg total) by mouth 2 (two) times daily.    [DISCONTINUED] insulin aspart 100 Units/mL Subcutaneous Solution Pen-injector Inject 18 Units into the skin 3 (three) times daily with meals.    [] cefdinir 300 MG Oral Cap Take 1 capsule (300 mg total) by mouth 2 (two) times daily for 7 days.    Insulin Pen Needle 32G X 4 MM Does not apply Misc May substitute if not on insurance formulary    polyethylene glycol, PEG 3350, 17 g Oral Powd Pack Take 17 g by mouth daily as needed (If no bowel movement in last 24 hours).    lactulose 20 GM/30ML Oral Solution Take 30 mL (20 g total) by mouth every 6 (six) hours as needed (constipation).    docusate sodium 100 MG Oral Cap Take 100 mg by mouth 2 (two) times daily.    meclizine 12.5 MG Oral Tab Take 1 tablet (12.5 mg total) by mouth 3 (three) times daily as needed for Dizziness.    Nystatin 937876 UNIT/GM External Powder Apply 1 Application topically as needed for Dry Skin. Under breast and groin area    glucagon (BAQSIMI ONE PACK) 3 MG/DOSE Nasal nasal powder 3 mg by Nasal route once as needed for Low blood glucose.    acetaminophen 325 MG Oral Tab Take 2 tablets (650 mg total) by mouth every 6 (six) hours as needed for Pain.    insulin glargine 100 UNIT/ML Subcutaneous  Solution Inject 56 Units into the skin nightly.    [DISCONTINUED] insulin glargine 100 UNIT/ML Subcutaneous Solution Inject 20 Units into the skin every morning.    ergocalciferol 1.25 MG (66715 UT) Oral Cap Take 1 capsule (50,000 Units total) by mouth every 7 days. Every monday    albuterol 108 (90 Base) MCG/ACT Inhalation Aero Soln Inhale 2 puffs into the lungs every 6 (six) hours as needed for Wheezing or Shortness of Breath.    Multiple Vitamins-Minerals (THERA-M) Oral Tab Take 1 tablet by mouth daily.    ondansetron 4 MG Oral Tablet Dispersible Take 2 tablets (8 mg total) by mouth every 8 (eight) hours as needed for Nausea.       Allergies  Allergies   Allergen Reactions    Pcn [Penicillins] HIVES    Sulfa Antibiotics HIVES    Radiology Contrast Iodinated Dyes        Review of Systems:   As by Admitting/Attending    Results:   Laboratory Data:  Lab Results   Component Value Date    WBC 7.0 07/25/2024    HGB 12.2 07/25/2024    HCT 37.6 07/25/2024    .0 07/25/2024    CREATSERUM 0.91 07/25/2024    BUN 14 07/25/2024     07/25/2024    K 4.9 07/25/2024     07/25/2024    CO2 32.0 07/25/2024     (H) 07/25/2024    CA 9.5 07/25/2024    ALB 4.0 07/22/2024    ALKPHO 76 07/22/2024    TP 6.6 07/22/2024    AST 18 07/22/2024    ALT 21 07/22/2024    PTT 26.4 12/05/2023    LIP 36 12/05/2023    DDIMER 0.92 (H) 12/05/2023    MG 1.7 12/12/2023    ETOH <3 07/23/2024         Imaging:  XR CHEST AP PORTABLE  (CPT=71045)    Result Date: 7/23/2024  CONCLUSION: No radiographic evidence of acute cardiopulmonary abnormality.    [id number]    Dictated by (CST): Obi Gomez MD on 7/23/2024 at 6:47 PM     Finalized by (CST): Obi Gomez MD on 7/23/2024 at 6:47 PM           Vital Signs:   Blood pressure 120/78, pulse 92, temperature 98.7 °F (37.1 °C), temperature source Oral, resp. rate 16, height 60\", weight 91.1 kg (200 lb 14.4 oz), SpO2 98%.    Mental Status Exam:   Appearance: Stated age female, obese, in  hospital gown, laying down in hospital bed.  Psychomotor: Very restless and irritable with examiner.  Orientation: Alert and oriented to person, place, time and condition.  Gait: Intact  Attitude/Coorperation: Effort to be cooperative but very distracted and restless.  Behavior: Appropriate.  Attention seeking behavior and restlessness.  Speech: Pressured speech with difficult to interrupt bedtime.  Mood: Patient reporting depressed and anxious mood with hopelessness and helplessness.  Affect: Anxious and restless affect congruent with the mood.  Thought process: Circumstantial and distracted thought process.  Thought content: Patient denies any suicidal or homicidal ideation.  Perceptions: Patient denies any auditory or visual hallucinations.  Concentration: Grossly intact.  Memory: Grossly intact.  Intellect: Average.  Judgment and Insight: Questionable.     Impression:      Bipolar disorder type I recent episode mixed severe without psychotic feature.  Generalized anxiety disorder.  Cellulitis    Patient is a 53 year old , single, female with past medical history of diabetes, bipolar 1 disorder, depression, MIKE, HTN, diverticulitis, neurogenic bladder, obesity, who was admitted to the hospital for Sepsis due to urinary tract infection (HCC): The patient has been demonstrating increased anxiety and depressive symptoms stating increasing manic symptoms, restlessness, difficulty sleeping with increasing intrusive thoughts.  Patient believing her best treatment should be in inpatient psych facility for she need longer and more total psychiatric management.    7/25/2024: The patient continued demonstrating restlessness, irritability, alternation in mood with increased anxiety and somatic manifestation.  Patient continue reporting her need to go to inpatient psych facility and having difficulty accepting her current position with impairment in her ability to care for self.    Discussed risk and benefit,  acknowledging the current symptom and severity.  At this point, I would recommend the following approach:     Focus on safety.  Patient demonstrating exacerbation her symptomology and I approve inpatient admission to psychiatric hospital.  Focus on education and support.  Focus on insight orientation helping the patient understand diagnosis and treatment plan.  Continue Abilify 5 mg daily.  Increase the Klonopin 2.5 mg 3 times daily.  Continue Depakote 500 mg twice daily.  Depakote level this morning is 61  Continue duloxetine 60 mg daily.  Continue Seroquel 200 mg nightly.  Communicate with psych liaison to proceed into inpatient psych admission when patient is medically cleared.  Coordinate plan with team    Orders This Visit:  Orders Placed This Encounter   Procedures    Basic Metabolic Panel (8)    CBC With Differential With Platelet    Troponin I (High Sensitivity)    Ethyl Alcohol    Basic Metabolic Panel (8)    CBC With Differential With Platelet    Valproic Acid, (Depakene)    CBC With Differential With Platelet    Basic Metabolic Panel (8)    SARS-CoV-2/Flu A and B/RSV by PCR (GeneXpert)    Emergency MRSA Screen by PCR       Meds This Visit:  Requested Prescriptions     Signed Prescriptions Disp Refills    clonazePAM 0.5 MG Oral Tab 15 tablet 0     Sig: Take 1 tablet (0.5 mg total) by mouth 2 (two) times daily.       Víctor Qureshi MD  7/25/2024    Note to Patient: The 21st Century Cures Act makes medical notes like these available to patients in the interest of transparency. However, be advised this is a medical document. It is intended as peer to peer communication. It is written in medical language and may contain abbreviations or verbiage that are unfamiliar. It may appear blunt or direct. Medical documents are intended to carry relevant information, facts as evident, and the clinical opinion of the practitioner. This note may have been transcribed using a voice dictation system. Voice recognition  errors may occur. This should not be taken to alter the content or meaning of this note.

## 2024-07-25 NOTE — PLAN OF CARE
Problem: Patient Centered Care  Goal: Patient preferences are identified and integrated in the patient's plan of care  Description: Interventions:  - What would you like us to know as we care for you?   - Provide timely, complete, and accurate information to patient/family  - Incorporate patient and family knowledge, values, beliefs, and cultural backgrounds into the planning and delivery of care  - Encourage patient/family to participate in care and decision-making at the level they choose  - Honor patient and family perspectives and choices  Outcome: Progressing     Problem: Diabetes/Glucose Control  Goal: Glucose maintained within prescribed range  Description: INTERVENTIONS:  - Monitor Blood Glucose as ordered  - Assess for signs and symptoms of hyperglycemia and hypoglycemia  - Administer ordered medications to maintain glucose within target range  - Assess barriers to adequate nutritional intake and initiate nutrition consult as needed  - Instruct patient on self management of diabetes  Outcome: Progressing     Problem: Patient/Family Goals  Goal: Patient/Family Long Term Goal  Description: Patient's Long Term Goal:     Interventions:  - Follow up with MD  - Inpatient psych  - See additional Care Plan goals for specific interventions  Outcome: Progressing  Goal: Patient/Family Short Term Goal  Description: Patient's Short Term Goal:     Interventions:   - Monitor lab  - IV ABT  - Psych consult  - See additional Care Plan goals for specific interventions  Outcome: Progressing     Problem: CARDIOVASCULAR - ADULT  Goal: Maintains optimal cardiac output and hemodynamic stability  Description: INTERVENTIONS:  - Monitor vital signs, rhythm, and trends  - Monitor for bleeding, hypotension and signs of decreased cardiac output  - Evaluate effectiveness of vasoactive medications to optimize hemodynamic stability  - Monitor arterial and/or venous puncture sites for bleeding and/or hematoma  - Assess quality of pulses,  skin color and temperature  - Assess for signs of decreased coronary artery perfusion - ex. Angina  - Evaluate fluid balance, assess for edema, trend weights  Outcome: Progressing  Goal: Absence of cardiac arrhythmias or at baseline  Description: INTERVENTIONS:  - Continuous cardiac monitoring, monitor vital signs, obtain 12 lead EKG if indicated  - Evaluate effectiveness of antiarrhythmic and heart rate control medications as ordered  - Initiate emergency measures for life threatening arrhythmias  - Monitor electrolytes and administer replacement therapy as ordered  Outcome: Progressing     Problem: METABOLIC/FLUID AND ELECTROLYTES - ADULT  Goal: Glucose maintained within prescribed range  Description: INTERVENTIONS:  - Monitor Blood Glucose as ordered  - Assess for signs and symptoms of hyperglycemia and hypoglycemia  - Administer ordered medications to maintain glucose within target range  - Assess barriers to adequate nutritional intake and initiate nutrition consult as needed  - Instruct patient on self management of diabetes  Outcome: Progressing     Problem: SKIN/TISSUE INTEGRITY - ADULT  Goal: Incision(s), wounds(s) or drain site(s) healing without S/S of infection  Description: INTERVENTIONS:  - Assess and document risk factors for pressure ulcer development  - Assess and document skin integrity  - Assess and document dressing/incision, wound bed, drain sites and surrounding tissue  - Implement wound care per orders  - Initiate isolation precautions as appropriate  - Initiate Pressure Ulcer prevention bundle as indicated  Outcome: Progressing     Clari was resting in bed, noted with some anxiety at times. PRN Norco was given for pain management. She's on IV ABT, pt afebrile with no adverse reaction noted. She's able to reposition self in bed. Suprapubic catheter in place, intact, and draining. Accucheck AC/HS done. She's also on CPAP at HS. Plan for possible transfer to 5th floor pending bed availability,  then discharge to inpatient psych when medically stable.

## 2024-07-25 NOTE — DISCHARGE SUMMARY
[facility]  Discharge Summary     Clari Rosa  : 1970    Status: Inpatient  Day #: 0    Attending: Johan Easton MD  PCP: Brian Bob MD     Date of Admission:  2024  Date of Discharge:  2024     Hospital Discharge Diagnoses:     B/l LE cellulitis  Bipolar 1 disorder with manic episode  Recent suprapubic catheter related UTI  Morbid obesity Body mass index is 39.24 kg/m².  IDDM  H/o PE  Reported paraplegia  Constipation      History of Present Illness:     Copied from Admission H&P:    The patient is a 53-year-old  female who lives at [facility]. She is bedbound with musculoskeletal deconditioning. Patient brought in today because she was complaining of left upper and lower extremity pain radiating from her neck. She denies any fever or chills. No abdominal pain. Also noted cellulitic change on her right leg. CBC today showed white blood cell count of 10.2, no left shift. Chemistry still pending. Chest x-ray still pending.       Hospital Course:     Bilateral lower extremity cellulitis  - improved  - ancef IV -> oral abx on discharge     Bipolar 1 disorder with reported manic episode  Severe anxiety d/o  - psychiatry consult  - cont psych meds  - inpatient psych on discharge     Recent Suprapubic catheter related UTI  Neurogenic bladder  -finished treatment     Morbid obesity Body mass index is 39.24 kg/m².     IDDM  -cont insulin  -monitor BG     H/o PE  -cont xarelto     Reported paraplegia     H/o constipation  -bowel regimen     Consultants         Provider   Role Specialty     Víctor Qureshi MD      Consulting Physician Psychiatry             Physical Exam:   Blood pressure 120/78, pulse 92, temperature 98.7 °F (37.1 °C), temperature source Oral, resp. rate 16, height 5' (1.524 m), weight 200 lb 14.4 oz (91.1 kg), SpO2 98%.  General:  Alert, no distress  HEENT:  Normocephalic, atraumatic  Cardiac:  Regular rate, regular rhythm  Pulmonary:  Clear to auscultation  bilaterally, respirations unlabored  Gastrointestinal:  Soft, non-tender, normal bowel sounds  Musculoskeletal:  No joint swelling  Extremities:  No edema, no cyanosis, no clubbing  Neurologic:  nonfocal  Psychiatric:  Normal affect, calm and appropriate         Discharge Medications        CHANGE how you take these medications        Instructions Prescription details   insulin aspart 100 UNIT/ML Soct  Commonly known as: NovoLOG  What changed: Another medication with the same name was changed. Make sure you understand how and when to take each.      SS: 150-200 2units, 201-250 4 units, 251-300 6 units, 301-350 8 units, 351-400 10 units, over 401 call md   Quantity: 3 mL  Refills: 0     insulin aspart 100 Units/mL Sopn  Commonly known as: NovoLOG  What changed: how much to take      Inject 10 Units into the skin 3 (three) times daily with meals.   Refills: 0     insulin glargine 100 UNIT/ML Soln  Commonly known as: Lantus  What changed:   how much to take  Another medication with the same name was removed. Continue taking this medication, and follow the directions you see here.      Inject 35 Units into the skin every morning.   Refills: 0            CONTINUE taking these medications        Instructions Prescription details   acetaminophen 325 MG Tabs  Commonly known as: Tylenol      Take 2 tablets (650 mg total) by mouth every 6 (six) hours as needed for Pain.   Refills: 0     acidophilus-pectin Caps  Commonly known as: Probiotic      Take 1 capsule by mouth 2 (two) times daily.   Refills: 0     albuterol 108 (90 Base) MCG/ACT Aers  Commonly known as: Ventolin HFA      Inhale 2 puffs into the lungs every 6 (six) hours as needed for Wheezing or Shortness of Breath.   Refills: 0     ARIPiprazole 5 MG Tabs  Commonly known as: Abilify      Take 1 tablet (5 mg total) by mouth daily.   Refills: 0     atorvastatin 40 MG Tabs  Commonly known as: Lipitor      Take 1 tablet (40 mg total) by mouth nightly.   Refills: 0      Baqsimi One Pack 3 MG/DOSE nasal powder  Generic drug: glucagon      3 mg by Nasal route once as needed for Low blood glucose.   Refills: 0     bethanechol 10 MG Tabs  Commonly known as: Urecholine      Take 1 tablet (10 mg total) by mouth 3 (three) times daily.   Refills: 0     cephalexin 500 MG Caps  Commonly known as: Keflex      Take 1 capsule (500 mg total) by mouth 3 (three) times daily.   Stop taking on: July 31, 2024  Refills: 0     clonazePAM 0.5 MG Tabs  Commonly known as: KlonoPIN      Take 1 tablet (0.5 mg total) by mouth 2 (two) times daily.   Quantity: 15 tablet  Refills: 0     Cranberry 500 MG Caps      1 capsule PO daily   Refills: 0     cyclobenzaprine 10 MG Tabs  Commonly known as: Flexeril      Take 1 tablet (10 mg total) by mouth 3 (three) times daily as needed for Muscle spasms.   Refills: 0     divalproex  MG Tb24  Commonly known as: Depakote ER      Take 1 tablet (500 mg total) by mouth 2 (two) times daily.   Refills: 0     docusate sodium 100 MG Caps  Commonly known as: COLACE      Take 100 mg by mouth 2 (two) times daily.   Refills: 0     DULoxetine 30 MG Cpep  Commonly known as: Cymbalta      Take 2 capsules (60 mg total) by mouth daily.   Refills: 0     ergocalciferol 1.25 MG (59321 UT) Caps  Commonly known as: Vitamin D2      Take 1 capsule (50,000 Units total) by mouth every 7 days. Every monday   Refills: 0     fluticasone propionate 50 MCG/ACT Susp  Commonly known as: Flonase      2 sprays by Nasal route daily.   Stop taking on: August 21, 2024  Quantity: 16 g  Refills: 0     Gabapentin 50 MG Tabs      Take 300 mg by mouth 3 (three) times daily.   Refills: 0     hydrOXYzine 10 MG Tabs  Commonly known as: Atarax      Take 1 tablet (10 mg total) by mouth 3 (three) times daily as needed for Anxiety.   Refills: 0     Icosapent Ethyl 1 g Caps      Take 2 capsules by mouth 4 (four) times daily.   Refills: 0     Insulin Pen Needle 32G X 4 MM Misc      May substitute if not on  insurance formulary   Quantity: 100 each  Refills: 5     lactulose 20 GM/30ML Soln  Commonly known as: ENULOSE      Take 30 mL (20 g total) by mouth every 6 (six) hours as needed (constipation).   Refills: 0     loratadine 10 MG Tabs  Commonly known as: Claritin      Take 1 tablet (10 mg total) by mouth in the morning, at noon, and at bedtime.   Refills: 0     meclizine 12.5 MG Tabs  Commonly known as: Antivert      Take 1 tablet (12.5 mg total) by mouth 3 (three) times daily as needed for Dizziness.   Refills: 0     Melatonin 3 MG Caps      Take 1 capsule (3 mg total) by mouth at bedtime.   Refills: 0     metFORMIN  MG Tb24  Commonly known as: Glucophage XR      Take 1 tablet (750 mg total) by mouth daily with breakfast.   Quantity: 30 tablet  Refills: 0     methenamine 1 g Tabs  Commonly known as: Hiprex      Take 0.5 tablets (0.5 g total) by mouth 2 (two) times daily with meals.   Refills: 0     metoprolol tartrate 25 MG Tabs  Commonly known as: Lopressor      Take 1 tablet (25 mg total) by mouth 2x Daily(Beta Blocker).   Refills: 0     montelukast 10 MG Tabs  Commonly known as: Singulair      Take 1 tablet (10 mg total) by mouth daily.   Refills: 0     Nystatin 327442 UNIT/GM Powd      Apply 1 Application topically as needed for Dry Skin. Under breast and groin area   Refills: 0     ondansetron 4 MG Tbdp  Commonly known as: Zofran-ODT      Take 2 tablets (8 mg total) by mouth every 8 (eight) hours as needed for Nausea.   Refills: 0     oxybutynin ER 5 MG Tb24  Commonly known as: Ditropan-XL      Take 1 tablet (5 mg total) by mouth daily.   Refills: 0     phenazopyridine 100 MG Tabs  Commonly known as: Pyridium      Take 1 tablet (100 mg total) by mouth 3 (three) times daily as needed for Pain.   Quantity: 5 tablet  Refills: 0     Polyethylene Glycol 3350 17 g Pack  Commonly known as: MIRALAX      Take 17 g by mouth daily as needed (If no bowel movement in last 24 hours).   Refills: 0     QUEtiapine 200 MG  Tabs  Commonly known as: SEROquel      Take 1 tablet (200 mg total) by mouth 2 (two) times daily.   Refills: 0     rivaroxaban 20 MG Tabs  Commonly known as: Xarelto      Take 1 tablet (20 mg total) by mouth daily.   Quantity: 30 tablet  Refills: 0     sennosides 8.6 MG Tabs  Commonly known as: Senokot      Take 2 tablets (17.2 mg total) by mouth 2 (two) times daily.   Refills: 0     Thera-M Tabs      Take 1 tablet by mouth daily.   Refills: 0     traZODone 50 MG Tabs  Commonly known as: Desyrel      Take 1 tablet (50 mg total) by mouth nightly as needed for Sleep.   Refills: 0            STOP taking these medications      amoxicillin clavulanate 875-125 MG Tabs  Commonly known as: Augmentin        cefdinir 300 MG Caps  Commonly known as: Omnicef                  Where to Get Your Medications        Please  your prescriptions at the location directed by your doctor or nurse    Bring a paper prescription for each of these medications  clonazePAM 0.5 MG Tabs           Hospital Discharge Diagnoses:  bilateral LE cellulitis    Lace+ Score: 57  59-90 High Risk  29-58 Medium Risk  0-28   Low Risk.    TCM Follow-Up Recommendation:  LACE 29-58: Moderate Risk of readmission after discharge from the hospital.        I spent >30 minutes on this discharge. Discussed treatment and discharge plans.       Johan Easton MD

## 2024-07-25 NOTE — PROGRESS NOTES
[facility]  Progress Note     Clari Rosa  : 1970    Status: Observation  Day #: 0    Attending: Johan Easton MD  PCP: Brian Bob MD     Assessment and Plan:    Bilateral lower extremity cellulitis  - improved  - cont ancef IV - oral on discharge    Bipolar 1 disorder with reported manic episode  Severe anxiety d/o  - psychiatry consult  - cont psych meds  - inpatient psychiatry on discharge    Recent Suprapubic catheter related UTI  Neurogenic bladder  -finished treatment    Morbid obesity Body mass index is 39.24 kg/m².    IDDM  -cont insulin  -monitor BG    H/o PE  -cont xarelto    Reported paraplegia    H/o constipation  -bowel regimen    Dispo:  medically cleared for discharge to inpatient psych faciltiy when arranged     DVT Mechanical Prophylaxis:        DVT Pharmacologic Prophylaxis   Medication    rivaroxaban (Xarelto) tab 20 mg               Subjective:      Initial Chief Complaint:  manic episode    C/o sinus congestion.     10 point ROS completed and was negative, except for pertinent positive and negatives stated in subjective.      Objective:      Temp:  [97.6 °F (36.4 °C)-98.9 °F (37.2 °C)] 98.7 °F (37.1 °C)  Pulse:  [] 92  Resp:  [16-20] 16  BP: (105-120)/(51-96) 120/78  SpO2:  [95 %-99 %] 98 %  General:  Alert, no distress  HEENT:  Normocephalic, atraumatic  Cardiac:  Regular rate, regular rhythm  Pulmonary:  Clear to auscultation bilaterally, respirations unlabored  Gastrointestinal:  Soft, non-tender, normal bowel sounds  Musculoskeletal:  No joint swelling  Extremities:  mild b/l LE erythema  Neurologic:  nonfocal  Psychiatric:  Normal affect, calm and appropriate    Intake/Output Summary (Last 24 hours) at 2024 1437  Last data filed at 2024 0800  Gross per 24 hour   Intake 480 ml   Output 4225 ml   Net -3745 ml         Recent Labs   Lab 24  1807 24  0349 24  0626   WBC 10.2 7.1 7.0   HGB 11.1* 11.3* 12.2   HCT 33.3* 32.5* 37.6   .0  194.0 240.0   RBC 3.72* 3.66* 4.11   MCV 89.5 88.8 91.5   MCH 29.8 30.9 29.7   MCHC 33.3 34.8 32.4   RDW 14.7 14.8 14.7   NEPRELIM 4.93 3.58 3.29     Recent Labs   Lab 07/19/24  0921 07/22/24  0352 07/23/24  1807 07/23/24  1811 07/24/24  0349 07/25/24  0626   BUN 10 9 15  --  13 14   CREATSERUM 0.88 0.81 0.77  --  0.82 0.91   CA 9.2 9.0 8.5*  --  9.4 9.5   ALB 4.1 4.0  --   --   --   --    * 131* 133*  --  138 139   K 4.2 4.3 3.8  --  3.6 4.9   CL 94* 97* 98  --  103 102   CO2 28.0 28.0 29.0  --  32.0 32.0   * 260* 111*  --  160* 209*   BILT 0.3 0.2*  --   --   --   --    AST 17 18  --   --   --   --    ALT 21 21  --   --   --   --    ALKPHO 71 76  --   --   --   --    TP 6.8 6.6  --   --   --   --    ETOH  --   --   --  <3  --   --        XR CHEST AP PORTABLE  (CPT=71045)    Result Date: 7/23/2024  CONCLUSION: No radiographic evidence of acute cardiopulmonary abnormality.    [facility]    Dictated by (CST): Obi Gomez MD on 7/23/2024 at 6:47 PM     Finalized by (CST): Obi Gomez MD on 7/23/2024 at 6:47 PM         EKG 12 Lead    Result Date: 7/24/2024  Normal sinus rhythm Possible Septal infarct , age undetermined Abnormal ECG When compared with ECG of 22-JUL-2024 03:46, Questionable change in The axis T wave inversion no longer evident in Inferior leads Confirmed by ANNA SMITH, SARAH ([id number]) on 7/24/2024 7:06:36 AM   Medications:   insulin degludec  30 Units Subcutaneous Daily    clonazePAM  0.5 mg Oral TID    acidophilus  1 each Oral Daily    ARIPiprazole  5 mg Oral Daily    atorvastatin  40 mg Oral Nightly    bethanechol  10 mg Oral TID    divalproex ER  500 mg Oral BID    docusate sodium  100 mg Oral BID    DULoxetine  60 mg Oral Daily    gabapentin  300 mg Oral TID    cetirizine  10 mg Oral Daily    melatonin  3 mg Oral Nightly    metoprolol tartrate  25 mg Oral 2x Daily(Beta Blocker)    montelukast  10 mg Oral Daily    sennosides  17.2 mg Oral BID    rivaroxaban  20 mg Oral Q24H     ceFAZolin  2 g Intravenous Q8H    insulin aspart  1-7 Units Subcutaneous TID CC    fluticasone propionate  2 spray Nasal Daily    QUEtiapine  200 mg Oral BID      PRN Meds:   albuterol    hydrOXYzine    meclizine    lactulose    sodium chloride    acetaminophen    acetaminophen **OR** HYDROcodone-acetaminophen **OR** HYDROcodone-acetaminophen    ondansetron    prochlorperazine    temazepam    glucose **OR** glucose **OR** glucose-vitamin C **OR** dextrose **OR** glucose **OR** glucose **OR** glucose-vitamin C    cyclobenzaprine    miconazole    traZODone    Supplementary Documentation:        MDM High. Time spent on chart/note review, review of labs/imaging, discussion with patient, physical exam, discussion with staff, consultants, coordinating care, writing progress note, discussion of plan of care.      Johan Easton MD

## 2024-07-25 NOTE — CM/SW NOTE
10:40AM  Per chart, pt has DC Order once inpatient psych is arranged.    SW met w/ pt at bedside. Pt is aware she is cleared for DC and feels \"being rushed/pushed out of here.\"    SW explained that DC is pending inpatient psych arranged/accepted. SW informed pt this can take some time. Confirmed it could be today but may not be immediate. Pt able to calm down a bit.    Discussed LTC list w/ pt and confirmed all the providers on the list have accepted pt for LTC services post DC from inpatient psych.    SW informed pt that she can bring list w/ her to psych facility and have psych SW f/up w/ her chosen facility for placement when she is ready for that.    SW informed pt that the accepting LTC facilities will be informed of which psych facility she is accepted at upon DC. Pt is agreeable to this and is even requesting it.    Pt agreed she will review the list w/ her parents and select a facility. Pt confirmed she will notify SW/DC Planner at the psych facility and request they f/up to confirm LTC placement.    Requested Kathe w/ psych notify SW when pt is accepted/arranged w/ inpatient psych facility.    03:05PM  SW was added to chat w/ psych liaison, ZINA Callaway and Dr. Easton. Per chat, pt presents as difficult inpatient psych placement due to pt's incontinence, need for cath, and use of davonte lift.    JAH sent message to all accepting LTC providers via [facility] - requesting response to confirm if they can accept pt w/ PHP arranged. Pending responses.    PLAN: Inpatient Psych vs LTC w/ PHP - pending acceptance, confirmation of LTC facilities that can do PHP      SW/CM to remain available for support and/or discharge planning.       Sera Ley, MSW, LSW [phone]

## 2024-07-25 NOTE — PLAN OF CARE
Medically clear for discharge. D/w psych, SW, and attending for inpt psych placement. Patient will be taken to [facility] in [place], see psych note. Pickup at 2130 by [facility]. Number for report in psych note.     Problem: Patient Centered Care  Goal: Patient preferences are identified and integrated in the patient's plan of care  Description: Interventions:  - What would you like us to know as we care for you?  - Provide timely, complete, and accurate information to patient/family  - Incorporate patient and family knowledge, values, beliefs, and cultural backgrounds into the planning and delivery of care  - Encourage patient/family to participate in care and decision-making at the level they choose  - Honor patient and family perspectives and choices  Outcome: Progressing     Problem: Diabetes/Glucose Control  Goal: Glucose maintained within prescribed range  Description: INTERVENTIONS:  - Monitor Blood Glucose as ordered  - Assess for signs and symptoms of hyperglycemia and hypoglycemia  - Administer ordered medications to maintain glucose within target range  - Assess barriers to adequate nutritional intake and initiate nutrition consult as needed  - Instruct patient on self management of diabetes  Outcome: Progressing       Problem: CARDIOVASCULAR - ADULT  Goal: Maintains optimal cardiac output and hemodynamic stability  Description: INTERVENTIONS:  - Monitor vital signs, rhythm, and trends  - Monitor for bleeding, hypotension and signs of decreased cardiac output  - Evaluate effectiveness of vasoactive medications to optimize hemodynamic stability  - Monitor arterial and/or venous puncture sites for bleeding and/or hematoma  - Assess quality of pulses, skin color and temperature  - Assess for signs of decreased coronary artery perfusion - ex. Angina  - Evaluate fluid balance, assess for edema, trend weights  Outcome: Progressing  Goal: Absence of cardiac arrhythmias or at  baseline  Description: INTERVENTIONS:  - Continuous cardiac monitoring, monitor vital signs, obtain 12 lead EKG if indicated  - Evaluate effectiveness of antiarrhythmic and heart rate control medications as ordered  - Initiate emergency measures for life threatening arrhythmias  - Monitor electrolytes and administer replacement therapy as ordered  Outcome: Progressing     Problem: METABOLIC/FLUID AND ELECTROLYTES - ADULT  Goal: Glucose maintained within prescribed range  Description: INTERVENTIONS:  - Monitor Blood Glucose as ordered  - Assess for signs and symptoms of hyperglycemia and hypoglycemia  - Administer ordered medications to maintain glucose within target range  - Assess barriers to adequate nutritional intake and initiate nutrition consult as needed  - Instruct patient on self management of diabetes  Outcome: Progressing     Problem: SKIN/TISSUE INTEGRITY - ADULT  Goal: Incision(s), wounds(s) or drain site(s) healing without S/S of infection  Description: INTERVENTIONS:  - Assess and document risk factors for pressure ulcer development  - Assess and document skin integrity  - Assess and document dressing/incision, wound bed, drain sites and surrounding tissue  - Implement wound care per orders  - Initiate isolation precautions as appropriate  - Initiate Pressure Ulcer prevention bundle as indicated  Outcome: Progressing

## 2024-07-25 NOTE — PROGRESS NOTES
[facility] met with patient to discuss inpatient psych treatment. Patient with rapid, tangential speech, stating she wants to go to group therapy at inpatient psych. Discussed PHP as an alternative option as they offer group therapy and medication management on an outpatient basis, however after speaking with SW it is unclear if there will be a LTC willing to accommodate.     Reviewed the rights of individuals receiving mental health and developmental disability services with the patient. Pt signed voluntary and rights. Voluntary and rights scanned into chart and submitted to [facility] per protocol.    Inpatient psych:    -Patient is not on IV antibiotics, will be switched to oral upon discharge per Dr Easton.   -Pt has suprapubic cath.   -CPAP.   -Not independent w ADLs, uses a davonte   -Pt is incontinent   -Isolation     [facility]: No appropriate bed today  [place]: unable to accommodate  [facility]: unable to accommodate cath  [place]: [facility] is full today. [facility] in [place] unable to accommodate due to CPAP -pt would need 1:1. Intake recommended referring pt tomorrow if placement is still needed.  [facility]: Willing to review. Faxed packet to [phone].    [facility]. The accepting MD physician is Dr. Bryson and received # for nurse to nurse report .     PCS form completed. [facility] confirmed pickup for 0930pm. [facility] for transport placed on top of pt chart.     Change of status to be sent to [facility] upon psychiatric discharge.     GURINDER Bynum  [id number]

## 2024-07-26 NOTE — CM/SW NOTE
Per chart, pt was transferred to [facility] in [place] yesterday evening.    SW notified accepting LTC providers via [facility]. Pt was informed yesterday to have psych facility SW f/up w/ her chosen facility when she is ready to DC from them.      Sera Ley, MSW, LSW [id number]

## 2024-07-26 NOTE — DISCHARGE PLANNING
I called and gave report to nurse Daily at [facility] psych facility. Patient's physical and history were relayed to nursing staff and included past medical history and diagnosis. Patient will be discharging at 9:30pm as arranged by psychiatry department. Phone number of primary nurse provided for follow up questions.     Code status: Full  Patient takes medication whole  Patient's mobility status is: maximum assistance  Medical device going with patient? Suprapubic mark catheter    Pamella IZAGUIRRE, Discharge Leader [phone]

## 2024-08-03 ENCOUNTER — APPOINTMENT (OUTPATIENT)
Dept: ULTRASOUND IMAGING | Facility: HOSPITAL | Age: 54
End: 2024-08-03
Attending: EMERGENCY MEDICINE
Payer: MEDICARE

## 2024-08-03 ENCOUNTER — APPOINTMENT (OUTPATIENT)
Dept: GENERAL RADIOLOGY | Facility: HOSPITAL | Age: 54
End: 2024-08-03
Attending: EMERGENCY MEDICINE
Payer: MEDICARE

## 2024-08-03 ENCOUNTER — HOSPITAL ENCOUNTER (EMERGENCY)
Facility: HOSPITAL | Age: 54
Discharge: HOME | End: 2024-08-04
Attending: EMERGENCY MEDICINE
Payer: MEDICARE

## 2024-08-03 DIAGNOSIS — Z87.898 HISTORY OF DIARRHEA: Primary | ICD-10-CM

## 2024-08-03 DIAGNOSIS — Z86.59 HISTORY OF BIPOLAR DISORDER: ICD-10-CM

## 2024-08-03 DIAGNOSIS — R82.81 PYURIA: ICD-10-CM

## 2024-08-03 DIAGNOSIS — Z86.718 HISTORY OF DVT (DEEP VEIN THROMBOSIS): ICD-10-CM

## 2024-08-03 LAB
ALBUMIN SERPL-MCNC: 4.2 G/DL (ref 3.2–4.8)
ALP LIVER SERPL-CCNC: 82 U/L
ALT SERPL-CCNC: 19 U/L
ANION GAP SERPL CALC-SCNC: 8 MMOL/L (ref 0–18)
AST SERPL-CCNC: 21 U/L (ref ?–34)
BASOPHILS # BLD AUTO: 0.08 X10(3) UL (ref 0–0.2)
BASOPHILS NFR BLD AUTO: 0.7 %
BILIRUB DIRECT SERPL-MCNC: <0.1 MG/DL (ref ?–0.3)
BILIRUB SERPL-MCNC: 0.2 MG/DL (ref 0.3–1.2)
BILIRUB UR QL: NEGATIVE
BUN BLD-MCNC: 9 MG/DL (ref 9–23)
BUN/CREAT SERPL: 12.3 (ref 10–20)
CALCIUM BLD-MCNC: 9.8 MG/DL (ref 8.7–10.4)
CHLORIDE SERPL-SCNC: 98 MMOL/L (ref 98–112)
CK SERPL-CCNC: 128 U/L
CLARITY UR: CLEAR
CO2 SERPL-SCNC: 26 MMOL/L (ref 21–32)
COLOR UR: COLORLESS
CREAT BLD-MCNC: 0.73 MG/DL
DEPRECATED RDW RBC AUTO: 45.4 FL (ref 35.1–46.3)
EGFRCR SERPLBLD CKD-EPI 2021: 98 ML/MIN/1.73M2 (ref 60–?)
EOSINOPHIL # BLD AUTO: 0.26 X10(3) UL (ref 0–0.7)
EOSINOPHIL NFR BLD AUTO: 2.2 %
ERYTHROCYTE [DISTWIDTH] IN BLOOD BY AUTOMATED COUNT: 14.1 % (ref 11–15)
FLUAV + FLUBV RNA SPEC NAA+PROBE: NEGATIVE
FLUAV + FLUBV RNA SPEC NAA+PROBE: NEGATIVE
GLUCOSE BLD-MCNC: 164 MG/DL (ref 70–99)
GLUCOSE BLDC GLUCOMTR-MCNC: 161 MG/DL (ref 70–99)
GLUCOSE UR-MCNC: NORMAL MG/DL
HCT VFR BLD AUTO: 37.5 %
HGB BLD-MCNC: 12.5 G/DL
IMM GRANULOCYTES # BLD AUTO: 0.05 X10(3) UL (ref 0–1)
IMM GRANULOCYTES NFR BLD: 0.4 %
KETONES UR-MCNC: NEGATIVE MG/DL
LEUKOCYTE ESTERASE UR QL STRIP.AUTO: 500
LYMPHOCYTES # BLD AUTO: 3.58 X10(3) UL (ref 1–4)
LYMPHOCYTES NFR BLD AUTO: 30.7 %
MCH RBC QN AUTO: 29 PG (ref 26–34)
MCHC RBC AUTO-ENTMCNC: 33.3 G/DL (ref 31–37)
MCV RBC AUTO: 87 FL
MONOCYTES # BLD AUTO: 0.54 X10(3) UL (ref 0.1–1)
MONOCYTES NFR BLD AUTO: 4.6 %
NEUTROPHILS # BLD AUTO: 7.17 X10 (3) UL (ref 1.5–7.7)
NEUTROPHILS # BLD AUTO: 7.17 X10(3) UL (ref 1.5–7.7)
NEUTROPHILS NFR BLD AUTO: 61.4 %
OSMOLALITY SERPL CALC.SUM OF ELEC: 276 MOSM/KG (ref 275–295)
PH UR: 6.5 [PH] (ref 5–8)
PLATELET # BLD AUTO: 321 10(3)UL (ref 150–450)
POTASSIUM SERPL-SCNC: 3.9 MMOL/L (ref 3.5–5.1)
PROT SERPL-MCNC: 7.1 G/DL (ref 5.7–8.2)
PROT UR-MCNC: NEGATIVE MG/DL
RBC # BLD AUTO: 4.31 X10(6)UL
RSV RNA SPEC NAA+PROBE: NEGATIVE
SARS-COV-2 RNA RESP QL NAA+PROBE: NOT DETECTED
SODIUM SERPL-SCNC: 132 MMOL/L (ref 136–145)
SP GR UR STRIP: <1.005 (ref 1–1.03)
TROPONIN I SERPL HS-MCNC: <3 NG/L
UROBILINOGEN UR STRIP-ACNC: NORMAL
VALPROATE SERPL-MCNC: 14.2 UG/ML (ref 50–100)
WBC # BLD AUTO: 11.7 X10(3) UL (ref 4–11)

## 2024-08-03 PROCEDURE — 0241U SARS-COV-2/FLU A AND B/RSV BY PCR (GENEXPERT): CPT | Performed by: EMERGENCY MEDICINE

## 2024-08-03 PROCEDURE — 96374 THER/PROPH/DIAG INJ IV PUSH: CPT

## 2024-08-03 PROCEDURE — 80076 HEPATIC FUNCTION PANEL: CPT | Performed by: EMERGENCY MEDICINE

## 2024-08-03 PROCEDURE — 82962 GLUCOSE BLOOD TEST: CPT

## 2024-08-03 PROCEDURE — 81001 URINALYSIS AUTO W/SCOPE: CPT | Performed by: EMERGENCY MEDICINE

## 2024-08-03 PROCEDURE — 71045 X-RAY EXAM CHEST 1 VIEW: CPT | Performed by: EMERGENCY MEDICINE

## 2024-08-03 PROCEDURE — 87086 URINE CULTURE/COLONY COUNT: CPT | Performed by: EMERGENCY MEDICINE

## 2024-08-03 PROCEDURE — 99285 EMERGENCY DEPT VISIT HI MDM: CPT

## 2024-08-03 PROCEDURE — 96361 HYDRATE IV INFUSION ADD-ON: CPT

## 2024-08-03 PROCEDURE — 93971 EXTREMITY STUDY: CPT | Performed by: EMERGENCY MEDICINE

## 2024-08-03 PROCEDURE — 84484 ASSAY OF TROPONIN QUANT: CPT | Performed by: EMERGENCY MEDICINE

## 2024-08-03 PROCEDURE — 80164 ASSAY DIPROPYLACETIC ACD TOT: CPT | Performed by: EMERGENCY MEDICINE

## 2024-08-03 PROCEDURE — 93005 ELECTROCARDIOGRAM TRACING: CPT

## 2024-08-03 PROCEDURE — 73590 X-RAY EXAM OF LOWER LEG: CPT | Performed by: EMERGENCY MEDICINE

## 2024-08-03 PROCEDURE — 87186 SC STD MICRODIL/AGAR DIL: CPT | Performed by: EMERGENCY MEDICINE

## 2024-08-03 PROCEDURE — 87077 CULTURE AEROBIC IDENTIFY: CPT | Performed by: EMERGENCY MEDICINE

## 2024-08-03 PROCEDURE — 80048 BASIC METABOLIC PNL TOTAL CA: CPT | Performed by: EMERGENCY MEDICINE

## 2024-08-03 PROCEDURE — 82550 ASSAY OF CK (CPK): CPT | Performed by: EMERGENCY MEDICINE

## 2024-08-03 PROCEDURE — 85025 COMPLETE CBC W/AUTO DIFF WBC: CPT | Performed by: EMERGENCY MEDICINE

## 2024-08-03 PROCEDURE — 93010 ELECTROCARDIOGRAM REPORT: CPT

## 2024-08-03 RX ORDER — DROPERIDOL 2.5 MG/ML
1.25 INJECTION, SOLUTION INTRAMUSCULAR; INTRAVENOUS ONCE
Status: COMPLETED | OUTPATIENT
Start: 2024-08-03 | End: 2024-08-03

## 2024-08-03 RX ORDER — VALPROIC ACID 250 MG/1
500 CAPSULE, LIQUID FILLED ORAL ONCE
Status: COMPLETED | OUTPATIENT
Start: 2024-08-03 | End: 2024-08-03

## 2024-08-03 RX ORDER — GRANULES FOR ORAL 3 G/1
3 POWDER ORAL ONCE
Status: COMPLETED | OUTPATIENT
Start: 2024-08-03 | End: 2024-08-03

## 2024-08-03 RX ORDER — METHOCARBAMOL 100 MG/ML
1000 INJECTION, SOLUTION INTRAMUSCULAR; INTRAVENOUS ONCE
Status: DISCONTINUED | OUTPATIENT
Start: 2024-08-03 | End: 2024-08-04

## 2024-08-03 RX ORDER — DICYCLOMINE HCL 20 MG
20 TABLET ORAL ONCE
Status: COMPLETED | OUTPATIENT
Start: 2024-08-03 | End: 2024-08-03

## 2024-08-03 NOTE — ED PROVIDER NOTES
Patient Seen in: [facility] Emergency Department    History     Chief Complaint   Patient presents with    Diarrhea     Stated Complaint: Diarrhea     HPI    52 yo F with PMH transverse myelitis c/b neurogenic bladder and paraplegia, DM, VTE on xarelto, bipolar on VPA presenting via EMS from [facility] with c/o diarrhea. Patient eventually with myriad of complaints including LLE pain questionably sustained while at psychiatric facility and diffuse myalgias. EMR reviewed, with history of manipulative behavior noted.    Past Medical History:    Bipolar 1 disorder (HCC)    Dental caries    Diabetes (HCC)    MIKE (generalized anxiety disorder)    High blood pressure    History of diverticulitis of colon    Manipulative behavior    Neurogenic bladder    Obesity (BMI 30-39.9)    Paraplegia (HCC)    Sepsis following intra-abdominal surgery (HCC)    Serotonin syndrome    Sleep apnea    Suprapubic catheter (HCC)    Transverse myelitis (HCC)       Past Surgical History:   Procedure Laterality Date    Arthroscopy of joint unlisted      knee          Cholecystectomy      Part removal colon w end colostomy          Tonsillectomy              Family History   Adopted: Yes       Social History     Socioeconomic History    Marital status: Single   Tobacco Use    Smoking status: Former     Current packs/day: 1.00     Average packs/day: 1 pack/day for 5.0 years (5.0 ttl pk-yrs)     Types: Cigarettes    Tobacco comments:     quit 2006   Vaping Use    Vaping status: Never Used   Substance and Sexual Activity    Alcohol use: No    Drug use: No     Social Determinants of Health     Food Insecurity: No Food Insecurity (2024)    Food Insecurity     Food Insecurity: Never true   Transportation Needs: No Transportation Needs (2024)    Transportation Needs     Lack of Transportation: No   Housing Stability: Low Risk  (2024)    Housing Stability     Housing Instability: No       Review of Systems :    Constitutional: As per HPI  Gastrointestinal: Negative for vomiting and abdominal pain. (+) diarrhea.    Positive for stated complaint: Diarrhea  Other systems are as noted in HPI.  Constitutional and vital signs reviewed.      All other systems reviewed and negative except as noted above.    PSFH elements reviewed from today and agreed except as otherwise stated in HPI.    Physical Exam     ED Triage Vitals [08/03/24 1629]   BP (!) 147/91   Pulse 100   Resp 22   Temp 97.6 °F (36.4 °C)   Temp src    SpO2 98 %   O2 Device None (Room air)       Current:BP (!) 147/91   Pulse 100   Temp 97.6 °F (36.4 °C)   Resp 22   SpO2 98%         Physical Exam   Constitutional: No distress. Obese. Anxious.  HEENT: MMM.  Head: Normocephalic.   Eyes: No injection.   Cardiovascular: RRR; LLE with 2+ DP/PT pulses.  Pulmonary/Chest: Effort normal. CTAB.  Abdominal: Soft. Nontender.  Musculoskeletal: No gross deformity.  Neurological: Alert.   Skin: Skin is warm.   Psychiatric: Cooperative.  Nursing note and vitals reviewed.        ED Course     Labs Reviewed   BASIC METABOLIC PANEL (8) - Abnormal; Notable for the following components:       Result Value    Glucose 164 (*)     Sodium 132 (*)     All other components within normal limits   HEPATIC FUNCTION PANEL (7) - Abnormal; Notable for the following components:    Bilirubin, Total 0.2 (*)     All other components within normal limits   VALPROIC ACID, (DEPAKENE) - Abnormal; Notable for the following components:    Valproic Acid 14.2 (*)     All other components within normal limits   URINALYSIS, ROUTINE - Abnormal; Notable for the following components:    Urine Color Colorless (*)     Spec Gravity <1.005 (*)     Blood Urine Trace (*)     Nitrite Urine 2+ (*)     Leukocyte Esterase Urine 500 (*)     Bacteria Urine Rare (*)     All other components within normal limits   POCT GLUCOSE - Abnormal; Notable for the following components:    POC Glucose  161 (*)     All other components  within normal limits   CBC W/ DIFFERENTIAL - Abnormal; Notable for the following components:    WBC 11.7 (*)     All other components within normal limits   CK CREATINE KINASE (NOT CREATININE) - Normal   TROPONIN I HIGH SENSITIVITY - Normal   SARS-COV-2/FLU A AND B/RSV BY PCR (GENEXPERT) - Normal    Narrative:     This test is intended for the qualitative detection and differentiation of SARS-CoV-2, influenza A, influenza B, and respiratory syncytial virus (RSV) viral RNA in nasopharyngeal or nares swabs from individuals suspected of respiratory viral infection consistent with COVID-19 by their healthcare provider. Signs and symptoms of respiratory viral infection due to SARS-CoV-2, influenza, and RSV can be similar.    Test performed using the Xpert Xpress SARS-CoV-2/FLU/RSV (real time RT-PCR)  assay on the [employer]_ instrument, [employer], [place] [address].   This test is being used under the Food and Drug Administration's Emergency Use Authorization.    The authorized Fact Sheet for Healthcare Providers for this assay is available upon request from the laboratory.   CBC WITH DIFFERENTIAL WITH PLATELET    Narrative:     The following orders were created for panel order CBC With Differential With Platelet.  Procedure                               Abnormality         Status                     ---------                               -----------         ------                     CBC W/ DIFFERENTIAL[[id number]]          Abnormal            Final result                 Please view results for these tests on the individual orders.   RAINBOW DRAW LAVENDER   RAINBOW DRAW LIGHT GREEN   RAINBOW DRAW BLUE   URINE CULTURE, ROUTINE     EKG    Rate, intervals and axes as noted on EKG Report.  Rate: 112  Rhythm: Sinus Rhythm  Reading: sinus tach 112 without PRIMO as independently interpreted by myself           XR CHEST AP PORTABLE  (CPT=71045)    Result Date: 8/3/2024  PROCEDURE: XR CHEST AP PORTABLE  (CPT=71045) TIME: 1907  hours.   COMPARISON: [facility], XR CHEST AP PORTABLE (CPT=71045), 7/23/2024, 6:21 PM.  [facility], XR CHEST AP PORTABLE (CPT=71045), 7/19/2024, 10:23 AM.  [facility], XR CHEST AP PORTABLE (CPT=71045), 5/10/2024, 9:24 PM.  INDICATIONS: Shortness of breath.  TECHNIQUE:   Single view.   FINDINGS:  CARDIAC/MEDIAST: Borderline cardiomegaly.  Slightly tortuous thoracic aorta.  Left chest wall port again identified stable in position.  LUNGS/PLEURA:  No focal opacity.  No pleural effusion.  No pneumothorax. OTHER:  Degenerative change thoracic spine.         CONCLUSION: No acute cardiopulmonary disease.    Dictated by (CST): Shaun Kiran MD on 8/03/2024 at 7:40 PM     Finalized by (CST): Shaun Kiran MD on 8/03/2024 at 7:42 PM          XR TIBIA + FIBULA (2 VIEWS), LEFT (CPT=73590)    Result Date: 8/3/2024  PROCEDURE: XR TIBIA + FIBULA (2 VIEWS), LEFT (CPT=73590)  COMPARISON: None.  INDICATIONS: Left anterior lower leg pain.  TECHNIQUE: 2 views were obtained.   FINDINGS:  BONES: Osteopenia.  No evidence of acute fracture or dislocation.  There appears to be severe degenerative change in the knee particularly in the medial compartment.  Mild periosteal thickening of the tibia and fibula may be chronic and appears nonaggressive. SOFT TISSUES: Negative. No visible soft tissue swelling. EFFUSION: None visible. OTHER: Negative.         CONCLUSION:   No acute fracture or dislocation.  Osteopenia.  Left knee osteoarthritis most significant and severe in the medial compartment.    Dictated by (CST): Shaun Kiran MD on 8/03/2024 at 7:37 PM     Finalized by (CST): Shaun Kiran MD on 8/03/2024 at 7:40 PM        Preliminary Radiology Report  [facility], [facility]  [phone] - Phone    [facility]    NAME: MICHAEL RICH    DATE OF EXAM: 08/03/2024  Patient No:  [id number]  Physician:  NURIA ^2  YOB: 1970    Past Medical History  (entered by Technologist):    Reason For Exam (entered by Technologist):  lle pain and swelling  Other Notes (entered by Technologist): LT PTV DVT   *PT is limited with cooperation. peros not vis d/t swelling    Additional Information (per [facility]):        Ultrasound venous Doppler left lower extremity    IMPRESSION:  Suboptimal due to patient body habitus and limited cooperation.    Positive for DVT with occlusive thrombus within 1 of 2 posterior tibial calf vein.  No other DVT identified although peroneal calf vein not seen.      The results were faxed/finalized only at 12:59 AM ET. If you would like to discuss this case directly, please call [phone] (extension [phone]).  If you can't reach me at this number, do not leave a voicemail.  Please call [phone] ext [phone] and ask for the next available Radiologist.  ED Course as of 08/04/24 0000  ------------------------------------------------------------  Time: 08/03 1757  Comment: Patient with ongoing c/o diarrhea though without same noted upon multiple attempts to collect/clean patient.  ------------------------------------------------------------  Time: 08/03 1846  Comment: Patient with ?LLE pain - will obtain XR/US.       MDM   DIFFERENTIAL DIAGNOSIS: After history and physical exam differential diagnosis includes but is not limited to myalgias, electrolyte derangement, fracture.    Pulse ox: 98%:Normal on RA, as independently interpreted by myself    Medical Decision Making  Evaluation for diarrhea though without evidence for same throughout prolonged ED course in setting of bipolar with recent psychiatric admission and seeming preoccupation/attempt to not return to SNF UA noted without evidence for sepsis in normotensive patient without leukocytosis for which fosfomycin initiated and culture sent. Sonography with distal DVT in setting of eventual c/o LLE pain withtou NVI and nonacute radiography on xarelto therapy for which patient transitioned to  eliquis in patient without hypoxia/respiratory distress -  stable for discharge to SNF as graciously facilitated by JASON Ayoub.    Problems Addressed:  History of bipolar disorder: chronic illness or injury  History of diarrhea: chronic illness or injury  History of DVT (deep vein thrombosis): chronic illness or injury  Pyuria: acute illness or injury    Amount and/or Complexity of Data Reviewed  Independent Historian: EMS     Details: Collateral history obtained from EMS  External Data Reviewed: labs, radiology, ECG and notes.     Details: 7/23/2024 labs/CXR/EKG/ED note reviewed  Labs: ordered. Decision-making details documented in ED Course.  Radiology: ordered and independent interpretation performed. Decision-making details documented in ED Course.     Details: CXR without obvious pneumothorax as independently interpreted by myself    ECG/medicine tests: ordered and independent interpretation performed. Decision-making details documented in ED Course.     Details: CXR without obvious pneumothorax as independently interpreted by myself  Discussion of management or test interpretation with external provider(s): Case d/w JASON Ayoub    Risk  Prescription drug management.      I was wearing at minimum a facemask and eye protection throughout this encounter with handwashing performed prior and after patient evaluation without personal hand/facial/oropharyngeal contact and gloves worn throughout encounter. See note and/or contact this provider for further PPE details.    Disposition and Plan     Clinical Impression:  1. History of diarrhea    2. History of bipolar disorder    3. Pyuria    4. History of DVT (deep vein thrombosis)        Disposition:  Discharge    Follow-up:  Brian Bob MD  [address]  [facility] [address]  [phone]    Call  For followup and re-evaluation.      Medications Prescribed:  Discharge Medication List as of 8/4/2024  1:56 AM        START taking these medications    Details   apixaban 5  MG Oral Tab Take 2 tabs (10mg) by mouth twice daily for 7 days, then take 1 tab (5mg) by mouth twice daily thereafter.  Discontinue other blood thinners., Print, Disp-74 tablet, R-0

## 2024-08-03 NOTE — ED INITIAL ASSESSMENT (HPI)
Patient presents to ED via EMS with c/o diarrhea all day. Patient is from [facility]. Patient states she has some lower abdominal pain. Patient is feeling nausea, denies vomiting. Recent psych admission per patient somewhere in [place].

## 2024-08-04 ENCOUNTER — HOSPITAL ENCOUNTER (EMERGENCY)
Facility: HOSPITAL | Age: 54
Discharge: HOME | End: 2024-08-05
Attending: EMERGENCY MEDICINE
Payer: MEDICARE

## 2024-08-04 VITALS
DIASTOLIC BLOOD PRESSURE: 92 MMHG | HEART RATE: 121 BPM | OXYGEN SATURATION: 98 % | SYSTOLIC BLOOD PRESSURE: 134 MMHG | RESPIRATION RATE: 19 BRPM | TEMPERATURE: 98 F

## 2024-08-04 DIAGNOSIS — K08.89 TOOTHACHE: Primary | ICD-10-CM

## 2024-08-04 DIAGNOSIS — Z00.8 ENCOUNTER FOR PSYCHOLOGICAL EVALUATION: ICD-10-CM

## 2024-08-04 LAB
AMPHET UR QL SCN: NEGATIVE
ANION GAP SERPL CALC-SCNC: 9 MMOL/L (ref 0–18)
BARBITURATES UR QL SCN: NEGATIVE
BASOPHILS # BLD AUTO: 0.04 X10(3) UL (ref 0–0.2)
BASOPHILS NFR BLD AUTO: 0.3 %
BENZODIAZ UR QL SCN: NEGATIVE
BUN BLD-MCNC: 7 MG/DL (ref 9–23)
BUN/CREAT SERPL: 8.5 (ref 10–20)
CALCIUM BLD-MCNC: 9.7 MG/DL (ref 8.7–10.4)
CANNABINOIDS UR QL SCN: NEGATIVE
CHLORIDE SERPL-SCNC: 99 MMOL/L (ref 98–112)
CO2 SERPL-SCNC: 24 MMOL/L (ref 21–32)
COCAINE UR QL: NEGATIVE
CREAT BLD-MCNC: 0.82 MG/DL
CREAT UR-SCNC: 20.1 MG/DL
DEPRECATED RDW RBC AUTO: 44.7 FL (ref 35.1–46.3)
EGFRCR SERPLBLD CKD-EPI 2021: 85 ML/MIN/1.73M2 (ref 60–?)
EOSINOPHIL # BLD AUTO: 0.06 X10(3) UL (ref 0–0.7)
EOSINOPHIL NFR BLD AUTO: 0.5 %
ERYTHROCYTE [DISTWIDTH] IN BLOOD BY AUTOMATED COUNT: 14.1 % (ref 11–15)
ETHANOL SERPL-MCNC: <3 MG/DL (ref ?–3)
FENTANYL UR QL SCN: NEGATIVE
GLUCOSE BLD-MCNC: 229 MG/DL (ref 70–99)
HCT VFR BLD AUTO: 43.1 %
HGB BLD-MCNC: 14.1 G/DL
IMM GRANULOCYTES # BLD AUTO: 0.05 X10(3) UL (ref 0–1)
IMM GRANULOCYTES NFR BLD: 0.4 %
LYMPHOCYTES # BLD AUTO: 2.79 X10(3) UL (ref 1–4)
LYMPHOCYTES NFR BLD AUTO: 23.1 %
MCH RBC QN AUTO: 28.5 PG (ref 26–34)
MCHC RBC AUTO-ENTMCNC: 32.7 G/DL (ref 31–37)
MCV RBC AUTO: 87.1 FL
MDMA UR QL SCN: NEGATIVE
METHADONE UR QL SCN: NEGATIVE
MONOCYTES # BLD AUTO: 0.4 X10(3) UL (ref 0.1–1)
MONOCYTES NFR BLD AUTO: 3.3 %
NEUTROPHILS # BLD AUTO: 8.73 X10 (3) UL (ref 1.5–7.7)
NEUTROPHILS # BLD AUTO: 8.73 X10(3) UL (ref 1.5–7.7)
NEUTROPHILS NFR BLD AUTO: 72.4 %
OPIATES UR QL SCN: NEGATIVE
OSMOLALITY SERPL CALC.SUM OF ELEC: 279 MOSM/KG (ref 275–295)
OXYCODONE UR QL SCN: NEGATIVE
PCP UR QL SCN: NEGATIVE
PLATELET # BLD AUTO: 451 10(3)UL (ref 150–450)
PLATELETS.RETICULATED NFR BLD AUTO: 1.6 % (ref 0–7)
POTASSIUM SERPL-SCNC: 3.8 MMOL/L (ref 3.5–5.1)
RBC # BLD AUTO: 4.95 X10(6)UL
SARS-COV-2 RNA RESP QL NAA+PROBE: NOT DETECTED
SODIUM SERPL-SCNC: 132 MMOL/L (ref 136–145)
WBC # BLD AUTO: 12.1 X10(3) UL (ref 4–11)

## 2024-08-04 PROCEDURE — 99284 EMERGENCY DEPT VISIT MOD MDM: CPT

## 2024-08-04 PROCEDURE — S0119 ONDANSETRON 4 MG: HCPCS | Performed by: EMERGENCY MEDICINE

## 2024-08-04 PROCEDURE — 36415 COLL VENOUS BLD VENIPUNCTURE: CPT

## 2024-08-04 PROCEDURE — 99285 EMERGENCY DEPT VISIT HI MDM: CPT

## 2024-08-04 PROCEDURE — 80048 BASIC METABOLIC PNL TOTAL CA: CPT | Performed by: EMERGENCY MEDICINE

## 2024-08-04 PROCEDURE — 85025 COMPLETE CBC W/AUTO DIFF WBC: CPT | Performed by: EMERGENCY MEDICINE

## 2024-08-04 PROCEDURE — 82077 ASSAY SPEC XCP UR&BREATH IA: CPT | Performed by: EMERGENCY MEDICINE

## 2024-08-04 PROCEDURE — 80307 DRUG TEST PRSMV CHEM ANLYZR: CPT | Performed by: EMERGENCY MEDICINE

## 2024-08-04 RX ORDER — CLONAZEPAM 0.5 MG/1
0.5 TABLET ORAL ONCE
Status: COMPLETED | OUTPATIENT
Start: 2024-08-04 | End: 2024-08-04

## 2024-08-04 RX ORDER — HEPARIN SODIUM (PORCINE) LOCK FLUSH IV SOLN 100 UNIT/ML 100 UNIT/ML
500 SOLUTION INTRAVENOUS ONCE
Status: COMPLETED | OUTPATIENT
Start: 2024-08-04 | End: 2024-08-04

## 2024-08-04 RX ORDER — ONDANSETRON 4 MG/1
4 TABLET, ORALLY DISINTEGRATING ORAL ONCE
Status: COMPLETED | OUTPATIENT
Start: 2024-08-04 | End: 2024-08-04

## 2024-08-04 RX ORDER — HEPARIN SODIUM 5000 [USP'U]/ML
5000 INJECTION, SOLUTION INTRAVENOUS; SUBCUTANEOUS ONCE
Status: DISCONTINUED | OUTPATIENT
Start: 2024-08-04 | End: 2024-08-04

## 2024-08-04 NOTE — ED QUICK NOTES
US at bedside. Patient stated again that she had a bowel movement and US left asking us to re-send the ready. When PCT when into room to clean patient, for the 3rd time patient felt she had soiled herself, and had a clean depends. MD notified.
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
## 2024-08-04 NOTE — ED QUICK NOTES
Rounding Completed    Patient continues to be emotional when medical staff enter room. Patient continues to state \"my heart feels like its going boom boom boom\" and continues to state that her leg is \"killing me, something has to be wrong\". Patient assured that her heart rythym is NL and that her HR is elevated which could be causing the feeling of her racing heart. MD aware.    Awaiting US.      Bed is locked and in lowest position. Call light within reach.

## 2024-08-04 NOTE — ED INITIAL ASSESSMENT (HPI)
Patient arrives via EMS with reports of abd pain/diarrhea. Also reports having an infected tooth. Patient was seen in ED yesterday for same complaints.     Also c/o L leg pain/ L foot pain. Requesting a stool test.

## 2024-08-04 NOTE — ED PROVIDER NOTES
Patient Seen in: [facility] Emergency Department      History     Chief Complaint   Patient presents with    Abdomen/Flank Pain    Dental Problem     Stated Complaint: Tooth pain, abd pain    Subjective:   HPI    53-year-old female presents for evaluation of tooth pain and psychiatric evaluation.  Patient states \"I think I am having a psychotic break.\"  When asked specific symptoms patient like she has difficulty describing her symptoms but states \"I know this is what it feels like when I have one.\" She denies SI and HI, does not answer re: hallucinations. Reports aching to left upper molar for the past few weeks.    Objective:   Past Medical History:    Bipolar 1 disorder (HCC)    Dental caries    Diabetes (HCC)    MIKE (generalized anxiety disorder)    High blood pressure    History of diverticulitis of colon    Manipulative behavior    Neurogenic bladder    Obesity (BMI 30-39.9)    Paraplegia ([facility])    Sepsis following intra-abdominal surgery ([facility])    Serotonin syndrome    Sleep apnea    Suprapubic catheter (HCC)    Transverse myelitis (HCC)              Past Surgical History:   Procedure Laterality Date    Arthroscopy of joint unlisted      knee          Cholecystectomy      Part removal colon w end colostomy      2013    Tonsillectomy                  Social History     Socioeconomic History    Marital status: Single   Tobacco Use    Smoking status: Former     Current packs/day: 1.00     Average packs/day: 1 pack/day for 5.0 years (5.0 ttl pk-yrs)     Types: Cigarettes    Tobacco comments:     quit 2006   Vaping Use    Vaping status: Never Used   Substance and Sexual Activity    Alcohol use: No    Drug use: No     Social Determinants of Health     Food Insecurity: No Food Insecurity (2024)    Food Insecurity     Food Insecurity: Never true   Transportation Needs: No Transportation Needs (2024)    Transportation Needs     Lack of Transportation: No   Housing Stability: Low Risk   (7/23/2024)    Housing Stability     Housing Instability: No              Review of Systems    Positive for stated Chief Complaint: Abdomen/Flank Pain and Dental Problem    Other systems are as noted in HPI.  Constitutional and vital signs reviewed.      All other systems reviewed and negative except as noted above.    Physical Exam     ED Triage Vitals [08/04/24 1351]   /86   Pulse 98   Resp 20   Temp 98 °F (36.7 °C)   Temp src Temporal   SpO2 97 %   O2 Device None (Room air)       Current Vitals:   Vital Signs  BP: 157/84  Pulse: 99  Resp: 19  Temp: 98 °F (36.7 °C)  Temp src: Temporal  MAP (mmHg): (!) 104    Oxygen Therapy  SpO2: 98 %  O2 Device: None (Room air)            Physical Exam  Vitals and nursing note reviewed.   Constitutional:       General: She is not in acute distress.     Appearance: She is well-developed.   HENT:      Head: Normocephalic and atraumatic.   Eyes:      Conjunctiva/sclera: Conjunctivae normal.   Cardiovascular:      Rate and Rhythm: Normal rate and regular rhythm.      Heart sounds: Normal heart sounds.   Pulmonary:      Effort: Pulmonary effort is normal. No respiratory distress.      Breath sounds: Normal breath sounds.   Abdominal:      General: Bowel sounds are normal. There is no distension.      Palpations: Abdomen is soft.      Tenderness: There is no abdominal tenderness. There is no guarding or rebound.   Musculoskeletal:         General: Normal range of motion.      Cervical back: Normal range of motion and neck supple.   Skin:     General: Skin is warm and dry.      Findings: No rash.   Neurological:      General: No focal deficit present.      Mental Status: She is alert and oriented to person, place, and time.               ED Course     Labs Reviewed   BASIC METABOLIC PANEL (8) - Abnormal; Notable for the following components:       Result Value    Glucose 229 (*)     Sodium 132 (*)     BUN 7 (*)     BUN/CREA Ratio 8.5 (*)     All other components within normal  limits   CBC W/ DIFFERENTIAL - Abnormal; Notable for the following components:    WBC 12.1 (*)     .0 (*)     Neutrophil Absolute Prelim 8.73 (*)     Neutrophil Absolute 8.73 (*)     All other components within normal limits   ETHYL ALCOHOL - Normal   RAPID SARS-COV-2 BY PCR - Normal   CBC WITH DIFFERENTIAL WITH PLATELET    Narrative:     The following orders were created for panel order CBC With Differential With Platelet.  Procedure                               Abnormality         Status                     ---------                               -----------         ------                     CBC W/ DIFFERENTIAL[[id number]]          Abnormal            Final result                 Please view results for these tests on the individual orders.   DRUG ABUSE PANEL 11 SCREEN   SCAN SLIDE       MDM        Medical Decision Making  Well-appearing patient, tooth ache without evidence of abscess or infection.  Seen by crisis who spoke with Dr. Qureshi, patient does not meet criteria for inpatient psychiatric admission, does not pose a threat to herself or others, will be cared for at the nursing facility.  Discharged with resources and plan for outpatient follow-up.  Please see crisis note for further detail.    Problems Addressed:  Encounter for psychological evaluation: acute illness or injury  Toothache: acute illness or injury    Amount and/or Complexity of Data Reviewed  Independent Historian: EMS  External Data Reviewed: labs.     Details: Results from yesterday reviewed, patient with nitrates on her UA, given fosfomycin in the emergency department and awaits further culture results.  Low suspicion for acute infection    CBC and BMP stable compared to labs from yesterday  Labs: ordered.     Details: Discussed with sandra Drake, who advises patient will need new lab workup for acceptance to psychiatric facility  Discussion of management or test interpretation with external provider(s): Discussed with  crisis        Disposition and Plan     Clinical Impression:  1. Toothache    2. Encounter for psychological evaluation         Disposition:  Discharge  8/4/2024 11:31 pm    Follow-up:  No follow-up provider specified.  We recommend that you schedule follow up care with a primary care provider within the next three months to obtain basic health screening including reassessment of your blood pressure.      Medications Prescribed:  Current Discharge Medication List

## 2024-08-04 NOTE — ED QUICK NOTES
Rounding Completed    Patient is emotional, continues to state that her left leg and left arm are painful, mainly left leg. MD notified.     Patient with catheter still in place.  Provided re-positioning for comfort care.    Bed is locked and in lowest position. Call light within reach.

## 2024-08-04 NOTE — ED QUICK NOTES
US at bedside. Patient stated that she had a bowel movement and needed to be cleaned up so US left and asked to re-ready patient.

## 2024-08-04 NOTE — ED QUICK NOTES
Case management spoke with patient and came to the agreement that patient was going back to [facility] in [place]. Transport has been set up and ETA is 0100.     Checked on patient, patient is more calm and understands that she is going back to [facility]. Patient on the phone with son who is helping her thru her nerves. Patient respirations are regular and unlabored.     Patient felt as if she had soiled her self again. This RN and PCT turned patient and no diarrhea was seen. Wiped patient and made sure she was dry. Patient was informed that she did not have any diarrhea. MD notified.    Will discontinue port access prior to discharge.

## 2024-08-05 ENCOUNTER — HOSPITAL ENCOUNTER (EMERGENCY)
Facility: HOSPITAL | Age: 54
Discharge: NURSING, REHAB OR LONG-TERM CARE | End: 2024-08-05
Attending: EMERGENCY MEDICINE
Payer: MEDICARE

## 2024-08-05 VITALS
HEIGHT: 60 IN | RESPIRATION RATE: 26 BRPM | SYSTOLIC BLOOD PRESSURE: 147 MMHG | WEIGHT: 250 LBS | DIASTOLIC BLOOD PRESSURE: 119 MMHG | BODY MASS INDEX: 49.08 KG/M2 | HEART RATE: 105 BPM | TEMPERATURE: 98 F | OXYGEN SATURATION: 95 %

## 2024-08-05 VITALS
HEIGHT: 60 IN | OXYGEN SATURATION: 98 % | DIASTOLIC BLOOD PRESSURE: 86 MMHG | SYSTOLIC BLOOD PRESSURE: 110 MMHG | WEIGHT: 250 LBS | HEART RATE: 84 BPM | BODY MASS INDEX: 49.08 KG/M2 | RESPIRATION RATE: 22 BRPM | TEMPERATURE: 98 F

## 2024-08-05 DIAGNOSIS — R11.0 NAUSEA: ICD-10-CM

## 2024-08-05 DIAGNOSIS — R19.7 DIARRHEA, UNSPECIFIED TYPE: Primary | ICD-10-CM

## 2024-08-05 LAB
ATRIAL RATE: 112 BPM
C DIFF TOX B STL QL: NEGATIVE
P AXIS: 55 DEGREES
P-R INTERVAL: 168 MS
Q-T INTERVAL: 338 MS
QRS DURATION: 78 MS
QTC CALCULATION (BEZET): 461 MS
R AXIS: -40 DEGREES
T AXIS: 62 DEGREES
VENTRICULAR RATE: 112 BPM

## 2024-08-05 PROCEDURE — 87015 SPECIMEN INFECT AGNT CONCNTJ: CPT | Performed by: EMERGENCY MEDICINE

## 2024-08-05 PROCEDURE — 99285 EMERGENCY DEPT VISIT HI MDM: CPT

## 2024-08-05 PROCEDURE — 87045 FECES CULTURE AEROBIC BACT: CPT | Performed by: EMERGENCY MEDICINE

## 2024-08-05 PROCEDURE — S0119 ONDANSETRON 4 MG: HCPCS | Performed by: EMERGENCY MEDICINE

## 2024-08-05 PROCEDURE — 87493 C DIFF AMPLIFIED PROBE: CPT | Performed by: EMERGENCY MEDICINE

## 2024-08-05 PROCEDURE — 87046 STOOL CULTR AEROBIC BACT EA: CPT | Performed by: EMERGENCY MEDICINE

## 2024-08-05 PROCEDURE — 99284 EMERGENCY DEPT VISIT MOD MDM: CPT

## 2024-08-05 PROCEDURE — 87427 SHIGA-LIKE TOXIN AG IA: CPT | Performed by: EMERGENCY MEDICINE

## 2024-08-05 RX ORDER — ONDANSETRON 4 MG/1
4 TABLET, ORALLY DISINTEGRATING ORAL ONCE
Status: COMPLETED | OUTPATIENT
Start: 2024-08-05 | End: 2024-08-05

## 2024-08-05 RX ORDER — VANCOMYCIN HYDROCHLORIDE 125 MG/1
125 CAPSULE ORAL ONCE
Status: COMPLETED | OUTPATIENT
Start: 2024-08-05 | End: 2024-08-05

## 2024-08-05 RX ORDER — LORAZEPAM 1 MG/1
0.5 TABLET ORAL ONCE
Status: COMPLETED | OUTPATIENT
Start: 2024-08-05 | End: 2024-08-05

## 2024-08-05 RX ORDER — VANCOMYCIN HYDROCHLORIDE 125 MG/1
125 CAPSULE ORAL 4 TIMES DAILY
Qty: 40 CAPSULE | Refills: 0 | Status: SHIPPED | OUTPATIENT
Start: 2024-08-05 | End: 2024-08-05

## 2024-08-05 NOTE — ED INITIAL ASSESSMENT (HPI)
Arrives via EMS for diarrhea. States she had two episodes and is now concerned for probable infection. Additionally requesting to be evaluated for her existent blood clot in L leg and if her urinary catheter is functioning correctly. Pt was seen in the ER yesterday and discharged.

## 2024-08-05 NOTE — ED QUICK NOTES
1230 Called [facility] to give report, per karen RN who did not disclose name to this writer patient \"is unable to return back to [facility] as  are not here on Sundays\".  Informed RN that per Elsie Banner councelor patient was able to return to [facility] Care.        1240 Report Alexia IZAGUIRRE.

## 2024-08-05 NOTE — ED NOTES
Spoke to Qiana at [facility] to speak about the the plan. She states that the patient does receive her psychiatric medications. Recommendations to the facility is to follow up with the psychiatrist/practitioner in next couple of days as well as re-establish mental health therapy to improve coping skills.

## 2024-08-05 NOTE — CM/SW NOTE
Spoke to the liaison at [facility] she will try and find another placement within their facilities      [facility] called back they want the patient back and the plan is for them to petition and certificate the patient and sent the patient to [facility]    Spoke with patient and she is refusing to go stating \"they are lying they always lying I am not going because they are not telling you the truth\"    Called director of nursing from [facility] who spoke with the patient via phone at bedside    Patient now agreeing to go back to [facility]  Mother was called by patient and  at bedside    [facility] notified   ETA 11:00am  PCS completed    Corina Jorgensen CTL, CCM, MSN    Emergency Room  [facility]  Clinical Transitions Leader  [phone]

## 2024-08-05 NOTE — ED PROVIDER NOTES
Patient Seen in: [facility] Emergency Department      History     Chief Complaint   Patient presents with    Nausea/Vomiting/Diarrhea     Stated Complaint: Diarrhea    Subjective:   HPI    53 year old female with past medical history of bipolar disorder, diabetes, generalized anxiety, hypertension, neurogenic bladder now with chronic suprapubic catheter, history of transverse myelitis and paraplegia , sleep apnea, who returns to the ER shortly after discharge from the ER earlier in the night with complaint of nausea/vomiting and diarrhea.  Patient states that she is at a nursing home where she feels she is not getting ideal care as quickly as she needs it and therefore she called 911 when she is worried about her health.  Right now the patient has diarrhea, is concerned it could be C. difficile, states she is too nauseated to take her pills.  The patient also states she is hungry and would like to eat breakfast.  The patient states she has not had water all night and feels very dehydrated.  Patient is interested in changing nursing homes.  There is a long note from the  that this was discussed on a prior visit and that they would try to place patient in a new facility when office is open on Monday.    Objective:   Past Medical History:    Bipolar 1 disorder (HCC)    Dental caries    Diabetes (HCC)    MIKE (generalized anxiety disorder)    High blood pressure    History of diverticulitis of colon    Manipulative behavior    Neurogenic bladder    Obesity (BMI 30-39.9)    Paraplegia (HCC)    Sepsis following intra-abdominal surgery (HCC)    Serotonin syndrome    Sleep apnea    Suprapubic catheter (HCC)    Transverse myelitis (HCC)              Past Surgical History:   Procedure Laterality Date    Arthroscopy of joint unlisted      knee          Cholecystectomy      Part removal colon w end colostomy          Tonsillectomy                  Social History     Socioeconomic History     Marital status: Single   Tobacco Use    Smoking status: Former     Current packs/day: 1.00     Average packs/day: 1 pack/day for 5.0 years (5.0 ttl pk-yrs)     Types: Cigarettes    Tobacco comments:     quit 2006   Vaping Use    Vaping status: Never Used   Substance and Sexual Activity    Alcohol use: No    Drug use: No     Social Determinants of Health     Food Insecurity: No Food Insecurity (7/23/2024)    Food Insecurity     Food Insecurity: Never true   Transportation Needs: No Transportation Needs (7/23/2024)    Transportation Needs     Lack of Transportation: No   Housing Stability: Low Risk  (7/23/2024)    Housing Stability     Housing Instability: No              Review of Systems    Positive for stated Chief Complaint: Nausea/Vomiting/Diarrhea    Other systems are as noted in HPI.  Constitutional and vital signs reviewed.      All other systems reviewed and negative except as noted above.    Physical Exam     ED Triage Vitals [08/05/24 0524]   /78   Pulse 113   Resp 20   Temp 98.1 °F (36.7 °C)   Temp src Oral   SpO2 97 %   O2 Device        Current Vitals:   Vital Signs  BP: 134/78  Pulse: 113  Resp: 20  Temp: 98.1 °F (36.7 °C)  Temp src: Oral    Oxygen Therapy  SpO2: 97 %            Physical Exam  Vitals and nursing note reviewed.   Constitutional:       General: She is not in acute distress.     Appearance: Normal appearance. She is well-developed. She is not ill-appearing, toxic-appearing or diaphoretic.   HENT:      Head: Normocephalic and atraumatic.   Eyes:      Conjunctiva/sclera: Conjunctivae normal.      Pupils: Pupils are equal, round, and reactive to light.   Cardiovascular:      Rate and Rhythm: Normal rate and regular rhythm.      Pulses: Normal pulses.      Heart sounds: Normal heart sounds. No murmur heard.  Pulmonary:      Effort: Pulmonary effort is normal. No respiratory distress.      Breath sounds: Normal breath sounds. No wheezing.   Abdominal:      General: A surgical scar is  present. There is no distension.      Palpations: Abdomen is soft.      Tenderness: There is no abdominal tenderness. There is no guarding.       Musculoskeletal:         General: No tenderness. Normal range of motion.      Cervical back: Full passive range of motion without pain, normal range of motion and neck supple. No rigidity. Normal range of motion.      Right lower leg: No edema.      Left lower leg: No edema.   Skin:     General: Skin is warm and dry.      Findings: No rash.   Neurological:      Mental Status: She is alert and oriented to person, place, and time.      GCS: GCS eye subscore is 4. GCS verbal subscore is 5. GCS motor subscore is 6.      Sensory: Sensation is intact. No sensory deficit.      Motor: Motor function is intact. No weakness.   Psychiatric:         Attention and Perception: Attention normal.         Mood and Affect: Mood normal.         Behavior: Behavior normal. Behavior is cooperative.           ED Course     Labs Reviewed   C. DIFFICILE(TOXIGENIC)PCR   STOOL CULTURE W/SHIGATOXIN    Narrative:     The following orders were created for panel order Stool Culture W/Shigatoxin.  Procedure                               Abnormality         Status                     ---------                               -----------         ------                     Stool Culture[[id number]]                                    In process                 Shigatoxin[[id number]]                                       In process                   Please view results for these tests on the individual orders.   STOOL CULTURE(P)   SHIGATOXIN                    MDM      Pulse Ox: 97%, Normal, room air      Medications   vancomycin (Vancocin) cap 125 mg (has no administration in time range)   LORazepam (Ativan) tab 0.5 mg (0.5 mg Oral Given 8/5/24 5850)   ondansetron (Zofran-ODT) disintegrating tab 4 mg (4 mg Oral Given 8/5/24 3571)     Patient complaining of having recurrent stools, does have a history of C.  difficile.  Here is not copious liquid, but was able to send sample for testing.  Patient requesting empiric vancomycin and this was given in the ER.  I will also write a prescription for her which she can stop if C. difficile is negative.  The patient was initially advised she will go back to [facility], and then family would work on finding a new place for her.   today advised that she may be able to assist, patient still in ER to see if she will go to a new facility today or will be transferred back to [facility].  She does not meet admission criteria at this time.        Disposition and Plan     Clinical Impression:  1. Diarrhea, unspecified type    2. Nausea         Disposition:  Discharge  8/5/2024  7:47 am    Follow-up:  Brian Bob MD  [address]  [facility] [address]  [phone]    Schedule an appointment as soon as possible for a visit  Call for next available appointment    We recommend that you schedule follow up care with a primary care provider within the next three months to obtain basic health screening including reassessment of your blood pressure.      Medications Prescribed:  Current Discharge Medication List        START taking these medications    Details   vancomycin 125 MG Oral Cap Take 1 capsule (125 mg total) by mouth 4 (four) times daily for 10 days.  Qty: 40 capsule, Refills: 0

## 2024-08-05 NOTE — DISCHARGE INSTRUCTIONS
Follow up with your psychiatrist this week as well as a therapist.     If you need additional support for therapy, please contact In-Home Counseling at [phone] (option 1)    Return to the ED with any new or worsening symptoms.       Use the resources given to you today by crisis.    See primary care and a dentist for follow-up.
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
## 2024-08-07 NOTE — PROGRESS NOTES
ED Culture Callback Results Review    Pharmacist reviewed culture results from ED visit .    Final urine culture positive for Citrobacter werkmanii and Serratia marcescens that is not optimally treated by previously prescribed  fosfomycin  therapy.     No treatment is necessary at this time as culture is suggestive of asymptomatic bacteriuria rather than active infection as discussed with Dr. Pradhan.    Min Berkowitz, PharmD  Emergency Medicine Pharmacist Specialist  08/07/24; 3:04 PM

## 2024-08-20 ENCOUNTER — HOSPITAL ENCOUNTER (INPATIENT)
Facility: HOSPITAL | Age: 54
LOS: 3 days | Discharge: NURSING, REHAB OR LONG-TERM CARE | DRG: 698 | End: 2024-08-24
Attending: EMERGENCY MEDICINE | Admitting: STUDENT IN AN ORGANIZED HEALTH CARE EDUCATION/TRAINING PROGRAM
Payer: MEDICARE

## 2024-08-20 ENCOUNTER — HOSPITAL ENCOUNTER (INPATIENT)
Facility: HOSPITAL | Age: 54
LOS: 3 days | Discharge: NURSING, REHAB OR LONG-TERM CARE | End: 2024-08-24
Attending: EMERGENCY MEDICINE | Admitting: STUDENT IN AN ORGANIZED HEALTH CARE EDUCATION/TRAINING PROGRAM
Payer: MEDICARE

## 2024-08-20 DIAGNOSIS — F33.2 SEVERE EPISODE OF RECURRENT MAJOR DEPRESSIVE DISORDER, WITHOUT PSYCHOTIC FEATURES (HCC): ICD-10-CM

## 2024-08-20 DIAGNOSIS — N30.00 ACUTE CYSTITIS WITHOUT HEMATURIA: Primary | ICD-10-CM

## 2024-08-20 DIAGNOSIS — D72.829 LEUKOCYTOSIS, UNSPECIFIED TYPE: ICD-10-CM

## 2024-08-20 DIAGNOSIS — F31.30 BIPOLAR I DISORDER DEPRESSED WITH ATYPICAL FEATURES (HCC): ICD-10-CM

## 2024-08-20 DIAGNOSIS — F41.1 GENERALIZED ANXIETY DISORDER: ICD-10-CM

## 2024-08-20 LAB
BASOPHILS # BLD AUTO: 0.08 X10(3) UL (ref 0–0.2)
BASOPHILS NFR BLD AUTO: 0.5 %
DEPRECATED RDW RBC AUTO: 44.3 FL (ref 35.1–46.3)
EOSINOPHIL # BLD AUTO: 0.39 X10(3) UL (ref 0–0.7)
EOSINOPHIL NFR BLD AUTO: 2.6 %
ERYTHROCYTE [DISTWIDTH] IN BLOOD BY AUTOMATED COUNT: 14.6 % (ref 11–15)
HCT VFR BLD AUTO: 41.1 %
HGB BLD-MCNC: 13.5 G/DL
IMM GRANULOCYTES # BLD AUTO: 0.08 X10(3) UL (ref 0–1)
IMM GRANULOCYTES NFR BLD: 0.5 %
LYMPHOCYTES # BLD AUTO: 4.33 X10(3) UL (ref 1–4)
LYMPHOCYTES NFR BLD AUTO: 29.2 %
MCH RBC QN AUTO: 27.3 PG (ref 26–34)
MCHC RBC AUTO-ENTMCNC: 32.8 G/DL (ref 31–37)
MCV RBC AUTO: 83.2 FL
MONOCYTES # BLD AUTO: 0.56 X10(3) UL (ref 0.1–1)
MONOCYTES NFR BLD AUTO: 3.8 %
NEUTROPHILS # BLD AUTO: 9.37 X10 (3) UL (ref 1.5–7.7)
NEUTROPHILS # BLD AUTO: 9.37 X10(3) UL (ref 1.5–7.7)
NEUTROPHILS NFR BLD AUTO: 63.4 %
PLATELET # BLD AUTO: 361 10(3)UL (ref 150–450)
RBC # BLD AUTO: 4.94 X10(6)UL
WBC # BLD AUTO: 14.8 X10(3) UL (ref 4–11)

## 2024-08-20 RX ORDER — ONDANSETRON 2 MG/ML
4 INJECTION INTRAMUSCULAR; INTRAVENOUS ONCE
Status: COMPLETED | OUTPATIENT
Start: 2024-08-20 | End: 2024-08-20

## 2024-08-21 ENCOUNTER — APPOINTMENT (OUTPATIENT)
Dept: CT IMAGING | Facility: HOSPITAL | Age: 54
End: 2024-08-21
Attending: INTERNAL MEDICINE
Payer: MEDICARE

## 2024-08-21 ENCOUNTER — APPOINTMENT (OUTPATIENT)
Dept: CT IMAGING | Facility: HOSPITAL | Age: 54
DRG: 698 | End: 2024-08-21
Attending: INTERNAL MEDICINE
Payer: MEDICARE

## 2024-08-21 PROBLEM — N30.00 ACUTE CYSTITIS WITHOUT HEMATURIA: Status: ACTIVE | Noted: 2024-08-21

## 2024-08-21 PROBLEM — D72.829 LEUKOCYTOSIS, UNSPECIFIED TYPE: Status: ACTIVE | Noted: 2024-08-21

## 2024-08-21 PROBLEM — D72.829 LEUKOCYTOSIS: Status: ACTIVE | Noted: 2024-08-21

## 2024-08-21 LAB
ADENOVIRUS F 40/41 PCR: NEGATIVE
ALBUMIN SERPL-MCNC: 4.2 G/DL (ref 3.2–4.8)
ALP LIVER SERPL-CCNC: 91 U/L
ALT SERPL-CCNC: 25 U/L
ANION GAP SERPL CALC-SCNC: 8 MMOL/L (ref 0–18)
ANION GAP SERPL CALC-SCNC: 8 MMOL/L (ref 0–18)
AST SERPL-CCNC: 16 U/L (ref ?–34)
ASTROVIRUS PCR: NEGATIVE
BASOPHILS # BLD AUTO: 0.05 X10(3) UL (ref 0–0.2)
BASOPHILS NFR BLD AUTO: 0.4 %
BILIRUB DIRECT SERPL-MCNC: <0.1 MG/DL (ref ?–0.3)
BILIRUB SERPL-MCNC: 0.3 MG/DL (ref 0.3–1.2)
BILIRUB UR QL: NEGATIVE
BUN BLD-MCNC: 8 MG/DL (ref 9–23)
BUN BLD-MCNC: 8 MG/DL (ref 9–23)
BUN/CREAT SERPL: 10.4 (ref 10–20)
BUN/CREAT SERPL: 11 (ref 10–20)
C CAYETANENSIS DNA SPEC QL NAA+PROBE: NEGATIVE
C DIFF TOX B STL QL: NEGATIVE
CALCIUM BLD-MCNC: 8.7 MG/DL (ref 8.7–10.4)
CALCIUM BLD-MCNC: 9.7 MG/DL (ref 8.7–10.4)
CAMPY SP DNA.DIARRHEA STL QL NAA+PROBE: NEGATIVE
CHLORIDE SERPL-SCNC: 100 MMOL/L (ref 98–112)
CHLORIDE SERPL-SCNC: 106 MMOL/L (ref 98–112)
CO2 SERPL-SCNC: 23 MMOL/L (ref 21–32)
CO2 SERPL-SCNC: 27 MMOL/L (ref 21–32)
CREAT BLD-MCNC: 0.73 MG/DL
CREAT BLD-MCNC: 0.77 MG/DL
CRYPTOSP DNA SPEC QL NAA+PROBE: NEGATIVE
DEPRECATED RDW RBC AUTO: 45.9 FL (ref 35.1–46.3)
EAEC PAA PLAS AGGR+AATA ST NAA+NON-PRB: NEGATIVE
EC STX1+STX2 + H7 FLIC SPEC NAA+PROBE: NEGATIVE
EGFRCR SERPLBLD CKD-EPI 2021: 92 ML/MIN/1.73M2 (ref 60–?)
EGFRCR SERPLBLD CKD-EPI 2021: 98 ML/MIN/1.73M2 (ref 60–?)
ENTAMOEBA HISTOLYTICA PCR: NEGATIVE
EOSINOPHIL # BLD AUTO: 0.43 X10(3) UL (ref 0–0.7)
EOSINOPHIL NFR BLD AUTO: 3.6 %
EPEC EAE GENE STL QL NAA+NON-PROBE: NEGATIVE
ERYTHROCYTE [DISTWIDTH] IN BLOOD BY AUTOMATED COUNT: 14.6 % (ref 11–15)
ETEC LTA+ST1A+ST1B TOX ST NAA+NON-PROBE: NEGATIVE
GIARDIA LAMBLIA PCR: NEGATIVE
GLUCOSE BLD-MCNC: 103 MG/DL (ref 70–99)
GLUCOSE BLD-MCNC: 169 MG/DL (ref 70–99)
GLUCOSE BLDC GLUCOMTR-MCNC: 103 MG/DL (ref 70–99)
GLUCOSE BLDC GLUCOMTR-MCNC: 105 MG/DL (ref 70–99)
GLUCOSE BLDC GLUCOMTR-MCNC: 112 MG/DL (ref 70–99)
GLUCOSE BLDC GLUCOMTR-MCNC: 127 MG/DL (ref 70–99)
GLUCOSE BLDC GLUCOMTR-MCNC: 63 MG/DL (ref 70–99)
GLUCOSE BLDC GLUCOMTR-MCNC: 77 MG/DL (ref 70–99)
GLUCOSE BLDC GLUCOMTR-MCNC: 82 MG/DL (ref 70–99)
GLUCOSE BLDC GLUCOMTR-MCNC: 97 MG/DL (ref 70–99)
GLUCOSE BLDC GLUCOMTR-MCNC: 97 MG/DL (ref 70–99)
GLUCOSE UR-MCNC: NORMAL MG/DL
HCT VFR BLD AUTO: 38.8 %
HGB BLD-MCNC: 12.3 G/DL
IMM GRANULOCYTES # BLD AUTO: 0.05 X10(3) UL (ref 0–1)
IMM GRANULOCYTES NFR BLD: 0.4 %
KETONES UR-MCNC: NEGATIVE MG/DL
LACTATE SERPL-SCNC: 3.3 MMOL/L (ref 0.5–2)
LACTATE SERPL-SCNC: 3.4 MMOL/L (ref 0.5–2)
LACTATE SERPL-SCNC: 5.2 MMOL/L (ref 0.5–2)
LEUKOCYTE ESTERASE UR QL STRIP.AUTO: 500
LIPASE SERPL-CCNC: 26 U/L (ref 12–53)
LYMPHOCYTES # BLD AUTO: 3.23 X10(3) UL (ref 1–4)
LYMPHOCYTES NFR BLD AUTO: 26.8 %
MAGNESIUM SERPL-MCNC: 1.6 MG/DL (ref 1.6–2.6)
MCH RBC QN AUTO: 27.3 PG (ref 26–34)
MCHC RBC AUTO-ENTMCNC: 31.7 G/DL (ref 31–37)
MCV RBC AUTO: 86 FL
MONOCYTES # BLD AUTO: 0.48 X10(3) UL (ref 0.1–1)
MONOCYTES NFR BLD AUTO: 4 %
NEUTROPHILS # BLD AUTO: 7.82 X10 (3) UL (ref 1.5–7.7)
NEUTROPHILS # BLD AUTO: 7.82 X10(3) UL (ref 1.5–7.7)
NEUTROPHILS NFR BLD AUTO: 64.8 %
NOROVIRUS GI/GII PCR: NEGATIVE
OSMOLALITY SERPL CALC.SUM OF ELEC: 279 MOSM/KG (ref 275–295)
OSMOLALITY SERPL CALC.SUM OF ELEC: 286 MOSM/KG (ref 275–295)
P SHIGELLOIDES DNA STL QL NAA+PROBE: NEGATIVE
PH UR: 6.5 [PH] (ref 5–8)
PLATELET # BLD AUTO: 318 10(3)UL (ref 150–450)
POTASSIUM SERPL-SCNC: 3.5 MMOL/L (ref 3.5–5.1)
POTASSIUM SERPL-SCNC: 3.7 MMOL/L (ref 3.5–5.1)
PROT SERPL-MCNC: 7 G/DL (ref 5.7–8.2)
PROT UR-MCNC: 20 MG/DL
RBC # BLD AUTO: 4.51 X10(6)UL
ROTAVIRUS A PCR: NEGATIVE
SALMONELLA DNA SPEC QL NAA+PROBE: NEGATIVE
SAPOVIRUS PCR: NEGATIVE
SHIGELLA SP+EIEC IPAH ST NAA+NON-PROBE: NEGATIVE
SODIUM SERPL-SCNC: 135 MMOL/L (ref 136–145)
SODIUM SERPL-SCNC: 137 MMOL/L (ref 136–145)
SP GR UR STRIP: 1 (ref 1–1.03)
UROBILINOGEN UR STRIP-ACNC: NORMAL
V CHOLERAE DNA SPEC QL NAA+PROBE: NEGATIVE
VIBRIO DNA SPEC NAA+PROBE: NEGATIVE
WBC # BLD AUTO: 12.1 X10(3) UL (ref 4–11)
WBC #/AREA URNS AUTO: >50 /HPF
WBC CLUMPS UR QL AUTO: PRESENT /HPF
YERSINIA DNA SPEC NAA+PROBE: NEGATIVE

## 2024-08-21 PROCEDURE — 74176 CT ABD & PELVIS W/O CONTRAST: CPT | Performed by: INTERNAL MEDICINE

## 2024-08-21 PROCEDURE — 90792 PSYCH DIAG EVAL W/MED SRVCS: CPT | Performed by: OTHER

## 2024-08-21 PROCEDURE — 99223 1ST HOSP IP/OBS HIGH 75: CPT | Performed by: HOSPITALIST

## 2024-08-21 RX ORDER — FLUTICASONE PROPIONATE 50 MCG
2 SPRAY, SUSPENSION (ML) NASAL DAILY
Status: DISCONTINUED | OUTPATIENT
Start: 2024-08-21 | End: 2024-08-24

## 2024-08-21 RX ORDER — ONDANSETRON 4 MG/1
8 TABLET, ORALLY DISINTEGRATING ORAL EVERY 8 HOURS PRN
Status: DISCONTINUED | OUTPATIENT
Start: 2024-08-21 | End: 2024-08-24

## 2024-08-21 RX ORDER — INSULIN DEGLUDEC 100 U/ML
25 INJECTION, SOLUTION SUBCUTANEOUS NIGHTLY
Status: DISCONTINUED | OUTPATIENT
Start: 2024-08-21 | End: 2024-08-24

## 2024-08-21 RX ORDER — DEXTROSE MONOHYDRATE 25 G/50ML
50 INJECTION, SOLUTION INTRAVENOUS
Status: DISCONTINUED | OUTPATIENT
Start: 2024-08-21 | End: 2024-08-24

## 2024-08-21 RX ORDER — NICOTINE POLACRILEX 4 MG
15 LOZENGE BUCCAL
Status: DISCONTINUED | OUTPATIENT
Start: 2024-08-21 | End: 2024-08-24

## 2024-08-21 RX ORDER — TRAMADOL HYDROCHLORIDE 50 MG/1
50 TABLET ORAL EVERY 6 HOURS PRN
Status: DISCONTINUED | OUTPATIENT
Start: 2024-08-21 | End: 2024-08-24

## 2024-08-21 RX ORDER — MONTELUKAST SODIUM 10 MG/1
10 TABLET ORAL DAILY
Status: DISCONTINUED | OUTPATIENT
Start: 2024-08-21 | End: 2024-08-21

## 2024-08-21 RX ORDER — PSEUDOEPHEDRINE HCL 30 MG
100 TABLET ORAL 2 TIMES DAILY
Status: DISCONTINUED | OUTPATIENT
Start: 2024-08-21 | End: 2024-08-21

## 2024-08-21 RX ORDER — BISACODYL 10 MG
10 SUPPOSITORY, RECTAL RECTAL
Status: DISCONTINUED | OUTPATIENT
Start: 2024-08-21 | End: 2024-08-24

## 2024-08-21 RX ORDER — NICOTINE POLACRILEX 4 MG
30 LOZENGE BUCCAL
Status: DISCONTINUED | OUTPATIENT
Start: 2024-08-21 | End: 2024-08-24

## 2024-08-21 RX ORDER — METHENAMINE HIPPURATE 1000 MG/1
0.5 TABLET ORAL 2 TIMES DAILY WITH MEALS
Status: DISCONTINUED | OUTPATIENT
Start: 2024-08-21 | End: 2024-08-21

## 2024-08-21 RX ORDER — ALBUTEROL SULFATE 90 UG/1
2 INHALANT RESPIRATORY (INHALATION) EVERY 6 HOURS PRN
Status: DISCONTINUED | OUTPATIENT
Start: 2024-08-21 | End: 2024-08-24

## 2024-08-21 RX ORDER — SENNOSIDES 8.6 MG
17.2 TABLET ORAL NIGHTLY PRN
Status: DISCONTINUED | OUTPATIENT
Start: 2024-08-21 | End: 2024-08-21

## 2024-08-21 RX ORDER — CYCLOBENZAPRINE HCL 5 MG
10 TABLET ORAL 3 TIMES DAILY PRN
Status: DISCONTINUED | OUTPATIENT
Start: 2024-08-21 | End: 2024-08-21

## 2024-08-21 RX ORDER — OXYBUTYNIN CHLORIDE 5 MG/1
5 TABLET, EXTENDED RELEASE ORAL DAILY
Status: DISCONTINUED | OUTPATIENT
Start: 2024-08-21 | End: 2024-08-21

## 2024-08-21 RX ORDER — TRAMADOL HYDROCHLORIDE 50 MG/1
50 TABLET ORAL EVERY 6 HOURS PRN
COMMUNITY

## 2024-08-21 RX ORDER — QUETIAPINE FUMARATE 50 MG/1
50 TABLET, FILM COATED ORAL NIGHTLY
COMMUNITY
End: 2024-08-24

## 2024-08-21 RX ORDER — LOPERAMIDE HCL 2 MG
2 CAPSULE ORAL 4 TIMES DAILY PRN
Status: DISCONTINUED | OUTPATIENT
Start: 2024-08-21 | End: 2024-08-24

## 2024-08-21 RX ORDER — ACETAMINOPHEN 325 MG/1
650 TABLET ORAL EVERY 6 HOURS PRN
Status: DISCONTINUED | OUTPATIENT
Start: 2024-08-21 | End: 2024-08-24

## 2024-08-21 RX ORDER — POLYETHYLENE GLYCOL 3350 17 G/17G
17 POWDER, FOR SOLUTION ORAL DAILY PRN
Status: DISCONTINUED | OUTPATIENT
Start: 2024-08-21 | End: 2024-08-21

## 2024-08-21 RX ORDER — CLONAZEPAM 0.5 MG/1
0.5 TABLET ORAL NIGHTLY PRN
Status: DISCONTINUED | OUTPATIENT
Start: 2024-08-21 | End: 2024-08-24

## 2024-08-21 RX ORDER — CYCLOBENZAPRINE HCL 5 MG
10 TABLET ORAL 3 TIMES DAILY PRN
Status: DISCONTINUED | OUTPATIENT
Start: 2024-08-21 | End: 2024-08-24

## 2024-08-21 RX ORDER — SODIUM PHOSPHATE, DIBASIC AND SODIUM PHOSPHATE, MONOBASIC 7; 19 G/230ML; G/230ML
1 ENEMA RECTAL ONCE AS NEEDED
Status: DISCONTINUED | OUTPATIENT
Start: 2024-08-21 | End: 2024-08-24

## 2024-08-21 RX ORDER — ACETAMINOPHEN 500 MG
500 TABLET ORAL EVERY 4 HOURS PRN
Status: DISCONTINUED | OUTPATIENT
Start: 2024-08-21 | End: 2024-08-21

## 2024-08-21 RX ORDER — BETHANECHOL CHLORIDE 5 MG
10 TABLET ORAL DAILY
Status: DISCONTINUED | OUTPATIENT
Start: 2024-08-21 | End: 2024-08-21

## 2024-08-21 RX ORDER — ATORVASTATIN CALCIUM 40 MG/1
40 TABLET, FILM COATED ORAL NIGHTLY
Status: DISCONTINUED | OUTPATIENT
Start: 2024-08-21 | End: 2024-08-24

## 2024-08-21 RX ORDER — ONDANSETRON 2 MG/ML
4 INJECTION INTRAMUSCULAR; INTRAVENOUS EVERY 6 HOURS PRN
Status: DISCONTINUED | OUTPATIENT
Start: 2024-08-21 | End: 2024-08-24

## 2024-08-21 RX ORDER — LACTOBACILLUS ACIDOPHILUS 500MM CELL
1 CAPSULE ORAL 2 TIMES DAILY
Status: DISCONTINUED | OUTPATIENT
Start: 2024-08-21 | End: 2024-08-21

## 2024-08-21 RX ORDER — ICOSAPENT ETHYL 1 G/1
2 CAPSULE ORAL 4 TIMES DAILY
Status: DISCONTINUED | OUTPATIENT
Start: 2024-08-21 | End: 2024-08-21 | Stop reason: RX

## 2024-08-21 RX ORDER — ERGOCALCIFEROL 1.25 MG/1
50000 CAPSULE, LIQUID FILLED ORAL
Status: DISCONTINUED | OUTPATIENT
Start: 2024-08-26 | End: 2024-08-24

## 2024-08-21 RX ORDER — ARIPIPRAZOLE 5 MG/1
5 TABLET ORAL DAILY
Status: DISCONTINUED | OUTPATIENT
Start: 2024-08-21 | End: 2024-08-24

## 2024-08-21 RX ORDER — BETHANECHOL CHLORIDE 5 MG
10 TABLET ORAL 3 TIMES DAILY
Status: DISCONTINUED | OUTPATIENT
Start: 2024-08-21 | End: 2024-08-24

## 2024-08-21 RX ORDER — PROCHLORPERAZINE EDISYLATE 5 MG/ML
5 INJECTION INTRAMUSCULAR; INTRAVENOUS EVERY 8 HOURS PRN
Status: DISCONTINUED | OUTPATIENT
Start: 2024-08-21 | End: 2024-08-24

## 2024-08-21 RX ORDER — LORAZEPAM 1 MG/1
1 TABLET ORAL 3 TIMES DAILY
Status: ON HOLD | COMMUNITY
End: 2024-09-20

## 2024-08-21 RX ORDER — PHENAZOPYRIDINE HYDROCHLORIDE 100 MG/1
100 TABLET, FILM COATED ORAL 3 TIMES DAILY PRN
Status: DISCONTINUED | OUTPATIENT
Start: 2024-08-21 | End: 2024-08-24

## 2024-08-21 RX ORDER — DULOXETIN HYDROCHLORIDE 30 MG/1
30 CAPSULE, DELAYED RELEASE ORAL DAILY
Status: DISCONTINUED | OUTPATIENT
Start: 2024-08-22 | End: 2024-08-23

## 2024-08-21 RX ORDER — POLYETHYLENE GLYCOL 3350 17 G/17G
17 POWDER, FOR SOLUTION ORAL DAILY PRN
Status: DISCONTINUED | OUTPATIENT
Start: 2024-08-21 | End: 2024-08-24

## 2024-08-21 RX ORDER — DIVALPROEX SODIUM 500 MG/1
500 TABLET, FILM COATED, EXTENDED RELEASE ORAL 2 TIMES DAILY
Status: DISCONTINUED | OUTPATIENT
Start: 2024-08-21 | End: 2024-08-24

## 2024-08-21 RX ORDER — MULTIPLE VITAMINS W/ MINERALS TAB 9MG-400MCG
1 TAB ORAL DAILY
Status: DISCONTINUED | OUTPATIENT
Start: 2024-08-21 | End: 2024-08-24

## 2024-08-21 RX ORDER — MECLIZINE HCL 12.5 MG 12.5 MG/1
12.5 TABLET ORAL 3 TIMES DAILY PRN
Status: DISCONTINUED | OUTPATIENT
Start: 2024-08-21 | End: 2024-08-24

## 2024-08-21 RX ORDER — MAGNESIUM OXIDE 400 MG/1
400 TABLET ORAL ONCE
Status: COMPLETED | OUTPATIENT
Start: 2024-08-21 | End: 2024-08-21

## 2024-08-21 RX ORDER — QUETIAPINE FUMARATE 100 MG/1
100 TABLET, FILM COATED ORAL NIGHTLY
Status: DISCONTINUED | OUTPATIENT
Start: 2024-08-21 | End: 2024-08-24

## 2024-08-21 RX ORDER — SODIUM CHLORIDE 9 MG/ML
INJECTION, SOLUTION INTRAVENOUS CONTINUOUS
Status: DISCONTINUED | OUTPATIENT
Start: 2024-08-21 | End: 2024-08-21

## 2024-08-21 RX ORDER — LACTULOSE 10 G/15ML
20 SOLUTION ORAL EVERY 6 HOURS PRN
Status: DISCONTINUED | OUTPATIENT
Start: 2024-08-21 | End: 2024-08-24

## 2024-08-21 RX ORDER — SENNOSIDES 8.6 MG
17.2 TABLET ORAL 2 TIMES DAILY
Status: DISCONTINUED | OUTPATIENT
Start: 2024-08-21 | End: 2024-08-21

## 2024-08-21 NOTE — PHYSICAL THERAPY NOTE
Physical Therapy Discharge Note    Orders received via functional mobility screen and chart reviewed. Patient is discharged from Physical Therapy services secondary to patient is at baseline mobility level, demonstrates no need for skilled therapy services with limited rehab potential. Per chart review, patient has been functionally bedbound since 2016. Physical Therapy to sign off at this time, patient to return to LTC once medically cleared.    Bette Bella, PT, DPT  [facility]  Rehab Services - Physical Therapy  [id number]

## 2024-08-21 NOTE — ED PROVIDER NOTES
Patient Seen in: [facility] Emergency Department    History     Chief Complaint   Patient presents with    Urinary Symptoms       HPI    53-year-old female presents ER with complaint of weakness and body aches.  Patient is bedbound with some musculoskeletal deconditioning.  Patient complaining of headache weakness and chills.  Patient has a past medical history of indwelling suprapubic catheter secondary to neurologic bladder.  Patient states that she is been septic multiple times and feels like \"I feel came septic now.\"  Patient afebrile in the ER.    History reviewed.   Past Medical History:    Bipolar 1 disorder (HCC)    Dental caries    Diabetes (HCC)    MIKE (generalized anxiety disorder)    High blood pressure    History of diverticulitis of colon    Manipulative behavior    Neurogenic bladder    Obesity (BMI 30-39.9)    Paraplegia (HCC)    Sepsis following intra-abdominal surgery (HCC)    Serotonin syndrome    Sleep apnea    Suprapubic catheter (HCC)    Transverse myelitis (HCC)       History reviewed.   Past Surgical History:   Procedure Laterality Date    Arthroscopy of joint unlisted      knee          Cholecystectomy      Part removal colon w end colostomy      2013    Tonsillectomy           Medications :  (Not in a hospital admission)       Family History   Adopted: Yes       Smoking Status:   Social History     Socioeconomic History    Marital status: Single   Tobacco Use    Smoking status: Former     Current packs/day: 1.00     Average packs/day: 1 pack/day for 5.0 years (5.0 ttl pk-yrs)     Types: Cigarettes    Tobacco comments:     quit    Vaping Use    Vaping status: Never Used   Substance and Sexual Activity    Alcohol use: No    Drug use: No       ROS  All pertinent positives for the review of systems are mentioned in the HPI  All other organ systems are reviewed and are negative.    Constitutional and vital signs reviewed.      Social History and Family History elements  reviewed from today, pertinent positives to the presenting problem noted.    Physical Exam     ED Triage Vitals [08/20/24 2206]   BP (!) 143/94   Pulse 100   Resp 20   Temp 98.6 °F (37 °C)   Temp src Oral   SpO2 98 %   O2 Device None (Room air)       All measures to prevent infection transmission during my interaction with the patient were taken. The patient was already wearing a droplet mask on my arrival to the room. Personal protective equipment including droplet mask, eye protection, and gloves were worn throughout the duration of the exam.  Handwashing was performed prior to and after the exam.  Stethoscope and any equipment used during my examination was cleaned with super sani-cloth germicidal wipes following the exam.     Physical Exam  Vitals and nursing note reviewed.   Constitutional:       Appearance: She is well-developed.   HENT:      Head: Normocephalic and atraumatic.      Right Ear: External ear normal.      Left Ear: External ear normal.      Nose: Nose normal.   Eyes:      Conjunctiva/sclera: Conjunctivae normal.      Pupils: Pupils are equal, round, and reactive to light.   Cardiovascular:      Rate and Rhythm: Normal rate and regular rhythm.      Heart sounds: Normal heart sounds.   Pulmonary:      Effort: Pulmonary effort is normal.      Breath sounds: Normal breath sounds.   Abdominal:      General: Bowel sounds are normal.      Palpations: Abdomen is soft.      Comments: Florentino catheter in place in the suprapubic area of the abdomen   Musculoskeletal:         General: Normal range of motion.      Cervical back: Normal range of motion and neck supple.   Skin:     General: Skin is warm and dry.   Neurological:      Mental Status: She is alert and oriented to person, place, and time.      Deep Tendon Reflexes: Reflexes are normal and symmetric.   Psychiatric:         Behavior: Behavior normal.         Thought Content: Thought content normal.         Judgment: Judgment normal.         ED Course         Labs Reviewed   BASIC METABOLIC PANEL (8) - Abnormal; Notable for the following components:       Result Value    Glucose 103 (*)     Sodium 135 (*)     BUN 8 (*)     All other components within normal limits   CBC WITH DIFFERENTIAL WITH PLATELET - Abnormal; Notable for the following components:    WBC 14.8 (*)     Neutrophil Absolute Prelim 9.37 (*)     Neutrophil Absolute 9.37 (*)     Lymphocyte Absolute 4.33 (*)     All other components within normal limits   URINALYSIS WITH CULTURE REFLEX - Abnormal; Notable for the following components:    Clarity Urine Turbid (*)     Blood Urine 1+ (*)     Protein Urine 20 (*)     Nitrite Urine 2+ (*)     Leukocyte Esterase Urine 500 (*)     WBC Urine >50 (*)     RBC Urine 3-5 (*)     Bacteria Urine 1+ (*)     WBC Clump Present (*)     All other components within normal limits   HEPATIC FUNCTION PANEL (7) - Normal   LIPASE - Normal   LACTIC ACID, PLASMA   C. DIFFICILE(TOXIGENIC)PCR   BLOOD CULTURE   BLOOD CULTURE   URINE CULTURE, ROUTINE   BLOOD CULTURE   BLOOD CULTURE         Imaging Results Available and Reviewed while in ED: No results found.  ED Medications Administered:   Medications   cefTRIAXone (Rocephin) 2 g in sodium chloride 0.9% 100 mL IVPB-ADDV (has no administration in time range)   sodium chloride 0.9 % IV bolus 2,763 mL (has no administration in time range)   sodium chloride 0.9 % IV bolus 1,000 mL (1,000 mL Intravenous New Bag 8/20/24 2351)   ondansetron (Zofran) 4 MG/2ML injection 4 mg (4 mg Intravenous Given 8/20/24 2350)         MDM     Vitals:    08/20/24 2206 08/20/24 2345 08/21/24 0030   BP: (!) 143/94 109/73 134/70   Pulse: 100 76 106   Resp: 20 21 17   Temp: 98.6 °F (37 °C)     TempSrc: Oral     SpO2: 98% 93% 96%   Weight: 92.1 kg     Height: 152.4 cm (5')       *I personally reviewed and interpreted all ED vitals.  I also personally reviewed all labs and imaging if ordered    Pulse Ox: 93%, Room air, Normal     Monitor Interpretation:   normal  sinus rhythm    Differential Diagnosis/ Diagnostic Considerations: Sepsis, UTI, leukocytosis, dehydration.    Medical Record Review: I personally reviewed available prior medical records for any recent pertinent discharge summaries, testing, and procedures and reviewed those reports.    Complicating Factors: The patient already has does not have any pertinent problems on file. to contribute to the complexity of this ED evaluation.    Medical Decision Making  This 53-year-old female presents ER with complaint of bodyaches chills.  Patient with a positive leukocytosis and positive urinary tract infection.  Patient's culture from 8/3/2024 showed sensitivity to Rocephin.  Patient given 2 g Rocephin IV.  Patient given 30 mg/kg sepsis bolus.  Awaiting lactic acid level.  Patient SIRS criteria positive.  Patient will be admitted for further evaluation.  Patient also complaining of diarrhea.  C. difficile PCR sent as well.  Hospitalist notified of the admission.    I completed the sepsis reassessment exam at 0051.    Total Critical Care Time: 31 minutes including time spent examining and re-evaluating the patient, ordering and reviewing laboratory tests, documenting, reviewing previous records, obtaining information from the family, and speaking with consultants, admitting doctors, nurses and medics and excludes any time spent on procedures.         Problems Addressed:  Acute cystitis without hematuria: acute illness or injury  Leukocytosis, unspecified type: acute illness or injury    Amount and/or Complexity of Data Reviewed  External Data Reviewed: labs and notes.     Details: Outpatient notes reviewed.  Patient had a urine culture on 8/3/2024 which showed bacteria sensitive to Rocephin.  Labs: ordered. Decision-making details documented in ED Course.        Condition upon leaving the department: Stable    Disposition and Plan     Clinical Impression:  1. Acute cystitis without hematuria    2. Leukocytosis, unspecified  type        Disposition:  Admit    Follow-up:  No follow-up provider specified.    Medications Prescribed:  Current Discharge Medication List          Hospital Problems       Present on Admission  Date Reviewed: 1/18/2024            ICD-10-CM Noted POA    * (Principal) Acute cystitis without hematuria N30.00 8/21/2024 Unknown    Leukocytosis D72.829 8/21/2024 Unknown

## 2024-08-21 NOTE — OCCUPATIONAL THERAPY NOTE
Pt is a LTC resident. Will complete orders. No need for acute OT skilled services.      Wanda Riojas OT  [facility]  Inpatient Rehabilitation  Occupational Therapy  [phone]

## 2024-08-21 NOTE — ED QUICK NOTES
Orders for admission, patient is aware of plan and ready to go upstairs. Any questions, please call ED RN Willow at extension [phone].     Patient Covid vaccination status: Unvaccinated     COVID Test Ordered in ED: None    COVID Suspicion at Admission: N/A    Running Infusions:      Mental Status/LOC at time of transport: Aox4    Other pertinent information:   CIWA score: N/A   NIH score:  N/A

## 2024-08-21 NOTE — PROGRESS NOTES
Order placed for Psych Liaison for PHQ-4 = 8 (Anxiety = 4, Depression = 4).  [facility].    Catalina Fernandez LCSW  [facility]

## 2024-08-21 NOTE — H&P
[facility]  part of [facility]    History & Physical    Clari Rosa Patient Status:  Inpatient    1970 MRN [id number]   Location [facility] 5SW/SE Attending Tamara Jackson, DO   Hosp Day # 0 PCP Brian Bob MD     Date:  2024  Date of Admission:  2024    History provided by:patient  Chief Complaint:     Chief Complaint   Patient presents with    Urinary Symptoms       HPI:   Clari Rosa is a(n) 53 year old female. Pt is 54 yo with suprapubic catheter, DM, Anxiety, depression, neurogenic bladder , bipolar, htn, diverticulitis, neema, transverse myelitis who p/w fatigue. Pt has had sepsis in the past from uti and feels similar to past admissions. She reports tremors in her hands and legs that started last night. She also reports anxiety and depression which are long standing. She denies SI. She was hospitalized in July with cellulitis and was discharged to inpatient psych due to worsening anxiety. She was in the ER on Aug 5th and urine cx showed Citrobacter and serratia sensitive to gent and she was given fosfomycin. In the ER, WBC 12 with left shift and Lactic acid 5.2. She received 3 L sepsis bolus in ER and additional 2 L bolus. BP is improved. Pt dx'ed with DVT aug 3 2024 and is on xarelto.    ROS is positive for cp, sob, n/v, c/d. She denies blood in the stool. She reports dry cough as well.     Pt is sleeping during my interview but wakes easily with tactile stimulation.   She denies fever at home.   She lives in [facility].     She was started on ceftriaxone in ER.     History     Past Medical History:    Bipolar 1 disorder (HCC)    Dental caries    Diabetes (HCC)    MIKE (generalized anxiety disorder)    High blood pressure    History of diverticulitis of colon    Manipulative behavior    Neurogenic bladder    Obesity (BMI 30-39.9)    Paraplegia (HCC)    Sepsis following intra-abdominal surgery (HCC)    Serotonin syndrome    Sleep apnea    Suprapubic  catheter (HCC)    Transverse myelitis (HCC)     Past Surgical History:   Procedure Laterality Date    Arthroscopy of joint unlisted      knee          Cholecystectomy      Part removal colon w end colostomy      2013    Tonsillectomy       Family History   Adopted: Yes     Social History:  Social History     Socioeconomic History    Marital status: Single   Tobacco Use    Smoking status: Former     Current packs/day: 1.00     Average packs/day: 1 pack/day for 5.0 years (5.0 ttl pk-yrs)     Types: Cigarettes    Tobacco comments:     quit    Vaping Use    Vaping status: Never Used   Substance and Sexual Activity    Alcohol use: No    Drug use: No     Social Determinants of Health     Food Insecurity: No Food Insecurity (2024)    Food Insecurity     Food Insecurity: Never true   Transportation Needs: No Transportation Needs (2024)    Transportation Needs     Lack of Transportation: No   Housing Stability: Low Risk  (2024)    Housing Stability     Housing Instability: No     Allergies/Medications:   Allergies:   Allergies   Allergen Reactions    Pcn [Penicillins] HIVES    Sulfa Antibiotics HIVES    Radiology Contrast Iodinated Dyes DIZZINESS     Medications Prior to Admission   Medication Sig    QUEtiapine 50 MG Oral Tab Take 1 tablet (50 mg total) by mouth nightly.    LORazepam 1 MG Oral Tab Take 1 tablet (1 mg total) by mouth in the morning, at noon, and at bedtime.    traMADol 50 MG Oral Tab Take 1 tablet (50 mg total) by mouth every 6 (six) hours as needed for Pain.    apixaban 5 MG Oral Tab Take 2 tabs (10mg) by mouth twice daily for 7 days, then take 1 tab (5mg) by mouth twice daily thereafter.  Discontinue other blood thinners. (Patient taking differently: Take 1 tablet (5 mg total) by mouth 2 (two) times daily.)    insulin aspart 100 Units/mL Subcutaneous Solution Pen-injector Inject 10 Units into the skin 3 (three) times daily with meals.    phenazopyridine 100 MG Oral Tab Take 1  tablet (100 mg total) by mouth 3 (three) times daily as needed for Pain.    metoprolol tartrate 25 MG Oral Tab Take 1 tablet (25 mg total) by mouth daily.    traZODone 50 MG Oral Tab Take 1 tablet (50 mg total) by mouth nightly as needed for Sleep.    QUEtiapine 200 MG Oral Tab Take 0.5 tablets (100 mg total) by mouth 3 (three) times daily.    Nystatin 587109 UNIT/GM External Powder Apply 1 Application topically as needed for Dry Skin. Under breast and groin area    acetaminophen 325 MG Oral Tab Take 2 tablets (650 mg total) by mouth every 6 (six) hours as needed for Pain.    bethanechol 10 MG Oral Tab Take 1 tablet (10 mg total) by mouth daily.    divalproex Sodium  MG Oral Tablet 24 Hr Take 1 tablet (500 mg total) by mouth 2 (two) times daily.    ondansetron 4 MG Oral Tablet Dispersible Take 2 tablets (8 mg total) by mouth every 8 (eight) hours as needed for Nausea.    metFORMIN  MG Oral Tablet 24 Hr Take 2 tablets (1,000 mg total) by mouth 2 (two) times daily with meals.    clonazePAM 0.5 MG Oral Tab Take 1 tablet (0.5 mg total) by mouth 2 (two) times daily. (Patient taking differently: Take 1 tablet (0.5 mg total) by mouth at bedtime.)    insulin glargine 100 UNIT/ML Subcutaneous Solution Inject 56 Units into the skin nightly.    [] cephalexin 500 MG Oral Cap Take 1 capsule (500 mg total) by mouth 3 (three) times daily.    fluticasone propionate 50 MCG/ACT Nasal Suspension 2 sprays by Nasal route daily.    ARIPiprazole 5 MG Oral Tab Take 1 tablet (5 mg total) by mouth daily.    Cranberry 500 MG Oral Cap 1 capsule PO daily    Insulin Pen Needle 32G X 4 MM Does not apply Misc May substitute if not on insurance formulary    polyethylene glycol, PEG 3350, 17 g Oral Powd Pack Take 17 g by mouth daily as needed (If no bowel movement in last 24 hours). (Patient not taking: Reported on 2024)    lactulose 20 GM/30ML Oral Solution Take 30 mL (20 g total) by mouth every 6 (six) hours as needed  (constipation).    hydrOXYzine 10 MG Oral Tab Take 1 tablet (10 mg total) by mouth 3 (three) times daily as needed for Anxiety. (Patient not taking: Reported on 8/21/2024)    docusate sodium 100 MG Oral Cap Take 100 mg by mouth 2 (two) times daily.    meclizine 12.5 MG Oral Tab Take 1 tablet (12.5 mg total) by mouth 3 (three) times daily as needed for Dizziness. (Patient not taking: Reported on 8/21/2024)    DULoxetine HCl 20 MG Oral Capsule Delayed Release Sprinkle Take 20 mg by mouth daily.    gabapentin 50 MG Oral Tab Take 300 mg by mouth 3 (three) times daily. (Patient not taking: Reported on 8/21/2024)    glucagon (BAQSIMI ONE PACK) 3 MG/DOSE Nasal nasal powder 3 mg by Nasal route once as needed for Low blood glucose.    atorvastatin 40 MG Oral Tab Take 1 tablet (40 mg total) by mouth nightly. (Patient not taking: Reported on 8/21/2024)    sennosides 8.6 MG Oral Tab Take 2 tablets (17.2 mg total) by mouth 2 (two) times daily. (Patient not taking: Reported on 8/21/2024)    Icosapent Ethyl 1 g Oral Cap Take 2 capsules by mouth 4 (four) times daily.    rivaroxaban (XARELTO) 20 MG Oral Tab Take 1 tablet (20 mg total) by mouth daily. (Patient not taking: Reported on 8/21/2024)    Melatonin 3 MG Oral Cap Take 1 capsule (3 mg total) by mouth at bedtime.    ergocalciferol 1.25 MG (64110 UT) Oral Cap Take 1 capsule (50,000 Units total) by mouth every 7 days. Every monday    oxybutynin ER 5 MG Oral Tablet 24 Hr Take 1 tablet (5 mg total) by mouth daily. (Patient not taking: Reported on 8/21/2024)    methenamine 1 g Oral Tab Take 0.5 tablets (0.5 g total) by mouth 2 (two) times daily with meals. (Patient not taking: Reported on 8/21/2024)    loratadine 10 MG Oral Tab Take 1 tablet (10 mg total) by mouth in the morning, at noon, and at bedtime. (Patient not taking: Reported on 8/21/2024)    cyclobenzaprine 10 MG Oral Tab Take 1 tablet (10 mg total) by mouth 3 (three) times daily as needed for Muscle spasms. (Patient not  taking: Reported on 8/21/2024)    albuterol 108 (90 Base) MCG/ACT Inhalation Aero Soln Inhale 2 puffs into the lungs every 6 (six) hours as needed for Wheezing or Shortness of Breath.    Insulin Aspart 100 UNIT/ML Subcutaneous Solution Cartridge SS: 150-200 2units, 201-250 4 units, 251-300 6 units, 301-350 8 units, 351-400 10 units, over 401 call md    Multiple Vitamins-Minerals (THERA-M) Oral Tab Take 1 tablet by mouth daily.    Montelukast Sodium 10 MG Oral Tab Take 1 tablet (10 mg total) by mouth daily. (Patient not taking: Reported on 8/21/2024)    Acidophilus/Pectin Oral Cap Take 1 capsule by mouth 2 (two) times daily. (Patient not taking: Reported on 8/21/2024)       Review of Systems:   Constitutional: negative  Eyes: negative for blurry vision  Ears, nose, mouth, throat, and face: negative  Respiratory: negative for cough and dyspnea on exertion  Cardiovascular: negative for chest pain and dyspnea  Gastrointestinal: negative for constipation, diarrhea, melena, nausea and vomiting  Genitourinary:negative for dysuria and hematuria  Integument/breast: negative for rash  Hematologic/lymphatic: negative  Musculoskeletal: negative for back pain   Neurological: negative for dizziness and headaches  Behavioral/Psych: negative for depression  Allergic/Immunologic: negative    Physical Exam:   Vital Signs:  Blood pressure 123/79, pulse 87, temperature 97.7 °F (36.5 °C), temperature source Oral, resp. rate 18, height 5' (1.524 m), weight 194 lb 8 oz (88.2 kg), SpO2 100%.     General appearance: alert, appears stated age and cooperative, lethargic at times   Head: Normocephalic, without obvious abnormality, atraumatic  Eyes: conjunctivae/corneas clear. PERRL, EOM's intact.  Throat: lips, mucosa, and tongue normal; teeth and gums normal  Neck: no adenopathy, no carotid bruit, no JVD, supple, symmetrical, trachea midline  Pulmonary:  clear to auscultation bilaterally  Cardiovascular: S1, S2 normal, no murmur, click, rub  or gallop, regular rate and rhythm  Abdominal: soft, tender in LLQ, no guarding, pos bs's, sp catheter in place, no evidence of cellulitis   Extremities: trace edema, no erythema   Pulses: 2+ and symmetric  Skin: Skin color, texture, turgor normal. No rashes or lesions  Neurologic: Alert and oriented X 3  Psychiatric: calm      Cervical Papanicolaou to be done in MD's office    Results:     Lab Results   Component Value Date    WBC 12.1 (H) 08/21/2024    HGB 12.3 08/21/2024    HCT 38.8 08/21/2024    .0 08/21/2024    CREATSERUM 0.77 08/21/2024    BUN 8 (L) 08/21/2024     08/21/2024    K 3.7 08/21/2024     08/21/2024    CO2 23.0 08/21/2024     (H) 08/21/2024    CA 8.7 08/21/2024    ALB 4.2 08/20/2024    ALKPHO 91 08/20/2024    BILT 0.3 08/20/2024    TP 7.0 08/20/2024    AST 16 08/20/2024    ALT 25 08/20/2024    PTT 26.4 12/05/2023    LIP 26 08/20/2024    DDIMER 0.92 (H) 12/05/2023    MG 1.6 08/20/2024     08/03/2024    ETOH <3 08/04/2024       No results found.        Assessment/Plan:     Acute cystitis without hematuria with h/o suprapubic catheter and neurogenic bladder   Sepsis , lactic elevated now improved with bolus   Cont iv abx ceftriaxone   Await urine cx   Pt stable without hypotension   Sp sepsis bolus. Now with some edema. Hold for now   Follow blood cxs   Leukocytosis with left shift - cont to follow   ID consult given resistant cxs in past and sepsis     Anxiety and depression - pt requests psych consult   - cont home meds for now   - check depakote level   - pt lethargic this am after receiving klonopin. Monitor closely     H/o dvt in left leg   - cont eliquis     Diabetes mellitus type 2.  - accu checks ac and hs   - diabetic diet   - glargine 56 units normally - decrease by half and cont 5 u tid novolog             DVT PPX: eliquis       60 minutes spent on this admission - examining patient, obtaining history, reviewing previous medical records, going over test  results/imaging and discussing plan of care. All questions answered.       Tamara Jackson, DO  8/21/2024  **Certification      PHYSICIAN Certification of Need for Inpatient Hospitalization - Initial Certification    Patient will require inpatient services that will reasonably be expected to span two midnight's based on the clinical documentation in H+P.   Based on patients current state of illness, I anticipate that, after discharge, patient will require TBD.

## 2024-08-21 NOTE — CONSULTS
[facility]  part of [facility] ID CONSULT NOTE    Clari Rosa Patient Status:  Inpatient    1970 MRN [id number]   Location [facility] 5SW/SE Attending Tamara Jackson, DO   Hosp Day # 0 PCP Brian Bob MD       Reason for Consultation:  Diarrhea    ASSESSMENT:    Antibiotics: IV ceftriaxone    # Diarrhea x3 weeks - 3-4x/day - C. Diff negative; r/o other pathogens    # Pyuria from SPC - await UCx    # Chronic neurogenic bladder s/p SPC with recurrent UTI   - h/o MDRO PSAR   - recent UCx 8/3/24 with Citrobacter werkmanii and Serratia - ? Colonizer    # Recent LLE DVT 8/3/24  # DM  # Anxiety, depression      PLAN:    -  continue IV ceftriaxone for now  -  check GI PCR  -  f/up cx  -  Enteric isolation pending above  -  check CT A/P  -  Follow fever curve, wbc  -  Reviewed labs, micro, imaging reports, available old records  -  d/w patient, RN, Primary    History of Present Illness:  Clari Rosa is a a(n) 53 year old female. Patient is a 53  yoF with a h/o neurogenic bladder with SPC with recurrent UTI, bipolar d/o, lives in NH. H/o E. Coli, PSAR in UCx MDRO. Seen in 2024 with increased abdominal pain, elevated LA. Discharged on cefdinir. Readmitted 24 with LUE/LLE pain radiating from neck and treated for BLE cellulitis. Discharged on keflex. Multiple ED visits since then for diarrhea. C. Diff negative. Now returns on  with weakness, myalgias, HA, chills. States main issue is diarrhea with 3-4x/day, pasty. Afebrile, room air. WBC 14. LA up to 5.2. Started on IV ceftriaxone.     History:  Past Medical History:    Bipolar 1 disorder (HCC)    Dental caries    Diabetes (HCC)    MIKE (generalized anxiety disorder)    High blood pressure    History of diverticulitis of colon    Manipulative behavior    Neurogenic bladder    Obesity (BMI 30-39.9)    Paraplegia (HCC)    Sepsis following intra-abdominal surgery (HCC)    Serotonin syndrome    Sleep apnea    Suprapubic  catheter (HCC)    Transverse myelitis (HCC)     Past Surgical History:   Procedure Laterality Date    Arthroscopy of joint unlisted      knee          Cholecystectomy      Part removal colon w end colostomy      2013    Tonsillectomy       Family History   Adopted: Yes      reports that she has quit smoking. Her smoking use included cigarettes. She has a 5 pack-year smoking history. She does not have any smokeless tobacco history on file. She reports that she does not drink alcohol and does not use drugs.    Allergies:  Allergies   Allergen Reactions    Pcn [Penicillins] HIVES    Sulfa Antibiotics HIVES    Radiology Contrast Iodinated Dyes DIZZINESS       Medications:    Current Facility-Administered Medications:     bisacodyl (Dulcolax) 10 MG rectal suppository 10 mg, 10 mg, Rectal, Daily PRN    fleet enema (Fleet) 7-19 GM/118ML rectal enema 133 mL, 1 enema, Rectal, Once PRN    ondansetron (Zofran) 4 MG/2ML injection 4 mg, 4 mg, Intravenous, Q6H PRN    prochlorperazine (Compazine) 10 MG/2ML injection 5 mg, 5 mg, Intravenous, Q8H PRN    [START ON 2024] cefTRIAXone (Rocephin) 1 g in sodium chloride 0.9% 100 mL IVPB-ADDV, 1 g, Intravenous, Q24H    glucose (Dex4) 15 GM/59ML oral liquid 15 g, 15 g, Oral, Q15 Min PRN **OR** glucose (Glutose) 40% oral gel 15 g, 15 g, Oral, Q15 Min PRN **OR** glucose-vitamin C (Dex-4) chewable tab 4 tablet, 4 tablet, Oral, Q15 Min PRN **OR** dextrose 50% injection 50 mL, 50 mL, Intravenous, Q15 Min PRN **OR** glucose (Dex4) 15 GM/59ML oral liquid 30 g, 30 g, Oral, Q15 Min PRN **OR** glucose (Glutose) 40% oral gel 30 g, 30 g, Oral, Q15 Min PRN **OR** glucose-vitamin C (Dex-4) chewable tab 8 tablet, 8 tablet, Oral, Q15 Min PRN    clonazePAM (KlonoPIN) tab 0.5 mg, 0.5 mg, Oral, Nightly PRN    phenazopyridine (Pyridium) tab 100 mg, 100 mg, Oral, TID PRN    multivitamin with minerals (Thera M Plus) tab 1 tablet, 1 tablet, Oral, Daily    montelukast (Singulair) tab 10 mg, 10 mg,  Oral, Daily    divalproex ER (Depakote ER) 24 hr tab 500 mg, 500 mg, Oral, BID    acidophilus (Probiotic) cap/tab 1 capsule, 1 capsule, Oral, BID    ondansetron (Zofran-ODT) disintegrating tab 8 mg, 8 mg, Oral, Q8H PRN    [START ON 8/26/2024] ergocalciferol (Vitamin D2) cap 50,000 Units, 50,000 Units, Oral, Q7 Days    oxybutynin ER (Ditropan-XL) 24 hr tab 5 mg, 5 mg, Oral, Daily    methenamine (Hiprex) tab 0.5 g, 0.5 g, Oral, BID with meals    bethanechol (Urecholine) tab 10 mg, 10 mg, Oral, Daily    cyclobenzaprine (Flexeril) tab 10 mg, 10 mg, Oral, TID PRN    albuterol (Ventolin HFA) 108 (90 Base) MCG/ACT inhaler 2 puff, 2 puff, Inhalation, Q6H PRN    atorvastatin (Lipitor) tab 40 mg, 40 mg, Oral, Nightly    sennosides (Senokot) tab 17.2 mg, 17.2 mg, Oral, BID    acetaminophen (Tylenol) tab 650 mg, 650 mg, Oral, Q6H PRN    Gabapentin TABS 300 mg, 300 mg, Oral, TID    meclizine (Antivert) tab 12.5 mg, 12.5 mg, Oral, TID PRN    polyethylene glycol (PEG 3350) (Miralax) 17 g oral packet 17 g, 17 g, Oral, Daily PRN    lactulose (ENULOSE) solution 20 g, 20 g, Oral, Q6H PRN    docusate sodium (COLACE) capsule CAPS 100 mg, 100 mg, Oral, BID    ARIPiprazole (Abilify) tab 5 mg, 5 mg, Oral, Daily    fluticasone propionate (Flonase) 50 MCG/ACT nasal suspension 2 spray, 2 spray, Each Nare, Daily    insulin aspart (NovoLOG) 100 Units/mL FlexPen 5 Units, 5 Units, Subcutaneous, TID CC    apixaban (Eliquis) tab 5 mg, 5 mg, Oral, BID    traMADol (Ultram) tab 50 mg, 50 mg, Oral, Q6H PRN    miconazole 2 % powder, , Topical, PRN    insulin degludec (Tresiba) 100 units/mL flextouch 25 Units, 25 Units, Subcutaneous, Nightly    Review of Systems:  Per HPI    Physical Exam:  Vital signs: Blood pressure 123/79, pulse 87, temperature 97.7 °F (36.5 °C), temperature source Oral, resp. rate 18, height 152.4 cm (5'), weight 194 lb 8 oz (88.2 kg), SpO2 100%.    General: Alert, oriented, NAD  HEENT: Moist mucous membranes. EOMI  Neck: No  lymphadenopathy.  Supple.  Cardiovascular: No chest wall tenderness  Respiratory: Symmetric expansion  Abdomen: Soft, nontender, nondistended. SPC with some discharge  Musculoskeletal: +LLE edema  Integument: No lesions. No erythema.  L chest port    Laboratory Data: Reviewed in EMR    Microbiology: Reviewed in EMR    Radiology: Reviewed    Thank you for allowing us to participate in the care of this patient. Please do not hesitate to call if you have any questions.     We will continue to follow with you and will make further recommendations based on his progress.    Devon Magana MD   [facility]  [phone]  8/21/2024

## 2024-08-21 NOTE — CM/SW NOTE
08/21/24 1300   CM/SW Referral Data   Referral Source Social Work (self-referral)   Reason for Referral Discharge planning;Readmission   Informant EMR;Clinical Staff Member   Readmission Assessment   Factors that patient feels contributed to this readmission Acute/Chronic Clinical Presentation   Pt's living situation prior to admission? Long term care facility  ([facility])   Pt's level of independence at discharge? Total assist (max)   Was full assessment completed by SW/CM on prior admission? Yes   Was the recommended discharge plan achieved? Yes   Was pt. discharged w/out services? No   Medical Hx   Does patient have an established PCP? Yes  (Dr. Brian Bob)   Significant Past Medical/Mental Health Hx Chronic suprapubic cathete; DMll;Bipolar 1 disorder   Patient Info   Advanced directives? No   Patient's Current Mental Status at Time of Assessment Alert;Oriented   Patient's Home Environment Long Term Care Facility   Post Acute Care Provider Upon Admission   ([facility])   Number of Levels in Home 1   Patient lives with   (Staff 24/7)   Patient Status Prior to Admission   Independent with ADLs and Mobility No   Pt. requires assistance with Housework;Driving;Meals;Bathing;Ambulating;Dressing;Medications;Feeding;Toileting   Discharge Needs   Anticipated D/C needs Long term care facility     SW self-referred to this case for discharge planning.    Per EMR, patient is from [facility].  Return referral sent.    Patient is bed bound at baseline, chronic supra public catheter with recurrent UTIs.    Per EMR, patient has psych hx with recent stay at [facility] inpatient program in [place].  Psychiatrist on consult.    Patient has expressed in previous admissions wanting new placement- it appears [facility] discharge planners were not successful in this as patient return to [facility].    Per chart, [place] and several other health systems have not been successful in  finding new placement for the patient.  Patient has been \"yellow/red flagged\" to all LTC SNF throughout the state due to behavioral/mental health concerns.    SW at [facility] will not be able to change patient's LTC placement.  [facility] team can help patent look for LTC internally.    PLAN: DC to [facility] LT pending [facility]    / to remain available for support and/or discharge planning.     Karla Lester MSW, LSW [id number]

## 2024-08-22 LAB
ANION GAP SERPL CALC-SCNC: 5 MMOL/L (ref 0–18)
BASOPHILS # BLD AUTO: 0.06 X10(3) UL (ref 0–0.2)
BASOPHILS NFR BLD AUTO: 0.5 %
BUN BLD-MCNC: 10 MG/DL (ref 9–23)
BUN/CREAT SERPL: 13.7 (ref 10–20)
CALCIUM BLD-MCNC: 9.9 MG/DL (ref 8.7–10.4)
CHLORIDE SERPL-SCNC: 106 MMOL/L (ref 98–112)
CO2 SERPL-SCNC: 29 MMOL/L (ref 21–32)
CREAT BLD-MCNC: 0.73 MG/DL
DEPRECATED RDW RBC AUTO: 44.7 FL (ref 35.1–46.3)
EGFRCR SERPLBLD CKD-EPI 2021: 98 ML/MIN/1.73M2 (ref 60–?)
EOSINOPHIL # BLD AUTO: 0.65 X10(3) UL (ref 0–0.7)
EOSINOPHIL NFR BLD AUTO: 5.9 %
ERYTHROCYTE [DISTWIDTH] IN BLOOD BY AUTOMATED COUNT: 14.3 % (ref 11–15)
GLUCOSE BLD-MCNC: 90 MG/DL (ref 70–99)
GLUCOSE BLDC GLUCOMTR-MCNC: 105 MG/DL (ref 70–99)
GLUCOSE BLDC GLUCOMTR-MCNC: 120 MG/DL (ref 70–99)
GLUCOSE BLDC GLUCOMTR-MCNC: 120 MG/DL (ref 70–99)
GLUCOSE BLDC GLUCOMTR-MCNC: 143 MG/DL (ref 70–99)
GLUCOSE BLDC GLUCOMTR-MCNC: 172 MG/DL (ref 70–99)
HCT VFR BLD AUTO: 37.8 %
HGB BLD-MCNC: 12.1 G/DL
IMM GRANULOCYTES # BLD AUTO: 0.05 X10(3) UL (ref 0–1)
IMM GRANULOCYTES NFR BLD: 0.5 %
LYMPHOCYTES # BLD AUTO: 3.67 X10(3) UL (ref 1–4)
LYMPHOCYTES NFR BLD AUTO: 33 %
MAGNESIUM SERPL-MCNC: 1.7 MG/DL (ref 1.6–2.6)
MCH RBC QN AUTO: 27.2 PG (ref 26–34)
MCHC RBC AUTO-ENTMCNC: 32 G/DL (ref 31–37)
MCV RBC AUTO: 84.9 FL
MONOCYTES # BLD AUTO: 0.4 X10(3) UL (ref 0.1–1)
MONOCYTES NFR BLD AUTO: 3.6 %
NEUTROPHILS # BLD AUTO: 6.28 X10 (3) UL (ref 1.5–7.7)
NEUTROPHILS # BLD AUTO: 6.28 X10(3) UL (ref 1.5–7.7)
NEUTROPHILS NFR BLD AUTO: 56.5 %
OSMOLALITY SERPL CALC.SUM OF ELEC: 289 MOSM/KG (ref 275–295)
PLATELET # BLD AUTO: 340 10(3)UL (ref 150–450)
POTASSIUM SERPL-SCNC: 4.2 MMOL/L (ref 3.5–5.1)
RBC # BLD AUTO: 4.45 X10(6)UL
SODIUM SERPL-SCNC: 140 MMOL/L (ref 136–145)
WBC # BLD AUTO: 11.1 X10(3) UL (ref 4–11)

## 2024-08-22 PROCEDURE — 99233 SBSQ HOSP IP/OBS HIGH 50: CPT | Performed by: HOSPITALIST

## 2024-08-22 RX ORDER — MAGNESIUM OXIDE 400 MG/1
400 TABLET ORAL ONCE
Status: COMPLETED | OUTPATIENT
Start: 2024-08-22 | End: 2024-08-22

## 2024-08-22 NOTE — PLAN OF CARE
Patient is alert and oriented per baseline. Vital signs stable. Complaints of pain treated on current plan. Continues on IV antibiotics, no adverse reactions noted. Suprapubic mark catheter in place and draining appropriately, refusing replacement by urology at this time. Assisted with care needs by staff, and updated on plan of care at bedside. Fall precautions in place, call light visible and within reach.    Problem: Patient Centered Care  Goal: Patient preferences are identified and integrated in the patient's plan of care  Description: Interventions:  - What would you like us to know as we care for you? From LTC  - Provide timely, complete, and accurate information to patient/family  - Incorporate patient and family knowledge, values, beliefs, and cultural backgrounds into the planning and delivery of care  - Encourage patient/family to participate in care and decision-making at the level they choose  - Honor patient and family perspectives and choices  Outcome: Progressing     Problem: GASTROINTESTINAL - ADULT  Goal: Minimal or absence of nausea and vomiting  Description: INTERVENTIONS:  - Maintain adequate hydration with IV or PO as ordered and tolerated  - Nasogastric tube to low intermittent suction as ordered  - Evaluate effectiveness of ordered antiemetic medications  - Provide nonpharmacologic comfort measures as appropriate  - Advance diet as tolerated, if ordered  - Obtain nutritional consult as needed  - Evaluate fluid balance  Outcome: Progressing  Goal: Maintains or returns to baseline bowel function  Description: INTERVENTIONS:  - Assess bowel function  - Maintain adequate hydration with IV or PO as ordered and tolerated  - Evaluate effectiveness of GI medications  - Encourage mobilization and activity  - Obtain nutritional consult as needed  - Establish a toileting routine/schedule  - Consider collaborating with pharmacy to review patient's medication profile  Outcome: Progressing     Problem:  GENITOURINARY - ADULT  Goal: Absence of urinary retention  Description: INTERVENTIONS:  - Assess patient’s ability to void and empty bladder  - Monitor intake/output and perform bladder scan as needed  - Follow urinary retention protocol/standard of care  - Consider collaborating with pharmacy to review patient's medication profile  - Implement strategies to promote bladder emptying  Outcome: Progressing     Problem: SKIN/TISSUE INTEGRITY - ADULT  Goal: Skin integrity remains intact  Description: INTERVENTIONS  - Assess and document risk factors for pressure ulcer development  - Assess and document skin integrity  - Monitor for areas of redness and/or skin breakdown  - Initiate interventions, skin care algorithm/standards of care as needed  Outcome: Progressing     Problem: PAIN - ADULT  Goal: Verbalizes/displays adequate comfort level or patient's stated pain goal  Description: INTERVENTIONS:  - Encourage pt to monitor pain and request assistance  - Assess pain using appropriate pain scale  - Administer analgesics based on type and severity of pain and evaluate response  - Implement non-pharmacological measures as appropriate and evaluate response  - Consider cultural and social influences on pain and pain management  - Manage/alleviate anxiety  - Utilize distraction and/or relaxation techniques  - Monitor for opioid side effects  - Notify MD/LIP if interventions unsuccessful or patient reports new pain  - Anticipate increased pain with activity and pre-medicate as appropriate  Outcome: Progressing     Problem: RISK FOR INFECTION - ADULT  Goal: Absence of fever/infection during anticipated neutropenic period  Description: INTERVENTIONS  - Monitor WBC  - Administer growth factors as ordered  - Implement neutropenic guidelines  Outcome: Progressing     Problem: SAFETY ADULT - FALL  Goal: Free from fall injury  Description: INTERVENTIONS:  - Assess pt frequently for physical needs  - Identify cognitive and physical  deficits and behaviors that affect risk of falls.  - [place] fall precautions as indicated by assessment.  - Educate pt/family on patient safety including physical limitations  - Instruct pt to call for assistance with activity based on assessment  - Modify environment to reduce risk of injury  - Provide assistive devices as appropriate  - Consider OT/PT consult to assist with strengthening/mobility  - Encourage toileting schedule  Outcome: Progressing     Problem: DISCHARGE PLANNING  Goal: Discharge to home or other facility with appropriate resources  Description: INTERVENTIONS:  - Identify barriers to discharge w/pt and caregiver  - Include patient/family/discharge partner in discharge planning  - Arrange for needed discharge resources and transportation as appropriate  - Identify discharge learning needs (meds, wound care, etc)  - Arrange for interpreters to assist at discharge as needed  - Consider post-discharge preferences of patient/family/discharge partner  - Complete POLST form as appropriate  - Assess patient's ability to be responsible for managing their own health  - Refer to Case Management Department for coordinating discharge planning if the patient needs post-hospital services based on physician/LIP order or complex needs related to functional status, cognitive ability or social support system  Outcome: Progressing

## 2024-08-22 NOTE — CONSULTS
[facility]  part of [facility]      Consult     Clari Rosa Patient Status:  Inpatient    1970 MRN [id number]   Location [facility] 5SW/SE Attending Tamara Jackson, DO   Hosp Day # 0 PCP Brian Bob MD     Referring Provider: Izzy  Reason for Consultation: UTI    Subjective:    Clari Rosa is a 53 year old female with history of paraplegia and a history of neurogenic bladder who is a nursing home resident with chronic SPT.  She has been admitted twice in the last 2 months with UTI among other issues included cellulitis.  She is now admitted with diarrhea history but has been on numerous antibiotic regimens over the last month.  ID is following and wanted urology consultation.    She has an SPT , chronically, last changed 2 weeks ago per patient.  She does not/have a urologist for follow up.  She has not seen a urologist in an unknown length of time.    She has bipolar disorder and has polypharmacia as well.    She is diabetic and is on blood thinners  It appears she is on oxybutynin and take methenamine      History/Other:      Past Medical History:  Past Medical History:    Bipolar 1 disorder (HCC)    Dental caries    Diabetes (HCC)    MIKE (generalized anxiety disorder)    High blood pressure    History of diverticulitis of colon    Manipulative behavior    Neurogenic bladder    Obesity (BMI 30-39.9)    Paraplegia (HCC)    Sepsis following intra-abdominal surgery (HCC)    Serotonin syndrome    Sleep apnea    Suprapubic catheter (HCC)    Transverse myelitis (HCC)        Past Surgical History:   Past Surgical History:   Procedure Laterality Date    Arthroscopy of joint unlisted      knee          Cholecystectomy      Part removal colon w end colostomy      2013    Tonsillectomy         Social History:  reports that she has quit smoking. Her smoking use included cigarettes. She has a 5 pack-year smoking history. She does not have any smokeless tobacco history on  file. She reports that she does not drink alcohol and does not use drugs.    Family History:   Family History   Adopted: Yes       Allergies:   Allergies   Allergen Reactions    Pcn [Penicillins] HIVES    Sulfa Antibiotics HIVES    Radiology Contrast Iodinated Dyes DIZZINESS       Medications:    No current facility-administered medications on file prior to encounter.     Current Outpatient Medications on File Prior to Encounter   Medication Sig Dispense Refill    QUEtiapine 50 MG Oral Tab Take 1 tablet (50 mg total) by mouth nightly.      LORazepam 1 MG Oral Tab Take 1 tablet (1 mg total) by mouth in the morning, at noon, and at bedtime.      traMADol 50 MG Oral Tab Take 1 tablet (50 mg total) by mouth every 6 (six) hours as needed for Pain.      apixaban 5 MG Oral Tab Take 2 tabs (10mg) by mouth twice daily for 7 days, then take 1 tab (5mg) by mouth twice daily thereafter.  Discontinue other blood thinners. (Patient taking differently: Take 1 tablet (5 mg total) by mouth 2 (two) times daily.) 74 tablet 0    insulin aspart 100 Units/mL Subcutaneous Solution Pen-injector Inject 10 Units into the skin 3 (three) times daily with meals.      phenazopyridine 100 MG Oral Tab Take 1 tablet (100 mg total) by mouth 3 (three) times daily as needed for Pain. 5 tablet 0    metoprolol tartrate 25 MG Oral Tab Take 1 tablet (25 mg total) by mouth daily.      traZODone 50 MG Oral Tab Take 1 tablet (50 mg total) by mouth nightly as needed for Sleep.      QUEtiapine 200 MG Oral Tab Take 0.5 tablets (100 mg total) by mouth 3 (three) times daily.      Nystatin 689711 UNIT/GM External Powder Apply 1 Application topically as needed for Dry Skin. Under breast and groin area      acetaminophen 325 MG Oral Tab Take 2 tablets (650 mg total) by mouth every 6 (six) hours as needed for Pain.      bethanechol 10 MG Oral Tab Take 1 tablet (10 mg total) by mouth daily.      divalproex Sodium  MG Oral Tablet 24 Hr Take 1 tablet (500 mg  total) by mouth 2 (two) times daily.      ondansetron 4 MG Oral Tablet Dispersible Take 2 tablets (8 mg total) by mouth every 8 (eight) hours as needed for Nausea.      metFORMIN  MG Oral Tablet 24 Hr Take 2 tablets (1,000 mg total) by mouth 2 (two) times daily with meals. 30 tablet 0    clonazePAM 0.5 MG Oral Tab Take 1 tablet (0.5 mg total) by mouth 2 (two) times daily. (Patient taking differently: Take 1 tablet (0.5 mg total) by mouth at bedtime.) 15 tablet 0    insulin glargine 100 UNIT/ML Subcutaneous Solution Inject 56 Units into the skin nightly.      fluticasone propionate 50 MCG/ACT Nasal Suspension 2 sprays by Nasal route daily. 16 g 0    ARIPiprazole 5 MG Oral Tab Take 1 tablet (5 mg total) by mouth daily.      Cranberry 500 MG Oral Cap 1 capsule PO daily      Insulin Pen Needle 32G X 4 MM Does not apply Misc May substitute if not on insurance formulary 100 each 5    polyethylene glycol, PEG 3350, 17 g Oral Powd Pack Take 17 g by mouth daily as needed (If no bowel movement in last 24 hours). (Patient not taking: Reported on 8/21/2024)      lactulose 20 GM/30ML Oral Solution Take 30 mL (20 g total) by mouth every 6 (six) hours as needed (constipation).      hydrOXYzine 10 MG Oral Tab Take 1 tablet (10 mg total) by mouth 3 (three) times daily as needed for Anxiety. (Patient not taking: Reported on 8/21/2024)      docusate sodium 100 MG Oral Cap Take 100 mg by mouth 2 (two) times daily.      meclizine 12.5 MG Oral Tab Take 1 tablet (12.5 mg total) by mouth 3 (three) times daily as needed for Dizziness. (Patient not taking: Reported on 8/21/2024)      DULoxetine HCl 20 MG Oral Capsule Delayed Release Sprinkle Take 20 mg by mouth daily.      gabapentin 50 MG Oral Tab Take 300 mg by mouth 3 (three) times daily. (Patient not taking: Reported on 8/21/2024)      glucagon (BAQSIMI ONE PACK) 3 MG/DOSE Nasal nasal powder 3 mg by Nasal route once as needed for Low blood glucose.      atorvastatin 40 MG Oral Tab  Take 1 tablet (40 mg total) by mouth nightly. (Patient not taking: Reported on 8/21/2024)      sennosides 8.6 MG Oral Tab Take 2 tablets (17.2 mg total) by mouth 2 (two) times daily. (Patient not taking: Reported on 8/21/2024)      Icosapent Ethyl 1 g Oral Cap Take 2 capsules by mouth 4 (four) times daily.      rivaroxaban (XARELTO) 20 MG Oral Tab Take 1 tablet (20 mg total) by mouth daily. (Patient not taking: Reported on 8/21/2024) 30 tablet 0    Melatonin 3 MG Oral Cap Take 1 capsule (3 mg total) by mouth at bedtime.      ergocalciferol 1.25 MG (33880 UT) Oral Cap Take 1 capsule (50,000 Units total) by mouth every 7 days. Every monday      oxybutynin ER 5 MG Oral Tablet 24 Hr Take 1 tablet (5 mg total) by mouth daily. (Patient not taking: Reported on 8/21/2024)      methenamine 1 g Oral Tab Take 0.5 tablets (0.5 g total) by mouth 2 (two) times daily with meals. (Patient not taking: Reported on 8/21/2024)      loratadine 10 MG Oral Tab Take 1 tablet (10 mg total) by mouth in the morning, at noon, and at bedtime. (Patient not taking: Reported on 8/21/2024)      cyclobenzaprine 10 MG Oral Tab Take 1 tablet (10 mg total) by mouth 3 (three) times daily as needed for Muscle spasms. (Patient not taking: Reported on 8/21/2024)      albuterol 108 (90 Base) MCG/ACT Inhalation Aero Soln Inhale 2 puffs into the lungs every 6 (six) hours as needed for Wheezing or Shortness of Breath.      Insulin Aspart 100 UNIT/ML Subcutaneous Solution Cartridge SS: 150-200 2units, 201-250 4 units, 251-300 6 units, 301-350 8 units, 351-400 10 units, over 401 call md 3 mL 0    Multiple Vitamins-Minerals (THERA-M) Oral Tab Take 1 tablet by mouth daily.      Montelukast Sodium 10 MG Oral Tab Take 1 tablet (10 mg total) by mouth daily. (Patient not taking: Reported on 8/21/2024)      Acidophilus/Pectin Oral Cap Take 1 capsule by mouth 2 (two) times daily. (Patient not taking: Reported on 8/21/2024)         Review of Systems:   Review of systems  was completed.  Pertinent positives and negatives noted in the HPI.    Objective:     BP (!) 147/101 (BP Location: Right arm)   Pulse 100   Temp 97.7 °F (36.5 °C) (Oral)   Resp 16   Ht 60\"   Wt 194 lb 8 oz (88.2 kg)   SpO2 100%   BMI 37.99 kg/m²     General: No acute distress.  Alert ,         Respiratory: No wheezes, no rhonchi, clear to auscultation bilaterally,    Cardiovascular: S1, S2.  Abdomen: Soft, SPT in place, draining clear urine.  Patient is Exit right of midline.  Large pannus  Patient is soiled with active diarrhea - alerted nursing team    Results:    Labs:  Laboratory data reviewed.      Selected labs - last 24 hours:  Endo  Lytes  Renal   Glu 169  Na 137 Ca 8.7  BUN 8   POC Gluc  105  K 3.7 PO4 -  Cr 0.77   A1c -  Cl 106 Mg 1.6  eGFR 92   TSH -  CO2 23.0         LFT  CBC  Other   AST 16  WBC 12.1  PTT - Procal -   ALT 25  Hb 12.3  INR - CRP -   APk 91  Hct 38.8  Trop - D dim -   T maciej 0.3  .0  pBNP -  BNP -  - Ferritin  -   Prot 7.0    CK  - Lactate  3.3   Alb 4.2    LDL  - COVID  -       Imaging: Imaging data reviewed in Epic.    Assessment & Plan:    #52 yo female  Recurrent UTI  Neurogenic bladder  Paraplegia   Cultures reviewed   Catheter related infection - although has cellulitis history and has been on numerous antibiotics, diabetes, obesity, nursing home resident, sedentary all risk factors for infection.    Recommend catheter change every 2 weeks.     Patient will need urology follow up with another group   Unfortunately we cannot see the patient as outpatient.    Hospital based groups may best serve the patient     Plan of care discussed with patient, hospitalist    Panda Taylor MD  8/21/2024    Supplementary Documentation:

## 2024-08-22 NOTE — PROGRESS NOTES
INFECTIOUS DISEASE PROGRESS NOTE  [facility]  part of [facility] ID PROGRESS NOTE    Clari Rosa Patient Status:  Inpatient    1970 MRN [id number]   Location Health system 5SW/SE Attending Tamara Jackson, DO   Hosp Day # 1 PCP Brian Bob MD     Subjective:  ROS reviewed. Seen by urology yesterday. States diarrhea is slowing down. Did drink contrast for CT A/P yesterday.    ASSESSMENT:    Antibiotics: IV ceftriaxone     # Diarrhea x3 weeks - 3-4x/day - C. Diff negative, GI panel negative     # Pyuria from SPC, urine cx Citrobacter   -Urology saw     # Chronic neurogenic bladder s/p SPC with recurrent UTI               - h/o MDRO PSAR               - recent UCx 8/3/24 with Citrobacter werkmanii and Serratia - ? Colonizer     # Recent LLE DVT 8/3/24  # DM  # Anxiety, depression     PLAN:  -  Currently on ceftriaxone. Await final urine cx results. Hope to convert to PO cefdinir to complete seven day course (EOT ).  -  Follow fever curve, wbc.  -  Reviewed labs, micro, imaging reports, available old records.  -  Case d/w patient, RN.     History of Present Illness:  53  yoF with a h/o neurogenic bladder with SPC with recurrent UTI, bipolar d/o, lives in [place]. H/o E. Coli, PSAR in UCx MDRO. Seen in 2024 with increased abdominal pain, elevated LA. Discharged on cefdinir. Readmitted 24 with LUE/LLE pain radiating from neck and treated for BLE cellulitis. Discharged on keflex. Multiple ED visits since then for diarrhea. C. Diff negative. Now returns on  with weakness, myalgias, HA, chills. States main issue is diarrhea with 3-4x/day, pasty. Afebrile, room air. WBC 14. LA up to 5.2. Started on IV ceftriaxone.    Physical Exam:  /85 (BP Location: Right arm)   Pulse 94   Temp 98.4 °F (36.9 °C) (Oral)   Resp 16   Ht 5' (1.524 m)   Wt 194 lb 8 oz (88.2 kg)   SpO2 100%   BMI 37.99 kg/m²     Gen:   Awake, in bed  HEENT:  EOMI, neck supple  CV/lungs:   RRR, CTAB  Abdom:  Soft, obese  Skin/extrem:  No rashes, no c/c/e  :   SPC+  Lines:  PIV+    Laboratory Data: Reviewed    Microbiology: Reviewed    Radiology: Reviewed      [facility]  [phone]  8/22/2024

## 2024-08-22 NOTE — PLAN OF CARE
Problem: Patient Centered Care  Goal: Patient preferences are identified and integrated in the patient's plan of care  Description: Interventions:  - What would you like us to know as we care for you?   - Provide timely, complete, and accurate information to patient/family  - Incorporate patient and family knowledge, values, beliefs, and cultural backgrounds into the planning and delivery of care  - Encourage patient/family to participate in care and decision-making at the level they choose  - Honor patient and family perspectives and choices  Outcome: Progressing     Problem: GASTROINTESTINAL - ADULT  Goal: Minimal or absence of nausea and vomiting  Description: INTERVENTIONS:  - Maintain adequate hydration with IV or PO as ordered and tolerated  - Nasogastric tube to low intermittent suction as ordered  - Evaluate effectiveness of ordered antiemetic medications  - Provide nonpharmacologic comfort measures as appropriate  - Advance diet as tolerated, if ordered  - Obtain nutritional consult as needed  - Evaluate fluid balance  Outcome: Progressing  Goal: Maintains or returns to baseline bowel function  Description: INTERVENTIONS:  - Assess bowel function  - Maintain adequate hydration with IV or PO as ordered and tolerated  - Evaluate effectiveness of GI medications  - Encourage mobilization and activity  - Obtain nutritional consult as needed  - Establish a toileting routine/schedule  - Consider collaborating with pharmacy to review patient's medication profile  Outcome: Progressing     Problem: GENITOURINARY - ADULT  Goal: Absence of urinary retention  Description: INTERVENTIONS:  - Assess patient’s ability to void and empty bladder  - Monitor intake/output and perform bladder scan as needed  - Follow urinary retention protocol/standard of care  - Consider collaborating with pharmacy to review patient's medication profile  - Implement strategies to promote bladder emptying  Outcome: Progressing     Problem:  SKIN/TISSUE INTEGRITY - ADULT  Goal: Skin integrity remains intact  Description: INTERVENTIONS  - Assess and document risk factors for pressure ulcer development  - Assess and document skin integrity  - Monitor for areas of redness and/or skin breakdown  - Initiate interventions, skin care algorithm/standards of care as needed  Outcome: Progressing     Problem: PAIN - ADULT  Goal: Verbalizes/displays adequate comfort level or patient's stated pain goal  Description: INTERVENTIONS:  - Encourage pt to monitor pain and request assistance  - Assess pain using appropriate pain scale  - Administer analgesics based on type and severity of pain and evaluate response  - Implement non-pharmacological measures as appropriate and evaluate response  - Consider cultural and social influences on pain and pain management  - Manage/alleviate anxiety  - Utilize distraction and/or relaxation techniques  - Monitor for opioid side effects  - Notify MD/LIP if interventions unsuccessful or patient reports new pain  - Anticipate increased pain with activity and pre-medicate as appropriate  Outcome: Progressing     Problem: RISK FOR INFECTION - ADULT  Goal: Absence of fever/infection during anticipated neutropenic period  Description: INTERVENTIONS  - Monitor WBC  - Administer growth factors as ordered  - Implement neutropenic guidelines  Outcome: Progressing     Problem: SAFETY ADULT - FALL  Goal: Free from fall injury  Description: INTERVENTIONS:  - Assess pt frequently for physical needs  - Identify cognitive and physical deficits and behaviors that affect risk of falls.  - [place] fall precautions as indicated by assessment.  - Educate pt/family on patient safety including physical limitations  - Instruct pt to call for assistance with activity based on assessment  - Modify environment to reduce risk of injury  - Provide assistive devices as appropriate  - Consider OT/PT consult to assist with strengthening/mobility  - Encourage  toileting schedule  Outcome: Progressing     Problem: DISCHARGE PLANNING  Goal: Discharge to home or other facility with appropriate resources  Description: INTERVENTIONS:  - Identify barriers to discharge w/pt and caregiver  - Include patient/family/discharge partner in discharge planning  - Arrange for needed discharge resources and transportation as appropriate  - Identify discharge learning needs (meds, wound care, etc)  - Arrange for interpreters to assist at discharge as needed  - Consider post-discharge preferences of patient/family/discharge partner  - Complete POLST form as appropriate  - Assess patient's ability to be responsible for managing their own health  - Refer to Case Management Department for coordinating discharge planning if the patient needs post-hospital services based on physician/LIP order or complex needs related to functional status, cognitive ability or social support system  Outcome: Progressing
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
## 2024-08-22 NOTE — CONSULTS
[facility]  part of [facility]    Report of Consultation    Clari Rosa Patient Status:  Inpatient    1970 MRN [id number]   Location [facility] 1W Attending Adonis Hager MD   Hosp Day # 0 PCP Brian Bob MD     Date of Admission:  2024  Date of Consult:  2024   Reason for Consultation:   Patient presented with increased anxiety, Adonis Carter MD requested psychiatric consult for evaluation and advice.    Consult Duration     The patient seen for initial psychiatric consult evaluation.   Record reviewed, communication with attending, communication with RN and patient seen face to face evaluation.    History of Present Illness:   Patient is a 53 year old , single, female with past medical history of diabetes, bipolar 1 disorder, depression, MIKE, HTN, diverticulitis, neurogenic bladder, obesity, who was admitted to the hospital for Acute cystitis without hematuria: The patient has been demonstrating increased anxiety and depressive symptoms. Patient indicated for psych consult for evaluation and advise.    The patient well-known to me from multiple previous psychiatric admission who has been in our facility almost on monthly basis recently.    Otherwise the patient with suprapubic catheter and frequent UTI.  Patient anxiety has been continuing the patient has not been finding [facility] where she lives is the best place for her care with the frequent somatic manifestation complain.    The patient is seen today in her room.  \"My nerves all numbness and I have been very shaky\".  The patient who presented initially calm with no physical manifestation started to demonstrate a tremor in her left arm after she talked about being numb and shaky.    The patient admitted that her anxiety has been escalating and she need to find a way to get out of that place.    Patient continued demonstrate and reporting panic attack with increase in anxiety, dizziness,  chest pain, palpitation and numbness.    The patient continue reporting feeling depressed with energy, lack of motivation, restlessness, irritability, distractibility with increased racing thoughts reporting that she continue having manic episode?    The patient admitted feeling hopeless and helpless but denied suicidal ideation, intention or plan.    The patient is not demonstrating any carmel or psychosis  The patient denies auditory or visual hallucinations  The patient denies suicidal or homicidal ideation.    The patient has been demonstrating increased anxiety, restlessness, irritability and difficulty managing her emotion.  No suicidal ideation or indication for inpatient admission      Past Psychiatric/Medication History:  1. Prior diagnoses: MIKE, depression, Bipolar 1 disorder, Patient states she also has OCD and Panic attacks sine adolescent years.  2. Past psychiatric inpatient: Patient has been coming frequently to our facility for somatic manifestation.  3. Past outpatient history: Patient reports seeing a psychiatris at Nursing Home   4. Past suicide history: Patient denies   5. Medication history: Wellbutrin XL 24 hr tab 300mg, Klonopin 1mg q6h, Seroquel 300mg BID,      Social History:   The patient states the she lives in a Nursing home due to medical conditions. She is single and has never  but has one living son.      Denies any ETOH abuse, or illicit substance abuse. Previous smoker (5 pack history)     Family History:  No family history reported   Medical History:   Past Medical History  Past Medical History:    Bipolar 1 disorder (HCC)    Dental caries    Diabetes (HCC)    MIKE (generalized anxiety disorder)    High blood pressure    History of diverticulitis of colon    Manipulative behavior    Neurogenic bladder    Obesity (BMI 30-39.9)    Paraplegia (HCC)    Sepsis following intra-abdominal surgery (HCC)    Serotonin syndrome    Sleep apnea    Suprapubic catheter (HCC)    Transverse  myelitis (HCC)       Past Surgical History  Past Surgical History:   Procedure Laterality Date    Arthroscopy of joint unlisted      knee          Cholecystectomy      Part removal colon w end colostomy      2013    Tonsillectomy         Family History  Family History   Adopted: Yes       Social History  Social History     Socioeconomic History    Marital status: Single   Tobacco Use    Smoking status: Former     Current packs/day: 1.00     Average packs/day: 1 pack/day for 5.0 years (5.0 ttl pk-yrs)     Types: Cigarettes    Tobacco comments:     quit    Vaping Use    Vaping status: Never Used   Substance and Sexual Activity    Alcohol use: No    Drug use: No     Social Determinants of Health     Food Insecurity: No Food Insecurity (2024)    Food Insecurity     Food Insecurity: Never true   Transportation Needs: No Transportation Needs (2024)    Transportation Needs     Lack of Transportation: No   Housing Stability: Low Risk  (2024)    Housing Stability     Housing Instability: No           Current Medications:  Current Facility-Administered Medications   Medication Dose Route Frequency    bisacodyl (Dulcolax) 10 MG rectal suppository 10 mg  10 mg Rectal Daily PRN    fleet enema (Fleet) 7-19 GM/118ML rectal enema 133 mL  1 enema Rectal Once PRN    ondansetron (Zofran) 4 MG/2ML injection 4 mg  4 mg Intravenous Q6H PRN    prochlorperazine (Compazine) 10 MG/2ML injection 5 mg  5 mg Intravenous Q8H PRN    [START ON 2024] cefTRIAXone (Rocephin) 1 g in sodium chloride 0.9% 100 mL IVPB-ADDV  1 g Intravenous Q24H    glucose (Dex4) 15 GM/59ML oral liquid 15 g  15 g Oral Q15 Min PRN    Or    glucose (Glutose) 40% oral gel 15 g  15 g Oral Q15 Min PRN    Or    glucose-vitamin C (Dex-4) chewable tab 4 tablet  4 tablet Oral Q15 Min PRN    Or    dextrose 50% injection 50 mL  50 mL Intravenous Q15 Min PRN    Or    glucose (Dex4) 15 GM/59ML oral liquid 30 g  30 g Oral Q15 Min PRN    Or    glucose  (Glutose) 40% oral gel 30 g  30 g Oral Q15 Min PRN    Or    glucose-vitamin C (Dex-4) chewable tab 8 tablet  8 tablet Oral Q15 Min PRN    clonazePAM (KlonoPIN) tab 0.5 mg  0.5 mg Oral Nightly PRN    phenazopyridine (Pyridium) tab 100 mg  100 mg Oral TID PRN    multivitamin with minerals (Thera M Plus) tab 1 tablet  1 tablet Oral Daily    divalproex ER (Depakote ER) 24 hr tab 500 mg  500 mg Oral BID    ondansetron (Zofran-ODT) disintegrating tab 8 mg  8 mg Oral Q8H PRN    [START ON 8/26/2024] ergocalciferol (Vitamin D2) cap 50,000 Units  50,000 Units Oral Q7 Days    cyclobenzaprine (Flexeril) tab 10 mg  10 mg Oral TID PRN    albuterol (Ventolin HFA) 108 (90 Base) MCG/ACT inhaler 2 puff  2 puff Inhalation Q6H PRN    atorvastatin (Lipitor) tab 40 mg  40 mg Oral Nightly    acetaminophen (Tylenol) tab 650 mg  650 mg Oral Q6H PRN    meclizine (Antivert) tab 12.5 mg  12.5 mg Oral TID PRN    polyethylene glycol (PEG 3350) (Miralax) 17 g oral packet 17 g  17 g Oral Daily PRN    lactulose (ENULOSE) solution 20 g  20 g Oral Q6H PRN    ARIPiprazole (Abilify) tab 5 mg  5 mg Oral Daily    fluticasone propionate (Flonase) 50 MCG/ACT nasal suspension 2 spray  2 spray Each Nare Daily    insulin aspart (NovoLOG) 100 Units/mL FlexPen 5 Units  5 Units Subcutaneous TID CC    apixaban (Eliquis) tab 5 mg  5 mg Oral BID    traMADol (Ultram) tab 50 mg  50 mg Oral Q6H PRN    miconazole 2 % powder   Topical PRN    insulin degludec (Tresiba) 100 units/mL flextouch 25 Units  25 Units Subcutaneous Nightly    bethanechol (Urecholine) tab 10 mg  10 mg Oral TID    loperamide (Imodium) cap 2 mg  2 mg Oral QID PRN     Medications Prior to Admission   Medication Sig    QUEtiapine 50 MG Oral Tab Take 1 tablet (50 mg total) by mouth nightly.    LORazepam 1 MG Oral Tab Take 1 tablet (1 mg total) by mouth in the morning, at noon, and at bedtime.    traMADol 50 MG Oral Tab Take 1 tablet (50 mg total) by mouth every 6 (six) hours as needed for Pain.     apixaban 5 MG Oral Tab Take 2 tabs (10mg) by mouth twice daily for 7 days, then take 1 tab (5mg) by mouth twice daily thereafter.  Discontinue other blood thinners. (Patient taking differently: Take 1 tablet (5 mg total) by mouth 2 (two) times daily.)    insulin aspart 100 Units/mL Subcutaneous Solution Pen-injector Inject 10 Units into the skin 3 (three) times daily with meals.    phenazopyridine 100 MG Oral Tab Take 1 tablet (100 mg total) by mouth 3 (three) times daily as needed for Pain.    metoprolol tartrate 25 MG Oral Tab Take 1 tablet (25 mg total) by mouth daily.    traZODone 50 MG Oral Tab Take 1 tablet (50 mg total) by mouth nightly as needed for Sleep.    QUEtiapine 200 MG Oral Tab Take 0.5 tablets (100 mg total) by mouth 3 (three) times daily.    Nystatin 020211 UNIT/GM External Powder Apply 1 Application topically as needed for Dry Skin. Under breast and groin area    acetaminophen 325 MG Oral Tab Take 2 tablets (650 mg total) by mouth every 6 (six) hours as needed for Pain.    bethanechol 10 MG Oral Tab Take 1 tablet (10 mg total) by mouth daily.    divalproex Sodium  MG Oral Tablet 24 Hr Take 1 tablet (500 mg total) by mouth 2 (two) times daily.    ondansetron 4 MG Oral Tablet Dispersible Take 2 tablets (8 mg total) by mouth every 8 (eight) hours as needed for Nausea.    metFORMIN  MG Oral Tablet 24 Hr Take 2 tablets (1,000 mg total) by mouth 2 (two) times daily with meals.    clonazePAM 0.5 MG Oral Tab Take 1 tablet (0.5 mg total) by mouth 2 (two) times daily. (Patient taking differently: Take 1 tablet (0.5 mg total) by mouth at bedtime.)    insulin glargine 100 UNIT/ML Subcutaneous Solution Inject 56 Units into the skin nightly.    [] cephalexin 500 MG Oral Cap Take 1 capsule (500 mg total) by mouth 3 (three) times daily.    fluticasone propionate 50 MCG/ACT Nasal Suspension 2 sprays by Nasal route daily.    ARIPiprazole 5 MG Oral Tab Take 1 tablet (5 mg total) by mouth daily.     Cranberry 500 MG Oral Cap 1 capsule PO daily    Insulin Pen Needle 32G X 4 MM Does not apply Misc May substitute if not on insurance formulary    polyethylene glycol, PEG 3350, 17 g Oral Powd Pack Take 17 g by mouth daily as needed (If no bowel movement in last 24 hours). (Patient not taking: Reported on 8/21/2024)    lactulose 20 GM/30ML Oral Solution Take 30 mL (20 g total) by mouth every 6 (six) hours as needed (constipation).    hydrOXYzine 10 MG Oral Tab Take 1 tablet (10 mg total) by mouth 3 (three) times daily as needed for Anxiety. (Patient not taking: Reported on 8/21/2024)    docusate sodium 100 MG Oral Cap Take 100 mg by mouth 2 (two) times daily.    meclizine 12.5 MG Oral Tab Take 1 tablet (12.5 mg total) by mouth 3 (three) times daily as needed for Dizziness. (Patient not taking: Reported on 8/21/2024)    DULoxetine HCl 20 MG Oral Capsule Delayed Release Sprinkle Take 20 mg by mouth daily.    gabapentin 50 MG Oral Tab Take 300 mg by mouth 3 (three) times daily. (Patient not taking: Reported on 8/21/2024)    glucagon (BAQSIMI ONE PACK) 3 MG/DOSE Nasal nasal powder 3 mg by Nasal route once as needed for Low blood glucose.    atorvastatin 40 MG Oral Tab Take 1 tablet (40 mg total) by mouth nightly. (Patient not taking: Reported on 8/21/2024)    sennosides 8.6 MG Oral Tab Take 2 tablets (17.2 mg total) by mouth 2 (two) times daily. (Patient not taking: Reported on 8/21/2024)    Icosapent Ethyl 1 g Oral Cap Take 2 capsules by mouth 4 (four) times daily.    rivaroxaban (XARELTO) 20 MG Oral Tab Take 1 tablet (20 mg total) by mouth daily. (Patient not taking: Reported on 8/21/2024)    Melatonin 3 MG Oral Cap Take 1 capsule (3 mg total) by mouth at bedtime.    ergocalciferol 1.25 MG (06342 UT) Oral Cap Take 1 capsule (50,000 Units total) by mouth every 7 days. Every monday    oxybutynin ER 5 MG Oral Tablet 24 Hr Take 1 tablet (5 mg total) by mouth daily. (Patient not taking: Reported on 8/21/2024)    methenamine  1 g Oral Tab Take 0.5 tablets (0.5 g total) by mouth 2 (two) times daily with meals. (Patient not taking: Reported on 8/21/2024)    loratadine 10 MG Oral Tab Take 1 tablet (10 mg total) by mouth in the morning, at noon, and at bedtime. (Patient not taking: Reported on 8/21/2024)    cyclobenzaprine 10 MG Oral Tab Take 1 tablet (10 mg total) by mouth 3 (three) times daily as needed for Muscle spasms. (Patient not taking: Reported on 8/21/2024)    albuterol 108 (90 Base) MCG/ACT Inhalation Aero Soln Inhale 2 puffs into the lungs every 6 (six) hours as needed for Wheezing or Shortness of Breath.    Insulin Aspart 100 UNIT/ML Subcutaneous Solution Cartridge SS: 150-200 2units, 201-250 4 units, 251-300 6 units, 301-350 8 units, 351-400 10 units, over 401 call md    Multiple Vitamins-Minerals (THERA-M) Oral Tab Take 1 tablet by mouth daily.    Montelukast Sodium 10 MG Oral Tab Take 1 tablet (10 mg total) by mouth daily. (Patient not taking: Reported on 8/21/2024)    Acidophilus/Pectin Oral Cap Take 1 capsule by mouth 2 (two) times daily. (Patient not taking: Reported on 8/21/2024)       Allergies  Allergies   Allergen Reactions    Pcn [Penicillins] HIVES    Sulfa Antibiotics HIVES    Radiology Contrast Iodinated Dyes DIZZINESS       Review of Systems:   As by Admitting/Attending    Results:   Laboratory Data:  Lab Results   Component Value Date    WBC 12.1 (H) 08/21/2024    HGB 12.3 08/21/2024    HCT 38.8 08/21/2024    .0 08/21/2024    CREATSERUM 0.77 08/21/2024    BUN 8 (L) 08/21/2024     08/21/2024    K 3.7 08/21/2024     08/21/2024    CO2 23.0 08/21/2024     (H) 08/21/2024    CA 8.7 08/21/2024    ALB 4.2 08/20/2024    ALKPHO 91 08/20/2024    TP 7.0 08/20/2024    AST 16 08/20/2024    ALT 25 08/20/2024    PTT 26.4 12/05/2023    LIP 26 08/20/2024    DDIMER 0.92 (H) 12/05/2023    MG 1.6 08/20/2024     08/03/2024    ETOH <3 08/04/2024         Imaging:  CT ABDOMEN+PELVIS(CPT=74176)    Result  Date: 8/21/2024  CONCLUSION:  1.  Suprapubic catheter is present exiting the bladder from the right anterior corner and coursing into the right groin.  Probable mature granulation tract around the catheter from the bladder to the skin surface. 2.  No bowel obstruction.  Jejunal diverticulosis without definite acute inflammation. 3.  Post cholecystectomy.  No significant biliary ductal dilatation. 4.  Chronic appearing left renal atrophy.  Dictated by (CST): Ravinder Ochoa MD on 8/21/2024 at 6:40 PM     Finalized by (CST): Ravinder Ochoa MD on 8/21/2024 at 6:46 PM           Vital Signs:   Blood pressure (!) 147/99, pulse 106, temperature 98.6 °F (37 °C), temperature source Oral, resp. rate 18, height 60\", weight 88.2 kg (194 lb 8 oz), SpO2 100%.    Mental Status Exam:   Appearance: Stated age female, obese, in hospital gown, laying down in hospital bed.  Psychomotor: No psychomotor agitation, or retardation.  Patient talking about feeling shaky and her left arm start to tremor?  Orientation: Alert and oriented to person, place, time and condition.  Gait: Not evaluated.  Attitude/Coorperation: Cooperative and attentive.  Behavior: Questionable reliability.  Speech: Regular rate and rhythm speech.  Mood: Patient reporting depressed and anxious mood.  Affect: Anxious and restless affect congruent with the mood.  Thought process: Circumstantial and distracted thought process.  Thought content: Patient denies any suicidal or homicidal ideation.  Perceptions: Patient denies any auditory or visual hallucinations.  Concentration: Grossly intact.  Memory: Grossly intact.  Intellect: Average.  Judgment and Insight: Questionable.     Impression:     Bipolar disorder type I recent episode depression moderate.  Generalized anxiety disorder with panic attacks.  Sepsis due to urinary tract infection (HCC)    Lactic acid acidosis    Constipation, unspecified constipation type    Patient is a 53 year old , single, female with  past medical history of diabetes, bipolar 1 disorder, depression, MIKE, HTN, diverticulitis, neurogenic bladder, obesity, who was admitted to the hospital for Sepsis due to urinary tract infection (HCC): The patient has been demonstrating increased anxiety and depressive symptoms.    8/21/2024: Patient has been demonstrating increased anxiety, depression, lack of energy, lack of motivation hopelessness, helplessness and increased panic attack.  Patient has been reporting many physical manifestation resulted from her anxiety and somatization.    Discussed risk and benefit, acknowledging the current symptom and severity.  At this point, I would recommend the following approach:     Focus on safety  Focus on education and support.  Focus on insight orientation helping the patient understand diagnosis and treatment plan.  Continue Abilify 5 mg daily.  Continue klonopin 0.5 mg BID as needed for anxiety  Continue Depakote 500 mg BID  Start Cymbalta 30 mg daily  Start Seroquel 100 mg nightly  Processed with patient at length, the initiation of the above psychotropic medications I advised the patient of the risks, benefits, alternatives and potential side effects. The patient consents to administration of the medications and understands the right to refuse medications at any time. The patient verbalized understanding.   Coordinate plan with team    Orders This Visit:  Orders Placed This Encounter   Procedures    Basic Metabolic Panel (8)    Hepatic Function Panel (7)    CBC With Differential With Platelet    Lipase    Urinalysis with Culture Reflex    Lactic Acid, Plasma    Basic Metabolic Panel (8)    CBC With Differential With Platelet    Lactic Acid Reflex Post Positive    Magnesium    Lactic Acid Post Positive    Magnesium    Clostridium difficile(toxigenic)PCR    Blood Culture    Urine Culture, Routine    GI Stool panel by PCR       Meds This Visit:  Requested Prescriptions      No prescriptions requested or ordered in  this encounter       Víctor Qureshi MD  8/21/2024    Note to Patient: The 21st Century Cures Act makes medical notes like these available to patients in the interest of transparency. However, be advised this is a medical document. It is intended as peer to peer communication. It is written in medical language and may contain abbreviations or verbiage that are unfamiliar. It may appear blunt or direct. Medical documents are intended to carry relevant information, facts as evident, and the clinical opinion of the practitioner. This note may have been transcribed using a voice dictation system. Voice recognition errors may occur. This should not be taken to alter the content or meaning of this note.

## 2024-08-22 NOTE — PROGRESS NOTES
[facility]  part of [facility]    Progress Note    Clari Rosa Patient Status:  Inpatient    1970 MRN [id number]   Location [facility] 5SW/SE Attending Tamara Jackson, DO   Hosp Day # 1 PCP Brian Bob MD     Chief complaint uti     Subjective:   Clari Rosa is a(n) 53 year old female pt doing better today     ROS:   No cp, sob   No c/d   No n/v     Objective:   Blood pressure 140/82, pulse 94, temperature 98.4 °F (36.9 °C), temperature source Oral, resp. rate 18, height 5' (1.524 m), weight 194 lb 8 oz (88.2 kg), SpO2 100%.      Intake/Output Summary (Last 24 hours) at 2024  Last data filed at 2024 1430  Gross per 24 hour   Intake 340 ml   Output 1900 ml   Net -1560 ml       Patient Weight(s) for the past 336 hrs:   Weight   24 0244 194 lb 8 oz (88.2 kg)   24 2206 203 lb (92.1 kg)           General appearance: alert, appears stated age and cooperative  Pulmonary:  clear to auscultation bilaterally  Cardiovascular: S1, S2 normal, no murmur, click, rub or gallop, regular rate and rhythm  Abdominal: soft, non-tender; bowel sounds normal; no masses,  no organomegaly  Extremities: extremities normal, atraumatic, no cyanosis or edema        Medicines:     Current Facility-Administered Medications   Medication Dose Route Frequency    bisacodyl (Dulcolax) 10 MG rectal suppository 10 mg  10 mg Rectal Daily PRN    fleet enema (Fleet) 7-19 GM/118ML rectal enema 133 mL  1 enema Rectal Once PRN    ondansetron (Zofran) 4 MG/2ML injection 4 mg  4 mg Intravenous Q6H PRN    prochlorperazine (Compazine) 10 MG/2ML injection 5 mg  5 mg Intravenous Q8H PRN    cefTRIAXone (Rocephin) 1 g in sodium chloride 0.9% 100 mL IVPB-ADDV  1 g Intravenous Q24H    glucose (Dex4) 15 GM/59ML oral liquid 15 g  15 g Oral Q15 Min PRN    Or    glucose (Glutose) 40% oral gel 15 g  15 g Oral Q15 Min PRN    Or    glucose-vitamin C (Dex-4) chewable tab 4 tablet  4 tablet Oral Q15 Min PRN    Or     dextrose 50% injection 50 mL  50 mL Intravenous Q15 Min PRN    Or    glucose (Dex4) 15 GM/59ML oral liquid 30 g  30 g Oral Q15 Min PRN    Or    glucose (Glutose) 40% oral gel 30 g  30 g Oral Q15 Min PRN    Or    glucose-vitamin C (Dex-4) chewable tab 8 tablet  8 tablet Oral Q15 Min PRN    clonazePAM (KlonoPIN) tab 0.5 mg  0.5 mg Oral Nightly PRN    phenazopyridine (Pyridium) tab 100 mg  100 mg Oral TID PRN    multivitamin with minerals (Thera M Plus) tab 1 tablet  1 tablet Oral Daily    divalproex ER (Depakote ER) 24 hr tab 500 mg  500 mg Oral BID    ondansetron (Zofran-ODT) disintegrating tab 8 mg  8 mg Oral Q8H PRN    [START ON 8/26/2024] ergocalciferol (Vitamin D2) cap 50,000 Units  50,000 Units Oral Q7 Days    cyclobenzaprine (Flexeril) tab 10 mg  10 mg Oral TID PRN    albuterol (Ventolin HFA) 108 (90 Base) MCG/ACT inhaler 2 puff  2 puff Inhalation Q6H PRN    atorvastatin (Lipitor) tab 40 mg  40 mg Oral Nightly    acetaminophen (Tylenol) tab 650 mg  650 mg Oral Q6H PRN    meclizine (Antivert) tab 12.5 mg  12.5 mg Oral TID PRN    polyethylene glycol (PEG 3350) (Miralax) 17 g oral packet 17 g  17 g Oral Daily PRN    lactulose (ENULOSE) solution 20 g  20 g Oral Q6H PRN    ARIPiprazole (Abilify) tab 5 mg  5 mg Oral Daily    fluticasone propionate (Flonase) 50 MCG/ACT nasal suspension 2 spray  2 spray Each Nare Daily    insulin aspart (NovoLOG) 100 Units/mL FlexPen 5 Units  5 Units Subcutaneous TID CC    apixaban (Eliquis) tab 5 mg  5 mg Oral BID    traMADol (Ultram) tab 50 mg  50 mg Oral Q6H PRN    miconazole 2 % powder   Topical PRN    insulin degludec (Tresiba) 100 units/mL flextouch 25 Units  25 Units Subcutaneous Nightly    bethanechol (Urecholine) tab 10 mg  10 mg Oral TID    loperamide (Imodium) cap 2 mg  2 mg Oral QID PRN    QUEtiapine (SEROquel) tab 100 mg  100 mg Oral Nightly    DULoxetine (Cymbalta) DR cap 30 mg  30 mg Oral Daily       Narcotic Medications            traMADol 50 MG Oral Tab             Ant-Infective Medications            cephalexin 500 MG Oral Cap ()    methenamine 1 g Oral Tab            Blood Sugar Medications            insulin aspart 100 Units/mL Subcutaneous Solution Pen-injector    insulin glargine 100 UNIT/ML Subcutaneous Solution    glucagon (BAQSIMI ONE PACK) 3 MG/DOSE Nasal nasal powder    Insulin Aspart 100 UNIT/ML Subcutaneous Solution Cartridge    metFORMIN  MG Oral Tablet 24 Hr            Blood Pressure and Cardiac Medications            metoprolol tartrate 25 MG Oral Tab                    Lab Results   Component Value Date    WBC 11.1 (H) 2024    HGB 12.1 2024    HCT 37.8 2024    .0 2024    CREATSERUM 0.73 2024    BUN 10 2024     2024    K 4.2 2024     2024    CO2 29.0 2024    GLU 90 2024    CA 9.9 2024    ALB 4.2 2024    ALKPHO 91 2024    BILT 0.3 2024    TP 7.0 2024    AST 16 2024    ALT 25 2024    PTT 26.4 2023    LIP 26 2024    DDIMER 0.92 (H) 2023    MG 1.7 2024     2024    ETOH <3 2024       CT ABDOMEN+PELVIS(CPT=74176)    Result Date: 2024  CONCLUSION:  1.  Suprapubic catheter is present exiting the bladder from the right anterior corner and coursing into the right groin.  Probable mature granulation tract around the catheter from the bladder to the skin surface. 2.  No bowel obstruction.  Jejunal diverticulosis without definite acute inflammation. 3.  Post cholecystectomy.  No significant biliary ductal dilatation. 4.  Chronic appearing left renal atrophy.  Dictated by (CST): Ravinder Ochoa MD on 2024 at 6:40 PM     Finalized by (CST): Ravinder Ochoa MD on 2024 at 6:46 PM               Results:     CBC:    Lab Results   Component Value Date    WBC 11.1 (H) 2024    WBC 12.1 (H) 2024    WBC 14.8 (H) 2024     Lab Results   Component Value Date    HGB 12.1  08/22/2024    HGB 12.3 08/21/2024    HGB 13.5 08/20/2024      Lab Results   Component Value Date    .0 08/22/2024    .0 08/21/2024    .0 08/20/2024       Recent Labs   Lab 08/20/24  2333 08/21/24  0433 08/22/24  0616   * 169* 90   BUN 8* 8* 10   CREATSERUM 0.73 0.77 0.73   CA 9.7 8.7 9.9   * 137 140   K 3.5 3.7 4.2    106 106   CO2 27.0 23.0 29.0         @[email or web address]@    Assessment and Plan:        Acute cystitis without hematuria with h/o suprapubic catheter and neurogenic bladder   Sepsis , lactic elevated now improved with bolus   Cont iv abx ceftriaxone   Await urine cx   Pt stable without hypotension   Sp sepsis bolus. Now with some edema. Hold for now   Follow blood cxs   Leukocytosis with left shift - cont to follow   ID consult given resistant cxs in past and sepsis   Apprec ID cont iv ceftriaxone   Anxiety and depression - pt requests psych consult   - cont home meds for now   - check depakote level   - apprec psych input     H/o dvt in left leg   - cont eliquis      Diabetes mellitus type 2.  - accu checks ac and hs   - diabetic diet   - glargine 56 units normally - decrease by half and cont 5 u tid novolog                  DVT PPX: eliquis      Chart reviewed, including current vitals, notes, labs and imaging  Labs ordered and medications adjusted as outlined above  Coordinate care with care team/consultants  Discussed with patient results of tests, management plan as outlined above, and the need for ongoing hospitalization  D/w RN     [facility]        8/22/2024

## 2024-08-23 LAB
ANION GAP SERPL CALC-SCNC: 5 MMOL/L (ref 0–18)
BASOPHILS # BLD AUTO: 0.05 X10(3) UL (ref 0–0.2)
BASOPHILS NFR BLD AUTO: 0.6 %
BUN BLD-MCNC: 11 MG/DL (ref 9–23)
BUN/CREAT SERPL: 15.3 (ref 10–20)
CALCIUM BLD-MCNC: 9.8 MG/DL (ref 8.7–10.4)
CHLORIDE SERPL-SCNC: 104 MMOL/L (ref 98–112)
CO2 SERPL-SCNC: 30 MMOL/L (ref 21–32)
CREAT BLD-MCNC: 0.72 MG/DL
DEPRECATED RDW RBC AUTO: 45.1 FL (ref 35.1–46.3)
EGFRCR SERPLBLD CKD-EPI 2021: 100 ML/MIN/1.73M2 (ref 60–?)
EOSINOPHIL # BLD AUTO: 0.53 X10(3) UL (ref 0–0.7)
EOSINOPHIL NFR BLD AUTO: 6 %
ERYTHROCYTE [DISTWIDTH] IN BLOOD BY AUTOMATED COUNT: 14.5 % (ref 11–15)
GLUCOSE BLD-MCNC: 171 MG/DL (ref 70–99)
GLUCOSE BLDC GLUCOMTR-MCNC: 110 MG/DL (ref 70–99)
GLUCOSE BLDC GLUCOMTR-MCNC: 139 MG/DL (ref 70–99)
GLUCOSE BLDC GLUCOMTR-MCNC: 141 MG/DL (ref 70–99)
GLUCOSE BLDC GLUCOMTR-MCNC: 190 MG/DL (ref 70–99)
GLUCOSE BLDC GLUCOMTR-MCNC: 240 MG/DL (ref 70–99)
HCT VFR BLD AUTO: 37.8 %
HGB BLD-MCNC: 12.1 G/DL
IMM GRANULOCYTES # BLD AUTO: 0.05 X10(3) UL (ref 0–1)
IMM GRANULOCYTES NFR BLD: 0.6 %
LYMPHOCYTES # BLD AUTO: 3.58 X10(3) UL (ref 1–4)
LYMPHOCYTES NFR BLD AUTO: 40.6 %
MAGNESIUM SERPL-MCNC: 1.7 MG/DL (ref 1.6–2.6)
MCH RBC QN AUTO: 27.3 PG (ref 26–34)
MCHC RBC AUTO-ENTMCNC: 32 G/DL (ref 31–37)
MCV RBC AUTO: 85.1 FL
MONOCYTES # BLD AUTO: 0.37 X10(3) UL (ref 0.1–1)
MONOCYTES NFR BLD AUTO: 4.2 %
NEUTROPHILS # BLD AUTO: 4.23 X10 (3) UL (ref 1.5–7.7)
NEUTROPHILS # BLD AUTO: 4.23 X10(3) UL (ref 1.5–7.7)
NEUTROPHILS NFR BLD AUTO: 48 %
OSMOLALITY SERPL CALC.SUM OF ELEC: 291 MOSM/KG (ref 275–295)
PLATELET # BLD AUTO: 307 10(3)UL (ref 150–450)
POTASSIUM SERPL-SCNC: 4.2 MMOL/L (ref 3.5–5.1)
RBC # BLD AUTO: 4.44 X10(6)UL
SODIUM SERPL-SCNC: 139 MMOL/L (ref 136–145)
WBC # BLD AUTO: 8.8 X10(3) UL (ref 4–11)

## 2024-08-23 PROCEDURE — 99232 SBSQ HOSP IP/OBS MODERATE 35: CPT | Performed by: NURSE PRACTITIONER

## 2024-08-23 PROCEDURE — 99233 SBSQ HOSP IP/OBS HIGH 50: CPT | Performed by: HOSPITALIST

## 2024-08-23 RX ORDER — MAGNESIUM OXIDE 400 MG/1
400 TABLET ORAL ONCE
Status: COMPLETED | OUTPATIENT
Start: 2024-08-23 | End: 2024-08-23

## 2024-08-23 RX ORDER — DULOXETIN HYDROCHLORIDE 20 MG/1
40 CAPSULE, DELAYED RELEASE ORAL DAILY
Status: DISCONTINUED | OUTPATIENT
Start: 2024-08-24 | End: 2024-08-24

## 2024-08-23 NOTE — PROGRESS NOTES
INFECTIOUS DISEASE PROGRESS NOTE  [facility]  part of [facility] ID PROGRESS NOTE    Clari Rosa Patient Status:  Inpatient    1970 MRN [id number]   Location [facility] 5SW/SE Attending Tamara Jackson, DO   Hosp Day # 2 PCP Brian Bob MD     Subjective:  ROS reviewed. Diarrhea slowing down. Afebrile. Asking to go to a different facility.    ASSESSMENT:    Antibiotics: IV ceftriaxone     # Diarrhea x3 weeks - 3-4x/day - C. Diff negative, GI panel negative     # Pyuria from SPC, urine cx Citrobacter   -Urology saw     # Chronic neurogenic bladder s/p SPC with recurrent UTI               - h/o MDRO PSAR               - recent UCx 8/3/24 with Citrobacter werkmanii and Serratia - ? Colonizer     # Recent LLE DVT 8/3/24  # DM  # Anxiety, depression     PLAN:  -  Currently on ceftriaxone. Will convert to PO cefdinir to complete seven day course (EOT ).  -  Follow fever curve, wbc.  -  Reviewed labs, micro, imaging reports, available old records.  -  Case d/w patient, RN.     History of Present Illness:  53  yoF with a h/o neurogenic bladder with SPC with recurrent UTI, bipolar d/o, lives in [place]. H/o E. Coli, PSAR in UCx MDRO. Seen in 2024 with increased abdominal pain, elevated LA. Discharged on cefdinir. Readmitted 24 with LUE/LLE pain radiating from neck and treated for BLE cellulitis. Discharged on keflex. Multiple ED visits since then for diarrhea. C. Diff negative. Now returns on  with weakness, myalgias, HA, chills. States main issue is diarrhea with 3-4x/day, pasty. Afebrile, room air. WBC 14. LA up to 5.2. Started on IV ceftriaxone.    Physical Exam:  /84 (BP Location: Right arm)   Pulse 94   Temp 98.3 °F (36.8 °C) (Oral)   Resp 18   Ht 5' (1.524 m)   Wt 194 lb 8 oz (88.2 kg)   SpO2 98%   BMI 37.99 kg/m²     Gen:   Awake, in bed  HEENT:  EOMI, neck supple  CV/lungs:  RRR, CTAB  Abdom:  Soft, obese  Skin/extrem:  No rashes, no  c/c/e  :   SPC+  Lines:  PIV+    Laboratory Data: Reviewed    Microbiology: Reviewed    Radiology: Reviewed      [facility]  [phone]  8/23/2024

## 2024-08-23 NOTE — PLAN OF CARE
Problem: Patient Centered Care  Goal: Patient preferences are identified and integrated in the patient's plan of care  Description: Interventions:  - What would you like us to know as we care for you?   - Provide timely, complete, and accurate information to patient/family  - Incorporate patient and family knowledge, values, beliefs, and cultural backgrounds into the planning and delivery of care  - Encourage patient/family to participate in care and decision-making at the level they choose  - Honor patient and family perspectives and choices  Outcome: Progressing     Problem: GASTROINTESTINAL - ADULT  Goal: Minimal or absence of nausea and vomiting  Description: INTERVENTIONS:  - Maintain adequate hydration with IV or PO as ordered and tolerated  - Nasogastric tube to low intermittent suction as ordered  - Evaluate effectiveness of ordered antiemetic medications  - Provide nonpharmacologic comfort measures as appropriate  - Advance diet as tolerated, if ordered  - Obtain nutritional consult as needed  - Evaluate fluid balance  Outcome: Progressing  Goal: Maintains or returns to baseline bowel function  Description: INTERVENTIONS:  - Assess bowel function  - Maintain adequate hydration with IV or PO as ordered and tolerated  - Evaluate effectiveness of GI medications  - Encourage mobilization and activity  - Obtain nutritional consult as needed  - Establish a toileting routine/schedule  - Consider collaborating with pharmacy to review patient's medication profile  Outcome: Progressing     Problem: GENITOURINARY - ADULT  Goal: Absence of urinary retention  Description: INTERVENTIONS:  - Assess patient’s ability to void and empty bladder  - Monitor intake/output and perform bladder scan as needed  - Follow urinary retention protocol/standard of care  - Consider collaborating with pharmacy to review patient's medication profile  - Implement strategies to promote bladder emptying  Outcome: Progressing     Problem:  SKIN/TISSUE INTEGRITY - ADULT  Goal: Skin integrity remains intact  Description: INTERVENTIONS  - Assess and document risk factors for pressure ulcer development  - Assess and document skin integrity  - Monitor for areas of redness and/or skin breakdown  - Initiate interventions, skin care algorithm/standards of care as needed  Outcome: Progressing     Problem: PAIN - ADULT  Goal: Verbalizes/displays adequate comfort level or patient's stated pain goal  Description: INTERVENTIONS:  - Encourage pt to monitor pain and request assistance  - Assess pain using appropriate pain scale  - Administer analgesics based on type and severity of pain and evaluate response  - Implement non-pharmacological measures as appropriate and evaluate response  - Consider cultural and social influences on pain and pain management  - Manage/alleviate anxiety  - Utilize distraction and/or relaxation techniques  - Monitor for opioid side effects  - Notify MD/LIP if interventions unsuccessful or patient reports new pain  - Anticipate increased pain with activity and pre-medicate as appropriate  Outcome: Progressing     Problem: RISK FOR INFECTION - ADULT  Goal: Absence of fever/infection during anticipated neutropenic period  Description: INTERVENTIONS  - Monitor WBC  - Administer growth factors as ordered  - Implement neutropenic guidelines  Outcome: Progressing     Problem: SAFETY ADULT - FALL  Goal: Free from fall injury  Description: INTERVENTIONS:  - Assess pt frequently for physical needs  - Identify cognitive and physical deficits and behaviors that affect risk of falls.  - [place] fall precautions as indicated by assessment.  - Educate pt/family on patient safety including physical limitations  - Instruct pt to call for assistance with activity based on assessment  - Modify environment to reduce risk of injury  - Provide assistive devices as appropriate  - Consider OT/PT consult to assist with strengthening/mobility  - Encourage  toileting schedule  Outcome: Progressing     Problem: DISCHARGE PLANNING  Goal: Discharge to home or other facility with appropriate resources  Description: INTERVENTIONS:  - Identify barriers to discharge w/pt and caregiver  - Include patient/family/discharge partner in discharge planning  - Arrange for needed discharge resources and transportation as appropriate  - Identify discharge learning needs (meds, wound care, etc)  - Arrange for interpreters to assist at discharge as needed  - Consider post-discharge preferences of patient/family/discharge partner  - Complete POLST form as appropriate  - Assess patient's ability to be responsible for managing their own health  - Refer to Case Management Department for coordinating discharge planning if the patient needs post-hospital services based on physician/LIP order or complex needs related to functional status, cognitive ability or social support system  Outcome: Progressing   Safety precautions on place. Supra pubic cath changed by urology. PRN medication given for pain management and dizziness. Call light within reach.

## 2024-08-23 NOTE — SPIRITUAL CARE NOTE
Spiritual Care Visit Note    Patient Name: Clari Rosa Date of Spiritual Care Visit: 24   : 1970 Primary Dx: Acute cystitis without hematuria       Referred By: Referral From: Patient;Nurse    Spiritual Care Taxonomy:    Intended Effects: Aligning care plan with patient's values    Methods: Setting boundaries;Encourage sharing of feelings;Encourage someone to recognize their strengths;Explore presence of God;Collaborate with care team member    Interventions: Acknowledge current situation;Acknowledge response to difficult experience;Active listening;Ask guided questions;Ask guided questions about the nature and presence of God;Discuss coping mechanism with someone;[place];Provide sacred reading(s);Provde spiritual/Anabaptist resources    Visit Type/Summary:     - Spiritual Care: Responded to a request via the on call phone Consulted with RN prior to visit. Offered empathic listening and emotional support. Patient and family expressed appreciation for  visit. Provided support for Patient's spiritual/Anabaptist requests. Offered prayer. Provided a reading from a sacred text. Provided spiritual/Anabaptist literature, per Patient's request.  remains available for follow up.    Spiritual Care support can be requested via an Epic consult. For urgent/immediate needs, please contact the On Call  at: [place]: ext [phone]    Rev Brianna Kaur MDiv

## 2024-08-23 NOTE — PROGRESS NOTES
Patient is a 53 year old , single, female with past medical history of diabetes, bipolar 1 disorder, depression, MIKE, HTN, diverticulitis, neurogenic bladder, obesity, who was admitted to the hospital for Acute cystitis without hematuria: The patient has been demonstrating increased anxiety and depressive symptoms. Patient indicated for psych consult for evaluation and advise.  Consult Duration   The patient seen for over 50-minute, follow-up evaluation, over 50% counseling and coordinating care addressing anxiety and depression.    Record reviewed, communication with attending, communication with RN and patient seen face to face evaluation.    History of Present Illness:       Communicating with the team, the patient continues to present anxious and restless. No issues reported overnight the patient this morning that she needs to be fed by staff because her hands are dirty.     The patient is receiving: Abilify 5 mg, Depakote 500 mg BID, Cymbalta 30 mg, Seroquel 100 mg, klonopin 0.5 mg PRN nightly    The patient seen today laying in hospital bed. The patient presents anxious and restless.     The patient stating that she needs \"mental and physical help.\" She notes that she has a yeast infection and has UTI.    The patient reporting intense anxiety, worry, ruminations with physical manifestations. The patient reporting that her hands have not been right and that she needs someone to feed her lunch.     The patient reporting that her physical health has been challenging her the most.     The patient continues to demonstrate feelings of hopelessness, helplessness, panic attack with increase in anxiety, dizziness, chest pain, palpitation and numbness.    The patient is not demonstrating any carmel or psychosis  The patient denies auditory or visual hallucinations  The patient denies suicidal or homicidal ideation.    Provided patient with supportive psychotherapy including listening, emotional support and  encouragement.     The patient has been demonstrating increased anxiety, restlessness, irritability and difficulty managing her emotion.  No suicidal ideation or indication for inpatient admission    Past Psychiatric/Medication History:  1. Prior diagnoses: MIKE, depression, Bipolar 1 disorder, Patient states she also has OCD and Panic attacks sine adolescent years.  2. Past psychiatric inpatient: Patient has been coming frequently to our facility for somatic manifestation.  3. Past outpatient history: Patient reports seeing a psychiatris at Nursing Home   4. Past suicide history: Patient denies   5. Medication history: Wellbutrin XL 24 hr tab 300mg, Klonopin 1mg q6h, Seroquel 300mg BID,      Social History:   The patient states the she lives in a Nursing home due to medical conditions. She is single and has never  but has one living son.      Denies any ETOH abuse, or illicit substance abuse. Previous smoker (5 pack history)     Family History:  No family history reported   Medical History:   Past Medical History  Past Medical History:    Bipolar 1 disorder (HCC)    Dental caries    Diabetes (HCC)    MIKE (generalized anxiety disorder)    High blood pressure    History of diverticulitis of colon    Manipulative behavior    Neurogenic bladder    Obesity (BMI 30-39.9)    Paraplegia (HCC)    Sepsis following intra-abdominal surgery (HCC)    Serotonin syndrome    Sleep apnea    Suprapubic catheter (HCC)    Transverse myelitis (HCC)       Past Surgical History  Past Surgical History:   Procedure Laterality Date    Arthroscopy of joint unlisted      knee          Cholecystectomy      Part removal colon w end colostomy      2013    Tonsillectomy         Family History  Family History   Adopted: Yes       Social History  Social History     Socioeconomic History    Marital status: Single   Tobacco Use    Smoking status: Former     Current packs/day: 1.00     Average packs/day: 1 pack/day for 5.0 years (5.0 ttl  pk-yrs)     Types: Cigarettes    Tobacco comments:     quit 2006   Vaping Use    Vaping status: Never Used   Substance and Sexual Activity    Alcohol use: No    Drug use: No     Social Determinants of Health     Food Insecurity: No Food Insecurity (8/21/2024)    Food Insecurity     Food Insecurity: Never true   Transportation Needs: No Transportation Needs (8/21/2024)    Transportation Needs     Lack of Transportation: No   Housing Stability: Low Risk  (8/21/2024)    Housing Stability     Housing Instability: No           Current Medications:  Current Facility-Administered Medications   Medication Dose Route Frequency    bisacodyl (Dulcolax) 10 MG rectal suppository 10 mg  10 mg Rectal Daily PRN    fleet enema (Fleet) 7-19 GM/118ML rectal enema 133 mL  1 enema Rectal Once PRN    ondansetron (Zofran) 4 MG/2ML injection 4 mg  4 mg Intravenous Q6H PRN    prochlorperazine (Compazine) 10 MG/2ML injection 5 mg  5 mg Intravenous Q8H PRN    cefTRIAXone (Rocephin) 1 g in sodium chloride 0.9% 100 mL IVPB-ADDV  1 g Intravenous Q24H    glucose (Dex4) 15 GM/59ML oral liquid 15 g  15 g Oral Q15 Min PRN    Or    glucose (Glutose) 40% oral gel 15 g  15 g Oral Q15 Min PRN    Or    glucose-vitamin C (Dex-4) chewable tab 4 tablet  4 tablet Oral Q15 Min PRN    Or    dextrose 50% injection 50 mL  50 mL Intravenous Q15 Min PRN    Or    glucose (Dex4) 15 GM/59ML oral liquid 30 g  30 g Oral Q15 Min PRN    Or    glucose (Glutose) 40% oral gel 30 g  30 g Oral Q15 Min PRN    Or    glucose-vitamin C (Dex-4) chewable tab 8 tablet  8 tablet Oral Q15 Min PRN    clonazePAM (KlonoPIN) tab 0.5 mg  0.5 mg Oral Nightly PRN    phenazopyridine (Pyridium) tab 100 mg  100 mg Oral TID PRN    multivitamin with minerals (Thera M Plus) tab 1 tablet  1 tablet Oral Daily    divalproex ER (Depakote ER) 24 hr tab 500 mg  500 mg Oral BID    ondansetron (Zofran-ODT) disintegrating tab 8 mg  8 mg Oral Q8H PRN    [START ON 8/26/2024] ergocalciferol (Vitamin D2) cap  50,000 Units  50,000 Units Oral Q7 Days    cyclobenzaprine (Flexeril) tab 10 mg  10 mg Oral TID PRN    albuterol (Ventolin HFA) 108 (90 Base) MCG/ACT inhaler 2 puff  2 puff Inhalation Q6H PRN    atorvastatin (Lipitor) tab 40 mg  40 mg Oral Nightly    acetaminophen (Tylenol) tab 650 mg  650 mg Oral Q6H PRN    meclizine (Antivert) tab 12.5 mg  12.5 mg Oral TID PRN    polyethylene glycol (PEG 3350) (Miralax) 17 g oral packet 17 g  17 g Oral Daily PRN    lactulose (ENULOSE) solution 20 g  20 g Oral Q6H PRN    ARIPiprazole (Abilify) tab 5 mg  5 mg Oral Daily    fluticasone propionate (Flonase) 50 MCG/ACT nasal suspension 2 spray  2 spray Each Nare Daily    insulin aspart (NovoLOG) 100 Units/mL FlexPen 5 Units  5 Units Subcutaneous TID CC    apixaban (Eliquis) tab 5 mg  5 mg Oral BID    traMADol (Ultram) tab 50 mg  50 mg Oral Q6H PRN    miconazole 2 % powder   Topical PRN    insulin degludec (Tresiba) 100 units/mL flextouch 25 Units  25 Units Subcutaneous Nightly    bethanechol (Urecholine) tab 10 mg  10 mg Oral TID    loperamide (Imodium) cap 2 mg  2 mg Oral QID PRN    QUEtiapine (SEROquel) tab 100 mg  100 mg Oral Nightly    DULoxetine (Cymbalta) DR cap 30 mg  30 mg Oral Daily     Medications Prior to Admission   Medication Sig    QUEtiapine 50 MG Oral Tab Take 1 tablet (50 mg total) by mouth nightly.    LORazepam 1 MG Oral Tab Take 1 tablet (1 mg total) by mouth in the morning, at noon, and at bedtime.    traMADol 50 MG Oral Tab Take 1 tablet (50 mg total) by mouth every 6 (six) hours as needed for Pain.    apixaban 5 MG Oral Tab Take 2 tabs (10mg) by mouth twice daily for 7 days, then take 1 tab (5mg) by mouth twice daily thereafter.  Discontinue other blood thinners. (Patient taking differently: Take 1 tablet (5 mg total) by mouth 2 (two) times daily.)    insulin aspart 100 Units/mL Subcutaneous Solution Pen-injector Inject 10 Units into the skin 3 (three) times daily with meals.    phenazopyridine 100 MG Oral Tab  Detail Level: Detailed Take 1 tablet (100 mg total) by mouth 3 (three) times daily as needed for Pain.    metoprolol tartrate 25 MG Oral Tab Take 1 tablet (25 mg total) by mouth daily.    traZODone 50 MG Oral Tab Take 1 tablet (50 mg total) by mouth nightly as needed for Sleep.    QUEtiapine 200 MG Oral Tab Take 0.5 tablets (100 mg total) by mouth 3 (three) times daily.    Nystatin 596705 UNIT/GM External Powder Apply 1 Application topically as needed for Dry Skin. Under breast and groin area    acetaminophen 325 MG Oral Tab Take 2 tablets (650 mg total) by mouth every 6 (six) hours as needed for Pain.    bethanechol 10 MG Oral Tab Take 1 tablet (10 mg total) by mouth daily.    divalproex Sodium  MG Oral Tablet 24 Hr Take 1 tablet (500 mg total) by mouth 2 (two) times daily.    ondansetron 4 MG Oral Tablet Dispersible Take 2 tablets (8 mg total) by mouth every 8 (eight) hours as needed for Nausea.    metFORMIN  MG Oral Tablet 24 Hr Take 2 tablets (1,000 mg total) by mouth 2 (two) times daily with meals.    clonazePAM 0.5 MG Oral Tab Take 1 tablet (0.5 mg total) by mouth 2 (two) times daily. (Patient taking differently: Take 1 tablet (0.5 mg total) by mouth at bedtime.)    insulin glargine 100 UNIT/ML Subcutaneous Solution Inject 56 Units into the skin nightly.    [] cephalexin 500 MG Oral Cap Take 1 capsule (500 mg total) by mouth 3 (three) times daily.    [] fluticasone propionate 50 MCG/ACT Nasal Suspension 2 sprays by Nasal route daily.    ARIPiprazole 5 MG Oral Tab Take 1 tablet (5 mg total) by mouth daily.    Cranberry 500 MG Oral Cap 1 capsule PO daily    Insulin Pen Needle 32G X 4 MM Does not apply Misc May substitute if not on insurance formulary    polyethylene glycol, PEG 3350, 17 g Oral Powd Pack Take 17 g by mouth daily as needed (If no bowel movement in last 24 hours). (Patient not taking: Reported on 2024)    lactulose 20 GM/30ML Oral Solution Take 30 mL (20 g total) by mouth every 6 (six)  Quality 137: Melanoma: Continuity Of Care - Recall System: Patient information entered into a recall system that includes: target date for the next exam specified AND a process to follow up with patients regarding missed or unscheduled appointments hours as needed (constipation).    hydrOXYzine 10 MG Oral Tab Take 1 tablet (10 mg total) by mouth 3 (three) times daily as needed for Anxiety. (Patient not taking: Reported on 8/21/2024)    docusate sodium 100 MG Oral Cap Take 100 mg by mouth 2 (two) times daily.    meclizine 12.5 MG Oral Tab Take 1 tablet (12.5 mg total) by mouth 3 (three) times daily as needed for Dizziness. (Patient not taking: Reported on 8/21/2024)    DULoxetine HCl 20 MG Oral Capsule Delayed Release Sprinkle Take 20 mg by mouth daily.    gabapentin 50 MG Oral Tab Take 300 mg by mouth 3 (three) times daily. (Patient not taking: Reported on 8/21/2024)    glucagon (BAQSIMI ONE PACK) 3 MG/DOSE Nasal nasal powder 3 mg by Nasal route once as needed for Low blood glucose.    atorvastatin 40 MG Oral Tab Take 1 tablet (40 mg total) by mouth nightly. (Patient not taking: Reported on 8/21/2024)    sennosides 8.6 MG Oral Tab Take 2 tablets (17.2 mg total) by mouth 2 (two) times daily. (Patient not taking: Reported on 8/21/2024)    Icosapent Ethyl 1 g Oral Cap Take 2 capsules by mouth 4 (four) times daily.    rivaroxaban (XARELTO) 20 MG Oral Tab Take 1 tablet (20 mg total) by mouth daily. (Patient not taking: Reported on 8/21/2024)    Melatonin 3 MG Oral Cap Take 1 capsule (3 mg total) by mouth at bedtime.    ergocalciferol 1.25 MG (27220 UT) Oral Cap Take 1 capsule (50,000 Units total) by mouth every 7 days. Every monday    oxybutynin ER 5 MG Oral Tablet 24 Hr Take 1 tablet (5 mg total) by mouth daily. (Patient not taking: Reported on 8/21/2024)    methenamine 1 g Oral Tab Take 0.5 tablets (0.5 g total) by mouth 2 (two) times daily with meals. (Patient not taking: Reported on 8/21/2024)    loratadine 10 MG Oral Tab Take 1 tablet (10 mg total) by mouth in the morning, at noon, and at bedtime. (Patient not taking: Reported on 8/21/2024)    cyclobenzaprine 10 MG Oral Tab Take 1 tablet (10 mg total) by mouth 3 (three) times daily as needed for Muscle spasms.  (Patient not taking: Reported on 8/21/2024)    albuterol 108 (90 Base) MCG/ACT Inhalation Aero Soln Inhale 2 puffs into the lungs every 6 (six) hours as needed for Wheezing or Shortness of Breath.    Insulin Aspart 100 UNIT/ML Subcutaneous Solution Cartridge SS: 150-200 2units, 201-250 4 units, 251-300 6 units, 301-350 8 units, 351-400 10 units, over 401 call md    Multiple Vitamins-Minerals (THERA-M) Oral Tab Take 1 tablet by mouth daily.    Montelukast Sodium 10 MG Oral Tab Take 1 tablet (10 mg total) by mouth daily. (Patient not taking: Reported on 8/21/2024)    Acidophilus/Pectin Oral Cap Take 1 capsule by mouth 2 (two) times daily. (Patient not taking: Reported on 8/21/2024)       Allergies  Allergies   Allergen Reactions    Pcn [Penicillins] HIVES    Sulfa Antibiotics HIVES    Radiology Contrast Iodinated Dyes DIZZINESS       Review of Systems:   As by Admitting/Attending    Results:   Laboratory Data:  Lab Results   Component Value Date    WBC 8.8 08/23/2024    HGB 12.1 08/23/2024    HCT 37.8 08/23/2024    .0 08/23/2024    CREATSERUM 0.72 08/23/2024    BUN 11 08/23/2024     08/23/2024    K 4.2 08/23/2024     08/23/2024    CO2 30.0 08/23/2024     (H) 08/23/2024    CA 9.8 08/23/2024    ALB 4.2 08/20/2024    ALKPHO 91 08/20/2024    TP 7.0 08/20/2024    AST 16 08/20/2024    ALT 25 08/20/2024    PTT 26.4 12/05/2023    LIP 26 08/20/2024    DDIMER 0.92 (H) 12/05/2023    MG 1.7 08/23/2024     08/03/2024    ETOH <3 08/04/2024         Imaging:  CT ABDOMEN+PELVIS(CPT=74176)    Result Date: 8/21/2024  CONCLUSION:  1.  Suprapubic catheter is present exiting the bladder from the right anterior corner and coursing into the right groin.  Probable mature granulation tract around the catheter from the bladder to the skin surface. 2.  No bowel obstruction.  Jejunal diverticulosis without definite acute inflammation. 3.  Post cholecystectomy.  No significant biliary ductal dilatation. 4.  Chronic  appearing left renal atrophy.  Dictated by (CST): Ravinder Ochoa MD on 8/21/2024 at 6:40 PM     Finalized by (CST): Ravinder Ochoa MD on 8/21/2024 at 6:46 PM           Vital Signs:   Blood pressure 140/84, pulse 94, temperature 98.3 °F (36.8 °C), temperature source Oral, resp. rate 18, height 5' (1.524 m), weight 88.2 kg (194 lb 8 oz), SpO2 98%.    Mental Status Exam:   Appearance: Stated age female, obese, in hospital gown, laying down in hospital bed.  Psychomotor: No psychomotor agitation, or retardation.  Patient talking about feeling shaky and her left arm start to tremor?  Orientation: Alert and oriented to person, place, time and condition.  Gait: Not evaluated.  Attitude/Coorperation: Cooperative and attentive.  Behavior: Questionable reliability.  Speech: Regular rate and rhythm speech.  Mood: Patient reporting depressed and anxious mood.  Affect: Anxious and restless affect congruent with the mood.  Thought process: Circumstantial and distracted thought process.  Thought content: Patient denies any suicidal or homicidal ideation.  Perceptions: Patient denies any auditory or visual hallucinations.  Concentration: Grossly intact.  Memory: Grossly intact.  Intellect: Average.  Judgment and Insight: Questionable.     Impression:     Bipolar disorder type I recent episode depression moderate.  Generalized anxiety disorder with panic attacks.  Sepsis due to urinary tract infection (HCC)    Lactic acid acidosis    Constipation, unspecified constipation type    Patient is a 53 year old , single, female with past medical history of diabetes, bipolar 1 disorder, depression, MIKE, HTN, diverticulitis, neurogenic bladder, obesity, who was admitted to the hospital for Sepsis due to urinary tract infection (HCC): The patient has been demonstrating increased anxiety and depressive symptoms.    8/21/2024: Patient has been demonstrating increased anxiety, depression, lack of energy, lack of motivation hopelessness,  helplessness and increased panic attack.  Patient has been reporting many physical manifestation resulted from her anxiety and somatization.    8/23/2024: The patient continues to demonstrate increased anxiety, restlessness, panicky feeling with physical manifestations. She denies any suicidal ideations. She is not demonstrating any safety concerns. She is appropriate for discharge once medically cleared.     Discussed risk and benefit, acknowledging the current symptom and severity.  At this point, I would recommend the following approach:     Focus on safety  Focus on education and support.  Focus on insight orientation helping the patient understand diagnosis and treatment plan.  Continue Abilify 5 mg daily.  Continue klonopin 0.5 mg BID as needed for anxiety  Continue Depakote 500 mg BID  Increase Cymbalta to 60 mg daily  Continue Seroquel 100 mg nightly  Processed with patient at length, the initiation of the above psychotropic medications I advised the patient of the risks, benefits, alternatives and potential side effects. The patient consents to administration of the medications and understands the right to refuse medications at any time. The patient verbalized understanding.   Coordinate plan with team    Orders This Visit:  Orders Placed This Encounter   Procedures    Basic Metabolic Panel (8)    Hepatic Function Panel (7)    CBC With Differential With Platelet    Lipase    Urinalysis with Culture Reflex    Lactic Acid, Plasma    Basic Metabolic Panel (8)    CBC With Differential With Platelet    Lactic Acid Reflex Post Positive    Magnesium    Lactic Acid Post Positive    Magnesium    Magnesium    Magnesium    Clostridium difficile(toxigenic)PCR    Blood Culture    Urine Culture, Routine    GI Stool panel by PCR       Meds This Visit:  Requested Prescriptions      No prescriptions requested or ordered in this encounter     AMBAR Miranda  8/23/2024    Note to Patient: The 21st Century Cures Act makes  medical notes like these available to patients in the interest of transparency. However, be advised this is a medical document. It is intended as peer to peer communication. It is written in medical language and may contain abbreviations or verbiage that are unfamiliar. It may appear blunt or direct. Medical documents are intended to carry relevant information, facts as evident, and the clinical opinion of the practitioner. This note may have been transcribed using a voice dictation system. Voice recognition errors may occur. This should not be taken to alter the content or meaning of this note.

## 2024-08-23 NOTE — PROGRESS NOTES
[facility]  part of [facility]    Progress Note    Clari Rosa Patient Status:  Inpatient    1970 MRN [id number]   Location [facility] 5SW/SE Attending Tamara Jackson, DO   Hosp Day # 2 PCP Brian Bob MD     Chief complaint uti     Subjective:   Clari Rosa is a(n) 53 year old female pt reports yeast infection     ROS:   No cp, sob   No c/d   No n/v     Objective:   Blood pressure 141/79, pulse 109, temperature 97.6 °F (36.4 °C), temperature source Oral, resp. rate 18, height 5' (1.524 m), weight 194 lb 8 oz (88.2 kg), SpO2 98%.      Intake/Output Summary (Last 24 hours) at 2024 1750  Last data filed at 2024 0943  Gross per 24 hour   Intake 510 ml   Output 2050 ml   Net -1540 ml       Patient Weight(s) for the past 336 hrs:   Weight   24 0244 194 lb 8 oz (88.2 kg)   24 2206 203 lb (92.1 kg)           General appearance: alert, appears stated age and cooperative  Pulmonary:  clear to auscultation bilaterally  Cardiovascular: S1, S2 normal, no murmur, click, rub or gallop, regular rate and rhythm  Abdominal: soft, non-tender; bowel sounds normal; no masses,  no organomegaly  Extremities: extremities normal, atraumatic, no cyanosis or edema        Medicines:     Current Facility-Administered Medications   Medication Dose Route Frequency    [START ON 2024] DULoxetine (Cymbalta) DR cap 40 mg  40 mg Oral Daily    bisacodyl (Dulcolax) 10 MG rectal suppository 10 mg  10 mg Rectal Daily PRN    fleet enema (Fleet) 7-19 GM/118ML rectal enema 133 mL  1 enema Rectal Once PRN    ondansetron (Zofran) 4 MG/2ML injection 4 mg  4 mg Intravenous Q6H PRN    prochlorperazine (Compazine) 10 MG/2ML injection 5 mg  5 mg Intravenous Q8H PRN    cefTRIAXone (Rocephin) 1 g in sodium chloride 0.9% 100 mL IVPB-ADDV  1 g Intravenous Q24H    glucose (Dex4) 15 GM/59ML oral liquid 15 g  15 g Oral Q15 Min PRN    Or    glucose (Glutose) 40% oral gel 15 g  15 g Oral Q15 Min PRN    Or     glucose-vitamin C (Dex-4) chewable tab 4 tablet  4 tablet Oral Q15 Min PRN    Or    dextrose 50% injection 50 mL  50 mL Intravenous Q15 Min PRN    Or    glucose (Dex4) 15 GM/59ML oral liquid 30 g  30 g Oral Q15 Min PRN    Or    glucose (Glutose) 40% oral gel 30 g  30 g Oral Q15 Min PRN    Or    glucose-vitamin C (Dex-4) chewable tab 8 tablet  8 tablet Oral Q15 Min PRN    clonazePAM (KlonoPIN) tab 0.5 mg  0.5 mg Oral Nightly PRN    phenazopyridine (Pyridium) tab 100 mg  100 mg Oral TID PRN    multivitamin with minerals (Thera M Plus) tab 1 tablet  1 tablet Oral Daily    divalproex ER (Depakote ER) 24 hr tab 500 mg  500 mg Oral BID    ondansetron (Zofran-ODT) disintegrating tab 8 mg  8 mg Oral Q8H PRN    [START ON 8/26/2024] ergocalciferol (Vitamin D2) cap 50,000 Units  50,000 Units Oral Q7 Days    cyclobenzaprine (Flexeril) tab 10 mg  10 mg Oral TID PRN    albuterol (Ventolin HFA) 108 (90 Base) MCG/ACT inhaler 2 puff  2 puff Inhalation Q6H PRN    atorvastatin (Lipitor) tab 40 mg  40 mg Oral Nightly    acetaminophen (Tylenol) tab 650 mg  650 mg Oral Q6H PRN    meclizine (Antivert) tab 12.5 mg  12.5 mg Oral TID PRN    polyethylene glycol (PEG 3350) (Miralax) 17 g oral packet 17 g  17 g Oral Daily PRN    lactulose (ENULOSE) solution 20 g  20 g Oral Q6H PRN    ARIPiprazole (Abilify) tab 5 mg  5 mg Oral Daily    fluticasone propionate (Flonase) 50 MCG/ACT nasal suspension 2 spray  2 spray Each Nare Daily    insulin aspart (NovoLOG) 100 Units/mL FlexPen 5 Units  5 Units Subcutaneous TID CC    apixaban (Eliquis) tab 5 mg  5 mg Oral BID    traMADol (Ultram) tab 50 mg  50 mg Oral Q6H PRN    miconazole 2 % powder   Topical PRN    insulin degludec (Tresiba) 100 units/mL flextouch 25 Units  25 Units Subcutaneous Nightly    bethanechol (Urecholine) tab 10 mg  10 mg Oral TID    loperamide (Imodium) cap 2 mg  2 mg Oral QID PRN    QUEtiapine (SEROquel) tab 100 mg  100 mg Oral Nightly       Narcotic Medications            traMADol 50  MG Oral Tab            Ant-Infective Medications            cephalexin 500 MG Oral Cap ()    methenamine 1 g Oral Tab            Blood Sugar Medications            insulin aspart 100 Units/mL Subcutaneous Solution Pen-injector    insulin glargine 100 UNIT/ML Subcutaneous Solution    glucagon (BAQSIMI ONE PACK) 3 MG/DOSE Nasal nasal powder    Insulin Aspart 100 UNIT/ML Subcutaneous Solution Cartridge    metFORMIN  MG Oral Tablet 24 Hr            Blood Pressure and Cardiac Medications            metoprolol tartrate 25 MG Oral Tab                    Lab Results   Component Value Date    WBC 8.8 2024    HGB 12.1 2024    HCT 37.8 2024    .0 2024    CREATSERUM 0.72 2024    BUN 11 2024     2024    K 4.2 2024     2024    CO2 30.0 2024     (H) 2024    CA 9.8 2024    ALB 4.2 2024    ALKPHO 91 2024    BILT 0.3 2024    TP 7.0 2024    AST 16 2024    ALT 25 2024    PTT 26.4 2023    LIP 26 2024    DDIMER 0.92 (H) 2023    MG 1.7 2024     2024    ETOH <3 2024       CT ABDOMEN+PELVIS(CPT=74176)    Result Date: 2024  CONCLUSION:  1.  Suprapubic catheter is present exiting the bladder from the right anterior corner and coursing into the right groin.  Probable mature granulation tract around the catheter from the bladder to the skin surface. 2.  No bowel obstruction.  Jejunal diverticulosis without definite acute inflammation. 3.  Post cholecystectomy.  No significant biliary ductal dilatation. 4.  Chronic appearing left renal atrophy.  Dictated by (CST): Ravinder Ochoa MD on 2024 at 6:40 PM     Finalized by (CST): Ravinder Ochoa MD on 2024 at 6:46 PM               Results:     CBC:    Lab Results   Component Value Date    WBC 8.8 2024    WBC 11.1 (H) 2024    WBC 12.1 (H) 2024     Lab Results   Component Value Date    HGB  12.1 08/23/2024    HGB 12.1 08/22/2024    HGB 12.3 08/21/2024      Lab Results   Component Value Date    .0 08/23/2024    .0 08/22/2024    .0 08/21/2024       Recent Labs   Lab 08/21/24  0433 08/22/24  0616 08/23/24  0557   * 90 171*   BUN 8* 10 11   CREATSERUM 0.77 0.73 0.72   CA 8.7 9.9 9.8    140 139   K 3.7 4.2 4.2    106 104   CO2 23.0 29.0 30.0         @[email or web address]@    Assessment and Plan:        Acute cystitis without hematuria with h/o suprapubic catheter and neurogenic bladder   Sepsis , lactic elevated now improved with bolus   Cont iv abx ceftriaxone   Apprec ID consult - cont iv ceftriaxone   - apprec urology - spc changed today 8/23/2024      Leukocytosis with left shift - cont to follow - improved    Anxiety and depression - pt requests psych consult   - cont home meds for now   - apprec psych input     H/o dvt in left leg   - cont eliquis      Diabetes mellitus type 2.  - accu checks ac and hs   - diabetic diet   - glargine 56 units normally - decrease by half and cont 5 u tid novolog                  DVT PPX: eliquis      Chart reviewed, including current vitals, notes, labs and imaging  Labs ordered and medications adjusted as outlined above  Coordinate care with care team/consultants  Discussed with patient results of tests, management plan as outlined above, and the need for ongoing hospitalization  D/w RN     [facility] high        8/22/2024

## 2024-08-23 NOTE — PROCEDURES
Clari Moe  : 1970  Referring Physician: Brian Bob MD     Chief Complaint   Patient presents with    Urinary Symptoms   UTI,   Neurogenic bladder  Recurrent UTI  Chronic SPT          Patient SPT site prepped and draped  We exchanged the existing catheter with a new SPT  Patient tolerated well  Clear urine draining    DIAGNOSIS: SPT, neurogenic bladder, recurrent UTI      PLAN: catheter changed  Needs to establish with urologist outside of [facility]    Panda Taylor MD  Department of Urology  Documentation of office visit sent to:  Brian Bob MD

## 2024-08-23 NOTE — CDS QUERY
How to answer this Query:    1.) DON'T CLICK COSIGN BUTTON FIRST  2.) Click \"3 dots...\" to the right of cosign button and click EDIT on the toolbar.  2.) Type an \"X\" in the bracket for the diagnosis that applies. (You may also add additional clinical details as you feel necessary to substantiate your response).   3.) Finally click \"Sign\" to complete response.  Thank You      CLINICAL DOCUMENTATION CLARIFICATION    DEAR DR CARBAJAL    Please provide additional documentation in the patient's medical record supportive of your documented diagnosis of SEPSIS.       ( )  Condition was ruled out and amended documentation provided in the medical record        ( )  Condition was evident because: __hypotension, lactic acid and leukocytosis_________________________                (please document in your next progress note)          ( )  Other (please specify)___________________________  ____________________________________________________________________________________    RISK FACTORS:HO TRANSVERSE MYELITIS, NEUROGENIC BLADDER WITH SUPRAPUBIC CATHETER    CLINICAL INDICATORS:WBC 14.8, LA 3.4, AFEBILE, VSS    TREATMENT IV BOLUS X1, ROCEPHIN IV      If you have any questions, please contact Clinical :  Amalia CARLOS RN at [phone]     Thank You!    THIS FORM IS A PERMANENT PART OF THE MEDICAL RECORD

## 2024-08-23 NOTE — CDS QUERY
How to answer this Query:    1.) DON'T CLICK COSIGN BUTTON FIRST  2.) Click \"3 dots...\" to the right of cosign button and click EDIT on the toolbar.  2.) Type an \"X\" in the bracket for the diagnosis that applies. (You may also add additional clinical details as you feel necessary to substantiate your response).   3.) Finally click \"Sign\" to complete response.  Thank You     CLINICAL DOCUMENTATION CLARIFICATION    DEAR DR CARBAJAL    A cause and effect relationship may not be assumed and must be documented by a provider.    Please clarify the relationship, if any, between CYSTITIS and SUPRAPUBIC CATHETER    Are the conditions:    ( x) Due to or associated with each other  ( ) Unrelated to each other  ( ) Other, please specify  ____________________________________________________________________________________    RISK FACTORS:HO TRANSVERSE MYELITIS, NEUROGENIC BLADDER WITH SUPRAPUBIC CATHETER     CLINICAL INDICATORS:WBC 14.8, LA 3.4, UCX=+CITROBACTER   8/23 YOON MARTINEZ PA=Pyuria from SPC, urine cx Citrobacter     TREATMENT UA/CX, UROLOGIST AND ID CONSULTS, ROCEPHIN IV, CATHETER EXCHANGED    If you have any questions, please contact Clinical :  Amalia CARLOS RN at [phone]     Thank You!    THIS FORM IS A PERMANENT PART OF THE MEDICAL RECORD

## 2024-08-23 NOTE — PLAN OF CARE
Pt. A/Ox4, on RA, VSS. IV rocephin. No acute events. Pt educated on call light use, within reach. Safety measures in place.     Problem: Patient Centered Care  Goal: Patient preferences are identified and integrated in the patient's plan of care  Description: Interventions:  - What would you like us to know as we care for you? From [place]  - Provide timely, complete, and accurate information to patient/family  - Incorporate patient and family knowledge, values, beliefs, and cultural backgrounds into the planning and delivery of care  - Encourage patient/family to participate in care and decision-making at the level they choose  - Honor patient and family perspectives and choices  Outcome: Progressing     Problem: GASTROINTESTINAL - ADULT  Goal: Minimal or absence of nausea and vomiting  Description: INTERVENTIONS:  - Maintain adequate hydration with IV or PO as ordered and tolerated  - Nasogastric tube to low intermittent suction as ordered  - Evaluate effectiveness of ordered antiemetic medications  - Provide nonpharmacologic comfort measures as appropriate  - Advance diet as tolerated, if ordered  - Obtain nutritional consult as needed  - Evaluate fluid balance  Outcome: Progressing  Goal: Maintains or returns to baseline bowel function  Description: INTERVENTIONS:  - Assess bowel function  - Maintain adequate hydration with IV or PO as ordered and tolerated  - Evaluate effectiveness of GI medications  - Encourage mobilization and activity  - Obtain nutritional consult as needed  - Establish a toileting routine/schedule  - Consider collaborating with pharmacy to review patient's medication profile  Outcome: Progressing     Problem: GENITOURINARY - ADULT  Goal: Absence of urinary retention  Description: INTERVENTIONS:  - Assess patient’s ability to void and empty bladder  - Monitor intake/output and perform bladder scan as needed  - Follow urinary retention protocol/standard of care  - Consider collaborating with  pharmacy to review patient's medication profile  - Implement strategies to promote bladder emptying  Outcome: Progressing     Problem: SKIN/TISSUE INTEGRITY - ADULT  Goal: Skin integrity remains intact  Description: INTERVENTIONS  - Assess and document risk factors for pressure ulcer development  - Assess and document skin integrity  - Monitor for areas of redness and/or skin breakdown  - Initiate interventions, skin care algorithm/standards of care as needed  Outcome: Progressing     Problem: PAIN - ADULT  Goal: Verbalizes/displays adequate comfort level or patient's stated pain goal  Description: INTERVENTIONS:  - Encourage pt to monitor pain and request assistance  - Assess pain using appropriate pain scale  - Administer analgesics based on type and severity of pain and evaluate response  - Implement non-pharmacological measures as appropriate and evaluate response  - Consider cultural and social influences on pain and pain management  - Manage/alleviate anxiety  - Utilize distraction and/or relaxation techniques  - Monitor for opioid side effects  - Notify MD/LIP if interventions unsuccessful or patient reports new pain  - Anticipate increased pain with activity and pre-medicate as appropriate  Outcome: Progressing     Problem: RISK FOR INFECTION - ADULT  Goal: Absence of fever/infection during anticipated neutropenic period  Description: INTERVENTIONS  - Monitor WBC  - Administer growth factors as ordered  - Implement neutropenic guidelines  Outcome: Progressing     Problem: SAFETY ADULT - FALL  Goal: Free from fall injury  Description: INTERVENTIONS:  - Assess pt frequently for physical needs  - Identify cognitive and physical deficits and behaviors that affect risk of falls.  - [place]don fall precautions as indicated by assessment.  - Educate pt/family on patient safety including physical limitations  - Instruct pt to call for assistance with activity based on assessment  - Modify environment to reduce risk  of injury  - Provide assistive devices as appropriate  - Consider OT/PT consult to assist with strengthening/mobility  - Encourage toileting schedule  Outcome: Progressing     Problem: DISCHARGE PLANNING  Goal: Discharge to home or other facility with appropriate resources  Description: INTERVENTIONS:  - Identify barriers to discharge w/pt and caregiver  - Include patient/family/discharge partner in discharge planning  - Arrange for needed discharge resources and transportation as appropriate  - Identify discharge learning needs (meds, wound care, etc)  - Arrange for interpreters to assist at discharge as needed  - Consider post-discharge preferences of patient/family/discharge partner  - Complete POLST form as appropriate  - Assess patient's ability to be responsible for managing their own health  - Refer to Case Management Department for coordinating discharge planning if the patient needs post-hospital services based on physician/LIP order or complex needs related to functional status, cognitive ability or social support system  Outcome: Progressing

## 2024-08-23 NOTE — DIETARY NOTE
ADULT NUTRITION INITIAL ASSESSMENT    Pt is at moderate nutrition risk.  Pt does not meet malnutrition criteria.      RECOMMENDATIONS TO MD: See Nutrition Intervention for oral nutritional supplement (ONS) specifics     ADMITTING DIAGNOSIS:  Acute cystitis without hematuria [N30.00]  Leukocytosis, unspecified type [D72.829]  PERTINENT PAST MEDICAL HISTORY:   Past Medical History:    Bipolar 1 disorder (HCC)    Dental caries    Diabetes (HCC)    MIKE (generalized anxiety disorder)    High blood pressure    History of diverticulitis of colon    Manipulative behavior    Neurogenic bladder    Obesity (BMI 30-39.9)    Paraplegia (HCC)    Sepsis following intra-abdominal surgery (HCC)    Serotonin syndrome    Sleep apnea    Suprapubic catheter (HCC)    Transverse myelitis (HCC)     PATIENT STATUS: Initial 08/23/24: Pt admit for acute cystitis without hematuria and leukocytosis. PMH sig for Diabetes (A1c 5.1% on 7/9/24), Neurogenic bladder, Bipolar, HTN, Paraplegia and others noted above. Pt assessed due to screening at risk for decreased appetite and unintentional weight loss. Pt known to nutrition services from previous admissions, last seen 1/8/24 and provided with DM diet education. Chart reviewed, pt admitted from [facility] with c/o urinary symptoms (with chronic catheter), diarrhea, nausea, abdominal pain and tooth abscess. Pt noted to be hospitalized in  July for cellulitis and discharged to inpatient psych due to worsening anxiety. . Discussion with RN, pt has good appetite/intake. Intakes reviewed, % x 5 meals since admission (averaging 81%). Pt visited, reports needing help with eating due to \"hands being bad\". Pt unsure if they helped at NH or if able to feed self but reports needing help with eating here. Pt continued repeating needing help eating when asked how appetite is or how pt appetite was prior to admission (PTA). Pt seen drinking independently with no difficulties during interaction.  Pt believes has had some weight loss but unable to report how much or timeframe. Pt unsure of usual body weight. Limited information obtained from pt at this time. Current weight 194# 8 oz. EMR weight review, last known weight 203# on 8/20/24. Per EMR weight review utilizing care everywhere, pt noted weighing 200# 14 oz on 7/24/24, 218# 6 oz on 7/9/24, 223# on 5/10/24, 206# on 12/6/23 and 220# on 8/8/23. Variable weights over last year - difficult to determine true weight loss vs r/t fluid status. Pt noted with non-pitting edema to bilateral hands/+1 edema to bilateral lower extremities. Pt appears well-nourished. Encouraged adequate energy and protein intake. Pt asking for oral nutritional supplement (ONS) - pt reports has used before and \"feels like she needs one\". +ONS per discussion.        FOOD/NUTRITION RELATED HISTORY:  Appetite: Good  Intake: ~% x5 meals documented since admit  Intake Meeting Needs: Yes, and oral nutrition supplements (ONS) to maximize  Percent Meals Eaten (last 6 days)       Date/Time Percent Meals Eaten (%)    08/21/24 1006 100 %    08/21/24 1317 70 %    08/21/24 1640 80 %    08/22/24 1044 75 %    08/22/24 1430 80 %          Food Allergies: No Known Food Allergies (NKFA)  Cultural/Ethnic/Hoahaoism Preferences: Not Obtained    GASTROINTESTINAL: +BM 8/22/2024 - small;soft  Hx C.diff (11/15/23) - negative on 8/21/24    MEDICATIONS: reviewed Electrolyte replacement per protocol (non-cardiac)   cefTRIAXone  1 g Intravenous Q24H    multivitamin with minerals  1 tablet Oral Daily    divalproex ER  500 mg Oral BID    [START ON 8/26/2024] ergocalciferol  50,000 Units Oral Q7 Days    atorvastatin  40 mg Oral Nightly    ARIPiprazole  5 mg Oral Daily    fluticasone propionate  2 spray Each Nare Daily    insulin aspart  5 Units Subcutaneous TID CC    apixaban  5 mg Oral BID    insulin degludec  25 Units Subcutaneous Nightly    bethanechol  10 mg Oral TID    QUEtiapine  100 mg Oral Nightly     DULoxetine  30 mg Oral Daily     LABS: reviewed A1c 5.1% on 7/9/24  Recent Labs     08/20/24  2333 08/21/24  0433 08/22/24  0616 08/23/24  0557   * 169* 90 171*   BUN 8* 8* 10 11   CREATSERUM 0.73 0.77 0.73 0.72   CA 9.7 8.7 9.9 9.8   MG 1.6  --  1.7 1.7   * 137 140 139   K 3.5 3.7 4.2 4.2    106 106 104   CO2 27.0 23.0 29.0 30.0   OSMOCALC 279 286 289 291     NUTRITION RELATED PHYSICAL FINDINGS:  - Nutrition Focused Physical Exam (NFPE): well nourished   Pt noted to be bed-bound at baseline  - Fluid Accumulation: non-pitting Bilateral Hand (s) and +1 Bilateral Lower extremity and Feet see RN documentation for details  - Skin Integrity: at risk, breakdown, intact, and reddened see RN documentation for details    ANTHROPOMETRICS:  HT: 152.4 cm (5')  WT: 88.2 kg (194 lb 8 oz)   BMI: Body mass index is 37.99 kg/m².  BMI CLASSIFICATION: 35-39.9 kg/m2 - obesity class II  IBW: 100 lbs        195% IBW  Usual Body Wt: unsure    WEIGHT HISTORY:  Patient Weight(s) for the past 336 hrs:   Weight   08/21/24 0244 88.2 kg (194 lb 8 oz)   08/20/24 2206 92.1 kg (203 lb)     Wt Readings from Last 10 Encounters:   08/21/24 88.2 kg (194 lb 8 oz)   08/05/24 113.4 kg (250 lb)   08/04/24 113.4 kg (250 lb)   07/24/24 91.1 kg (200 lb 14.4 oz)   07/22/24 95.3 kg (210 lb)   07/19/24 95.3 kg (210 lb)   07/13/24 95.3 kg (210 lb 1.6 oz)   05/10/24 101.2 kg (223 lb)   01/28/24 96 kg (211 lb 10.3 oz)   01/11/24 95.9 kg (211 lb 8 oz)     NUTRITION DIAGNOSIS/PROBLEM:   Unintended wt loss related to Decreased ability to consume sufficient energy as evidenced by pt reports unintentional weight loss    NUTRITION INTERVENTION:   NUTRITION PRESCRIPTION:   Estimated Nutrition needs: --dosing wt of 88.2 kg - wt taken on 8/21/2024  Calories: 1809-4768 calories/day (18-20 calories per kg Dosing wt)  Protein: 68-91 g protein/day (1.5-2.0 g protein/kg Ideal body wt (IBW)45.5kg)    - Diet:       Procedures    Regular/General diet Is Patient  on Accuchecks? Yes    **Liberalized CHO restriction given A1c 5.1%    - RD Care Plan: Liberalized diet and Initiated ONS (oral nutritional supplements)  - Meals and snacks: Liberalized diet and Encouraged adequate PO intake  - Medical Food Supplements-RD added Ensure Compact (220 calories/ 9 g protein each) Daily Vanilla or Chocolate. Rational/use of oral supplements discussed.  - Vitamin and mineral supplements: multivitamin/mineral/MD  - Feeding assistance: meal set up provide assistance prn  - Nutrition education: Discussed importance of adequate energy and protein intake     - Coordination of nutrition care: collaboration with other providers  - Discharge and transfer of nutrition care to new setting or provider: monitor plans from [facility], plan to return pending medical clearance    MONITOR AND EVALUATE/NUTRITION GOALS:  - Food and Nutrient Intake:      Monitor: adequacy of PO intake and adequacy of supplement intake  - Food and Nutrient Administration:      Monitor: N/A  - Anthropometric Measurement:    Monitor weight  - Nutrition Goals:      allow wt loss due to fluid losses, PO and supplement greater than 75% of needs, good supplement intake, labs within acceptable limits, euglycemia, minimize lean body mass loss, prevent skin breakdown, monitor fluid status, and improved GI status    DIETITIAN FOLLOW UP: RD to follow and monitor nutrition status    Marjorie Chaudhary MS, RAQUEL, RDN, LDN  [id number]

## 2024-08-23 NOTE — CM/SW NOTE
SW met with patient bedside.    Patient asking to go to another LTC facility.    SW explained to patient we cannot accommodate this inpatient and cannot hold discharge for new placement.    Per RN Ana, patient now agreeable to supra pubic catheter exchange.  Urology to see her today.    Patient likely ready for discharge after this.  ID final rec is converting to PO cefdinir for 7 day course.    JAH spoke to liaison Loni with [facility] who confirmed  patient is a Medicaid bed hold.  [facility]  team can continue to work with patient on placement closer to [place] (preferred SNF Is [facility]).    JAH sent referral to [facility] in [facility] for review.  Patient will have to work with [facility] for transfer if preferred SNF can accommodate.      PLAN: DC to [facility] pending [facility]    / to remain available for support and/or discharge planning.     Karla Lester MSW, LSW [id number]

## 2024-08-24 VITALS
HEIGHT: 60 IN | OXYGEN SATURATION: 97 % | SYSTOLIC BLOOD PRESSURE: 131 MMHG | DIASTOLIC BLOOD PRESSURE: 81 MMHG | BODY MASS INDEX: 38.18 KG/M2 | RESPIRATION RATE: 20 BRPM | HEART RATE: 87 BPM | TEMPERATURE: 98 F | WEIGHT: 194.5 LBS

## 2024-08-24 LAB
GLUCOSE BLDC GLUCOMTR-MCNC: 173 MG/DL (ref 70–99)
GLUCOSE BLDC GLUCOMTR-MCNC: 179 MG/DL (ref 70–99)
MAGNESIUM SERPL-MCNC: 1.7 MG/DL (ref 1.6–2.6)

## 2024-08-24 PROCEDURE — 99239 HOSP IP/OBS DSCHRG MGMT >30: CPT | Performed by: HOSPITALIST

## 2024-08-24 RX ORDER — MAGNESIUM OXIDE 400 MG/1
400 TABLET ORAL ONCE
Status: COMPLETED | OUTPATIENT
Start: 2024-08-24 | End: 2024-08-24

## 2024-08-24 RX ORDER — ECHINACEA PURPUREA EXTRACT 125 MG
1 TABLET ORAL
Status: SHIPPED | COMMUNITY
Start: 2024-08-24

## 2024-08-24 RX ORDER — CLONAZEPAM 0.5 MG/1
0.5 TABLET ORAL NIGHTLY
Status: SHIPPED | COMMUNITY
Start: 2024-08-24

## 2024-08-24 RX ORDER — ECHINACEA PURPUREA EXTRACT 125 MG
1 TABLET ORAL
Status: DISCONTINUED | OUTPATIENT
Start: 2024-08-24 | End: 2024-08-24

## 2024-08-24 RX ORDER — QUETIAPINE FUMARATE 100 MG/1
100 TABLET, FILM COATED ORAL NIGHTLY
Status: SHIPPED | COMMUNITY
Start: 2024-08-24

## 2024-08-24 RX ORDER — INSULIN GLARGINE 100 [IU]/ML
46 INJECTION, SOLUTION SUBCUTANEOUS NIGHTLY
Status: SHIPPED | COMMUNITY
Start: 2024-08-24

## 2024-08-24 RX ORDER — CEFDINIR 300 MG/1
300 CAPSULE ORAL 2 TIMES DAILY
Qty: 4 CAPSULE | Refills: 0 | Status: SHIPPED | OUTPATIENT
Start: 2024-08-24 | End: 2024-09-18

## 2024-08-24 RX ORDER — CEFDINIR 300 MG/1
300 CAPSULE ORAL 2 TIMES DAILY
Qty: 14 CAPSULE | Refills: 0 | Status: SHIPPED | OUTPATIENT
Start: 2024-08-24 | End: 2024-08-24

## 2024-08-24 NOTE — PLAN OF CARE
Problem: Patient Centered Care  Goal: Patient preferences are identified and integrated in the patient's plan of care  Description: Interventions:  - What would you like us to know as we care for you?   - Provide timely, complete, and accurate information to patient/family  - Incorporate patient and family knowledge, values, beliefs, and cultural backgrounds into the planning and delivery of care  - Encourage patient/family to participate in care and decision-making at the level they choose  - Honor patient and family perspectives and choices  Outcome: Adequate for Discharge     Problem: GASTROINTESTINAL - ADULT  Goal: Minimal or absence of nausea and vomiting  Description: INTERVENTIONS:  - Maintain adequate hydration with IV or PO as ordered and tolerated  - Nasogastric tube to low intermittent suction as ordered  - Evaluate effectiveness of ordered antiemetic medications  - Provide nonpharmacologic comfort measures as appropriate  - Advance diet as tolerated, if ordered  - Obtain nutritional consult as needed  - Evaluate fluid balance  Outcome: Adequate for Discharge  Goal: Maintains or returns to baseline bowel function  Description: INTERVENTIONS:  - Assess bowel function  - Maintain adequate hydration with IV or PO as ordered and tolerated  - Evaluate effectiveness of GI medications  - Encourage mobilization and activity  - Obtain nutritional consult as needed  - Establish a toileting routine/schedule  - Consider collaborating with pharmacy to review patient's medication profile  Outcome: Adequate for Discharge     Problem: GENITOURINARY - ADULT  Goal: Absence of urinary retention  Description: INTERVENTIONS:  - Assess patient’s ability to void and empty bladder  - Monitor intake/output and perform bladder scan as needed  - Follow urinary retention protocol/standard of care  - Consider collaborating with pharmacy to review patient's medication profile  - Implement strategies to promote bladder  emptying  Outcome: Adequate for Discharge     Problem: SKIN/TISSUE INTEGRITY - ADULT  Goal: Skin integrity remains intact  Description: INTERVENTIONS  - Assess and document risk factors for pressure ulcer development  - Assess and document skin integrity  - Monitor for areas of redness and/or skin breakdown  - Initiate interventions, skin care algorithm/standards of care as needed  Outcome: Adequate for Discharge     Problem: PAIN - ADULT  Goal: Verbalizes/displays adequate comfort level or patient's stated pain goal  Description: INTERVENTIONS:  - Encourage pt to monitor pain and request assistance  - Assess pain using appropriate pain scale  - Administer analgesics based on type and severity of pain and evaluate response  - Implement non-pharmacological measures as appropriate and evaluate response  - Consider cultural and social influences on pain and pain management  - Manage/alleviate anxiety  - Utilize distraction and/or relaxation techniques  - Monitor for opioid side effects  - Notify MD/LIP if interventions unsuccessful or patient reports new pain  - Anticipate increased pain with activity and pre-medicate as appropriate  Outcome: Adequate for Discharge     Problem: RISK FOR INFECTION - ADULT  Goal: Absence of fever/infection during anticipated neutropenic period  Description: INTERVENTIONS  - Monitor WBC  - Administer growth factors as ordered  - Implement neutropenic guidelines  Outcome: Adequate for Discharge     Problem: SAFETY ADULT - FALL  Goal: Free from fall injury  Description: INTERVENTIONS:  - Assess pt frequently for physical needs  - Identify cognitive and physical deficits and behaviors that affect risk of falls.  - [place] fall precautions as indicated by assessment.  - Educate pt/family on patient safety including physical limitations  - Instruct pt to call for assistance with activity based on assessment  - Modify environment to reduce risk of injury  - Provide assistive devices as  appropriate  - Consider OT/PT consult to assist with strengthening/mobility  - Encourage toileting schedule  Outcome: Adequate for Discharge     Problem: DISCHARGE PLANNING  Goal: Discharge to home or other facility with appropriate resources  Description: INTERVENTIONS:  - Identify barriers to discharge w/pt and caregiver  - Include patient/family/discharge partner in discharge planning  - Arrange for needed discharge resources and transportation as appropriate  - Identify discharge learning needs (meds, wound care, etc)  - Arrange for interpreters to assist at discharge as needed  - Consider post-discharge preferences of patient/family/discharge partner  - Complete POLST form as appropriate  - Assess patient's ability to be responsible for managing their own health  - Refer to Case Management Department for coordinating discharge planning if the patient needs post-hospital services based on physician/LIP order or complex needs related to functional status, cognitive ability or social support system  Outcome: Adequate for Discharge   Safety precautions on place. Education completed. I tried multiple time to call [facility] to give report and not answer. Mother notified. Venous port De-Accessed. Patient transportation provided via [facility].

## 2024-08-24 NOTE — PROGRESS NOTES
Pt refused the use of CPAP at [facility]. Notified to let RN know of any changes.

## 2024-08-24 NOTE — PLAN OF CARE
Problem: Patient Centered Care  Goal: Patient preferences are identified and integrated in the patient's plan of care  Description: Interventions:  - What would you like us to know as we care for you?   - Provide timely, complete, and accurate information to patient/family  - Incorporate patient and family knowledge, values, beliefs, and cultural backgrounds into the planning and delivery of care  - Encourage patient/family to participate in care and decision-making at the level they choose  - Honor patient and family perspectives and choices  Outcome: Progressing     Problem: GASTROINTESTINAL - ADULT  Goal: Minimal or absence of nausea and vomiting  Description: INTERVENTIONS:  - Maintain adequate hydration with IV or PO as ordered and tolerated  - Nasogastric tube to low intermittent suction as ordered  - Evaluate effectiveness of ordered antiemetic medications  - Provide nonpharmacologic comfort measures as appropriate  - Advance diet as tolerated, if ordered  - Obtain nutritional consult as needed  - Evaluate fluid balance  Outcome: Progressing  Goal: Maintains or returns to baseline bowel function  Description: INTERVENTIONS:  - Assess bowel function  - Maintain adequate hydration with IV or PO as ordered and tolerated  - Evaluate effectiveness of GI medications  - Encourage mobilization and activity  - Obtain nutritional consult as needed  - Establish a toileting routine/schedule  - Consider collaborating with pharmacy to review patient's medication profile  Outcome: Progressing     Problem: GENITOURINARY - ADULT  Goal: Absence of urinary retention  Description: INTERVENTIONS:  - Assess patient’s ability to void and empty bladder  - Monitor intake/output and perform bladder scan as needed  - Follow urinary retention protocol/standard of care  - Consider collaborating with pharmacy to review patient's medication profile  - Implement strategies to promote bladder emptying  Outcome: Progressing     Problem:  SKIN/TISSUE INTEGRITY - ADULT  Goal: Skin integrity remains intact  Description: INTERVENTIONS  - Assess and document risk factors for pressure ulcer development  - Assess and document skin integrity  - Monitor for areas of redness and/or skin breakdown  - Initiate interventions, skin care algorithm/standards of care as needed  Outcome: Progressing     Problem: PAIN - ADULT  Goal: Verbalizes/displays adequate comfort level or patient's stated pain goal  Description: INTERVENTIONS:  - Encourage pt to monitor pain and request assistance  - Assess pain using appropriate pain scale  - Administer analgesics based on type and severity of pain and evaluate response  - Implement non-pharmacological measures as appropriate and evaluate response  - Consider cultural and social influences on pain and pain management  - Manage/alleviate anxiety  - Utilize distraction and/or relaxation techniques  - Monitor for opioid side effects  - Notify MD/LIP if interventions unsuccessful or patient reports new pain  - Anticipate increased pain with activity and pre-medicate as appropriate  Outcome: Progressing     Problem: RISK FOR INFECTION - ADULT  Goal: Absence of fever/infection during anticipated neutropenic period  Description: INTERVENTIONS  - Monitor WBC  - Administer growth factors as ordered  - Implement neutropenic guidelines  Outcome: Progressing     Problem: SAFETY ADULT - FALL  Goal: Free from fall injury  Description: INTERVENTIONS:  - Assess pt frequently for physical needs  - Identify cognitive and physical deficits and behaviors that affect risk of falls.  - [place] fall precautions as indicated by assessment.  - Educate pt/family on patient safety including physical limitations  - Instruct pt to call for assistance with activity based on assessment  - Modify environment to reduce risk of injury  - Provide assistive devices as appropriate  - Consider OT/PT consult to assist with strengthening/mobility  - Encourage  toileting schedule  Outcome: Progressing     Problem: DISCHARGE PLANNING  Goal: Discharge to home or other facility with appropriate resources  Description: INTERVENTIONS:  - Identify barriers to discharge w/pt and caregiver  - Include patient/family/discharge partner in discharge planning  - Arrange for needed discharge resources and transportation as appropriate  - Identify discharge learning needs (meds, wound care, etc)  - Arrange for interpreters to assist at discharge as needed  - Consider post-discharge preferences of patient/family/discharge partner  - Complete POLST form as appropriate  - Assess patient's ability to be responsible for managing their own health  - Refer to Case Management Department for coordinating discharge planning if the patient needs post-hospital services based on physician/LIP order or complex needs related to functional status, cognitive ability or social support system  Outcome: Progressing   Pt is A&Ox4. Pt's super pubic cath was changed by urology and later pt received CH[facility] bath and mark care. Pt was anxious and received her PRN clonazepam, d/t small white particles on her body. No acute changes. Safety precautions in place and call light within the pt's reach.

## 2024-08-24 NOTE — CM/SW NOTE
08/24/24 1300   Discharge disposition   Expected discharge disposition [facility]   Discharge transportation [facility]  (3pm per Wallace)     Loni @ [facility] approved 3pm discharge    RN to inform pt/family of discharge.    RN to call report to [phone]    Taylor MEJIA, LSW  Social Work/Case Management

## 2024-09-06 ENCOUNTER — HOSPITAL ENCOUNTER (EMERGENCY)
Facility: HOSPITAL | Age: 54
Discharge: HOME | End: 2024-09-06
Attending: EMERGENCY MEDICINE
Payer: MEDICARE

## 2024-09-06 ENCOUNTER — APPOINTMENT (OUTPATIENT)
Dept: GENERAL RADIOLOGY | Facility: HOSPITAL | Age: 54
End: 2024-09-06
Attending: EMERGENCY MEDICINE
Payer: MEDICARE

## 2024-09-06 VITALS
HEART RATE: 111 BPM | TEMPERATURE: 98 F | RESPIRATION RATE: 16 BRPM | DIASTOLIC BLOOD PRESSURE: 83 MMHG | SYSTOLIC BLOOD PRESSURE: 132 MMHG | OXYGEN SATURATION: 98 %

## 2024-09-06 DIAGNOSIS — N30.90 CYSTITIS: Primary | ICD-10-CM

## 2024-09-06 LAB
ALBUMIN SERPL-MCNC: 4.2 G/DL (ref 3.2–4.8)
ALBUMIN/GLOB SERPL: 1.5 {RATIO} (ref 1–2)
ALP LIVER SERPL-CCNC: 83 U/L
ALT SERPL-CCNC: 14 U/L
ANION GAP SERPL CALC-SCNC: 11 MMOL/L (ref 0–18)
AST SERPL-CCNC: 14 U/L (ref ?–34)
ATRIAL RATE: 96 BPM
BASOPHILS # BLD AUTO: 0.03 X10(3) UL (ref 0–0.2)
BASOPHILS NFR BLD AUTO: 0.3 %
BILIRUB SERPL-MCNC: 0.3 MG/DL (ref 0.3–1.2)
BILIRUB UR QL: NEGATIVE
BUN BLD-MCNC: 9 MG/DL (ref 9–23)
BUN/CREAT SERPL: 12.9 (ref 10–20)
CALCIUM BLD-MCNC: 9.2 MG/DL (ref 8.7–10.4)
CHLORIDE SERPL-SCNC: 100 MMOL/L (ref 98–112)
CLARITY UR: CLEAR
CO2 SERPL-SCNC: 27 MMOL/L (ref 21–32)
COLOR UR: COLORLESS
CREAT BLD-MCNC: 0.7 MG/DL
DEPRECATED RDW RBC AUTO: 43.3 FL (ref 35.1–46.3)
EGFRCR SERPLBLD CKD-EPI 2021: 103 ML/MIN/1.73M2 (ref 60–?)
EOSINOPHIL # BLD AUTO: 0.33 X10(3) UL (ref 0–0.7)
EOSINOPHIL NFR BLD AUTO: 3.1 %
ERYTHROCYTE [DISTWIDTH] IN BLOOD BY AUTOMATED COUNT: 14.8 % (ref 11–15)
GLOBULIN PLAS-MCNC: 2.8 G/DL (ref 2–3.5)
GLUCOSE BLD-MCNC: 78 MG/DL (ref 70–99)
GLUCOSE BLDC GLUCOMTR-MCNC: 76 MG/DL (ref 70–99)
GLUCOSE UR-MCNC: NORMAL MG/DL
HCT VFR BLD AUTO: 35.4 %
HGB BLD-MCNC: 11.8 G/DL
IMM GRANULOCYTES # BLD AUTO: 0.06 X10(3) UL (ref 0–1)
IMM GRANULOCYTES NFR BLD: 0.6 %
KETONES UR-MCNC: NEGATIVE MG/DL
LEUKOCYTE ESTERASE UR QL STRIP.AUTO: 500
LYMPHOCYTES # BLD AUTO: 3.51 X10(3) UL (ref 1–4)
LYMPHOCYTES NFR BLD AUTO: 33.1 %
MCH RBC QN AUTO: 26.8 PG (ref 26–34)
MCHC RBC AUTO-ENTMCNC: 33.3 G/DL (ref 31–37)
MCV RBC AUTO: 80.3 FL
MONOCYTES # BLD AUTO: 0.43 X10(3) UL (ref 0.1–1)
MONOCYTES NFR BLD AUTO: 4.1 %
NEUTROPHILS # BLD AUTO: 6.23 X10 (3) UL (ref 1.5–7.7)
NEUTROPHILS # BLD AUTO: 6.23 X10(3) UL (ref 1.5–7.7)
NEUTROPHILS NFR BLD AUTO: 58.8 %
NT-PROBNP SERPL-MCNC: <35 PG/ML (ref ?–125)
OSMOLALITY SERPL CALC.SUM OF ELEC: 284 MOSM/KG (ref 275–295)
P AXIS: 58 DEGREES
P-R INTERVAL: 172 MS
PH UR: 6 [PH] (ref 5–8)
PLATELET # BLD AUTO: 326 10(3)UL (ref 150–450)
POTASSIUM SERPL-SCNC: 3.4 MMOL/L (ref 3.5–5.1)
PROT SERPL-MCNC: 7 G/DL (ref 5.7–8.2)
PROT UR-MCNC: NEGATIVE MG/DL
Q-T INTERVAL: 378 MS
QRS DURATION: 84 MS
QTC CALCULATION (BEZET): 477 MS
R AXIS: -5 DEGREES
RBC # BLD AUTO: 4.41 X10(6)UL
SODIUM SERPL-SCNC: 138 MMOL/L (ref 136–145)
SP GR UR STRIP: <1.005 (ref 1–1.03)
T AXIS: 52 DEGREES
TROPONIN I SERPL HS-MCNC: <3 NG/L
UROBILINOGEN UR STRIP-ACNC: NORMAL
VENTRICULAR RATE: 96 BPM
WBC # BLD AUTO: 10.6 X10(3) UL (ref 4–11)

## 2024-09-06 PROCEDURE — 80053 COMPREHEN METABOLIC PANEL: CPT | Performed by: EMERGENCY MEDICINE

## 2024-09-06 PROCEDURE — 87186 SC STD MICRODIL/AGAR DIL: CPT | Performed by: EMERGENCY MEDICINE

## 2024-09-06 PROCEDURE — 99285 EMERGENCY DEPT VISIT HI MDM: CPT

## 2024-09-06 PROCEDURE — 83880 ASSAY OF NATRIURETIC PEPTIDE: CPT | Performed by: EMERGENCY MEDICINE

## 2024-09-06 PROCEDURE — 71045 X-RAY EXAM CHEST 1 VIEW: CPT | Performed by: EMERGENCY MEDICINE

## 2024-09-06 PROCEDURE — 93005 ELECTROCARDIOGRAM TRACING: CPT

## 2024-09-06 PROCEDURE — 85025 COMPLETE CBC W/AUTO DIFF WBC: CPT | Performed by: EMERGENCY MEDICINE

## 2024-09-06 PROCEDURE — 96376 TX/PRO/DX INJ SAME DRUG ADON: CPT

## 2024-09-06 PROCEDURE — 87077 CULTURE AEROBIC IDENTIFY: CPT | Performed by: EMERGENCY MEDICINE

## 2024-09-06 PROCEDURE — 96374 THER/PROPH/DIAG INJ IV PUSH: CPT

## 2024-09-06 PROCEDURE — 93010 ELECTROCARDIOGRAM REPORT: CPT

## 2024-09-06 PROCEDURE — 82962 GLUCOSE BLOOD TEST: CPT

## 2024-09-06 PROCEDURE — 96375 TX/PRO/DX INJ NEW DRUG ADDON: CPT

## 2024-09-06 PROCEDURE — 87086 URINE CULTURE/COLONY COUNT: CPT | Performed by: EMERGENCY MEDICINE

## 2024-09-06 PROCEDURE — 81001 URINALYSIS AUTO W/SCOPE: CPT | Performed by: EMERGENCY MEDICINE

## 2024-09-06 PROCEDURE — 84484 ASSAY OF TROPONIN QUANT: CPT | Performed by: EMERGENCY MEDICINE

## 2024-09-06 RX ORDER — ONDANSETRON 2 MG/ML
4 INJECTION INTRAMUSCULAR; INTRAVENOUS ONCE
Status: COMPLETED | OUTPATIENT
Start: 2024-09-06 | End: 2024-09-06

## 2024-09-06 RX ORDER — NITROFURANTOIN 25; 75 MG/1; MG/1
100 CAPSULE ORAL 2 TIMES DAILY
Qty: 14 CAPSULE | Refills: 0 | Status: SHIPPED | OUTPATIENT
Start: 2024-09-06 | End: 2024-09-13

## 2024-09-06 RX ORDER — ONDANSETRON 2 MG/ML
INJECTION INTRAMUSCULAR; INTRAVENOUS
Status: COMPLETED
Start: 2024-09-06 | End: 2024-09-06

## 2024-09-06 RX ORDER — ONDANSETRON 4 MG/1
4 TABLET, ORALLY DISINTEGRATING ORAL EVERY 4 HOURS PRN
Qty: 10 TABLET | Refills: 0 | Status: SHIPPED | OUTPATIENT
Start: 2024-09-06 | End: 2024-09-13

## 2024-09-06 NOTE — ED PROVIDER NOTES
Patient Seen in: [facility] Emergency Department    History     Chief Complaint   Patient presents with    Dizziness    Cath Tube Problem    Chest Pain Angina       HPI    History is provided by patient/independent historian: patient, EMS  53 year old female with hx of bipolar disorder, transverse myelitis  and neurogenic bladder, with suprapubic catheter, here with multiple complaints. She reports 8hours ago, developing chest pain, L arm pain, dizziness, nausea, and suprapubic catheter pain. She does report some left side pain.  EMS reports vitals were stable on their arrival.    History reviewed.   Past Medical History:    Bipolar 1 disorder (HCC)    Dental caries    Diabetes (HCC)    MIKE (generalized anxiety disorder)    High blood pressure    History of diverticulitis of colon    Manipulative behavior    Neurogenic bladder    Obesity (BMI 30-39.9)    Paraplegia (HCC)    Sepsis following intra-abdominal surgery (HCC)    Serotonin syndrome    Sleep apnea    Suprapubic catheter (HCC)    Transverse myelitis (HCC)         History reviewed.   Past Surgical History:   Procedure Laterality Date    Arthroscopy of joint unlisted      knee          Cholecystectomy      Part removal colon w end colostomy          Tonsillectomy           Home Medications reviewed :  (Not in a hospital admission)        History reviewed.   Social History     Socioeconomic History    Marital status: Single   Tobacco Use    Smoking status: Former     Current packs/day: 1.00     Average packs/day: 1 pack/day for 5.0 years (5.0 ttl pk-yrs)     Types: Cigarettes    Tobacco comments:     quit    Vaping Use    Vaping status: Never Used   Substance and Sexual Activity    Alcohol use: No    Drug use: No         ROS  Review of Systems   Respiratory:  Negative for shortness of breath.    Cardiovascular:  Positive for chest pain.   Gastrointestinal:  Positive for abdominal pain and nausea.   Neurological:  Positive for  dizziness.   All other systems reviewed and are negative.     All other pertinent organ systems are reviewed and are negative.      Physical Exam     ED Triage Vitals [09/06/24 0615]   BP (!) 147/91   Pulse 103   Resp 22   Temp 97.9 °F (36.6 °C)   Temp src    SpO2 96 %   O2 Device None (Room air)     Vital signs reviewed.      Physical Exam  Vitals and nursing note reviewed.   Cardiovascular:      Pulses: Normal pulses.      Comments: No significant leg edema  Pulmonary:      Effort: No respiratory distress.      Comments: L upper chest with palpable port  Abdominal:      General: There is no distension.      Comments: Suprapubic catheter   Neurological:      Mental Status: She is alert.         ED Course       Labs:     Labs Reviewed   COMP METABOLIC PANEL (14) - Abnormal; Notable for the following components:       Result Value    Potassium 3.4 (*)     All other components within normal limits   CBC WITH DIFFERENTIAL WITH PLATELET - Abnormal; Notable for the following components:    HGB 11.8 (*)     All other components within normal limits   URINALYSIS WITH CULTURE REFLEX - Abnormal; Notable for the following components:    Urine Color Colorless (*)     Spec Gravity <1.005 (*)     Blood Urine Trace (*)     Nitrite Urine 2+ (*)     Leukocyte Esterase Urine 500 (*)     WBC Urine 21-50 (*)     RBC Urine 3-5 (*)     Bacteria Urine 1+ (*)     All other components within normal limits   TROPONIN I HIGH SENSITIVITY - Normal   PRO BETA NATRIURETIC PEPTIDE - Normal   POCT GLUCOSE - Normal   RAINBOW DRAW LAVENDER   RAINBOW DRAW LIGHT GREEN   URINE CULTURE, ROUTINE         My EKG Interpretation: EKG    Rate, intervals and axes as noted on EKG Report.  Rate: 96  Rhythm: Sinus Rhythm  Reading: normal for rate, normal for rhythm, no acute ST changes           As reviewed and Interpreted by me      Imaging Results Available and Reviewed while in ED:   XR CHEST AP PORTABLE  (CPT=71045)    Result Date: 9/6/2024  CONCLUSION:   No  radiographic evidence of acute cardiopulmonary process.    Dictated by (CST): Ene Pérez MD on 9/06/2024 at 7:24 AM     Finalized by (CST): Ene Pérez MD on 9/06/2024 at 7:25 AM         My review and independent interpretation of XR images: no infiltrate. Radiology report corroborates this in addition to other details as reported by them.      Decision rules/scores evaluated: none      Diagnostic labs/tests considered but not ordered: none    ED Medications Administered:   Medications   ondansetron (Zofran) 4 MG/2ML injection 4 mg (4 mg Intravenous Given 9/6/24 0629)                MDM       Medical Decision Making      Differential Diagnosis: After obtaining the patient's history, performing the physical exam and reviewing the diagnostics, multiple initial diagnoses were considered based on the presenting problem including UTI, pneumonia, ACS    External document review: I personally reviewed available external medical records for any recent pertinent discharge summaries, testing, and procedures - the findings are as follows: 8/20/24 admission for cystitis    Complicating Factors: The patient already  has a past medical history of Bipolar 1 disorder ([facility]), Dental caries (10/26/2014), Diabetes ([facility]), MIKE (generalized anxiety disorder), High blood pressure, History of diverticulitis of colon (10/26/2014), Manipulative behavior (10/26/2014), Neurogenic bladder, Obesity (BMI 30-39.9) (10/26/2014), Paraplegia ([facility]), Sepsis following intra-abdominal surgery ([facility]) (10/26/2014), Serotonin syndrome, Sleep apnea, Suprapubic catheter ([facility]), and Transverse myelitis ([facility]). to contribute to the complexity of this ED evaluation.    Procedures performed: none    Discussed management with physician/appropriate source: none    Considered admission/deescalation of care for: none    Social determinants of health affecting patient care: none    Prescription medications considered: macrobid, discussed continuing current  medication regimen    The patient requires continuous monitoring for: multiple complaints    Shared decision making: discussed possible admission      Disposition and Plan     Clinical Impression:  1. Cystitis        Disposition:  Discharge    Follow-up:  Brian Bob MD  [address] #[facility] [address]  [phone]    Follow up        Medications Prescribed:  Current Discharge Medication List        START taking these medications    Details   nitrofurantoin monohydrate macro 100 MG Oral Cap Take 1 capsule (100 mg total) by mouth 2 (two) times daily for 7 days.  Qty: 14 capsule, Refills: 0

## 2024-09-06 NOTE — ED INITIAL ASSESSMENT (HPI)
Clari is here via EMS for evaluation of chest tightness, dizziness, nausea, and pain at suprapubic catheter site, which she states began 8 hours ago.

## 2024-09-06 NOTE — ED QUICK NOTES
Rounding completed  Pt assisted with changing soiled sheets x 2  Call light within reach  Continue to monitor

## 2024-09-08 NOTE — DISCHARGE SUMMARY
[facility]  part of [facility]    Discharge Summary    Clari Rosa Patient Status:  Inpatient    1970 MRN [id number]   Location [facility] 5SW/SE Attending No att. providers found   Hosp Day # 3 PCP Brian Bob MD     Date of Admission: 2024 Disposition: Home or Self Care     Date of Discharge: 24    Admitting Diagnosis: Acute cystitis without hematuria [N30.00]  Leukocytosis, unspecified type [D72.829]    Hospital Discharge Diagnoses:  cystitis    Hospital Discharge Diagnoses:  cystitis    Lace+ Score: 59  59-90 High Risk  29-58 Medium Risk  0-28   Low Risk.    TCM Follow-Up Recommendation:  LACE > 58: High Risk of readmission after discharge from the hospital.          Lace+ Score: 59  59-90 High Risk  29-58 Medium Risk  0-28   Low Risk    Risk of readmission: Clari Roas has High Risk of readmission after discharge from the hospital.    Problem List:   Patient Active Problem List   Diagnosis    Paresis of lower extremity (HCC)    Transverse myelitis (HCC)    Bipolar 1 disorder (HCC)    Manipulative behavior    History of diverticulitis of colon    Serotonin syndrome    Obesity (BMI 30-39.9)    Dental caries    Type 2 diabetes mellitus without complication, with long-term current use of insulin (HCC)    Chronic suprapubic catheter (HCC)    Bipolar I disorder depressed with atypical features (HCC)    Other acute pulmonary embolism, unspecified whether acute cor pulmonale present (HCC)    Hyperglycemia    Sepsis due to undetermined organism (HCC)    Severe episode of recurrent major depressive disorder, without psychotic features (HCC)    Generalized anxiety disorder with panic attacks    Sepsis due to urinary tract infection (HCC)    Lactic acid acidosis    Constipation, unspecified constipation type    Moderate depressed bipolar I disorder (HCC)    Acute chest pain    Depression, unspecified depression type    Cellulitis    Bipolar disorder, current episode mixed,  severe, without psychotic features (HCC)    Chest pain    Acute cystitis without hematuria    Leukocytosis    Leukocytosis, unspecified type       Reason for Admission:     Physical Exam:   General appearance: alert, appears stated age and cooperative  Pulmonary:  clear to auscultation bilaterally  Cardiovascular: S1, S2 normal, no murmur, click, rub or gallop, regular rate and rhythm  Abdominal: soft, non-tender; bowel sounds normal; no masses,  no organomegaly  Extremities: extremities normal, atraumatic, no cyanosis or edema  Psychiatric: calm        History of Present Illness:     Clari Rosa is a(n) 53 year old female. Pt is 52 yo with suprapubic catheter, DM, Anxiety, depression, neurogenic bladder , bipolar, htn, diverticulitis, neema, transverse myelitis who p/w fatigue. Pt has had sepsis in the past from uti and feels similar to past admissions. She reports tremors in her hands and legs that started last night. She also reports anxiety and depression which are long standing. She denies SI. She was hospitalized in July with cellulitis and was discharged to inpatient psych due to worsening anxiety. She was in the ER on Aug 5th and urine cx showed Citrobacter and serratia sensitive to gent and she was given fosfomycin. In the ER, WBC 12 with left shift and Lactic acid 5.2. She received 3 L sepsis bolus in ER and additional 2 L bolus. BP is improved. Pt dx'ed with DVT aug 3 2024 and is on xarelto.     ROS is positive for cp, sob, n/v, c/d. She denies blood in the stool. She reports dry cough as well.      Pt is sleeping during my interview but wakes easily with tactile stimulation.   She denies fever at home.   She lives in [facility].      She was started on ceftriaxone in ER    Hospital Course:      Acute cystitis without hematuria with h/o suprapubic catheter and neurogenic bladder   Sepsis , lactic elevated now improved with bolus   Cont iv abx ceftriaxone   Apprec ID consult - cont iv ceftriaxone    - apprec urology - spc changed today 8/23/2024; will need catheter changed regularly         Leukocytosis with left shift - cont to follow - improved     Anxiety and depression - pt requests psych consult   - cont home meds for now   - apprec psych input      H/o dvt in left leg   - cont eliquis      Diabetes mellitus type 2.  - accu checks ac and hs   - diabetic diet   - glargine 56 units normally - decrease by half and cont 5 u tid novolog                  DVT PPX: eliquis      Chart reviewed, including current vitals, notes, labs and imaging  Labs ordered and medications adjusted as outlined above  Coordinate care with care team/consultants  Discussed with patient results of tests, management plan as outlined above, and the need for ongoing hospitalization  D/w RN     MDM high       Consultations: Dr Taylor, Titus Magana     Procedures: none    Complications: none    Discharge Condition: Good    Discharge Medications:      Discharge Medications        START taking these medications        Instructions Prescription details   sodium chloride 0.65 % Soln  Commonly known as: Saline Mist      1 spray every 3 (three) hours as needed.   Refills: 0            CHANGE how you take these medications        Instructions Prescription details   insulin glargine 100 UNIT/ML Soln  Commonly known as: Lantus  What changed: how much to take  Notes to patient: Resume home regimen      Inject 46 Units into the skin nightly.   Refills: 0     QUEtiapine 100 MG Tabs  Commonly known as: SEROquel  What changed:   medication strength  when to take this  Another medication with the same name was removed. Continue taking this medication, and follow the directions you see here.  Notes to patient: Tonight at bedtime       Take 1 tablet (100 mg total) by mouth nightly.   Refills: 0            CONTINUE taking these medications        Instructions Prescription details   acetaminophen 325 MG Tabs  Commonly known as: Tylenol      Take 2  tablets (650 mg total) by mouth every 6 (six) hours as needed for Pain.   Refills: 0     albuterol 108 (90 Base) MCG/ACT Aers  Commonly known as: Ventolin HFA      Inhale 2 puffs into the lungs every 6 (six) hours as needed for Wheezing or Shortness of Breath.   Refills: 0     apixaban 5 MG Tabs  Commonly known as: Eliquis  Notes to patient: Today at evening       Take 1 tablet (5 mg total) by mouth 2 (two) times daily.   Refills: 0     ARIPiprazole 5 MG Tabs  Commonly known as: Abilify  Notes to patient: Tomorrow  morning       Take 1 tablet (5 mg total) by mouth daily.   Refills: 0     Baqsimi One Pack 3 MG/DOSE nasal powder  Generic drug: glucagon      3 mg by Nasal route once as needed for Low blood glucose.   Refills: 0     bethanechol 10 MG Tabs  Commonly known as: Urecholine  Notes to patient: Tomorrow morning      Take 1 tablet (10 mg total) by mouth daily.   Refills: 0     clonazePAM 0.5 MG Tabs  Commonly known as: KlonoPIN  Notes to patient: Tonight at bedtime       Take 1 tablet (0.5 mg total) by mouth at bedtime.   Refills: 0     Cranberry 500 MG Caps  Notes to patient: Resume home regimen       1 capsule PO daily   Refills: 0     divalproex  MG Tb24  Commonly known as: Depakote ER  Notes to patient: Today at evening.       Take 1 tablet (500 mg total) by mouth 2 (two) times daily.   Refills: 0     docusate sodium 100 MG Caps  Commonly known as: COLACE  Notes to patient: Resume home regimen       Take 100 mg by mouth 2 (two) times daily.   Refills: 0     DULoxetine HCl 20 MG Csdr  Notes to patient: Tomorrow morning       Take 20 mg by mouth daily.   Refills: 0     ergocalciferol 1.25 MG (60277 UT) Caps  Commonly known as: Vitamin D2      Take 1 capsule (50,000 Units total) by mouth every 7 days. Every monday   Refills: 0     Icosapent Ethyl 1 g Caps  Notes to patient: Resume home regimen       Take 2 capsules by mouth 4 (four) times daily.   Refills: 0     insulin aspart 100 UNIT/ML Soct  Commonly  known as: NovoLOG      SS: 150-200 2units, 201-250 4 units, 251-300 6 units, 301-350 8 units, 351-400 10 units, over 401 call md   Quantity: 3 mL  Refills: 0     insulin aspart 100 Units/mL Sopn  Commonly known as: NovoLOG  Notes to patient: With meals       Inject 10 Units into the skin 3 (three) times daily with meals.   Refills: 0     Insulin Pen Needle 32G X 4 MM Misc      May substitute if not on insurance formulary   Quantity: 100 each  Refills: 5     lactulose 20 GM/30ML Soln  Commonly known as: ENULOSE      Take 30 mL (20 g total) by mouth every 6 (six) hours as needed (constipation).   Refills: 0     LORazepam 1 MG Tabs  Commonly known as: Ativan  Notes to patient: Resume home regimen       Take 1 tablet (1 mg total) by mouth in the morning, at noon, and at bedtime.   Refills: 0     Melatonin 3 MG Caps  Notes to patient: Resume home regimen      Take 1 capsule (3 mg total) by mouth at bedtime.   Refills: 0     metFORMIN  MG Tb24  Commonly known as: Glucophage XR  Notes to patient: Resume home regimen      Take 2 tablets (1,000 mg total) by mouth 2 (two) times daily with meals.   Quantity: 30 tablet  Refills: 0     metoprolol tartrate 25 MG Tabs  Commonly known as: Lopressor  Notes to patient: Resume home regimen      Take 1 tablet (25 mg total) by mouth daily.   Refills: 0     Nystatin [id number] UNIT/GM Powd      Apply 1 Application topically as needed for Dry Skin. Under breast and groin area   Refills: 0     ondansetron 4 MG Tbdp  Commonly known as: Zofran-ODT      Take 2 tablets (8 mg total) by mouth every 8 (eight) hours as needed for Nausea.   Refills: 0     phenazopyridine 100 MG Tabs  Commonly known as: Pyridium      Take 1 tablet (100 mg total) by mouth 3 (three) times daily as needed for Pain.   Quantity: 5 tablet  Refills: 0     Thera-M Tabs  Notes to patient: Tomorrow morning      Take 1 tablet by mouth daily.   Refills: 0     traMADol 50 MG Tabs  Commonly known as: Ultram      Take 1 tablet  (50 mg total) by mouth every 6 (six) hours as needed for Pain.   Refills: 0     traZODone 50 MG Tabs  Commonly known as: Desyrel      Take 1 tablet (50 mg total) by mouth nightly as needed for Sleep.   Refills: 0            STOP taking these medications      cephALEXin 500 MG Caps  Commonly known as: Keflex        cyclobenzaprine 10 MG Tabs  Commonly known as: Flexeril        fluticasone propionate 50 MCG/ACT Susp  Commonly known as: Flonase        Gabapentin 50 MG Tabs        hydrOXYzine 10 MG Tabs  Commonly known as: Atarax        loratadine 10 MG Tabs  Commonly known as: Claritin        meclizine 12.5 MG Tabs  Commonly known as: Antivert        methenamine 1 g Tabs  Commonly known as: Hiprex        montelukast 10 MG Tabs  Commonly known as: Singulair        oxybutynin ER 5 MG Tb24  Commonly known as: Ditropan-XL        Polyethylene Glycol 3350 17 g Pack  Commonly known as: MIRALAX        rivaroxaban 20 MG Tabs  Commonly known as: Xarelto        sennosides 8.6 MG Tabs  Commonly known as: Senokot               ASK your doctor about these medications        Instructions Prescription details   acidophilus-pectin Caps  Commonly known as: Probiotic      Take 1 capsule by mouth 2 (two) times daily.   Refills: 0     atorvastatin 40 MG Tabs  Commonly known as: Lipitor  Notes to patient: Tonight at bedtime       Take 1 tablet (40 mg total) by mouth nightly.   Refills: 0     cefdinir 300 MG Caps  Commonly known as: Omnicef  Ask about: Should I take this medication?      Take 1 capsule (300 mg total) by mouth 2 (two) times daily for 2 days.   Stop taking on: August 26, 2024  Quantity: 4 capsule  Refills: 0               Where to Get Your Medications        These medications were sent to [facility] [place], [place] - [facility] [phone], [phone]  [facility] [address]      Phone: [phone]   cefdinir 300 MG Caps         Follow up Visits: Follow-up  with pcp  in 1 week    Follow up Labs: none     Other Discharge Instructions: f/u with urology     Tamara Jackson DO  9/8/2024  4:20 PM    > 35 min

## 2024-09-17 ENCOUNTER — HOSPITAL ENCOUNTER (INPATIENT)
Facility: HOSPITAL | Age: 54
LOS: 2 days | Discharge: NURSING, REHAB OR LONG-TERM CARE | End: 2024-09-20
Attending: EMERGENCY MEDICINE | Admitting: STUDENT IN AN ORGANIZED HEALTH CARE EDUCATION/TRAINING PROGRAM
Payer: MEDICARE

## 2024-09-17 ENCOUNTER — APPOINTMENT (OUTPATIENT)
Dept: CT IMAGING | Facility: HOSPITAL | Age: 54
End: 2024-09-17
Attending: EMERGENCY MEDICINE
Payer: MEDICARE

## 2024-09-17 ENCOUNTER — APPOINTMENT (OUTPATIENT)
Dept: GENERAL RADIOLOGY | Facility: HOSPITAL | Age: 54
End: 2024-09-17
Attending: EMERGENCY MEDICINE
Payer: MEDICARE

## 2024-09-17 DIAGNOSIS — A41.9 SEPSIS DUE TO URINARY TRACT INFECTION (HCC): Primary | ICD-10-CM

## 2024-09-17 DIAGNOSIS — N39.0 SEPSIS DUE TO URINARY TRACT INFECTION (HCC): Primary | ICD-10-CM

## 2024-09-17 DIAGNOSIS — F31.9 BIPOLAR 1 DISORDER (HCC): ICD-10-CM

## 2024-09-17 DIAGNOSIS — F31.63 BIPOLAR DISORDER, CURRENT EPISODE MIXED, SEVERE, WITHOUT PSYCHOTIC FEATURES (HCC): ICD-10-CM

## 2024-09-17 LAB
ALBUMIN SERPL-MCNC: 4.5 G/DL (ref 3.2–4.8)
ALP LIVER SERPL-CCNC: 81 U/L
ALT SERPL-CCNC: 16 U/L
ANION GAP SERPL CALC-SCNC: 12 MMOL/L (ref 0–18)
AST SERPL-CCNC: 19 U/L (ref ?–34)
BASOPHILS # BLD AUTO: 0.06 X10(3) UL (ref 0–0.2)
BASOPHILS NFR BLD AUTO: 0.5 %
BILIRUB DIRECT SERPL-MCNC: <0.1 MG/DL (ref ?–0.3)
BILIRUB SERPL-MCNC: 0.2 MG/DL (ref 0.3–1.2)
BILIRUB UR QL: NEGATIVE
BUN BLD-MCNC: 6 MG/DL (ref 9–23)
BUN/CREAT SERPL: 7.1 (ref 10–20)
CALCIUM BLD-MCNC: 9.8 MG/DL (ref 8.7–10.4)
CHLORIDE SERPL-SCNC: 99 MMOL/L (ref 98–112)
CLARITY UR: CLEAR
CO2 SERPL-SCNC: 23 MMOL/L (ref 21–32)
COLOR UR: YELLOW
CREAT BLD-MCNC: 0.84 MG/DL
DEPRECATED RDW RBC AUTO: 43.2 FL (ref 35.1–46.3)
EGFRCR SERPLBLD CKD-EPI 2021: 83 ML/MIN/1.73M2 (ref 60–?)
EOSINOPHIL # BLD AUTO: 0.15 X10(3) UL (ref 0–0.7)
EOSINOPHIL NFR BLD AUTO: 1.2 %
ERYTHROCYTE [DISTWIDTH] IN BLOOD BY AUTOMATED COUNT: 15.1 % (ref 11–15)
GLUCOSE BLD-MCNC: 237 MG/DL (ref 70–99)
GLUCOSE BLDC GLUCOMTR-MCNC: 156 MG/DL (ref 70–99)
GLUCOSE BLDC GLUCOMTR-MCNC: 292 MG/DL (ref 70–99)
GLUCOSE UR-MCNC: NEGATIVE MG/DL
HCT VFR BLD AUTO: 37.1 %
HGB BLD-MCNC: 12.3 G/DL
IMM GRANULOCYTES # BLD AUTO: 0.1 X10(3) UL (ref 0–1)
IMM GRANULOCYTES NFR BLD: 0.8 %
KETONES UR-MCNC: NEGATIVE MG/DL
LACTATE SERPL-SCNC: 4.9 MMOL/L (ref 0.5–2)
LYMPHOCYTES # BLD AUTO: 2.84 X10(3) UL (ref 1–4)
LYMPHOCYTES NFR BLD AUTO: 23 %
MCH RBC QN AUTO: 26.3 PG (ref 26–34)
MCHC RBC AUTO-ENTMCNC: 33.2 G/DL (ref 31–37)
MCV RBC AUTO: 79.3 FL
MONOCYTES # BLD AUTO: 0.45 X10(3) UL (ref 0.1–1)
MONOCYTES NFR BLD AUTO: 3.6 %
NEUTROPHILS # BLD AUTO: 8.75 X10 (3) UL (ref 1.5–7.7)
NEUTROPHILS # BLD AUTO: 8.75 X10(3) UL (ref 1.5–7.7)
NEUTROPHILS NFR BLD AUTO: 70.9 %
NITRITE UR QL STRIP.AUTO: NEGATIVE
OSMOLALITY SERPL CALC.SUM OF ELEC: 283 MOSM/KG (ref 275–295)
PH UR: 6.5 [PH] (ref 5–8)
PLATELET # BLD AUTO: 303 10(3)UL (ref 150–450)
POTASSIUM SERPL-SCNC: 4.4 MMOL/L (ref 3.5–5.1)
PROT SERPL-MCNC: 7.4 G/DL (ref 5.7–8.2)
PROT UR-MCNC: NEGATIVE MG/DL
RBC # BLD AUTO: 4.68 X10(6)UL
SODIUM SERPL-SCNC: 134 MMOL/L (ref 136–145)
SP GR UR STRIP: 1.01 (ref 1–1.03)
TROPONIN I SERPL HS-MCNC: <3 NG/L
UROBILINOGEN UR STRIP-ACNC: 0.2
WBC # BLD AUTO: 12.4 X10(3) UL (ref 4–11)

## 2024-09-17 PROCEDURE — 74176 CT ABD & PELVIS W/O CONTRAST: CPT | Performed by: EMERGENCY MEDICINE

## 2024-09-17 PROCEDURE — 99223 1ST HOSP IP/OBS HIGH 75: CPT | Performed by: INTERNAL MEDICINE

## 2024-09-17 PROCEDURE — 71045 X-RAY EXAM CHEST 1 VIEW: CPT | Performed by: EMERGENCY MEDICINE

## 2024-09-17 RX ORDER — MECLIZINE HYDROCHLORIDE 25 MG/1
25 TABLET ORAL ONCE
Status: COMPLETED | OUTPATIENT
Start: 2024-09-17 | End: 2024-09-17

## 2024-09-17 RX ORDER — LORAZEPAM 1 MG/1
1 TABLET ORAL ONCE
Status: COMPLETED | OUTPATIENT
Start: 2024-09-17 | End: 2024-09-17

## 2024-09-17 NOTE — ED INITIAL ASSESSMENT (HPI)
Patient presents to the ED c/o dizziness, diarrhea, and anxiety x one week. Pt reports she has been out of her anxiety medication x one week.   Pt is on 2L nasal cannula at baseline, hx COPD and CHF.   Pt O2: 99% on 2L nasal cannula.

## 2024-09-18 LAB
ATRIAL RATE: 107 BPM
C DIFF TOX B STL QL: NEGATIVE
GLUCOSE BLDC GLUCOMTR-MCNC: 151 MG/DL (ref 70–99)
GLUCOSE BLDC GLUCOMTR-MCNC: 163 MG/DL (ref 70–99)
GLUCOSE BLDC GLUCOMTR-MCNC: 173 MG/DL (ref 70–99)
GLUCOSE BLDC GLUCOMTR-MCNC: 198 MG/DL (ref 70–99)
LACTATE SERPL-SCNC: 3.6 MMOL/L (ref 0.5–2)
LACTATE SERPL-SCNC: 5.3 MMOL/L (ref 0.5–2)
P AXIS: 53 DEGREES
P-R INTERVAL: 168 MS
Q-T INTERVAL: 344 MS
QRS DURATION: 78 MS
QTC CALCULATION (BEZET): 459 MS
R AXIS: -30 DEGREES
T AXIS: 65 DEGREES
VENTRICULAR RATE: 107 BPM

## 2024-09-18 PROCEDURE — 99233 SBSQ HOSP IP/OBS HIGH 50: CPT | Performed by: INTERNAL MEDICINE

## 2024-09-18 PROCEDURE — 90792 PSYCH DIAG EVAL W/MED SRVCS: CPT | Performed by: NURSE PRACTITIONER

## 2024-09-18 RX ORDER — LORAZEPAM 1 MG/1
1 TABLET ORAL 3 TIMES DAILY PRN
Status: DISCONTINUED | OUTPATIENT
Start: 2024-09-18 | End: 2024-09-20

## 2024-09-18 RX ORDER — ALBUTEROL SULFATE 90 UG/1
2 INHALANT RESPIRATORY (INHALATION) EVERY 6 HOURS PRN
Status: DISCONTINUED | OUTPATIENT
Start: 2024-09-18 | End: 2024-09-20

## 2024-09-18 RX ORDER — PHENAZOPYRIDINE HYDROCHLORIDE 100 MG/1
100 TABLET, FILM COATED ORAL 3 TIMES DAILY PRN
Status: DISCONTINUED | OUTPATIENT
Start: 2024-09-18 | End: 2024-09-20

## 2024-09-18 RX ORDER — NYSTATIN 100000 U/G
CREAM TOPICAL 2 TIMES DAILY
Status: DISCONTINUED | OUTPATIENT
Start: 2024-09-18 | End: 2024-09-20

## 2024-09-18 RX ORDER — QUETIAPINE FUMARATE 100 MG/1
100 TABLET, FILM COATED ORAL NIGHTLY
Status: DISCONTINUED | OUTPATIENT
Start: 2024-09-18 | End: 2024-09-20

## 2024-09-18 RX ORDER — TRAZODONE HYDROCHLORIDE 50 MG/1
50 TABLET, FILM COATED ORAL NIGHTLY PRN
Status: DISCONTINUED | OUTPATIENT
Start: 2024-09-18 | End: 2024-09-20

## 2024-09-18 RX ORDER — ACETAMINOPHEN 500 MG
500 TABLET ORAL EVERY 4 HOURS PRN
Status: DISCONTINUED | OUTPATIENT
Start: 2024-09-18 | End: 2024-09-20

## 2024-09-18 RX ORDER — DOCUSATE SODIUM 100 MG/1
100 CAPSULE, LIQUID FILLED ORAL 2 TIMES DAILY
Status: DISCONTINUED | OUTPATIENT
Start: 2024-09-18 | End: 2024-09-20

## 2024-09-18 RX ORDER — NICOTINE POLACRILEX 4 MG
15 LOZENGE BUCCAL
Status: DISCONTINUED | OUTPATIENT
Start: 2024-09-18 | End: 2024-09-20

## 2024-09-18 RX ORDER — METOPROLOL TARTRATE 25 MG/1
25 TABLET, FILM COATED ORAL DAILY
Status: DISCONTINUED | OUTPATIENT
Start: 2024-09-18 | End: 2024-09-20

## 2024-09-18 RX ORDER — SODIUM CHLORIDE 9 MG/ML
INJECTION, SOLUTION INTRAVENOUS CONTINUOUS
Status: DISCONTINUED | OUTPATIENT
Start: 2024-09-18 | End: 2024-09-20

## 2024-09-18 RX ORDER — MECLIZINE HCL 12.5 MG 12.5 MG/1
25 TABLET ORAL DAILY PRN
Status: DISCONTINUED | OUTPATIENT
Start: 2024-09-18 | End: 2024-09-20

## 2024-09-18 RX ORDER — INSULIN DEGLUDEC 100 U/ML
30 INJECTION, SOLUTION SUBCUTANEOUS DAILY
Status: DISCONTINUED | OUTPATIENT
Start: 2024-09-18 | End: 2024-09-20

## 2024-09-18 RX ORDER — LORAZEPAM 1 MG/1
1 TABLET ORAL 3 TIMES DAILY
Status: DISCONTINUED | OUTPATIENT
Start: 2024-09-18 | End: 2024-09-18

## 2024-09-18 RX ORDER — DIVALPROEX SODIUM 500 MG/1
500 TABLET, DELAYED RELEASE ORAL 2 TIMES DAILY
Status: DISCONTINUED | OUTPATIENT
Start: 2024-09-18 | End: 2024-09-20

## 2024-09-18 RX ORDER — ARIPIPRAZOLE 5 MG/1
5 TABLET ORAL DAILY
Status: DISCONTINUED | OUTPATIENT
Start: 2024-09-18 | End: 2024-09-19

## 2024-09-18 RX ORDER — NICOTINE POLACRILEX 4 MG
30 LOZENGE BUCCAL
Status: DISCONTINUED | OUTPATIENT
Start: 2024-09-18 | End: 2024-09-20

## 2024-09-18 RX ORDER — CLONAZEPAM 0.5 MG/1
0.5 TABLET ORAL NIGHTLY
Status: DISCONTINUED | OUTPATIENT
Start: 2024-09-18 | End: 2024-09-20

## 2024-09-18 RX ORDER — DULOXETIN HYDROCHLORIDE 30 MG/1
30 CAPSULE, DELAYED RELEASE ORAL DAILY
Status: DISCONTINUED | OUTPATIENT
Start: 2024-09-18 | End: 2024-09-20

## 2024-09-18 RX ORDER — TRAMADOL HYDROCHLORIDE 50 MG/1
50 TABLET ORAL EVERY 6 HOURS PRN
Status: DISCONTINUED | OUTPATIENT
Start: 2024-09-18 | End: 2024-09-20

## 2024-09-18 RX ORDER — DEXTROSE MONOHYDRATE 25 G/50ML
50 INJECTION, SOLUTION INTRAVENOUS
Status: DISCONTINUED | OUTPATIENT
Start: 2024-09-18 | End: 2024-09-20

## 2024-09-18 RX ORDER — BETHANECHOL CHLORIDE 5 MG
10 TABLET ORAL DAILY
Status: DISCONTINUED | OUTPATIENT
Start: 2024-09-18 | End: 2024-09-20

## 2024-09-18 RX ORDER — ONDANSETRON 4 MG/1
4 TABLET, ORALLY DISINTEGRATING ORAL EVERY 4 HOURS PRN
Status: DISCONTINUED | OUTPATIENT
Start: 2024-09-18 | End: 2024-09-20

## 2024-09-18 RX ORDER — MELATONIN
3 NIGHTLY
Status: DISCONTINUED | OUTPATIENT
Start: 2024-09-18 | End: 2024-09-20

## 2024-09-18 RX ORDER — LOPERAMIDE HCL 2 MG
2 CAPSULE ORAL 2 TIMES DAILY PRN
Status: DISCONTINUED | OUTPATIENT
Start: 2024-09-18 | End: 2024-09-20

## 2024-09-18 NOTE — ED QUICK NOTES
Orders for admission, patient is aware of plan and ready to go upstairs. Any questions, please call ED ZINA Amador at extension [phone].     Patient Covid vaccination status: Unvaccinated     COVID Test Ordered in ED: None    COVID Suspicion at Admission: N/A    Running Infusions:      Mental Status/LOC at time of transport: x4    Other pertinent information:   CIWA score: N/A   NIH score:  N/A

## 2024-09-18 NOTE — PLAN OF CARE
Problem: Patient Centered Care  Goal: Patient preferences are identified and integrated in the patient's plan of care  Description: Interventions:  - What would you like us to know as we care for you?   Problem: METABOLIC/FLUID AND ELECTROLYTES - ADULT  Goal: Glucose maintained within prescribed range  Description: INTERVENTIONS:  - Monitor Blood Glucose as ordered  - Assess for signs and symptoms of hyperglycemia and hypoglycemia  - Administer ordered medications to maintain glucose within target range  - Assess barriers to adequate nutritional intake and initiate nutrition consult as needed  - Instruct patient on self management of diabetes  Outcome: Progressing     Problem: SKIN/TISSUE INTEGRITY - ADULT  Goal: Skin integrity remains intact  Description: INTERVENTIONS  - Assess and document risk factors for pressure ulcer development  - Assess and document skin integrity  - Monitor for areas of redness and/or skin breakdown  - Initiate interventions, skin care algorithm/standards of care as needed  Outcome: Progressing     - Provide timely, complete, and accurate information to patient/family  - Incorporate patient and family knowledge, values, beliefs, and cultural backgrounds into the planning and delivery of care  - Encourage patient/family to participate in care and decision-making at the level they choose  - Honor patient and family perspectives and choices  Outcome: Progressing

## 2024-09-18 NOTE — PROGRESS NOTES
[facility] was consulted for PHQ-4 score = 8.   Patient with history of bipolar 1 disorder, depression, MIKE, who was recently seen by the psych team in August for increased anxiety and depression. Recently admitted to [facility] in [place]. Patient states she had a ' horrible experience' while there. Patient states she did not receive any of her anxiety medications. She attended 1 music therapy group.    Current medications include Seroquel 100 mg, Ativan 1 mg. Previously on  Wellbutrin XL 24 hr tab 300mg, Klonopin 1mg q6h, Seroquel 300mg BID.     Patient states she doesn't want to continue care  at the nursing home. Writer provided referrals for [facility] psychiatry in pt discharge instructions. Patient would like to see Psychiatry team while admitted.  Psychiatry consult ordered, AMBAR Nava to see for medication management.     Denies SI/HI, no safety concerns noted. Denies auditory or visual hallucinations.     Pt declined additional referrals and had no further questions/concerns at this time.      GURINDER Bynum  [id number]

## 2024-09-18 NOTE — ED PROVIDER NOTES
Patient Seen in: [facility] Emergency Department      History     Chief Complaint   Patient presents with    Dizziness     Stated Complaint:     Subjective:   HPI    53-year-old female with history of hypertension, diabetes, COPD on 2 L of oxygen by nasal cannula, sleep apnea, transverse myelitis, paraplegia with neurogenic bladder for which she has a suprapubic catheter in place, chronic pain syndrome, bipolar disorder, anxiety, and personality disorder presents from her nursing home with multiple complaints.  The patient reports having difficulty breathing out of her nostrils because, \"I have a large Ankur in my nose that needs to be surgically removed.\"  She reports having the symptoms for the past 6 months but reports increased dyspnea today.  She also reports feeling lightheaded.  She reports chronic cough.  She complains of left-sided abdominal pain.  No vomiting or diarrhea.  No known fevers.  The patient also reports that she has been out of her anxiety medications for the past week.  Per nursing home notes the patient takes lorazepam 1 mg 3 times daily.    Objective:   Past Medical History:    Bipolar 1 disorder (HCC)    Dental caries    Diabetes (HCC)    MIKE (generalized anxiety disorder)    High blood pressure    History of diverticulitis of colon    Manipulative behavior    Neurogenic bladder    Obesity (BMI 30-39.9)    Paraplegia (HCC)    Sepsis following intra-abdominal surgery (HCC)    Serotonin syndrome    Sleep apnea    Suprapubic catheter (HCC)    Transverse myelitis (HCC)              Past Surgical History:   Procedure Laterality Date    Arthroscopy of joint unlisted      knee          Cholecystectomy      Part removal colon w end colostomy      2013    Tonsillectomy                  Social History     Socioeconomic History    Marital status: Single   Tobacco Use    Smoking status: Former     Current packs/day: 1.00     Average packs/day: 1 pack/day for 5.0 years (5.0 ttl pk-yrs)      Types: Cigarettes    Tobacco comments:     quit 2006   Vaping Use    Vaping status: Never Used   Substance and Sexual Activity    Alcohol use: No    Drug use: No     Social Determinants of Health     Food Insecurity: No Food Insecurity (8/21/2024)    Food Insecurity     Food Insecurity: Never true   Transportation Needs: No Transportation Needs (8/21/2024)    Transportation Needs     Lack of Transportation: No   Housing Stability: Low Risk  (8/21/2024)    Housing Stability     Housing Instability: No              Review of Systems    Positive for stated Chief Complaint: Dizziness    Other systems are as noted in HPI.  Constitutional and vital signs reviewed.      All other systems reviewed and negative except as noted above.    Physical Exam     ED Triage Vitals [09/17/24 1830]   BP (!) 139/97   Pulse 110   Resp 18   Temp 97.2 °F (36.2 °C)   Temp src Temporal   SpO2 99 %   O2 Device Nasal cannula       Current Vitals:   Vital Signs  BP: 109/66  Pulse: 102  Resp: 23  Temp: 97.2 °F (36.2 °C)  Temp src: Temporal  MAP (mmHg): 81    Oxygen Therapy  SpO2: 98 %  O2 Device: None (Room air)            Physical Exam      General Appearance: alert, no distress  Eyes: pupils equal and round no pallor or injection  ENT, Mouth: mucous membranes moist.  Bilateral nostrils clear with no visible masses.  Oropharynx normal-appearing.  Respiratory: there are no retractions, lungs are clear to auscultation  Cardiovascular: regular rate and rhythm  Gastrointestinal:  abdomen is soft with tenderness to the left side of the abdomen including the left upper and lower quadrants, no masses, bowel sounds normal  Neurological: Speech normal.  Motor and sensation is intact and symmetric to bilateral upper and lower extremities.  Skin: warm and dry, no rashes.  Musculoskeletal: neck is supple non tender        Extremities are symmetrical, full range of motion  Psychiatric: patient is oriented X 3, there is no agitation    ED Course     Labs  Reviewed   CBC WITH DIFFERENTIAL WITH PLATELET - Abnormal; Notable for the following components:       Result Value    WBC 12.4 (*)     MCV 79.3 (*)     RDW 15.1 (*)     Neutrophil Absolute Prelim 8.75 (*)     Neutrophil Absolute 8.75 (*)     All other components within normal limits   BASIC METABOLIC PANEL (8) - Abnormal; Notable for the following components:    Glucose 237 (*)     Sodium 134 (*)     BUN 6 (*)     BUN/CREA Ratio 7.1 (*)     All other components within normal limits   LACTIC ACID, PLASMA - Abnormal; Notable for the following components:    Lactic Acid 4.9 (*)     All other components within normal limits   HEPATIC FUNCTION PANEL (7) - Abnormal; Notable for the following components:    Bilirubin, Total 0.2 (*)     All other components within normal limits   POCT GLUCOSE - Abnormal; Notable for the following components:    POC Glucose  292 (*)     All other components within normal limits   TROPONIN I HIGH SENSITIVITY - Normal   URINALYSIS WITH CULTURE REFLEX   RAINBOW DRAW LAVENDER   RAINBOW DRAW LIGHT GREEN   RAINBOW DRAW BLUE   BLOOD CULTURE   BLOOD CULTURE     EKG    Rate, intervals and axes as noted on EKG Report.  Rate: 107  Rhythm: Sinus Rhythm  Axis: Left  Reading: Nonspecific EKG                        MDM      Lab, CT, and x-ray results noted.  Patient with elevated lactic acid and leukocytosis with possible sepsis.  Will start an IV sepsis bolus and begin empiric antibiotics.  Will treat for urinary tract infection at this time given her history of frequent urinary infections and sepsis.  Communicated with Dr. Rodriguez, hospitalist.  Admission disposition: 9/17/2024  9:31 PM                                     [facility]  part of [facility]      Sepsis Reassessment Note    /66   Pulse 102   Temp 97.2 °F (36.2 °C) (Temporal)   Resp 23   Wt 88.2 kg   SpO2 98%   BMI 37.98 kg/m²      I completed the sepsis reassessment at 2155    Cardiac:  Regularity:  Regular  Rate: Normal  Heart Sounds: S1,S2    Lungs:   Right: Clear  Left: Clear    Peripheral Pulses:  Radial: Right 1+ or Left 1+      Capillary Refill:  <3 Secs    Skin:  Temp/Moisture: Warm and Dry  Color: Normal      Boyd Lucia MD  9/17/2024  9:39 PM      Medical Decision Making      Disposition and Plan     Clinical Impression:  1. Sepsis due to urinary tract infection (HCC)         Disposition:  Admit  9/17/2024  9:31 pm    Follow-up:  No follow-up provider specified.  We recommend that you schedule follow up care with a primary care provider within the next three months to obtain basic health screening including reassessment of your blood pressure.      Medications Prescribed:  Current Discharge Medication List                            Hospital Problems       Present on Admission  Date Reviewed: 1/18/2024            ICD-10-CM Noted POA    * (Principal) Sepsis due to urinary tract infection (HCC) A41.9, N39.0 7/9/2024 Unknown

## 2024-09-18 NOTE — CONSULTS
[facility]  part of [facility] ID CONSULT NOTE    Clari Rosa Patient Status:  Inpatient    1970 MRN [id number]   Location [facility] 5SW/SE Attending Stefan Haque MD   Hosp Day # 0 PCP Brian Bob MD       Reason for Consultation:  Pyuria    ASSESSMENT:    Antibiotics: Meropenem -    # Acute lactic acidosis with pyuria, urine cx from  with ESBL E. Coli, Serratia marcescens, R/o bacteremia   -CT A/P unremarkable   -On macrobid PTA  # Leukocytosis  # Diarrhea, R/o C. Difficile colitis  # Chronic neurogenic bladder s/p SPC with recurrent UTI  # DM  # Transverse myelitis  # Anxiety, depression  # Bipolar disorder     PLAN:  -  Continue on meropenem.  -  FU cx.  -  Follow fever curve, wbc.  -  Reviewed labs, micro, imaging reports, available old records.  -  Case d/w patient, RN.    History of Present Illness:  Clari Rosa is a 53 year old female with a history of transverse myelitis, neurogenic bladder with SPC with recurrent UTI, bipolar disorder, lives in NH with a h/o E. Coli, PSAR in UCx MDRO, last seen by our service during admission 2024 for Serratia UTI, who was seen in ED on  with dizziness, started on macrobid with urine cx growing ESBL E. Coli and Serratia, who presented to [facility] ED on  with multiple complaints including diarrhea and anxiety. Has been having four Bms daily. Unclear when SPC was last exchanged. On arrival, afebrile, wbc 12.4, lactate 5.3, CT A/P unremarkable, blood cx obtained, started on meropenem. ID consulted.    History:  Past Medical History:    Bipolar 1 disorder (HCC)    Dental caries    Diabetes (HCC)    MIKE (generalized anxiety disorder)    High blood pressure    History of diverticulitis of colon    Manipulative behavior    Neurogenic bladder    Obesity (BMI 30-39.9)    Paraplegia (HCC)    Sepsis following intra-abdominal surgery (HCC)    Serotonin syndrome    Sleep apnea    Suprapubic catheter (HCC)    Transverse  myelitis (HCC)     Past Surgical History:   Procedure Laterality Date    Arthroscopy of joint unlisted      knee          Cholecystectomy      Part removal colon w end colostomy      2013    Tonsillectomy       Family History   Adopted: Yes      reports that she has quit smoking. Her smoking use included cigarettes. She has a 5 pack-year smoking history. She does not have any smokeless tobacco history on file. She reports that she does not drink alcohol and does not use drugs.    Allergies:  Allergies   Allergen Reactions    Pcn [Penicillins] HIVES    Sulfa Antibiotics HIVES    Radiology Contrast Iodinated Dyes DIZZINESS       Medications:    Current Facility-Administered Medications:     glucose (Dex4) 15 GM/59ML oral liquid 15 g, 15 g, Oral, Q15 Min PRN **OR** glucose (Glutose) 40% oral gel 15 g, 15 g, Oral, Q15 Min PRN **OR** glucose-vitamin C (Dex-4) chewable tab 4 tablet, 4 tablet, Oral, Q15 Min PRN **OR** dextrose 50% injection 50 mL, 50 mL, Intravenous, Q15 Min PRN **OR** glucose (Dex4) 15 GM/59ML oral liquid 30 g, 30 g, Oral, Q15 Min PRN **OR** glucose (Glutose) 40% oral gel 30 g, 30 g, Oral, Q15 Min PRN **OR** glucose-vitamin C (Dex-4) chewable tab 8 tablet, 8 tablet, Oral, Q15 Min PRN    sodium chloride 0.9% infusion, , Intravenous, Continuous    acetaminophen (Tylenol Extra Strength) tab 500 mg, 500 mg, Oral, Q4H PRN    meropenem (Merrem) 500 mg in sodium chloride 0.9% 100 mL IVPB-MBP, 500 mg, Intravenous, Q8H    traMADol (Ultram) tab 50 mg, 50 mg, Oral, Q6H PRN    QUEtiapine (SEROquel) tab 100 mg, 100 mg, Oral, Nightly    phenazopyridine (Pyridium) tab 100 mg, 100 mg, Oral, TID PRN    ondansetron (Zofran-ODT) disintegrating tab 4 mg, 4 mg, Oral, Q4H PRN    metoprolol tartrate (Lopressor) tab 25 mg, 25 mg, Oral, Daily    melatonin tab 3 mg, 3 mg, Oral, Nightly    LORazepam (Ativan) tab 1 mg, 1 mg, Oral, TID    insulin degludec (Tresiba) 100 units/mL flextouch 30 Units, 30 Units, Subcutaneous,  Daily    insulin aspart (NovoLOG) 100 Units/mL FlexPen 5 Units, 5 Units, Subcutaneous, TID CC    Review of Systems:  CONSTITUTIONAL:  No weight loss, weakness or fatigue.  HEENT:  Eyes:  No visual loss, blurred vision, double vision or yellow sclerae. Ears, Nose, Throat:  No hearing loss, sneezing, congestion, runny nose or sore throat.  SKIN:  No rash or itching.  CARDIOVASCULAR:  No chest pain, chest pressure or chest discomfort  RESPIRATORY:  No shortness of breath, cough or sputum.  GASTROINTESTINAL:  +diarrhea  GENITOURINARY:  No Burning on urination.   NEUROLOGICAL:  No headache, dizziness, syncope, paralysis, ataxia, numbness or tingling in the extremities.  MUSCULOSKELETAL:  No muscle, back pain, joint pain or stiffness.  HEMATOLOGIC:  No anemia, bleeding or bruising.  ALLERGIES:  No history of asthma, hives, eczema or rhinitis.    Physical Exam:  Vital signs: Blood pressure 122/68, pulse 99, temperature 98.8 °F (37.1 °C), temperature source Oral, resp. rate 18, height 5' (1.524 m), weight 190 lb 6.4 oz (86.4 kg), SpO2 98%.    General: Awake, in bed  HEENT: Moist mucous membranes. On NC  Neck: No lymphadenopathy.  Supple.  Cardiovascular: RRR  Respiratory: CTAB  Abdomen: Soft,  obese, no TTP  Musculoskeletal: BLE edema  : SPC+  Integument: No lesions. No erythema.  Lines: PIV+    Laboratory Data:  Recent Labs   Lab 09/17/24  1853   RBC 4.68   HGB 12.3   HCT 37.1   MCV 79.3*   MCH 26.3   MCHC 33.2   RDW 15.1*   NEPRELIM 8.75*   WBC 12.4*   .0     Recent Labs   Lab 09/17/24  1853   *   BUN 6*   CREATSERUM 0.84   CA 9.8   ALB 4.5   *   K 4.4   CL 99   CO2 23.0   ALKPHO 81   AST 19   ALT 16   BILT 0.2*   TP 7.4       Microbiology: Reviewed in EMR    Radiology: Reviewed    Thank you for allowing us to participate in the care of this patient. Please do not hesitate to call if you have any questions.   We will continue to follow with you and will make further recommendations based on his  progress.    [facility]  [phone]  9/18/2024

## 2024-09-18 NOTE — CONSULTS
[facility]  part of [facility]    Report of Consultation    Clari Rosa Patient Status:  Inpatient    1970 MRN [id number]   Location [facility] 5SW/SE Attending Stefan Haque MD   Hosp Day # 0 PCP Brian Bob MD     Date of Admission:  2024  Date of Consult:  24   Reason for Consultation:   Patient presented with increased anxiety, Stefan Fritz MD requested psychiatric consult for evaluation and advice.    Consult Duration     The patient seen for initial psychiatric consult evaluation.   Record reviewed, communication with attending, communication with RN and patient seen face to face evaluation.    History of Present Illness:   Patient is a 53 year old , single female with past medical history of diabetes, bipolar disorder, depression, MIKE, HTN, diverticulitis, neurogenic bladder, obesity who was admitted to the hospital for Sepsis due to urinary tract infection (HCC): The patient has been demonstrating increased anxiety.  Patient indicated for psych consult for evaluation and advise.    Per chart review, the patient presented to the hospital by EMS or anxiety, dizziness and diarrhea. Patient was admitted to the hospital.     The patient received the following psychotropic medications ativan 1 mg TID, melatonin 3 mg, seroquel 100 mg,     Labs and imaging reviewed: sodium 3.6    The patient is well known to the psychiatry team from previous consult. The patient was last seen in  for increased anxiety.     The patient seen today sitting in hospital bed. She presents calm otherwise some anxiety noted.     The patient is alert and oriented to person, place, date and condition. The patient answers questions and engages in appropriate conversation with no impairment in cognition or distortion in thought process.     The patient stating that she came to the hospital for multiple reasons otherwise she reports worsening anxiety.    She notes  that after she was discharged in August her psychiatrist at the nursing facility did not continue the medications.    The patient reporting that she has been feeling very anxious and depressed.     The patient reporting feelings of hopelessness, helplessness, , low mood, low energy, decreased motivation with intense anxiety, worry, ruminations.     The patient stating that what has been challenging her the most is feeling anxious and not getting her medications that were prescribed by Dr. Qureshi previously.     Provided patient with supportive psychotherapy including listening, emotional support and encouragement.    The patient reporting that she has great support from family.    The patient is not demonstrating any carmel or psychosis  The patient denies auditory or visual hallucinations  The patient denies suicidal or homicidal ideation.    The patient has been demonstrating feelings of hopelessness, helplessness,  low mood, low energy, decreased motivation with increased anxiety, worry, ruminations. The patient denies any suicidal ideations. The patient is agreeable to medications changes and to follow up with outpatient psychiatry providers.     Past Psychiatric/Medication History:  1. Prior diagnoses: MIKE, depression, Bipolar 1 disorder, Patient states she also has OCD and Panic attacks sine adolescent years.  2. Past psychiatric inpatient: Patient has been coming frequently to our facility for somatic manifestation.  3. Past outpatient history: Patient reports seeing a psychiatris at Nursing Home   4. Past suicide history: Patient denies   5. Medication history: Wellbutrin XL 24 hr tab 300mg, Klonopin 1mg q6h, Seroquel 300mg BID,      Social History:   The patient states the she lives in a Nursing home due to medical conditions. She is single and has never  but has one living son.      Denies any ETOH abuse, or illicit substance abuse. Previous smoker (5 pack history)     Family History:  No family  history reported   Medical History:   Past Medical History  Past Medical History:    Bipolar 1 disorder (HCC)    Dental caries    Diabetes (HCC)    MIKE (generalized anxiety disorder)    High blood pressure    History of diverticulitis of colon    Manipulative behavior    Neurogenic bladder    Obesity (BMI 30-39.9)    Paraplegia (HCC)    Sepsis following intra-abdominal surgery (HCC)    Serotonin syndrome    Sleep apnea    Suprapubic catheter (HCC)    Transverse myelitis (HCC)       Past Surgical History  Past Surgical History:   Procedure Laterality Date    Arthroscopy of joint unlisted      knee          Cholecystectomy      Part removal colon w end colostomy      2013    Tonsillectomy         Family History  Family History   Adopted: Yes       Social History  Social History     Socioeconomic History    Marital status: Single   Tobacco Use    Smoking status: Former     Current packs/day: 1.00     Average packs/day: 1 pack/day for 5.0 years (5.0 ttl pk-yrs)     Types: Cigarettes    Tobacco comments:     quit    Vaping Use    Vaping status: Never Used   Substance and Sexual Activity    Alcohol use: No    Drug use: No     Social Determinants of Health     Food Insecurity: No Food Insecurity (2024)    Food Insecurity     Food Insecurity: Never true   Transportation Needs: No Transportation Needs (2024)    Transportation Needs     Lack of Transportation: No   Housing Stability: Low Risk  (2024)    Housing Stability     Housing Instability: No           Current Medications:  Current Facility-Administered Medications   Medication Dose Route Frequency    glucose (Dex4) 15 GM/59ML oral liquid 15 g  15 g Oral Q15 Min PRN    Or    glucose (Glutose) 40% oral gel 15 g  15 g Oral Q15 Min PRN    Or    glucose-vitamin C (Dex-4) chewable tab 4 tablet  4 tablet Oral Q15 Min PRN    Or    dextrose 50% injection 50 mL  50 mL Intravenous Q15 Min PRN    Or    glucose (Dex4) 15 GM/59ML oral liquid 30 g  30 g  Oral Q15 Min PRN    Or    glucose (Glutose) 40% oral gel 30 g  30 g Oral Q15 Min PRN    Or    glucose-vitamin C (Dex-4) chewable tab 8 tablet  8 tablet Oral Q15 Min PRN    sodium chloride 0.9% infusion   Intravenous Continuous    acetaminophen (Tylenol Extra Strength) tab 500 mg  500 mg Oral Q4H PRN    meropenem (Merrem) 500 mg in sodium chloride 0.9% 100 mL IVPB-MBP  500 mg Intravenous Q8H    traMADol (Ultram) tab 50 mg  50 mg Oral Q6H PRN    QUEtiapine (SEROquel) tab 100 mg  100 mg Oral Nightly    phenazopyridine (Pyridium) tab 100 mg  100 mg Oral TID PRN    ondansetron (Zofran-ODT) disintegrating tab 4 mg  4 mg Oral Q4H PRN    metoprolol tartrate (Lopressor) tab 25 mg  25 mg Oral Daily    melatonin tab 3 mg  3 mg Oral Nightly    LORazepam (Ativan) tab 1 mg  1 mg Oral TID    insulin degludec (Tresiba) 100 units/mL flextouch 30 Units  30 Units Subcutaneous Daily    insulin aspart (NovoLOG) 100 Units/mL FlexPen 5 Units  5 Units Subcutaneous TID CC     Medications Prior to Admission   Medication Sig    apixaban 5 MG Oral Tab Take 1 tablet (5 mg total) by mouth 2 (two) times daily.    clonazePAM 0.5 MG Oral Tab Take 1 tablet (0.5 mg total) by mouth at bedtime.    insulin glargine 100 UNIT/ML Subcutaneous Solution Inject 46 Units into the skin nightly.    LORazepam 1 MG Oral Tab Take 1 tablet (1 mg total) by mouth in the morning, at noon, and at bedtime.    traMADol 50 MG Oral Tab Take 1 tablet (50 mg total) by mouth every 6 (six) hours as needed for Pain.    insulin aspart 100 Units/mL Subcutaneous Solution Pen-injector Inject 10 Units into the skin 3 (three) times daily with meals.    ARIPiprazole 5 MG Oral Tab Take 1 tablet (5 mg total) by mouth daily.    Cranberry 500 MG Oral Cap 1 capsule PO daily    metoprolol tartrate 25 MG Oral Tab Take 1 tablet (25 mg total) by mouth daily.    docusate sodium 100 MG Oral Cap Take 100 mg by mouth 2 (two) times daily.    DULoxetine HCl 20 MG Oral Capsule Delayed Release Sprinkle  Take 20 mg by mouth daily.    Icosapent Ethyl 1 g Oral Cap Take 2 capsules by mouth 4 (four) times daily.    Melatonin 3 MG Oral Cap Take 1 capsule (3 mg total) by mouth at bedtime.    ergocalciferol 1.25 MG (67018 UT) Oral Cap Take 1 capsule (50,000 Units total) by mouth every 7 days. Every monday    bethanechol 10 MG Oral Tab Take 1 tablet (10 mg total) by mouth daily.    Insulin Aspart 100 UNIT/ML Subcutaneous Solution Cartridge SS: 150-200 2units, 201-250 4 units, 251-300 6 units, 301-350 8 units, 351-400 10 units, over 401 call md    Multiple Vitamins-Minerals (THERA-M) Oral Tab Take 1 tablet by mouth daily.    divalproex Sodium  MG Oral Tablet 24 Hr Take 1 tablet (500 mg total) by mouth 2 (two) times daily.    metFORMIN  MG Oral Tablet 24 Hr Take 2 tablets (1,000 mg total) by mouth 2 (two) times daily with meals.    QUEtiapine 100 MG Oral Tab Take 1 tablet (100 mg total) by mouth nightly.    sodium chloride 0.65 % Nasal Solution 1 spray every 3 (three) hours as needed.    phenazopyridine 100 MG Oral Tab Take 1 tablet (100 mg total) by mouth 3 (three) times daily as needed for Pain.    Insulin Pen Needle 32G X 4 MM Does not apply Misc May substitute if not on insurance formulary    lactulose 20 GM/30ML Oral Solution Take 30 mL (20 g total) by mouth every 6 (six) hours as needed (constipation).    traZODone 50 MG Oral Tab Take 1 tablet (50 mg total) by mouth nightly as needed for Sleep.    Nystatin 573095 UNIT/GM External Powder Apply 1 Application topically as needed for Dry Skin. Under breast and groin area    glucagon (BAQSIMI ONE PACK) 3 MG/DOSE Nasal nasal powder 3 mg by Nasal route once as needed for Low blood glucose.    atorvastatin 40 MG Oral Tab Take 1 tablet (40 mg total) by mouth nightly. (Patient not taking: Reported on 8/21/2024)    acetaminophen 325 MG Oral Tab Take 2 tablets (650 mg total) by mouth every 6 (six) hours as needed for Pain.    albuterol 108 (90 Base) MCG/ACT Inhalation  Aero Soln Inhale 2 puffs into the lungs every 6 (six) hours as needed for Wheezing or Shortness of Breath.    Acidophilus/Pectin Oral Cap Take 1 capsule by mouth 2 (two) times daily. (Patient not taking: Reported on 8/21/2024)    ondansetron 4 MG Oral Tablet Dispersible Take 2 tablets (8 mg total) by mouth every 8 (eight) hours as needed for Nausea.       Allergies  Allergies   Allergen Reactions    Pcn [Penicillins] HIVES    Sulfa Antibiotics HIVES    Radiology Contrast Iodinated Dyes DIZZINESS       Review of Systems:   As by Admitting/Attending    Results:   Laboratory Data:  Lab Results   Component Value Date    WBC 12.4 (H) 09/17/2024    HGB 12.3 09/17/2024    HCT 37.1 09/17/2024    .0 09/17/2024    CREATSERUM 0.84 09/17/2024    BUN 6 (L) 09/17/2024     (L) 09/17/2024    K 4.4 09/17/2024    CL 99 09/17/2024    CO2 23.0 09/17/2024     (H) 09/17/2024    CA 9.8 09/17/2024    ALB 4.5 09/17/2024    ALKPHO 81 09/17/2024    TP 7.4 09/17/2024    AST 19 09/17/2024    ALT 16 09/17/2024    PTT 26.4 12/05/2023    LIP 26 08/20/2024    DDIMER 0.92 (H) 12/05/2023    MG 1.7 08/24/2024     08/03/2024    ETOH <3 08/04/2024         Imaging:  CT ABDOMEN+PELVIS(CPT=74176)    Result Date: 9/17/2024  CONCLUSION: No acute or concerning finding    Dictated by (CST): Yordan Guerrero MD on 9/17/2024 at 8:40 PM     Finalized by (CST): Yordan Guerrero MD on 9/17/2024 at 8:42 PM           Vital Signs:   Blood pressure 122/68, pulse 99, temperature 98.8 °F (37.1 °C), temperature source Oral, resp. rate 18, height 5' (1.524 m), weight 86.4 kg (190 lb 6.4 oz), SpO2 98%.    Mental Status Exam:   Appearance: Stated age female, in hospital gown, laying down in hospital bed.  Psychomotor: No psychomotor agitation, or retardation.   Orientation: Alert and oriented to person, place, time and condition.  Gait: Not evaluated.  Attitude/Coorperation: Cooperative and attentive.  Behavior: Appropriate.  Speech: Regular rate and  rhythm speech.  Mood: Patient reporting depressed mood.  Affect: Congruent with the mood.  Thought process: Linear and appropriate.  Thought content: Patient denies any suicidal or homicidal ideation.  Perceptions: Patient denies any auditory or visual hallucinations.  Concentration: Grossly intact.  Memory: Grossly intact.  Intellect: Average.  Judgment and Insight: Questionable.     Impression:     Sepsis due to urinary tract infection (HCC)  Bipolar disorder type I recent episode depression moderate.  Generalized anxiety disorder with panic attacks.    Patient is a 53 year old , single female with past medical history of diabetes, bipolar disorder, depression, MIKE, HTN, diverticulitis, neurogenic bladder, obesity who was admitted to the hospital for Sepsis due to urinary tract infection (HCC): The patient has been demonstrating increased anxiety.    Discussed risk and benefit, acknowledging the current symptom and severity.  At this point, I would recommend the following approach:     Focus on safety  Focus on education and support.  Focus on insight orientation helping the patient understand diagnosis and treatment plan.  Continue Abilify 5 mg daily.  Utilize ativan 1 mg TID PRN  Start Depakote 500 mg BID  Start Cymbalta 30 mg daily  Continue Seroquel 100 mg nightly  Processed with patient at length, the initiation of the above psychotropic medications I advised the patient of the risks, benefits, alternatives and potential side effects. The patient consents to administration of the medications and understands the right to refuse medications at any time. The patient verbalized understanding.   Coordinate plan with team    Orders This Visit:  Orders Placed This Encounter   Procedures    CBC With Differential With Platelet    Basic Metabolic Panel (8)    Lactic Acid, Plasma    Troponin I (High Sensitivity)    Hepatic Function Panel (7)    Lactic Acid Reflex Post Positive    Urinalysis with Culture Reflex     UA Microscopic only, urine    Comp Metabolic Panel (14)    CBC With Differential With Platelet    Lactic Acid Post Positive    Blood Culture    Urine Culture, Routine    Clostridium difficile(toxigenic)PCR       Meds This Visit:  Requested Prescriptions      No prescriptions requested or ordered in this encounter       Brandy Andersonricky, AMBAR  9/18/2024    Note to Patient: The 21st Century Cures Act makes medical notes like these available to patients in the interest of transparency. However, be advised this is a medical document. It is intended as peer to peer communication. It is written in medical language and may contain abbreviations or verbiage that are unfamiliar. It may appear blunt or direct. Medical documents are intended to carry relevant information, facts as evident, and the clinical opinion of the practitioner. This note may have been transcribed using a voice dictation system. Voice recognition errors may occur. This should not be taken to alter the content or meaning of this note.

## 2024-09-18 NOTE — H&P
[facility]  part of [facility]    History and Physical    Clari Rosa Patient Status:  Emergency    1970 MRN [id number]   Location [facility] EMERGENCY DEPARTMENT Attending Boyd Lucia MD   Hosp Day # 0 PCP Brian Bob MD     Date:  2024  Date of Admission:  2024    History provided by:patient  HPI:     Chief Complaint   Patient presents with    Dizziness     HPI    53-year-old woman with significant past medical history of transverse myelitis complicated by paraplegia and neurogenic bladder with suprapubic catheter in place, bipolar disorder, hypertension, type 2 diabetes, recurrent UTIs, COPD at baseline on 2 L of oxygen, ANAID, chronic pain syndrome, history of DVT (on Eliquis) who presents to the emergency room today for several symptoms including rhinitis, dizziness/lightheadedness  .    In the emergency room blood pressure 109/66, pulse 102 afebrile at 97.2 °F.  CBC with an elevated WBC count of 12.4, metabolic panel with elevated glucose and lactic acid 4.9.  Blood cultures drawn.  Urine cultures drawn.  Started on IV ceftriaxone.  Normal saline bolus administered.  Sepsis protocol ordered.    Of note patient was recently admitted from  through 2024 for acute cystitis treated with.  IV ceftriaxone during that hospitalization.      History     Past Medical History:    Bipolar 1 disorder (HCC)    Dental caries    Diabetes (HCC)    MIKE (generalized anxiety disorder)    High blood pressure    History of diverticulitis of colon    Manipulative behavior    Neurogenic bladder    Obesity (BMI 30-39.9)    Paraplegia (HCC)    Sepsis following intra-abdominal surgery (HCC)    Serotonin syndrome    Sleep apnea    Suprapubic catheter (HCC)    Transverse myelitis (HCC)     Past Surgical History:   Procedure Laterality Date    Arthroscopy of joint unlisted      knee          Cholecystectomy      Part removal colon w end colostomy           Tonsillectomy       Family History   Adopted: Yes     Social History:  Social History     Socioeconomic History    Marital status: Single   Tobacco Use    Smoking status: Former     Current packs/day: 1.00     Average packs/day: 1 pack/day for 5.0 years (5.0 ttl pk-yrs)     Types: Cigarettes    Tobacco comments:     quit 2006   Vaping Use    Vaping status: Never Used   Substance and Sexual Activity    Alcohol use: No    Drug use: No     Social Determinants of Health     Food Insecurity: No Food Insecurity (8/21/2024)    Food Insecurity     Food Insecurity: Never true   Transportation Needs: No Transportation Needs (8/21/2024)    Transportation Needs     Lack of Transportation: No   Housing Stability: Low Risk  (8/21/2024)    Housing Stability     Housing Instability: No     Allergies/Medications:   Allergies:   Allergies   Allergen Reactions    Pcn [Penicillins] HIVES    Sulfa Antibiotics HIVES    Radiology Contrast Iodinated Dyes DIZZINESS     (Not in a hospital admission)      Review of Systems:     Constitutional:  Positive for chills, diaphoresis and fatigue.   HENT:  Positive for congestion.    Eyes: Negative.    Respiratory: Negative.     Cardiovascular: Negative.    Endocrine: Negative.    Genitourinary: Negative.    Musculoskeletal: Negative.    Skin: Negative.    Allergic/Immunologic: Negative.    Neurological: Negative.    Hematological: Negative.    Psychiatric/Behavioral: Negative.         Physical Exam:   Vital Signs:  Blood pressure 109/66, pulse 102, temperature 97.2 °F (36.2 °C), temperature source Temporal, resp. rate 23, weight 194 lb 7.1 oz (88.2 kg), SpO2 98%.  Physical Exam  Constitutional:       General: She is in acute distress.   HENT:      Head: Normocephalic.      Nose: Nose normal.      Mouth/Throat:      Mouth: Mucous membranes are moist.   Eyes:      Pupils: Pupils are equal, round, and reactive to light.   Cardiovascular:      Rate and Rhythm: Tachycardia present.   Pulmonary:       Effort: Pulmonary effort is normal.      Breath sounds: Normal breath sounds.   Abdominal:      General: Abdomen is flat.      Palpations: Abdomen is soft.   Skin:     General: Skin is warm.   Neurological:      Mental Status: She is alert. Mental status is at baseline.      Comments: Weakness Lower extermity hx of paraplegia.            Results:     Lab Results   Component Value Date    WBC 12.4 (H) 09/17/2024    HGB 12.3 09/17/2024    HCT 37.1 09/17/2024    .0 09/17/2024    CREATSERUM 0.84 09/17/2024    BUN 6 (L) 09/17/2024     (L) 09/17/2024    K 4.4 09/17/2024    CL 99 09/17/2024    CO2 23.0 09/17/2024     (H) 09/17/2024    CA 9.8 09/17/2024    ALB 4.5 09/17/2024    ALKPHO 81 09/17/2024    BILT 0.2 (L) 09/17/2024    TP 7.4 09/17/2024    AST 19 09/17/2024    ALT 16 09/17/2024    PTT 26.4 12/05/2023    LIP 26 08/20/2024    DDIMER 0.92 (H) 12/05/2023    MG 1.7 08/24/2024    TROPHS <3 09/17/2024     08/03/2024    ETOH <3 08/04/2024     CT ABDOMEN+PELVIS(CPT=74176)    Result Date: 9/17/2024  CONCLUSION: No acute or concerning finding    Dictated by (CST): Yordan Guerrero MD on 9/17/2024 at 8:40 PM     Finalized by (CST): Yordan Guerrero MD on 9/17/2024 at 8:42 PM         EKG    Result Date: 9/17/2024  Sinus tachycardia Left axis deviation Abnormal ECG When compared with ECG of 06-SEP-2024 06:20, No significant change was found     Assessment/Plan:     Sepsis due to urinary tract infection (HCC)  -Sepsis protocol.  Repeat and trend lactic acid.  -Continue 0.9 normal saline.  -Continue IV ceftriaxone.  Await urine cultures.    Type 2 diabetes  -Normally on glargine 56 units.  Will reduce to approximately half here in the hospital and continue 5 units of NovoLog 3 times daily.       Bipolar disorder  -History of personality disorder, anxiety.  Continue home meds including quetiapine, Abilify, clonazepam 0.5 nightly, Depakote 500 mg twice daily.  -On lorazepam 1 tablet by mouth in the morning and at  noon       History of DVT  -On Eliquis    COPD  -Home baseline oxygen requirement 2 L    Neurogenic bladder  -History of transverse myelitis complicated by paraplegia neurogenic bladder  -Suprapubic catheter in place        **Certification      PHYSICIAN Certification of Need for Inpatient Hospitalization - Initial Certification    Patient will require inpatient services that will reasonably be expected to span two midnight's based on the clinical documentation in H+P.   Based on patients current state of illness, I anticipate that, after discharge, patient will require TBD.        Emil Landin MD  9/17/2024

## 2024-09-18 NOTE — PROGRESS NOTES
[facility]  part of [facility]    Progress Note    Clari Rosa Patient Status:  Inpatient    1970 MRN [id number]   Location [facility] 5SW/SE Attending Stefan Haque MD   Hosp Day # 0 PCP Brian Bob MD       Subjective:   Clari Rosa is a(n) 53 year old female admitted with UTI    Pt seen and examined. No new complaints, meds reconciled.     Objective:   Blood pressure 122/68, pulse 99, temperature 98.8 °F (37.1 °C), temperature source Oral, resp. rate 18, height 5' (1.524 m), weight 190 lb 6.4 oz (86.4 kg), SpO2 98%.    GENERAL:  The patient appeared to be in no distress and was comfortable.  PSYCHIATRIC: Calm and cooperative   HEENT:  Head was atraumatic and normocephalic.     NECK:  Supple.  There was no JVD.   CVS:  S1S2 RRR no murmurs  LUNGS:  No audible wheezing  ABDOMEN: Non-distended, obese  MUSCULOSKELETAL:  There was no deformity.  There was full range of motion in all the extremities.   EXTREMITIES: + b/l edema  NEUROLOGICAL:  There was no focal deficit.       meropenem  500 mg Intravenous Q8H    QUEtiapine  100 mg Oral Nightly    metoprolol tartrate  25 mg Oral Daily    melatonin  3 mg Oral Nightly    insulin degludec  30 Units Subcutaneous Daily    insulin aspart  5 Units Subcutaneous TID CC    ARIPiprazole  5 mg Oral Daily    DULoxetine  30 mg Oral Daily    divalproex DR  500 mg Oral BID    apixaban  5 mg Oral BID    bethanechol  10 mg Oral Daily    clonazePAM  0.5 mg Oral Nightly    docusate sodium  100 mg Oral BID    nystatin   Topical BID       Results:     Lab Results   Component Value Date    WBC 12.4 (H) 2024    HGB 12.3 2024    HCT 37.1 2024    .0 2024    CREATSERUM 0.84 2024    BUN 6 (L) 2024     (L) 2024    K 4.4 2024    CL 99 2024    CO2 23.0 2024     (H) 2024    CA 9.8 2024    ALB 4.5 2024    ALKPHO 81 2024    BILT 0.2 (L) 2024    TP 7.4  09/17/2024    AST 19 09/17/2024    ALT 16 09/17/2024    PTT 26.4 12/05/2023    LIP 26 08/20/2024    DDIMER 0.92 (H) 12/05/2023    MG 1.7 08/24/2024     08/03/2024    ETOH <3 08/04/2024       CT ABDOMEN+PELVIS(CPT=74176)    Result Date: 9/17/2024  CONCLUSION: No acute or concerning finding    Dictated by (CST): Yordan Guerrero MD on 9/17/2024 at 8:40 PM     Finalized by (CST): Yordan Guerrero MD on 9/17/2024 at 8:42 PM         EKG    Result Date: 9/18/2024  Sinus tachycardia Left axis deviation Abnormal ECG When compared with ECG of 06-SEP-2024 06:20, No significant change was found     Assessment and Plan:     Sepsis due to urinary tract infection related to SPC  -cx with ESBL Ecoli, Serratia  -f/u final blood cx  -has chronic neurogenic bladder s/p spc with recurrent UTI's  -meropenem per ID, f/u further recs    Diarrhea  -r/o cdiff    DM Type II  -iss, Lantus    Transverse Myelitis c/b paraplegia and neurogenic bladder  -cpm, has SPC    Bipolar d/o  -cpm    Hx copd  -stable    Hx of DVT-eliquis resumed       Code Status: Full Code  DVT Prophylaxis:eliquis  GI Prophylaxis:ppi    MDM: High, acute illness/severe exacerbation of chronic illness posing threat to life.  IV medications requiring close inpatient monitoring       Stefan Haque M.D.

## 2024-09-18 NOTE — CDS QUERY
Dear Dr Haque,    Can the relationship, if any, between Urinary Tract Infection and Suprapubic Catheter be further clarified?    (  x ) Due to or  Associated with each Other     (  )   Unrelated to each other      (  ) Other : Please specify: ________________________________________      __________________________________________________________________________________    Clinical Indicators:    9/17 ED  Clinical Impression:  \"Patient with elevated lactic acid and leukocytosis with possible sepsis \"  \" Sepsis due to UTI\"    9/17 Urinalysis - positive for infection   Urine Culture: >100,000 Escherichia Coli; 50,000-99,999  Serratia marcescens     WBC   12.4  Lactic    4.9--5.3--3.6  CT Abd/Pelvis : CONCLUSION: No acute or concerning finding     Risk Factor:  H istory of transverse myelitis complicated by paraplegia and neurogenic bladder with suprapubic catheter in place    Treatment:   -- Infectious Disease Consult  -- ED gave IV Sepsis Bolus  -- Continue  IV Meropenem          If you have any questions ,please call Clinical : Sandra Christine/ZINA-BSN  at cell # [phone]       THIS FORM IS A PERMANENT PART OF THE MEDICAL RECORD

## 2024-09-18 NOTE — SPIRITUAL CARE NOTE
Spiritual Care Visit Note    Patient Name: Clari Rosa Date of Spiritual Care Visit: 24   : 1970 Primary Dx: Sepsis due to urinary tract infection (HCC)       Referred By: Referral From: Nurse;Patient, Bible Request, Consult        Visit Type/Summary:     - Spiritual Care: Responded to a request via the on call phone Consulted with RN prior to visit. Attempted visit. Patient is unavailable. Left a Spiritual Care contact information card.  remains available as needed for follow up.    Spiritual Care support can be requested via an [facility] consult. For urgent/immediate needs, please contact the On Call  at: [place]: ext [phone]    Chaplain Marcos.

## 2024-09-19 LAB
ALBUMIN SERPL-MCNC: 4.2 G/DL (ref 3.2–4.8)
ALBUMIN/GLOB SERPL: 1.5 {RATIO} (ref 1–2)
ALP LIVER SERPL-CCNC: 81 U/L
ALT SERPL-CCNC: 12 U/L
ANION GAP SERPL CALC-SCNC: 7 MMOL/L (ref 0–18)
AST SERPL-CCNC: 10 U/L (ref ?–34)
BASOPHILS # BLD AUTO: 0.05 X10(3) UL (ref 0–0.2)
BASOPHILS # BLD AUTO: 0.06 X10(3) UL (ref 0–0.2)
BASOPHILS NFR BLD AUTO: 0.5 %
BASOPHILS NFR BLD AUTO: 0.5 %
BILIRUB SERPL-MCNC: 0.2 MG/DL (ref 0.3–1.2)
BUN BLD-MCNC: 12 MG/DL (ref 9–23)
BUN/CREAT SERPL: 14.5 (ref 10–20)
CALCIUM BLD-MCNC: 9.6 MG/DL (ref 8.7–10.4)
CHLORIDE SERPL-SCNC: 104 MMOL/L (ref 98–112)
CO2 SERPL-SCNC: 29 MMOL/L (ref 21–32)
CREAT BLD-MCNC: 0.83 MG/DL
DEPRECATED RDW RBC AUTO: 45.3 FL (ref 35.1–46.3)
DEPRECATED RDW RBC AUTO: 45.9 FL (ref 35.1–46.3)
EGFRCR SERPLBLD CKD-EPI 2021: 84 ML/MIN/1.73M2 (ref 60–?)
EOSINOPHIL # BLD AUTO: 0.3 X10(3) UL (ref 0–0.7)
EOSINOPHIL # BLD AUTO: 0.33 X10(3) UL (ref 0–0.7)
EOSINOPHIL NFR BLD AUTO: 2.8 %
EOSINOPHIL NFR BLD AUTO: 3.1 %
ERYTHROCYTE [DISTWIDTH] IN BLOOD BY AUTOMATED COUNT: 15.2 % (ref 11–15)
ERYTHROCYTE [DISTWIDTH] IN BLOOD BY AUTOMATED COUNT: 15.3 % (ref 11–15)
GLOBULIN PLAS-MCNC: 2.8 G/DL (ref 2–3.5)
GLUCOSE BLD-MCNC: 166 MG/DL (ref 70–99)
GLUCOSE BLDC GLUCOMTR-MCNC: 136 MG/DL (ref 70–99)
GLUCOSE BLDC GLUCOMTR-MCNC: 136 MG/DL (ref 70–99)
GLUCOSE BLDC GLUCOMTR-MCNC: 145 MG/DL (ref 70–99)
GLUCOSE BLDC GLUCOMTR-MCNC: 145 MG/DL (ref 70–99)
HCT VFR BLD AUTO: 35 %
HCT VFR BLD AUTO: 35.5 %
HGB BLD-MCNC: 11.1 G/DL
HGB BLD-MCNC: 11.4 G/DL
IMM GRANULOCYTES # BLD AUTO: 0.06 X10(3) UL (ref 0–1)
IMM GRANULOCYTES # BLD AUTO: 0.08 X10(3) UL (ref 0–1)
IMM GRANULOCYTES NFR BLD: 0.6 %
IMM GRANULOCYTES NFR BLD: 0.7 %
LYMPHOCYTES # BLD AUTO: 2.63 X10(3) UL (ref 1–4)
LYMPHOCYTES # BLD AUTO: 3.13 X10(3) UL (ref 1–4)
LYMPHOCYTES NFR BLD AUTO: 26.7 %
LYMPHOCYTES NFR BLD AUTO: 26.9 %
MCH RBC QN AUTO: 25.8 PG (ref 26–34)
MCH RBC QN AUTO: 26.1 PG (ref 26–34)
MCHC RBC AUTO-ENTMCNC: 31.7 G/DL (ref 31–37)
MCHC RBC AUTO-ENTMCNC: 32.1 G/DL (ref 31–37)
MCV RBC AUTO: 81.2 FL
MCV RBC AUTO: 81.2 FL
MONOCYTES # BLD AUTO: 0.45 X10(3) UL (ref 0.1–1)
MONOCYTES # BLD AUTO: 0.45 X10(3) UL (ref 0.1–1)
MONOCYTES NFR BLD AUTO: 3.8 %
MONOCYTES NFR BLD AUTO: 4.6 %
NEUTROPHILS # BLD AUTO: 6.28 X10 (3) UL (ref 1.5–7.7)
NEUTROPHILS # BLD AUTO: 6.28 X10(3) UL (ref 1.5–7.7)
NEUTROPHILS # BLD AUTO: 7.66 X10 (3) UL (ref 1.5–7.7)
NEUTROPHILS # BLD AUTO: 7.66 X10(3) UL (ref 1.5–7.7)
NEUTROPHILS NFR BLD AUTO: 64.3 %
NEUTROPHILS NFR BLD AUTO: 65.5 %
OSMOLALITY SERPL CALC.SUM OF ELEC: 294 MOSM/KG (ref 275–295)
PLATELET # BLD AUTO: 272 10(3)UL (ref 150–450)
PLATELET # BLD AUTO: 293 10(3)UL (ref 150–450)
POTASSIUM SERPL-SCNC: 4 MMOL/L (ref 3.5–5.1)
PROT SERPL-MCNC: 7 G/DL (ref 5.7–8.2)
RBC # BLD AUTO: 4.31 X10(6)UL
RBC # BLD AUTO: 4.37 X10(6)UL
SODIUM SERPL-SCNC: 140 MMOL/L (ref 136–145)
WBC # BLD AUTO: 11.7 X10(3) UL (ref 4–11)
WBC # BLD AUTO: 9.8 X10(3) UL (ref 4–11)

## 2024-09-19 PROCEDURE — 99233 SBSQ HOSP IP/OBS HIGH 50: CPT | Performed by: INTERNAL MEDICINE

## 2024-09-19 PROCEDURE — 99232 SBSQ HOSP IP/OBS MODERATE 35: CPT | Performed by: NURSE PRACTITIONER

## 2024-09-19 RX ORDER — VANCOMYCIN HYDROCHLORIDE 125 MG/1
125 CAPSULE ORAL DAILY
Status: DISCONTINUED | OUTPATIENT
Start: 2024-09-19 | End: 2024-09-20

## 2024-09-19 NOTE — PROGRESS NOTES
INFECTIOUS DISEASE PROGRESS NOTE  [facility]  part of [facility] ID PROGRESS NOTE    Clari Rosa Patient Status:  Inpatient    1970 MRN [id number]   Location [facility] 5SW/SE Attending Stefan Haque MD   Hosp Day # 1 PCP Brian Bob MD     Subjective:  ROS reviewed. Complains of abdominal pain. Afebrile.    ASSESSMENT:    Antibiotics: Meropenem -  ceftriaxone     # Acute lactic acidosis with pyuria, urine cx from  with ESBL E. Coli, Serratia marcescens   -Blood cx NGTD               -CT A/P unremarkable               -On macrobid PTA  # Leukocytosis  # Diarrhea, R/o C. Difficile colitis  # Chronic neurogenic bladder s/p SPC with recurrent UTI  # DM  # Transverse myelitis  # Anxiety, depression  # Bipolar disorder     PLAN:  -  Continue on meropenem, day 2. Do not anticipate continued IV abx on DC. Will plan for dose of invanz prior to DC.  -  Follow fever curve, wbc.  -  Reviewed labs, micro, imaging reports, available old records.  -  Case d/w patient, RN.     History of Present Illness:  Clari Rosa is a 53 year old female with a history of transverse myelitis, neurogenic bladder with SPC with recurrent UTI, bipolar disorder, lives in [place] with a h/o E. Coli, PSAR in UCx MDRO, last seen by our service during admission 2024 for Serratia UTI, who was seen in ED on  with dizziness, started on macrobid with urine cx growing ESBL E. Coli and Serratia, who presented to [facility] ED on  with multiple complaints including diarrhea and anxiety. Has been having four Bms daily. Unclear when SPC was last exchanged. On arrival, afebrile, wbc 12.4, lactate 5.3, CT A/P unremarkable, blood cx obtained, started on meropenem. ID consulted.    Physical Exam:  /66 (BP Location: Left arm)   Pulse 84   Temp 98 °F (36.7 °C) (Oral)   Resp 19   Ht 5' (1.524 m)   Wt 190 lb 6.4 oz (86.4 kg)   SpO2 98%   BMI 37.18 kg/m²     Gen:   Awake, in bed, on NC  HEENT:  EOMI,  neck supple  CV/lungs:  RRR, CTAB  Abdom:  Soft, NT/ND, +BS  Skin/extrem:  No rashes, BLE edema  :   SPC+  Lines:  PIV+    Laboratory Data: Reviewed    Microbiology: Reviewed    Radiology: Reviewed      [facility]  [phone]  9/19/2024

## 2024-09-19 NOTE — CM/SW NOTE
Pt is from [facility]. SW initiated return referral via aidin to [facility].     Plan: Pending medical clearance, DC to [facility].     SW/JASON to remain available for support and/or discharge planning.     Marsha Inman, MSW, LSW   x [phone]

## 2024-09-19 NOTE — PLAN OF CARE
Patient VSS, no acute changes during shift. Updated patient on plan of care. Frequent rounding, no needs at this time. Call light always in reach and safety precautions in place.     Problem: Patient Centered Care  Goal: Patient preferences are identified and integrated in the patient's plan of care  Description: Interventions:  - What would you like us to know as we care for you?   - Provide timely, complete, and accurate information to patient/family  - Incorporate patient and family knowledge, values, beliefs, and cultural backgrounds into the planning and delivery of care  - Encourage patient/family to participate in care and decision-making at the level they choose  - Honor patient and family perspectives and choices  Outcome: Progressing     Problem: Diabetes/Glucose Control  Goal: Glucose maintained within prescribed range  Description: INTERVENTIONS:  - Monitor Blood Glucose as ordered  - Assess for signs and symptoms of hyperglycemia and hypoglycemia  - Administer ordered medications to maintain glucose within target range  - Assess barriers to adequate nutritional intake and initiate nutrition consult as needed  - Instruct patient on self management of diabetes  Outcome: Progressing     Problem: Patient/Family Goals  Goal: Patient/Family Long Term Goal  Description: Patient's Long Term Goal:     Interventions:  -   - See additional Care Plan goals for specific interventions  Outcome: Progressing  Goal: Patient/Family Short Term Goal  Description: Patient's Short Term Goal:     Interventions:   -   - See additional Care Plan goals for specific interventions  Outcome: Progressing     Problem: METABOLIC/FLUID AND ELECTROLYTES - ADULT  Goal: Glucose maintained within prescribed range  Description: INTERVENTIONS:  - Monitor Blood Glucose as ordered  - Assess for signs and symptoms of hyperglycemia and hypoglycemia  - Administer ordered medications to maintain glucose within target range  - Assess barriers to  adequate nutritional intake and initiate nutrition consult as needed  - Instruct patient on self management of diabetes  Outcome: Progressing     Problem: SKIN/TISSUE INTEGRITY - ADULT  Goal: Skin integrity remains intact  Description: INTERVENTIONS  - Assess and document risk factors for pressure ulcer development  - Assess and document skin integrity  - Monitor for areas of redness and/or skin breakdown  - Initiate interventions, skin care algorithm/standards of care as needed  Outcome: Progressing     Problem: PAIN - ADULT  Goal: Verbalizes/displays adequate comfort level or patient's stated pain goal  Description: INTERVENTIONS:  - Encourage pt to monitor pain and request assistance  - Assess pain using appropriate pain scale  - Administer analgesics based on type and severity of pain and evaluate response  - Implement non-pharmacological measures as appropriate and evaluate response  - Consider cultural and social influences on pain and pain management  - Manage/alleviate anxiety  - Utilize distraction and/or relaxation techniques  - Monitor for opioid side effects  - Notify MD/LIP if interventions unsuccessful or patient reports new pain  - Anticipate increased pain with activity and pre-medicate as appropriate  Outcome: Progressing     Problem: SAFETY ADULT - FALL  Goal: Free from fall injury  Description: INTERVENTIONS:  - Assess pt frequently for physical needs  - Identify cognitive and physical deficits and behaviors that affect risk of falls.  - [place] fall precautions as indicated by assessment.  - Educate pt/family on patient safety including physical limitations  - Instruct pt to call for assistance with activity based on assessment  - Modify environment to reduce risk of injury  - Provide assistive devices as appropriate  - Consider OT/PT consult to assist with strengthening/mobility  - Encourage toileting schedule  Outcome: Progressing

## 2024-09-19 NOTE — SPIRITUAL CARE NOTE
Spiritual Care Visit Note    Patient Name: Clari Rosa Date of Spiritual Care Visit: 24   : 1970 Primary Dx: Sepsis due to urinary tract infection (HCC)       Referred By: Referral From: Patient    Spiritual Care Taxonomy:    Intended Effects: Demonstrate caring and concern    Methods: Accompany someone in their spiritual/Judaism practice outside your [place] tradition;Collaborate with care team member;Offer spiritual/Judaism support    Interventions: Share words of hope and inspiration;Provide hospitality;Acknowledge current situation;Active listening    Visit Type/Summary:     - Spiritual Care: Responded to a request for spiritual care and assessed the patient for spiritual care needs. Offered empathic listening and emotional support.    Spiritual Care support can be requested via an Epic consult. For urgent/immediate needs, please contact the On Call  at: [place]: ext [phone]        Gerri

## 2024-09-19 NOTE — PROGRESS NOTES
Patient is a 53 year old , single female with past medical history of diabetes, bipolar disorder, depression, MIKE, HTN, diverticulitis, neurogenic bladder, obesity who was admitted to the hospital for Sepsis due to urinary tract infection (HCC): The patient has been demonstrating increased anxiety.  Patient indicated for psych consult for evaluation and advise.      Consult Duration   The patient seen for over 50-minute, follow-up evaluation, over 50% counseling and coordinating care addressing increased anxiety.    Record reviewed, communication with attending, communication with RN and patient seen face to face evaluation.    History of Present Illness:     Communicating with the team, the patient continues to be anxious and restless. Patient requesting scheduled ativan.     The patient received the following psychotropic medications: patient refused Abilify. She is receiving klonopin 0.5 mg nightly, Depakote 500 mg BID, Cymbalta 30 mg, melatonin 3 mg, Seroquel 100 mg.     Labs and imaging reviewed: Glucose 166, sodium 140,     The patient seen today sitting in hospital bed.     Patient presents anxious and restless today. She states that she needs ativan to be scheduled. The patient also reporting that she needs meclizine to be scheduled .    The patient continues demonstrating feelings of low mood, low energy, decreased motivation with intense anxiety, worry, ruminations.     She is not longer agreeable with the initiation of Abilify.     Discussed medication options with patient at length.     Provided patient with supportive psychotherapy including listening, emotional support and encouragement.    The patient is not demonstrating any carmel or psychosis  The patient denies auditory or visual hallucinations  The patient denies suicidal or homicidal ideation.    The patient has been demonstrating feelings of hopelessness, helplessness,  low mood, low energy, decreased motivation with increased  anxiety, worry, ruminations. The patient denies any suicidal ideations. The patient is agreeable to medications changes and to follow up with outpatient psychiatry providers.     Past Psychiatric/Medication History:  1. Prior diagnoses: MIKE, depression, Bipolar 1 disorder, Patient states she also has OCD and Panic attacks sine adolescent years.  2. Past psychiatric inpatient: Patient has been coming frequently to our facility for somatic manifestation.  3. Past outpatient history: Patient reports seeing a psychiatris at Nursing Home   4. Past suicide history: Patient denies   5. Medication history: Wellbutrin XL 24 hr tab 300mg, Klonopin 1mg q6h, Seroquel 300mg BID,      Social History:   The patient states the she lives in a Nursing home due to medical conditions. She is single and has never  but has one living son.      Denies any ETOH abuse, or illicit substance abuse. Previous smoker (5 pack history)     Family History:  No family history reported   Medical History:   Past Medical History  Past Medical History:    Bipolar 1 disorder (HCC)    Dental caries    Diabetes (HCC)    MIKE (generalized anxiety disorder)    High blood pressure    History of diverticulitis of colon    Manipulative behavior    Neurogenic bladder    Obesity (BMI 30-39.9)    Paraplegia (HCC)    Sepsis following intra-abdominal surgery (HCC)    Serotonin syndrome    Sleep apnea    Suprapubic catheter (HCC)    Transverse myelitis (HCC)       Past Surgical History  Past Surgical History:   Procedure Laterality Date    Arthroscopy of joint unlisted      knee          Cholecystectomy      Part removal colon w end colostomy      2013    Tonsillectomy         Family History  Family History   Adopted: Yes       Social History  Social History     Socioeconomic History    Marital status: Single   Tobacco Use    Smoking status: Former     Current packs/day: 1.00     Average packs/day: 1 pack/day for 5.0 years (5.0 ttl pk-yrs)     Types:  Cigarettes    Tobacco comments:     quit 2006   Vaping Use    Vaping status: Never Used   Substance and Sexual Activity    Alcohol use: No    Drug use: No     Social Determinants of Health     Food Insecurity: No Food Insecurity (9/18/2024)    Food Insecurity     Food Insecurity: Never true   Transportation Needs: No Transportation Needs (9/18/2024)    Transportation Needs     Lack of Transportation: No   Housing Stability: Low Risk  (9/18/2024)    Housing Stability     Housing Instability: No           Current Medications:  Current Facility-Administered Medications   Medication Dose Route Frequency    vancomycin (Vancocin) cap 125 mg  125 mg Oral Daily    glucose (Dex4) 15 GM/59ML oral liquid 15 g  15 g Oral Q15 Min PRN    Or    glucose (Glutose) 40% oral gel 15 g  15 g Oral Q15 Min PRN    Or    glucose-vitamin C (Dex-4) chewable tab 4 tablet  4 tablet Oral Q15 Min PRN    Or    dextrose 50% injection 50 mL  50 mL Intravenous Q15 Min PRN    Or    glucose (Dex4) 15 GM/59ML oral liquid 30 g  30 g Oral Q15 Min PRN    Or    glucose (Glutose) 40% oral gel 30 g  30 g Oral Q15 Min PRN    Or    glucose-vitamin C (Dex-4) chewable tab 8 tablet  8 tablet Oral Q15 Min PRN    sodium chloride 0.9% infusion   Intravenous Continuous    acetaminophen (Tylenol Extra Strength) tab 500 mg  500 mg Oral Q4H PRN    meropenem (Merrem) 500 mg in sodium chloride 0.9% 100 mL IVPB-MBP  500 mg Intravenous Q8H    traMADol (Ultram) tab 50 mg  50 mg Oral Q6H PRN    QUEtiapine (SEROquel) tab 100 mg  100 mg Oral Nightly    phenazopyridine (Pyridium) tab 100 mg  100 mg Oral TID PRN    ondansetron (Zofran-ODT) disintegrating tab 4 mg  4 mg Oral Q4H PRN    metoprolol tartrate (Lopressor) tab 25 mg  25 mg Oral Daily    melatonin tab 3 mg  3 mg Oral Nightly    insulin degludec (Tresiba) 100 units/mL flextouch 30 Units  30 Units Subcutaneous Daily    insulin aspart (NovoLOG) 100 Units/mL FlexPen 5 Units  5 Units Subcutaneous TID CC    LORazepam (Ativan)  tab 1 mg  1 mg Oral TID PRN    ARIPiprazole (Abilify) tab 5 mg  5 mg Oral Daily    DULoxetine (Cymbalta) DR cap 30 mg  30 mg Oral Daily    divalproex DR (Depakote) tab 500 mg  500 mg Oral BID    apixaban (Eliquis) tab 5 mg  5 mg Oral BID    bethanechol (Urecholine) tab 10 mg  10 mg Oral Daily    clonazePAM (KlonoPIN) tab 0.5 mg  0.5 mg Oral Nightly    docusate sodium (Colace) cap 100 mg  100 mg Oral BID    traZODone (Desyrel) tab 50 mg  50 mg Oral Nightly PRN    albuterol (Ventolin HFA) 108 (90 Base) MCG/ACT inhaler 2 puff  2 puff Inhalation Q6H PRN    nystatin (Mycostatin) 100,000 Units/g cream   Topical BID    meclizine (Antivert) tab 25 mg  25 mg Oral Daily PRN    loperamide (Imodium) cap 2 mg  2 mg Oral BID PRN     Medications Prior to Admission   Medication Sig    apixaban 5 MG Oral Tab Take 1 tablet (5 mg total) by mouth 2 (two) times daily.    clonazePAM 0.5 MG Oral Tab Take 1 tablet (0.5 mg total) by mouth at bedtime.    insulin glargine 100 UNIT/ML Subcutaneous Solution Inject 46 Units into the skin nightly.    LORazepam 1 MG Oral Tab Take 1 tablet (1 mg total) by mouth in the morning, at noon, and at bedtime.    traMADol 50 MG Oral Tab Take 1 tablet (50 mg total) by mouth every 6 (six) hours as needed for Pain.    insulin aspart 100 Units/mL Subcutaneous Solution Pen-injector Inject 10 Units into the skin 3 (three) times daily with meals.    ARIPiprazole 5 MG Oral Tab Take 1 tablet (5 mg total) by mouth daily.    Cranberry 500 MG Oral Cap 1 capsule PO daily    metoprolol tartrate 25 MG Oral Tab Take 1 tablet (25 mg total) by mouth daily.    docusate sodium 100 MG Oral Cap Take 100 mg by mouth 2 (two) times daily.    DULoxetine HCl 20 MG Oral Capsule Delayed Release Sprinkle Take 20 mg by mouth daily.    Icosapent Ethyl 1 g Oral Cap Take 2 capsules by mouth 4 (four) times daily.    Melatonin 3 MG Oral Cap Take 1 capsule (3 mg total) by mouth at bedtime.    ergocalciferol 1.25 MG (76324 UT) Oral Cap Take 1  capsule (50,000 Units total) by mouth every 7 days. Every monday    bethanechol 10 MG Oral Tab Take 1 tablet (10 mg total) by mouth daily.    Insulin Aspart 100 UNIT/ML Subcutaneous Solution Cartridge SS: 150-200 2units, 201-250 4 units, 251-300 6 units, 301-350 8 units, 351-400 10 units, over 401 call md    Multiple Vitamins-Minerals (THERA-M) Oral Tab Take 1 tablet by mouth daily.    divalproex Sodium  MG Oral Tablet 24 Hr Take 1 tablet (500 mg total) by mouth 2 (two) times daily.    metFORMIN  MG Oral Tablet 24 Hr Take 2 tablets (1,000 mg total) by mouth 2 (two) times daily with meals.    QUEtiapine 100 MG Oral Tab Take 1 tablet (100 mg total) by mouth nightly.    sodium chloride 0.65 % Nasal Solution 1 spray every 3 (three) hours as needed.    phenazopyridine 100 MG Oral Tab Take 1 tablet (100 mg total) by mouth 3 (three) times daily as needed for Pain.    Insulin Pen Needle 32G X 4 MM Does not apply Misc May substitute if not on insurance formulary    lactulose 20 GM/30ML Oral Solution Take 30 mL (20 g total) by mouth every 6 (six) hours as needed (constipation).    traZODone 50 MG Oral Tab Take 1 tablet (50 mg total) by mouth nightly as needed for Sleep.    Nystatin 874821 UNIT/GM External Powder Apply 1 Application topically as needed for Dry Skin. Under breast and groin area    glucagon (BAQSIMI ONE PACK) 3 MG/DOSE Nasal nasal powder 3 mg by Nasal route once as needed for Low blood glucose.    atorvastatin 40 MG Oral Tab Take 1 tablet (40 mg total) by mouth nightly. (Patient not taking: Reported on 8/21/2024)    acetaminophen 325 MG Oral Tab Take 2 tablets (650 mg total) by mouth every 6 (six) hours as needed for Pain.    albuterol 108 (90 Base) MCG/ACT Inhalation Aero Soln Inhale 2 puffs into the lungs every 6 (six) hours as needed for Wheezing or Shortness of Breath.    ondansetron 4 MG Oral Tablet Dispersible Take 2 tablets (8 mg total) by mouth every 8 (eight) hours as needed for Nausea.        Allergies  Allergies   Allergen Reactions    Pcn [Penicillins] HIVES    Sulfa Antibiotics HIVES    Radiology Contrast Iodinated Dyes DIZZINESS       Review of Systems:   As by Admitting/Attending    Results:   Laboratory Data:  Lab Results   Component Value Date    WBC 9.8 09/19/2024    HGB 11.1 (L) 09/19/2024    HCT 35.0 09/19/2024    .0 09/19/2024    CREATSERUM 0.83 09/19/2024    BUN 12 09/19/2024     09/19/2024    K 4.0 09/19/2024     09/19/2024    CO2 29.0 09/19/2024     (H) 09/19/2024    CA 9.6 09/19/2024    ALB 4.2 09/19/2024    ALKPHO 81 09/19/2024    TP 7.0 09/19/2024    AST 10 09/19/2024    ALT 12 09/19/2024    PTT 26.4 12/05/2023    LIP 26 08/20/2024    DDIMER 0.92 (H) 12/05/2023    MG 1.7 08/24/2024     08/03/2024    ETOH <3 08/04/2024         Imaging:  CT ABDOMEN+PELVIS(CPT=74176)    Result Date: 9/17/2024  CONCLUSION: No acute or concerning finding    Dictated by (CST): Yordan Guerrero MD on 9/17/2024 at 8:40 PM     Finalized by (CST): Yordan Guerrero MD on 9/17/2024 at 8:42 PM           Vital Signs:   Blood pressure 117/66, pulse 84, temperature 98 °F (36.7 °C), temperature source Oral, resp. rate 19, height 5' (1.524 m), weight 86.4 kg (190 lb 6.4 oz), SpO2 98%.    Mental Status Exam:   Appearance: Stated age female, in hospital gown, laying down in hospital bed.  Psychomotor: No psychomotor agitation, or retardation.   Orientation: Alert and oriented to person, place, time and condition.  Gait: Not evaluated.  Attitude/Coorperation: Cooperative and attentive.  Behavior: Appropriate.  Speech: Regular rate and rhythm speech.  Mood: Patient reporting depressed mood.  Affect: Congruent with the mood.  Thought process: Linear and appropriate.  Thought content: Patient denies any suicidal or homicidal ideation.  Perceptions: Patient denies any auditory or visual hallucinations.  Concentration: Grossly intact.  Memory: Grossly intact.  Intellect: Average.  Judgment and  Insight: Questionable.     Impression:     Sepsis due to urinary tract infection (HCC)  Bipolar disorder type I recent episode depression moderate.  Generalized anxiety disorder with panic attacks.    Patient is a 53 year old , single female with past medical history of diabetes, bipolar disorder, depression, MIKE, HTN, diverticulitis, neurogenic bladder, obesity who was admitted to the hospital for Sepsis due to urinary tract infection (HCC): The patient has been demonstrating increased anxiety.    9/19/2024: patient continues to present anxious and restless. Provided patient with supportive psychotherapy including listening, emotional support and encouragement.     Discussed risk and benefit, acknowledging the current symptom and severity.  At this point, I would recommend the following approach:     Focus on safety  Focus on education and support.  Focus on insight orientation helping the patient understand diagnosis and treatment plan.  Discontinue Abilify 5 mg daily.  Utilize ativan 1 mg TID PRN  Continue Depakote 500 mg BID  Continue  Cymbalta 30 mg daily  Continue Seroquel 100 mg nightly  Processed with patient at length, the initiation of the above psychotropic medications I advised the patient of the risks, benefits, alternatives and potential side effects. The patient consents to administration of the medications and understands the right to refuse medications at any time. The patient verbalized understanding.   Coordinate plan with team    Orders This Visit:  Orders Placed This Encounter   Procedures    CBC With Differential With Platelet    Basic Metabolic Panel (8)    Lactic Acid, Plasma    Troponin I (High Sensitivity)    Hepatic Function Panel (7)    Lactic Acid Reflex Post Positive    Urinalysis with Culture Reflex    UA Microscopic only, urine    Comp Metabolic Panel (14)    CBC With Differential With Platelet    Lactic Acid Post Positive    CBC With Differential With Platelet    Blood  Culture    Urine Culture, Routine    Clostridium difficile(toxigenic)PCR       Meds This Visit:  Requested Prescriptions      No prescriptions requested or ordered in this encounter       Brandy Marinelli, APRN  9/19/2024    Note to Patient: The 21st Century Cures Act makes medical notes like these available to patients in the interest of transparency. However, be advised this is a medical document. It is intended as peer to peer communication. It is written in medical language and may contain abbreviations or verbiage that are unfamiliar. It may appear blunt or direct. Medical documents are intended to carry relevant information, facts as evident, and the clinical opinion of the practitioner. This note may have been transcribed using a voice dictation system. Voice recognition errors may occur. This should not be taken to alter the content or meaning of this note.

## 2024-09-19 NOTE — PROGRESS NOTES
[facility]  part of [facility]    Progress Note    Clari Rosa Patient Status:  Inpatient    1970 MRN [id number]   Location [facility] 5SW/SE Attending Stefan Haque MD   Hosp Day # 1 PCP Brian Bob MD       Subjective:   Clari Rosa is a(n) 53 year old female admitted with UTI    Pt seen and examined. Sleepy. No new complaints.     Objective:   Blood pressure 117/66, pulse 84, temperature 98 °F (36.7 °C), temperature source Oral, resp. rate 19, height 5' (1.524 m), weight 190 lb 6.4 oz (86.4 kg), SpO2 98%.    GENERAL:  The patient appeared to be in no distress and was comfortable.  PSYCHIATRIC: Calm and cooperative   HEENT:  Head was atraumatic and normocephalic.     NECK:  Supple.  There was no JVD.   CVS:  S1S2 RRR no murmurs  LUNGS:  No audible wheezing, no crackles  ABDOMEN: Non-distended, obese  MUSCULOSKELETAL:  There was no deformity.  There was full range of motion in all the extremities.   EXTREMITIES: + b/l edema  NEUROLOGICAL:  There was no focal deficit.       vancomycin  125 mg Oral Daily    meropenem  500 mg Intravenous Q8H    QUEtiapine  100 mg Oral Nightly    metoprolol tartrate  25 mg Oral Daily    melatonin  3 mg Oral Nightly    insulin degludec  30 Units Subcutaneous Daily    insulin aspart  5 Units Subcutaneous TID CC    DULoxetine  30 mg Oral Daily    divalproex DR  500 mg Oral BID    apixaban  5 mg Oral BID    bethanechol  10 mg Oral Daily    clonazePAM  0.5 mg Oral Nightly    docusate sodium  100 mg Oral BID    nystatin   Topical BID       Results:     Lab Results   Component Value Date    WBC 9.8 2024    HGB 11.1 (L) 2024    HCT 35.0 2024    .0 2024    CREATSERUM 0.83 2024    BUN 12 2024     2024    K 4.0 2024     2024    CO2 29.0 2024     (H) 2024    CA 9.6 2024    ALB 4.2 2024    ALKPHO 81 2024    BILT 0.2 (L) 2024    TP 7.0 2024     AST 10 09/19/2024    ALT 12 09/19/2024    PTT 26.4 12/05/2023    LIP 26 08/20/2024    DDIMER 0.92 (H) 12/05/2023    MG 1.7 08/24/2024     08/03/2024    ETOH <3 08/04/2024       CT ABDOMEN+PELVIS(CPT=74176)    Result Date: 9/17/2024  CONCLUSION: No acute or concerning finding    Dictated by (CST): Yordan Guerrero MD on 9/17/2024 at 8:40 PM     Finalized by (CST): Yordan Guerrero MD on 9/17/2024 at 8:42 PM         EKG    Result Date: 9/18/2024  Sinus tachycardia Left axis deviation Abnormal ECG When compared with ECG of 06-SEP-2024 06:20, No significant change was found Confirmed by ANNA NEW, FERNANDEZ ([id number]) on 9/18/2024 5:41:01 PM     Assessment and Plan:     Sepsis due to urinary tract infection related to SPC  -cx with ESBL Ecoli, Serratia  -f/u final blood cx  -has chronic neurogenic bladder s/p spc with recurrent UTI's  -meropenem per ID, f/u further recs-plan for Invanz dose prior to discharge    Diarrhea  -C. difficile negative, now on as needed Imodium    DM Type II  -iss, Lantus    Transverse Myelitis c/b paraplegia and neurogenic bladder  -cpm, has SPC    Bipolar d/o  -cpm    Hx copd  -stable    Hx of DVT-eliquis       Code Status: Full Code  DVT Prophylaxis:eliquis  GI Prophylaxis:ppi    MDM: High, acute illness/severe exacerbation of chronic illness posing threat to life.  IV medications requiring close inpatient monitoring       Stefan Haque M.D.

## 2024-09-19 NOTE — SPIRITUAL CARE NOTE
Spiritual Care Visit Note    Patient Name: Clari Rosa Date of Spiritual Care Visit: 24   : 1970 Primary Dx: Sepsis due to urinary tract infection (HCC)       Referred By: Referral From:     Spiritual Care Taxonomy:    Intended Effects: Aligning care plan with patient's values    Methods: Collaborate with care team member;Offer spiritual/Jew support    Interventions: Provde spiritual/Jew resources;Silent prayer    Visit Type/Summary:     - Spiritual Care: Consulted with RN prior to visit. Attempted visit. Patient is unavailable. Left a Spiritual Care contact information card. Provided support for Patient's spiritual/Jew requests. Provided spiritual/Jew literature, per Patient's request.     Jarred Heart, WESTLEY CAMII   [id number]     Spiritual Care support can be requested via an Epic consult. For urgent/immediate needs, please contact the On Call  at: [place]: ext [phone]

## 2024-09-19 NOTE — PLAN OF CARE
Pt A/Ox4. RA. Very anxious at times- ativan given. Imodium for diarrhea. Tramadol for abd pain. Suprapubic cath free from signs of infection. IV abx per ID. IV removed by pt. Multiple attempts for IV failed. MD order to access port. 4th floor TL called. Call light within reach and safety precautions in place.     Problem: Patient Centered Care  Goal: Patient preferences are identified and integrated in the patient's plan of care  Description: Interventions:  - What would you like us to know as we care for you?   - Provide timely, complete, and accurate information to patient/family  - Incorporate patient and family knowledge, values, beliefs, and cultural backgrounds into the planning and delivery of care  - Encourage patient/family to participate in care and decision-making at the level they choose  - Honor patient and family perspectives and choices  Outcome: Progressing     Problem: Diabetes/Glucose Control  Goal: Glucose maintained within prescribed range  Description: INTERVENTIONS:  - Monitor Blood Glucose as ordered  - Assess for signs and symptoms of hyperglycemia and hypoglycemia  - Administer ordered medications to maintain glucose within target range  - Assess barriers to adequate nutritional intake and initiate nutrition consult as needed  - Instruct patient on self management of diabetes  Outcome: Progressing     Problem: Patient/Family Goals  Goal: Patient/Family Long Term Goal  Description: Patient's Long Term Goal:     Interventions:  -   - See additional Care Plan goals for specific interventions  Outcome: Progressing  Goal: Patient/Family Short Term Goal  Description: Patient's Short Term Goal:     Interventions:   -   - See additional Care Plan goals for specific interventions  Outcome: Progressing     Problem: METABOLIC/FLUID AND ELECTROLYTES - ADULT  Goal: Glucose maintained within prescribed range  Description: INTERVENTIONS:  - Monitor Blood Glucose as ordered  - Assess for signs and symptoms  of hyperglycemia and hypoglycemia  - Administer ordered medications to maintain glucose within target range  - Assess barriers to adequate nutritional intake and initiate nutrition consult as needed  - Instruct patient on self management of diabetes  Outcome: Progressing     Problem: SKIN/TISSUE INTEGRITY - ADULT  Goal: Skin integrity remains intact  Description: INTERVENTIONS  - Assess and document risk factors for pressure ulcer development  - Assess and document skin integrity  - Monitor for areas of redness and/or skin breakdown  - Initiate interventions, skin care algorithm/standards of care as needed  Outcome: Progressing     Problem: PAIN - ADULT  Goal: Verbalizes/displays adequate comfort level or patient's stated pain goal  Description: INTERVENTIONS:  - Encourage pt to monitor pain and request assistance  - Assess pain using appropriate pain scale  - Administer analgesics based on type and severity of pain and evaluate response  - Implement non-pharmacological measures as appropriate and evaluate response  - Consider cultural and social influences on pain and pain management  - Manage/alleviate anxiety  - Utilize distraction and/or relaxation techniques  - Monitor for opioid side effects  - Notify MD/LIP if interventions unsuccessful or patient reports new pain  - Anticipate increased pain with activity and pre-medicate as appropriate  Outcome: Progressing     Problem: SAFETY ADULT - FALL  Goal: Free from fall injury  Description: INTERVENTIONS:  - Assess pt frequently for physical needs  - Identify cognitive and physical deficits and behaviors that affect risk of falls.  - [place] fall precautions as indicated by assessment.  - Educate pt/family on patient safety including physical limitations  - Instruct pt to call for assistance with activity based on assessment  - Modify environment to reduce risk of injury  - Provide assistive devices as appropriate  - Consider OT/PT consult to assist with  strengthening/mobility  - Encourage toileting schedule  Outcome: Progressing

## 2024-09-20 ENCOUNTER — HOSPITAL ENCOUNTER (EMERGENCY)
Facility: HOSPITAL | Age: 54
Discharge: HOME | End: 2024-09-21
Attending: EMERGENCY MEDICINE
Payer: MEDICARE

## 2024-09-20 ENCOUNTER — APPOINTMENT (OUTPATIENT)
Dept: GENERAL RADIOLOGY | Facility: HOSPITAL | Age: 54
End: 2024-09-20
Attending: INTERNAL MEDICINE
Payer: MEDICARE

## 2024-09-20 VITALS
RESPIRATION RATE: 18 BRPM | HEART RATE: 81 BPM | DIASTOLIC BLOOD PRESSURE: 94 MMHG | OXYGEN SATURATION: 98 % | TEMPERATURE: 99 F | SYSTOLIC BLOOD PRESSURE: 109 MMHG | HEIGHT: 60 IN | WEIGHT: 190.38 LBS | BODY MASS INDEX: 37.37 KG/M2

## 2024-09-20 DIAGNOSIS — Z71.89: Primary | ICD-10-CM

## 2024-09-20 LAB
ANION GAP SERPL CALC-SCNC: 3 MMOL/L (ref 0–18)
BASOPHILS # BLD AUTO: 0.06 X10(3) UL (ref 0–0.2)
BASOPHILS NFR BLD AUTO: 0.6 %
BUN BLD-MCNC: 11 MG/DL (ref 9–23)
BUN/CREAT SERPL: 16.4 (ref 10–20)
CALCIUM BLD-MCNC: 9.1 MG/DL (ref 8.7–10.4)
CHLORIDE SERPL-SCNC: 106 MMOL/L (ref 98–112)
CO2 SERPL-SCNC: 30 MMOL/L (ref 21–32)
CREAT BLD-MCNC: 0.67 MG/DL
DEPRECATED RDW RBC AUTO: 44.6 FL (ref 35.1–46.3)
EGFRCR SERPLBLD CKD-EPI 2021: 104 ML/MIN/1.73M2 (ref 60–?)
EOSINOPHIL # BLD AUTO: 0.32 X10(3) UL (ref 0–0.7)
EOSINOPHIL NFR BLD AUTO: 3.1 %
ERYTHROCYTE [DISTWIDTH] IN BLOOD BY AUTOMATED COUNT: 15.2 % (ref 11–15)
GLUCOSE BLD-MCNC: 128 MG/DL (ref 70–99)
GLUCOSE BLDC GLUCOMTR-MCNC: 116 MG/DL (ref 70–99)
GLUCOSE BLDC GLUCOMTR-MCNC: 126 MG/DL (ref 70–99)
GLUCOSE BLDC GLUCOMTR-MCNC: 136 MG/DL (ref 70–99)
GLUCOSE BLDC GLUCOMTR-MCNC: 233 MG/DL (ref 70–99)
HCT VFR BLD AUTO: 32.4 %
HGB BLD-MCNC: 10.4 G/DL
IMM GRANULOCYTES # BLD AUTO: 0.06 X10(3) UL (ref 0–1)
IMM GRANULOCYTES NFR BLD: 0.6 %
LYMPHOCYTES # BLD AUTO: 2.98 X10(3) UL (ref 1–4)
LYMPHOCYTES NFR BLD AUTO: 28.4 %
MAGNESIUM SERPL-MCNC: 1.6 MG/DL (ref 1.6–2.6)
MCH RBC QN AUTO: 25.8 PG (ref 26–34)
MCHC RBC AUTO-ENTMCNC: 32.1 G/DL (ref 31–37)
MCV RBC AUTO: 80.4 FL
MONOCYTES # BLD AUTO: 0.39 X10(3) UL (ref 0.1–1)
MONOCYTES NFR BLD AUTO: 3.7 %
NEUTROPHILS # BLD AUTO: 6.67 X10 (3) UL (ref 1.5–7.7)
NEUTROPHILS # BLD AUTO: 6.67 X10(3) UL (ref 1.5–7.7)
NEUTROPHILS NFR BLD AUTO: 63.6 %
OSMOLALITY SERPL CALC.SUM OF ELEC: 289 MOSM/KG (ref 275–295)
PHOSPHATE SERPL-MCNC: 4.4 MG/DL (ref 2.4–5.1)
PLATELET # BLD AUTO: 268 10(3)UL (ref 150–450)
POTASSIUM SERPL-SCNC: 3.9 MMOL/L (ref 3.5–5.1)
RBC # BLD AUTO: 4.03 X10(6)UL
SODIUM SERPL-SCNC: 139 MMOL/L (ref 136–145)
WBC # BLD AUTO: 10.5 X10(3) UL (ref 4–11)

## 2024-09-20 PROCEDURE — 99239 HOSP IP/OBS DSCHRG MGMT >30: CPT | Performed by: INTERNAL MEDICINE

## 2024-09-20 PROCEDURE — 71045 X-RAY EXAM CHEST 1 VIEW: CPT | Performed by: INTERNAL MEDICINE

## 2024-09-20 PROCEDURE — 99283 EMERGENCY DEPT VISIT LOW MDM: CPT

## 2024-09-20 RX ORDER — ARIPIPRAZOLE 5 MG/1
5 TABLET ORAL DAILY
Status: DISCONTINUED | OUTPATIENT
Start: 2024-09-20 | End: 2024-09-20

## 2024-09-20 RX ORDER — LORAZEPAM 1 MG/1
1 TABLET ORAL 3 TIMES DAILY PRN
Status: SHIPPED | COMMUNITY
Start: 2024-09-20

## 2024-09-20 RX ORDER — DULOXETIN HYDROCHLORIDE 30 MG/1
30 CAPSULE, DELAYED RELEASE ORAL DAILY
Qty: 30 CAPSULE | Refills: 0 | Status: SHIPPED | OUTPATIENT
Start: 2024-09-21 | End: 2024-10-21

## 2024-09-20 RX ORDER — MAGNESIUM OXIDE 400 MG/1
400 TABLET ORAL ONCE
Status: COMPLETED | OUTPATIENT
Start: 2024-09-20 | End: 2024-09-20

## 2024-09-20 RX ORDER — MAGNESIUM OXIDE 400 MG/1
400 TABLET ORAL DAILY
Qty: 30 TABLET | Refills: 0 | Status: SHIPPED | OUTPATIENT
Start: 2024-09-20 | End: 2024-10-20

## 2024-09-20 RX ORDER — MAGNESIUM OXIDE 400 MG/1
400 TABLET ORAL
Status: DISCONTINUED | OUTPATIENT
Start: 2024-09-20 | End: 2024-09-20

## 2024-09-20 RX ORDER — MECLIZINE HYDROCHLORIDE 25 MG/1
25 TABLET ORAL DAILY PRN
Qty: 10 TABLET | Refills: 0 | Status: SHIPPED | OUTPATIENT
Start: 2024-09-20 | End: 2024-09-30

## 2024-09-20 NOTE — DISCHARGE SUMMARY
[facility]  part of [facility]    Discharge Summary    Clari Rosa Patient Status:  Inpatient    1970 MRN [id number]   Location [facility] 5SW/SE Attending Stefan Haque MD   Hosp Day # 2 PCP Brian Bob MD     Date of Admission: 2024      Date of Discharge: No discharge date for patient encounter.  ***    Admitting Diagnosis: Sepsis due to urinary tract infection (HCC) [A41.9, N39.0]    Hospital Discharge Diagnoses: {Enter hospital discharge diagnoses here (please select the wildcards and then replace with free text; this allows us to save this data discretely for reporting purposes:5961::\"***\"}    Lace+ Score: 58  59-90 High Risk  29-58 Medium Risk  0-28   Low Risk.    TCM Follow-Up Recommendation:  {Care Managers will evaluate the need for follow-up for all patients ages 50+, and high/moderate risk patients ages 25-49. Low risk patients (LACE < 29) will only be evaluated if the \"Still recommend for TCM follow-up\" option is selected from this list.:7396}          Problem List:   Patient Active Problem List   Diagnosis    Paresis of lower extremity (HCC)    Transverse myelitis (HCC)    Bipolar 1 disorder (HCC)    Manipulative behavior    History of diverticulitis of colon    Serotonin syndrome    Obesity (BMI 30-39.9)    Dental caries    Type 2 diabetes mellitus without complication, with long-term current use of insulin (HCC)    Chronic suprapubic catheter (HCC)    Bipolar I disorder depressed with atypical features (HCC)    Other acute pulmonary embolism, unspecified whether acute cor pulmonale present (HCC)    Hyperglycemia    Sepsis due to undetermined organism (HCC)    Severe episode of recurrent major depressive disorder, without psychotic features (HCC)    Generalized anxiety disorder with panic attacks    Sepsis due to urinary tract infection (HCC)    Lactic acid acidosis    Constipation, unspecified constipation type    Moderate depressed bipolar I disorder  (HCC)    Acute chest pain    Depression, unspecified depression type    Cellulitis    Bipolar disorder, current episode mixed, severe, without psychotic features (HCC)    Chest pain    Acute cystitis without hematuria    Leukocytosis    Leukocytosis, unspecified type         Physical Exam: ***    History of Present Illness: ***    Hospital Course: ***    Discharge Condition: {DISCHARGE CONDITION:19696}    Discharge Medications:      Discharge Medications        START taking these medications        Instructions Prescription details   DULoxetine 30 MG Cpep  Commonly known as: Cymbalta  Start taking on: September 21, 2024  Replaces: DULoxetine HCl 20 MG Csdr      Take 1 capsule (30 mg total) by mouth daily.   Stop taking on: October 21, 2024  Quantity: 30 capsule  Refills: 0     magnesium oxide 400 MG Tabs  Commonly known as: Mag-Ox      Take 1 tablet (400 mg total) by mouth daily.   Stop taking on: October 20, 2024  Quantity: 30 tablet  Refills: 0            CHANGE how you take these medications        Instructions Prescription details   LORazepam 1 MG Tabs  Commonly known as: Ativan  What changed:   when to take this  reasons to take this      Take 1 tablet (1 mg total) by mouth 3 (three) times daily as needed for Anxiety.   Refills: 0            CONTINUE taking these medications        Instructions Prescription details   acetaminophen 325 MG Tabs  Commonly known as: Tylenol      Take 2 tablets (650 mg total) by mouth every 6 (six) hours as needed for Pain.   Refills: 0     albuterol 108 (90 Base) MCG/ACT Aers  Commonly known as: Ventolin HFA      Inhale 2 puffs into the lungs every 6 (six) hours as needed for Wheezing or Shortness of Breath.   Refills: 0     apixaban 5 MG Tabs  Commonly known as: Eliquis      Take 1 tablet (5 mg total) by mouth 2 (two) times daily.   Refills: 0     Baqsimi One Pack 3 MG/DOSE nasal powder  Generic drug: glucagon      3 mg by Nasal route once as needed for Low blood glucose.    Refills: 0     bethanechol 10 MG Tabs  Commonly known as: Urecholine      Take 1 tablet (10 mg total) by mouth daily.   Refills: 0     clonazePAM 0.5 MG Tabs  Commonly known as: KlonoPIN      Take 1 tablet (0.5 mg total) by mouth at bedtime.   Refills: 0     Cranberry 500 MG Caps      1 capsule PO daily   Refills: 0     divalproex  MG Tb24  Commonly known as: Depakote ER      Take 1 tablet (500 mg total) by mouth 2 (two) times daily.   Refills: 0     docusate sodium 100 MG Caps  Commonly known as: COLACE      Take 100 mg by mouth 2 (two) times daily.   Refills: 0     ergocalciferol 1.25 MG (07919 UT) Caps  Commonly known as: Vitamin D2      Take 1 capsule (50,000 Units total) by mouth every 7 days. Every monday   Refills: 0     Icosapent Ethyl 1 g Caps      Take 2 capsules by mouth 4 (four) times daily.   Refills: 0     insulin aspart 100 UNIT/ML Soct  Commonly known as: NovoLOG      SS: 150-200 2units, 201-250 4 units, 251-300 6 units, 301-350 8 units, 351-400 10 units, over 401 call md   Quantity: 3 mL  Refills: 0     insulin aspart 100 Units/mL Sopn  Commonly known as: NovoLOG      Inject 10 Units into the skin 3 (three) times daily with meals.   Refills: 0     insulin glargine 100 UNIT/ML Soln  Commonly known as: Lantus      Inject 46 Units into the skin nightly.   Refills: 0     Insulin Pen Needle 32G X 4 MM Misc      May substitute if not on insurance formulary   Quantity: 100 each  Refills: 5     lactulose 20 GM/30ML Soln  Commonly known as: ENULOSE      Take 30 mL (20 g total) by mouth every 6 (six) hours as needed (constipation).   Refills: 0     Melatonin 3 MG Caps      Take 1 capsule (3 mg total) by mouth at bedtime.   Refills: 0     metFORMIN  MG Tb24  Commonly known as: Glucophage XR      Take 2 tablets (1,000 mg total) by mouth 2 (two) times daily with meals.   Quantity: 30 tablet  Refills: 0     metoprolol tartrate 25 MG Tabs  Commonly known as: Lopressor      Take 1 tablet (25 mg total)  by mouth daily.   Refills: 0     Nystatin 324401 UNIT/GM Powd      Apply 1 Application topically as needed for Dry Skin. Under breast and groin area   Refills: 0     ondansetron 4 MG Tbdp  Commonly known as: Zofran-ODT      Take 2 tablets (8 mg total) by mouth every 8 (eight) hours as needed for Nausea.   Refills: 0     phenazopyridine 100 MG Tabs  Commonly known as: Pyridium      Take 1 tablet (100 mg total) by mouth 3 (three) times daily as needed for Pain.   Quantity: 5 tablet  Refills: 0     QUEtiapine 100 MG Tabs  Commonly known as: SEROquel      Take 1 tablet (100 mg total) by mouth nightly.   Refills: 0     sodium chloride 0.65 % Soln  Commonly known as: Saline Mist      1 spray every 3 (three) hours as needed.   Refills: 0     Thera-M Tabs      Take 1 tablet by mouth daily.   Refills: 0     traMADol 50 MG Tabs  Commonly known as: Ultram      Take 1 tablet (50 mg total) by mouth every 6 (six) hours as needed for Pain.   Refills: 0     traZODone 50 MG Tabs  Commonly known as: Desyrel      Take 1 tablet (50 mg total) by mouth nightly as needed for Sleep.   Refills: 0            STOP taking these medications      ARIPiprazole 5 MG Tabs  Commonly known as: Abilify        atorvastatin 40 MG Tabs  Commonly known as: Lipitor        DULoxetine HCl 20 MG Csdr  Replaced by: DULoxetine 30 MG Cpep                  Where to Get Your Medications        These medications were sent to [place], [place] - [address] [place] [address] [phone], [phone]  [address] [place] [id number]      Phone: [phone]   DULoxetine 30 MG Cpep  magnesium oxide 400 MG Tabs             Stefan Haque MD  9/20/2024  2:38 PM    Greater than 30 minutes spent on preparation and coordination of this discharge

## 2024-09-20 NOTE — PROGRESS NOTES
INFECTIOUS DISEASE PROGRESS NOTE  [facility]  part of [facility] ID PROGRESS NOTE    Clari Rosa Patient Status:  Inpatient    1970 MRN [id number]   Location [facility] 5SW/SE Attending Stefan Haque MD   Hosp Day # 2 PCP Brian Bob MD     Subjective:  ROS reviewed. Complains of shortness of breath and dizziness. Still with diarrhea.    ASSESSMENT:    Antibiotics: Meropenem -  ceftriaxone     # Acute lactic acidosis with pyuria, urine cx from  with ESBL E. Coli, Serratia marcescens   -Blood cx NGTD               -CT A/P unremarkable               -On macrobid PTA  # Leukocytosis  # Diarrhea, R/o C. Difficile colitis  # Chronic neurogenic bladder s/p SPC with recurrent UTI  # DM  # Transverse myelitis  # Anxiety, depression  # Bipolar disorder     PLAN:  -  Continue on meropenem and OVP, day 3. Can give dose of invanz prior to DC.  -  Follow fever curve, wbc.  -  Reviewed labs, micro, imaging reports, available old records.  -  Case d/w patient, primary, RN.     History of Present Illness:  Clari Rosa is a 53 year old female with a history of transverse myelitis, neurogenic bladder with SPC with recurrent UTI, bipolar disorder, lives in [place] with a h/o E. Coli, PSAR in UCx MDRO, last seen by our service during admission 2024 for Serratia UTI, who was seen in ED on  with dizziness, started on macrobid with urine cx growing ESBL E. Coli and Serratia, who presented to [facility] ED on  with multiple complaints including diarrhea and anxiety. Has been having four Bms daily. Unclear when SPC was last exchanged. On arrival, afebrile, wbc 12.4, lactate 5.3, CT A/P unremarkable, blood cx obtained, started on meropenem. ID consulted.    Physical Exam:  /71 (BP Location: Right arm)   Pulse 77   Temp 98.1 °F (36.7 °C) (Oral)   Resp 18   Ht 5' (1.524 m)   Wt 190 lb 6.4 oz (86.4 kg)   SpO2 97%   BMI 37.18 kg/m²     Gen:   Awake, in bed, on NC  HEENT:  EOMI,  neck supple  CV/lungs:  RRR, CTAB  Abdom:  Soft, obese, no TTP  Skin/extrem:  No rashes, BLE edema  :   SPC+  Lines:  PIV+    Laboratory Data: Reviewed    Microbiology: Reviewed    Radiology: Reviewed      [facility]  [phone]  9/20/2024

## 2024-09-20 NOTE — CM/SW NOTE
Pt discussed in RN DC Rounds. SW was informed via aidin by [facility] that patient has been requesting a location closer to her parents. They have a bed open at their [facility] Location. JAH updated patient who informed her father. SW was informed that patient is requesting to see SW by RN. JAH met with patient again, who asked for clarifitcation. JAH called Michelet, and explained above. Father informed JAH that patient lives in [place], [place]. Per [employer] maps, without high way and tolls, drive from parents house to [facility] is 48 mins. Michelet informed that its further. At this time, Patient will be returning back to [facility].     1020am- Jah was informed that [facility] called parents, and mom is agreeable to patient going to [place]. JAH confirmed with mom. Mom is agreeable. Sw was transferred phone call to patient's room phone. JAH followed up with mom, Mom wishes to proceed with [place]. Jah updated [facility] liaison.  Jah placed ambulance on will call.    09/20/24 1138   Discharge disposition   Expected discharge disposition [facility]   Post Acute Care Provider   ([facility])   Discharge transportation [facility]     Pt received MDO for discharge. Pt requires an ambulance according to the RN due to being a max assist. 2L of 02.. JAH called and ordered an Ambulance from [facility] to transport pt to [facility]  at 6 pm PCS flow sheet completed. RN to attached to AVS and print out. RN is to call for report at , patient will be going to room 206-2. Patient nd mom updated    Plan:  DC to [facility]    JAH/JASON to remain available for support and/or discharge planning.     Marsha Inman, MSW, LSW   x [phone]

## 2024-09-20 NOTE — PLAN OF CARE
Problem: Patient Centered Care  Goal: Patient preferences are identified and integrated in the patient's plan of care  Description: Interventions:  - What would you like us to know as we care for you? From [place]  - Provide timely, complete, and accurate information to patient/family  - Incorporate patient and family knowledge, values, beliefs, and cultural backgrounds into the planning and delivery of care  - Encourage patient/family to participate in care and decision-making at the level they choose  - Honor patient and family perspectives and choices  Outcome: Adequate for Discharge     Problem: Diabetes/Glucose Control  Goal: Glucose maintained within prescribed range  Description: INTERVENTIONS:  - Monitor Blood Glucose as ordered  - Assess for signs and symptoms of hyperglycemia and hypoglycemia  - Administer ordered medications to maintain glucose within target range  - Assess barriers to adequate nutritional intake and initiate nutrition consult as needed  - Instruct patient on self management of diabetes  Outcome: Adequate for Discharge        Problem: METABOLIC/FLUID AND ELECTROLYTES - ADULT  Goal: Glucose maintained within prescribed range  Description: INTERVENTIONS:  - Monitor Blood Glucose as ordered  - Assess for signs and symptoms of hyperglycemia and hypoglycemia  - Administer ordered medications to maintain glucose within target range  - Assess barriers to adequate nutritional intake and initiate nutrition consult as needed  - Instruct patient on self management of diabetes  Outcome: Adequate for Discharge     Problem: SKIN/TISSUE INTEGRITY - ADULT  Goal: Skin integrity remains intact  Description: INTERVENTIONS  - Assess and document risk factors for pressure ulcer development  - Assess and document skin integrity  - Monitor for areas of redness and/or skin breakdown  - Initiate interventions, skin care algorithm/standards of care as needed  Outcome: Adequate for Discharge       Report given to  Abdirahman Hernandez at [facility]. Will give last dose Invanz prior to discharge, per MD. Chest port deaccessed after IV abx. Bath given.    Ambulance will arrive late, they said 7:30pm. Endorsed discharge process to night nurse, paperwork printed and ready to go. Belongings collected and at bedside.

## 2024-09-20 NOTE — PLAN OF CARE
Patient VSS, no acute changes during shift. Updated patient on plan of care. Frequent rounding, no needs at this time. Call light always in reach and safety precautions in place.     Problem: Patient Centered Care  Goal: Patient preferences are identified and integrated in the patient's plan of care  Description: Interventions:  - What would you like us to know as we care for you?   - Provide timely, complete, and accurate information to patient/family  - Incorporate patient and family knowledge, values, beliefs, and cultural backgrounds into the planning and delivery of care  - Encourage patient/family to participate in care and decision-making at the level they choose  - Honor patient and family perspectives and choices  Outcome: Progressing     Problem: Diabetes/Glucose Control  Goal: Glucose maintained within prescribed range  Description: INTERVENTIONS:  - Monitor Blood Glucose as ordered  - Assess for signs and symptoms of hyperglycemia and hypoglycemia  - Administer ordered medications to maintain glucose within target range  - Assess barriers to adequate nutritional intake and initiate nutrition consult as needed  - Instruct patient on self management of diabetes  Outcome: Progressing     Problem: Patient/Family Goals  Goal: Patient/Family Long Term Goal  Description: Patient's Long Term Goal:     Interventions:  -   - See additional Care Plan goals for specific interventions  Outcome: Progressing  Goal: Patient/Family Short Term Goal  Description: Patient's Short Term Goal:     Interventions:   -   - See additional Care Plan goals for specific interventions  Outcome: Progressing     Problem: METABOLIC/FLUID AND ELECTROLYTES - ADULT  Goal: Glucose maintained within prescribed range  Description: INTERVENTIONS:  - Monitor Blood Glucose as ordered  - Assess for signs and symptoms of hyperglycemia and hypoglycemia  - Administer ordered medications to maintain glucose within target range  - Assess barriers to  adequate nutritional intake and initiate nutrition consult as needed  - Instruct patient on self management of diabetes  Outcome: Progressing     Problem: SKIN/TISSUE INTEGRITY - ADULT  Goal: Skin integrity remains intact  Description: INTERVENTIONS  - Assess and document risk factors for pressure ulcer development  - Assess and document skin integrity  - Monitor for areas of redness and/or skin breakdown  - Initiate interventions, skin care algorithm/standards of care as needed  Outcome: Progressing     Problem: PAIN - ADULT  Goal: Verbalizes/displays adequate comfort level or patient's stated pain goal  Description: INTERVENTIONS:  - Encourage pt to monitor pain and request assistance  - Assess pain using appropriate pain scale  - Administer analgesics based on type and severity of pain and evaluate response  - Implement non-pharmacological measures as appropriate and evaluate response  - Consider cultural and social influences on pain and pain management  - Manage/alleviate anxiety  - Utilize distraction and/or relaxation techniques  - Monitor for opioid side effects  - Notify MD/LIP if interventions unsuccessful or patient reports new pain  - Anticipate increased pain with activity and pre-medicate as appropriate  Outcome: Progressing     Problem: SAFETY ADULT - FALL  Goal: Free from fall injury  Description: INTERVENTIONS:  - Assess pt frequently for physical needs  - Identify cognitive and physical deficits and behaviors that affect risk of falls.  - Baring fall precautions as indicated by assessment.  - Educate pt/family on patient safety including physical limitations  - Instruct pt to call for assistance with activity based on assessment  - Modify environment to reduce risk of injury  - Provide assistive devices as appropriate  - Consider OT/PT consult to assist with strengthening/mobility  - Encourage toileting schedule  Outcome: Progressing

## 2024-09-21 VITALS
DIASTOLIC BLOOD PRESSURE: 76 MMHG | OXYGEN SATURATION: 91 % | SYSTOLIC BLOOD PRESSURE: 133 MMHG | WEIGHT: 190 LBS | RESPIRATION RATE: 18 BRPM | TEMPERATURE: 97 F | HEIGHT: 60 IN | HEART RATE: 65 BPM | BODY MASS INDEX: 37.3 KG/M2

## 2024-09-21 NOTE — ED INITIAL ASSESSMENT (HPI)
Patient to ED via EMS states she doesn't want to be at her current living facility [facility] because of a covid outbreak

## 2024-09-21 NOTE — ED PROVIDER NOTES
Patient Seen in: [facility] Emergency Department      History     Chief Complaint   Patient presents with    Other     Stated Complaint: doesn't want to be at SNF becuase of covid    Subjective:   HPI  53-year-old female with multiple medical comorbidities as below presents for placement to alternate living facility.  She was discharged from the hospital today, states that she does not want to be at her rehab facility ([facility]) because of a COVID outbreak.  She has no physical complaints.      Objective:   Past Medical History:    Bipolar 1 disorder (HCC)    Dental caries    Diabetes (HCC)    MIKE (generalized anxiety disorder)    High blood pressure    History of diverticulitis of colon    Manipulative behavior    Neurogenic bladder    Obesity (BMI 30-39.9)    Paraplegia (HCC)    Sepsis following intra-abdominal surgery (HCC)    Serotonin syndrome    Sleep apnea    Suprapubic catheter (HCC)    Transverse myelitis (HCC)              Past Surgical History:   Procedure Laterality Date    Arthroscopy of joint unlisted      knee          Cholecystectomy      Part removal colon w end colostomy      2013    Tonsillectomy                  Social History     Socioeconomic History    Marital status: Single   Tobacco Use    Smoking status: Former     Current packs/day: 1.00     Average packs/day: 1 pack/day for 5.0 years (5.0 ttl pk-yrs)     Types: Cigarettes    Tobacco comments:     quit 2006   Vaping Use    Vaping status: Never Used   Substance and Sexual Activity    Alcohol use: No    Drug use: No     Social Determinants of Health     Food Insecurity: No Food Insecurity (2024)    Food Insecurity     Food Insecurity: Never true   Transportation Needs: No Transportation Needs (2024)    Transportation Needs     Lack of Transportation: No   Housing Stability: Low Risk  (2024)    Housing Stability     Housing Instability: No              Review of Systems    Positive for stated Chief  Complaint: Other    Other systems are as noted in HPI.  Constitutional and vital signs reviewed.      All other systems reviewed and negative except as noted above.    Physical Exam     ED Triage Vitals [09/20/24 2259]   /69   Pulse 76   Resp 15   Temp 97 °F (36.1 °C)   Temp src Temporal   SpO2 96 %   O2 Device None (Room air)       Current Vitals:   Vital Signs  BP: 115/69  Pulse: 76  Resp: 15  Temp: 97 °F (36.1 °C)  Temp src: Temporal    Oxygen Therapy  SpO2: 96 %  O2 Device: None (Room air)            Physical Exam  Vitals and nursing note reviewed.   Constitutional:       Appearance: She is well-developed.   HENT:      Head: Normocephalic and atraumatic.   Eyes:      Extraocular Movements: Extraocular movements intact.   Cardiovascular:      Rate and Rhythm: Normal rate and regular rhythm.      Heart sounds: Normal heart sounds.   Pulmonary:      Effort: Pulmonary effort is normal.      Breath sounds: Normal breath sounds.   Abdominal:      General: There is no distension.      Palpations: Abdomen is soft.      Tenderness: There is no abdominal tenderness.   Musculoskeletal:         General: Normal range of motion.      Cervical back: Normal range of motion.   Skin:     General: Skin is warm.   Neurological:      Mental Status: She is alert.      Comments: No focal deficits               ED Course   Labs Reviewed - No data to display                   MDM                    Medical Decision Making  Vitals are stable in the ED.  I spoke with  Aidee who reports that patient cannot be placed at alternate nursing facility from the ED tonight and advises that she communicate this desire with the  at her nursing facility. This was discussed with the patient and she is agreeable with the plan.  Discharged in stable condition.    Problems Addressed:  Advised to contact : acute illness or injury    Amount and/or Complexity of Data Reviewed  Discussion of management or test  interpretation with external provider(s): As above    Risk  Decision regarding hospitalization.        Disposition and Plan     Clinical Impression:  1. Advised to contact          Disposition:  Discharge  9/20/2024 11:08 pm    Follow-up:  Brian Bob MD  [address]  [facility] [address]  [phone]    Follow up      We recommend that you schedule follow up care with a primary care provider within the next three months to obtain basic health screening including reassessment of your blood pressure.      Medications Prescribed:  Current Discharge Medication List

## 2024-09-21 NOTE — CM/SW NOTE
THIS PATIENT SHOWED UP IN THE EMERGENCY DEPT [facility] 09/20/2024 AND TOLD THE STAFF SHE WAS FROM [facility]. WHEN STAFF TRIED TO SEND HER BACK THEY SAID THAT THEY CANNOT TAKE HER BACK THAT THEY CLEANED HER ROOM AND SHE IS DISCHARGED FROM [facility]. HER NURSE KIERAN CALLED ME AND ASKED ME IF I COULD HELP. I CALLED THE  [facility] AND SHE EXPLAINED THAT THE PATIENT IS ACTUALLY AT [facility] AND THAT IS WHERE SHE NEEDS TO GO BACK TO. THEY ARE CLOSING THE BUILDING IN [place]. IF PT PRESENTS AGAIN PLEASE SEND BACK TO [facility].           Parul Mahajan MSN, RAQUEL, RN  Emergency Department   Extension [phone]

## 2024-09-21 NOTE — ED QUICK NOTES
RN discussed plan of care with social work multiple times. Per prior social work documentation patient to be transported to [facility] ([facility]), had all been previously discussed and arranged with patient's mother POA as part of discharge planning from patient's admission yesterday. Social work spoke with [facility] that confirm patient is supposed to be transferred to [place].      Patient expressing concerns about \"covid outbreak\" at [facility] Reassured patient that precautions will be used to prevent spread of infection. After much back and forth, patient ultimately agreeable to transport at this time. States that she will try to facilitate a different living situation for herself with her family and social work when she arrives to [facility] ([facility]).

## 2024-09-27 NOTE — CDS QUERY
Dear Dr Haque,    Please provide additional documentation in the patient's medical record supportive of documented diagnosis of Sepsis      (  x ) Sepsis remains a known or suspected condition for this patient. Please provide clinical indicators to support diagnosis of sepsis__________Infection identified:  UTI r/t SPC   SIRS score: 2-- WBC 12.4 (+1) ,  [place]  (+1) , RR 18 (0) ,T 97-98 (0)_____________________      (  ) Sepsis was ruled out and amended documentation provided in the medical record      ( ) Other (please specify)___________________________    ________________________________________________      Clinical Indicator:    9/17 ED MD Clinical Impression: \" Sepsis due to urinary tract infection \"    9/17 H&P :  Sepsis due to urinary tract infection   -Sepsis protocol.  Repeat and trend lactic acid.  -Continue 0.9 normal saline.  -Continue IV ceftriaxone.  Await urine cultures.    9/18 Infectious Disease MD Note:   Acute lactic acidosis with pyuria, urine cx from 9/6 with ESBL E. Coli, Serratia marcescens, R/o bacteremia               -CT A/P unremarkable               -On macrobid PTA  # Leukocytosis      Sepsis documented:  Yes  Infection identified:  UTI r/t SPC   SIRS score: 2-- WBC 12.4 (+1) ,  [place]  (+1) , RR 18 (0) ,T 97-98 (0)  SOFA score: 0  End organ dysfunction tied to sepsis:  Lactic Acidosis   Positive cultures: Urine  ESBL Ecoli, Serratia, Blood cx -- negative   Treatment: Merropenen, Invanz    LActic -- 4.9--5.3--3.6  WBC-- 12.4--11.7--9.8--10.5      Risk Factors: Neurogenic Bladder, Recurrent UTI, Transverse Myelitis ,Paraplegia       Treatment:  - Infectious Disease Consult  -IV Rocephin x1  - IV Invanz x1  - IV Meropenem 500 mg q 8hrs        If you have any questions ,please call Clinical : Sandra Christine/RN-BSN  at cell # [phone]       THIS FORM IS A PERMANENT PART OF THE MEDICAL RECORD

## 2024-10-15 PROBLEM — J44.9 COPD (CHRONIC OBSTRUCTIVE PULMONARY DISEASE) (HCC): Status: ACTIVE | Noted: 2023-07-20

## 2024-10-16 ENCOUNTER — APPOINTMENT (OUTPATIENT)
Dept: CT IMAGING | Facility: HOSPITAL | Age: 54
End: 2024-10-16
Attending: EMERGENCY MEDICINE
Payer: MEDICARE

## 2024-10-16 ENCOUNTER — HOSPITAL ENCOUNTER (INPATIENT)
Facility: HOSPITAL | Age: 54
LOS: 2 days | Discharge: NURSING, REHAB OR LONG-TERM CARE | End: 2024-10-20
Attending: EMERGENCY MEDICINE | Admitting: STUDENT IN AN ORGANIZED HEALTH CARE EDUCATION/TRAINING PROGRAM
Payer: MEDICARE

## 2024-10-16 DIAGNOSIS — F31.9 BIPOLAR 1 DISORDER (HCC): ICD-10-CM

## 2024-10-16 DIAGNOSIS — N39.0 URINARY TRACT INFECTION ASSOCIATED WITH CATHETERIZATION OF URINARY TRACT, UNSPECIFIED INDWELLING URINARY CATHETER TYPE, INITIAL ENCOUNTER (HCC): Primary | ICD-10-CM

## 2024-10-16 DIAGNOSIS — R53.1 WEAKNESS GENERALIZED: ICD-10-CM

## 2024-10-16 DIAGNOSIS — F32.A DEPRESSION, UNSPECIFIED DEPRESSION TYPE: ICD-10-CM

## 2024-10-16 DIAGNOSIS — N30.00 ACUTE CYSTITIS WITHOUT HEMATURIA: ICD-10-CM

## 2024-10-16 DIAGNOSIS — F31.30 BIPOLAR I DISORDER DEPRESSED WITH ATYPICAL FEATURES (HCC): ICD-10-CM

## 2024-10-16 DIAGNOSIS — F41.1 GENERALIZED ANXIETY DISORDER WITH PANIC ATTACKS: ICD-10-CM

## 2024-10-16 DIAGNOSIS — T83.511A URINARY TRACT INFECTION ASSOCIATED WITH CATHETERIZATION OF URINARY TRACT, UNSPECIFIED INDWELLING URINARY CATHETER TYPE, INITIAL ENCOUNTER (HCC): Primary | ICD-10-CM

## 2024-10-16 DIAGNOSIS — F41.0 GENERALIZED ANXIETY DISORDER WITH PANIC ATTACKS: ICD-10-CM

## 2024-10-16 LAB
ALBUMIN SERPL-MCNC: 4.4 G/DL (ref 3.2–4.8)
ALBUMIN/GLOB SERPL: 1.7 {RATIO} (ref 1–2)
ALP LIVER SERPL-CCNC: 68 U/L
ALT SERPL-CCNC: 9 U/L
ANION GAP SERPL CALC-SCNC: 5 MMOL/L (ref 0–18)
AST SERPL-CCNC: 9 U/L (ref ?–34)
BASOPHILS # BLD AUTO: 0.06 X10(3) UL (ref 0–0.2)
BASOPHILS NFR BLD AUTO: 0.5 %
BILIRUB SERPL-MCNC: 0.2 MG/DL (ref 0.3–1.2)
BILIRUB UR QL STRIP.AUTO: NEGATIVE
BUN BLD-MCNC: 15 MG/DL (ref 9–23)
CALCIUM BLD-MCNC: 9.3 MG/DL (ref 8.7–10.4)
CHLORIDE SERPL-SCNC: 103 MMOL/L (ref 98–112)
CLARITY UR REFRACT.AUTO: CLEAR
CO2 SERPL-SCNC: 30 MMOL/L (ref 21–32)
COLOR UR AUTO: YELLOW
CREAT BLD-MCNC: 0.78 MG/DL
EGFRCR SERPLBLD CKD-EPI 2021: 91 ML/MIN/1.73M2 (ref 60–?)
EOSINOPHIL # BLD AUTO: 0.35 X10(3) UL (ref 0–0.7)
EOSINOPHIL NFR BLD AUTO: 2.9 %
ERYTHROCYTE [DISTWIDTH] IN BLOOD BY AUTOMATED COUNT: 16.2 %
GLOBULIN PLAS-MCNC: 2.6 G/DL (ref 2–3.5)
GLUCOSE BLD-MCNC: 118 MG/DL (ref 70–99)
GLUCOSE UR STRIP.AUTO-MCNC: NORMAL MG/DL
HCT VFR BLD AUTO: 35.2 %
HGB BLD-MCNC: 11.4 G/DL
IMM GRANULOCYTES # BLD AUTO: 0.08 X10(3) UL (ref 0–1)
IMM GRANULOCYTES NFR BLD: 0.7 %
KETONES UR STRIP.AUTO-MCNC: 10 MG/DL
LEUKOCYTE ESTERASE UR QL STRIP.AUTO: 250
LYMPHOCYTES # BLD AUTO: 4.79 X10(3) UL (ref 1–4)
LYMPHOCYTES NFR BLD AUTO: 39.7 %
MCH RBC QN AUTO: 24.8 PG (ref 26–34)
MCHC RBC AUTO-ENTMCNC: 32.4 G/DL (ref 31–37)
MCV RBC AUTO: 76.5 FL
MONOCYTES # BLD AUTO: 0.48 X10(3) UL (ref 0.1–1)
MONOCYTES NFR BLD AUTO: 4 %
NEUTROPHILS # BLD AUTO: 6.31 X10 (3) UL (ref 1.5–7.7)
NEUTROPHILS # BLD AUTO: 6.31 X10(3) UL (ref 1.5–7.7)
NEUTROPHILS NFR BLD AUTO: 52.2 %
NITRITE UR QL STRIP.AUTO: NEGATIVE
OSMOLALITY SERPL CALC.SUM OF ELEC: 288 MOSM/KG (ref 275–295)
PH UR STRIP.AUTO: 7 [PH] (ref 5–8)
PLATELET # BLD AUTO: 296 10(3)UL (ref 150–450)
PLATELETS.RETICULATED NFR BLD AUTO: 1.2 % (ref 0–7)
POTASSIUM SERPL-SCNC: 4.3 MMOL/L (ref 3.5–5.1)
PROT SERPL-MCNC: 7 G/DL (ref 5.7–8.2)
PROT UR STRIP.AUTO-MCNC: 50 MG/DL
RBC # BLD AUTO: 4.6 X10(6)UL
RBC #/AREA URNS AUTO: >10 /HPF
SARS-COV-2 RNA RESP QL NAA+PROBE: NOT DETECTED
SODIUM SERPL-SCNC: 138 MMOL/L (ref 136–145)
SP GR UR STRIP.AUTO: 1.02 (ref 1–1.03)
UROBILINOGEN UR STRIP.AUTO-MCNC: NORMAL MG/DL
WBC # BLD AUTO: 12.1 X10(3) UL (ref 4–11)

## 2024-10-16 PROCEDURE — 99223 1ST HOSP IP/OBS HIGH 75: CPT | Performed by: STUDENT IN AN ORGANIZED HEALTH CARE EDUCATION/TRAINING PROGRAM

## 2024-10-16 PROCEDURE — 74176 CT ABD & PELVIS W/O CONTRAST: CPT | Performed by: EMERGENCY MEDICINE

## 2024-10-16 RX ORDER — LEVOFLOXACIN 5 MG/ML
750 INJECTION, SOLUTION INTRAVENOUS ONCE
Status: COMPLETED | OUTPATIENT
Start: 2024-10-16 | End: 2024-10-17

## 2024-10-17 LAB
GLUCOSE BLD-MCNC: 122 MG/DL (ref 70–99)
GLUCOSE BLD-MCNC: 128 MG/DL (ref 70–99)
GLUCOSE BLD-MCNC: 145 MG/DL (ref 70–99)
GLUCOSE BLD-MCNC: 153 MG/DL (ref 70–99)
GLUCOSE BLD-MCNC: 178 MG/DL (ref 70–99)

## 2024-10-17 PROCEDURE — 99233 SBSQ HOSP IP/OBS HIGH 50: CPT | Performed by: HOSPITALIST

## 2024-10-17 RX ORDER — NICOTINE POLACRILEX 4 MG
15 LOZENGE BUCCAL
Status: DISCONTINUED | OUTPATIENT
Start: 2024-10-17 | End: 2024-10-20

## 2024-10-17 RX ORDER — INSULIN DEGLUDEC 100 U/ML
5 INJECTION, SOLUTION SUBCUTANEOUS NIGHTLY
Status: DISCONTINUED | OUTPATIENT
Start: 2024-10-17 | End: 2024-10-20

## 2024-10-17 RX ORDER — SODIUM PHOSPHATE, DIBASIC AND SODIUM PHOSPHATE, MONOBASIC 7; 19 G/230ML; G/230ML
1 ENEMA RECTAL ONCE AS NEEDED
Status: DISCONTINUED | OUTPATIENT
Start: 2024-10-17 | End: 2024-10-20

## 2024-10-17 RX ORDER — MORPHINE SULFATE 2 MG/ML
2 INJECTION, SOLUTION INTRAMUSCULAR; INTRAVENOUS EVERY 4 HOURS PRN
Status: DISCONTINUED | OUTPATIENT
Start: 2024-10-17 | End: 2024-10-18

## 2024-10-17 RX ORDER — BETHANECHOL CHLORIDE 10 MG/1
10 TABLET ORAL DAILY
Status: DISCONTINUED | OUTPATIENT
Start: 2024-10-17 | End: 2024-10-20

## 2024-10-17 RX ORDER — ENOXAPARIN SODIUM 100 MG/ML
40 INJECTION SUBCUTANEOUS DAILY
Status: DISCONTINUED | OUTPATIENT
Start: 2024-10-17 | End: 2024-10-17

## 2024-10-17 RX ORDER — MORPHINE SULFATE 4 MG/ML
4 INJECTION, SOLUTION INTRAMUSCULAR; INTRAVENOUS EVERY 4 HOURS PRN
Status: DISCONTINUED | OUTPATIENT
Start: 2024-10-17 | End: 2024-10-17

## 2024-10-17 RX ORDER — BISACODYL 10 MG
10 SUPPOSITORY, RECTAL RECTAL
Status: DISCONTINUED | OUTPATIENT
Start: 2024-10-17 | End: 2024-10-20

## 2024-10-17 RX ORDER — SENNOSIDES 8.6 MG
17.2 TABLET ORAL NIGHTLY PRN
Status: DISCONTINUED | OUTPATIENT
Start: 2024-10-17 | End: 2024-10-20

## 2024-10-17 RX ORDER — MELATONIN
3 NIGHTLY PRN
Status: DISCONTINUED | OUTPATIENT
Start: 2024-10-17 | End: 2024-10-20

## 2024-10-17 RX ORDER — QUETIAPINE FUMARATE 100 MG/1
200 TABLET, FILM COATED ORAL NIGHTLY
Status: DISCONTINUED | OUTPATIENT
Start: 2024-10-17 | End: 2024-10-20

## 2024-10-17 RX ORDER — ACETAMINOPHEN 500 MG
500 TABLET ORAL EVERY 4 HOURS PRN
Status: DISCONTINUED | OUTPATIENT
Start: 2024-10-17 | End: 2024-10-20

## 2024-10-17 RX ORDER — HYDROCODONE BITARTRATE AND ACETAMINOPHEN 10; 325 MG/1; MG/1
1 TABLET ORAL EVERY 6 HOURS PRN
Status: DISCONTINUED | OUTPATIENT
Start: 2024-10-17 | End: 2024-10-20

## 2024-10-17 RX ORDER — BENZONATATE 200 MG/1
200 CAPSULE ORAL 3 TIMES DAILY PRN
Status: DISCONTINUED | OUTPATIENT
Start: 2024-10-17 | End: 2024-10-20

## 2024-10-17 RX ORDER — METOPROLOL TARTRATE 25 MG/1
25 TABLET, FILM COATED ORAL 2 TIMES DAILY
Status: DISCONTINUED | OUTPATIENT
Start: 2024-10-17 | End: 2024-10-20

## 2024-10-17 RX ORDER — DIVALPROEX SODIUM 500 MG/1
500 TABLET, FILM COATED, EXTENDED RELEASE ORAL 2 TIMES DAILY
Status: DISCONTINUED | OUTPATIENT
Start: 2024-10-17 | End: 2024-10-20

## 2024-10-17 RX ORDER — ICOSAPENT ETHYL 1 G/1
2 CAPSULE ORAL 4 TIMES DAILY
Status: DISCONTINUED | OUTPATIENT
Start: 2024-10-17 | End: 2024-10-19

## 2024-10-17 RX ORDER — DEXTROSE MONOHYDRATE 25 G/50ML
50 INJECTION, SOLUTION INTRAVENOUS
Status: DISCONTINUED | OUTPATIENT
Start: 2024-10-17 | End: 2024-10-20

## 2024-10-17 RX ORDER — ECHINACEA PURPUREA EXTRACT 125 MG
1 TABLET ORAL
Status: DISCONTINUED | OUTPATIENT
Start: 2024-10-17 | End: 2024-10-20

## 2024-10-17 RX ORDER — PHENAZOPYRIDINE HYDROCHLORIDE 100 MG/1
100 TABLET, FILM COATED ORAL
Status: DISPENSED | OUTPATIENT
Start: 2024-10-17 | End: 2024-10-19

## 2024-10-17 RX ORDER — TRAZODONE HYDROCHLORIDE 50 MG/1
50 TABLET, FILM COATED ORAL NIGHTLY PRN
Status: DISCONTINUED | OUTPATIENT
Start: 2024-10-17 | End: 2024-10-20

## 2024-10-17 RX ORDER — MECLIZINE HYDROCHLORIDE 25 MG/1
25 TABLET ORAL DAILY PRN
Status: DISCONTINUED | OUTPATIENT
Start: 2024-10-17 | End: 2024-10-20

## 2024-10-17 RX ORDER — SODIUM CHLORIDE 9 MG/ML
INJECTION, SOLUTION INTRAVENOUS CONTINUOUS
Status: DISCONTINUED | OUTPATIENT
Start: 2024-10-17 | End: 2024-10-18

## 2024-10-17 RX ORDER — POLYETHYLENE GLYCOL 3350 17 G/17G
17 POWDER, FOR SOLUTION ORAL DAILY PRN
Status: DISCONTINUED | OUTPATIENT
Start: 2024-10-17 | End: 2024-10-20

## 2024-10-17 RX ORDER — MORPHINE SULFATE 2 MG/ML
1 INJECTION, SOLUTION INTRAMUSCULAR; INTRAVENOUS EVERY 4 HOURS PRN
Status: DISCONTINUED | OUTPATIENT
Start: 2024-10-17 | End: 2024-10-18

## 2024-10-17 RX ORDER — NICOTINE POLACRILEX 4 MG
30 LOZENGE BUCCAL
Status: DISCONTINUED | OUTPATIENT
Start: 2024-10-17 | End: 2024-10-20

## 2024-10-17 RX ORDER — ALBUTEROL SULFATE 90 UG/1
2 INHALANT RESPIRATORY (INHALATION) EVERY 6 HOURS PRN
Status: DISCONTINUED | OUTPATIENT
Start: 2024-10-17 | End: 2024-10-20

## 2024-10-17 RX ORDER — DULOXETIN HYDROCHLORIDE 30 MG/1
60 CAPSULE, DELAYED RELEASE ORAL DAILY
Status: DISCONTINUED | OUTPATIENT
Start: 2024-10-17 | End: 2024-10-20

## 2024-10-17 RX ORDER — ONDANSETRON 2 MG/ML
4 INJECTION INTRAMUSCULAR; INTRAVENOUS EVERY 6 HOURS PRN
Status: DISCONTINUED | OUTPATIENT
Start: 2024-10-17 | End: 2024-10-17

## 2024-10-17 RX ORDER — PROCHLORPERAZINE EDISYLATE 5 MG/ML
5 INJECTION INTRAMUSCULAR; INTRAVENOUS EVERY 8 HOURS PRN
Status: DISCONTINUED | OUTPATIENT
Start: 2024-10-17 | End: 2024-10-20

## 2024-10-17 NOTE — PLAN OF CARE
Pt is a &ox3-4. L port c/d/I/.  on RA. NSR on tele. Eliquis. IV abx. IVF's. Suprapubic catheter in place. Pt c/o pain, meds given per MAR.  Tolerating diet w/ QID accuchecks. Pt updated on poc. No further needs at this time.    Problem: PAIN - ADULT  Goal: Verbalizes/displays adequate comfort level or patient's stated pain goal  Description: INTERVENTIONS:  - Encourage pt to monitor pain and request assistance  - Assess pain using appropriate pain scale  - Administer analgesics based on type and severity of pain and evaluate response  - Implement non-pharmacological measures as appropriate and evaluate response  - Consider cultural and social influences on pain and pain management  - Manage/alleviate anxiety  - Utilize distraction and/or relaxation techniques  - Monitor for opioid side effects  - Notify MD/LIP if interventions unsuccessful or patient reports new pain  - Anticipate increased pain with activity and pre-medicate as appropriate  Outcome: Progressing

## 2024-10-17 NOTE — SPIRITUAL CARE NOTE
Spiritual Care Visit Note    Patient Name: Clari Rosa Date of Spiritual Care Visit: 10/17/24   : 1970 Primary Dx: Urinary tract infection associated with catheterization of urinary tract, unspecified indwelling urinary catheter type, initial encounter ([facility])       Referred By:      Spiritual Care Taxonomy:    Intended Effects: Convey a calming presence    Methods: Encouraging spiritual/Jehovah's witness practices;Collaborate with care team member    Interventions: Active listening;Ask guided questions;Explain  role    Visit Type/Summary:     - Spiritual Care: Consulted with RN prior to visit. Offered empathic listening and emotional support. Provided information regarding how to contact Spiritual Care and left a Spiritual Care information card.  remains available as needed for follow up. Patient does not attend any Jehovah's witness service anymore but does believe in Brennon Juwan and the Holy Spirit. Patient is supported by her family.    Spiritual Care support can be requested via an Epic consult. For urgent/immediate needs, please contact the On Call  at: Edward: ext [phone]           Chaplain Resident Teresita Massey MA

## 2024-10-17 NOTE — PROGRESS NOTES
[facility]   part of [facility]     Hospitalist Progress Note     Clari Rosa Patient Status:  Observation    1970 MRN [id number]   Location [facility] 5NW-A Attending Jay Napoles MD   Hosp Day # 0 PCP Brian Bob MD     Chief Complaint: UTI    Subjective:   Patient complains of dysuria. Pain with recent mark placement.     Current medications:   meropenem  500 mg Intravenous Q8H    apixaban  5 mg Oral BID    bethanechol  10 mg Oral Daily    divalproex ER  500 mg Oral BID    DULoxetine  60 mg Oral Daily    [Held by provider] Icosapent Ethyl  2 capsule Oral QID    insulin degludec  5 Units Subcutaneous Nightly    metoprolol tartrate  25 mg Oral BID    QUEtiapine  200 mg Oral Nightly    phenazopyridine  100 mg Oral TID CC    miconazole   Topical BID    insulin aspart  5 Units Subcutaneous TID CC    insulin aspart  1-10 Units Subcutaneous TID AC and HS       Objective:    Review of Systems:   10 point ROS completed and was negative, except for pertinent positive and negatives stated in subjective.    Vital signs:  Temp:  [98 °F (36.7 °C)-98.9 °F (37.2 °C)] 98.7 °F (37.1 °C)  Pulse:  [64-78] 72  Resp:  [12-18] 18  BP: ()/(61-97) 120/66  SpO2:  [91 %-100 %] 91 %  Patient Weight for the past 72 hrs:   Weight   10/16/24 2018 190 lb (86.2 kg)   10/17/24 0015 190 lb (86.2 kg)     Physical Exam:    General: No acute distress.   Respiratory: Clear to auscultation bilaterally.   Cardiovascular: S1, S2. Regular rate and rhythm.   Abdomen: Soft, nontender, nondistended.  Positive bowel sounds.   Extremities: No edema.  Neuro: AAOx3    Diagnostic Data:    Labs:  Recent Labs   Lab 10/16/24  2152   WBC 12.1*   HGB 11.4*   MCV 76.5*   .0       Recent Labs   Lab 10/16/24  2152   *   BUN 15   CREATSERUM 0.78   CA 9.3   ALB 4.4      K 4.3      CO2 30.0   ALKPHO 68   AST 9   ALT 9*   BILT 0.2*   TP 7.0       Estimated Creatinine Clearance: 59.9 mL/min (based on SCr of 0.78  mg/dL).    No results for input(s): \"PTP\", \"INR\" in the last 168 hours.         COVID-19 Lab Results    COVID-19  Lab Results   Component Value Date    COVID19 Not Detected 10/16/2024    COVID19 Not Detected 08/04/2024    COVID19 Not Detected 08/03/2024       Pro-Calcitonin  No results for input(s): \"PCT\" in the last 168 hours.    Cardiac  No results for input(s): \"TROP\", \"PBNP\" in the last 168 hours.    Creatinine Kinase  No results for input(s): \"CK\" in the last 168 hours.    Inflammatory Markers  No results for input(s): \"CRP\", \"GERMAN\", \"LDH\", \"DDIMER\" in the last 168 hours.    No results for input(s): \"TROP\", \"TROPHS\", \"CK\" in the last 168 hours.    Imaging: Imaging data reviewed in Epic.    Medications:    meropenem  500 mg Intravenous Q8H    apixaban  5 mg Oral BID    bethanechol  10 mg Oral Daily    divalproex ER  500 mg Oral BID    DULoxetine  60 mg Oral Daily    [Held by provider] Icosapent Ethyl  2 capsule Oral QID    insulin degludec  5 Units Subcutaneous Nightly    metoprolol tartrate  25 mg Oral BID    QUEtiapine  200 mg Oral Nightly    phenazopyridine  100 mg Oral TID CC    miconazole   Topical BID    insulin aspart  5 Units Subcutaneous TID CC    insulin aspart  1-10 Units Subcutaneous TID AC and HS       Assessment & Plan:    CAUTI  History of MDRO  IV abx  Add Pyridium  Follow-up UC  ID consult   Dehydration  IVF  Leukocytosis  Monitor  Moderate hydroureteronephrosis of the left collecting system   Neurogenic bladder  Florentino care  Urology consult   Diabetes mellitus  Tresiba  Scheduled Novolog  Correctional scale   VTE on DOAC  DOAC  Essential hypertension  Metoprolol   Paraplegia  Bipolar disorder  Anxiety  Continue PTA regimen   COPD  ANAID    Supplementary Documentation:   Quality:  DVT Prophylaxis: DOAC    At this point Ms. Rosa is expected to be discharge to: [facility]    Plan of care discussed with patient and RN.    Jay Napoles MD

## 2024-10-17 NOTE — PROGRESS NOTES
NURSING ADMISSION NOTE      Patient admitted via Cart  Oriented to room.  Safety precautions initiated.  Bed in low position.  Call light in reach.    Pt from [facility]on. AO x3. Repeats herself at times. RA. Tele NSR. Lovenox. Suprapubic catheter, exchanged at NH today per pt. Reporting 8/10 pain, tylenol given. Reporting worse pain, see Orders. Bedbound. QID accucheck. L Port. IVF. Merrem. Incontinent. Briefed. Carb controlled diet. Med rec and navigator completed. Pt resting in bed with call light in reach.     Urology and ID consulted.

## 2024-10-17 NOTE — ED INITIAL ASSESSMENT (HPI)
Pt had suprapubic catheter exchanged today, pt c/o pain, is concerned for possible UTI, pt reports pain at urethra and pain when urine exits her bladder, pt tearful and agitated upon arrival to ED, pt states \"I\"m hurting, it hurts, I need pain medication\", per pt \"I've been septic 22 times with UTIs\"

## 2024-10-17 NOTE — H&P
[facility]     Clari Rosa Patient Status:  Emergency    1970 MRN [id number]   Location [facility] EMERGENCY DEPARTMENT Attending Nj Ellison MD   Hosp Day # 0 PCP Brian Bob MD     Chief Complaint: dysuria    Subjective:    History of Present Illness:     Clari Rosa is a 53 year old female with PMHX neurogenic bladder (s/p suprapubic cath)/ DM/ PE/ COPD/ HTN/ ANAID/ transverse myelitis who presented to the hospital for suprapubic pain and dysuria. She reports 2-3 days fo worsening pain in her suprapubic region which is burning and worse with manipulation of her catheter or urination. Her pain was rated 7/10 and had no alleviating factors. She denied any fever, chills, or increased urinary output.     History/Other:    Past Medical History:  Past Medical History:    Bipolar 1 disorder (HCC)    Dental caries    Diabetes (HCC)    MIKE (generalized anxiety disorder)    High blood pressure    History of diverticulitis of colon    Manipulative behavior    Neurogenic bladder    Obesity (BMI 30-39.9)    Paraplegia (HCC)    Sepsis following intra-abdominal surgery (HCC)    Serotonin syndrome    Sleep apnea    Suprapubic catheter (HCC)    Transverse myelitis (HCC)     Past Surgical History:   Past Surgical History:   Procedure Laterality Date    Arthroscopy of joint unlisted      knee          Cholecystectomy      Part removal colon w end colostomy      2013    Tonsillectomy        Family History:   Family History   Adopted: Yes     Social History:    reports that she has quit smoking. Her smoking use included cigarettes. She has a 5 pack-year smoking history. She does not have any smokeless tobacco history on file. She reports that she does not drink alcohol and does not use drugs.     Allergies: Allergies[1]    Medications:  Medications Ordered Prior to Encounter[2]    Review of Systems:   A comprehensive review of systems was completed.    Pertinent positives and  negatives noted in the HPI.    Objective:   Physical Exam:    /66   Pulse 73   Temp 98.7 °F (37.1 °C) (Oral)   Resp 15   Ht 5' (1.524 m)   Wt 190 lb (86.2 kg)   SpO2 99%   BMI 37.11 kg/m²   General: No acute distress, Alert  Respiratory: No rhonchi, no wheezes, on room air  Cardiovascular: S1, S2. Regular rate and rhythm  Abdomen: Soft, Non-tender, non-distended, positive bowel sounds, suprapubic catheter in place with minimal erythema at insertion site as well as trace brown mucous  Neuro: No new focal deficits  Extremities: +1 non-pitting bl pre-tibial edema    Results:    Labs:      Labs Last 24 Hours:    Recent Labs   Lab 10/16/24  2152   RBC 4.60   HGB 11.4*   HCT 35.2   MCV 76.5*   MCH 24.8*   MCHC 32.4   RDW 16.2   NEPRELIM 6.31   WBC 12.1*   .0       Recent Labs   Lab 10/16/24  2152   *   BUN 15   CREATSERUM 0.78   EGFRCR 91   CA 9.3   ALB 4.4      K 4.3      CO2 30.0   ALKPHO 68   AST 9   ALT 9*   BILT 0.2*   TP 7.0       No results found for: \"PT\", \"INR\"    No results for input(s): \"TROP\", \"TROPHS\", \"CK\" in the last 168 hours.    No results for input(s): \"TROP\", \"PBNP\" in the last 168 hours.    No results for input(s): \"PCT\" in the last 168 hours.    Imaging: Imaging data reviewed in Epic.    Assessment & Plan:      #UTI  #leukocytosis  -UA showing 10 ketones, 2+ blood, 50 protein, 250 leuk est, 21-50 WBC, >10 RBC  -CT A/P showing mild concentric bladder wall thickening and perivesicular stranding consistent with cystitis, mod hydroureteronephrosis left collecting system without stone  -met 1 SIRS criteria: WBC 12.1  -antibiotics: meropenem given prior hx ESBL E coli and serratia marcesens  -urine cx pending  -cont to monitor CBC    #microcytic anemia  -hgb 11.4: baseline 10-11  -blood in urine as above  -cont to monitor CBC    #DM  -SSI, QID checks, hypoglycemia protocol  -home glargine subbed with tresiba  -hold home metformin    #chronic pain  -cont home norco  prn    #Bipolar ds  -cont home duloxetine, clonazepam, quetiapine, trazodone, divalproex    #PE  -cont home apixaban        Plan of care discussed with ED physician    Kerry Ahn DO    Supplementary Documentation:     The 21st Century Cures Act makes medical notes like these available to patients in the interest of transparency. Please be advised this is a medical document. Medical documents are intended to carry relevant information, facts as evident, and the clinical opinion of the practitioner. The medical note is intended as peer to peer communication and may appear blunt or direct. It is written in medical language and may contain abbreviations or verbiage that are unfamiliar.                                       [1]   Allergies  Allergen Reactions    Pcn [Penicillins] HIVES    Sulfa Antibiotics HIVES    Radiology Contrast Iodinated Dyes DIZZINESS    Keflex [Cephalexin] UNKNOWN   [2]   Current Facility-Administered Medications on File Prior to Encounter   Medication Dose Route Frequency Provider Last Rate Last Admin    [COMPLETED] magnesium oxide (Mag-Ox) tab 400 mg  400 mg Oral Once Stefan Haque MD   400 mg at 09/20/24 1007    [COMPLETED] ertapenem (Invanz) 1 g in sodium chloride 0.9% 100 mL IVPB-MBP  1 g Intravenous Once Terrell Sparrow  mL/hr at 09/20/24 1542 1 g at 09/20/24 1542    [COMPLETED] heparin (Porcine) 100 Units/mL lock flush 500 Units  5 mL Intracatheter Once Stefan Haque MD   500 Units at 09/20/24 1649    [COMPLETED] LORazepam (Ativan) tab 1 mg  1 mg Oral Once Boyd Lucia MD   1 mg at 09/17/24 1913    [COMPLETED] meclizine (Antivert) tab 25 mg  25 mg Oral Once Boyd Lucia MD   25 mg at 09/17/24 2027    [COMPLETED] sodium chloride 0.9 % IV bolus 2,646 mL  30 mL/kg Intravenous Once Boyd Lucia MD   Stopped at 09/17/24 2345    [COMPLETED] cefTRIAXone (Rocephin) 1 g in sodium chloride 0.9% 100 mL IVPB-ADDV  1 g Intravenous Once Boyd Lucia MD   Stopped at  09/17/24 2332    [COMPLETED] ondansetron (Zofran) 4 MG/2ML injection 4 mg  4 mg Intravenous Once Doc Pradhan MD   4 mg at 09/06/24 0629    [COMPLETED] ondansetron (Zofran) 4 MG/2ML injection 4 mg  4 mg Intravenous Once Doc Pradhan MD   4 mg at 09/06/24 0814    [COMPLETED] heparin (Porcine) 100 Units/mL lock flush 500 Units  5 mL Intravenous Once Doc Pradhan MD   500 Units at 09/06/24 0816    [COMPLETED] magnesium oxide (Mag-Ox) tab 400 mg  400 mg Oral Once Tamara Jackson DO   400 mg at 08/24/24 1001    [COMPLETED] heparin (Porcine) 100 Units/mL lock flush 500 Units  5 mL Intracatheter Once Tamara Jackson DO   500 Units at 08/24/24 1431    [COMPLETED] magnesium oxide (Mag-Ox) tab 400 mg  400 mg Oral Once Tamara Jackson DO   400 mg at 08/23/24 0829    [COMPLETED] magnesium oxide (Mag-Ox) tab 400 mg  400 mg Oral Once Tamara Jackson DO   400 mg at 08/22/24 0931    [COMPLETED] cefTRIAXone (Rocephin) 2 g in sodium chloride 0.9% 100 mL IVPB-ADDV  2 g Intravenous Once Boyd Orozco  mL/hr at 08/21/24 0115 2 g at 08/21/24 0115    [COMPLETED] sodium chloride 0.9 % IV bolus 2,763 mL  30 mL/kg Intravenous Once Boyd Orozco  mL/hr at 08/21/24 0027 2,763 mL at 08/21/24 0027    [COMPLETED] magnesium oxide (Mag-Ox) tab 400 mg  400 mg Oral Once Loni Rodriguez MD   400 mg at 08/21/24 0528    [COMPLETED] sodium chloride 0.9 % IV bolus 1,000 mL  1,000 mL Intravenous Once Loni Rodriguez MD 1,000 mL/hr at 08/21/24 0525 1,000 mL at 08/21/24 0525    [COMPLETED] sodium chloride 0.9 % IV bolus 1,000 mL  1,000 mL Intravenous Once Boyd Orozco DO   Stopped at 08/21/24 0051    [COMPLETED] ondansetron (Zofran) 4 MG/2ML injection 4 mg  4 mg Intravenous Once Boyd Orozco DO   4 mg at 08/20/24 2350    [COMPLETED] LORazepam (Ativan) tab 0.5 mg  0.5 mg Oral Once Cristina Stallings MD   0.5 mg at 08/05/24 0729    [COMPLETED] ondansetron (Zofran-ODT) disintegrating tab 4 mg  4 mg Oral Once  Cristina Stallings MD   4 mg at 08/05/24 0826    [COMPLETED] vancomycin (Vancocin) cap 125 mg  125 mg Oral Once Cristina Stallings MD   125 mg at 08/05/24 0826    [COMPLETED] apixaban (Eliquis) tab 10 mg  10 mg Oral Once Mahendra Parmar MD   10 mg at 08/04/24 0138    [COMPLETED] heparin sodium lock flush 100 UNIT/ML injection 500 Units  500 Units Intravenous Once Mahendra Parmar MD   500 Units at 08/04/24 0139    [COMPLETED] ondansetron (Zofran-ODT) disintegrating tab 4 mg  4 mg Oral Once Rosario Collins MD   4 mg at 08/04/24 1804    [COMPLETED] clonazePAM (KlonoPIN) tab 0.5 mg  0.5 mg Oral Once Rosario Collins MD   0.5 mg at 08/04/24 2013    [COMPLETED] sodium chloride 0.9 % IV bolus 1,000 mL  1,000 mL Intravenous Once Mahendra Parmar MD   Stopped at 08/04/24 0110    [COMPLETED] droperidol (Inapsine) 2.5 mg/mL injection 1.25 mg  1.25 mg Intravenous Once Mahendra Parmar MD   1.25 mg at 08/03/24 1807    [COMPLETED] dicyclomine (Bentyl) tab 20 mg  20 mg Oral Once Mahendra Parmar MD   20 mg at 08/03/24 1756    [COMPLETED] valproic acid (Depakene) cap 500 mg  500 mg Oral Once Mahendra Parmar MD   500 mg at 08/03/24 1814    [COMPLETED] fosfomycin (Monurol) 3 g oral packet  3 g Oral Once Mahendra Parmar MD   3 g at 08/03/24 2126    [COMPLETED] ceFAZolin (Ancef) 2g in 10mL IV syringe premix  2 g Intravenous Once Evita Mckeon MD   Stopped at 07/23/24 1934    [COMPLETED] aspirin chewable tab 324 mg  324 mg Oral Once Evita Mckeon MD   324 mg at 07/23/24 1928    [COMPLETED] alum-mag hydroxide-simethicone (Maalox) 200-200-20 MG/5ML oral suspension 30 mL  30 mL Oral Once Alyse Kirk MD   30 mL at 07/19/24 0948    [COMPLETED] LORazepam (Ativan) tab 1 mg  1 mg Oral Once Alyse Kirk MD   1 mg at 07/19/24 0949    [COMPLETED] HYDROcodone-acetaminophen (Norco) 5-325 MG per tab 1 tablet  1 tablet Oral Once Alyse Kirk MD   1 tablet at 07/19/24 0949    [COMPLETED] sodium chloride 0.9 % IV bolus 500 mL   500 mL Intravenous Once Alyse Kirk MD   Stopped at 24 1105    [COMPLETED] sodium chloride 0.9 % IV bolus 500 mL  500 mL Intravenous Once Alyse Kirk MD   Stopped at 24 1149     Current Outpatient Medications on File Prior to Encounter   Medication Sig Dispense Refill    nystatin 100,000 Units/g External Cream Apply 1 Application topically in the morning, at noon, and at bedtime.      loperamide 2 MG Oral Cap Take 1 capsule (2 mg total) by mouth 4 (four) times daily as needed for Diarrhea.      meclizine 25 MG Oral Tab Take 1 tablet (25 mg total) by mouth 3 (three) times daily as needed.      HYDROcodone-acetaminophen  MG Oral Tab Take 1 tablet by mouth every 6 (six) hours as needed for Pain.      DULoxetine 60 MG Oral Cap DR Particles Take 1 capsule (60 mg total) by mouth daily.      clonazePAM 0.5 MG Oral Tab Take 3 tablets (1.5 mg total) by mouth 3 (three) times daily as needed for Anxiety. 60 tablet 0    magnesium oxide 400 MG Oral Tab Take 1 tablet (400 mg total) by mouth daily. 30 tablet 0    [] meclizine 25 MG Oral Tab Take 1 tablet (25 mg total) by mouth daily as needed for Dizziness. 10 tablet 0    apixaban 5 MG Oral Tab Take 1 tablet (5 mg total) by mouth 2 (two) times daily.      QUEtiapine 100 MG Oral Tab Take 2 tablets (200 mg total) by mouth nightly.      [] clonazePAM 0.5 MG Oral Tab Take 3 tablets (1.5 mg total) by mouth in the morning, at noon, and at bedtime.      insulin glargine 100 UNIT/ML Subcutaneous Solution Inject 6 Units into the skin nightly.      sodium chloride 0.65 % Nasal Solution 1 spray every 3 (three) hours as needed. (Patient not taking: Reported on 10/15/2024)      insulin aspart 100 Units/mL Subcutaneous Solution Pen-injector Inject 10 Units into the skin 3 (three) times daily with meals.      phenazopyridine 100 MG Oral Tab Take 1 tablet (100 mg total) by mouth 3 (three) times daily as needed for Pain. 5 tablet 0    Cranberry 500  MG Oral Cap 1 capsule PO daily (Patient not taking: Reported on 10/15/2024)      Insulin Pen Needle 32G X 4 MM Does not apply Misc May substitute if not on insurance formulary 100 each 5    lactulose 20 GM/30ML Oral Solution Take 30 mL (20 g total) by mouth every 6 (six) hours as needed (constipation). (Patient not taking: Reported on 10/15/2024)      metoprolol tartrate 25 MG Oral Tab Take 1 tablet (25 mg total) by mouth 2 (two) times daily.      traZODone 50 MG Oral Tab Take 1 tablet (50 mg total) by mouth nightly as needed for Sleep.      Nystatin 057571 UNIT/GM External Powder Apply 1 Application topically as needed for Dry Skin. Under breast and groin area      glucagon (BAQSIMI ONE PACK) 3 MG/DOSE Nasal nasal powder 3 mg by Nasal route once as needed for Low blood glucose. (Patient not taking: Reported on 10/15/2024)      acetaminophen 325 MG Oral Tab Take 2 tablets (650 mg total) by mouth every 6 (six) hours as needed for Pain.      Icosapent Ethyl 1 g Oral Cap Take 2 capsules by mouth 4 (four) times daily.      Melatonin 3 MG Oral Cap Take 1 capsule (3 mg total) by mouth at bedtime.      ergocalciferol 1.25 MG (27537 UT) Oral Cap Take 1 capsule (50,000 Units total) by mouth every 7 days. Every monday      bethanechol 10 MG Oral Tab Take 1 tablet (10 mg total) by mouth daily.      albuterol 108 (90 Base) MCG/ACT Inhalation Aero Soln Inhale 2 puffs into the lungs every 6 (six) hours as needed for Wheezing or Shortness of Breath.      Insulin Aspart 100 UNIT/ML Subcutaneous Solution Cartridge SS: 150-200 2units, 201-250 4 units, 251-300 6 units, 301-350 8 units, 351-400 10 units, over 401 call md (Patient not taking: Reported on 10/15/2024) 3 mL 0    Multiple Vitamins-Minerals (THERA-M) Oral Tab Take 1 tablet by mouth daily.      divalproex Sodium  MG Oral Tablet 24 Hr Take 1 tablet (500 mg total) by mouth 2 (two) times daily.      ondansetron 4 MG Oral Tablet Dispersible Take 1 tablet (4 mg total) by  mouth every 8 (eight) hours as needed for Nausea.      metFORMIN  MG Oral Tablet 24 Hr Take 2 tablets (1,000 mg total) by mouth 2 (two) times daily with meals. 30 tablet 0

## 2024-10-17 NOTE — CM/SW NOTE
10/17/24 1000   CM/SW Referral Data   Referral Source    Reason for Referral Discharge planning   Informant Patient   Readmission Assessment   Factors that patient feels contributed to this readmission Acute/Chronic Clinical Presentation   Pt's living situation prior to admission? Long term care facility  ([facility])   Patient Info   Patient's Current Mental Status at Time of Assessment Alert;Oriented   Choice of Post-Acute Provider   Informed patient of right to choose their preferred provider Yes     CM self referred to case for discharge planning.    Pt is a 53 year old female admitted from [facility] with pain in her suprapubic region.  Pt has suprapubic catheter in place, hx neurogenic bladder, transverse myelitis.    Met w/pt at the bedside and she reports she recently admitted to [facility] from [facility] in [place] where she was a LTC resident.    She is agreeable to return to [facility] at discharge.    Sent updates to [facility] via [employer] and confirmed w/liaison that pt is admitted under Medicaid    / to remain available for support and/or discharge planning.     Barbara Olsen MBA MSN, RN CTL/  [id number]

## 2024-10-17 NOTE — ED QUICK NOTES
Per note from [facility] during patient's last admission there, 5 days ago: \"Dysuria, perineal pain  Chronic suprapubic catheter, being treated for cystitis (levaquin), PTA  Leukocytosis  -Leukocytosis improved immediately following admission, was likely related to dehydration  -ID evaluated patient, well known to their service, recommended against any antibiotics, did not feel that patient had active infection  -Urine culture grew Candida, ID did not believe patient required treatment for this, likely colonization of catheter  -Continue pyridium.\"

## 2024-10-17 NOTE — CONSULTS
INFECTIOUS DISEASE CONSULTATION    Clari Rosa Patient Status:  Observation    1970 MRN [id number]   [facility] 5NW-A Attending Jay Napoles MD   Hosp Day # 0 PCP Brian Bob MD       Requested by Dr. Napoles    Reason for Consultation:  Suprapubic catheter, bacteruria  Possible cystitis      History of Present Illness:  Clari Rosa is a a(n) 53 year old female sent to ED from NH due to pain with SP catheter change. No fever.  CT overnight shows decompressed bladder and mild bladder thickening, no stones, mild left hydro.      History:  Past Medical History:    Bipolar 1 disorder (HCC)    Dental caries    Diabetes (HCC)    MIKE (generalized anxiety disorder)    High blood pressure    History of diverticulitis of colon    Manipulative behavior    Neurogenic bladder    Obesity (BMI 30-39.9)    Paraplegia (HCC)    Sepsis following intra-abdominal surgery (HCC)    Serotonin syndrome    Sleep apnea    Suprapubic catheter (HCC)    Transverse myelitis (HCC)     Past Surgical History:   Procedure Laterality Date    Arthroscopy of joint unlisted      knee          Cholecystectomy      Part removal colon w end colostomy      2013    Tonsillectomy       Family History   Adopted: Yes      reports that she has quit smoking. Her smoking use included cigarettes. She has a 5 pack-year smoking history. She does not have any smokeless tobacco history on file. She reports that she does not drink alcohol and does not use drugs.      Allergies:  Allergies[1]    Medications:    Current Facility-Administered Medications:     acetaminophen (Tylenol Extra Strength) tab 500 mg, 500 mg, Oral, Q4H PRN    melatonin tab 3 mg, 3 mg, Oral, Nightly PRN    polyethylene glycol (PEG 3350) (Miralax) 17 g oral packet 17 g, 17 g, Oral, Daily PRN    sennosides (Senokot) tab 17.2 mg, 17.2 mg, Oral, Nightly PRN    bisacodyl (Dulcolax) 10 MG rectal suppository 10 mg, 10 mg,  Rectal, Daily PRN    fleet enema (Fleet) rectal enema 133 mL, 1 enema, Rectal, Once PRN    prochlorperazine (Compazine) 10 MG/2ML injection 5 mg, 5 mg, Intravenous, Q8H PRN    benzonatate (Tessalon) cap 200 mg, 200 mg, Oral, TID PRN    glycerin-hypromellose- (Artificial Tears) 0.2-0.2-1 % ophthalmic solution 1 drop, 1 drop, Both Eyes, QID PRN    sodium chloride (Saline Mist) 0.65 % nasal solution 1 spray, 1 spray, Each Nare, Q3H PRN    meropenem (Merrem) 500 mg in sodium chloride 0.9% 100 mL IVPB-MBP, 500 mg, Intravenous, Q8H    glucose (Dex4) 15 GM/59ML oral liquid 15 g, 15 g, Oral, Q15 Min PRN **OR** glucose (Glutose) 40% oral gel 15 g, 15 g, Oral, Q15 Min PRN **OR** glucose-vitamin C (Dex-4) chewable tab 4 tablet, 4 tablet, Oral, Q15 Min PRN **OR** dextrose 50% injection 50 mL, 50 mL, Intravenous, Q15 Min PRN **OR** glucose (Dex4) 15 GM/59ML oral liquid 30 g, 30 g, Oral, Q15 Min PRN **OR** glucose (Glutose) 40% oral gel 30 g, 30 g, Oral, Q15 Min PRN **OR** glucose-vitamin C (Dex-4) chewable tab 8 tablet, 8 tablet, Oral, Q15 Min PRN    morphINE PF 2 MG/ML injection 1 mg, 1 mg, Intravenous, Q4H PRN **OR** morphINE PF 2 MG/ML injection 2 mg, 2 mg, Intravenous, Q4H PRN **OR** morphINE PF 4 MG/ML injection 4 mg, 4 mg, Intravenous, Q4H PRN    insulin aspart (NovoLOG) 100 Units/mL FlexPen 10 Units, 10 Units, Subcutaneous, TID CC    albuterol (Ventolin HFA) 108 (90 Base) MCG/ACT inhaler 2 puff, 2 puff, Inhalation, Q6H PRN    apixaban (Eliquis) tab 5 mg, 5 mg, Oral, BID    bethanechol (Urecholine) tab 10 mg, 10 mg, Oral, Daily    clonazePAM (KlonoPIN) tab 1.5 mg, 1.5 mg, Oral, TID PRN    divalproex ER (Depakote ER) 24 hr tab 500 mg, 500 mg, Oral, BID    DULoxetine (Cymbalta) DR cap 60 mg, 60 mg, Oral, Daily    HYDROcodone-acetaminophen (Norco)  MG per tab 1 tablet, 1 tablet, Oral, Q6H PRN    [Held by provider] Icosapent Ethyl CAPS 2 capsule, 2 capsule, Oral, QID    insulin degludec (Tresiba) 100 units/mL  flextouch 5 Units, 5 Units, Subcutaneous, Nightly    meclizine (Antivert) tab 25 mg, 25 mg, Oral, Daily PRN    metoprolol tartrate (Lopressor) tab 25 mg, 25 mg, Oral, BID    QUEtiapine (SEROquel) tab 200 mg, 200 mg, Oral, Nightly    traZODone (Desyrel) tab 50 mg, 50 mg, Oral, Nightly PRN    sodium chloride 0.9% infusion, , Intravenous, Continuous  Medications Ordered Prior to Encounter[2]    Review of Systems:    A comprehensive 10 point review of systems was completed.  Pertinent positives and negatives noted in the the HPI.      Physical Exam:    General: No acute distress. Alert and oriented x 3.  Vital signs: Temp:  [98 °F (36.7 °C)-98.9 °F (37.2 °C)] 98.6 °F (37 °C)  Pulse:  [64-78] 72  Resp:  [12-18] 18  BP: ()/(61-97) 106/61  SpO2:  [95 %-100 %] 95 %  Body mass index is 37.11 kg/m².  HEENT: Moist mucous membranes. Extraocular muscles are intact.  Neck: No swelling, no masses  Respiratory: Non labored, symmetric exursion  Cardiovascular: No irregularities in rhythm  Abdomen: Soft, nontender, nondistended.   Suprapubic catheter  Musculoskeletal: Full range of motion of all extremities.  No swelling noted.  Joints: no effusions  Skin: No lesions. No erythema, no open wounds    Laboratory Data:  Laboratory data reviewed      Recent Labs   Lab 10/16/24  2152   RBC 4.60   HGB 11.4*   HCT 35.2   MCV 76.5*   MCH 24.8*   MCHC 32.4   RDW 16.2   NEPRELIM 6.31   WBC 12.1*   .0       Recent Labs   Lab 10/16/24  2152   *   BUN 15   CREATSERUM 0.78   CA 9.3   ALB 4.4      K 4.3      CO2 30.0   ALKPHO 68   AST 9   ALT 9*   BILT 0.2*   TP 7.0         Lab Results   Component Value Date    PGLU 122 10/17/2024       Established Problem list:  Patient Active Problem List   Diagnosis    Paresis of lower extremity (HCC)    Transverse myelitis (HCC)    Bipolar 1 disorder (HCC)    Manipulative behavior    History of diverticulitis of colon    Serotonin syndrome    Obesity (BMI 30-39.9)    Dental caries     Type 2 diabetes mellitus without complication, with long-term current use of insulin ([facility])    Chronic suprapubic catheter ([facility])    Bipolar I disorder depressed with atypical features ([facility])    Other acute pulmonary embolism, unspecified whether acute cor pulmonale present ([facility])    Hyperglycemia    Sepsis due to undetermined organism ([facility])    Severe episode of recurrent major depressive disorder, without psychotic features ([facility])    Generalized anxiety disorder with panic attacks    Sepsis due to urinary tract infection ([facility])    Lactic acid acidosis    Constipation, unspecified constipation type    Moderate depressed bipolar I disorder ([facility])    Acute chest pain    Depression, unspecified depression type    Cellulitis    Bipolar disorder, current episode mixed, severe, without psychotic features ([facility])    Chest pain    Acute cystitis without hematuria    Leukocytosis    Leukocytosis, unspecified type    Benign essential hypertension    COPD (chronic obstructive pulmonary disease) ([facility])    Neurogenic bladder    Urinary tract infection associated with catheterization of urinary tract, unspecified indwelling urinary catheter type, initial encounter ([facility])    Weakness generalized       ASSESSMENT/PLAN:  1. SP catheter, admitted with pain with catheter change  No fever or signs of sepsis, CT shows no acute findings  Ho ESBL  -was started on meropenem for possible cystitis  Await micro updates      2. Bipolar disorder  Per hospitlaist    3. DM        Romulo Ingram MD, MD  [facility]  [phone]         [1]   Allergies  Allergen Reactions    Pcn [Penicillins] HIVES    Sulfa Antibiotics HIVES    Radiology Contrast Iodinated Dyes DIZZINESS    Keflex [Cephalexin] UNKNOWN   [2]   Current Facility-Administered Medications on File Prior to Encounter   Medication Dose Route Frequency Provider Last Rate Last Admin    [COMPLETED] magnesium oxide (Mag-Ox) tab 400 mg  400 mg Oral Once Stefan Haque MD    400 mg at 09/20/24 1007    [COMPLETED] ertapenem (Invanz) 1 g in sodium chloride 0.9% 100 mL IVPB-MBP  1 g Intravenous Once Terrell Sparrow  mL/hr at 09/20/24 1542 1 g at 09/20/24 1542    [COMPLETED] heparin (Porcine) 100 Units/mL lock flush 500 Units  5 mL Intracatheter Once Stefan Haque MD   500 Units at 09/20/24 1649    [COMPLETED] LORazepam (Ativan) tab 1 mg  1 mg Oral Once Boyd Lucia MD   1 mg at 09/17/24 1913    [COMPLETED] meclizine (Antivert) tab 25 mg  25 mg Oral Once Boyd Lucia MD   25 mg at 09/17/24 2027    [COMPLETED] sodium chloride 0.9 % IV bolus 2,646 mL  30 mL/kg Intravenous Once Boyd Lucia MD   Stopped at 09/17/24 2345    [COMPLETED] cefTRIAXone (Rocephin) 1 g in sodium chloride 0.9% 100 mL IVPB-ADDV  1 g Intravenous Once Boyd Lucia MD   Stopped at 09/17/24 2332    [COMPLETED] ondansetron (Zofran) 4 MG/2ML injection 4 mg  4 mg Intravenous Once Doc Pradhan MD   4 mg at 09/06/24 0629    [COMPLETED] ondansetron (Zofran) 4 MG/2ML injection 4 mg  4 mg Intravenous Once Doc Pradhan MD   4 mg at 09/06/24 0814    [COMPLETED] heparin (Porcine) 100 Units/mL lock flush 500 Units  5 mL Intravenous Once Doc Pradhan MD   500 Units at 09/06/24 0816    [COMPLETED] magnesium oxide (Mag-Ox) tab 400 mg  400 mg Oral Once Tamara Jackson DO   400 mg at 08/24/24 1001    [COMPLETED] heparin (Porcine) 100 Units/mL lock flush 500 Units  5 mL Intracatheter Once Tamara Jackson DO   500 Units at 08/24/24 1431    [COMPLETED] magnesium oxide (Mag-Ox) tab 400 mg  400 mg Oral Once Tamara Jackson DO   400 mg at 08/23/24 0829    [COMPLETED] magnesium oxide (Mag-Ox) tab 400 mg  400 mg Oral Once Tamara Jackson    400 mg at 08/22/24 0931    [COMPLETED] cefTRIAXone (Rocephin) 2 g in sodium chloride 0.9% 100 mL IVPB-ADDV  2 g Intravenous Once Boyd Orozco  mL/hr at 08/21/24 0115 2 g at 08/21/24 0115    [COMPLETED] sodium chloride 0.9 % IV bolus 2,763 mL  30 mL/kg  Intravenous Once Boyd Orozco  mL/hr at 08/21/24 0027 2,763 mL at 08/21/24 0027    [COMPLETED] magnesium oxide (Mag-Ox) tab 400 mg  400 mg Oral Once Loni Rodriguez MD   400 mg at 08/21/24 0528    [COMPLETED] sodium chloride 0.9 % IV bolus 1,000 mL  1,000 mL Intravenous Once Loni Rodriguez MD 1,000 mL/hr at 08/21/24 0525 1,000 mL at 08/21/24 0525    [COMPLETED] sodium chloride 0.9 % IV bolus 1,000 mL  1,000 mL Intravenous Once Boyd Orozco DO   Stopped at 08/21/24 0051    [COMPLETED] ondansetron (Zofran) 4 MG/2ML injection 4 mg  4 mg Intravenous Once Boyd Orozco DO   4 mg at 08/20/24 2350    [COMPLETED] LORazepam (Ativan) tab 0.5 mg  0.5 mg Oral Once Cristina Stallings MD   0.5 mg at 08/05/24 0729    [COMPLETED] ondansetron (Zofran-ODT) disintegrating tab 4 mg  4 mg Oral Once Cristina Stallings MD   4 mg at 08/05/24 0826    [COMPLETED] vancomycin (Vancocin) cap 125 mg  125 mg Oral Once Cristina Stallings MD   125 mg at 08/05/24 0826    [COMPLETED] apixaban (Eliquis) tab 10 mg  10 mg Oral Once Mahendra Parmar MD   10 mg at 08/04/24 0138    [COMPLETED] heparin sodium lock flush 100 UNIT/ML injection 500 Units  500 Units Intravenous Once Mahendra Parmar MD   500 Units at 08/04/24 0139    [COMPLETED] ondansetron (Zofran-ODT) disintegrating tab 4 mg  4 mg Oral Once Rosario Collins MD   4 mg at 08/04/24 1804    [COMPLETED] clonazePAM (KlonoPIN) tab 0.5 mg  0.5 mg Oral Once Rosario Collins MD   0.5 mg at 08/04/24 2013    [COMPLETED] sodium chloride 0.9 % IV bolus 1,000 mL  1,000 mL Intravenous Once Mahendra Parmar MD   Stopped at 08/04/24 0110    [COMPLETED] droperidol (Inapsine) 2.5 mg/mL injection 1.25 mg  1.25 mg Intravenous Once Mahendra Parmar MD   1.25 mg at 08/03/24 1807    [COMPLETED] dicyclomine (Bentyl) tab 20 mg  20 mg Oral Once Mahendra Parmar MD   20 mg at 08/03/24 1756    [COMPLETED] valproic acid (Depakene) cap 500 mg  500 mg Oral Once Mahendra Parmar MD   500 mg at 08/03/24  1814    [COMPLETED] fosfomycin (Monurol) 3 g oral packet  3 g Oral Once Mahendra Parmar MD   3 g at 08/03/24 2126    [COMPLETED] ceFAZolin (Ancef) 2g in 10mL IV syringe premix  2 g Intravenous Once Evita Mckeon MD   Stopped at 07/23/24 1934    [COMPLETED] aspirin chewable tab 324 mg  324 mg Oral Once Evita Mckeon MD   324 mg at 07/23/24 1928    [COMPLETED] alum-mag hydroxide-simethicone (Maalox) 200-200-20 MG/5ML oral suspension 30 mL  30 mL Oral Once Alyse Kirk MD   30 mL at 07/19/24 0948    [COMPLETED] LORazepam (Ativan) tab 1 mg  1 mg Oral Once Alyse Kirk MD   1 mg at 07/19/24 0949    [COMPLETED] HYDROcodone-acetaminophen (Norco) 5-325 MG per tab 1 tablet  1 tablet Oral Once Alyse Kirk MD   1 tablet at 07/19/24 0949    [COMPLETED] sodium chloride 0.9 % IV bolus 500 mL  500 mL Intravenous Once Alyse Kirk MD   Stopped at 07/19/24 1105    [COMPLETED] sodium chloride 0.9 % IV bolus 500 mL  500 mL Intravenous Once Alyse Kirk MD   Stopped at 07/19/24 1149     Current Outpatient Medications on File Prior to Encounter   Medication Sig Dispense Refill    nystatin 100,000 Units/g External Cream Apply 1 Application topically in the morning, at noon, and at bedtime.      loperamide 2 MG Oral Cap Take 1 capsule (2 mg total) by mouth 4 (four) times daily as needed for Diarrhea.      meclizine 25 MG Oral Tab Take 1 tablet (25 mg total) by mouth 3 (three) times daily as needed.      HYDROcodone-acetaminophen  MG Oral Tab Take 1 tablet by mouth every 6 (six) hours as needed for Pain.      DULoxetine 60 MG Oral Cap DR Particles Take 1 capsule (60 mg total) by mouth daily.      clonazePAM 0.5 MG Oral Tab Take 3 tablets (1.5 mg total) by mouth 3 (three) times daily as needed for Anxiety. 60 tablet 0    magnesium oxide 400 MG Oral Tab Take 1 tablet (400 mg total) by mouth daily. 30 tablet 0    apixaban 5 MG Oral Tab Take 1 tablet (5 mg total) by mouth 2 (two) times daily.       QUEtiapine 100 MG Oral Tab Take 2 tablets (200 mg total) by mouth nightly.      phenazopyridine 100 MG Oral Tab Take 1 tablet (100 mg total) by mouth 3 (three) times daily as needed for Pain. 5 tablet 0    metoprolol tartrate 25 MG Oral Tab Take 1 tablet (25 mg total) by mouth 2 (two) times daily.      traZODone 50 MG Oral Tab Take 1 tablet (50 mg total) by mouth nightly as needed for Sleep.      Nystatin 945166 UNIT/GM External Powder Apply 1 Application topically as needed for Dry Skin. Under breast and groin area      acetaminophen 325 MG Oral Tab Take 2 tablets (650 mg total) by mouth every 6 (six) hours as needed for Pain.      Icosapent Ethyl 1 g Oral Cap Take 2 capsules by mouth 4 (four) times daily.      Melatonin 3 MG Oral Cap Take 1 capsule (3 mg total) by mouth at bedtime.      ergocalciferol 1.25 MG (79720 UT) Oral Cap Take 1 capsule (50,000 Units total) by mouth every 7 days. Every monday      bethanechol 10 MG Oral Tab Take 1 tablet (10 mg total) by mouth daily.      albuterol 108 (90 Base) MCG/ACT Inhalation Aero Soln Inhale 2 puffs into the lungs every 6 (six) hours as needed for Wheezing or Shortness of Breath.      Multiple Vitamins-Minerals (THERA-M) Oral Tab Take 1 tablet by mouth daily.      divalproex Sodium  MG Oral Tablet 24 Hr Take 1 tablet (500 mg total) by mouth 2 (two) times daily.      ondansetron 4 MG Oral Tablet Dispersible Take 1 tablet (4 mg total) by mouth every 8 (eight) hours as needed for Nausea.      metFORMIN  MG Oral Tablet 24 Hr Take 2 tablets (1,000 mg total) by mouth 2 (two) times daily with meals. 30 tablet 0    [] meclizine 25 MG Oral Tab Take 1 tablet (25 mg total) by mouth daily as needed for Dizziness. 10 tablet 0    [] clonazePAM 0.5 MG Oral Tab Take 3 tablets (1.5 mg total) by mouth in the morning, at noon, and at bedtime.      insulin glargine 100 UNIT/ML Subcutaneous Solution Inject 6 Units into the skin nightly.      sodium chloride  0.65 % Nasal Solution 1 spray every 3 (three) hours as needed. (Patient not taking: Reported on 10/15/2024)      insulin aspart 100 Units/mL Subcutaneous Solution Pen-injector Inject 10 Units into the skin 3 (three) times daily with meals.      Cranberry 500 MG Oral Cap 1 capsule PO daily (Patient not taking: Reported on 10/15/2024)      Insulin Pen Needle 32G X 4 MM Does not apply Misc May substitute if not on insurance formulary 100 each 5    lactulose 20 GM/30ML Oral Solution Take 30 mL (20 g total) by mouth every 6 (six) hours as needed (constipation). (Patient not taking: Reported on 10/15/2024)      glucagon (BAQSIMI ONE PACK) 3 MG/DOSE Nasal nasal powder 3 mg by Nasal route once as needed for Low blood glucose. (Patient not taking: Reported on 10/15/2024)      Insulin Aspart 100 UNIT/ML Subcutaneous Solution Cartridge SS: 150-200 2units, 201-250 4 units, 251-300 6 units, 301-350 8 units, 351-400 10 units, over 401 call md (Patient not taking: Reported on 10/15/2024) 3 mL 0

## 2024-10-17 NOTE — ED QUICK NOTES
Orders for admission, patient is aware of plan and ready to go upstairs. Any questions, please call ED RN Annalisa at extension [phone].     Patient Covid vaccination status: Unvaccinated     COVID Test Ordered in ED: Rapid SARS-CoV-2 by PCR    COVID Suspicion at Admission: N/A    Running Infusions:      Mental Status/LOC at time of transport: A&Ox4    Other pertinent information:   CIWA score: N/A   NIH score:  N/A

## 2024-10-17 NOTE — ED PROVIDER NOTES
Patient Seen in: [facility]      History     Chief Complaint   Patient presents with    UTI     Stated Complaint: UTI/pain    Subjective:   HPI      53-year-old female presenting with possible UTI.  Patient states that she states she has another urinary tract infection she was discharged from [facility] she reports over a week ago and at that time she was having urinary symptoms he also had a Florentino catheter or suprapubic catheter replaced and she was painful.  She still having urinary output but she still feels like she has urinary tract infection does not improve over the course of the week.  She denies any sort of documented fevers but she has had chills kidney pain bilaterally she denies any sort of vomiting she denies any other exacerbating factors.  She says she has had like cough she is concerned she may potentially have COVID as well.    Objective:     Past Medical History:    Bipolar 1 disorder (HCC)    Dental caries    Diabetes (HCC)    MIKE (generalized anxiety disorder)    High blood pressure    History of diverticulitis of colon    Manipulative behavior    Neurogenic bladder    Obesity (BMI 30-39.9)    Paraplegia (HCC)    Sepsis following intra-abdominal surgery (HCC)    Serotonin syndrome    Sleep apnea    Suprapubic catheter (HCC)    Transverse myelitis (HCC)              Past Surgical History:   Procedure Laterality Date    Arthroscopy of joint unlisted      knee          Cholecystectomy      Part removal colon w end colostomy      2013    Tonsillectomy                  Social History     Socioeconomic History    Marital status: Single   Tobacco Use    Smoking status: Former     Current packs/day: 1.00     Average packs/day: 1 pack/day for 5.0 years (5.0 ttl pk-yrs)     Types: Cigarettes    Tobacco comments:     quit 2006   Vaping Use    Vaping status: Never Used   Substance and Sexual Activity    Alcohol use: No    Drug use: No     Social Drivers of  Health     Food Insecurity: Low Risk  (10/9/2024)    Received from [facility]    Food Insecurity     Have there been times that your food ran out, and you didn't have money to get more?: No     Are there times that you worry that this might happen?: No   Transportation Needs: Low Risk  (10/9/2024)    Received from [facility]    Transportation Needs     Do you have trouble getting transportation to medical appointments?: No   Housing Stability: Low Risk  (10/9/2024)    Received from [facility]    Housing Stability     Are you worried that your electric, gas, oil, or water might be shut off?: No     Are you concerned about having a safe and reliable place to live?: Yes   Recent Concern: Housing Stability - High Risk (10/9/2024)    Received from [facility]    Housing Stability     Are you concerned about having a safe and reliable place to live?: Yes                  Physical Exam     ED Triage Vitals [10/16/24 2018]   /66   Pulse 75   Resp 12   Temp 98.7 °F (37.1 °C)   Temp src Oral   SpO2 100 %   O2 Device None (Room air)       Current Vitals:   Vital Signs  BP: 123/66  Pulse: 73  Resp: 15  Temp: 98.7 °F (37.1 °C)  Temp src: Oral    Oxygen Therapy  SpO2: 99 %  O2 Device: None (Room air)        Physical Exam  Awake alert patient appears anxious HEENT exam normal lungs were clear cardiovascular exam regular rhythm abdomen soft nontender suprapubic catheter no surrounding erythema or discharge.  Extremities no COVID cyanosis or edema no rash    ED Course     Labs Reviewed   URINALYSIS, ROUTINE - Abnormal; Notable for the following components:       Result Value    Ketones Urine 10 (*)     Blood Urine 2+ (*)     Protein Urine 50 (*)     Leukocyte Esterase Urine 250 (*)     WBC Urine 21-50 (*)     RBC Urine >10 (*)     All other components within normal limits   COMP METABOLIC PANEL (14) - Abnormal; Notable for the following  components:    Glucose 118 (*)     ALT 9 (*)     Bilirubin, Total 0.2 (*)     All other components within normal limits   CBC WITH DIFFERENTIAL WITH PLATELET - Abnormal; Notable for the following components:    WBC 12.1 (*)     HGB 11.4 (*)     MCV 76.5 (*)     MCH 24.8 (*)     Lymphocyte Absolute 4.79 (*)     All other components within normal limits   RAPID SARS-COV-2 BY PCR - Normal            Differential diagnosis includes sepsis, pyelonephritis       [facility]          Admission disposition: 10/16/2024 11:15 PM           Medical Decision Making  53-year-old female presenting with possible UTI.  IV established cardiac monitor shows a sinus rhythm pulse ox shows no signs of hypoxia.  White cell count is not elevated metabolic panel shows stable renal function urinalysis shows signs of possible urinary tract infection CT scan shows hydroureter ureteral nephrosis and signs of cystitis.  Patient initially ordered Levaquin is what she used to be on but after looking at old lab reports most recently from September of this past year September 17 specifically patient had ESBL multidrug-resistant as well as Serratia.  Patient was ordered meropenem and will be admitted at this time    Problems Addressed:  Urinary tract infection associated with catheterization of urinary tract, unspecified indwelling urinary catheter type, initial encounter (HCC): acute illness or injury  Weakness generalized: acute illness or injury    Amount and/or Complexity of Data Reviewed  External Data Reviewed: labs, radiology and notes.     Details: Reviewed urinalysis, urine culture, CT scan as well as culture results showing history of ESBL and Serratia multidrug-resistant from most recently from September 17 of this year  Labs: ordered. Decision-making details documented in ED Course.  Radiology: ordered and independent interpretation performed. Decision-making details documented in ED Course.  ECG/medicine tests: ordered and independent  interpretation performed. Decision-making details documented in ED Course.    Risk  Decision regarding hospitalization.        Disposition and Plan     Clinical Impression:  1. Urinary tract infection associated with catheterization of urinary tract, unspecified indwelling urinary catheter type, initial encounter ([facility])    2. Weakness generalized         Disposition:  Admit  10/16/2024 11:15 pm    Follow-up:  No follow-up provider specified.        Medications Prescribed:  Current Discharge Medication List              Supplementary Documentation:         Hospital Problems       Present on Admission  Date Reviewed: 10/16/2024            ICD-10-CM Noted POA    * (Principal) Urinary tract infection associated with catheterization of urinary tract, unspecified indwelling urinary catheter type, initial encounter ([facility]) T83.511A, N39.0 10/16/2024 Unknown

## 2024-10-17 NOTE — CONSULTS
[facility]  Department of Urology   Consultation Note    Clari Rosa Patient Status:  Observation    1970 MRN [id number]   Location [facility] 5NW-A Attending Jay Napoles MD   Hosp Day # 0 PCP Brian Bob MD     Reason for Consultation:  Abdominal/back pain  Left hydroureteronephrosis  Chronic SPT    History of Present Illness:  Clari Rosa is a a(n) 53 year old female with PMHX neurogenic bladder (s/p suprapubic cath)/ DM/ PE/ COPD/ HTN/ ANAID/ transverse myelitis.    Patient reports she was screaming in pain with exchange of SPT yesterday.  +back pain bilaterally.  No nausea, vomiting, fever, or chills.     Reports she does have leakage from urethra - not new.      Labs:  WBC 12.1  Hgb 11.4  Platelet count 296  Serum creat 0.78    UA 10/16/24: 21-50 WBC/hpf, >10 RBC/hpf, no bacteria  Urine culture 10/16/24: pending    Rapid SARS-CoV-2 by PCR 10/16/24: not detected    Abdominal/pelvic CT scan without contrast 10/16/24:  Suprapubic catheterization of the urinary bladder.  The bladder is decompressed.    Moderate hydroureteronephrosis of the left collecting system.  No obstructing urinary calculi identified.    Urology was consulted.       History:  Past Medical History:    Bipolar 1 disorder (HCC)    Dental caries    Diabetes (HCC)    MIKE (generalized anxiety disorder)    High blood pressure    History of diverticulitis of colon    Manipulative behavior    Neurogenic bladder    Obesity (BMI 30-39.9)    Paraplegia (HCC)    Sepsis following intra-abdominal surgery (HCC)    Serotonin syndrome    Sleep apnea    Suprapubic catheter (HCC)    Transverse myelitis (HCC)     Past Surgical History:   Procedure Laterality Date    Arthroscopy of joint unlisted      knee          Cholecystectomy      Part removal colon w end colostomy      2013    Tonsillectomy       Family History   Adopted: Yes      reports that she has quit smoking. Her smoking use included cigarettes. She has  a 5 pack-year smoking history. She does not have any smokeless tobacco history on file. She reports that she does not drink alcohol and does not use drugs.    Allergies:  Allergies[1]    Medications:    Current Facility-Administered Medications:     acetaminophen (Tylenol Extra Strength) tab 500 mg, 500 mg, Oral, Q4H PRN    melatonin tab 3 mg, 3 mg, Oral, Nightly PRN    polyethylene glycol (PEG 3350) (Miralax) 17 g oral packet 17 g, 17 g, Oral, Daily PRN    sennosides (Senokot) tab 17.2 mg, 17.2 mg, Oral, Nightly PRN    bisacodyl (Dulcolax) 10 MG rectal suppository 10 mg, 10 mg, Rectal, Daily PRN    fleet enema (Fleet) rectal enema 133 mL, 1 enema, Rectal, Once PRN    prochlorperazine (Compazine) 10 MG/2ML injection 5 mg, 5 mg, Intravenous, Q8H PRN    benzonatate (Tessalon) cap 200 mg, 200 mg, Oral, TID PRN    glycerin-hypromellose- (Artificial Tears) 0.2-0.2-1 % ophthalmic solution 1 drop, 1 drop, Both Eyes, QID PRN    sodium chloride (Saline Mist) 0.65 % nasal solution 1 spray, 1 spray, Each Nare, Q3H PRN    meropenem (Merrem) 500 mg in sodium chloride 0.9% 100 mL IVPB-MBP, 500 mg, Intravenous, Q8H    glucose (Dex4) 15 GM/59ML oral liquid 15 g, 15 g, Oral, Q15 Min PRN **OR** glucose (Glutose) 40% oral gel 15 g, 15 g, Oral, Q15 Min PRN **OR** glucose-vitamin C (Dex-4) chewable tab 4 tablet, 4 tablet, Oral, Q15 Min PRN **OR** dextrose 50% injection 50 mL, 50 mL, Intravenous, Q15 Min PRN **OR** glucose (Dex4) 15 GM/59ML oral liquid 30 g, 30 g, Oral, Q15 Min PRN **OR** glucose (Glutose) 40% oral gel 30 g, 30 g, Oral, Q15 Min PRN **OR** glucose-vitamin C (Dex-4) chewable tab 8 tablet, 8 tablet, Oral, Q15 Min PRN    morphINE PF 2 MG/ML injection 1 mg, 1 mg, Intravenous, Q4H PRN **OR** morphINE PF 2 MG/ML injection 2 mg, 2 mg, Intravenous, Q4H PRN **OR** [DISCONTINUED] morphINE PF 4 MG/ML injection 4 mg, 4 mg, Intravenous, Q4H PRN    insulin aspart (NovoLOG) 100 Units/mL FlexPen 10 Units, 10 Units, Subcutaneous,  TID CC    albuterol (Ventolin HFA) 108 (90 Base) MCG/ACT inhaler 2 puff, 2 puff, Inhalation, Q6H PRN    apixaban (Eliquis) tab 5 mg, 5 mg, Oral, BID    bethanechol (Urecholine) tab 10 mg, 10 mg, Oral, Daily    clonazePAM (KlonoPIN) tab 1.5 mg, 1.5 mg, Oral, TID PRN    divalproex ER (Depakote ER) 24 hr tab 500 mg, 500 mg, Oral, BID    DULoxetine (Cymbalta) DR cap 60 mg, 60 mg, Oral, Daily    HYDROcodone-acetaminophen (Norco)  MG per tab 1 tablet, 1 tablet, Oral, Q6H PRN    [Held by provider] Icosapent Ethyl CAPS 2 capsule, 2 capsule, Oral, QID    insulin degludec (Tresiba) 100 units/mL flextouch 5 Units, 5 Units, Subcutaneous, Nightly    meclizine (Antivert) tab 25 mg, 25 mg, Oral, Daily PRN    metoprolol tartrate (Lopressor) tab 25 mg, 25 mg, Oral, BID    QUEtiapine (SEROquel) tab 200 mg, 200 mg, Oral, Nightly    traZODone (Desyrel) tab 50 mg, 50 mg, Oral, Nightly PRN    sodium chloride 0.9% infusion, , Intravenous, Continuous    phenazopyridine (Pyridium) tab 100 mg, 100 mg, Oral, TID CC    miconazole 2 % powder, , Topical, BID    Review of Systems:  Pertinent items are noted in HPI.  10 point review of systems completed.    Physical Exam:  /61 (BP Location: Left arm)   Pulse 72   Temp 98.6 °F (37 °C) (Oral)   Resp 18   Ht 5' (1.524 m)   Wt 190 lb (86.2 kg)   SpO2 95%   BMI 37.11 kg/m²   GENERAL: the patient is resting in bed in no acute distress.    HEENT: Unremarkable.  NECK: Supple.   LUNGS: non-labored respirations.   ABDOMEN:  The abdomen is soft and nontender.   SPT in place draining yellow urine       Laboratory Data:  Lab Results   Component Value Date    WBC 12.1 10/16/2024    HGB 11.4 10/16/2024    HCT 35.2 10/16/2024    .0 10/16/2024    CREATSERUM 0.78 10/16/2024    BUN 15 10/16/2024     10/16/2024    K 4.3 10/16/2024     10/16/2024    CO2 30.0 10/16/2024     10/16/2024    CA 9.3 10/16/2024    ALB 4.4 10/16/2024    ALKPHO 68 10/16/2024    BILT 0.2 10/16/2024     TP 7.0 10/16/2024    AST 9 10/16/2024    ALT 9 10/16/2024    PGLU 122 10/17/2024      UA 10/16/24: 21-50 WBC/hpf, >10 RBC/hpf, no bacteria  Urine culture 10/16/24: pending    Rapid SARS-CoV-2 by PCR 10/16/24: not detected    Imaging:  CT ABDOMEN+PELVIS KIDNEYSTONE 2D RNDR(NO IV,NO ORAL)(CPT=74176)    Result Date: 10/16/2024  CONCLUSION:   1. Suprapubic catheterization of the urinary bladder.  The bladder is decompressed, though there is mild concentric bladder wall thickening and perivesicular stranding indicating cystitis.  Recommend correlation with urinalysis.  2. Moderate hydroureteronephrosis of the left collecting system.  No obstructing urinary calculi identified.  This may reflect chronic obstruction at the UVJ given advanced cortical atrophy of the left kidney.     LOCATION:  [facility]   Dictated by (CST): Sam Ren MD on 10/16/2024 at 11:03 PM     Finalized by (CST): Sam Ren MD on 10/16/2024 at 11:08 PM       Impression:  Patient Active Problem List   Diagnosis    Paresis of lower extremity (HCC)    Transverse myelitis (HCC)    Bipolar 1 disorder (HCC)    Manipulative behavior    History of diverticulitis of colon    Serotonin syndrome    Obesity (BMI 30-39.9)    Dental caries    Type 2 diabetes mellitus without complication, with long-term current use of insulin (HCC)    Chronic suprapubic catheter (HCC)    Bipolar I disorder depressed with atypical features (HCC)    Other acute pulmonary embolism, unspecified whether acute cor pulmonale present (HCC)    Hyperglycemia    Sepsis due to undetermined organism (HCC)    Severe episode of recurrent major depressive disorder, without psychotic features (HCC)    Generalized anxiety disorder with panic attacks    Sepsis due to urinary tract infection (HCC)    Lactic acid acidosis    Constipation, unspecified constipation type    Moderate depressed bipolar I disorder (HCC)    Acute chest pain    Depression, unspecified depression type    Cellulitis    Bipolar  disorder, current episode mixed, severe, without psychotic features ([facility])    Chest pain    Acute cystitis without hematuria    Leukocytosis    Leukocytosis, unspecified type    Benign essential hypertension    COPD (chronic obstructive pulmonary disease) ([facility])    Neurogenic bladder    Urinary tract infection associated with catheterization of urinary tract, unspecified indwelling urinary catheter type, initial encounter ([facility])    Weakness generalized     ABDOMINAL PAIN/BACK PAIN  LEFT HYDROURETERONEPHROSIS  Afebrile, VSS  WBC 12.1  Hgb 11.4  Platelet count 296  Serum creat 0.78  UA 10/16/24: 21-50 WBC/hpf, >10 RBC/hpf, no bacteria  Urine culture 10/16/24: pending  Rapid SARS-CoV-2 by PCR 10/16/24: not detected    NGB  -managed with SPT    Recommendations:  -Dr. Óscar Holt reviewed CT scan - new left hydroureteronephrosis.  -Check final urine culture  -Abx per ID  -follow labs, temp  -Pain management  -Consideration for cystoscopy/left ureteral stent placement pending clinical course.  -Repeat renal US in a few days    Above discussed with patient, Dr. Holt.    Thank you for allowing me to participate in the care of your patient.    Fannie Velez PA-C  [facility]  Department of Urology  10/17/2024  10:48 AM         [1]   Allergies  Allergen Reactions    Pcn [Penicillins] HIVES    Sulfa Antibiotics HIVES    Radiology Contrast Iodinated Dyes DIZZINESS    Keflex [Cephalexin] UNKNOWN

## 2024-10-18 LAB
ANION GAP SERPL CALC-SCNC: 9 MMOL/L (ref 0–18)
BASOPHILS # BLD AUTO: 0.03 X10(3) UL (ref 0–0.2)
BASOPHILS NFR BLD AUTO: 0.3 %
BUN BLD-MCNC: 15 MG/DL (ref 9–23)
CALCIUM BLD-MCNC: 9.4 MG/DL (ref 8.7–10.4)
CHLORIDE SERPL-SCNC: 107 MMOL/L (ref 98–112)
CO2 SERPL-SCNC: 27 MMOL/L (ref 21–32)
CREAT BLD-MCNC: 0.79 MG/DL
EGFRCR SERPLBLD CKD-EPI 2021: 89 ML/MIN/1.73M2 (ref 60–?)
EOSINOPHIL # BLD AUTO: 0.34 X10(3) UL (ref 0–0.7)
EOSINOPHIL NFR BLD AUTO: 3.8 %
ERYTHROCYTE [DISTWIDTH] IN BLOOD BY AUTOMATED COUNT: 16.5 %
GLUCOSE BLD-MCNC: 136 MG/DL (ref 70–99)
GLUCOSE BLD-MCNC: 186 MG/DL (ref 70–99)
GLUCOSE BLD-MCNC: 188 MG/DL (ref 70–99)
GLUCOSE BLD-MCNC: 192 MG/DL (ref 70–99)
GLUCOSE BLD-MCNC: 207 MG/DL (ref 70–99)
GLUCOSE BLD-MCNC: 220 MG/DL (ref 70–99)
HCT VFR BLD AUTO: 33.6 %
HGB BLD-MCNC: 10.3 G/DL
IMM GRANULOCYTES # BLD AUTO: 0.05 X10(3) UL (ref 0–1)
IMM GRANULOCYTES NFR BLD: 0.6 %
LYMPHOCYTES # BLD AUTO: 2.79 X10(3) UL (ref 1–4)
LYMPHOCYTES NFR BLD AUTO: 31.1 %
MCH RBC QN AUTO: 24.5 PG (ref 26–34)
MCHC RBC AUTO-ENTMCNC: 30.7 G/DL (ref 31–37)
MCV RBC AUTO: 79.8 FL
MONOCYTES # BLD AUTO: 0.37 X10(3) UL (ref 0.1–1)
MONOCYTES NFR BLD AUTO: 4.1 %
NEUTROPHILS # BLD AUTO: 5.38 X10 (3) UL (ref 1.5–7.7)
NEUTROPHILS # BLD AUTO: 5.38 X10(3) UL (ref 1.5–7.7)
NEUTROPHILS NFR BLD AUTO: 60.1 %
OSMOLALITY SERPL CALC.SUM OF ELEC: 302 MOSM/KG (ref 275–295)
PLATELET # BLD AUTO: 239 10(3)UL (ref 150–450)
POTASSIUM SERPL-SCNC: 4 MMOL/L (ref 3.5–5.1)
RBC # BLD AUTO: 4.21 X10(6)UL
SODIUM SERPL-SCNC: 143 MMOL/L (ref 136–145)
TROPONIN I SERPL HS-MCNC: <3 NG/L
WBC # BLD AUTO: 9 X10(3) UL (ref 4–11)

## 2024-10-18 PROCEDURE — 99233 SBSQ HOSP IP/OBS HIGH 50: CPT | Performed by: HOSPITALIST

## 2024-10-18 RX ORDER — LOPERAMIDE HYDROCHLORIDE 2 MG/1
2 CAPSULE ORAL 4 TIMES DAILY PRN
Status: DISCONTINUED | OUTPATIENT
Start: 2024-10-18 | End: 2024-10-20

## 2024-10-18 RX ORDER — MAGNESIUM HYDROXIDE/ALUMINUM HYDROXICE/SIMETHICONE 120; 1200; 1200 MG/30ML; MG/30ML; MG/30ML
30 SUSPENSION ORAL 4 TIMES DAILY PRN
Status: DISCONTINUED | OUTPATIENT
Start: 2024-10-18 | End: 2024-10-18

## 2024-10-18 RX ORDER — SODIUM CHLORIDE 9 MG/ML
INJECTION, SOLUTION INTRAVENOUS CONTINUOUS
Status: DISCONTINUED | OUTPATIENT
Start: 2024-10-18 | End: 2024-10-19

## 2024-10-18 RX ORDER — MAGNESIUM HYDROXIDE/ALUMINUM HYDROXICE/SIMETHICONE 120; 1200; 1200 MG/30ML; MG/30ML; MG/30ML
30 SUSPENSION ORAL ONCE
Status: COMPLETED | OUTPATIENT
Start: 2024-10-18 | End: 2024-10-18

## 2024-10-18 RX ORDER — PETROLATUM,WHITE/LANOLIN
OINTMENT (GRAM) TOPICAL AS NEEDED
Status: DISCONTINUED | OUTPATIENT
Start: 2024-10-18 | End: 2024-10-20

## 2024-10-18 NOTE — PHYSICAL THERAPY NOTE
PT orders received, chart reviewed. Per previous notes, patient is a LTC resident at [facility], complete care and bedbound since 2016. PT will complete current orders. Anticipate return to LTC is recommended as medical progress allows. SW updated.

## 2024-10-18 NOTE — OCCUPATIONAL THERAPY NOTE
OT orders received, chart reviewed. Per previous notes, patient is a LTC resident at [facility], complete care and bedbound since 2016. OT will complete current orders and sign off.

## 2024-10-18 NOTE — PROGRESS NOTES
[facility]  Urology Progress Note    Clari Rosa Patient Status:  Observation    1970 MRN [id number]   Location [facility] 5NW-A Attending Jay Napoles MD   Hosp Day # 0 PCP Brian Bob MD     Subjective:  Clari Rosa is a(n) 53 year old female.    Current complaints: Reports abdominal/back pain. Increasing pain with movement of catheter.  No fever.      Objective:  General appearance: alert, appears stated age, and cooperative  Blood pressure 139/78, pulse 76, temperature 98.2 °F (36.8 °C), temperature source Oral, resp. rate 16, height 5' (1.524 m), weight 190 lb (86.2 kg), SpO2 91%.  Lungs: non-labored respirations  Abdomen: soft, non-tender  SPT in place draining draining pyridium tinged urine (UOP - 1050 mL/24 hours)  Lab Results   Component Value Date    PGLU 207 10/18/2024       No results found for this visit on 10/16/24.     CT ABDOMEN+PELVIS KIDNEYSTONE 2D RNDR(NO IV,NO ORAL)(CPT=74176)    Result Date: 10/16/2024  CONCLUSION:   1. Suprapubic catheterization of the urinary bladder.  The bladder is decompressed, though there is mild concentric bladder wall thickening and perivesicular stranding indicating cystitis.  Recommend correlation with urinalysis.  2. Moderate hydroureteronephrosis of the left collecting system.  No obstructing urinary calculi identified.  This may reflect chronic obstruction at the UVJ given advanced cortical atrophy of the left kidney.     LOCATION:  [place]   Dictated by (CST): Sam Ren MD on 10/16/2024 at 11:03 PM     Finalized by (CST): Sam Ren MD on 10/16/2024 at 11:08 PM         Assessment:    ABDOMINAL PAIN/BACK PAIN  LEFT HYDROURETERONEPHROSIS  Afebrile, VSS  WBC 12.1  Hgb 11.4  Platelet count 296  Serum creat 0.78  Repeat labs to be collected  UA 10/16/24: 21-50 WBC/hpf, >10 RBC/hpf, no bacteria  Urine culture 10/16/24: pending  Rapid SARS-CoV-2 by PCR 10/16/24: not detected    Plan:    Check final cultures  Abx per ID  Follow labs, temp,   UOP  Discussed option of cystoscopy/left ureteral stent.  Reviewed risks, benefits, alternatives, and complications of the procedure including but  not limited to risk of anesthesia, bladder/ureteral injury, use of nephrostomy tube, bleeding, infection/worsening infection, and ureteral stent related symptoms with the patient.  Patient informed the ureteral stent is not a permanent implant and would eventually need to be removed.  Will observe at this time.  Future repeat imaging    Above discussed with patient, nurse, Dr. Day.    Fannie Velez PA-C  [facility]  Department of Urology  10/18/2024  7:17 AM

## 2024-10-18 NOTE — PLAN OF CARE
Alert x4. Room air-2LNC. NSR on tele. Briefed, suprapubic catheter in place. Accuchecks QID- insulin coverage provided. Bed bound. Updated on POC, all questions or concerns  addressed to understanding. IV abx. IVF infusing. Call light within reach.    Problem: PAIN - ADULT  Goal: Verbalizes/displays adequate comfort level or patient's stated pain goal  Description: INTERVENTIONS:  - Encourage pt to monitor pain and request assistance  - Assess pain using appropriate pain scale  - Administer analgesics based on type and severity of pain and evaluate response  - Implement non-pharmacological measures as appropriate and evaluate response  - Consider cultural and social influences on pain and pain management  - Manage/alleviate anxiety  - Utilize distraction and/or relaxation techniques  - Monitor for opioid side effects  - Notify MD/LIP if interventions unsuccessful or patient reports new pain  - Anticipate increased pain with activity and pre-medicate as appropriate  Outcome: Progressing     Problem: RISK FOR INFECTION - ADULT  Goal: Absence of fever/infection during anticipated neutropenic period  Description: INTERVENTIONS  - Monitor WBC  - Administer growth factors as ordered  - Implement neutropenic guidelines  Outcome: Progressing     Problem: SAFETY ADULT - FALL  Goal: Free from fall injury  Description: INTERVENTIONS:  - Assess pt frequently for physical needs  - Identify cognitive and physical deficits and behaviors that affect risk of falls.  - [place] fall precautions as indicated by assessment.  - Educate pt/family on patient safety including physical limitations  - Instruct pt to call for assistance with activity based on assessment  - Modify environment to reduce risk of injury  - Provide assistive devices as appropriate  - Consider OT/PT consult to assist with strengthening/mobility  - Encourage toileting schedule  Outcome: Progressing     Problem: DISCHARGE PLANNING  Goal: Discharge to home or other  facility with appropriate resources  Description: INTERVENTIONS:  - Identify barriers to discharge w/pt and caregiver  - Include patient/family/discharge partner in discharge planning  - Arrange for needed discharge resources and transportation as appropriate  - Identify discharge learning needs (meds, wound care, etc)  - Arrange for interpreters to assist at discharge as needed  - Consider post-discharge preferences of patient/family/discharge partner  - Complete POLST form as appropriate  - Assess patient's ability to be responsible for managing their own health  - Refer to Case Management Department for coordinating discharge planning if the patient needs post-hospital services based on physician/LIP order or complex needs related to functional status, cognitive ability or social support system  Outcome: Progressing

## 2024-10-18 NOTE — PROGRESS NOTES
Pt is a&ox4. L port c/d/I/.  on RA. NSR on tele. Eliquis. IV abx. IVF's. Suprapubic catheter in place. Pt c/o pain, meds given per MAR. Pt c/o of having too many bm's and asking for meds to help- MD notified, PRN meds ordered.   Tolerating diet w/ QID accuchecks. Pt updated on poc. No further needs at this time.

## 2024-10-18 NOTE — PROGRESS NOTES
[facility]   part of [facility]     Hospitalist Progress Note     Clari Rosa Patient Status:  Observation    1970 MRN [id number]   Location [facility] 5NW-A Attending Jay Napoles MD   Hosp Day # 0 PCP Brian Bob MD     Chief Complaint: UTI    Subjective:   Patient complains of pain. No nausea or vomiting. + UOP + BM    Current medications:   insulin aspart  1-68 Units Subcutaneous TID CC    meropenem  500 mg Intravenous Q8H    apixaban  5 mg Oral BID    bethanechol  10 mg Oral Daily    divalproex ER  500 mg Oral BID    DULoxetine  60 mg Oral Daily    [Held by provider] Icosapent Ethyl  2 capsule Oral QID    insulin degludec  5 Units Subcutaneous Nightly    metoprolol tartrate  25 mg Oral BID    QUEtiapine  200 mg Oral Nightly    phenazopyridine  100 mg Oral TID CC    miconazole   Topical BID    insulin aspart  1-10 Units Subcutaneous TID AC and HS       Objective:    Review of Systems:   10 point ROS completed and was negative, except for pertinent positive and negatives stated in subjective.    Vital signs:  Temp:  [98.2 °F (36.8 °C)-99.1 °F (37.3 °C)] 98.5 °F (36.9 °C)  Pulse:  [] 74  Resp:  [16-18] 18  BP: ()/(58-78) 99/59  SpO2:  [86 %-100 %] 100 %  Patient Weight for the past 72 hrs:   Weight   10/16/24 2018 190 lb (86.2 kg)   10/17/24 0015 190 lb (86.2 kg)     Physical Exam:    General: No acute distress.   Respiratory: Clear to auscultation bilaterally.   Cardiovascular: S1, S2. Regular rate and rhythm.   Abdomen: Soft, nontender, nondistended.  Positive bowel sounds.   Extremities: No edema.  Neuro: AAOx3    Diagnostic Data:    Labs:  Recent Labs   Lab 10/16/24  2152 10/18/24  0737   WBC 12.1* 9.0   HGB 11.4* 10.3*   MCV 76.5* 79.8*   .0 239.0       Recent Labs   Lab 10/16/24  2152 10/18/24  0737   * 192*   BUN 15 15   CREATSERUM 0.78 0.79   CA 9.3 9.4   ALB 4.4  --     143   K 4.3 4.0    107   CO2 30.0 27.0   ALKPHO 68  --    AST 9  --     ALT 9*  --    BILT 0.2*  --    TP 7.0  --        Estimated Creatinine Clearance: 59.2 mL/min (based on SCr of 0.79 mg/dL).    No results for input(s): \"PTP\", \"INR\" in the last 168 hours.         COVID-19 Lab Results    COVID-19  Lab Results   Component Value Date    COVID19 Not Detected 10/16/2024    COVID19 Not Detected 08/04/2024    COVID19 Not Detected 08/03/2024       Pro-Calcitonin  No results for input(s): \"PCT\" in the last 168 hours.    Cardiac  No results for input(s): \"TROP\", \"PBNP\" in the last 168 hours.    Creatinine Kinase  No results for input(s): \"CK\" in the last 168 hours.    Inflammatory Markers  No results for input(s): \"CRP\", \"GERMAN\", \"LDH\", \"DDIMER\" in the last 168 hours.    No results for input(s): \"TROP\", \"TROPHS\", \"CK\" in the last 168 hours.    Imaging: Imaging data reviewed in Epic.    Medications:    insulin aspart  1-68 Units Subcutaneous TID CC    meropenem  500 mg Intravenous Q8H    apixaban  5 mg Oral BID    bethanechol  10 mg Oral Daily    divalproex ER  500 mg Oral BID    DULoxetine  60 mg Oral Daily    [Held by provider] Icosapent Ethyl  2 capsule Oral QID    insulin degludec  5 Units Subcutaneous Nightly    metoprolol tartrate  25 mg Oral BID    QUEtiapine  200 mg Oral Nightly    phenazopyridine  100 mg Oral TID CC    miconazole   Topical BID    insulin aspart  1-10 Units Subcutaneous TID AC and HS       Assessment & Plan:    CAUTI  History of MDRO  IV abx  Pyridium  Follow-up UC   ID consult   Dehydration  IVF x 24 hours  Moderate hydroureteronephrosis of the left collecting system   Neurogenic bladder  Florentino care  Urology consult   Diabetes mellitus  Tresiba  Carb scale   Correctional scale   VTE on DOAC  DOAC  Essential hypertension  Metoprolol - add hold parameters   Paraplegia  Bipolar disorder  Anxiety  Continue PTA regimen   Leukocytosis, resolved   COPD  ANAID    Supplementary Documentation:   Quality:  DVT Prophylaxis: DOAC    At this point Ms. Rosa is expected to be discharge  to: [facility]    Plan of care discussed with patient and RN.    Jay Napoles MD

## 2024-10-18 NOTE — CONSULTS
[facility]  Report of Inpatient Wound Care Consultation    Clari Rosa Patient Status:  Observation    1970 MRN [id number]   Location [facility] 5NW-A Attending Jay Napoles MD   Hosp Day # 0 PCP Brian Bob MD     Reason for Consultation:  Groin  per patient's request.    History of Present Illness:  Clari Rosa is a a(n) 53 year old female. Patient seen at bedside with a fellow wound care nurse.Staff report states patient requested wound care to assess her groin.Upon assessment,patient wants wound care to assess her buttocks.No open wound or erythema to  bilateral groin at this time. Patient with multiple comorbidities.          History:  Past Medical History:    Bipolar 1 disorder (HCC)    Dental caries    Diabetes (HCC)    MIKE (generalized anxiety disorder)    High blood pressure    History of diverticulitis of colon    Manipulative behavior    Neurogenic bladder    Obesity (BMI 30-39.9)    Paraplegia (HCC)    Sepsis following intra-abdominal surgery (HCC)    Serotonin syndrome    Sleep apnea    Suprapubic catheter (HCC)    Transverse myelitis (HCC)     Past Surgical History:   Procedure Laterality Date    Arthroscopy of joint unlisted      knee          Cholecystectomy      Part removal colon w end colostomy      2013    Tonsillectomy        reports that she has quit smoking. Her smoking use included cigarettes. She has a 5 pack-year smoking history. She does not have any smokeless tobacco history on file. She reports that she does not drink alcohol and does not use drugs.      Allergies:  @ALLERGY    Laboratory Data:    Recent Labs   Lab 10/16/24  2152 10/17/24  0024 10/17/24  1654 10/17/24  2122 10/18/24  0522 10/18/24  0737   WBC 12.1*  --   --   --   --  9.0   HGB 11.4*  --   --   --   --  10.3*   HCT 35.2  --   --   --   --  33.6*   .0  --   --   --   --  239.0   CREATSERUM 0.78  --   --   --   --  0.79   BUN 15  --   --   --   --  15   *  --   --   --   --  192*    CA 9.3  --   --   --   --  9.4   ALB 4.4  --   --   --   --   --    TP 7.0  --   --   --   --   --    PGLU  --    < > 145* 153* 207*  --     < > = values in this interval not displayed.         ASSESSMENT:  Wound 10/18/24 Buttocks (Active)   Date First Assessed/Time First Assessed: 10/18/24 0949   Location: Buttocks      Assessments 10/18/2024  9:49 AM   Wound Image     Shape No open wound at this time.Blanchable erythema        Wound Cleaning and Dressings:  Wound cleansing:  Wash with mild soap and water or the no rinse cleansing foam.Keep area clean and dry.  Wound product: Apply zinc oxide skin protectant daily and as needed particularly after each jhon care    Miscellaneous/Additional Orders:  Offloading: Turn Q 2 hours and as needed .Patient needs to be reminded    Care Summary: Discussed Plan of Care at beside with patient. Patient verbally acknowledges understanding of all instructions and all questions were answered.      Thank you for this consultation and for allowing me to participate in the care of your patient.      Time Spent 20 minutes .    Thank you,  Zahira DEUTSCHN RN [facility]  Wound Care Clinician  [facility]  Wound Care Team  10/18/2024  10:36 AM

## 2024-10-18 NOTE — PROGRESS NOTES
INFECTIOUS DISEASE  PROGRESS NOTE            [facility]    Clari Rosa Patient Status:  Observation    1970 MRN [id number]   [facility] 5NW-A Attending Jay Napoles MD   Hosp Day # 0 PCP Brian Bob MD     Antibiotics: meropenem #2    Subjective:  : abdominal pain    Objective:  Temp:  [98.2 °F (36.8 °C)-99.1 °F (37.3 °C)] 98.5 °F (36.9 °C)  Pulse:  [] 58  Resp:  [16-18] 18  BP: ()/(58-78) 99/59  SpO2:  [86 %-100 %] 100 %    General: awake alert  Vital signs:Temp:  [98.2 °F (36.8 °C)-99.1 °F (37.3 °C)] 98.5 °F (36.9 °C)  Pulse:  [] 58  Resp:  [16-18] 18  BP: ()/(58-78) 99/59  SpO2:  [86 %-100 %] 100 %  HEENT: Moist mucous membranes. Extraocular muscles are intact.  Neck: supple no masses  Respiratory: Non labored, symmetric excursion, normal respirations  Cardiovascular: no irregularities in rhythm  Abdomen: Soft, nontender, nondistended. SP cath in place  Musculoskeletal: joints: no swelling   Integument: No lesions. No erythema. No open wounds.  Labs:     COVID-19 Lab Results    COVID-19  Lab Results   Component Value Date    COVID19 Not Detected 10/16/2024    COVID19 Not Detected 2024    COVID19 Not Detected 2024       Pro-Calcitonin  No results for input(s): \"PCT\" in the last 168 hours.    Cardiac  No results for input(s): \"TROP\", \"PBNP\" in the last 168 hours.    Creatinine Kinase  No results for input(s): \"CK\" in the last 168 hours.    Inflammatory Markers  No results for input(s): \"CRP\", \"GERMAN\", \"LDH\", \"DDIMER\" in the last 168 hours.    Recent Labs   Lab 10/16/24  2152   RBC 4.60   HGB 11.4*   HCT 35.2   MCV 76.5*   MCH 24.8*   MCHC 32.4   RDW 16.2   NEPRELIM 6.31   WBC 12.1*   .0         Recent Labs   Lab 10/16/24  2152   *   BUN 15   CREATSERUM 0.78   CA 9.3   ALB 4.4      K 4.3      CO2 30.0   ALKPHO 68   AST 9   ALT 9*   BILT 0.2*   TP 7.0       No results found for: \"VANCT\"  Microbiology    No results found  for this visit on 10/16/24.  Susceptibility data from last 90 days.  Collected Specimen Info Organism Cefazolin Cefepime Ceftazidime Ceftriaxone Ciprofloxacin Gentamicin Levofloxacin Meropenem Nitrofurantoin Piperacillin/Tazobactam Trimethoprim/Sulfamethoxazole   09/17/24 Urine, clean catch Escherichia coli  ESBL Pos  R  R  R  R  R  S  R  S  S  S  R     Serratia marcescens  R  S   S  S  S  S  S  R   S   09/06/24 Urine, clean catch Escherichia coli  ESBL Pos  R  R  R  R  R  S  R  S  S  S  R     Serratia marcescens  R  S   S  S  S  S  S  R   S   08/20/24 Urine, clean catch Citrobacter freundii  R  S   I  S  S  I  S  S  R  S   08/03/24 Urine, clean catch Serratia marcescens  R  S   S  S  S  S  S  R   S     Citrobacter werkmanii  R  S   S  S  S  S  S  R  S  S        Problem list reviewed:  Patient Active Problem List   Diagnosis    Paresis of lower extremity (HCC)    Transverse myelitis ([facility])    Bipolar 1 disorder ([facility])    Manipulative behavior    History of diverticulitis of colon    Serotonin syndrome    Obesity (BMI 30-39.9)    Dental caries    Type 2 diabetes mellitus without complication, with long-term current use of insulin ([facility])    Chronic suprapubic catheter ([facility])    Bipolar I disorder depressed with atypical features ([facility])    Other acute pulmonary embolism, unspecified whether acute cor pulmonale present ([facility])    Hyperglycemia    Sepsis due to undetermined organism ([facility])    Severe episode of recurrent major depressive disorder, without psychotic features ([facility])    Generalized anxiety disorder with panic attacks    Sepsis due to urinary tract infection ([facility])    Lactic acid acidosis    Constipation, unspecified constipation type    Moderate depressed bipolar I disorder (HCC)    Acute chest pain    Depression, unspecified depression type    Cellulitis    Bipolar disorder, current episode mixed, severe, without psychotic features (HCC)    Chest pain    Acute cystitis without hematuria    Leukocytosis     Leukocytosis, unspecified type    Benign essential hypertension    COPD (chronic obstructive pulmonary disease) ([facility])    Neurogenic bladder    Urinary tract infection associated with catheterization of urinary tract, unspecified indwelling urinary catheter type, initial encounter ([facility])    Weakness generalized             ASSESSMENT/PLAN:  1. Chronic SP catheter  Abdominal pain after SP catheter change  -CT on 10/16 no acute changes  Urine culture was no growth on 10/16, sent prior to antibiotics  Can complete meropenem  today      Romulo Ingram MD, MD  [facility]  [phone]

## 2024-10-18 NOTE — PLAN OF CARE
Problem: PAIN - ADULT  Goal: Verbalizes/displays adequate comfort level or patient's stated pain goal  Description: INTERVENTIONS:  - Encourage pt to monitor pain and request assistance  - Assess pain using appropriate pain scale  - Administer analgesics based on type and severity of pain and evaluate response  - Implement non-pharmacological measures as appropriate and evaluate response  - Consider cultural and social influences on pain and pain management  - Manage/alleviate anxiety  - Utilize distraction and/or relaxation techniques  - Monitor for opioid side effects  - Notify MD/LIP if interventions unsuccessful or patient reports new pain  - Anticipate increased pain with activity and pre-medicate as appropriate  Outcome: Progressing     Problem: RISK FOR INFECTION - ADULT  Goal: Absence of fever/infection during anticipated neutropenic period  Description: INTERVENTIONS  - Monitor WBC  - Administer growth factors as ordered  - Implement neutropenic guidelines  Outcome: Progressing     Problem: SAFETY ADULT - FALL  Goal: Free from fall injury  Description: INTERVENTIONS:  - Assess pt frequently for physical needs  - Identify cognitive and physical deficits and behaviors that affect risk of falls.  - [place] fall precautions as indicated by assessment.  - Educate pt/family on patient safety including physical limitations  - Instruct pt to call for assistance with activity based on assessment  - Modify environment to reduce risk of injury  - Provide assistive devices as appropriate  - Consider OT/PT consult to assist with strengthening/mobility  - Encourage toileting schedule  Outcome: Progressing     Problem: DISCHARGE PLANNING  Goal: Discharge to home or other facility with appropriate resources  Description: INTERVENTIONS:  - Identify barriers to discharge w/pt and caregiver  - Include patient/family/discharge partner in discharge planning  - Arrange for needed discharge resources and transportation as  appropriate  - Identify discharge learning needs (meds, wound care, etc)  - Arrange for interpreters to assist at discharge as needed  - Consider post-discharge preferences of patient/family/discharge partner  - Complete POLST form as appropriate  - Assess patient's ability to be responsible for managing their own health  - Refer to Case Management Department for coordinating discharge planning if the patient needs post-hospital services based on physician/LIP order or complex needs related to functional status, cognitive ability or social support system  Outcome: Progressing

## 2024-10-19 LAB
ATRIAL RATE: 79 BPM
GLUCOSE BLD-MCNC: 159 MG/DL (ref 70–99)
GLUCOSE BLD-MCNC: 176 MG/DL (ref 70–99)
GLUCOSE BLD-MCNC: 189 MG/DL (ref 70–99)
P AXIS: 52 DEGREES
P-R INTERVAL: 172 MS
Q-T INTERVAL: 400 MS
QRS DURATION: 86 MS
QTC CALCULATION (BEZET): 458 MS
R AXIS: -12 DEGREES
T AXIS: 50 DEGREES
VENTRICULAR RATE: 79 BPM

## 2024-10-19 PROCEDURE — 99231 SBSQ HOSP IP/OBS SF/LOW 25: CPT | Performed by: HOSPITALIST

## 2024-10-19 RX ORDER — HYDROCODONE BITARTRATE AND ACETAMINOPHEN 10; 325 MG/1; MG/1
1 TABLET ORAL EVERY 6 HOURS PRN
Qty: 5 TABLET | Refills: 0 | Status: ON HOLD | OUTPATIENT
Start: 2024-10-19 | End: 2024-10-24

## 2024-10-19 RX ORDER — CLONAZEPAM 0.5 MG/1
1.5 TABLET ORAL 3 TIMES DAILY PRN
Qty: 15 TABLET | Refills: 0 | Status: SHIPPED | OUTPATIENT
Start: 2024-10-19

## 2024-10-19 NOTE — PROGRESS NOTES
Per facility they are unable to take the pt tonight but will take her tomorrow> MD updated on discharge status

## 2024-10-19 NOTE — PLAN OF CARE
Patient a&ox4.  RA. Hx ANAID. Tele NS. On eliquis, hx of PE. Carb control diet,  QID accu check, Suprapubic catheter for neurogenic bladder. 0.9 @100. Medications administered per MAR for pain. No further needs at this time, call light within reach.     Problem: PAIN - ADULT  Goal: Verbalizes/displays adequate comfort level or patient's stated pain goal  Description: INTERVENTIONS:  - Encourage pt to monitor pain and request assistance  - Assess pain using appropriate pain scale  - Administer analgesics based on type and severity of pain and evaluate response  - Implement non-pharmacological measures as appropriate and evaluate response  - Consider cultural and social influences on pain and pain management  - Manage/alleviate anxiety  - Utilize distraction and/or relaxation techniques  - Monitor for opioid side effects  - Notify MD/LIP if interventions unsuccessful or patient reports new pain  - Anticipate increased pain with activity and pre-medicate as appropriate  Outcome: Progressing     Problem: RISK FOR INFECTION - ADULT  Goal: Absence of fever/infection during anticipated neutropenic period  Description: INTERVENTIONS  - Monitor WBC  - Administer growth factors as ordered  - Implement neutropenic guidelines  Outcome: Progressing     Problem: SAFETY ADULT - FALL  Goal: Free from fall injury  Description: INTERVENTIONS:  - Assess pt frequently for physical needs  - Identify cognitive and physical deficits and behaviors that affect risk of falls.  - [place] fall precautions as indicated by assessment.  - Educate pt/family on patient safety including physical limitations  - Instruct pt to call for assistance with activity based on assessment  - Modify environment to reduce risk of injury  - Provide assistive devices as appropriate  - Consider OT/PT consult to assist with strengthening/mobility  - Encourage toileting schedule  Outcome: Progressing     Problem: DISCHARGE PLANNING  Goal: Discharge to home or  other facility with appropriate resources  Description: INTERVENTIONS:  - Identify barriers to discharge w/pt and caregiver  - Include patient/family/discharge partner in discharge planning  - Arrange for needed discharge resources and transportation as appropriate  - Identify discharge learning needs (meds, wound care, etc)  - Arrange for interpreters to assist at discharge as needed  - Consider post-discharge preferences of patient/family/discharge partner  - Complete POLST form as appropriate  - Assess patient's ability to be responsible for managing their own health  - Refer to Case Management Department for coordinating discharge planning if the patient needs post-hospital services based on physician/LIP order or complex needs related to functional status, cognitive ability or social support system  Outcome: Progressing

## 2024-10-19 NOTE — PROGRESS NOTES
[facility]   part of [facility]     Hospitalist Progress Note     Clari Rosa Patient Status:  Observation    1970 MRN [id number]   Location [facility] 5NW-A Attending Jay Napoles MD   Hosp Day # 1 PCP Brian Bob MD     Chief Complaint: Abnormal UA    Subjective:   Patient feels she is retaining urine (bladder scan completes only 30ml).     Current medications:   insulin aspart  1-68 Units Subcutaneous TID CC    apixaban  5 mg Oral BID    bethanechol  10 mg Oral Daily    divalproex ER  500 mg Oral BID    DULoxetine  60 mg Oral Daily    insulin degludec  5 Units Subcutaneous Nightly    metoprolol tartrate  25 mg Oral BID    QUEtiapine  200 mg Oral Nightly    miconazole   Topical BID    insulin aspart  1-10 Units Subcutaneous TID AC and HS       Objective:    Review of Systems:   10 point ROS completed and was negative, except for pertinent positive and negatives stated in subjective.    Vital signs:  Temp:  [97.6 °F (36.4 °C)-99.3 °F (37.4 °C)] 97.6 °F (36.4 °C)  Pulse:  [61-80] 76  Resp:  [16-18] 18  BP: ()/(58-75) 90/72  SpO2:  [91 %-100 %] 100 %  Patient Weight for the past 72 hrs:   Weight   10/16/24 2018 190 lb (86.2 kg)   10/17/24 0015 190 lb (86.2 kg)     Physical Exam:    General: No acute distress.   Respiratory: Clear to auscultation bilaterally.   Cardiovascular: S1, S2. Regular rate and rhythm.   Abdomen: Soft, nontender, nondistended. Positive bowel sounds.   Extremities: No edema.  Neuro: AAOx3    Diagnostic Data:    Labs:  Recent Labs   Lab 10/16/24  2152 10/18/24  0737   WBC 12.1* 9.0   HGB 11.4* 10.3*   MCV 76.5* 79.8*   .0 239.0       Recent Labs   Lab 10/16/24  2152 10/18/24  0737   * 192*   BUN 15 15   CREATSERUM 0.78 0.79   CA 9.3 9.4   ALB 4.4  --     143   K 4.3 4.0    107   CO2 30.0 27.0   ALKPHO 68  --    AST 9  --    ALT 9*  --    BILT 0.2*  --    TP 7.0  --        Estimated Creatinine Clearance: 59.2 mL/min (based on SCr of 0.79  mg/dL).    No results for input(s): \"PTP\", \"INR\" in the last 168 hours.         COVID-19 Lab Results    COVID-19  Lab Results   Component Value Date    COVID19 Not Detected 10/16/2024    COVID19 Not Detected 08/04/2024    COVID19 Not Detected 08/03/2024       Pro-Calcitonin  No results for input(s): \"PCT\" in the last 168 hours.    Cardiac  No results for input(s): \"TROP\", \"PBNP\" in the last 168 hours.    Creatinine Kinase  No results for input(s): \"CK\" in the last 168 hours.    Inflammatory Markers  No results for input(s): \"CRP\", \"GERMAN\", \"LDH\", \"DDIMER\" in the last 168 hours.    Recent Labs   Lab 10/18/24  1753   TROPHS <3       Imaging: Imaging data reviewed in Epic.    Medications:    insulin aspart  1-68 Units Subcutaneous TID CC    apixaban  5 mg Oral BID    bethanechol  10 mg Oral Daily    divalproex ER  500 mg Oral BID    DULoxetine  60 mg Oral Daily    insulin degludec  5 Units Subcutaneous Nightly    metoprolol tartrate  25 mg Oral BID    QUEtiapine  200 mg Oral Nightly    miconazole   Topical BID    insulin aspart  1-10 Units Subcutaneous TID AC and HS       Assessment & Plan:    Abnormal UA, CAUTI ruled out  History of MDRO  Stop IV abx  Pyridium  ID consult   Moderate hydroureteronephrosis of the left collecting system   Neurogenic bladder  Florentino care  Urology consult - no plans for intervention   Diabetes mellitus  Tresiba  Carb scale   Correctional scale   VTE on DOAC  DOAC  Essential hypertension  Metoprolol   Bipolar disorder  Anxiety  Continue PTA regimen   Leukocytosis, resolved   COPD  ANAID  Paraplegia noted in problem list, patient able to move limbs    Supplementary Documentation:   Quality:  DVT Prophylaxis: DOAC    If no procedures planned by urology, patient medically cleared for discharge to [facility] today.    Plan of care discussed with patient and RN.    Jay Napoles MD

## 2024-10-19 NOTE — PROGRESS NOTES
Per Urology Dr Day:    No procedure. Can go home. Follow up as outpt for a repeat K US in a couple of weeks.

## 2024-10-19 NOTE — PLAN OF CARE
Pt A&O x4, suprapubic catheter in place, Tele WILMER GILES. PRN pain medication administered per MAR. Bladder scan completed per MD order. POC discussed with pt.    Problem: RISK FOR INFECTION - ADULT  Goal: Absence of fever/infection during anticipated neutropenic period  Description: INTERVENTIONS  - Monitor WBC  - Administer growth factors as ordered  - Implement neutropenic guidelines  Outcome: Progressing     Problem: SAFETY ADULT - FALL  Goal: Free from fall injury  Description: INTERVENTIONS:  - Assess pt frequently for physical needs  - Identify cognitive and physical deficits and behaviors that affect risk of falls.  - [place] fall precautions as indicated by assessment.  - Educate pt/family on patient safety including physical limitations  - Instruct pt to call for assistance with activity based on assessment  - Modify environment to reduce risk of injury  - Provide assistive devices as appropriate  - Consider OT/PT consult to assist with strengthening/mobility  - Encourage toileting schedule  Outcome: Progressing

## 2024-10-20 VITALS
BODY MASS INDEX: 37.11 KG/M2 | SYSTOLIC BLOOD PRESSURE: 94 MMHG | TEMPERATURE: 99 F | RESPIRATION RATE: 18 BRPM | WEIGHT: 189 LBS | HEIGHT: 60 IN | OXYGEN SATURATION: 98 % | DIASTOLIC BLOOD PRESSURE: 66 MMHG | HEART RATE: 73 BPM

## 2024-10-20 LAB — GLUCOSE BLD-MCNC: 233 MG/DL (ref 70–99)

## 2024-10-20 PROCEDURE — 99238 HOSP IP/OBS DSCHRG MGMT 30/<: CPT | Performed by: HOSPITALIST

## 2024-10-20 NOTE — PROGRESS NOTES
NURSING DISCHARGE NOTE    Discharged Nursing home via Ambulance.  Accompanied by Support staff  Belongings Taken by patient/family    [place] accessed. Belongings given to support staff. Report called to nurse Batista in [facility]. .

## 2024-10-20 NOTE — PLAN OF CARE
Problem: RISK FOR INFECTION - ADULT  Goal: Absence of fever/infection during anticipated neutropenic period  Description: INTERVENTIONS  - Monitor WBC  - Administer growth factors as ordered  - Implement neutropenic guidelines  Outcome: Progressing     Problem: SAFETY ADULT - FALL  Goal: Free from fall injury  Description: INTERVENTIONS:  - Assess pt frequently for physical needs  - Identify cognitive and physical deficits and behaviors that affect risk of falls.  - [place] fall precautions as indicated by assessment.  - Educate pt/family on patient safety including physical limitations  - Instruct pt to call for assistance with activity based on assessment  - Modify environment to reduce risk of injury  - Provide assistive devices as appropriate  - Consider OT/PT consult to assist with strengthening/mobility  - Encourage toileting schedule  Outcome: Progressing     Problem: DISCHARGE PLANNING  Goal: Discharge to home or other facility with appropriate resources  Description: INTERVENTIONS:  - Identify barriers to discharge w/pt and caregiver  - Include patient/family/discharge partner in discharge planning  - Arrange for needed discharge resources and transportation as appropriate  - Identify discharge learning needs (meds, wound care, etc)  - Arrange for interpreters to assist at discharge as needed  - Consider post-discharge preferences of patient/family/discharge partner  - Complete POLST form as appropriate  - Assess patient's ability to be responsible for managing their own health  - Refer to Case Management Department for coordinating discharge planning if the patient needs post-hospital services based on physician/LIP order or complex needs related to functional status, cognitive ability or social support system  Outcome: Progressing

## 2024-10-20 NOTE — PLAN OF CARE
Patient alert and oriented.  RA. Tele NS. Takes Eliquis. Carb control diet. QID Accu check. Saline locked. Anticipating DC tomorrow. No further needs at this time, call light within reach.     Problem: PAIN - ADULT  Goal: Verbalizes/displays adequate comfort level or patient's stated pain goal  Description: INTERVENTIONS:  - Encourage pt to monitor pain and request assistance  - Assess pain using appropriate pain scale  - Administer analgesics based on type and severity of pain and evaluate response  - Implement non-pharmacological measures as appropriate and evaluate response  - Consider cultural and social influences on pain and pain management  - Manage/alleviate anxiety  - Utilize distraction and/or relaxation techniques  - Monitor for opioid side effects  - Notify MD/LIP if interventions unsuccessful or patient reports new pain  - Anticipate increased pain with activity and pre-medicate as appropriate  Outcome: Progressing     Problem: RISK FOR INFECTION - ADULT  Goal: Absence of fever/infection during anticipated neutropenic period  Description: INTERVENTIONS  - Monitor WBC  - Administer growth factors as ordered  - Implement neutropenic guidelines  Outcome: Progressing     Problem: SAFETY ADULT - FALL  Goal: Free from fall injury  Description: INTERVENTIONS:  - Assess pt frequently for physical needs  - Identify cognitive and physical deficits and behaviors that affect risk of falls.  - [place] fall precautions as indicated by assessment.  - Educate pt/family on patient safety including physical limitations  - Instruct pt to call for assistance with activity based on assessment  - Modify environment to reduce risk of injury  - Provide assistive devices as appropriate  - Consider OT/PT consult to assist with strengthening/mobility  - Encourage toileting schedule  Outcome: Progressing     Problem: DISCHARGE PLANNING  Goal: Discharge to home or other facility with appropriate resources  Description:  INTERVENTIONS:  - Identify barriers to discharge w/pt and caregiver  - Include patient/family/discharge partner in discharge planning  - Arrange for needed discharge resources and transportation as appropriate  - Identify discharge learning needs (meds, wound care, etc)  - Arrange for interpreters to assist at discharge as needed  - Consider post-discharge preferences of patient/family/discharge partner  - Complete POLST form as appropriate  - Assess patient's ability to be responsible for managing their own health  - Refer to Case Management Department for coordinating discharge planning if the patient needs post-hospital services based on physician/LIP order or complex needs related to functional status, cognitive ability or social support system  Outcome: Progressing

## 2024-10-20 NOTE — PROGRESS NOTES
Report given to facility nurse. Paramedics to transport pt to [facility]. Paperwork given to georgi.

## 2024-10-20 NOTE — PROGRESS NOTES
Multiple attempts made to call report to nursing staff at [facility] with no response.  even attempted to page nursing staff to no avail. Asked to be transferred to a supervisor in order to give report on patient returning to facility. Nurse Silver from facility will call me back to get report.   Pt mother was updated about discharge at 1202pm

## 2024-10-20 NOTE — DISCHARGE SUMMARY
[facility]     Clari Rosa Patient Status:  Inpatient    1970 MRN [id number]   Location [facility] 5NW-A Attending Jay Napoles MD   Hosp Day # 2 PCP Brian Bob MD     Date of Admission: 10/16/2024  Date of Discharge:   10/20/2024    Discharge Disposition: PLACIDO    Discharge Diagnosis:  Abnormal UA, CAUTI ruled out  History of MDRO  ID consult   Moderate hydroureteronephrosis of the left collecting system   Neurogenic bladder  Florentino care  Urology consult - no plans for intervention, repeat renal US in a couple weeks  Diabetes mellitus  Tresiba  Carb scale   Correctional scale   VTE on DOAC  DOAC  Essential hypertension  Metoprolol   Bipolar disorder  Anxiety  Continue PTA regimen   Leukocytosis, resolved   COPD  ANAID  Paraplegia noted in problem list, patient able to move limbs    History of Present Illness: Clari Rosa is a 53 year old female with PMHX neurogenic bladder (s/p suprapubic cath)/ DM/ PE/ COPD/ HTN/ ANAID/ transverse myelitis who presented to the hospital for suprapubic pain and dysuria. She reports 2-3 days fo worsening pain in her suprapubic region which is burning and worse with manipulation of her catheter or urination. Her pain was rated 7/10 and had no alleviating factors. She denied any fever, chills, or increased urinary output.     Brief Synopsis: Patient presented with dysuria. She was admitted for possible CAUTI with ID on consult. UC negative. Abx stopped. Patient without UTI. Patient also found to have moderate hydroureteronephrosis of the left collecting system. Urology consulted. Plans for outpatient follow-up. PLACIDO in stable condition on regimen outlined below.     Lace+ Score: 72  59-90 High Risk  29-58 Medium Risk  0-28   Low Risk       TCM Follow-Up Recommendation:  LACE > 58: High Risk of readmission after discharge from the hospital.        Discharge Medication List:     Discharge Medications        CHANGE how you take these medications         Instructions Prescription details   clonazePAM 0.5 MG Tabs  Commonly known as: KlonoPIN  What changed: Another medication with the same name was removed. Continue taking this medication, and follow the directions you see here.      Take 3 tablets (1.5 mg total) by mouth 3 (three) times daily as needed for Anxiety.   Quantity: 15 tablet  Refills: 0     insulin aspart 100 Units/mL Sopn  Commonly known as: NovoLOG  What changed: Another medication with the same name was removed. Continue taking this medication, and follow the directions you see here.      Inject 10 Units into the skin 3 (three) times daily with meals.   Refills: 0     meclizine 25 MG Tabs  Commonly known as: Antivert  What changed: Another medication with the same name was removed. Continue taking this medication, and follow the directions you see here.      Take 1 tablet (25 mg total) by mouth 3 (three) times daily as needed.   Refills: 0            CONTINUE taking these medications        Instructions Prescription details   acetaminophen 325 MG Tabs  Commonly known as: Tylenol      Take 2 tablets (650 mg total) by mouth every 6 (six) hours as needed for Pain.   Refills: 0     albuterol 108 (90 Base) MCG/ACT Aers  Commonly known as: Ventolin HFA      Inhale 2 puffs into the lungs every 6 (six) hours as needed for Wheezing or Shortness of Breath.   Refills: 0     apixaban 5 MG Tabs  Commonly known as: Eliquis      Take 1 tablet (5 mg total) by mouth 2 (two) times daily.   Refills: 0     bethanechol 10 MG Tabs  Commonly known as: Urecholine      Take 1 tablet (10 mg total) by mouth daily.   Refills: 0     divalproex  MG Tb24  Commonly known as: Depakote ER      Take 1 tablet (500 mg total) by mouth 2 (two) times daily.   Refills: 0     DULoxetine 60 MG Cpep  Commonly known as: Cymbalta      Take 1 capsule (60 mg total) by mouth daily.   Refills: 0     ergocalciferol 1.25 MG (50536 UT) Caps  Commonly known as: Vitamin D2      Take 1 capsule (50,000  Units total) by mouth every 7 days. Every monday   Refills: 0     HYDROcodone-acetaminophen  MG Tabs  Commonly known as: Norco      Take 1 tablet by mouth every 6 (six) hours as needed for Pain.   Quantity: 5 tablet  Refills: 0     Icosapent Ethyl 1 g Caps      Take 2 capsules by mouth 4 (four) times daily.   Refills: 0     insulin glargine 100 UNIT/ML Soln  Commonly known as: Lantus      Inject 6 Units into the skin nightly.   Refills: 0     Insulin Pen Needle 32G X 4 MM Misc      May substitute if not on insurance formulary   Quantity: 100 each  Refills: 5     loperamide 2 MG Caps  Commonly known as: Imodium      Take 1 capsule (2 mg total) by mouth 4 (four) times daily as needed for Diarrhea.   Refills: 0     Melatonin 3 MG Caps      Take 1 capsule (3 mg total) by mouth at bedtime.   Refills: 0     metFORMIN  MG Tb24  Commonly known as: Glucophage XR      Take 2 tablets (1,000 mg total) by mouth 2 (two) times daily with meals.   Quantity: 30 tablet  Refills: 0     metoprolol tartrate 25 MG Tabs  Commonly known as: Lopressor      Take 1 tablet (25 mg total) by mouth 2 (two) times daily.   Refills: 0     nystatin 234787 UNIT/GM Powd      Apply 1 Application topically as needed for Dry Skin. Under breast and groin area   Refills: 0     nystatin 100,000 Units/g Crea  Commonly known as: Mycostatin      Apply 1 Application topically in the morning, at noon, and at bedtime.   Refills: 0     ondansetron 4 MG Tbdp  Commonly known as: Zofran-ODT      Take 1 tablet (4 mg total) by mouth every 8 (eight) hours as needed for Nausea.   Refills: 0     phenazopyridine 100 MG Tabs  Commonly known as: Pyridium      Take 1 tablet (100 mg total) by mouth 3 (three) times daily as needed for Pain.   Quantity: 5 tablet  Refills: 0     QUEtiapine 100 MG Tabs  Commonly known as: SEROquel      Take 2 tablets (200 mg total) by mouth nightly.   Refills: 0     Thera-M Tabs      Take 1 tablet by mouth daily.   Refills: 0      traZODone 50 MG Tabs  Commonly known as: Desyrel      Take 1 tablet (50 mg total) by mouth nightly as needed for Sleep.   Refills: 0            STOP taking these medications      Baqsimi One Pack 3 MG/DOSE nasal powder  Generic drug: glucagon        Cranberry 500 MG Caps        lactulose 20 GM/30ML Soln  Commonly known as: ENULOSE        sodium chloride 0.65 % Soln  Commonly known as: Saline Mist               ASK your doctor about these medications        Instructions Prescription details   magnesium oxide 400 MG Tabs  Commonly known as: Mag-Ox  Ask about: Should I take this medication?      Take 1 tablet (400 mg total) by mouth daily.   Stop taking on: 2024  Quantity: 30 tablet  Refills: 0               Where to Get Your Medications        Please  your prescriptions at the location directed by your doctor or nurse    Bring a paper prescription for each of these medications  clonazePAM 0.5 MG Tabs  HYDROcodone-acetaminophen  MG Tabs         [place]    Follow-up appointment:   Kyler Day MD  [address] [place] [id number]  [phone]    Schedule an appointment as soon as possible for a visit in 3 week(s)  Follow up as outpt for a repeat K US in a couple of weeks.    Appointments for Next 30 Days 10/21/2024 - 2024      None            -----------------------------------------------------------------------------------------------  PATIENT DISCHARGE INSTRUCTIONS: See electronic chart    Jay Napoles MD    Total time spent on discharge plannin minutes     The  [place] Cures Act makes medical notes like these available to patients in the interest of transparency. Please be advised this is a medical document. Medical documents are intended to carry relevant information, facts as evident, and the clinical opinion of the practitioner. The medical note is intended as peer to peer communication and may appear blunt or direct. It is written  in medical language and may contain abbreviations or verbiage that are unfamiliar.

## 2024-10-20 NOTE — CM/SW NOTE
10/20/24 0949   Discharge disposition   Expected discharge disposition [facility]   Post Acute Care Provider Shannon Narayan   Discharge transportation [facility]     1pm ambulance arranged for return to [facility].  RN to update family and call report to [phone].    Aidee Awad LCSW  /Discharge Planner

## 2024-10-22 ENCOUNTER — HOSPITAL ENCOUNTER (INPATIENT)
Facility: HOSPITAL | Age: 54
LOS: 2 days | Discharge: NURSING, REHAB OR LONG-TERM CARE | End: 2024-10-24
Attending: EMERGENCY MEDICINE | Admitting: STUDENT IN AN ORGANIZED HEALTH CARE EDUCATION/TRAINING PROGRAM
Payer: MEDICARE

## 2024-10-22 ENCOUNTER — APPOINTMENT (OUTPATIENT)
Dept: CT IMAGING | Facility: HOSPITAL | Age: 54
End: 2024-10-22
Attending: EMERGENCY MEDICINE
Payer: MEDICARE

## 2024-10-22 DIAGNOSIS — N30.90 CYSTITIS: Primary | ICD-10-CM

## 2024-10-22 DIAGNOSIS — G89.29 CHRONIC ABDOMINAL PAIN: ICD-10-CM

## 2024-10-22 DIAGNOSIS — N30.00 ACUTE CYSTITIS WITHOUT HEMATURIA: ICD-10-CM

## 2024-10-22 DIAGNOSIS — R10.9 CHRONIC ABDOMINAL PAIN: ICD-10-CM

## 2024-10-22 PROBLEM — D64.9 ANEMIA: Status: ACTIVE | Noted: 2024-10-22

## 2024-10-22 PROBLEM — N39.0 UTI (URINARY TRACT INFECTION): Status: ACTIVE | Noted: 2024-10-22

## 2024-10-22 LAB
ALBUMIN SERPL-MCNC: 4.1 G/DL (ref 3.2–4.8)
ALBUMIN/GLOB SERPL: 1.5 {RATIO} (ref 1–2)
ALP LIVER SERPL-CCNC: 64 U/L
ALT SERPL-CCNC: <7 U/L
ANION GAP SERPL CALC-SCNC: 4 MMOL/L (ref 0–18)
AST SERPL-CCNC: 11 U/L (ref ?–34)
BASOPHILS # BLD AUTO: 0.03 X10(3) UL (ref 0–0.2)
BASOPHILS NFR BLD AUTO: 0.4 %
BILIRUB SERPL-MCNC: 0.3 MG/DL (ref 0.3–1.2)
BILIRUB UR QL STRIP.AUTO: NEGATIVE
BUN BLD-MCNC: 11 MG/DL (ref 9–23)
CALCIUM BLD-MCNC: 9.5 MG/DL (ref 8.7–10.4)
CHLORIDE SERPL-SCNC: 105 MMOL/L (ref 98–112)
CO2 SERPL-SCNC: 30 MMOL/L (ref 21–32)
COLOR UR AUTO: YELLOW
CREAT BLD-MCNC: 0.76 MG/DL
EGFRCR SERPLBLD CKD-EPI 2021: 94 ML/MIN/1.73M2 (ref 60–?)
EOSINOPHIL # BLD AUTO: 0.39 X10(3) UL (ref 0–0.7)
EOSINOPHIL NFR BLD AUTO: 5 %
ERYTHROCYTE [DISTWIDTH] IN BLOOD BY AUTOMATED COUNT: 16.6 %
EST. AVERAGE GLUCOSE BLD GHB EST-MCNC: 177 MG/DL (ref 68–126)
GLOBULIN PLAS-MCNC: 2.7 G/DL (ref 2–3.5)
GLUCOSE BLD-MCNC: 118 MG/DL (ref 70–99)
GLUCOSE BLD-MCNC: 137 MG/DL (ref 70–99)
GLUCOSE BLD-MCNC: 138 MG/DL (ref 70–99)
GLUCOSE UR STRIP.AUTO-MCNC: NORMAL MG/DL
HBA1C MFR BLD: 7.8 % (ref ?–5.7)
HCT VFR BLD AUTO: 32.8 %
HGB BLD-MCNC: 10.6 G/DL
HYALINE CASTS #/AREA URNS AUTO: PRESENT /LPF
IMM GRANULOCYTES # BLD AUTO: 0.04 X10(3) UL (ref 0–1)
IMM GRANULOCYTES NFR BLD: 0.5 %
LACTATE SERPL-SCNC: 1 MMOL/L (ref 0.5–2)
LEUKOCYTE ESTERASE UR QL STRIP.AUTO: 500
LYMPHOCYTES # BLD AUTO: 3.33 X10(3) UL (ref 1–4)
LYMPHOCYTES NFR BLD AUTO: 43.1 %
MCH RBC QN AUTO: 24.7 PG (ref 26–34)
MCHC RBC AUTO-ENTMCNC: 32.3 G/DL (ref 31–37)
MCV RBC AUTO: 76.3 FL
MONOCYTES # BLD AUTO: 0.31 X10(3) UL (ref 0.1–1)
MONOCYTES NFR BLD AUTO: 4 %
NEUTROPHILS # BLD AUTO: 3.63 X10 (3) UL (ref 1.5–7.7)
NEUTROPHILS # BLD AUTO: 3.63 X10(3) UL (ref 1.5–7.7)
NEUTROPHILS NFR BLD AUTO: 47 %
NITRITE UR QL STRIP.AUTO: NEGATIVE
OSMOLALITY SERPL CALC.SUM OF ELEC: 290 MOSM/KG (ref 275–295)
PH UR STRIP.AUTO: 5.5 [PH] (ref 5–8)
PLATELET # BLD AUTO: 248 10(3)UL (ref 150–450)
POTASSIUM SERPL-SCNC: 3.9 MMOL/L (ref 3.5–5.1)
PROT SERPL-MCNC: 6.8 G/DL (ref 5.7–8.2)
PROT UR STRIP.AUTO-MCNC: 70 MG/DL
RBC # BLD AUTO: 4.3 X10(6)UL
RBC #/AREA URNS AUTO: >10 /HPF
SODIUM SERPL-SCNC: 139 MMOL/L (ref 136–145)
SP GR UR STRIP.AUTO: 1.03 (ref 1–1.03)
UROBILINOGEN UR STRIP.AUTO-MCNC: NORMAL MG/DL
WBC # BLD AUTO: 7.7 X10(3) UL (ref 4–11)
WBC #/AREA URNS AUTO: >50 /HPF
YEAST UR QL: PRESENT /HPF

## 2024-10-22 PROCEDURE — 99223 1ST HOSP IP/OBS HIGH 75: CPT | Performed by: STUDENT IN AN ORGANIZED HEALTH CARE EDUCATION/TRAINING PROGRAM

## 2024-10-22 PROCEDURE — 74176 CT ABD & PELVIS W/O CONTRAST: CPT | Performed by: EMERGENCY MEDICINE

## 2024-10-22 RX ORDER — DEXTROSE MONOHYDRATE 25 G/50ML
50 INJECTION, SOLUTION INTRAVENOUS
Status: DISCONTINUED | OUTPATIENT
Start: 2024-10-22 | End: 2024-10-24

## 2024-10-22 RX ORDER — GUAIFENESIN 600 MG/1
600 TABLET, EXTENDED RELEASE ORAL 2 TIMES DAILY PRN
Status: DISCONTINUED | OUTPATIENT
Start: 2024-10-22 | End: 2024-10-24

## 2024-10-22 RX ORDER — BETHANECHOL CHLORIDE 10 MG/1
10 TABLET ORAL DAILY
Status: DISCONTINUED | OUTPATIENT
Start: 2024-10-22 | End: 2024-10-24

## 2024-10-22 RX ORDER — NICOTINE POLACRILEX 4 MG
30 LOZENGE BUCCAL
Status: DISCONTINUED | OUTPATIENT
Start: 2024-10-22 | End: 2024-10-24

## 2024-10-22 RX ORDER — ALBUTEROL SULFATE 90 UG/1
2 INHALANT RESPIRATORY (INHALATION) EVERY 6 HOURS PRN
Status: DISCONTINUED | OUTPATIENT
Start: 2024-10-22 | End: 2024-10-24

## 2024-10-22 RX ORDER — MORPHINE SULFATE 2 MG/ML
1 INJECTION, SOLUTION INTRAMUSCULAR; INTRAVENOUS EVERY 2 HOUR PRN
Status: DISCONTINUED | OUTPATIENT
Start: 2024-10-22 | End: 2024-10-23

## 2024-10-22 RX ORDER — HYDROMORPHONE HYDROCHLORIDE 1 MG/ML
0.5 INJECTION, SOLUTION INTRAMUSCULAR; INTRAVENOUS; SUBCUTANEOUS EVERY 30 MIN PRN
Status: DISCONTINUED | OUTPATIENT
Start: 2024-10-22 | End: 2024-10-22

## 2024-10-22 RX ORDER — ACETAMINOPHEN 500 MG
500 TABLET ORAL EVERY 4 HOURS PRN
Status: DISCONTINUED | OUTPATIENT
Start: 2024-10-22 | End: 2024-10-24

## 2024-10-22 RX ORDER — METOPROLOL TARTRATE 25 MG/1
25 TABLET, FILM COATED ORAL 2 TIMES DAILY
Status: DISCONTINUED | OUTPATIENT
Start: 2024-10-22 | End: 2024-10-24

## 2024-10-22 RX ORDER — NICOTINE POLACRILEX 4 MG
15 LOZENGE BUCCAL
Status: DISCONTINUED | OUTPATIENT
Start: 2024-10-22 | End: 2024-10-24

## 2024-10-22 RX ORDER — ECHINACEA PURPUREA EXTRACT 125 MG
1 TABLET ORAL
Status: DISCONTINUED | OUTPATIENT
Start: 2024-10-22 | End: 2024-10-24

## 2024-10-22 RX ORDER — INSULIN DEGLUDEC 100 U/ML
5 INJECTION, SOLUTION SUBCUTANEOUS DAILY
Status: DISCONTINUED | OUTPATIENT
Start: 2024-10-22 | End: 2024-10-24

## 2024-10-22 RX ORDER — SODIUM CHLORIDE 9 MG/ML
INJECTION, SOLUTION INTRAVENOUS CONTINUOUS
Status: DISCONTINUED | OUTPATIENT
Start: 2024-10-22 | End: 2024-10-24

## 2024-10-22 RX ORDER — DULOXETIN HYDROCHLORIDE 60 MG/1
60 CAPSULE, DELAYED RELEASE ORAL DAILY
Status: DISCONTINUED | OUTPATIENT
Start: 2024-10-22 | End: 2024-10-24

## 2024-10-22 RX ORDER — MORPHINE SULFATE 4 MG/ML
4 INJECTION, SOLUTION INTRAMUSCULAR; INTRAVENOUS EVERY 30 MIN PRN
Status: COMPLETED | OUTPATIENT
Start: 2024-10-22 | End: 2024-10-22

## 2024-10-22 RX ORDER — MORPHINE SULFATE 2 MG/ML
2 INJECTION, SOLUTION INTRAMUSCULAR; INTRAVENOUS EVERY 2 HOUR PRN
Status: DISCONTINUED | OUTPATIENT
Start: 2024-10-22 | End: 2024-10-23

## 2024-10-22 RX ORDER — BENZONATATE 100 MG/1
200 CAPSULE ORAL 3 TIMES DAILY PRN
Status: DISCONTINUED | OUTPATIENT
Start: 2024-10-22 | End: 2024-10-24

## 2024-10-22 RX ORDER — QUETIAPINE FUMARATE 100 MG/1
200 TABLET, FILM COATED ORAL NIGHTLY
Status: DISCONTINUED | OUTPATIENT
Start: 2024-10-22 | End: 2024-10-24

## 2024-10-22 RX ORDER — MECLIZINE HYDROCHLORIDE 25 MG/1
25 TABLET ORAL EVERY 6 HOURS PRN
Status: DISCONTINUED | OUTPATIENT
Start: 2024-10-22 | End: 2024-10-24

## 2024-10-22 RX ORDER — DIVALPROEX SODIUM 500 MG/1
500 TABLET, FILM COATED, EXTENDED RELEASE ORAL 2 TIMES DAILY
Status: DISCONTINUED | OUTPATIENT
Start: 2024-10-22 | End: 2024-10-24

## 2024-10-22 RX ORDER — MORPHINE SULFATE 4 MG/ML
4 INJECTION, SOLUTION INTRAMUSCULAR; INTRAVENOUS EVERY 30 MIN PRN
Status: DISCONTINUED | OUTPATIENT
Start: 2024-10-22 | End: 2024-10-23

## 2024-10-22 RX ORDER — SODIUM CHLORIDE 9 MG/ML
INJECTION, SOLUTION INTRAVENOUS CONTINUOUS
Status: ACTIVE | OUTPATIENT
Start: 2024-10-22 | End: 2024-10-22

## 2024-10-22 RX ORDER — HYDROMORPHONE HYDROCHLORIDE 1 MG/ML
0.5 INJECTION, SOLUTION INTRAMUSCULAR; INTRAVENOUS; SUBCUTANEOUS EVERY 30 MIN PRN
Status: ACTIVE | OUTPATIENT
Start: 2024-10-22 | End: 2024-10-22

## 2024-10-22 RX ORDER — FLUCONAZOLE 100 MG/1
200 TABLET ORAL EVERY 24 HOURS
Status: DISCONTINUED | OUTPATIENT
Start: 2024-10-22 | End: 2024-10-24

## 2024-10-22 RX ORDER — MORPHINE SULFATE 2 MG/ML
0.5 INJECTION, SOLUTION INTRAMUSCULAR; INTRAVENOUS EVERY 2 HOUR PRN
Status: DISCONTINUED | OUTPATIENT
Start: 2024-10-22 | End: 2024-10-23

## 2024-10-22 NOTE — ED QUICK NOTES
Rounding Completed    Plan of Care reviewed. Waiting for results.  Elimination needs assessed.  Provided morphine per md order.    Bed is locked and in lowest position. Call light within reach.

## 2024-10-22 NOTE — ED QUICK NOTES
Rounding Completed    Plan of Care reviewed. Waiting for md to review results.  Elimination needs assessed.  PT sleeping.    Bed is locked and in lowest position. Call light within reach.

## 2024-10-22 NOTE — H&P
[facility]     Clari Rosa Patient Status:  Emergency    1970 MRN [id number]   Location [facility] EMERGENCY DEPARTMENT Attending Karsten Beatty MD   Hosp Day # 0 PCP No primary care provider on file.     Chief Complaint: UTI    Subjective:    History of Present Illness:     Clari Rosa is a 53 year old female with past medical history of diabetes, bipolar disorder, hypertension who presents to the ED for UTI symptoms.  Patient has known suprapubic catheter.  She was admitted on 10/16 discharged yesterday after urine culture showed no growth.  Antibiotics were stopped at the time.  She has had multiple UTIs in the past.    History/Other:    Past Medical History:  Past Medical History:    Bipolar 1 disorder (HCC)    Dental caries    Diabetes (HCC)    MIKE (generalized anxiety disorder)    High blood pressure    History of diverticulitis of colon    Manipulative behavior    Neurogenic bladder    Obesity (BMI 30-39.9)    Paraplegia (HCC)    Sepsis following intra-abdominal surgery (HCC)    Serotonin syndrome    Sleep apnea    Suprapubic catheter (HCC)    Transverse myelitis (HCC)     Past Surgical History:   Past Surgical History:   Procedure Laterality Date    Arthroscopy of joint unlisted      knee          Cholecystectomy      Part removal colon w end colostomy      2013    Tonsillectomy        Family History:   Family History   Adopted: Yes     Social History:    reports that she has quit smoking. Her smoking use included cigarettes. She has a 5 pack-year smoking history. She does not have any smokeless tobacco history on file. She reports that she does not drink alcohol and does not use drugs.     Allergies: Allergies[1]    Medications:  Medications Ordered Prior to Encounter[2]    Review of Systems:   A comprehensive review of systems was completed.    Pertinent positives and negatives noted in the HPI.    Objective:   Physical Exam:    /62   Pulse 81    Temp 98.7 °F (37.1 °C)   Resp 10   SpO2 98%   General: No acute distress, Alert  Respiratory: No rhonchi, no wheezes  Cardiovascular: S1, S2. Regular rate and rhythm  Abdomen: Soft, Non-tender, non-distended, positive bowel sounds  Neuro: No new focal deficits  Extremities: No edema    Results:    Labs:      Labs Last 24 Hours:    Recent Labs   Lab 10/16/24  2152 10/18/24  0737 10/22/24  0734   RBC 4.60 4.21 4.30   HGB 11.4* 10.3* 10.6*   HCT 35.2 33.6* 32.8*   MCV 76.5* 79.8* 76.3*   MCH 24.8* 24.5* 24.7*   MCHC 32.4 30.7* 32.3   RDW 16.2 16.5 16.6   NEPRELIM 6.31 5.38 3.63   WBC 12.1* 9.0 7.7   .0 239.0 248.0       Recent Labs   Lab 10/16/24  2152 10/18/24  0737 10/22/24  0734   * 192* 137*   BUN 15 15 11   CREATSERUM 0.78 0.79 0.76   EGFRCR 91 89 94   CA 9.3 9.4 9.5   ALB 4.4  --  4.1    143 139   K 4.3 4.0 3.9    107 105   CO2 30.0 27.0 30.0   ALKPHO 68  --  64   AST 9  --  11   ALT 9*  --  <7*   BILT 0.2*  --  0.3   TP 7.0  --  6.8       No results found for: \"PT\", \"INR\"    Recent Labs   Lab 10/18/24  1753   TROPHS <3       No results for input(s): \"TROP\", \"PBNP\" in the last 168 hours.    No results for input(s): \"PCT\" in the last 168 hours.    Imaging: Imaging data reviewed in Epic.    Assessment & Plan:      #Abnormal UA  #History of MDRO  -Blood cultures ordered  -Urine cultures ordered  -Monitor off IV antibiotics per ID recommendations  -ID consulted    #Moderate hydroureteronephrosis  -CT of the pelvis reviewed and shows stable moderate hydroureteronephrosis  -Urology was consulted earlier this week during previous admission and recommended no intervention    #Diabetes type 2  -Tresiba  -Hyperglycemia protocol    #VTE  -Eliquis  #Hypertension  -Metoprolol    #Anxiety  #Bipolar disorder  -Duloxetine, quetiapine, divalproex, clonazepam as needed    #COPD  -Home inhalers          Plan of care discussed with patient     Christopher Taylor DO    Supplementary Documentation:     The 21st  Century Cures Act makes medical notes like these available to patients in the interest of transparency. Please be advised this is a medical document. Medical documents are intended to carry relevant information, facts as evident, and the clinical opinion of the practitioner. The medical note is intended as peer to peer communication and may appear blunt or direct. It is written in medical language and may contain abbreviations or verbiage that are unfamiliar.                                       [1]   Allergies  Allergen Reactions    Pcn [Penicillins] HIVES    Sulfa Antibiotics HIVES    Radiology Contrast Iodinated Dyes DIZZINESS    Keflex [Cephalexin] UNKNOWN   [2]   No current facility-administered medications on file prior to encounter.     Current Outpatient Medications on File Prior to Encounter   Medication Sig Dispense Refill    clonazePAM 0.5 MG Oral Tab Take 3 tablets (1.5 mg total) by mouth 3 (three) times daily as needed for Anxiety. 15 tablet 0    HYDROcodone-acetaminophen  MG Oral Tab Take 1 tablet by mouth every 6 (six) hours as needed for Pain. 5 tablet 0    nystatin 100,000 Units/g External Cream Apply 1 Application topically in the morning, at noon, and at bedtime.      loperamide 2 MG Oral Cap Take 1 capsule (2 mg total) by mouth 4 (four) times daily as needed for Diarrhea.      meclizine 25 MG Oral Tab Take 1 tablet (25 mg total) by mouth 3 (three) times daily as needed.      DULoxetine 60 MG Oral Cap DR Particles Take 1 capsule (60 mg total) by mouth daily.      apixaban 5 MG Oral Tab Take 1 tablet (5 mg total) by mouth 2 (two) times daily.      QUEtiapine 100 MG Oral Tab Take 2 tablets (200 mg total) by mouth nightly.      insulin glargine 100 UNIT/ML Subcutaneous Solution Inject 6 Units into the skin nightly.      insulin aspart 100 Units/mL Subcutaneous Solution Pen-injector Inject 10 Units into the skin 3 (three) times daily with meals.      phenazopyridine 100 MG Oral Tab Take 1  tablet (100 mg total) by mouth 3 (three) times daily as needed for Pain. 5 tablet 0    Insulin Pen Needle 32G X 4 MM Does not apply Misc May substitute if not on insurance formulary 100 each 5    metoprolol tartrate 25 MG Oral Tab Take 1 tablet (25 mg total) by mouth 2 (two) times daily.      traZODone 50 MG Oral Tab Take 1 tablet (50 mg total) by mouth nightly as needed for Sleep.      Nystatin 223198 UNIT/GM External Powder Apply 1 Application topically as needed for Dry Skin. Under breast and groin area      acetaminophen 325 MG Oral Tab Take 2 tablets (650 mg total) by mouth every 6 (six) hours as needed for Pain.      Icosapent Ethyl 1 g Oral Cap Take 2 capsules by mouth 4 (four) times daily.      Melatonin 3 MG Oral Cap Take 1 capsule (3 mg total) by mouth at bedtime.      ergocalciferol 1.25 MG (95297 UT) Oral Cap Take 1 capsule (50,000 Units total) by mouth every 7 days. Every monday      bethanechol 10 MG Oral Tab Take 1 tablet (10 mg total) by mouth daily.      albuterol 108 (90 Base) MCG/ACT Inhalation Aero Soln Inhale 2 puffs into the lungs every 6 (six) hours as needed for Wheezing or Shortness of Breath.      Multiple Vitamins-Minerals (THERA-M) Oral Tab Take 1 tablet by mouth daily.      divalproex Sodium  MG Oral Tablet 24 Hr Take 1 tablet (500 mg total) by mouth 2 (two) times daily.      ondansetron 4 MG Oral Tablet Dispersible Take 1 tablet (4 mg total) by mouth every 8 (eight) hours as needed for Nausea.      metFORMIN  MG Oral Tablet 24 Hr Take 2 tablets (1,000 mg total) by mouth 2 (two) times daily with meals. 30 tablet 0

## 2024-10-22 NOTE — ED INITIAL ASSESSMENT (HPI)
Pt arrives to ED via EMS from [facility] for c/o UTI symptoms. Pt states she has had 22 UTIs before, was just admitted here for one. C/O abdominal pain, has a suprapubic catheter. Pt states she is unsure if the nursing home is giving her antibiotics.

## 2024-10-22 NOTE — ED QUICK NOTES
Rounding Completed    Plan of Care reviewed. Waiting for room.  Elimination needs assessed. Brief changed, pericare performed.     Bed is locked and in lowest position.

## 2024-10-22 NOTE — ED QUICK NOTES
Orders for admission, patient is aware of plan and ready to go upstairs. Any questions, please call ED RN Brandy at extension [phone].     Patient Covid vaccination status: Unvaccinated     COVID Test Ordered in ED: None    COVID Suspicion at Admission: N/A    Running Infusions:  None    Mental Status/LOC at time of transport: x4    Other pertinent information: parapalegic, chronic suprapubic catheter   CIWA score: N/A   NIH score:  N/A

## 2024-10-22 NOTE — H&P
NURSING ADMISSION NOTE      Patient admitted via Cart from ER  Oriented to room.  Safety precautions initiated.  Bed in low position.  Call light in reach.    Skin check completed with PCT Barbara, Redness noted to site of subprapubic cath. Cleansed, drain sponge placed.        Pt Aox4, RA , Tele. Suprapubic cath in place, draining kenia urine.

## 2024-10-22 NOTE — ED QUICK NOTES
Rounding Completed    Plan of Care reviewed. Waiting for labs and imaging.  Elimination needs assessed.  PT had large bowel movement, pericare and bed change performed by Nir pct and Trell pct.      Bed is locked and in lowest position. Call light within reach.

## 2024-10-22 NOTE — CONSULTS
INFECTIOUS DISEASE CONSULTATION    Clari Rosa Patient Status:  Emergency    1970 MRN [id number]   Formerly [facility] EMERGENCY DEPARTMENT Attending Karsten Beatty MD   Hosp Day # 0 PCP No primary care provider on file.       Requested by Dr. Beatty    Reason for Consultation:  Candiduria    History of Present Illness:  Clari Rosa is a a(n) 53 year old female paraplegic with neurogenic bladder and SP catheter, sent from NH for recurrent abdominal pain.  She has hospitalized last week with pain after SP catheter change.  CT shows chronic left hydro, no acute change. Urology did not recommend any intervention.  Repeat CT done in ED no change.  Urine culture was negative on previous admission. No fever      History:  Past Medical History:    Bipolar 1 disorder (HCC)    Dental caries    Diabetes (HCC)    MIKE (generalized anxiety disorder)    High blood pressure    History of diverticulitis of colon    Manipulative behavior    Neurogenic bladder    Obesity (BMI 30-39.9)    Paraplegia (HCC)    Sepsis following intra-abdominal surgery (HCC)    Serotonin syndrome    Sleep apnea    Suprapubic catheter (HCC)    Transverse myelitis (HCC)     Past Surgical History:   Procedure Laterality Date    Arthroscopy of joint unlisted      knee          Cholecystectomy      Part removal colon w end colostomy      2013    Tonsillectomy       Family History   Adopted: Yes      reports that she has quit smoking. Her smoking use included cigarettes. She has a 5 pack-year smoking history. She does not have any smokeless tobacco history on file. She reports that she does not drink alcohol and does not use drugs.      Allergies:  Allergies[1]    Medications:    Current Facility-Administered Medications:     morphINE PF 4 MG/ML injection 4 mg, 4 mg, Intravenous, Q30 Min PRN    glucose (Dex4) 15 GM/59ML oral liquid 15 g, 15 g, Oral, Q15 Min PRN **OR** glucose (Glutose)  40% oral gel 15 g, 15 g, Oral, Q15 Min PRN **OR** dextrose 50% injection 50 mL, 50 mL, Intravenous, Q15 Min PRN **OR** glucose (Dex4) 15 GM/59ML oral liquid 30 g, 30 g, Oral, Q15 Min PRN **OR** glucose (Glutose) 40% oral gel 30 g, 30 g, Oral, Q15 Min PRN  Medications Ordered Prior to Encounter[2]    Review of Systems:    A comprehensive 10 point review of systems was completed.  Pertinent positives and negatives noted in the the HPI.      Physical Exam:    General: No acute distress. Alert and oriented x 3.  Vital signs: Temp:  [98.7 °F (37.1 °C)] 98.7 °F (37.1 °C)  Pulse:  [68-83] 83  Resp:  [10-32] 17  BP: ()/(62-89) 110/79  SpO2:  [97 %-99 %] 99 %  There is no height or weight on file to calculate BMI.  HEENT: Moist mucous membranes. Extraocular muscles are intact.  Neck: No swelling, no masses  Respiratory: Non labored, symmetric exursion  Cardiovascular: No irregularities in rhythm  Abdomen: Soft, nontender, nondistended.  SP cath  Musculoskeletal: contracted, muscle wasting    Laboratory Data:  Laboratory data reviewed      Recent Labs   Lab 10/22/24  0734   RBC 4.30   HGB 10.6*   HCT 32.8*   MCV 76.3*   MCH 24.7*   MCHC 32.3   RDW 16.6   NEPRELIM 3.63   WBC 7.7   .0       Recent Labs   Lab 10/16/24  2152 10/18/24  0737 10/22/24  0734   * 192* 137*   BUN 15 15 11   CREATSERUM 0.78 0.79 0.76   CA 9.3 9.4 9.5   ALB 4.4  --  4.1    143 139   K 4.3 4.0 3.9    107 105   CO2 30.0 27.0 30.0   ALKPHO 68  --  64   AST 9  --  11   ALT 9*  --  <7*   BILT 0.2*  --  0.3   TP 7.0  --  6.8                Established Problem list:  Patient Active Problem List   Diagnosis    Paresis of lower extremity (HCC)    Transverse myelitis (HCC)    Bipolar 1 disorder (HCC)    Manipulative behavior    History of diverticulitis of colon    Serotonin syndrome    Obesity (BMI 30-39.9)    Dental caries    Type 2 diabetes mellitus without complication, with long-term current use of insulin (HCC)    Chronic  suprapubic catheter (HCC)    Bipolar I disorder depressed with atypical features (HCC)    Other acute pulmonary embolism, unspecified whether acute cor pulmonale present ([facility])    Hyperglycemia    Sepsis due to undetermined organism ([facility])    Severe episode of recurrent major depressive disorder, without psychotic features ([facility])    Generalized anxiety disorder with panic attacks    Sepsis due to urinary tract infection ([facility])    Lactic acid acidosis    Constipation, unspecified constipation type    Moderate depressed bipolar I disorder (HCC)    Acute chest pain    Depression, unspecified depression type    Cellulitis    Bipolar disorder, current episode mixed, severe, without psychotic features ([facility])    Chest pain    Acute cystitis without hematuria    Leukocytosis    Leukocytosis, unspecified type    Benign essential hypertension    COPD (chronic obstructive pulmonary disease) ([facility])    Neurogenic bladder    Urinary tract infection associated with catheterization of urinary tract, unspecified indwelling urinary catheter type, initial encounter ([facility])    Weakness generalized    Anemia    Cystitis       ASSESSMENT/PLAN:  1. Candiduria  Presence of SP catheter cw colonization  Admitted for recurent abdominal pain  -CT showing chronic left hydro  -UA positive yeast, no bacteria  --culture at [facility] candida albicans    No signs of sepsis or systemic infection  Can add fluconazole    evalaution      2. Bipolar disorder        Romulo Ingram MD, MD [facility]  [phone]         [1]   Allergies  Allergen Reactions    Pcn [Penicillins] HIVES    Sulfa Antibiotics HIVES    Radiology Contrast Iodinated Dyes DIZZINESS    Keflex [Cephalexin] UNKNOWN   [2]   Current Facility-Administered Medications on File Prior to Encounter   Medication Dose Route Frequency Provider Last Rate Last Admin    [COMPLETED] alum-mag hydroxide-simethicone (Maalox) 200-200-20 MG/5ML oral suspension 30 mL  30 mL Oral Once  Jay Napoles MD   30 mL at 10/18/24 0900    [COMPLETED] meropenem (Merrem) 500 mg in sodium chloride 0.9% 100 mL IVPB-MBP  500 mg Intravenous Q8H Romulo Ingram MD 33.3 mL/hr at 10/18/24 1156 500 mg at 10/18/24 1156    [] phenazopyridine (Pyridium) tab 100 mg  100 mg Oral TID CC Jay Napoles MD   100 mg at 10/18/24 1705    [COMPLETED] levoFLOXacin in dextrose 5% (Levaquin) 750 mg/150mL IVPB premix 750 mg  750 mg Intravenous Once Nj Ellison MD   Stopped at 10/17/24 0700    [COMPLETED] meropenem (Merrem) 1 g in sodium chloride 0.9% 100 mL IVPB-MBP  1 g Intravenous Once Nj Ellison MD   Stopped at 10/17/24 0700    [COMPLETED] magnesium oxide (Mag-Ox) tab 400 mg  400 mg Oral Once Stefan Haque MD   400 mg at 24 1007    [COMPLETED] ertapenem (Invanz) 1 g in sodium chloride 0.9% 100 mL IVPB-MBP  1 g Intravenous Once Terrell Sparrow  mL/hr at 24 1542 1 g at 24 1542    [COMPLETED] heparin (Porcine) 100 Units/mL lock flush 500 Units  5 mL Intracatheter Once Stefan Haque MD   500 Units at 24 1649    [COMPLETED] LORazepam (Ativan) tab 1 mg  1 mg Oral Once Boyd Lucia MD   1 mg at 24 1913    [COMPLETED] meclizine (Antivert) tab 25 mg  25 mg Oral Once Boyd Lucia MD   25 mg at 24    [COMPLETED] sodium chloride 0.9 % IV bolus 2,646 mL  30 mL/kg Intravenous Once Boyd Lucia MD   Stopped at 24 2345    [COMPLETED] cefTRIAXone (Rocephin) 1 g in sodium chloride 0.9% 100 mL IVPB-ADDV  1 g Intravenous Once Boyd Lucia MD   Stopped at 24 2332    [COMPLETED] ondansetron (Zofran) 4 MG/2ML injection 4 mg  4 mg Intravenous Once Doc Pradhan MD   4 mg at 24 0629    [COMPLETED] ondansetron (Zofran) 4 MG/2ML injection 4 mg  4 mg Intravenous Once Doc Pradhan MD   4 mg at 24 0814    [COMPLETED] heparin (Porcine) 100 Units/mL lock flush 500 Units  5 mL Intravenous Once Doc Pradhan MD   500 Units at 24 0816     [COMPLETED] magnesium oxide (Mag-Ox) tab 400 mg  400 mg Oral Once Tamara Jackson, DO   400 mg at 08/24/24 1001    [COMPLETED] heparin (Porcine) 100 Units/mL lock flush 500 Units  5 mL Intracatheter Once Tamara Jackson, DO   500 Units at 08/24/24 1431    [COMPLETED] magnesium oxide (Mag-Ox) tab 400 mg  400 mg Oral Once Christy Jacksont, DO   400 mg at 08/23/24 0829    [COMPLETED] magnesium oxide (Mag-Ox) tab 400 mg  400 mg Oral Once Christy Jacksont, DO   400 mg at 08/22/24 0931    [COMPLETED] cefTRIAXone (Rocephin) 2 g in sodium chloride 0.9% 100 mL IVPB-ADDV  2 g Intravenous Once Boyd Orozco  mL/hr at 08/21/24 0115 2 g at 08/21/24 0115    [COMPLETED] sodium chloride 0.9 % IV bolus 2,763 mL  30 mL/kg Intravenous Once Boyd Orozco  mL/hr at 08/21/24 0027 2,763 mL at 08/21/24 0027    [COMPLETED] magnesium oxide (Mag-Ox) tab 400 mg  400 mg Oral Once Loni Rodriguez MD   400 mg at 08/21/24 0528    [COMPLETED] sodium chloride 0.9 % IV bolus 1,000 mL  1,000 mL Intravenous Once Loni Rodriguez MD 1,000 mL/hr at 08/21/24 0525 1,000 mL at 08/21/24 0525    [COMPLETED] sodium chloride 0.9 % IV bolus 1,000 mL  1,000 mL Intravenous Once Boyd Orozco DO   Stopped at 08/21/24 0051    [COMPLETED] ondansetron (Zofran) 4 MG/2ML injection 4 mg  4 mg Intravenous Once Boyd Orozco DO   4 mg at 08/20/24 2350    [COMPLETED] LORazepam (Ativan) tab 0.5 mg  0.5 mg Oral Once Cristina Stallings MD   0.5 mg at 08/05/24 0729    [COMPLETED] ondansetron (Zofran-ODT) disintegrating tab 4 mg  4 mg Oral Once Cristina Stallings MD   4 mg at 08/05/24 0826    [COMPLETED] vancomycin (Vancocin) cap 125 mg  125 mg Oral Once Cristina Stallings MD   125 mg at 08/05/24 0826    [COMPLETED] apixaban (Eliquis) tab 10 mg  10 mg Oral Once Mahendra Parmar MD   10 mg at 08/04/24 0138    [COMPLETED] heparin sodium lock flush 100 UNIT/ML injection 500 Units  500 Units Intravenous Once Mahendra Parmar MD   500 Units at 08/04/24  0139    [COMPLETED] ondansetron (Zofran-ODT) disintegrating tab 4 mg  4 mg Oral Once Rosario Collins MD   4 mg at 24 180    [COMPLETED] clonazePAM (KlonoPIN) tab 0.5 mg  0.5 mg Oral Once Rosario Collins MD   0.5 mg at 24    [COMPLETED] sodium chloride 0.9 % IV bolus 1,000 mL  1,000 mL Intravenous Once Mahendra Parmar MD   Stopped at 24 0110    [COMPLETED] droperidol (Inapsine) 2.5 mg/mL injection 1.25 mg  1.25 mg Intravenous Once Mahendra Parmar MD   1.25 mg at 24 180    [COMPLETED] dicyclomine (Bentyl) tab 20 mg  20 mg Oral Once Mahendra Parmar MD   20 mg at 24 175    [COMPLETED] valproic acid (Depakene) cap 500 mg  500 mg Oral Once Mahendra Parmar MD   500 mg at 24 181    [COMPLETED] fosfomycin (Monurol) 3 g oral packet  3 g Oral Once Mahendra Parmar MD   3 g at 24 212     Current Outpatient Medications on File Prior to Encounter   Medication Sig Dispense Refill    clonazePAM 0.5 MG Oral Tab Take 3 tablets (1.5 mg total) by mouth 3 (three) times daily as needed for Anxiety. 15 tablet 0    HYDROcodone-acetaminophen  MG Oral Tab Take 1 tablet by mouth every 6 (six) hours as needed for Pain. 5 tablet 0    nystatin 100,000 Units/g External Cream Apply 1 Application topically in the morning, at noon, and at bedtime.      loperamide 2 MG Oral Cap Take 1 capsule (2 mg total) by mouth 4 (four) times daily as needed for Diarrhea.      meclizine 25 MG Oral Tab Take 1 tablet (25 mg total) by mouth 3 (three) times daily as needed.      DULoxetine 60 MG Oral Cap DR Particles Take 1 capsule (60 mg total) by mouth daily.      [] magnesium oxide 400 MG Oral Tab Take 1 tablet (400 mg total) by mouth daily. 30 tablet 0    apixaban 5 MG Oral Tab Take 1 tablet (5 mg total) by mouth 2 (two) times daily.      QUEtiapine 100 MG Oral Tab Take 2 tablets (200 mg total) by mouth nightly.      insulin glargine 100 UNIT/ML Subcutaneous Solution Inject 6 Units into the  skin nightly.      insulin aspart 100 Units/mL Subcutaneous Solution Pen-injector Inject 10 Units into the skin 3 (three) times daily with meals.      phenazopyridine 100 MG Oral Tab Take 1 tablet (100 mg total) by mouth 3 (three) times daily as needed for Pain. 5 tablet 0    Insulin Pen Needle 32G X 4 MM Does not apply Misc May substitute if not on insurance formulary 100 each 5    metoprolol tartrate 25 MG Oral Tab Take 1 tablet (25 mg total) by mouth 2 (two) times daily.      traZODone 50 MG Oral Tab Take 1 tablet (50 mg total) by mouth nightly as needed for Sleep.      Nystatin 159589 UNIT/GM External Powder Apply 1 Application topically as needed for Dry Skin. Under breast and groin area      acetaminophen 325 MG Oral Tab Take 2 tablets (650 mg total) by mouth every 6 (six) hours as needed for Pain.      Icosapent Ethyl 1 g Oral Cap Take 2 capsules by mouth 4 (four) times daily.      Melatonin 3 MG Oral Cap Take 1 capsule (3 mg total) by mouth at bedtime.      ergocalciferol 1.25 MG (49479 UT) Oral Cap Take 1 capsule (50,000 Units total) by mouth every 7 days. Every monday      bethanechol 10 MG Oral Tab Take 1 tablet (10 mg total) by mouth daily.      albuterol 108 (90 Base) MCG/ACT Inhalation Aero Soln Inhale 2 puffs into the lungs every 6 (six) hours as needed for Wheezing or Shortness of Breath.      Multiple Vitamins-Minerals (THERA-M) Oral Tab Take 1 tablet by mouth daily.      divalproex Sodium  MG Oral Tablet 24 Hr Take 1 tablet (500 mg total) by mouth 2 (two) times daily.      ondansetron 4 MG Oral Tablet Dispersible Take 1 tablet (4 mg total) by mouth every 8 (eight) hours as needed for Nausea.      metFORMIN  MG Oral Tablet 24 Hr Take 2 tablets (1,000 mg total) by mouth 2 (two) times daily with meals. 30 tablet 0

## 2024-10-22 NOTE — ED PROVIDER NOTES
Patient Seen in: [facility] Emergency Department      History     Chief Complaint   Patient presents with    Urinary Symptoms     Stated Complaint: uti    Subjective:   HPI      53-year-old female who presents to the emergency department complaint of having suprapubic discomfort.  The patient has a known suprapubic catheter in reviewing her records she was recently hospitalized on 10/16/2024 for presumed urinary tract infection.  At that time was found that the patient did not have a catheter associated urinary tract infection.  Antibiotics were stopped at that time.  She does have a history of ESBL.  She has had multiple urinary tract infections in the past with sepsis.  She denies fevers or chills.  No nausea.    Objective:     Past Medical History:    Bipolar 1 disorder (HCC)    Dental caries    Diabetes (HCC)    MIKE (generalized anxiety disorder)    High blood pressure    History of diverticulitis of colon    Manipulative behavior    Neurogenic bladder    Obesity (BMI 30-39.9)    Paraplegia (HCC)    Sepsis following intra-abdominal surgery (HCC)    Serotonin syndrome    Sleep apnea    Suprapubic catheter (HCC)    Transverse myelitis (HCC)              Past Surgical History:   Procedure Laterality Date    Arthroscopy of joint unlisted      knee          Cholecystectomy      Part removal colon w end colostomy      2013    Tonsillectomy                  Social History     Socioeconomic History    Marital status: Single   Tobacco Use    Smoking status: Former     Current packs/day: 1.00     Average packs/day: 1 pack/day for 5.0 years (5.0 ttl pk-yrs)     Types: Cigarettes    Tobacco comments:     quit    Vaping Use    Vaping status: Never Used   Substance and Sexual Activity    Alcohol use: No    Drug use: No     Social Drivers of Health     Food Insecurity: No Food Insecurity (10/17/2024)    Food Insecurity     Food Insecurity: Never true   Transportation Needs: No Transportation Needs  (10/17/2024)    Transportation Needs     Lack of Transportation: No   Housing Stability: Low Risk  (10/17/2024)    Housing Stability     Housing Instability: No   Recent Concern: Housing Stability - High Risk (10/9/2024)    Received from [facility]    Housing Stability     Are you concerned about having a safe and reliable place to live?: Yes                  Physical Exam     ED Triage Vitals   BP 10/22/24 0701 90/65   Pulse 10/22/24 0659 72   Resp 10/22/24 0659 21   Temp 10/22/24 0659 98.7 °F (37.1 °C)   Temp src --    SpO2 10/22/24 0659 97 %   O2 Device 10/22/24 0659 None (Room air)       Current Vitals:   Vital Signs  BP: 120/69  Pulse: 79  Resp: 16  Temp: 98.7 °F (37.1 °C)  MAP (mmHg): 85    Oxygen Therapy  SpO2: 96 %  O2 Device: None (Room air)        Physical Exam  General: This a pleasant nontoxic appearing patient in no apparent distress alert and oriented ×3  HEENT: Head is atraumatic normocephalic.  Lips are dry.  Tongue is midline.  No scleral icterus or conjunctival pallor.    Lungs: Clear to auscultation bilaterally.  No wheezes, rhonchi, or rales appreciated.  No accessory muscle use noted for breathing.  Cardiac: Regular rate and rhythm.  Normal S1 and 2 without murmurs or ectopy appreciated  Abdomen: Midline abdominal scar noted.  Some mild suprapubic discomfort on palpation.    Extremities: There are some edema in the lower extremities.  ED Course     Labs Reviewed   COMP METABOLIC PANEL (14) - Abnormal; Notable for the following components:       Result Value    Glucose 137 (*)     ALT <7 (*)     All other components within normal limits   CBC WITH DIFFERENTIAL WITH PLATELET - Abnormal; Notable for the following components:    HGB 10.6 (*)     HCT 32.8 (*)     MCV 76.3 (*)     MCH 24.7 (*)     All other components within normal limits   URINALYSIS WITH CULTURE REFLEX - Abnormal; Notable for the following components:    Clarity Urine Turbid (*)     Ketones Urine Trace (*)      Blood Urine 3+ (*)     Protein Urine 70 (*)     Leukocyte Esterase Urine 500 (*)     WBC Urine >50 (*)     RBC Urine >10 (*)     Squamous Epi. Cells Few (*)     Hyaline Casts Present (*)     Yeast Urine Present (*)     All other components within normal limits   LACTIC ACID, PLASMA - Normal   BLOOD CULTURE   BLOOD CULTURE   URINE CULTURE, ROUTINE   Narrative  PROCEDURE:  CT ABDOMEN+PELVIS KIDNEYSTONE 2D RNDR(NO IV,NO ORAL)(CPT=74176)     COMPARISON:  [facility] , CT, CT ABDOMEN+PELVIS KIDNEYSTONE 2D RNDR(NO IV,NO ORAL)(CPT=74176), 10/16/2024, 10:15 PM.     INDICATIONS:  suprapubic pain     TECHNIQUE:  Unenhanced multislice CT scanning from above the kidneys to below the urinary bladder.  2D rendering are generated on the CT scanner workstation to localize potential stones in the cranio-caudal plane.  Dose reduction techniques were used.  Dose information is transmitted to the ACR (American College of Radiology) NRDR (National Radiology Data Registry) which includes the Dose Index Registry.     PATIENT STATED HISTORY: (As transcribed by Technologist)  Patient presents with abdominal pain all over with UTI symptoms.  Florentino in place.  Hx Multiple UTIs.      FINDINGS: Evaluation of the visceral organs is limited due to the lack of IV contrast.  LUNG BASE:  Scattered atelectasis/scarring.  LIVER:  Unremarkable.  BILIARY:  Status post cholecystectomy.  SPLEEN:  Unremarkable.  PANCREAS:  Unremarkable.  ADRENALS:  Unremarkable.  KIDNEYS:  There is relatively stable moderate to severe left hydroureteronephrosis without obstructing stone identified.  There is mild left perinephric stranding.  While the findings are concerning for pyelonephritis, a distal obstructing mass or  stricture not excluded.  Clinical correlation recommended.  There is mild nonspecific right perinephric stranding.  No urinary calculi identified.  Asymmetric left renal atrophy noted.  AORTA/VASCULAR:  Unremarkable.  RETROPERITONEUM:   Unremarkable.  BOWEL/MESENTERY:  There are postoperative changes from previous right hemicolectomy noted.  Scattered feces in the colon.  Uncomplicated colonic diverticulosis.  No large or small bowel dilatation.  No free air or free fluid.  ABDOMINAL WALL:  Suprapubic catheter noted.  Scarring from previous surgery.  Diastasis of the rectus sheath.  PELVIC ORGANS:  Suprapubic catheter noted.  Moderate urinary bladder wall thickening with fatty induration is concerning for cystitis.  Clinical correlation recommended.  Unremarkable uterus and ovaries.  LYMPH NODES:  No lymphadenopathy in the abdomen or pelvis.  BONES:  Degenerative changes in the spine and hips.  OTHER:  None.                  Impression  CONCLUSION:       1. There is relatively stable moderate to severe left hydroureteronephrosis without obstructing stone identified.  There is mild left perinephric stranding.  While the findings are concerning for pyelonephritis, a distal obstructing mass or stricture not   excluded.  Clinical correlation recommended.     2. Suprapubic catheter noted.  Moderate urinary bladder wall thickening with fatty induration is concerning for cystitis.  Clinical correlation recommended.       3. Uncomplicated colonic diverticulosis.       Please see above for further details.     LOCATION:  [facility]        Dictated by (CST): Allan Spear MD on 10/22/2024 at 10:50 AM      Finalized by (CST): Allan Spear MD on 10/22/2024 at 10:54 AM                  MDM    Differential diagnosis includes but is not limited to cystitis, sepsis due to urinary tract infection, bladder spasms, bladder obstruction.  Laboratories were sent.  Admission disposition: 10/22/2024  3:15 PM       Lactic acid was 1.0.  Urinalysis greater than 50 white blood cells greater than 10 red blood cells.  Glucose 137.  Hemoglobin of 10.6 medic at 32.8.  CT scan was performed due to the patient's abdominal pain  I reviewed the x-rays and agree with the radiologist  report that showed relatively stable moderate t to severe left hydronephrosis without obstructing stone identified.  There is mild left perinephric stranding.  All the findings are concerning for pyelonephritis and distal obstructing mass or stricture not excluded.  Suprapubic catheter is noted.  Moderate urinary bladder wall thickening with fatty induration is concerning for cystitis.  Uncomplicated colonic diverticulosis.  I explained to the patient that her findings appear to be similar to her recent hospitalization and nothing appears to be changed significantly but she has had pain inside a proportion to her exam.  I spoke to the infectious disease service and they agreed with my plan of observation the patient to see whether or not her cultures grow but not starting antibiotic therapy at this time.  I spoke to the initial hospital service from [facility] considering that the patient appeared to be a duly patient but they said the patient does not belong to them and [facility]  [facility] then told me that this should be duly patient.  I spoke to the [facility] hospitalist service once again and they also stated they would talk to the [facility] who called back stating that they would finally take the patient from [facility] service.  The patient was admitted for continued care.  Medical Decision Making      Disposition and Plan     Clinical Impression:  1. Cystitis    2. Chronic abdominal pain         Disposition:  Admit  10/22/2024  3:15 pm    Follow-up:  No follow-up provider specified.        Medications Prescribed:  Current Discharge Medication List              Supplementary Documentation:         Hospital Problems       Present on Admission  Date Reviewed: 10/16/2024            ICD-10-CM Noted POA    * (Principal) Cystitis N30.90 10/22/2024 Unknown    Anemia D64.9 10/22/2024 Yes

## 2024-10-22 NOTE — ED QUICK NOTES
PT GAVE OKAY FOR UPDATE TO UPDATE HER MOTHER, PT'S MOTHER ON THE PHONE.  GAVE UPDATE ON PLAN OF CARE.

## 2024-10-23 LAB
ALBUMIN SERPL-MCNC: 3.9 G/DL (ref 3.2–4.8)
ALBUMIN/GLOB SERPL: 1.6 {RATIO} (ref 1–2)
ALP LIVER SERPL-CCNC: 88 U/L
ALT SERPL-CCNC: 16 U/L
ANION GAP SERPL CALC-SCNC: 4 MMOL/L (ref 0–18)
AST SERPL-CCNC: 18 U/L (ref ?–34)
BASOPHILS # BLD AUTO: 0.03 X10(3) UL (ref 0–0.2)
BASOPHILS NFR BLD AUTO: 0.3 %
BILIRUB SERPL-MCNC: 0.2 MG/DL (ref 0.3–1.2)
BILIRUB UR QL STRIP.AUTO: NEGATIVE
BUN BLD-MCNC: 12 MG/DL (ref 9–23)
C DIFF TOX B STL QL: NEGATIVE
CALCIUM BLD-MCNC: 9.3 MG/DL (ref 8.7–10.4)
CHLORIDE SERPL-SCNC: 103 MMOL/L (ref 98–112)
CO2 SERPL-SCNC: 29 MMOL/L (ref 21–32)
COLOR UR AUTO: YELLOW
CREAT BLD-MCNC: 0.65 MG/DL
EGFRCR SERPLBLD CKD-EPI 2021: 105 ML/MIN/1.73M2 (ref 60–?)
EOSINOPHIL # BLD AUTO: 0.36 X10(3) UL (ref 0–0.7)
EOSINOPHIL NFR BLD AUTO: 3.8 %
ERYTHROCYTE [DISTWIDTH] IN BLOOD BY AUTOMATED COUNT: 16.3 %
GLOBULIN PLAS-MCNC: 2.4 G/DL (ref 2–3.5)
GLUCOSE BLD-MCNC: 158 MG/DL (ref 70–99)
GLUCOSE BLD-MCNC: 169 MG/DL (ref 70–99)
GLUCOSE BLD-MCNC: 170 MG/DL (ref 70–99)
GLUCOSE BLD-MCNC: 192 MG/DL (ref 70–99)
GLUCOSE BLD-MCNC: 210 MG/DL (ref 70–99)
GLUCOSE UR STRIP.AUTO-MCNC: NORMAL MG/DL
HCT VFR BLD AUTO: 29.5 %
HGB BLD-MCNC: 9.2 G/DL
IMM GRANULOCYTES # BLD AUTO: 0.03 X10(3) UL (ref 0–1)
IMM GRANULOCYTES NFR BLD: 0.3 %
KETONES UR STRIP.AUTO-MCNC: 10 MG/DL
LEUKOCYTE ESTERASE UR QL STRIP.AUTO: 500
LYMPHOCYTES # BLD AUTO: 2.89 X10(3) UL (ref 1–4)
LYMPHOCYTES NFR BLD AUTO: 30.9 %
MCH RBC QN AUTO: 24.6 PG (ref 26–34)
MCHC RBC AUTO-ENTMCNC: 31.2 G/DL (ref 31–37)
MCV RBC AUTO: 78.9 FL
MONOCYTES # BLD AUTO: 0.35 X10(3) UL (ref 0.1–1)
MONOCYTES NFR BLD AUTO: 3.7 %
NEUTROPHILS # BLD AUTO: 5.7 X10 (3) UL (ref 1.5–7.7)
NEUTROPHILS # BLD AUTO: 5.7 X10(3) UL (ref 1.5–7.7)
NEUTROPHILS NFR BLD AUTO: 61 %
NITRITE UR QL STRIP.AUTO: NEGATIVE
OSMOLALITY SERPL CALC.SUM OF ELEC: 285 MOSM/KG (ref 275–295)
PH UR STRIP.AUTO: 5.5 [PH] (ref 5–8)
PLATELET # BLD AUTO: 233 10(3)UL (ref 150–450)
POTASSIUM SERPL-SCNC: 3.6 MMOL/L (ref 3.5–5.1)
PROT SERPL-MCNC: 6.3 G/DL (ref 5.7–8.2)
PROT UR STRIP.AUTO-MCNC: 30 MG/DL
RBC # BLD AUTO: 3.74 X10(6)UL
RBC #/AREA URNS AUTO: >10 /HPF
SARS-COV-2 RNA RESP QL NAA+PROBE: NOT DETECTED
SODIUM SERPL-SCNC: 136 MMOL/L (ref 136–145)
SP GR UR STRIP.AUTO: 1.02 (ref 1–1.03)
UROBILINOGEN UR STRIP.AUTO-MCNC: NORMAL MG/DL
WBC # BLD AUTO: 9.4 X10(3) UL (ref 4–11)
WBC #/AREA URNS AUTO: >50 /HPF
YEAST UR QL: PRESENT /HPF

## 2024-10-23 PROCEDURE — 99232 SBSQ HOSP IP/OBS MODERATE 35: CPT | Performed by: HOSPITALIST

## 2024-10-23 RX ORDER — OXYBUTYNIN CHLORIDE 5 MG/1
5 TABLET ORAL 4 TIMES DAILY PRN
Status: DISCONTINUED | OUTPATIENT
Start: 2024-10-23 | End: 2024-10-23

## 2024-10-23 RX ORDER — PHENAZOPYRIDINE HYDROCHLORIDE 100 MG/1
100 TABLET, FILM COATED ORAL
Status: DISCONTINUED | OUTPATIENT
Start: 2024-10-23 | End: 2024-10-23

## 2024-10-23 RX ORDER — HYDROCODONE BITARTRATE AND ACETAMINOPHEN 5; 325 MG/1; MG/1
1 TABLET ORAL EVERY 6 HOURS PRN
Status: DISCONTINUED | OUTPATIENT
Start: 2024-10-23 | End: 2024-10-24

## 2024-10-23 RX ORDER — OXYBUTYNIN CHLORIDE 5 MG/1
5 TABLET ORAL 3 TIMES DAILY
Status: DISCONTINUED | OUTPATIENT
Start: 2024-10-24 | End: 2024-10-24

## 2024-10-23 RX ORDER — PHENAZOPYRIDINE HYDROCHLORIDE 100 MG/1
100 TABLET, FILM COATED ORAL
Status: DISCONTINUED | OUTPATIENT
Start: 2024-10-23 | End: 2024-10-24

## 2024-10-23 NOTE — PROGRESS NOTES
INFECTIOUS DISEASE  PROGRESS NOTE            [facility]    Clari Rosa Patient Status:  Inpatient    1970 MRN [id number]   [facility] 3SW-A Attending Juan Miguel Daniels DO   Hosp Day # 1 PCP No primary care provider on file.     Antibiotics: fluconazole #1    Subjective:  : co abdominal pain and leakage  Per RN there was no leakage, SP draining into bag    Objective:  Temp:  [97.6 °F (36.4 °C)-98.4 °F (36.9 °C)] 98.4 °F (36.9 °C)  Pulse:  [71-88] 81  Resp:  [10-18] 18  BP: ()/(61-89) 106/67  SpO2:  [93 %-100 %] 98 %    General: awake alert  Vital signs:Temp:  [97.6 °F (36.4 °C)-98.4 °F (36.9 °C)] 98.4 °F (36.9 °C)  Pulse:  [71-88] 81  Resp:  [10-18] 18  BP: ()/(61-89) 106/67  SpO2:  [93 %-100 %] 98 %  HEENT: Moist mucous membranes. Extraocular muscles are intact.  Neck: supple no masses  Respiratory: Non labored, symmetric excursion, normal respirations  Abdomen: SP cath in place  Musculoskeletal: joints: no swelling   Integument: No lesions. No erythema. No open wounds.  Labs:     COVID-19 Lab Results    COVID-19  Lab Results   Component Value Date    COVID19 Not Detected 10/23/2024    COVID19 Not Detected 10/16/2024    COVID19 Not Detected 2024       Pro-Calcitonin  No results for input(s): \"PCT\" in the last 168 hours.    Cardiac  No results for input(s): \"TROP\", \"PBNP\" in the last 168 hours.    Creatinine Kinase  No results for input(s): \"CK\" in the last 168 hours.    Inflammatory Markers  No results for input(s): \"CRP\", \"GERMAN\", \"LDH\", \"DDIMER\" in the last 168 hours.    Recent Labs   Lab 10/23/24  0505   RBC 3.74*   HGB 9.2*   HCT 29.5*   MCV 78.9*   MCH 24.6*   MCHC 31.2   RDW 16.3   NEPRELIM 5.70   WBC 9.4   .0         Recent Labs   Lab 10/16/24  2152 10/18/24  0737 10/22/24  0734 10/23/24  0521   * 192* 137* 158*   BUN 15 15 11 12   CREATSERUM 0.78 0.79 0.76 0.65   CA 9.3 9.4 9.5 9.3   ALB 4.4  --  4.1 3.9    143 139 136   K 4.3 4.0 3.9 3.6   CL  103 107 105 103   CO2 30.0 27.0 30.0 29.0   ALKPHO 68  --  64 88   AST 9  --  11 18   ALT 9*  --  <7* 16   BILT 0.2*  --  0.3 0.2*   TP 7.0  --  6.8 6.3       No results found for: \"VANCT\"  Microbiology    No results found for this visit on 10/22/24.      Problem list reviewed:  Patient Active Problem List   Diagnosis    Paresis of lower extremity ([facility])    Transverse myelitis ([facility])    Bipolar 1 disorder ([facility])    Manipulative behavior    History of diverticulitis of colon    Serotonin syndrome    Obesity (BMI 30-39.9)    Dental caries    Type 2 diabetes mellitus without complication, with long-term current use of insulin ([facility])    Chronic suprapubic catheter ([facility])    Bipolar I disorder depressed with atypical features ([facility])    Other acute pulmonary embolism, unspecified whether acute cor pulmonale present ([facility])    Hyperglycemia    Sepsis due to undetermined organism ([facility])    Severe episode of recurrent major depressive disorder, without psychotic features ([facility])    Generalized anxiety disorder with panic attacks    Sepsis due to urinary tract infection ([facility])    Lactic acid acidosis    Constipation, unspecified constipation type    Moderate depressed bipolar I disorder ([facility])    Acute chest pain    Depression, unspecified depression type    Cellulitis    Bipolar disorder, current episode mixed, severe, without psychotic features ([facility])    Chest pain    Acute cystitis without hematuria    Leukocytosis    Leukocytosis, unspecified type    Benign essential hypertension    COPD (chronic obstructive pulmonary disease) ([facility])    Neurogenic bladder    Urinary tract infection associated with catheterization of urinary tract, unspecified indwelling urinary catheter type, initial encounter ([facility])    Weakness generalized    Anemia    Cystitis    UTI (urinary tract infection)    Chronic abdominal pain             ASSESSMENT/PLAN:  1. Candiduria  In association with suprapubic catheter colonization  Reporting abdominal pain, CT no acute  changes, chronic hydro   following  Can continue fluconazole pending micro updates    2. Bipolar disorder    Romulo Ingram MD, MD  [facility]  [phone]

## 2024-10-23 NOTE — PROGRESS NOTES
Called by nursing that pt having co  bladder spasms and some leak per urethra.      PLAN      Change SPT - biofilm can cause spasms . Suggest Florentino in SP tube be changed  every 2-4 weeks - depending on complaints.  1st treatment is always to change  the SPT is pt's complaining   Irrigate Florentino w 180 ML  to confirm if any sediment in bladder now and PRN   On Pyridum   PRN Ditropan for bladder spasms       As pt is refusing - I don't have much else to offer by a diaper or Purewick

## 2024-10-23 NOTE — PLAN OF CARE
Pt A&Ox4 VSS on RA or 2L PRN. Tele NSR. IV pain meds given. Bedbound at baseline, hx paraplegia - clinitron bed. Voiding via suprapubic catheter. Plan to continue antifungals, and pain management. Call light in reach, safety measures in place.

## 2024-10-23 NOTE — CM/SW NOTE
10/23/24 0800   CM/SW Referral Data   Referral Source Social Work (self-referral)   Reason for Referral Discharge planning   Informant EMR;Clinical Staff Member   Patient Info   Patient's Current Mental Status at Time of Assessment Alert;Oriented   Patient's Home Environment Long Term Care Facility   Post Acute Care Provider Upon Admission Shannon Narayan   Discharge Needs   Anticipated D/C needs Transportation services;Long term care facility       Patient is a 54 y/o known long term care resident at [facility] in [place].  Anticipate return to NH at [place].  Updates sent via [employer].  Await medical clearance for discharge.  / to remain available for support and/or discharge planning.     [facility]  P:[phone]  F:[phone]    Theresa Oseguera [facility]  Discharge Planner  [phone]    Addendum:  Received message from Livia, liaison with [facility] who confirmed pt can be accepted for readmission at [place].  She stated that pt called 911 without notifying NH staff to demand return to hospital.  Pt with history of bipolar disorder and manipulative behaviors per chart review.  Await medical clearance for discharge.

## 2024-10-23 NOTE — PROGRESS NOTES
[facility]   part of [facility]     Hospitalist Progress Note     Clari Rosa Patient Status:  Inpatient    1970 MRN [id number]   Location [facility] 3SW-A Attending Juan Miguel Daniels DO   Hosp Day # 1 PCP No primary care provider on file.     Chief Complaint: concerns of kidney pain    Subjective:     Patient states she has \"kidney pain\" but states she doesn't want pain meds. She denies n/v.  She also states she has a dental abscess and is having a hard time getting to see dentist        Objective:    Review of Systems:   A comprehensive review of systems was completed; pertinent positive and negatives stated in subjective.    Vital signs:  Temp:  [97.6 °F (36.4 °C)-98.4 °F (36.9 °C)] 98.4 °F (36.9 °C)  Pulse:  [71-88] 81  Resp:  [10-18] 18  BP: ()/(61-89) 106/67  SpO2:  [93 %-100 %] 98 %    Physical Exam:    General: No acute distress  Respiratory: No wheezes, no rhonchi  Cardiovascular: S1, S2, regular rate and rhythm  Abdomen: Soft, Non-tender, non-distended, positive bowel sounds  Neuro: No new focal deficits.   Extremities: No edema      Diagnostic Data:    Labs:  Recent Labs   Lab 10/16/24  2152 10/18/24  0737 10/22/24  0734 10/23/24  0505   WBC 12.1* 9.0 7.7 9.4   HGB 11.4* 10.3* 10.6* 9.2*   MCV 76.5* 79.8* 76.3* 78.9*   .0 239.0 248.0 233.0       Recent Labs   Lab 10/16/24  2152 10/18/24  0737 10/22/24  0734 10/23/24  0521   * 192* 137* 158*   BUN 15 15 11 12   CREATSERUM 0.78 0.79 0.76 0.65   CA 9.3 9.4 9.5 9.3   ALB 4.4  --  4.1 3.9    143 139 136   K 4.3 4.0 3.9 3.6    107 105 103   CO2 30.0 27.0 30.0 29.0   ALKPHO 68  --  64 88   AST 9  --  11 18   ALT 9*  --  <7* 16   BILT 0.2*  --  0.3 0.2*   TP 7.0  --  6.8 6.3       Estimated Creatinine Clearance: 71.9 mL/min (based on SCr of 0.65 mg/dL).    Recent Labs   Lab 10/18/24  1753   TROPHS <3       No results for input(s): \"PTP\", \"INR\" in the last 168 hours.               Microbiology    No results found  for this visit on 10/22/24.      Imaging: Reviewed in Epic.    Medications:    phenazopyridine  100 mg Oral TID CC    fluconazole  200 mg Oral Q24H    apixaban  5 mg Oral BID    bethanechol  10 mg Oral Daily    divalproex ER  500 mg Oral BID    DULoxetine  60 mg Oral Daily    insulin degludec  5 Units Subcutaneous Daily    metoprolol tartrate  25 mg Oral BID    QUEtiapine  200 mg Oral Nightly    insulin aspart  1-68 Units Subcutaneous TID CC    insulin aspart  1-30 Units Subcutaneous TID AC and HS       Assessment & Plan:      #Abnormal UA  #Hx of MDRO  #Candiduria   -ID eval noted  -fluconazole    #Moderate hydroureteronephrosis  #Neurogenic bladder  -urology eval noted from most recent hospitalization, pt wants re-evaluation    #DM2  -A1c 7.8 as of this admission  -ISS + Carb coverage + Tresiba    #HTN    #Anxiety  #Bipolar disorder    #COPD    #VTE on DOAC    #ANAID    #Obesity  -Body mass index is 36.91 kg/m².      Juan Miguel Daniels DO    Supplementary Documentation:     Quality:  DVT Mechanical Prophylaxis:   SCDs,    DVT Pharmacologic Prophylaxis   Medication    apixaban (Eliquis) tab 5 mg    heparin (Porcine) 100 Units/mL lock flush 500 Units                Code Status: Full Code  Florentino: Suprapubic catheter in place  External urinary catheter in place  Florentino Duration (in days):   Central line:    MARINA: 10/23/2024    Discharge is dependent on: clinical improvement  At this point Ms. Rosa is expected to be discharge to: tbd    The 21st Century Cures Act makes medical notes like these available to patients in the interest of transparency. Please be advised this is a medical document. Medical documents are intended to carry relevant information, facts as evident, and the clinical opinion of the practitioner. The medical note is intended as peer to peer communication and may appear blunt or direct. It is written in medical language and may contain abbreviations or verbiage that are unfamiliar.               **Certification      PHYSICIAN Certification of Need for Inpatient Hospitalization - Initial Certification    Patient will require inpatient services that will reasonably be expected to span two midnight's based on the clinical documentation in H+P.   Based on patients current state of illness, I anticipate that, after discharge, patient will require TBD.

## 2024-10-23 NOTE — PLAN OF CARE
Pt Aox4, RA  Tele. Suprapubic cath in place- pt continues to report voiding, scant urine noted on Purewick. Bedrest. Declining SCDs.     Pt behaviors very manipulative.  Pt asked yesterday for air bed due to chronic immobility/paraplegia. Bed ordered and pt placed on bed overnight. Pt now asking for a regular bed, refusing to allow staff to turn on the air on the specialty bed.     Pt continually calling on call light approx q15 minutes or less stating that her brief is wet. Brief checked each time and brief is dry.  Purewick placed however pt asking for purewick to be changed every time she voids. Educated patient that it does not need to be changed that frequently unless soiled with stool. Pt continues to demand purewick be changed. Pt stating al her jhon area is red an excoriated- only small amount of redness noted to rectal area- barrier cream applied.    Pt called RN in room stating that there was blood in her urine. Educated patient that dark orange color is due to the Pyridium she is taking. Pt argumentative that there is blood in her urine and it is not from the medication. Urine assessed by 2 other Rns and noted to be clear and orange with no blood noted.     Urology consulted- order for suprapubic cath to be exchanged and bladder irrigated. Pt refusing to allow this to be done despite being educated on the rationale for the exchange and irrigation. Urology PA made aware.     Pt noted to be manipulating the Florentino cath as well as putting the purewick in and out. Educated patient on importance of not touching the catheter as it increases her risk for CAUTI. Pt non compliant and continues to manipulate Florentino as well as purewick.     Pt continues to report severe excruciating pain requesting morphine, Discussed with patient that only po meds will be given, per her discussion with MD earlier.  pt continues to call on call light multiple times an hour requesting IV pain meds.     Update: Pt refusing to have IV  fluids running- disconnected and IV now SL. MD aware. Pt requesting transfer to another facility, unhappy only receiving po norco. Dr Daniels aware.     Pt continues to decline exchange of catheter- rationale for exchange explained in depth, pt refuses to listen and just continues to repeat \"I'm leaking out\" will not answer question directly if she will allow cath to be exchanged and irrigated. Dr Day aware.

## 2024-10-24 VITALS
SYSTOLIC BLOOD PRESSURE: 96 MMHG | BODY MASS INDEX: 37 KG/M2 | HEART RATE: 66 BPM | RESPIRATION RATE: 18 BRPM | DIASTOLIC BLOOD PRESSURE: 58 MMHG | WEIGHT: 189 LBS | OXYGEN SATURATION: 91 % | TEMPERATURE: 98 F

## 2024-10-24 LAB
ALBUMIN SERPL-MCNC: 3.9 G/DL (ref 3.2–4.8)
ALBUMIN/GLOB SERPL: 1.6 {RATIO} (ref 1–2)
ALP LIVER SERPL-CCNC: 81 U/L
ALT SERPL-CCNC: 10 U/L
ANION GAP SERPL CALC-SCNC: 5 MMOL/L (ref 0–18)
AST SERPL-CCNC: 11 U/L (ref ?–34)
BASOPHILS # BLD AUTO: 0.03 X10(3) UL (ref 0–0.2)
BASOPHILS NFR BLD AUTO: 0.5 %
BILIRUB SERPL-MCNC: 0.2 MG/DL (ref 0.3–1.2)
BUN BLD-MCNC: 8 MG/DL (ref 9–23)
CALCIUM BLD-MCNC: 9.5 MG/DL (ref 8.7–10.4)
CHLORIDE SERPL-SCNC: 106 MMOL/L (ref 98–112)
CO2 SERPL-SCNC: 30 MMOL/L (ref 21–32)
CREAT BLD-MCNC: 0.74 MG/DL
EGFRCR SERPLBLD CKD-EPI 2021: 97 ML/MIN/1.73M2 (ref 60–?)
EOSINOPHIL # BLD AUTO: 0.34 X10(3) UL (ref 0–0.7)
EOSINOPHIL NFR BLD AUTO: 5.1 %
ERYTHROCYTE [DISTWIDTH] IN BLOOD BY AUTOMATED COUNT: 16.4 %
GLOBULIN PLAS-MCNC: 2.4 G/DL (ref 2–3.5)
GLUCOSE BLD-MCNC: 159 MG/DL (ref 70–99)
GLUCOSE BLD-MCNC: 175 MG/DL (ref 70–99)
HCT VFR BLD AUTO: 31.4 %
HGB BLD-MCNC: 9.8 G/DL
IMM GRANULOCYTES # BLD AUTO: 0.03 X10(3) UL (ref 0–1)
IMM GRANULOCYTES NFR BLD: 0.5 %
LYMPHOCYTES # BLD AUTO: 2.91 X10(3) UL (ref 1–4)
LYMPHOCYTES NFR BLD AUTO: 44 %
MAGNESIUM SERPL-MCNC: 1.7 MG/DL (ref 1.6–2.6)
MCH RBC QN AUTO: 24.5 PG (ref 26–34)
MCHC RBC AUTO-ENTMCNC: 31.2 G/DL (ref 31–37)
MCV RBC AUTO: 78.5 FL
MONOCYTES # BLD AUTO: 0.26 X10(3) UL (ref 0.1–1)
MONOCYTES NFR BLD AUTO: 3.9 %
NEUTROPHILS # BLD AUTO: 3.05 X10 (3) UL (ref 1.5–7.7)
NEUTROPHILS # BLD AUTO: 3.05 X10(3) UL (ref 1.5–7.7)
NEUTROPHILS NFR BLD AUTO: 46 %
OSMOLALITY SERPL CALC.SUM OF ELEC: 295 MOSM/KG (ref 275–295)
PLATELET # BLD AUTO: 251 10(3)UL (ref 150–450)
POTASSIUM SERPL-SCNC: 4 MMOL/L (ref 3.5–5.1)
PROT SERPL-MCNC: 6.3 G/DL (ref 5.7–8.2)
RBC # BLD AUTO: 4 X10(6)UL
SODIUM SERPL-SCNC: 141 MMOL/L (ref 136–145)
WBC # BLD AUTO: 6.6 X10(3) UL (ref 4–11)

## 2024-10-24 PROCEDURE — 99239 HOSP IP/OBS DSCHRG MGMT >30: CPT | Performed by: HOSPITALIST

## 2024-10-24 RX ORDER — OXYBUTYNIN CHLORIDE 5 MG/1
5 TABLET ORAL 3 TIMES DAILY PRN
Status: DISCONTINUED | OUTPATIENT
Start: 2024-10-24 | End: 2024-10-24

## 2024-10-24 RX ORDER — MAGNESIUM OXIDE 400 MG/1
400 TABLET ORAL ONCE
Status: DISCONTINUED | OUTPATIENT
Start: 2024-10-24 | End: 2024-10-24

## 2024-10-24 RX ORDER — OXYBUTYNIN CHLORIDE 5 MG/1
5 TABLET ORAL 3 TIMES DAILY
Status: SHIPPED | COMMUNITY
Start: 2024-10-24

## 2024-10-24 RX ORDER — HYDROCODONE BITARTRATE AND ACETAMINOPHEN 10; 325 MG/1; MG/1
1 TABLET ORAL EVERY 6 HOURS PRN
Status: DISCONTINUED | OUTPATIENT
Start: 2024-10-24 | End: 2024-10-24

## 2024-10-24 RX ORDER — HYDROCODONE BITARTRATE AND ACETAMINOPHEN 10; 325 MG/1; MG/1
1 TABLET ORAL EVERY 6 HOURS PRN
Qty: 20 TABLET | Refills: 0 | Status: SHIPPED | OUTPATIENT
Start: 2024-10-24

## 2024-10-24 RX ORDER — FLUCONAZOLE 200 MG/1
200 TABLET ORAL EVERY 24 HOURS
Qty: 7 TABLET | Refills: 0 | Status: SHIPPED | OUTPATIENT
Start: 2024-10-24 | End: 2024-10-31

## 2024-10-24 NOTE — CM/SW NOTE
Informed that pt is able to return to NH today.  Met with pt at bedside and pt agreeable with plan to return to [place].  She stated her \"kidneys hurt\" and that she wants her MDs to do more urine and blood cultures but understands this is up to her MDs to decided if it's needed.      Spoke with Livia from [place] who confirmed pt can return to NH today.  Ambulance transport arranged for 11am.  PCS form completed and available for RN to print.  Updated RN.  / to remain available for support and/or discharge planning.     [facility]  P:[phone]  F:[phone]    [facility]  [phone]    Theresa Oseguera [facility]  Discharge Planner  [phone]

## 2024-10-24 NOTE — PROGRESS NOTES
INFECTIOUS DISEASE  PROGRESS NOTE            [facility]    Clari Rosa Patient Status:  Inpatient    1970 MRN [id number]   [facility] 3SW-A Attending Juan Miguel Daniels DO   Hosp Day # 2 PCP No primary care provider on file.     Antibiotics: fluconazole #2    Subjective:  SP cath was changed  Objective:  Temp:  [97.8 °F (36.6 °C)-98.6 °F (37 °C)] 97.8 °F (36.6 °C)  Pulse:  [65-84] 66  Resp:  [18] 18  BP: ()/(52-67) 96/58  SpO2:  [91 %-92 %] 91 %    General: awake alert  Vital signs:Temp:  [97.8 °F (36.6 °C)-98.6 °F (37 °C)] 97.8 °F (36.6 °C)  Pulse:  [65-84] 66  Resp:  [18] 18  BP: ()/(52-67) 96/58  SpO2:  [91 %-92 %] 91 %  HEENTno swelling  Respiratory: Non labored, symmetric excursion, normal respirations  Abdomen: SP cath in place  Musculoskeletal: joints: no swelling   Integument: No lesions. No erythema. No open wounds.  Labs:     COVID-19 Lab Results    COVID-19  Lab Results   Component Value Date    COVID19 Not Detected 10/23/2024    COVID19 Not Detected 10/16/2024    COVID19 Not Detected 2024       Pro-Calcitonin  No results for input(s): \"PCT\" in the last 168 hours.    Cardiac  No results for input(s): \"TROP\", \"PBNP\" in the last 168 hours.    Creatinine Kinase  No results for input(s): \"CK\" in the last 168 hours.    Inflammatory Markers  No results for input(s): \"CRP\", \"GERMAN\", \"LDH\", \"DDIMER\" in the last 168 hours.    Recent Labs   Lab 10/24/24  0655   RBC 4.00   HGB 9.8*   HCT 31.4*   MCV 78.5*   MCH 24.5*   MCHC 31.2   RDW 16.4   NEPRELIM 3.05   WBC 6.6   .0         Recent Labs   Lab 10/22/24  0734 10/23/24  0521 10/24/24  0645   * 158* 175*   BUN 11 12 8*   CREATSERUM 0.76 0.65 0.74   CA 9.5 9.3 9.5   ALB 4.1 3.9 3.9    136 141   K 3.9 3.6 4.0    103 106   CO2 30.0 29.0 30.0   ALKPHO 64 88 81   AST 11 18 11   ALT <7* 16 10   BILT 0.3 0.2* 0.2*   TP 6.8 6.3 6.3       No results found for: \"VANCT\"  Microbiology    Hospital Encounter on  10/22/24   1. Blood Culture     Status: None (Preliminary result)    Collection Time: 10/22/24  7:34 AM    Specimen: Blood,peripheral   Result Value Ref Range    Blood Culture Result No Growth 1 Day N/A         Problem list reviewed:  Patient Active Problem List   Diagnosis    Paresis of lower extremity ([facility])    Transverse myelitis ([facility])    Bipolar 1 disorder ([facility])    Manipulative behavior    History of diverticulitis of colon    Serotonin syndrome    Obesity (BMI 30-39.9)    Dental caries    Type 2 diabetes mellitus without complication, with long-term current use of insulin ([facility])    Chronic suprapubic catheter ([facility])    Bipolar I disorder depressed with atypical features ([facility])    Other acute pulmonary embolism, unspecified whether acute cor pulmonale present ([facility])    Hyperglycemia    Sepsis due to undetermined organism ([facility])    Severe episode of recurrent major depressive disorder, without psychotic features ([facility])    Generalized anxiety disorder with panic attacks    Sepsis due to urinary tract infection ([facility])    Lactic acid acidosis    Constipation, unspecified constipation type    Moderate depressed bipolar I disorder ([facility])    Acute chest pain    Depression, unspecified depression type    Cellulitis    Bipolar disorder, current episode mixed, severe, without psychotic features ([facility])    Chest pain    Acute cystitis without hematuria    Leukocytosis    Leukocytosis, unspecified type    Benign essential hypertension    COPD (chronic obstructive pulmonary disease) ([facility])    Neurogenic bladder    Urinary tract infection associated with catheterization of urinary tract, unspecified indwelling urinary catheter type, initial encounter ([facility])    Weakness generalized    Anemia    Cystitis    UTI (urinary tract infection)    Chronic abdominal pain             ASSESSMENT/PLAN:  1. Candiduria  In association with suprapubic catheter colonization  Cath changed  Fluconazole pending final cutlure    2. Bipolar disorder    Romulo  LEE Ingram MD, MD  [facility]  [phone]

## 2024-10-24 NOTE — SPIRITUAL CARE NOTE
Spiritual Care Visit Note    Patient Name: Clari Rosa Date of Spiritual Care Visit: 10/24/24   : 1970 Primary Dx: Cystitis       Referred By: Referral From: Nurse    Spiritual Care Taxonomy:                   Visit Type/Summary:     - Spiritual Care: Consulted with RN prior to visit. Provided information regarding how to contact Spiritual Care and left a Spiritual Care information card.  remains available as needed for follow up. Patient was sleeping and was informed by staff to follow-up later.     Spiritual Care support can be requested via an Epic consult. For urgent/immediate needs, please contact the On Call  at: [place]: ext [phone]           Chaplain Resident Teresita Massey MA

## 2024-10-24 NOTE — PLAN OF CARE
A&Ox4, sleepy this morning. VSS on room air. Catheter exchange this morning per urology. Plan to DC back to NH. Update given to family via phone.    11:00: Report called to Nusrat Ng. No answer, will try again

## 2024-10-24 NOTE — DISCHARGE SUMMARY
[facility]     Clari Rosa Patient Status:  Inpatient    1970 MRN [id number]   Location [facility] 3SW-A Attending No att. providers found   Hosp Day # 2 PCP No primary care provider on file.     Date of Admission: 10/22/2024  Date of Discharge:  10/24/2024     Discharge Disposition: SNF [facility] ([facility])    Discharge Diagnosis:  #Abnormal UA  #Hx of MDRO  #Candiduria   -ID eval noted  -fluconazole  -UTI ruled out     #Moderate hydroureteronephrosis  #Neurogenic bladder  -urology eval noted from most recent hospitalization, pt wants re-evaluation     #DM2  -A1c 7.8 as of this admission  -ISS + Carb coverage + Tresiba     #HTN     #Anxiety  #Bipolar disorder     #COPD    #VTE on DOAC    #ANAID     #Obesity  -Body mass index is 36.91 kg/m².       History of Present Illness: Clari Rosa is a 53 year old female with past medical history of diabetes, bipolar disorder, hypertension who presents to the ED for UTI symptoms.  Patient has known suprapubic catheter.  She was admitted on 10/16 discharged yesterday after urine culture showed no growth.  Antibiotics were stopped at the time.  She has had multiple UTIs in the past.     Brief Synopsis: Patient admitted for recurrent issues of abnormal UA and concerns of pain to \"kidney\".  Patient seen by urology and infectious disease specialist.  From a infectious standpoint patient was ruled out for bacterial UTI and did have some candiduria and was treated with fluconazole.  Patient seen by urology and had exchange of catheter.  Patient was given Ditropan as needed for bladder spasms.  Patient is to follow-up as an outpatient for evaluation of left hydroureteronephrosis.  Patient's pain now controlled.  Patient stable for discharge.    Lace+ Score: 70  59-90 High Risk  29-58 Medium Risk  0-28   Low Risk       TCM Follow-Up Recommendation:  LACE > 58: High Risk of readmission after discharge from the hospital.      Procedures during  hospitalization:   18 F SPT exchanged with Dr. Green under aseptic technique.  Catheter hand irrigated well.  Continue with routine SPT exchanges.      Incidental or significant findings and recommendations (brief descriptions):      Lab/Test results pending at Discharge:       Consultants:  Urology & ID    Discharge Medication List:     Discharge Medications        START taking these medications        Instructions Prescription details   fluconazole 200 MG Tabs  Commonly known as: Diflucan      Take 1 tablet (200 mg total) by mouth daily for 7 days.   Stop taking on: October 31, 2024  Quantity: 7 tablet  Refills: 0     oxybutynin 5 MG Tabs  Commonly known as: Ditropan      Take 1 tablet (5 mg total) by mouth 3 (three) times daily as needed.   Refills: 0            CONTINUE taking these medications        Instructions Prescription details   acetaminophen 325 MG Tabs  Commonly known as: Tylenol      Take 2 tablets (650 mg total) by mouth every 6 (six) hours as needed for Pain.   Refills: 0     albuterol 108 (90 Base) MCG/ACT Aers  Commonly known as: Ventolin HFA      Inhale 2 puffs into the lungs every 6 (six) hours as needed for Wheezing or Shortness of Breath.   Refills: 0     apixaban 5 MG Tabs  Commonly known as: Eliquis      Take 1 tablet (5 mg total) by mouth 2 (two) times daily.   Refills: 0     bethanechol 10 MG Tabs  Commonly known as: Urecholine      Take 1 tablet (10 mg total) by mouth daily.   Refills: 0     clonazePAM 0.5 MG Tabs  Commonly known as: KlonoPIN      Take 3 tablets (1.5 mg total) by mouth 3 (three) times daily as needed for Anxiety.   Quantity: 15 tablet  Refills: 0     divalproex  MG Tb24  Commonly known as: Depakote ER      Take 1 tablet (500 mg total) by mouth 2 (two) times daily.   Refills: 0     DULoxetine 60 MG Cpep  Commonly known as: Cymbalta      Take 1 capsule (60 mg total) by mouth daily.   Refills: 0     ergocalciferol 1.25 MG (85639 UT) Caps  Commonly known as: Vitamin  D2      Take 1 capsule (50,000 Units total) by mouth every 7 days. Every monday   Refills: 0     HYDROcodone-acetaminophen  MG Tabs  Commonly known as: Norco      Take 1 tablet by mouth every 6 (six) hours as needed for Pain.   Quantity: 20 tablet  Refills: 0     Icosapent Ethyl 1 g Caps      Take 2 capsules by mouth 4 (four) times daily.   Refills: 0     insulin aspart 100 Units/mL Sopn  Commonly known as: NovoLOG      Inject 10 Units into the skin 3 (three) times daily with meals.   Refills: 0     insulin glargine 100 UNIT/ML Soln  Commonly known as: Lantus      Inject 6 Units into the skin nightly.   Refills: 0     Insulin Pen Needle 32G X 4 MM Misc      May substitute if not on insurance formulary   Quantity: 100 each  Refills: 5     loperamide 2 MG Caps  Commonly known as: Imodium      Take 1 capsule (2 mg total) by mouth 4 (four) times daily as needed for Diarrhea.   Refills: 0     meclizine 25 MG Tabs  Commonly known as: Antivert      Take 1 tablet (25 mg total) by mouth 3 (three) times daily as needed.   Refills: 0     Melatonin 3 MG Caps      Take 1 capsule (3 mg total) by mouth at bedtime.   Refills: 0     metFORMIN  MG Tb24  Commonly known as: Glucophage XR      Take 2 tablets (1,000 mg total) by mouth 2 (two) times daily with meals.   Quantity: 30 tablet  Refills: 0     metoprolol tartrate 25 MG Tabs  Commonly known as: Lopressor      Take 1 tablet (25 mg total) by mouth 2 (two) times daily.   Refills: 0     nystatin 395786 UNIT/GM Powd      Apply 1 Application topically as needed for Dry Skin. Under breast and groin area   Refills: 0     nystatin 100,000 Units/g Crea  Commonly known as: Mycostatin      Apply 1 Application topically in the morning, at noon, and at bedtime.   Refills: 0     ondansetron 4 MG Tbdp  Commonly known as: Zofran-ODT      Take 1 tablet (4 mg total) by mouth every 8 (eight) hours as needed for Nausea.   Refills: 0     phenazopyridine 100 MG Tabs  Commonly known as:  Pyridium      Take 1 tablet (100 mg total) by mouth 3 (three) times daily as needed for Pain.   Quantity: 5 tablet  Refills: 0     QUEtiapine 100 MG Tabs  Commonly known as: SEROquel      Take 2 tablets (200 mg total) by mouth nightly.   Refills: 0     Thera-M Tabs      Take 1 tablet by mouth daily.   Refills: 0     traZODone 50 MG Tabs  Commonly known as: Desyrel      Take 1 tablet (50 mg total) by mouth nightly as needed for Sleep.   Refills: 0            STOP taking these medications      magnesium oxide 400 MG Tabs  Commonly known as: Mag-Ox                  Where to Get Your Medications        These medications were sent to [place], [place] - [facility] [phone], [phone]  [address] [place] [id number]      Phone: [phone]   fluconazole 200 MG Tabs       Please  your prescriptions at the location directed by your doctor or nurse    Bring a paper prescription for each of these medications  HYDROcodone-acetaminophen  MG Tabs         [place] reviewed:     Follow-up appointment:   Jayce Green MD  [address] [facility]  [address]  [facility] [address]  [phone]    Follow up  after discharge - discuss evaluation of left hydronephrosis - will eventually need evaluation.    [facility]  [address]  [facility] [address] [phone]  Follow up in 1 week(s)      Appointments for Next 30 Days 10/24/2024 - 11/23/2024      None            Vital signs:  Temp:  [97.8 °F (36.6 °C)-98.6 °F (37 °C)] 97.8 °F (36.6 °C)  Pulse:  [65-84] 66  Resp:  [18] 18  BP: ()/(52-63) 96/58  SpO2:  [91 %] 91 %    Physical Exam:    General: No acute distress   Lungs: clear to auscultation  Cardiovascular: S1, S2  Abdomen: Soft    -----------------------------------------------------------------------------------------------  PATIENT DISCHARGE INSTRUCTIONS: See electronic chart    Juan Miguel Daniels, DO    Total time spent on  discharge plannin minutes     The  [employer] Cures Act makes medical notes like these available to patients in the interest of transparency. Please be advised this is a medical document. Medical documents are intended to carry relevant information, facts as evident, and the clinical opinion of the practitioner. The medical note is intended as peer to peer communication and may appear blunt or direct. It is written in medical language and may contain abbreviations or verbiage that are unfamiliar.

## 2024-10-24 NOTE — CONSULTS
[facility]  Department of Urology   Consultation Note    Clari Rosa Patient Status:  Inpatient    1970 MRN [id number]   Location [facility] 3SW-A Attending Juan Miguel Daniels,    Hosp Day # 2 PCP No primary care provider on file.     Reason for Consultation:  SPT issues    History of Present Illness:  Clari Rosa is a a(n) 53 year old female with PMHX neurogenic bladder (s/p suprapubic cath)/ DM/ PE/ COPD/ HTN/ ANAID/ transverse myelitis     Admitted last week with pain after SPT exchange.    Urine culture 10/16/24: no growth  Abdominal/pelvic CT scan without contast 10/16/24:   Moderate hydroureteronephrosis of the left collecting system extending to the level of the UVJ.  No obstructing urinary calculi identified.     +abdominal pain  No fever  SPT exchanged yesterday by nursing staff  Leaking urine.       Labs:  WBC 7.7 > 9.4 > 6.6  Hgb 10.6 > 9.2 > 9.8  Platelet count 248 > 233 > 251  Serum creat 0.76 > 0.65 > 0.74  UA 10/22/24: >50 WBC/hpf, >10 RBC/hpf, no bacteria,  few squamous epithelial cells, +yeast  Urine culture 10/22/24: pending    UA 10/23/24: >50 WBC/hpf,  >10 RBC/hpf, rare bacteria, few squamous epithelial cells, +yeast    2/2 blood cultures 10/22/24: pending    Abdominal/pelvic CT scan without contrast 10/22/24:  There is relatively stable moderate to severe left hydroureteronephrosis without obstructing stone identified. There is mild left perinephric stranding     Urology was consulted.      History:  Past Medical History:    Bipolar 1 disorder (HCC)    Dental caries    Diabetes (HCC)    MIKE (generalized anxiety disorder)    High blood pressure    History of diverticulitis of colon    Manipulative behavior    Neurogenic bladder    Obesity (BMI 30-39.9)    Paraplegia (HCC)    Sepsis following intra-abdominal surgery (HCC)    Serotonin syndrome    Sleep apnea    Suprapubic catheter (HCC)    Transverse myelitis (HCC)     Past Surgical History:   Procedure Laterality  Date    Arthroscopy of joint unlisted      knee          Cholecystectomy      Part removal colon w end colostomy      2013    Tonsillectomy       Family History   Adopted: Yes      reports that she has quit smoking. Her smoking use included cigarettes. She has a 5 pack-year smoking history. She does not have any smokeless tobacco history on file. She reports that she does not drink alcohol and does not use drugs.    Allergies:  Allergies[1]    Medications:    Current Facility-Administered Medications:     HYDROcodone-acetaminophen (Norco)  MG per tab 1 tablet, 1 tablet, Oral, Q6H PRN    phenazopyridine (Pyridium) tab 100 mg, 100 mg, Oral, TID CC    oxybutynin (Ditropan) tab 5 mg, 5 mg, Oral, TID    fluconazole (Diflucan) tab 200 mg, 200 mg, Oral, Q24H    albuterol (Ventolin HFA) 108 (90 Base) MCG/ACT inhaler 2 puff, 2 puff, Inhalation, Q6H PRN    apixaban (Eliquis) tab 5 mg, 5 mg, Oral, BID    bethanechol (Urecholine) tab 10 mg, 10 mg, Oral, Daily    clonazePAM (KlonoPIN) tab 1.5 mg, 1.5 mg, Oral, TID PRN    divalproex ER (Depakote ER) 24 hr tab 500 mg, 500 mg, Oral, BID    DULoxetine (Cymbalta) DR cap 60 mg, 60 mg, Oral, Daily    insulin degludec (Tresiba) 100 units/mL flextouch 5 Units, 5 Units, Subcutaneous, Daily    metoprolol tartrate (Lopressor) tab 25 mg, 25 mg, Oral, BID    QUEtiapine (SEROquel) tab 200 mg, 200 mg, Oral, Nightly    insulin aspart (NovoLOG) 100 Units/mL FlexPen 1-68 Units, 1-68 Units, Subcutaneous, TID CC    glucose (Dex4) 15 GM/59ML oral liquid 15 g, 15 g, Oral, Q15 Min PRN **OR** glucose (Glutose) 40% oral gel 15 g, 15 g, Oral, Q15 Min PRN **OR** glucose-vitamin C (Dex-4) chewable tab 4 tablet, 4 tablet, Oral, Q15 Min PRN **OR** dextrose 50% injection 50 mL, 50 mL, Intravenous, Q15 Min PRN **OR** glucose (Dex4) 15 GM/59ML oral liquid 30 g, 30 g, Oral, Q15 Min PRN **OR** glucose (Glutose) 40% oral gel 30 g, 30 g, Oral, Q15 Min PRN **OR** glucose-vitamin C (Dex-4) chewable tab  8 tablet, 8 tablet, Oral, Q15 Min PRN    insulin aspart (NovoLOG) 100 Units/mL FlexPen 1-30 Units, 1-30 Units, Subcutaneous, TID AC and HS    acetaminophen (Tylenol Extra Strength) tab 500 mg, 500 mg, Oral, Q4H PRN    melatonin tab 3 mg, 3 mg, Oral, Nightly PRN    guaiFENesin ER (Mucinex) 12 hr tab 600 mg, 600 mg, Oral, BID PRN    benzonatate (Tessalon) cap 200 mg, 200 mg, Oral, TID PRN    glycerin-hypromellose- (Artificial Tears) 0.2-0.2-1 % ophthalmic solution 1 drop, 1 drop, Both Eyes, QID PRN    sodium chloride (Saline Mist) 0.65 % nasal solution 1 spray, 1 spray, Each Nare, Q3H PRN    sodium chloride 0.9% infusion, , Intravenous, Continuous    glucose (Dex4) 15 GM/59ML oral liquid 15 g, 15 g, Oral, Q15 Min PRN **OR** glucose (Glutose) 40% oral gel 15 g, 15 g, Oral, Q15 Min PRN **OR** dextrose 50% injection 50 mL, 50 mL, Intravenous, Q15 Min PRN **OR** glucose (Dex4) 15 GM/59ML oral liquid 30 g, 30 g, Oral, Q15 Min PRN **OR** glucose (Glutose) 40% oral gel 30 g, 30 g, Oral, Q15 Min PRN    heparin (Porcine) 100 Units/mL lock flush 500 Units, 5 mL, Intravenous, PRN    influenza virus trivalent PF (Fluzone Trivalent) 0.5 mL IM injection (ages 6 months to 64 years) 0.5 mL, 0.5 mL, Intramuscular, Prior to discharge    meclizine (Antivert) tab 25 mg, 25 mg, Oral, Q6H PRN    Review of Systems:  Pertinent items are noted in HPI.    Physical Exam:  BP 96/58 (BP Location: Right arm)   Pulse 66   Temp 97.8 °F (36.6 °C) (Axillary)   Resp 18   Wt 189 lb (85.7 kg)   SpO2 91%   BMI 36.91 kg/m²   GENERAL: the patient is resting in bed in no acute distress.    HEENT: Unremarkable.  NECK: Supple.   LUNGS: non-labored respirations.    ABDOMEN:  The abdomen is soft and nontender without rebound or guarding.  BACK: Without CVA tenderness.  GENITOURINARY: +SPT.  Catheter exiting through urethra and mark balloon seen.    Laboratory Data:  Lab Results   Component Value Date    WBC 6.6 10/24/2024    HGB 9.8 10/24/2024     HCT 31.4 10/24/2024    .0 10/24/2024    CREATSERUM 0.74 10/24/2024    BUN 8 10/24/2024     10/24/2024    K 4.0 10/24/2024     10/24/2024    CO2 30.0 10/24/2024     10/24/2024    CA 9.5 10/24/2024    ALB 3.9 10/24/2024    ALKPHO 81 10/24/2024    BILT 0.2 10/24/2024    TP 6.3 10/24/2024    AST 11 10/24/2024    ALT 10 10/24/2024    MG 1.7 10/24/2024    PGLU 159 10/24/2024       Hospital Encounter on 10/22/24   1. Blood Culture     Status: None (Preliminary result)    Collection Time: 10/22/24  7:34 AM    Specimen: Blood,peripheral   Result Value Ref Range    Blood Culture Result No Growth 1 Day N/A      UA 10/22/24: >50 WBC/hpf, >10 RBC/hpf, no bacteria,  few squamous epithelial cells, +yeast  Urine culture 10/22/24: pending    UA 10/23/24: >50 WBC/hpf,  >10 RBC/hpf, rare bacteria, few squamous epithelial cells, +yeast    2/2 blood cultures 10/22/24: pending    Imaging:  CT ABDOMEN+PELVIS KIDNEYSTONE 2D RNDR(NO IV,NO ORAL)(CPT=74176)    Result Date: 10/22/2024  CONCLUSION:   1. There is relatively stable moderate to severe left hydroureteronephrosis without obstructing stone identified.  There is mild left perinephric stranding.  While the findings are concerning for pyelonephritis, a distal obstructing mass or stricture not  excluded.  Clinical correlation recommended.  2. Suprapubic catheter noted.  Moderate urinary bladder wall thickening with fatty induration is concerning for cystitis.  Clinical correlation recommended.   3. Uncomplicated colonic diverticulosis.   Please see above for further details.  LOCATION:  [id number]   Dictated by (CST): Allan Spear MD on 10/22/2024 at 10:50 AM     Finalized by (CST): Allan Spear MD on 10/22/2024 at 10:54 AM       Impression:  Patient Active Problem List   Diagnosis    Paresis of lower extremity (HCC)    Transverse myelitis (HCC)    Bipolar 1 disorder (HCC)    Manipulative behavior    History of diverticulitis of colon    Serotonin syndrome     Obesity (BMI 30-39.9)    Dental caries    Type 2 diabetes mellitus without complication, with long-term current use of insulin (HCC)    Chronic suprapubic catheter ([facility])    Bipolar I disorder depressed with atypical features ([facility])    Other acute pulmonary embolism, unspecified whether acute cor pulmonale present ([facility])    Hyperglycemia    Sepsis due to undetermined organism ([facility])    Severe episode of recurrent major depressive disorder, without psychotic features ([facility])    Generalized anxiety disorder with panic attacks    Sepsis due to urinary tract infection ([facility])    Lactic acid acidosis    Constipation, unspecified constipation type    Moderate depressed bipolar I disorder ([facility])    Acute chest pain    Depression, unspecified depression type    Cellulitis    Bipolar disorder, current episode mixed, severe, without psychotic features ([facility])    Chest pain    Acute cystitis without hematuria    Leukocytosis    Leukocytosis, unspecified type    Benign essential hypertension    COPD (chronic obstructive pulmonary disease) ([facility])    Neurogenic bladder    Urinary tract infection associated with catheterization of urinary tract, unspecified indwelling urinary catheter type, initial encounter ([facility])    Weakness generalized    Anemia    Cystitis    UTI (urinary tract infection)    Chronic abdominal pain       LEFT HYDROURETERONEPHROSIS  Afebrile, VSS  No leukocytosis  Serum creat 0.74    MALPOSITIONED SPT  -exchanged 10/24/24    ABNORMAL UA  Urine culture pending  On fluconazole per ID     NGB  -managed with SPT    Recommendations:  -Check final cultures  -Fluconazole per ID  -Follow labs, temp  -18 F SPT exchanged with Dr. Green under aseptic technique.  Catheter hand irrigated well.  Continue with routine SPT exchanges.    -Ditropan prn bladder spasms  -Will need future evaluation of left hydroureteronephrosis with cystoscopy, [facility]    Above discussed with patient, nurse, Dr. Green.      Thank you for allowing me to participate  in the care of your patient.    Fannie Velez PA-C  [facility]  Department of Urology  10/24/2024  8:28 AM         [1]   Allergies  Allergen Reactions    Pcn [Penicillins] HIVES    Sulfa Antibiotics HIVES    Radiology Contrast Iodinated Dyes DIZZINESS    Keflex [Cephalexin] UNKNOWN

## 2024-10-24 NOTE — PLAN OF CARE
AxO4. VSS on RA. On tele-NSR. Refusing SCD, on eliquis. Denies nausea, having loose bowel movements. Suprapubic catheter exchange at beginning of shift with Ade RN - noted leaking around suprapubic opening, mark readjusted and inflated with 30 mL, Uro aware - to see in AM. Bedbound ar baseline, turns w/ minimal assist. PRN PO meds given for pain mgmt. 2 person skin check done w/  PCT during care - redness to jhon area, suprapubic site noted. Updated on POC. Safety measures placed. Care ongoing.

## 2024-10-24 NOTE — PROGRESS NOTES
AVS reviewed, Port-a-cath removed, declined flu vaccine -states had it 2 weeks ago , ambulance here for pick -up - will dc to [facility] in [place].

## 2024-10-24 NOTE — DISCHARGE INSTRUCTIONS
Discharge Instructions: Caring for Your Suprapubic Catheter   You are going home with a suprapubic catheter in place. This tube is placed directly into the bladder through your belly (abdomen) to drain urine from your bladder. You were shown how to care for your catheter in the hospital. This sheet will help remind you of those steps and guidelines when you are at home.   Home care  Shower as needed.   Change your dressing every day. Change the dressing more often if it falls off, becomes dirty, or has absorbed a lot of drainage.  Gather your supplies  Tape  Soap  Wash cloth  Wastebasket and plastic bag  Dressing sponges (4\" x 4\") that are cut or split half-way into the middle  Remove the dressing and check for problems   Wash your hands thoroughly before and after all catheter care.  Gently remove the old dressing if you have one.  Don’t pull on the tube.  Check the dressing for drainage. Notice whether anything looks abnormal or smells bad.  Place your dressing in the plastic bag and throw it away in the wastebasket.  Now look at the place where the catheter leaves your body (exit site).  Note any swelling, bleeding, irritation, abnormal, or smelly drainage.  Also check for any sores next to the exit site. Sores form around the exit site if there is too much pressure from the tube on the skin.  Clean the area  Wash the area around the catheter exit site gently with soap and water.  Gently pat the area dry.  Don't use powders, creams, or sprays near the exit site.  Place a split 4\" x 4\" sponge around the catheter. Tape it in place.    Follow-up care  Make a follow-up appointment, or as advised.   When to call your healthcare provider  Call your healthcare provider right away if you have any of the following:   Catheter that falls out, or is clogged or feels clogged  Stitches that fall out  Urine leaking around catheter  Urine that is cloudy, bloody, or smells bad  No urine drainage  Bladder that feels full or  painful  Rash, itching, redness, swelling, or drainage at the catheter site  Fever of 100.4°F ( 38°C ) or higher, or as directed by your provider  Shaking derrell Nicole last reviewed this educational content on 9/1/2022 © 2000-2023 [employer]. All rights reserved. This information is not intended as a substitute for professional medical care. Always follow your healthcare professional's instructions.        Bladder Infection, Female (Adult)    Urine normally doesn't have any germs (bacteria) in it. But bacteria can get into the urinary tract from the skin around the rectum. Or they can travel in the blood from other parts of the body. Once they are in your urinary tract, they can cause infection in these areas:   The urethra (urethritis)  The bladder (cystitis)  The kidneys (pyelonephritis)  The most common place for an infection is in the bladder. This is called a bladder infection. This is one of the most common infections in women because women have a shorter urethra than men. Bacteria have a shorter distance to travel to reach the bladder.. Women who have gone through menopause also lose the protection from estrogen that lowers the chance of getting a UTI. And some women are at higher risk because of their genes.   Most bladder infections are easily treated. They are not serious unless the infection spreads to the kidney.   The terms bladder infection, UTI, and cystitis are often used to describe the same thing. But they are not always the same. Cystitis is an inflammation of the bladder. The most common cause of cystitis is an infection.   Symptoms  The infection causes inflammation in the urethra and bladder. This causes many of the symptoms. The most common symptoms of a bladder infection are:   Pain or burning when urinating  Having to urinate more often than normal  Urgent need to urinate  Only a small amount of urine comes out  Blood in urine  Belly (abdominal) discomfort. This is often  in the lower belly above the pubic bone.  Lower back pain  Cloudy urine  Strong- or bad-smelling urine  Unable to urinate (urinary retention)  Unable to hold urine in (urinary incontinence)  Fever  Loss of appetite  Confusion (in older adults)  Causes  Bladder infections are not contagious. You can't get one from someone else, from a toilet seat, or from sharing a bath.   The most common cause of bladder infections is bacteria from the bowels. The bacteria get onto the skin around the opening of the urethra. From there, they can get into the urine. Then they travel up to the bladder, causing inflammation and infection. This often happens because of:   Wiping incorrectly after urinating. Always wipe from front to back.  Bowel incontinence  Pregnancy. During pregnancy urinary tract changes raise the risk for infection.  Procedures such as having a catheter put in  Older age  Not emptying your bladder. This can give bacteria a chance to grow in your urine.  Fluid loss (dehydration)  Constipation  Having sex  Using a diaphragm for birth control   Treatment  Bladder infections are diagnosed by a urine test and urine culture. They are treated with antibiotics. They often clear up quickly without problems. Treatment helps prevent a more serious kidney infection.   Medicines  Medicines can help in the treatment of a bladder infection:  Take antibiotics until they are used up, even if you feel better. It's important to finish them to make sure the infection has cleared.  You can use acetaminophen or ibuprofen for pain, fever, or discomfort, unless another medicine was prescribed. If you have long-term (chronic) liver or kidney disease, talk with your healthcare provider before using these medicines. Also talk with your provider if you've ever had a stomach ulcer or GI (gastrointestinal) bleeding, or are taking blood-thinner medicines.  If you are given phenazopydridine to reduce burning with urination, it will make your  urine a bright orange color. This can stain clothing.  Care and prevention  These self-care steps can help prevent future infections:  Drink plenty of fluids. This helps to prevent dehydration and flush out your bladder. Do this unless you must restrict fluids for other health reasons, or your healthcare provider told you not to.  Clean yourself correctly after going to the bathroom. Wipe from front to back after using the toilet. This helps prevent the spread of bacteria.  Urinate more often. Don't try to hold urine in for a long time.  Wear loose-fitting clothes and cotton underwear. Don't wear tight-fitting pants.  Improve your diet and prevent constipation. Eat more fresh fruits and vegetables, and fiber. Eat less junk foods and fatty foods.  Don't have sex until your symptoms are gone.  Don't have caffeine, alcohol, and spicy foods. These can irritate your bladder.  Urinate right after you have sex to flush out your bladder.  If you use birth control pills and have frequent bladder infections, discuss it with your healthcare provider.  Follow-up care  Call your healthcare provider if all symptoms are not gone after 3 days of treatment. This is especially important if you have repeat infections.   If a culture was done, you will be told if your treatment needs to be changed. If directed, you can call to find out the results.   If X-rays were done, you will be told if the results will affect your treatment.  Call 911  Call 911 if any of the following occur:   Trouble breathing  Hard to wake up or confusion  Fainting (loss of consciousness)  Fast heart rate  When to get medical advice  Call your healthcare provider right away if any of these occur:  Fever of 100.4ºF (38.0ºC) or higher, or as directed by your healthcare provider  Symptoms are not better after 3 days of treatment  Symptoms get worse or you have new symptoms  Back or belly pain that gets worse  Repeated vomiting, or unable to keep medicine  down  Weakness or dizziness  Vaginal discharge  Pain, redness, or swelling in the outer vaginal area (labia)  Bonnie last reviewed this educational content on 1/1/2022 © 2000-2023 [employer]. All rights reserved. This information is not intended as a substitute for professional medical care. Always follow your healthcare professional's instructions.

## 2024-10-28 ENCOUNTER — INITIAL APN SNF VISIT (OUTPATIENT)
Dept: INTERNAL MEDICINE CLINIC | Age: 54
End: 2024-10-28

## 2024-10-28 VITALS
DIASTOLIC BLOOD PRESSURE: 78 MMHG | TEMPERATURE: 97 F | SYSTOLIC BLOOD PRESSURE: 117 MMHG | HEART RATE: 81 BPM | RESPIRATION RATE: 18 BRPM | OXYGEN SATURATION: 98 %

## 2024-10-28 DIAGNOSIS — I26.99 OTHER ACUTE PULMONARY EMBOLISM, UNSPECIFIED WHETHER ACUTE COR PULMONALE PRESENT (HCC): ICD-10-CM

## 2024-10-28 DIAGNOSIS — J44.9 CHRONIC OBSTRUCTIVE PULMONARY DISEASE, UNSPECIFIED COPD TYPE (HCC): ICD-10-CM

## 2024-10-28 DIAGNOSIS — B37.49 CANDIDURIA: Primary | ICD-10-CM

## 2024-10-28 DIAGNOSIS — G80.9 CEREBRAL PALSY, UNSPECIFIED TYPE (HCC): ICD-10-CM

## 2024-10-28 DIAGNOSIS — R33.9 URINARY RETENTION: ICD-10-CM

## 2024-10-28 DIAGNOSIS — F41.0 GENERALIZED ANXIETY DISORDER WITH PANIC ATTACKS: ICD-10-CM

## 2024-10-28 DIAGNOSIS — Z93.59 SUPRAPUBIC CATHETER (HCC): ICD-10-CM

## 2024-10-28 DIAGNOSIS — R53.1 WEAKNESS: ICD-10-CM

## 2024-10-28 DIAGNOSIS — Z79.4 TYPE 2 DIABETES MELLITUS WITHOUT COMPLICATION, WITH LONG-TERM CURRENT USE OF INSULIN (HCC): ICD-10-CM

## 2024-10-28 DIAGNOSIS — I10 BENIGN ESSENTIAL HYPERTENSION: ICD-10-CM

## 2024-10-28 DIAGNOSIS — F41.1 GENERALIZED ANXIETY DISORDER WITH PANIC ATTACKS: ICD-10-CM

## 2024-10-28 DIAGNOSIS — E11.9 TYPE 2 DIABETES MELLITUS WITHOUT COMPLICATION, WITH LONG-TERM CURRENT USE OF INSULIN (HCC): ICD-10-CM

## 2024-10-28 DIAGNOSIS — F31.9 BIPOLAR 1 DISORDER (HCC): ICD-10-CM

## 2024-10-28 DIAGNOSIS — F32.A DEPRESSION, UNSPECIFIED DEPRESSION TYPE: ICD-10-CM

## 2024-10-28 PROCEDURE — 99310 SBSQ NF CARE HIGH MDM 45: CPT | Performed by: NURSE PRACTITIONER

## 2024-10-28 PROCEDURE — 99499 UNLISTED E&M SERVICE: CPT | Performed by: NURSE PRACTITIONER

## 2024-10-28 NOTE — PROGRESS NOTES
Skilled Nursing Facility, Subacute Rehab  [facility]     Clari Rosa Author: Diana BooAMBAR     1970 MRN [id number]   Last Hospital  Admission 10/22/24      Last Hospital Discharge 10/24/24 PCP No primary care provider on file.     Hospital Discharge Diagnoses:  Abnormal UA   Hx of MDRO   Candiduria   Mod Hydroureteronephrosis   Diabetes   Hypertension   Anxiety   Bipolar   VTE on DOAC   ANAID   Obesity       HPI:  Clari Rosa  : 1970, Age 53 year old  female patient is admitted for subacute rehab. Patient has a past medical history of diabetes, bipolar, hypertension who presented to the ER with pain at suprapubic catheter site and kidneys. She had a recent prior admission and Urine cultures showed no growth and her antibiotics were stopped at that time. She has had multiple UTIs in the past. She was seen this last admission by Urology and ID. She had some candiduria and treated with Fluconazole. Patient seen by Urology and had an exchange. She was started on Ditropan for bladder spasms. She is to follow up with Urology for hydroureteronephrosis.   Admitting to [facility] for nursing care, medication management, and PT/OT/ST eval and tx.    Chief Complaint at visit:   Chief Complaint   Patient presents with    Follow - Up      Patient is seen for initial RE admission assessment   She has had multiple admissions through [place] with urinary symptoms from Suprapubic catheter. Most recent times urine cultures were neg for bacteria. This last admission showed candiduria she was discharged with oral Diflucan. She was also started on Ditropan.   On assessment today she again says she has pain at the suprapubic catheter and she knows she has infection. She states her kidneys also hurt.   Discussed with ID who states will awaiting lab results and determine if further urine testing is needed   Spoke to psych who also saw patient last week. Primary scheduled Clonazepam and nurse feels this is helping  with some of her behaviors.   She appears comfortable in bed and in no acute distress     ALLERGIES    She is allergic to pcn [penicillins], sulfa antibiotics, radiology contrast iodinated dyes, and keflex [cephalexin].      CURRENT MEDS:    Current Outpatient Medications on File Prior to Visit   Medication Sig    oxybutynin 5 MG Oral Tab Take 1 tablet (5 mg total) by mouth 3 (three) times daily.    HYDROcodone-acetaminophen  MG Oral Tab Take 1 tablet by mouth every 6 (six) hours as needed for Pain.    fluconazole 200 MG Oral Tab Take 1 tablet (200 mg total) by mouth daily for 7 days.    clonazePAM 0.5 MG Oral Tab Take 3 tablets (1.5 mg total) by mouth 3 (three) times daily as needed for Anxiety. (Patient taking differently: Take 3 tablets (1.5 mg total) by mouth in the morning, at noon, and at bedtime.)    nystatin 100,000 Units/g External Cream Apply 1 Application topically in the morning, at noon, and at bedtime.    loperamide 2 MG Oral Cap Take 1 capsule (2 mg total) by mouth 4 (four) times daily as needed for Diarrhea.    meclizine 25 MG Oral Tab Take 1 tablet (25 mg total) by mouth 3 (three) times daily as needed.    DULoxetine 60 MG Oral Cap DR Particles Take 1 capsule (60 mg total) by mouth daily.    apixaban 5 MG Oral Tab Take 1 tablet (5 mg total) by mouth 2 (two) times daily.    QUEtiapine 100 MG Oral Tab Take 2 tablets (200 mg total) by mouth nightly.    insulin glargine 100 UNIT/ML Subcutaneous Solution Inject 6 Units into the skin nightly.    insulin aspart 100 Units/mL Subcutaneous Solution Pen-injector Inject 4-10 Units into the skin 3 (three) times daily with meals.    phenazopyridine 100 MG Oral Tab Take 1 tablet (100 mg total) by mouth 3 (three) times daily as needed for Pain.    Insulin Pen Needle 32G X 4 MM Does not apply Misc May substitute if not on insurance formulary (Patient not taking: Reported on 10/28/2024)    metoprolol tartrate 25 MG Oral Tab Take 1 tablet (25 mg total) by mouth  2 (two) times daily.    traZODone 50 MG Oral Tab Take 1 tablet (50 mg total) by mouth nightly as needed for Sleep.    Nystatin 839454 UNIT/GM External Powder Apply 1 Application topically as needed for Dry Skin. Under breast and groin area    acetaminophen 325 MG Oral Tab Take 2 tablets (650 mg total) by mouth every 6 (six) hours as needed for Pain.    Icosapent Ethyl 1 g Oral Cap Take 2 capsules by mouth 4 (four) times daily.    Melatonin 3 MG Oral Cap Take 1 capsule (3 mg total) by mouth at bedtime.    ergocalciferol 1.25 MG (14029 UT) Oral Cap Take 1 capsule (50,000 Units total) by mouth every 7 days. Every monday    bethanechol 10 MG Oral Tab Take 1 tablet (10 mg total) by mouth daily.    albuterol 108 (90 Base) MCG/ACT Inhalation Aero Soln Inhale 2 puffs into the lungs every 6 (six) hours as needed for Wheezing or Shortness of Breath.    Multiple Vitamins-Minerals (THERA-M) Oral Tab Take 1 tablet by mouth daily.    divalproex Sodium  MG Oral Tablet 24 Hr Take 1 tablet (500 mg total) by mouth 2 (two) times daily.    ondansetron 4 MG Oral Tablet Dispersible Take 1 tablet (4 mg total) by mouth every 8 (eight) hours as needed for Nausea.    metFORMIN  MG Oral Tablet 24 Hr Take 2 tablets (1,000 mg total) by mouth 2 (two) times daily with meals.     No current facility-administered medications on file prior to visit.         HISTORY:    She  has a past medical history of Bipolar 1 disorder ([facility]), Dental caries (10/26/2014), Diabetes ([facility]), MIKE (generalized anxiety disorder), High blood pressure, History of diverticulitis of colon (10/26/2014), Manipulative behavior (10/26/2014), Neurogenic bladder, Obesity (BMI 30-39.9) (10/26/2014), Paraplegia ([facility]), Sepsis following intra-abdominal surgery ([facility]) (10/26/2014), Serotonin syndrome, Sleep apnea, Suprapubic catheter ([facility]), and Transverse myelitis ([facility]).    She  has a past surgical history that includes part removal colon w end colostomy; tonsillectomy;  arthroscopy of joint unlisted; cholecystectomy; and .        CODE STATUS:  Full Code    ADVANCED CARE PLANNING TEAM:  Pending further course at subacute rehab    SUBJECTIVE:    REVIEW OF SYSTEMS:  GENERAL HEALTH:feels well otherwise  SKIN: denies any unusual skin lesions or rashes  WOUNDS: see wound assessment    EYES:no visual complaints or deficits  HENT: denies nasal congestion, sinus pain or sore throat;  RESPIRATORY: denies shortness of breath, wheezing + mild cough + hx of COPD   CARDIOVASCULAR:denies chest pain, no palpitations  + hx of HTN   GI: denies nausea, vomiting, + patient reports diarrhea staff state she is incontinent and no diarrhea   : + suprapubic catheter   MUSCULOSKELETAL: + CP   NEURO: + cerebral palsy + paraplegic   PSYCHE: + bipolar + anxiety/depression   HEMATOLOGY:denies hx anemia  ENDOCRINE: + diabetes  OBJECTIVE:  VITALS:  /78   Pulse 81   Temp 97.2 °F (36.2 °C)   Resp 18   SpO2 98%     PHYSICAL EXAM:  GENERAL HEALTH: 53 year old female patient, no acute distress   LINES, TUBES, DRAINS: + Port left subclavicular- not accessed currently   +Suprapubic catheter   SKIN: no rashes, no suspicious lesions   WOUND: see wound assessment   EYES: PERRLA, conjunctiva normal; no drainage from eyes  HENT: normocephalic; normal nose, no nasal drainage, mucous membranes pink, moist  RESPIRATORY:clear to percussion and auscultation, No wheezing/cough/accessory muscle use; on room air  CARDIOVASCULAR: S1, S2 normal, RRR; no S3, no S4;   ABDOMEN: normal active BS+, soft, non-distended; no apparent masses; observed, non-tender, no guarding during physical exam  : + suprapubic catheter- mild erythema around site   LYMPHATIC: no lymphedema  MUSCULOSKELETAL:+ cerebral palsy- bed bound   EXTREMITIES/VASCULAR: no cyanosis, clubbing or edema  NEUROLOGIC: intact; no sensorimotor deficit  PSYCHIATRIC: alert and oriented x 3; affect appropriate. + bipolar hx + anxiety/depression history      DIAGNOSTICS REVIEWED AT THIS VISIT:  Vital signs reviewed in [facility] EMR.  [facility] medical records, notes, lab and imaging results reviewed. And diagnostics available in rehab records/[facility].  Medication reconciliation completed.      10/28/24   Wbc 8.88 hgb 10.3 hematocrit 32.4 plt 284   Glucose 127 bun 13 creat 0.68 bili 0.20 prot 6.2 alb 3.8 sodium 139 potassium 4.2 chl 100 co2 29 alt 7 ast 9 alk phos 72 ca 8.9 gfr > 60     10/25/24   Wbc 7.41 hgb 10.2 hematocrit 33.5 plt 278   Mag 1.7   Glucose 86 bun 10 creat 0.60 bili 0.24 prot 6.0 alb 3.7 sodium 143 potassium 3.9 chl 105 co2 29 alt 11 ast 11 alk phos 72 calcium 8.9 gfr > 60     Lab Results   Component Value Date     (H) 10/24/2024    BUN 8 (L) 10/24/2024    BUNCREA 16.4 09/20/2024    CREATSERUM 0.74 10/24/2024    ANIONGAP 5 10/24/2024     08/15/2016    CA 9.5 10/24/2024    OSMOCALC 295 10/24/2024    ALKPHO 81 10/24/2024    AST 11 10/24/2024    ALT 10 10/24/2024    BILT 0.2 (L) 10/24/2024    TP 6.3 10/24/2024    ALB 3.9 10/24/2024    GLOBULIN 2.4 10/24/2024     10/24/2024    K 4.0 10/24/2024     10/24/2024    CO2 30.0 10/24/2024       Lab Results   Component Value Date    WBC 6.6 10/24/2024    RBC 4.00 10/24/2024    HGB 9.8 (L) 10/24/2024    HCT 31.4 (L) 10/24/2024    .0 10/24/2024    MCV 78.5 (L) 10/24/2024    MCH 24.5 (L) 10/24/2024    MCHC 31.2 10/24/2024    RDW 16.4 10/24/2024    NEPRELIM 3.05 10/24/2024    NEUTABS 2.97 12/05/2023    LYMPHABS 3.04 12/05/2023    EOSABS 0.07 12/05/2023    BASABS 0.00 12/05/2023    NEUT 44 12/05/2023    LYMPH 46 12/05/2023    MON 6 12/05/2023    EOS 1 12/05/2023    BASO 0 12/05/2023    NEPERCENT 46.0 10/24/2024    LYPERCENT 44.0 10/24/2024    MOPERCENT 3.9 10/24/2024    EOPERCENT 5.1 10/24/2024    BAPERCENT 0.5 10/24/2024    NE 3.05 10/24/2024    LYMABS 2.91 10/24/2024    MOABSO 0.26 10/24/2024    EOABSO 0.34 10/24/2024    BAABSO 0.03 10/24/2024       Last COVID-19 Viral test:  Not Detected, done on 10/23/2024.        CT ABDOMEN+PELVIS KIDNEYSTONE 2D RNDR(NO IV,NO ORAL)(CPT=74176)  Narrative: PROCEDURE:  CT ABDOMEN+PELVIS KIDNEYSTONE 2D RNDR(NO IV,NO ORAL)(CPT=74176)     COMPARISON:  [facility] , CT, CT ABDOMEN+PELVIS KIDNEYSTONE 2D RNDR(NO IV,NO ORAL)(CPT=74176), 10/16/2024, 10:15 PM.     INDICATIONS:  suprapubic pain     TECHNIQUE:  Unenhanced multislice CT scanning from above the kidneys to below the urinary bladder.  2D rendering are generated on the CT scanner workstation to localize potential stones in the cranio-caudal plane.  Dose reduction techniques were used.   Dose information is transmitted to the ACR (American College of Radiology) NRDR (National Radiology Data Registry) which includes the Dose Index Registry.     PATIENT STATED HISTORY: (As transcribed by Technologist)  Patient presents with abdominal pain all over with UTI symptoms.  Florentino in place.  Hx Multiple UTIs.       FINDINGS: Evaluation of the visceral organs is limited due to the lack of IV contrast.  LUNG BASE:  Scattered atelectasis/scarring.  LIVER:  Unremarkable.  BILIARY:  Status post cholecystectomy.  SPLEEN:  Unremarkable.  PANCREAS:  Unremarkable.  ADRENALS:  Unremarkable.  KIDNEYS:  There is relatively stable moderate to severe left hydroureteronephrosis without obstructing stone identified.  There is mild left perinephric stranding.  While the findings are concerning for pyelonephritis, a distal obstructing mass or   stricture not excluded.  Clinical correlation recommended.  There is mild nonspecific right perinephric stranding.  No urinary calculi identified.  Asymmetric left renal atrophy noted.  AORTA/VASCULAR:  Unremarkable.  RETROPERITONEUM:  Unremarkable.  BOWEL/MESENTERY:  There are postoperative changes from previous right hemicolectomy noted.  Scattered feces in the colon.  Uncomplicated colonic diverticulosis.  No large or small bowel dilatation.  No free air or free fluid.  ABDOMINAL WALL:   Suprapubic catheter noted.  Scarring from previous surgery.  Diastasis of the rectus sheath.  PELVIC ORGANS:  Suprapubic catheter noted.  Moderate urinary bladder wall thickening with fatty induration is concerning for cystitis.  Clinical correlation recommended.  Unremarkable uterus and ovaries.  LYMPH NODES:  No lymphadenopathy in the abdomen or pelvis.  BONES:  Degenerative changes in the spine and hips.   OTHER:  None.                   Impression: CONCLUSION:       1. There is relatively stable moderate to severe left hydroureteronephrosis without obstructing stone identified.  There is mild left perinephric stranding.  While the findings are concerning for pyelonephritis, a distal obstructing mass or stricture not   excluded.  Clinical correlation recommended.      2. Suprapubic catheter noted.  Moderate urinary bladder wall thickening with fatty induration is concerning for cystitis.  Clinical correlation recommended.       3. Uncomplicated colonic diverticulosis.       Please see above for further details.     LOCATION:  [facility]        Dictated by (CST): Allan Spear MD on 10/22/2024 at 10:50 AM       Finalized by (CST): Allan Spear MD on 10/22/2024 at 10:54 AM              SEE PLAN BELOW  Physical Deconditioning/Impaired mobility and ADLs/At risk for falling  Fall Precautions  PT/OT/ST to evaluate and treat  [facility] team to establish care plan meeting with patient and POA/family as appropriate   Anticipate DC on or before TBD; SW to assist patient/family w/ DC planning  DC Plan:  TBD     Abnormal UA/ Mod hydroureteronephrosis/Candiduria/ Retention / Suprapubic catheter   In hospital- ID and Urology consulted. Suprapubic catheter was exchanged. Imaging shows moderate hydroureteronephrosis of left collecting system extending to level of the UVJ. Urine cultures were negative for bacteria. Urine showed Candiduria. She was started on Diflucan and discharged with Diflucan.    Vital signs q shift and prn   Labs  weekly and prn   Suprapubic catheter care   Follow up with Urology Dr Green  In house ID to follow - spoke with ID will await blood testing to determine if any further urine testing needed.   Bethanechol 10 mg daily   Fluconazole 200 mg daily- 10/31   Nystatin application tid, Nystatin powder   Oxybutynin 5 mg tid   Phenazopyridine 100 mg tid prn     Bipolar Anxiety / Insomnia   IN house Psych to follow - spoke with Pedro who saw patient last week. Will see patient again this week. He states he spoke to patients Mother as well   Clonazepam 0.5 mg 3 tablets TID   Depakote 500 mg bid   Melatonin 3 mg nightly   Quetiapine 200 mg nightly   Trazodone 50 mg nightly prn     Hx of MRDO   ID follow     Diabetes   10/22/24 A1c 7.8 %   Low carb diet   Accu check ac/hs   Insulin aspart sliding scale 4-10 units   Glargine 6 units nightly  Metformin 1000 mg bid      Hypertension /Hyperlipidemia  Vital signs q shift and prn   Labs weekly and prn   In house cardiology to follow   Lcosapent Ethyl 2 gm 4x daily   Metoprolol tartrate 25 mg bid     COPD /ANAID   In house Pulm to follow   Albuterol inhaler 2 puff every 6 hrs prn       DVT Prophylaxis / Hx of VTE   Encourage early mobilization and participation in PT/OT as able  Apixaban 5 mg bid     Pain Management  Offer to pre-medicate 30-60 min prior to therapy  Physiatry evaluation with management appreciated  Acetaminophen 650 mg every 6 hrs prn   Hydrocodone- acetaminophen  mg 1 tab every 6 hrs prn     Bowel Management Regimen/Diarrhea per patient   10/28 reports diarrhea. Nurse reports no diarrhea   Loperamide prn      Vitamins/supplements as r/t deficiencies  [facility] RD to follow while in rehab; supplementation/diet as per [facility] RD  May continue home supplements  Ergocalciferol 50,000 units weekly  MVI daily      LABS  CBC/CMP weekly while in [facility]- 10/29  Vit D/Mag- 10/29     *Follow-Up with PCP within 1-2 weeks following [facility] discharge.   Follow up with Urology Dr Green  *Follow-Up  with specialists as recommended.  No future appointments.        *Greater than 65 minutes spent w/ patient and family, reviewing medical records, labs, completing medication reconciliation and entering orders to establish plan of care in [facility].    Diana Boo, APRN  10/28/24

## 2024-10-31 ENCOUNTER — SNF VISIT (OUTPATIENT)
Dept: INTERNAL MEDICINE CLINIC | Age: 54
End: 2024-10-31

## 2024-10-31 VITALS
TEMPERATURE: 98 F | DIASTOLIC BLOOD PRESSURE: 68 MMHG | OXYGEN SATURATION: 98 % | RESPIRATION RATE: 18 BRPM | HEART RATE: 67 BPM | SYSTOLIC BLOOD PRESSURE: 101 MMHG

## 2024-10-31 DIAGNOSIS — F31.9 BIPOLAR 1 DISORDER (HCC): ICD-10-CM

## 2024-10-31 DIAGNOSIS — E11.9 TYPE 2 DIABETES MELLITUS WITHOUT COMPLICATION, WITH LONG-TERM CURRENT USE OF INSULIN (HCC): ICD-10-CM

## 2024-10-31 DIAGNOSIS — J44.9 CHRONIC OBSTRUCTIVE PULMONARY DISEASE, UNSPECIFIED COPD TYPE (HCC): ICD-10-CM

## 2024-10-31 DIAGNOSIS — F32.A DEPRESSION, UNSPECIFIED DEPRESSION TYPE: ICD-10-CM

## 2024-10-31 DIAGNOSIS — R33.9 URINARY RETENTION: ICD-10-CM

## 2024-10-31 DIAGNOSIS — I10 BENIGN ESSENTIAL HYPERTENSION: ICD-10-CM

## 2024-10-31 DIAGNOSIS — F41.1 GENERALIZED ANXIETY DISORDER WITH PANIC ATTACKS: ICD-10-CM

## 2024-10-31 DIAGNOSIS — Z79.4 TYPE 2 DIABETES MELLITUS WITHOUT COMPLICATION, WITH LONG-TERM CURRENT USE OF INSULIN (HCC): ICD-10-CM

## 2024-10-31 DIAGNOSIS — I26.99 OTHER ACUTE PULMONARY EMBOLISM, UNSPECIFIED WHETHER ACUTE COR PULMONALE PRESENT (HCC): ICD-10-CM

## 2024-10-31 DIAGNOSIS — B37.49 CANDIDURIA: Primary | ICD-10-CM

## 2024-10-31 DIAGNOSIS — Z93.59 SUPRAPUBIC CATHETER (HCC): ICD-10-CM

## 2024-10-31 DIAGNOSIS — G80.9 CEREBRAL PALSY, UNSPECIFIED TYPE (HCC): ICD-10-CM

## 2024-10-31 DIAGNOSIS — R53.1 WEAKNESS: ICD-10-CM

## 2024-10-31 DIAGNOSIS — F41.0 GENERALIZED ANXIETY DISORDER WITH PANIC ATTACKS: ICD-10-CM

## 2024-10-31 PROCEDURE — 99499 UNLISTED E&M SERVICE: CPT | Performed by: NURSE PRACTITIONER

## 2024-10-31 PROCEDURE — 99309 SBSQ NF CARE MODERATE MDM 30: CPT | Performed by: NURSE PRACTITIONER

## 2024-10-31 NOTE — PROGRESS NOTES
Clari Rosa, 1970, 53 year old, female    [facility] 10/22-10/24/24     Hospital Discharge Diagnoses:  Abnormal UA   Hx of MDRO   Candiduria   Mod Hydroureteronephrosis   Diabetes   Hypertension   Anxiety   Bipolar   VTE on DOAC   ANAID   Obesity         HPI:  Clari Rosa  : 1970, Age 53 year old  female patient is admitted for subacute rehab. Patient has a past medical history of diabetes, bipolar, hypertension who presented to the ER with pain at suprapubic catheter site and kidneys. She had a recent prior admission and Urine cultures showed no growth and her antibiotics were stopped at that time. She has had multiple UTIs in the past. She was seen this last admission by Urology and ID. She had some candiduria and treated with Fluconazole. Patient seen by Urology and had an exchange. She was started on Ditropan for bladder spasms. She is to follow up with Urology for hydroureteronephrosis.   Admitting to [facility] for nursing care, medication management, and PT/OT/ST eval and tx.       Chief Complaint:    Chief Complaint   Patient presents with    Follow - Up        Subjective:   TODAY:  Patient seen for a follow up visit   She is seen in her room and very sleepy- nurse reports she received Norco this am. She is also on clonazepam scheduled  She awakens   Candiduria- Oral diflucan.On ditropan.  In house ID following - needs Urology follow up   Nurse reports that she would like sliding scale decreased will decrease sliding scale.   Appears comfortable sleeping and in no acute distress       Objective:  /68   Pulse 67   Temp 97.8 °F (36.6 °C)   Resp 18   SpO2 98%     PHYSICAL EXAM:  GENERAL HEALTH: 53 year old female patient, no acute distress   LINES, TUBES, DRAINS: + Port left subclavicular- not accessed currently   +Suprapubic catheter   SKIN: no rashes, no suspicious lesions   WOUND: see wound assessment   EYES: PERRLA, conjunctiva normal; no drainage from eyes  HENT: normocephalic; normal  nose, no nasal drainage, mucous membranes pink, moist  RESPIRATORY:clear to percussion and auscultation, No wheezing/cough/accessory muscle use; on room air  CARDIOVASCULAR: S1, S2 normal, RRR; no S3, no S4;   ABDOMEN: normal active BS+, soft, non-distended; no apparent masses; observed, non-tender, no guarding during physical exam  : + suprapubic catheter  MUSCULOSKELETAL:+ cerebral palsy- bed bound   EXTREMITIES/VASCULAR: no cyanosis, clubbing or edema  NEUROLOGIC: intact; no sensorimotor deficit  PSYCHIATRIC: alert and oriented- sleepy this morning  + bipolar hx + anxiety/depression history     Medications reviewed: Yes      Current Outpatient Medications:     oxybutynin 5 MG Oral Tab, Take 1 tablet (5 mg total) by mouth 3 (three) times daily., Disp: , Rfl:     HYDROcodone-acetaminophen  MG Oral Tab, Take 1 tablet by mouth every 6 (six) hours as needed for Pain., Disp: 20 tablet, Rfl: 0    fluconazole 200 MG Oral Tab, Take 1 tablet (200 mg total) by mouth daily for 7 days., Disp: 7 tablet, Rfl: 0    clonazePAM 0.5 MG Oral Tab, Take 3 tablets (1.5 mg total) by mouth 3 (three) times daily as needed for Anxiety. (Patient taking differently: Take 3 tablets (1.5 mg total) by mouth in the morning, at noon, and at bedtime.), Disp: 15 tablet, Rfl: 0    nystatin 100,000 Units/g External Cream, Apply 1 Application topically in the morning, at noon, and at bedtime., Disp: , Rfl:     loperamide 2 MG Oral Cap, Take 1 capsule (2 mg total) by mouth 4 (four) times daily as needed for Diarrhea., Disp: , Rfl:     meclizine 25 MG Oral Tab, Take 1 tablet (25 mg total) by mouth 3 (three) times daily as needed., Disp: , Rfl:     DULoxetine 60 MG Oral Cap DR Particles, Take 1 capsule (60 mg total) by mouth daily., Disp: , Rfl:     apixaban 5 MG Oral Tab, Take 1 tablet (5 mg total) by mouth 2 (two) times daily., Disp: , Rfl:     QUEtiapine 100 MG Oral Tab, Take 2 tablets (200 mg total) by mouth nightly., Disp: , Rfl:     insulin  glargine 100 UNIT/ML Subcutaneous Solution, Inject 6 Units into the skin nightly., Disp: , Rfl:     insulin aspart 100 Units/mL Subcutaneous Solution Pen-injector, Inject 2-8 Units into the skin 3 (three) times daily with meals., Disp: , Rfl:     phenazopyridine 100 MG Oral Tab, Take 1 tablet (100 mg total) by mouth 3 (three) times daily as needed for Pain., Disp: 5 tablet, Rfl: 0    metoprolol tartrate 25 MG Oral Tab, Take 1 tablet (25 mg total) by mouth 2 (two) times daily., Disp: , Rfl:     traZODone 50 MG Oral Tab, Take 1 tablet (50 mg total) by mouth nightly as needed for Sleep., Disp: , Rfl:     Nystatin 735551 UNIT/GM External Powder, Apply 1 Application topically as needed for Dry Skin. Under breast and groin area, Disp: , Rfl:     acetaminophen 325 MG Oral Tab, Take 2 tablets (650 mg total) by mouth every 6 (six) hours as needed for Pain., Disp: , Rfl:     Icosapent Ethyl 1 g Oral Cap, Take 2 capsules by mouth 4 (four) times daily., Disp: , Rfl:     Melatonin 3 MG Oral Cap, Take 1 capsule (3 mg total) by mouth at bedtime., Disp: , Rfl:     ergocalciferol 1.25 MG (60213 UT) Oral Cap, Take 1 capsule (50,000 Units total) by mouth every 7 days. Every monday, Disp: , Rfl:     bethanechol 10 MG Oral Tab, Take 1 tablet (10 mg total) by mouth daily., Disp: , Rfl:     albuterol 108 (90 Base) MCG/ACT Inhalation Aero Soln, Inhale 2 puffs into the lungs every 6 (six) hours as needed for Wheezing or Shortness of Breath., Disp: , Rfl:     Multiple Vitamins-Minerals (THERA-M) Oral Tab, Take 1 tablet by mouth daily., Disp: , Rfl:     divalproex Sodium  MG Oral Tablet 24 Hr, Take 1 tablet (500 mg total) by mouth 2 (two) times daily., Disp: , Rfl:     ondansetron 4 MG Oral Tablet Dispersible, Take 1 tablet (4 mg total) by mouth every 8 (eight) hours as needed for Nausea., Disp: , Rfl:     metFORMIN  MG Oral Tablet 24 Hr, Take 2 tablets (1,000 mg total) by mouth 2 (two) times daily with meals., Disp: 30 tablet,  Rfl: 0    Insulin Pen Needle 32G X 4 MM Does not apply Misc, May substitute if not on insurance formulary (Patient not taking: Reported on 10/31/2024), Disp: 100 each, Rfl: 5    Diagnostics   10/28/24   Wbc 8.88 hgb 10.3 hematocrit 32.4 plt 284   Glucose 127 bun 13 creat 0.68 bili 0.20 prot 6.2 alb 3.8 sodium 139 potassium 4.2 chl 100 co2 29 alt 7 ast 9 alk phos 72 ca 8.9 gfr > 60      10/25/24   Wbc 7.41 hgb 10.2 hematocrit 33.5 plt 278   Mag 1.7   Glucose 86 bun 10 creat 0.60 bili 0.24 prot 6.0 alb 3.7 sodium 143 potassium 3.9 chl 105 co2 29 alt 11 ast 11 alk phos 72 calcium 8.9 gfr > 60     Therapy update 10/31/24   Refuses to get out of bed   Sup-sit- CGA  Can sit at side of bed for 20 min   Upper Body- Min-Mod A   Lower body-dependent       Assessment and plan:  Physical Deconditioning/Impaired mobility and ADLs/At risk for falling  Fall Precautions  PT/OT/ST to evaluate and treat  [facility] team to establish care plan meeting with patient and POA/family as appropriate   Anticipate DC on or before TBD; SW to assist patient/family w/ DC planning  DC Plan:  TBD     Abnormal UA/ Mod hydroureteronephrosis/Candiduria/ Retention / Suprapubic catheter   In hospital- ID and Urology consulted. Suprapubic catheter was exchanged. Imaging shows moderate hydroureteronephrosis of left collecting system extending to level of the UVJ. Urine cultures were negative for bacteria. Urine showed Candiduria. She was started on Diflucan and discharged with Diflucan.    Vital signs q shift and prn   Labs weekly and prn   Suprapubic catheter care   Follow up with Urology Dr Green  In house ID to follow - spoke with ID will await blood testing to determine if any further urine testing needed.   Bethanechol 10 mg daily   Fluconazole 200 mg daily- 10/31   Nystatin application tid, Nystatin powder   Oxybutynin 5 mg tid   Phenazopyridine 100 mg tid prn      Bipolar Anxiety / Insomnia   IN house Psych to follow - spoke with Pedro who saw patient  last week. Will see patient again this week. He states he spoke to patients Mother as well   Clonazepam 0.5 mg 3 tablets TID   Depakote 500 mg bid   Melatonin 3 mg nightly   Quetiapine 200 mg nightly   Trazodone 50 mg nightly prn      Hx of MRDO   ID follow      Diabetes   10/22/24 A1c 7.8 %   Low carb diet   Accu check ac/hs - 130-180s   Insulin aspart sliding scale 2-8 units   Glargine 6 units nightly  Metformin 1000 mg bid       Hypertension /Hyperlipidemia  Vital signs q shift and prn   Labs weekly and prn   In house cardiology to follow   Lcosapent Ethyl 2 gm 4x daily   Metoprolol tartrate 25 mg bid      COPD /ANAID   In house Pulm to follow   Albuterol inhaler 2 puff every 6 hrs prn         DVT Prophylaxis / Hx of VTE   Encourage early mobilization and participation in PT/OT as able  Apixaban 5 mg bid      Pain Management  Offer to pre-medicate 30-60 min prior to therapy  Physiatry evaluation with management appreciated  Acetaminophen 650 mg every 6 hrs prn   Hydrocodone- acetaminophen  mg 1 tab every 6 hrs prn      Bowel Management Regimen/Diarrhea per patient   10/28 reports diarrhea. Nurse reports no diarrhea   Loperamide prn      Vitamins/supplements as r/t deficiencies  [facility] RD to follow while in rehab; supplementation/diet as per [facility] RD  May continue home supplements  Ergocalciferol 50,000 units weekly  MVI daily       LABS  CBC/CMP weekly while in [facility]- 11/5  Vit D/Mag- 11/5      *Follow-Up with PCP within 1-2 weeks following [facility] discharge.   Follow up with Urology Dr Green  *Follow-Up with specialists as recommended.  No future appointments.        This is a 45 minute visit and greater than 50% of the time was spent counseling the patient and/or coordinating care.        Note to patient: The 21st Century Cures Act makes medical notes like these available to patients in the interest of transparency. However, this is a medical document intended as peer to peer communication. It is written in medical  language and may contain abbreviations or verbiage that are unfamiliar. It may appear blunt or direct. Medical documents are intended to carry relevant information, facts as evident, and the clinical opinion of the practitioner who signs the document.    Diana Boo, APRN  10/31/2024

## 2024-11-05 ENCOUNTER — HOSPITAL ENCOUNTER (EMERGENCY)
Facility: HOSPITAL | Age: 54
Discharge: HOME | End: 2024-11-05
Attending: EMERGENCY MEDICINE
Payer: MEDICARE

## 2024-11-05 ENCOUNTER — SNF VISIT (OUTPATIENT)
Dept: INTERNAL MEDICINE CLINIC | Age: 54
End: 2024-11-05

## 2024-11-05 VITALS
SYSTOLIC BLOOD PRESSURE: 111 MMHG | OXYGEN SATURATION: 98 % | RESPIRATION RATE: 10 BRPM | TEMPERATURE: 98 F | HEART RATE: 74 BPM | DIASTOLIC BLOOD PRESSURE: 74 MMHG

## 2024-11-05 VITALS
HEART RATE: 70 BPM | RESPIRATION RATE: 18 BRPM | SYSTOLIC BLOOD PRESSURE: 126 MMHG | DIASTOLIC BLOOD PRESSURE: 70 MMHG | TEMPERATURE: 98 F | OXYGEN SATURATION: 98 %

## 2024-11-05 DIAGNOSIS — R21 RASH: ICD-10-CM

## 2024-11-05 DIAGNOSIS — F41.1 GENERALIZED ANXIETY DISORDER WITH PANIC ATTACKS: ICD-10-CM

## 2024-11-05 DIAGNOSIS — Z79.4 TYPE 2 DIABETES MELLITUS WITHOUT COMPLICATION, WITH LONG-TERM CURRENT USE OF INSULIN (HCC): ICD-10-CM

## 2024-11-05 DIAGNOSIS — R10.9 CHRONIC ABDOMINAL PAIN: Primary | ICD-10-CM

## 2024-11-05 DIAGNOSIS — R53.1 WEAKNESS: ICD-10-CM

## 2024-11-05 DIAGNOSIS — G89.29 CHRONIC ABDOMINAL PAIN: Primary | ICD-10-CM

## 2024-11-05 DIAGNOSIS — E11.9 TYPE 2 DIABETES MELLITUS WITHOUT COMPLICATION, WITH LONG-TERM CURRENT USE OF INSULIN (HCC): ICD-10-CM

## 2024-11-05 DIAGNOSIS — J44.9 CHRONIC OBSTRUCTIVE PULMONARY DISEASE, UNSPECIFIED COPD TYPE (HCC): ICD-10-CM

## 2024-11-05 DIAGNOSIS — R53.83 LETHARGY: ICD-10-CM

## 2024-11-05 DIAGNOSIS — R33.9 URINARY RETENTION: ICD-10-CM

## 2024-11-05 DIAGNOSIS — B37.49 CANDIDURIA: Primary | ICD-10-CM

## 2024-11-05 DIAGNOSIS — F31.9 BIPOLAR 1 DISORDER (HCC): ICD-10-CM

## 2024-11-05 DIAGNOSIS — G80.9 CEREBRAL PALSY, UNSPECIFIED TYPE (HCC): ICD-10-CM

## 2024-11-05 DIAGNOSIS — I26.99 OTHER ACUTE PULMONARY EMBOLISM, UNSPECIFIED WHETHER ACUTE COR PULMONALE PRESENT (HCC): ICD-10-CM

## 2024-11-05 DIAGNOSIS — F32.A DEPRESSION, UNSPECIFIED DEPRESSION TYPE: ICD-10-CM

## 2024-11-05 DIAGNOSIS — Z93.59 SUPRAPUBIC CATHETER (HCC): ICD-10-CM

## 2024-11-05 DIAGNOSIS — I10 BENIGN ESSENTIAL HYPERTENSION: ICD-10-CM

## 2024-11-05 DIAGNOSIS — F41.0 GENERALIZED ANXIETY DISORDER WITH PANIC ATTACKS: ICD-10-CM

## 2024-11-05 LAB
ALBUMIN SERPL-MCNC: 3.9 G/DL (ref 3.2–4.8)
ALBUMIN/GLOB SERPL: 1.3 {RATIO} (ref 1–2)
ALP LIVER SERPL-CCNC: 61 U/L
ALT SERPL-CCNC: 8 U/L
ANION GAP SERPL CALC-SCNC: 7 MMOL/L (ref 0–18)
AST SERPL-CCNC: 9 U/L (ref ?–34)
BASOPHILS # BLD AUTO: 0.03 X10(3) UL (ref 0–0.2)
BASOPHILS NFR BLD AUTO: 0.3 %
BILIRUB SERPL-MCNC: 0.2 MG/DL (ref 0.3–1.2)
BUN BLD-MCNC: 11 MG/DL (ref 9–23)
CALCIUM BLD-MCNC: 9.5 MG/DL (ref 8.7–10.4)
CHLORIDE SERPL-SCNC: 99 MMOL/L (ref 98–112)
CO2 SERPL-SCNC: 30 MMOL/L (ref 21–32)
CREAT BLD-MCNC: 0.84 MG/DL
EGFRCR SERPLBLD CKD-EPI 2021: 83 ML/MIN/1.73M2 (ref 60–?)
EOSINOPHIL # BLD AUTO: 0.14 X10(3) UL (ref 0–0.7)
EOSINOPHIL NFR BLD AUTO: 1.5 %
ERYTHROCYTE [DISTWIDTH] IN BLOOD BY AUTOMATED COUNT: 17.1 %
GLOBULIN PLAS-MCNC: 3.1 G/DL (ref 2–3.5)
GLUCOSE BLD-MCNC: 231 MG/DL (ref 70–99)
HCT VFR BLD AUTO: 35.4 %
HGB BLD-MCNC: 11.2 G/DL
IMM GRANULOCYTES # BLD AUTO: 0.06 X10(3) UL (ref 0–1)
IMM GRANULOCYTES NFR BLD: 0.6 %
LYMPHOCYTES # BLD AUTO: 1.89 X10(3) UL (ref 1–4)
LYMPHOCYTES NFR BLD AUTO: 20.2 %
MCH RBC QN AUTO: 24 PG (ref 26–34)
MCHC RBC AUTO-ENTMCNC: 31.6 G/DL (ref 31–37)
MCV RBC AUTO: 76 FL
MONOCYTES # BLD AUTO: 0.26 X10(3) UL (ref 0.1–1)
MONOCYTES NFR BLD AUTO: 2.8 %
NEUTROPHILS # BLD AUTO: 6.98 X10 (3) UL (ref 1.5–7.7)
NEUTROPHILS # BLD AUTO: 6.98 X10(3) UL (ref 1.5–7.7)
NEUTROPHILS NFR BLD AUTO: 74.6 %
OSMOLALITY SERPL CALC.SUM OF ELEC: 289 MOSM/KG (ref 275–295)
PLATELET # BLD AUTO: 267 10(3)UL (ref 150–450)
POTASSIUM SERPL-SCNC: 3.7 MMOL/L (ref 3.5–5.1)
PROT SERPL-MCNC: 7 G/DL (ref 5.7–8.2)
RBC # BLD AUTO: 4.66 X10(6)UL
SODIUM SERPL-SCNC: 136 MMOL/L (ref 136–145)
WBC # BLD AUTO: 9.4 X10(3) UL (ref 4–11)

## 2024-11-05 PROCEDURE — 80053 COMPREHEN METABOLIC PANEL: CPT | Performed by: EMERGENCY MEDICINE

## 2024-11-05 PROCEDURE — 85025 COMPLETE CBC W/AUTO DIFF WBC: CPT | Performed by: EMERGENCY MEDICINE

## 2024-11-05 PROCEDURE — 99309 SBSQ NF CARE MODERATE MDM 30: CPT | Performed by: NURSE PRACTITIONER

## 2024-11-05 PROCEDURE — 99284 EMERGENCY DEPT VISIT MOD MDM: CPT

## 2024-11-05 PROCEDURE — 99283 EMERGENCY DEPT VISIT LOW MDM: CPT

## 2024-11-05 PROCEDURE — 99499 UNLISTED E&M SERVICE: CPT | Performed by: NURSE PRACTITIONER

## 2024-11-05 PROCEDURE — 36415 COLL VENOUS BLD VENIPUNCTURE: CPT

## 2024-11-05 NOTE — ED PROVIDER NOTES
Patient Seen in: [facility] Emergency Department      History     Chief Complaint   Patient presents with    Syncope     Stated Complaint: multiple syncopal episodes    Subjective:   HPI      53-year-old female who presents to the emergency department complaining of sleeping all day.  Patient said that she had passed out and thinks is related to urinary tract infection.  The patient on review of her records was last admitted on 10/22/2024 for cystitis and chronic abdominal pain but reviewing her records found to have candiduria.  She was seen today on 2024 by the APN at the [facility].  The patient said she has been having continued symptomology and thinks a urinary tract infection is back and decided to come to the emergency department for continued care.  No reported fevers or vomiting.  She is also had a rash on her elbows.  Denies any new exposures.  Recently was started on fluconazole for her candiduria.    Objective:     Past Medical History:    Bipolar 1 disorder (HCC)    Dental caries    Diabetes (HCC)    MIKE (generalized anxiety disorder)    High blood pressure    History of diverticulitis of colon    Manipulative behavior    Neurogenic bladder    Obesity (BMI 30-39.9)    Paraplegia (HCC)    Sepsis following intra-abdominal surgery (HCC)    Serotonin syndrome    Sleep apnea    Suprapubic catheter (HCC)    Transverse myelitis (HCC)              Past Surgical History:   Procedure Laterality Date    Arthroscopy of joint unlisted      knee          Cholecystectomy      Part removal colon w end colostomy      2013    Tonsillectomy                  Social History     Socioeconomic History    Marital status: Single   Tobacco Use    Smoking status: Former     Current packs/day: 1.00     Average packs/day: 1 pack/day for 5.0 years (5.0 ttl pk-yrs)     Types: Cigarettes    Tobacco comments:     quit 2006   Vaping Use    Vaping status: Never Used   Substance and Sexual Activity     Alcohol use: No    Drug use: No     Social Drivers of Health     Food Insecurity: No Food Insecurity (10/22/2024)    Food Insecurity     Food Insecurity: Never true   Transportation Needs: No Transportation Needs (10/22/2024)    Transportation Needs     Lack of Transportation: No   Housing Stability: Low Risk  (10/22/2024)    Housing Stability     Housing Instability: No   Recent Concern: Housing Stability - High Risk (10/9/2024)    Received from [facility]    Housing Stability     Are you concerned about having a safe and reliable place to live?: Yes                  Physical Exam     ED Triage Vitals [11/05/24 1546]   /80   Pulse 91   Resp 18   Temp 98.2 °F (36.8 °C)   Temp src Oral   SpO2 98 %   O2 Device None (Room air)       Current Vitals:   Vital Signs  BP: 111/74  Pulse: 74  Resp: 10  Temp: 98.2 °F (36.8 °C)  Temp src: Oral  MAP (mmHg): 85    Oxygen Therapy  SpO2: 98 %  O2 Device: None (Room air)        Physical Exam  General: This a pleasant nontoxic appearing patient in no apparent distress alert and oriented ×3  HEENT: Pupils are equal reactive to light.  Extra ocular motions are intact.  No scleral icterus or conjunctival pallor: Neck is supple without tenderness on palpation.  Head is atraumatic normocephalic.  Oral mucosa moist.  Tongue is midline.  No posterior pharyngeal lesions.  Tympanic members are intact and clear bilaterally.  No JVD or adenopathy.  Lungs: Clear to auscultation bilaterally.  No wheezes, rhonchi, or rales appreciated.  No accessory muscle use noted for breathing.  Cardiac: Regular rate and rhythm.  Normal S1 and 2 without murmurs or ectopy appreciated  Abdomen: She has some suprapubic discomfort on palpation.  No distention.  Extremities: No cyanosis, no edema or clubbing.  Pulses are +2.  Full range of motion is noted of the extremities without deformities.  No tenderness.  Neurologically intact.  Contact dermatitis appearing rash on the elbows.  ED  Course     Labs Reviewed   COMP METABOLIC PANEL (14) - Abnormal; Notable for the following components:       Result Value    Glucose 231 (*)     ALT 8 (*)     Bilirubin, Total 0.2 (*)     All other components within normal limits   CBC WITH DIFFERENTIAL WITH PLATELET - Abnormal; Notable for the following components:    HGB 11.2 (*)     MCV 76.0 (*)     MCH 24.0 (*)     All other components within normal limits                   MDM    Differential diagnosis includes but is not limited to chronic abdominal pain, bladder spasms, candiduria.  I reviewed the patient's previous records and history and explained to the patient that she had not had any growth on her cultures except for the Candida.  Her vital signs are quite stable she appears nontoxic and well.  Laboratories were sent.  Glucose of 231 ALT of 8 total bilirubin 0.2.  Hemoglobin 11.2.  Rest of the CBC was normal.  The patient does not appear to have significant abnormalities in laboratories.  She has had chronic abdominal pain and had recent cultures done which showed that she grown yeast in her urine.  Explained this to the patient at length.  She does not have a rationale for admission at this time.  Encouraged her to follow-up with her urologist as an outpatient as well as continue fluconazole and follow-up with her primary care physicians as needed.  Return if symptoms progress or worsen.  Her fatigue may be related to some of her chronic disease processes.  She will need ambulance transport due to her chronic indwelling catheter.  Note to Patient  The 21st Century Cures Act makes medical notes like these available to patients in the interest of transparency. However, be advised this is a medical document and is intended as [id number]-he-elhn communication; it is written in medical language and may appear blunt, direct, or contain abbreviations or verbiage that are unfamiliar. Medical documents are intended to carry relevant information, facts as evident, and  the clinical opinion of the practitioner.  Patient was evaluated and a screening exam was performed.   As a treating physician attending to the patient, I determined, within reasonable clinical confidence and prior to discharge, that an emergency medical condition was not or was no longer present.  There was no indication for further evaluation, treatment or admission on an emergency basis.  Comprehensive verbal and written discharge and follow-up instructions were provided to help prevent relapse or worsening.  Patient was instructed to follow-up with their primary care provider for further evaluation and treatment, but to return immediately to the ER for worsening, concerning, new, changing or persisting symptoms.  I discussed the case with the patient and they had no questions, complaints, or concerns.  Patient felt comfortable going home.  ^^Please note that this report has been produced using speech recognition software and may contain errors related to that system including, but not limited to, errors in grammar, punctuation, and spelling, as well as words and phrases that possibly may have been recognized inappropriately.  If there are any questions or concerns, contact the dictating provider for clarification.  Medical Decision Making      Disposition and Plan     Clinical Impression:  1. Chronic abdominal pain    2. Suprapubic catheter (HCC)    3. Weakness         Disposition:  Discharge  11/5/2024  6:48 pm    Follow-up:  Your primary care physician    Schedule an appointment as soon as possible for a visit in 2 day(s)            Medications Prescribed:  Current Discharge Medication List              Supplementary Documentation:

## 2024-11-05 NOTE — PROGRESS NOTES
Clari Rosa, 1970, 53 year old, female    [facility] 10/22-10/24/24     Hospital Discharge Diagnoses:  Abnormal UA   Hx of MDRO   Candiduria   Mod Hydroureteronephrosis   Diabetes   Hypertension   Anxiety   Bipolar   VTE on DOAC   ANAID   Obesity         HPI:  Clari Rosa  : 1970, Age 53 year old  female patient is admitted for subacute rehab. Patient has a past medical history of diabetes, bipolar, hypertension who presented to the ER with pain at suprapubic catheter site and kidneys. She had a recent prior admission and Urine cultures showed no growth and her antibiotics were stopped at that time. She has had multiple UTIs in the past. She was seen this last admission by Urology and ID. She had some candiduria and treated with Fluconazole. Patient seen by Urology and had an exchange. She was started on Ditropan for bladder spasms. She is to follow up with Urology for hydroureteronephrosis.   Admitting to [facility] for nursing care, medication management, and PT/OT/ST eval and tx.       Chief Complaint:    Chief Complaint   Patient presents with    Follow - Up        Subjective:   TODAY:  Patient seen for a follow up visit   She states she keeps falling asleep and cannot keep her eyes open. She states \" I have been septic 22 times and I think I am septic again.\" She has had multiple hospital admissions recent one did not show bacteria in urine culture showed candiduria she was on antifungal treatment. She wants another UA drawn. Discussed with ID- can obtain UA C &S but needs suprapubic catheter exchanged per [employer] for best results.   Candiduria- Oral diflucan completed. On ditropan.    Patient needs Urology follow up with Dr Green-  notified   Nurse reports she has not eaten in 24 hrs when asking patient she states she just keeps sleeping and cannot stay awake long enough to eat.   Lab work today  stable.   UA C & S pending   Monitor patient carefully for change in condition and notify  provider     New rash to bilateral arms. Started the other day. States it does itch. No new medications. Ordering steroid cream avoiding sedating anti histamines as patient has been lethargic.     Nurse updated provider this afternoon patient is more awake and alert and ate 75% of her meal.     Objective:  /70   Pulse 70   Temp 97.7 °F (36.5 °C)   Resp 18   SpO2 98%     PHYSICAL EXAM:  GENERAL HEALTH: 53 year old female patient, no acute distress   LINES, TUBES, DRAINS: + Port left subclavicular  +Suprapubic catheter   SKIN: no rashes, no suspicious lesions   WOUND: see wound assessment   EYES: PERRLA, conjunctiva normal; no drainage from eyes  HENT: normocephalic; normal nose, no nasal drainage, mucous membranes pink, moist  RESPIRATORY:clear to percussion and auscultation, No wheezing/cough/accessory muscle use; on room air  CARDIOVASCULAR: S1, S2 normal, RRR; no S3, no S4;   ABDOMEN: normal active BS+, soft, non-distended; no apparent masses; observed, non-tender, no guarding during physical exam  : + suprapubic catheter  MUSCULOSKELETAL:+ cerebral palsy- bed bound   EXTREMITIES/VASCULAR: no cyanosis, clubbing or edema  NEUROLOGIC: intact; no sensorimotor deficit  PSYCHIATRIC: alert and oriented- sleepy this morning  + bipolar hx + anxiety/depression history     Medications reviewed: Yes      Current Outpatient Medications:     oxybutynin 5 MG Oral Tab, Take 1 tablet (5 mg total) by mouth 3 (three) times daily., Disp: , Rfl:     HYDROcodone-acetaminophen  MG Oral Tab, Take 1 tablet by mouth every 6 (six) hours as needed for Pain., Disp: 20 tablet, Rfl: 0    clonazePAM 0.5 MG Oral Tab, Take 3 tablets (1.5 mg total) by mouth 3 (three) times daily as needed for Anxiety. (Patient taking differently: Take 3 tablets (1.5 mg total) by mouth in the morning, at noon, and at bedtime.), Disp: 15 tablet, Rfl: 0    nystatin 100,000 Units/g External Cream, Apply 1 Application topically in the morning, at  noon, and at bedtime., Disp: , Rfl:     loperamide 2 MG Oral Cap, Take 1 capsule (2 mg total) by mouth 4 (four) times daily as needed for Diarrhea., Disp: , Rfl:     meclizine 25 MG Oral Tab, Take 1 tablet (25 mg total) by mouth 3 (three) times daily as needed., Disp: , Rfl:     DULoxetine 60 MG Oral Cap DR Particles, Take 1 capsule (60 mg total) by mouth daily., Disp: , Rfl:     apixaban 5 MG Oral Tab, Take 1 tablet (5 mg total) by mouth 2 (two) times daily., Disp: , Rfl:     QUEtiapine 100 MG Oral Tab, Take 2 tablets (200 mg total) by mouth nightly., Disp: , Rfl:     insulin glargine 100 UNIT/ML Subcutaneous Solution, Inject 6 Units into the skin nightly., Disp: , Rfl:     insulin aspart 100 Units/mL Subcutaneous Solution Pen-injector, Inject 2-8 Units into the skin 3 (three) times daily with meals., Disp: , Rfl:     phenazopyridine 100 MG Oral Tab, Take 1 tablet (100 mg total) by mouth 3 (three) times daily as needed for Pain., Disp: 5 tablet, Rfl: 0    Insulin Pen Needle 32G X 4 MM Does not apply Misc, May substitute if not on insurance formulary (Patient not taking: Reported on 10/28/2024), Disp: 100 each, Rfl: 5    metoprolol tartrate 25 MG Oral Tab, Take 1 tablet (25 mg total) by mouth 2 (two) times daily., Disp: , Rfl:     traZODone 50 MG Oral Tab, Take 1 tablet (50 mg total) by mouth nightly as needed for Sleep., Disp: , Rfl:     Nystatin 544630 UNIT/GM External Powder, Apply 1 Application topically as needed for Dry Skin. Under breast and groin area, Disp: , Rfl:     acetaminophen 325 MG Oral Tab, Take 2 tablets (650 mg total) by mouth every 6 (six) hours as needed for Pain., Disp: , Rfl:     Icosapent Ethyl 1 g Oral Cap, Take 2 capsules by mouth 4 (four) times daily., Disp: , Rfl:     Melatonin 3 MG Oral Cap, Take 1 capsule (3 mg total) by mouth at bedtime., Disp: , Rfl:     ergocalciferol 1.25 MG (77823 UT) Oral Cap, Take 1 capsule (50,000 Units total) by mouth every 7 days. Every monday, Disp: , Rfl:      bethanechol 10 MG Oral Tab, Take 1 tablet (10 mg total) by mouth daily., Disp: , Rfl:     albuterol 108 (90 Base) MCG/ACT Inhalation Aero Soln, Inhale 2 puffs into the lungs every 6 (six) hours as needed for Wheezing or Shortness of Breath., Disp: , Rfl:     Multiple Vitamins-Minerals (THERA-M) Oral Tab, Take 1 tablet by mouth daily., Disp: , Rfl:     divalproex Sodium  MG Oral Tablet 24 Hr, Take 1 tablet (500 mg total) by mouth 2 (two) times daily., Disp: , Rfl:     ondansetron 4 MG Oral Tablet Dispersible, Take 1 tablet (4 mg total) by mouth every 8 (eight) hours as needed for Nausea., Disp: , Rfl:     metFORMIN  MG Oral Tablet 24 Hr, Take 2 tablets (1,000 mg total) by mouth 2 (two) times daily with meals., Disp: 30 tablet, Rfl: 0    Diagnostics   11/5/24-   Wbc 8.42 hgb 10.9 hematocrit 36.0 plt 264  Mag 1.6   glucose 126 bun 12 creat 0.77 bili 0.17 prot 6.4 alb 3.9 sodium 142 potassium 4.0 chl 102 co2 31 alt 8 ast 11 alk phos 61 ca 9.2 gfr > 60   Vit D 32.5     10/28/24   Wbc 8.88 hgb 10.3 hematocrit 32.4 plt 284   Glucose 127 bun 13 creat 0.68 bili 0.20 prot 6.2 alb 3.8 sodium 139 potassium 4.2 chl 100 co2 29 alt 7 ast 9 alk phos 72 ca 8.9 gfr > 60      10/25/24   Wbc 7.41 hgb 10.2 hematocrit 33.5 plt 278   Mag 1.7   Glucose 86 bun 10 creat 0.60 bili 0.24 prot 6.0 alb 3.7 sodium 143 potassium 3.9 chl 105 co2 29 alt 11 ast 11 alk phos 72 calcium 8.9 gfr > 60     Therapy update 10/31/24   Refuses to get out of bed   Sup-sit- CGA  Can sit at side of bed for 20 min   Upper Body- Min-Mod A   Lower body-dependent       Assessment and plan:  Physical Deconditioning/Impaired mobility and ADLs/At risk for falling  Fall Precautions  PT/OT/ST to evaluate and treat  [facility] team to establish care plan meeting with patient and POA/family as appropriate   Anticipate DC on or before TBD; SW to assist patient/family w/ DC planning  DC Plan:  TBD     Abnormal UA/ Mod hydroureteronephrosis/Candiduria/ Retention /  Suprapubic catheter   In hospital- ID and Urology consulted. Suprapubic catheter was exchanged. Imaging shows moderate hydroureteronephrosis of left collecting system extending to level of the UVJ. Urine cultures were negative for bacteria. Urine showed Candiduria. She was started on Diflucan and discharged with Diflucan.    Vital signs q shift and prn   Labs weekly and prn   Suprapubic catheter care   Follow up with Urology Dr Green  In house ID to follow - spoke with ID will await blood testing to determine if any further urine testing needed.   Bethanechol 10 mg daily   Fluconazole 200 mg daily- 10/31   Nystatin application tid, Nystatin powder   Oxybutynin 5 mg tid   Phenazopyridine 100 mg tid prn   11/5 more lethargic, not eating. Lab work pending 11/5. Obtain UA C & S per ID need Suprapubic cath exchange for best Urine test results.      Bipolar Anxiety / Insomnia   IN house Psych to follow - spoke with Pedro who saw patient last week. Will see patient again this week. He states he spoke to patients Mother as well   Clonazepam 0.5 mg 3 tablets TID   Depakote 500 mg bid   Melatonin 3 mg nightly   Quetiapine 200 mg nightly   Trazodone 50 mg nightly prn      Rash bilateral arms   Unknown cause. No new medication.   Topical steroid cream daily   Monitor symptoms     Hx of MRDO   ID follow      Diabetes   10/22/24 A1c 7.8 %   Low carb diet   Accu check ac/hs - 150-190s   Insulin aspart sliding scale 2-8 units   Glargine 6 units nightly  Metformin 1000 mg bid       Hypertension /Hyperlipidemia  Vital signs q shift and prn   Labs weekly and prn   In house cardiology to follow   Lcosapent Ethyl 2 gm 4x daily   Metoprolol tartrate 25 mg bid      COPD /ANAID   In house Pulm to follow   Albuterol inhaler 2 puff every 6 hrs prn         DVT Prophylaxis / Hx of VTE   Encourage early mobilization and participation in PT/OT as able  Apixaban 5 mg bid      Pain Management  Offer to pre-medicate 30-60 min prior to  therapy  Physiatry evaluation with management appreciated  Acetaminophen 650 mg every 6 hrs prn   Hydrocodone- acetaminophen  mg 1 tab every 6 hrs prn      Bowel Management Regimen/Diarrhea per patient   10/28 reports diarrhea. Nurse reports no diarrhea   Loperamide prn      Vitamins/supplements as r/t deficiencies  PLACIDO RD to follow while in rehab; supplementation/diet as per PLACIDO RD  May continue home supplements  Ergocalciferol 50,000 units weekly  MVI daily       LABS  CBC/CMP- 11/8      *Follow-Up with PCP within 1-2 weeks following [facility] discharge.   Follow up with Urology Dr Green  *Follow-Up with specialists as recommended.  No future appointments.          This is a 45 minute visit and greater than 50% of the time was spent counseling the patient and/or coordinating care.        Note to patient: The 21st Century Cures Act makes medical notes like these available to patients in the interest of transparency. However, this is a medical document intended as peer to peer communication. It is written in medical language and may contain abbreviations or verbiage that are unfamiliar. It may appear blunt or direct. Medical documents are intended to carry relevant information, facts as evident, and the clinical opinion of the practitioner who signs the document.    Diana Boo, APRN  11/5/2024

## 2024-11-05 NOTE — CDS QUERY
CLINICAL DOCUMENTATION CLARIFICATION FORM  Dear Doctor Jeremiah:    Was Urogenital candidiasis  A COMPLICATION OF Suprapubic catheter  SELECTION BY PROVIDER ONLY  (  ) Yes, it is a complication   (  ) No, it is not a complication  (  ) Other (please specify):       Clinical Indicators  10/24 Progress note: Candiduria In association with suprapubic catheter colonization Cath changed  Abnormal UA      Risk Factors  Suprapubic catheter  Moderate Hydroureteronephrosis  Neurogenic bladder  Hx of ESBL/ Multiple    Treatments  IV Fluconazole  ID consult  IV Fluids  Urine cultures-UTI ruled out  Urology consult          If you have any questions, please contact Clinical : Arcelia Sanchez RN  CDS  at [email or web address]  Thank You!  THIS FORM IS A PERMANENT PART OF THE MEDICAL RECORD

## 2024-11-05 NOTE — ED INITIAL ASSESSMENT (HPI)
Patient states she was passed out for an entire day and that the SNF did not help her. She keeps repeating that she has a UTI and she knows it and needs to be placed on abx. Patient has rash to bilateral elbows. Red and bumpy.

## 2024-11-05 NOTE — ED INITIAL ASSESSMENT (HPI)
Patient to the ER c/o multiple syncopal episodes. Patient states she has went septic from a UTI 22 times and today she has been passing out while laying in bed. Labs drawn today at [facility], all WNL. Patient is alert and oriented x3. No fever no n/v/d no pain

## 2024-11-07 ENCOUNTER — SNF DISCHARGE (OUTPATIENT)
Dept: INTERNAL MEDICINE CLINIC | Age: 54
End: 2024-11-07

## 2024-11-07 VITALS
OXYGEN SATURATION: 98 % | TEMPERATURE: 98 F | SYSTOLIC BLOOD PRESSURE: 130 MMHG | RESPIRATION RATE: 18 BRPM | HEART RATE: 72 BPM | DIASTOLIC BLOOD PRESSURE: 69 MMHG

## 2024-11-07 DIAGNOSIS — Z79.4 TYPE 2 DIABETES MELLITUS WITHOUT COMPLICATION, WITH LONG-TERM CURRENT USE OF INSULIN (HCC): ICD-10-CM

## 2024-11-07 DIAGNOSIS — R33.9 URINARY RETENTION: ICD-10-CM

## 2024-11-07 DIAGNOSIS — I26.99 OTHER ACUTE PULMONARY EMBOLISM, UNSPECIFIED WHETHER ACUTE COR PULMONALE PRESENT (HCC): ICD-10-CM

## 2024-11-07 DIAGNOSIS — F41.0 GENERALIZED ANXIETY DISORDER WITH PANIC ATTACKS: ICD-10-CM

## 2024-11-07 DIAGNOSIS — J44.9 CHRONIC OBSTRUCTIVE PULMONARY DISEASE, UNSPECIFIED COPD TYPE (HCC): ICD-10-CM

## 2024-11-07 DIAGNOSIS — I10 BENIGN ESSENTIAL HYPERTENSION: ICD-10-CM

## 2024-11-07 DIAGNOSIS — G80.9 CEREBRAL PALSY, UNSPECIFIED TYPE (HCC): ICD-10-CM

## 2024-11-07 DIAGNOSIS — Z93.59 SUPRAPUBIC CATHETER (HCC): ICD-10-CM

## 2024-11-07 DIAGNOSIS — F31.9 BIPOLAR 1 DISORDER (HCC): ICD-10-CM

## 2024-11-07 DIAGNOSIS — F41.1 GENERALIZED ANXIETY DISORDER WITH PANIC ATTACKS: ICD-10-CM

## 2024-11-07 DIAGNOSIS — B37.49 CANDIDURIA: Primary | ICD-10-CM

## 2024-11-07 DIAGNOSIS — F32.A DEPRESSION, UNSPECIFIED DEPRESSION TYPE: ICD-10-CM

## 2024-11-07 DIAGNOSIS — E11.9 TYPE 2 DIABETES MELLITUS WITHOUT COMPLICATION, WITH LONG-TERM CURRENT USE OF INSULIN (HCC): ICD-10-CM

## 2024-11-07 DIAGNOSIS — R53.1 WEAKNESS: ICD-10-CM

## 2024-11-07 PROCEDURE — 99316 NF DSCHRG MGMT 30 MIN+: CPT | Performed by: NURSE PRACTITIONER

## 2024-11-07 PROCEDURE — 99499 UNLISTED E&M SERVICE: CPT | Performed by: NURSE PRACTITIONER

## 2024-11-07 RX ORDER — TRIAMCINOLONE ACETONIDE 1 MG/G
1 CREAM TOPICAL 2 TIMES DAILY
COMMUNITY
End: 2024-11-12

## 2024-11-07 NOTE — PROGRESS NOTES
Clari Rosa, 1970, 53 year old, female is being discharged from Facility: [facility]       DISCHARGE SUMMARY    Date of Admission: 10/24/24     Anticipated Date of Discharge: 24                                  [facility] 10/22-10/24/24      Hospital Discharge Diagnoses:  Abnormal UA   Hx of MDRO   Candiduria   Mod Hydroureteronephrosis   Diabetes   Hypertension   Anxiety   Bipolar   VTE on DOAC   ANAID   Obesity         HPI:  Clari Rosa  : 1970, Age 53 year old  female patient is admitted for subacute rehab. Patient has a past medical history of diabetes, bipolar, hypertension who presented to the ER with pain at suprapubic catheter site and kidneys. She had a recent prior admission and Urine cultures showed no growth and her antibiotics were stopped at that time. She has had multiple UTIs in the past. She was seen this last admission by Urology and ID. She had some candiduria and treated with Fluconazole. Patient seen by Urology and had an exchange. She was started on Ditropan for bladder spasms. She is to follow up with Urology for hydroureteronephrosis.   Admitting to [facility] for nursing care, medication management, and PT/OT/ST eval and tx.          Reason for Admission:  Subacute Rehab and Medical Management    Subjective:  Patient is seen in her room   She is not progressing/refusing therapies so they plan to discharge as of    She continues to be concerned she is septic and has UTI- refuses suprapubic catheter exchange- needing Urology follow up         ROS and Physical Exam:         REVIEW OF SYSTEMS:  Limited appears comfortable   + CP history   + urinary retention suprapubic catheter  + bipolar hx     PHYSICAL EXAM:  /69   Pulse 72   Temp 97.6 °F (36.4 °C)   Resp 18   SpO2 98%     GENERAL HEALTH: 53 year old female patient, no acute distress   LINES, TUBES, DRAINS: + Port left subclavicular  +Suprapubic catheter   SKIN: no rashes, no suspicious lesions   WOUND: see  wound assessment   EYES: PERRLA, conjunctiva normal; no drainage from eyes  HENT: normocephalic; normal nose, no nasal drainage, mucous membranes pink, moist  RESPIRATORY:clear to percussion and auscultation, No wheezing/cough/accessory muscle use; on room air  CARDIOVASCULAR: S1, S2 normal, RRR; no S3, no S4;   ABDOMEN: normal active BS+, soft, non-distended; no apparent masses; observed, non-tender, no guarding during physical exam  : + suprapubic catheter  MUSCULOSKELETAL:+ cerebral palsy- bed bound   EXTREMITIES/VASCULAR: no cyanosis, clubbing or edema  NEUROLOGIC: intact; no sensorimotor deficit  PSYCHIATRIC: alert and oriented      Skilled Nursing Facility Course:    Clari Rosa with PT/OT not making much progress. Bed mobility SBA, Refuses to get out of bed or transfer. She is transitioning to LTC but facility likely looking for other placement   She has history of UTI but most recently found candiduria given dose of Diflucan also started on Oxybutynin. Patient to follow up with Urologist. She developed rash to bilateral elbow area- unknown cause. Discussed with ID - steroid cream to apply monitor site.     Most recent lab results:  Lab Results   Component Value Date    WBC 9.4 11/05/2024    RBC 4.66 11/05/2024    HGB 11.2 (L) 11/05/2024    HCT 35.4 11/05/2024    MCV 76.0 (L) 11/05/2024    MCH 24.0 (L) 11/05/2024    MCHC 31.6 11/05/2024    RDW 17.1 11/05/2024    .0 11/05/2024     Lab Results   Component Value Date     (H) 11/05/2024    BUN 11 11/05/2024    BUNCREA 16.4 09/20/2024    CREATSERUM 0.84 11/05/2024    ANIONGAP 7 11/05/2024     08/15/2016    CA 9.5 11/05/2024    OSMOCALC 289 11/05/2024    ALKPHO 61 11/05/2024    AST 9 11/05/2024    ALT 8 (L) 11/05/2024    BILT 0.2 (L) 11/05/2024    TP 7.0 11/05/2024    ALB 3.9 11/05/2024    GLOBULIN 3.1 11/05/2024     11/05/2024    K 3.7 11/05/2024    CL 99 11/05/2024    CO2 30.0 11/05/2024 11/5/24-   Wbc 8.42 hgb 10.9 hematocrit 36.0  plt 264  Mag 1.6   glucose 126 bun 12 creat 0.77 bili 0.17 prot 6.4 alb 3.9 sodium 142 potassium 4.0 chl 102 co2 31 alt 8 ast 11 alk phos 61 ca 9.2 gfr > 60   Vit D 32.5      10/28/24   Wbc 8.88 hgb 10.3 hematocrit 32.4 plt 284   Glucose 127 bun 13 creat 0.68 bili 0.20 prot 6.2 alb 3.8 sodium 139 potassium 4.2 chl 100 co2 29 alt 7 ast 9 alk phos 72 ca 8.9 gfr > 60      10/25/24   Wbc 7.41 hgb 10.2 hematocrit 33.5 plt 278   Mag 1.7   Glucose 86 bun 10 creat 0.60 bili 0.24 prot 6.0 alb 3.7 sodium 143 potassium 3.9 chl 105 co2 29 alt 11 ast 11 alk phos 72 calcium 8.9 gfr > 60     Discharge Diagnoses w/ current management:     Abnormal UA/ Mod hydroureteronephrosis/Candiduria/ Retention / Suprapubic catheter   In hospital- ID and Urology consulted. Suprapubic catheter was exchanged. Imaging shows moderate hydroureteronephrosis of left collecting system extending to level of the UVJ. Urine cultures were negative for bacteria. Urine showed Candiduria. She was started on Diflucan and discharged with Diflucan.    Vital signs q shift and prn   Labs weekly and prn   Suprapubic catheter care   Follow up with Urology Dr Green  In house ID to follow - spoke with ID will await blood testing to determine if any further urine testing needed.   Bethanechol 10 mg daily   Fluconazole 200 mg daily- 10/31   Nystatin application tid, Nystatin powder   Oxybutynin 5 mg tid   Phenazopyridine 100 mg tid prn   11/5 more lethargic, not eating. Lab work pending 11/5. Obtain UA C & S per ID need Suprapubic cath exchange for best Urine test results.      Bipolar Anxiety / Insomnia   IN house Psych to follow - spoke with Pedro who saw patient last week. Will see patient again this week. He states he spoke to patients Mother as well   Clonazepam 0.5 mg 3 tablets TID   Depakote 500 mg bid   Melatonin 3 mg nightly   Quetiapine 200 mg nightly   Trazodone 50 mg nightly prn      Rash bilateral arms   Unknown cause. No new medication.   Topical steroid  cream daily   Monitor symptoms      Hx of MRDO   ID follow      Diabetes   10/22/24 A1c 7.8 %   Low carb diet   Accu check ac/hs   Insulin aspart sliding scale 2-8 units   Glargine 6 units nightly  Metformin 1000 mg bid       Hypertension /Hyperlipidemia  Vital signs q shift and prn   Labs weekly and prn   In house cardiology to follow   Lcosapent Ethyl 2 gm 4x daily   Metoprolol tartrate 25 mg bid      COPD /ANAID   In house Pulm to follow   Albuterol inhaler 2 puff every 6 hrs prn         DVT Prophylaxis / Hx of VTE   Encourage early mobilization and participation in PT/OT as able  Apixaban 5 mg bid               Medication Reconciliation Completed:  Yes    Follow Up Visits:  PCP within 7 days of Discharge  Follow up with Dr Green Urology   No future appointments.      Greater than 30 min  spent in coordination of care in preparation for discharge.    Note to patient: The 21st Century Cures Act makes medical notes like these available to patients in the interest of transparency. However, this is a medical document intended as peer to peer communication. It is written in medical language and may contain abbreviations or verbiage that are unfamiliar. It may appear blunt or direct. Medical documents are intended to carry relevant information, facts as evident, and the clinical opinion of the practitioner who signs the document.      Diana Boo, APRN  11/7/2024

## 2024-11-12 ENCOUNTER — SNF VISIT (OUTPATIENT)
Dept: INTERNAL MEDICINE CLINIC | Age: 54
End: 2024-11-12

## 2024-11-12 VITALS
OXYGEN SATURATION: 98 % | TEMPERATURE: 97 F | HEART RATE: 76 BPM | RESPIRATION RATE: 20 BRPM | SYSTOLIC BLOOD PRESSURE: 121 MMHG | DIASTOLIC BLOOD PRESSURE: 64 MMHG

## 2024-11-12 DIAGNOSIS — E11.9 TYPE 2 DIABETES MELLITUS WITHOUT COMPLICATION, WITH LONG-TERM CURRENT USE OF INSULIN (HCC): ICD-10-CM

## 2024-11-12 DIAGNOSIS — Z79.4 TYPE 2 DIABETES MELLITUS WITHOUT COMPLICATION, WITH LONG-TERM CURRENT USE OF INSULIN (HCC): ICD-10-CM

## 2024-11-12 DIAGNOSIS — G80.9 CEREBRAL PALSY, UNSPECIFIED TYPE (HCC): ICD-10-CM

## 2024-11-12 DIAGNOSIS — J44.9 CHRONIC OBSTRUCTIVE PULMONARY DISEASE, UNSPECIFIED COPD TYPE (HCC): ICD-10-CM

## 2024-11-12 DIAGNOSIS — R33.9 URINARY RETENTION: ICD-10-CM

## 2024-11-12 DIAGNOSIS — I26.99 OTHER ACUTE PULMONARY EMBOLISM, UNSPECIFIED WHETHER ACUTE COR PULMONALE PRESENT (HCC): ICD-10-CM

## 2024-11-12 DIAGNOSIS — I10 BENIGN ESSENTIAL HYPERTENSION: ICD-10-CM

## 2024-11-12 DIAGNOSIS — F31.9 BIPOLAR 1 DISORDER (HCC): ICD-10-CM

## 2024-11-12 DIAGNOSIS — F41.1 GENERALIZED ANXIETY DISORDER WITH PANIC ATTACKS: ICD-10-CM

## 2024-11-12 DIAGNOSIS — Z93.59 SUPRAPUBIC CATHETER (HCC): ICD-10-CM

## 2024-11-12 DIAGNOSIS — R53.1 WEAKNESS: ICD-10-CM

## 2024-11-12 DIAGNOSIS — N39.0 URINARY TRACT INFECTION WITHOUT HEMATURIA, SITE UNSPECIFIED: Primary | ICD-10-CM

## 2024-11-12 DIAGNOSIS — B37.49 CANDIDURIA: ICD-10-CM

## 2024-11-12 DIAGNOSIS — F41.0 GENERALIZED ANXIETY DISORDER WITH PANIC ATTACKS: ICD-10-CM

## 2024-11-12 DIAGNOSIS — F32.A DEPRESSION, UNSPECIFIED DEPRESSION TYPE: ICD-10-CM

## 2024-11-12 PROCEDURE — 99309 SBSQ NF CARE MODERATE MDM 30: CPT | Performed by: NURSE PRACTITIONER

## 2024-11-12 PROCEDURE — 99499 UNLISTED E&M SERVICE: CPT | Performed by: NURSE PRACTITIONER

## 2024-11-12 RX ORDER — BENZOCAINE/MENTHOL 6 MG-10 MG
1 LOZENGE MUCOUS MEMBRANE 2 TIMES DAILY
COMMUNITY

## 2024-11-12 NOTE — PROGRESS NOTES
Clari Rosa, 1970, 53 year old, female    [facility] 10/22-10/24/24     Hospital Discharge Diagnoses:  Abnormal UA   Hx of MDRO   Candiduria   Mod Hydroureteronephrosis   Diabetes   Hypertension   Anxiety   Bipolar   VTE on DOAC   ANAID   Obesity         HPI:  Clari Rosa  : 1970, Age 53 year old  female patient is admitted for subacute rehab. Patient has a past medical history of diabetes, bipolar, hypertension who presented to the ER with pain at suprapubic catheter site and kidneys. She had a recent prior admission and Urine cultures showed no growth and her antibiotics were stopped at that time. She has had multiple UTIs in the past. She was seen this last admission by Urology and ID. She had some candiduria and treated with Fluconazole. Patient seen by Urology and had an exchange. She was started on Ditropan for bladder spasms. She is to follow up with Urology for hydroureteronephrosis.   Admitting to [facility] for nursing care, medication management, and PT/OT/ST eval and tx.       Chief Complaint:    Chief Complaint   Patient presents with    Follow - Up        Subjective:   TODAY:  Patient seen for a follow up visit   She is sleeping in her bed easily awoken   UA was collected but suprapubic cath was not exchanged - culture showed ESBL E coli urine. She is now on IV antibiotic Ertapenem -   . On ditropan.    Patient needs Urology follow up with Dr Green-  notified   Patient felt she needed to go to the ER again yesterday states she was septic - spoke to patient via phone yesterday- she received her first dose of IV antibiotic yesterday. Stat labs drawn  stable.   Continue to monitor patient     Objective:  /64   Pulse 76   Temp 97.3 °F (36.3 °C)   Resp 20   SpO2 98%     PHYSICAL EXAM:  GENERAL HEALTH: 53 year old female patient, no acute distress   LINES, TUBES, DRAINS: + Port left subclavicular  +Suprapubic catheter   SKIN: no rashes, no suspicious lesions    WOUND: see wound assessment   EYES: PERRLA, conjunctiva normal; no drainage from eyes  HENT: normocephalic; normal nose, no nasal drainage, mucous membranes pink, moist  RESPIRATORY:clear to percussion and auscultation, No wheezing/cough/accessory muscle use; on room air  CARDIOVASCULAR: S1, S2 normal, RRR; no S3, no S4;   ABDOMEN: normal active BS+, soft, non-distended; no apparent masses; observed, non-tender, no guarding during physical exam  : + suprapubic catheter  MUSCULOSKELETAL:+ cerebral palsy- bed bound   EXTREMITIES/VASCULAR: no cyanosis, clubbing or edema  NEUROLOGIC: intact; no sensorimotor deficit  PSYCHIATRIC: alert and oriented- sleepy this morning  + bipolar hx + anxiety/depression history     Medications reviewed: Yes      Current Outpatient Medications:     hydrocortisone 1 % External Cream, Apply 1 Application topically 2 (two) times daily., Disp: , Rfl:     sodium chloride 0.9% SOLN 100 mL with ertapenem 1 g SOLR 1 g, Inject 1 g into the vein daily., Disp: , Rfl:     oxybutynin 5 MG Oral Tab, Take 1 tablet (5 mg total) by mouth 3 (three) times daily., Disp: , Rfl:     HYDROcodone-acetaminophen  MG Oral Tab, Take 1 tablet by mouth every 6 (six) hours as needed for Pain., Disp: 20 tablet, Rfl: 0    clonazePAM 0.5 MG Oral Tab, Take 3 tablets (1.5 mg total) by mouth 3 (three) times daily as needed for Anxiety. (Patient taking differently: Take 3 tablets (1.5 mg total) by mouth in the morning, at noon, and at bedtime.), Disp: 15 tablet, Rfl: 0    nystatin 100,000 Units/g External Cream, Apply 1 Application topically in the morning, at noon, and at bedtime., Disp: , Rfl:     loperamide 2 MG Oral Cap, Take 1 capsule (2 mg total) by mouth 4 (four) times daily as needed for Diarrhea., Disp: , Rfl:     meclizine 25 MG Oral Tab, Take 1 tablet (25 mg total) by mouth 3 (three) times daily as needed., Disp: , Rfl:     DULoxetine 60 MG Oral Cap DR Particles, Take 1 capsule (60 mg total) by mouth  daily., Disp: , Rfl:     apixaban 5 MG Oral Tab, Take 1 tablet (5 mg total) by mouth 2 (two) times daily., Disp: , Rfl:     QUEtiapine 100 MG Oral Tab, Take 2 tablets (200 mg total) by mouth nightly., Disp: , Rfl:     insulin glargine 100 UNIT/ML Subcutaneous Solution, Inject 6 Units into the skin nightly., Disp: , Rfl:     insulin aspart 100 Units/mL Subcutaneous Solution Pen-injector, Inject 2-8 Units into the skin 3 (three) times daily with meals., Disp: , Rfl:     phenazopyridine 100 MG Oral Tab, Take 1 tablet (100 mg total) by mouth 3 (three) times daily as needed for Pain., Disp: 5 tablet, Rfl: 0    Insulin Pen Needle 32G X 4 MM Does not apply Misc, May substitute if not on insurance formulary (Patient not taking: Reported on 10/28/2024), Disp: 100 each, Rfl: 5    metoprolol tartrate 25 MG Oral Tab, Take 1 tablet (25 mg total) by mouth 2 (two) times daily., Disp: , Rfl:     traZODone 50 MG Oral Tab, Take 1 tablet (50 mg total) by mouth nightly as needed for Sleep., Disp: , Rfl:     Nystatin 434859 UNIT/GM External Powder, Apply 1 Application topically as needed for Dry Skin. Under breast and groin area, Disp: , Rfl:     acetaminophen 325 MG Oral Tab, Take 2 tablets (650 mg total) by mouth every 6 (six) hours as needed for Pain., Disp: , Rfl:     Icosapent Ethyl 1 g Oral Cap, Take 2 capsules by mouth 4 (four) times daily., Disp: , Rfl:     Melatonin 3 MG Oral Cap, Take 1 capsule (3 mg total) by mouth at bedtime., Disp: , Rfl:     ergocalciferol 1.25 MG (71661 UT) Oral Cap, Take 1 capsule (50,000 Units total) by mouth every 7 days. Every monday, Disp: , Rfl:     bethanechol 10 MG Oral Tab, Take 1 tablet (10 mg total) by mouth daily., Disp: , Rfl:     albuterol 108 (90 Base) MCG/ACT Inhalation Aero Soln, Inhale 2 puffs into the lungs every 6 (six) hours as needed for Wheezing or Shortness of Breath., Disp: , Rfl:     Multiple Vitamins-Minerals (THERA-M) Oral Tab, Take 1 tablet by mouth daily., Disp: , Rfl:      divalproex Sodium  MG Oral Tablet 24 Hr, Take 1 tablet (500 mg total) by mouth 2 (two) times daily., Disp: , Rfl:     ondansetron 4 MG Oral Tablet Dispersible, Take 1 tablet (4 mg total) by mouth every 8 (eight) hours as needed for Nausea., Disp: , Rfl:     metFORMIN  MG Oral Tablet 24 Hr, Take 2 tablets (1,000 mg total) by mouth 2 (two) times daily with meals., Disp: 30 tablet, Rfl: 0    Diagnostics     11/11/24  Wbc 10.77 hgb 10.1 hematocrit 32.0 plt 288   Glucose 183 bun 11 creat 0.68 bili <0.15 prot 6.1 alb 3.8 sodium 136 potassium 4.3 chl 101 co2 28 alt 8 ast 10 alk phos 73 ca 8.8 gfr > 60     11/8/24 UA showed small blood, Positive Nitrite, mod leuko, cultures E coli ESBL   11/5/24-   Wbc 8.42 hgb 10.9 hematocrit 36.0 plt 264  Mag 1.6   glucose 126 bun 12 creat 0.77 bili 0.17 prot 6.4 alb 3.9 sodium 142 potassium 4.0 chl 102 co2 31 alt 8 ast 11 alk phos 61 ca 9.2 gfr > 60   Vit D 32.5     10/28/24   Wbc 8.88 hgb 10.3 hematocrit 32.4 plt 284   Glucose 127 bun 13 creat 0.68 bili 0.20 prot 6.2 alb 3.8 sodium 139 potassium 4.2 chl 100 co2 29 alt 7 ast 9 alk phos 72 ca 8.9 gfr > 60      10/25/24   Wbc 7.41 hgb 10.2 hematocrit 33.5 plt 278   Mag 1.7   Glucose 86 bun 10 creat 0.60 bili 0.24 prot 6.0 alb 3.7 sodium 143 potassium 3.9 chl 105 co2 29 alt 11 ast 11 alk phos 72 calcium 8.9 gfr > 60         Assessment and plan:  Physical Deconditioning/Impaired mobility and ADLs/At risk for falling  Fall Precautions  PT/OT/ST to evaluate and treat  [facility] team to establish care plan meeting with patient and POA/family as appropriate   Anticipate DC on or before TBD; SW to assist patient/family w/ DC planning  DC Plan:  TBD     Abnormal UA/ Mod hydroureteronephrosis/Candiduria/ Retention / Suprapubic catheter / UTI 11/11/24 ESBL/E coli   In hospital- ID and Urology consulted. Suprapubic catheter was exchanged. Imaging shows moderate hydroureteronephrosis of left collecting system extending to level of the UVJ. Urine  cultures were negative for bacteria. Urine showed Candiduria. She was started on Diflucan and discharged with Diflucan.    Vital signs q shift and prn   Labs weekly and prn   Suprapubic catheter care   Follow up with Urology Dr Green  In house ID to follow   Bethanechol 10 mg daily   Fluconazole 200 mg daily- 10/31   Nystatin application tid, Nystatin powder   Oxybutynin 5 mg tid   Phenazopyridine 100 mg tid prn   11/5 more lethargic, not eating.11/5. Obtain UA C & S per ID need Suprapubic cath exchange for best Urine test results- refused. Nursing staff obtained UA C &S without exchange showed E coli/ESBL- ID started Ertapenem IV 11/11-11/18/24 - isolation      Bipolar Anxiety / Insomnia   IN house Psych to follow  Clonazepam 0.5 mg 3 tablets TID   Depakote 500 mg bid   Melatonin 3 mg nightly   Quetiapine 200 mg nightly   Trazodone 50 mg nightly prn      Rash bilateral arms   Unknown cause. No new medication.   Topical steroid cream daily   Monitor symptoms     Hx of MRDO   ID follow      Diabetes   10/22/24 A1c 7.8 %   Low carb diet   Accu check ac/hs   Insulin aspart sliding scale 2-8 units   Glargine 6 units nightly  Metformin 1000 mg bid       Hypertension /Hyperlipidemia  Vital signs q shift and prn   Labs weekly and prn   In house cardiology to follow   Lcosapent Ethyl 2 gm 4x daily   Metoprolol tartrate 25 mg bid      COPD /ANAID   In house Pulm to follow   Albuterol inhaler 2 puff every 6 hrs prn         DVT Prophylaxis / Hx of VTE   Encourage early mobilization and participation in PT/OT as able  Apixaban 5 mg bid      Pain Management  Offer to pre-medicate 30-60 min prior to therapy  Physiatry evaluation with management appreciated  Acetaminophen 650 mg every 6 hrs prn   Hydrocodone- acetaminophen  mg 1 tab every 6 hrs prn      Bowel Management Regimen/Diarrhea per patient   Loperamide prn      Vitamins/supplements as r/t deficiencies  PLACIDO RD to follow while in rehab; supplementation/diet as per PLACIDO  RD  May continue home supplements  Ergocalciferol 50,000 units weekly  MVI daily       LABS  CBC/CMP- 11/19      *Follow-Up with PCP within 1-2 weeks following [facility] discharge.   Follow up with Urology Dr Green  *Follow-Up with specialists as recommended.  No future appointments.            This is a 45 minute visit and greater than 50% of the time was spent counseling the patient and/or coordinating care.        Note to patient: The 21st Century Cures Act makes medical notes like these available to patients in the interest of transparency. However, this is a medical document intended as peer to peer communication. It is written in medical language and may contain abbreviations or verbiage that are unfamiliar. It may appear blunt or direct. Medical documents are intended to carry relevant information, facts as evident, and the clinical opinion of the practitioner who signs the document.    Diana Boo, AMBAR  11/12/2024

## 2024-11-14 ENCOUNTER — SNF DISCHARGE (OUTPATIENT)
Dept: INTERNAL MEDICINE CLINIC | Age: 54
End: 2024-11-14

## 2024-11-14 VITALS
HEART RATE: 78 BPM | RESPIRATION RATE: 20 BRPM | TEMPERATURE: 97 F | SYSTOLIC BLOOD PRESSURE: 132 MMHG | OXYGEN SATURATION: 98 % | DIASTOLIC BLOOD PRESSURE: 82 MMHG

## 2024-11-14 DIAGNOSIS — J44.9 CHRONIC OBSTRUCTIVE PULMONARY DISEASE, UNSPECIFIED COPD TYPE (HCC): ICD-10-CM

## 2024-11-14 DIAGNOSIS — F41.1 GENERALIZED ANXIETY DISORDER WITH PANIC ATTACKS: ICD-10-CM

## 2024-11-14 DIAGNOSIS — I26.99 OTHER ACUTE PULMONARY EMBOLISM, UNSPECIFIED WHETHER ACUTE COR PULMONALE PRESENT (HCC): ICD-10-CM

## 2024-11-14 DIAGNOSIS — F41.0 GENERALIZED ANXIETY DISORDER WITH PANIC ATTACKS: ICD-10-CM

## 2024-11-14 DIAGNOSIS — R53.1 WEAKNESS: ICD-10-CM

## 2024-11-14 DIAGNOSIS — R33.9 URINARY RETENTION: ICD-10-CM

## 2024-11-14 DIAGNOSIS — Z79.4 TYPE 2 DIABETES MELLITUS WITHOUT COMPLICATION, WITH LONG-TERM CURRENT USE OF INSULIN (HCC): ICD-10-CM

## 2024-11-14 DIAGNOSIS — I10 BENIGN ESSENTIAL HYPERTENSION: ICD-10-CM

## 2024-11-14 DIAGNOSIS — G80.9 CEREBRAL PALSY, UNSPECIFIED TYPE (HCC): ICD-10-CM

## 2024-11-14 DIAGNOSIS — B37.49 CANDIDURIA: ICD-10-CM

## 2024-11-14 DIAGNOSIS — E11.9 TYPE 2 DIABETES MELLITUS WITHOUT COMPLICATION, WITH LONG-TERM CURRENT USE OF INSULIN (HCC): ICD-10-CM

## 2024-11-14 DIAGNOSIS — Z93.59 SUPRAPUBIC CATHETER (HCC): ICD-10-CM

## 2024-11-14 DIAGNOSIS — N39.0 URINARY TRACT INFECTION WITHOUT HEMATURIA, SITE UNSPECIFIED: Primary | ICD-10-CM

## 2024-11-14 DIAGNOSIS — F32.A DEPRESSION, UNSPECIFIED DEPRESSION TYPE: ICD-10-CM

## 2024-11-14 DIAGNOSIS — F31.9 BIPOLAR 1 DISORDER (HCC): ICD-10-CM

## 2024-11-14 PROCEDURE — 99316 NF DSCHRG MGMT 30 MIN+: CPT | Performed by: NURSE PRACTITIONER

## 2024-11-14 PROCEDURE — 99499 UNLISTED E&M SERVICE: CPT | Performed by: NURSE PRACTITIONER

## 2024-11-14 NOTE — PROGRESS NOTES
Clari Rosa, 1970, 53 year old, female is being discharged from Facility: [facility]       DISCHARGE SUMMARY    Date of Admission:10/24/24     Date of Discharge:24 end of IV antibiotics                                  [facility] 10/22-10/24/24      Hospital Discharge Diagnoses:  Abnormal UA   Hx of MDRO   Candiduria   Mod Hydroureteronephrosis   Diabetes   Hypertension   Anxiety   Bipolar   VTE on DOAC   ANAID   Obesity         HPI:  Clari Rosa  : 1970, Age 53 year old  female patient is admitted for subacute rehab. Patient has a past medical history of diabetes, bipolar, hypertension who presented to the ER with pain at suprapubic catheter site and kidneys. She had a recent prior admission and Urine cultures showed no growth and her antibiotics were stopped at that time. She has had multiple UTIs in the past. She was seen this last admission by Urology and ID. She had some candiduria and treated with Fluconazole. Patient seen by Urology and had an exchange. She was started on Ditropan for bladder spasms. She is to follow up with Urology for hydroureteronephrosis.   Admitting to [facility] for nursing care, medication management, and PT/OT/ST eval and tx.       Reason for Admission:  Subacute Rehab and Medical Management    Subjective:  Patient seen for follow up visit   She is alert and oriented and speaking with provider   Recent UA collected but suprapubic catheter was not exchanged as ordered- Culture showed ESBL E coli in urine. ID following. ID ordered IV Ertapenem until . She is also on oral ditropan   Patient needs Urology follow up appt -  states Urology Dr Green is booked out till next year - working on alternative follow up appt   Labs from  stable. Repeat labs next week       ROS and Physical Exam:    Review of system   Limited appears comfortable   + CP history   + urinary retention suprapubic catheter  + bipolar hx         PHYSICAL EXAM:  /82    Pulse 78   Temp 97.4 °F (36.3 °C)   Resp 20   SpO2 98%     GENERAL HEALTH: 53 year old female patient, no acute distress   LINES, TUBES, DRAINS: + Port left subclavicular  +Suprapubic catheter   SKIN: no rashes, no suspicious lesions - rash to elbows cleared   WOUND: see wound assessment   EYES: PERRLA, conjunctiva normal; no drainage from eyes  HENT: normocephalic; normal nose, no nasal drainage, mucous membranes pink, moist  RESPIRATORY:clear to percussion and auscultation, No wheezing/cough/accessory muscle use; on room air  CARDIOVASCULAR: S1, S2 normal, RRR; no S3, no S4;   ABDOMEN: normal active BS+, soft, non-distended; no apparent masses; observed, non-tender, no guarding during physical exam  : + suprapubic catheter  MUSCULOSKELETAL:+ cerebral palsy- bed bound   EXTREMITIES/VASCULAR: no cyanosis, clubbing or edema  NEUROLOGIC: intact; no sensorimotor deficit  PSYCHIATRIC: alert and oriented- intermittently sleepy depending on Clonazepam dose. + bipolar hx + anxiety/depression       Skilled Nursing Facility Course:    Clari Rosa has not progressed with PT/OT. Bed mobility SBA- refuses to get out of bed and transfer. Transitioning to LTC.  Medically cleared to go to LTC as of 11/17 after IV antibiotics. Hx of frequent UTIs. Recent Urine culture showed ESBL E coli she has been on IV Ertapenem until 11/18. She had recent treatment for yeast in the urine. Patient is to follow up with Urology-  trying to get her in asap with Urology team.       Most recent lab results:  11/11/24  Wbc 10.77 hgb 10.1 hematocrit 32.0 plt 288   Glucose 183 bun 11 creat 0.68 bili <0.15 prot 6.1 alb 3.8 sodium 136 potassium 4.3 chl 101 co2 28 alt 8 ast 10 alk phos 73 ca 8.8 gfr > 60      11/8/24 UA showed small blood, Positive Nitrite, mod leuko, cultures E coli ESBL   11/5/24-   Wbc 8.42 hgb 10.9 hematocrit 36.0 plt 264  Mag 1.6   glucose 126 bun 12 creat 0.77 bili 0.17 prot 6.4 alb 3.9 sodium 142 potassium 4.0 chl 102  co2 31 alt 8 ast 11 alk phos 61 ca 9.2 gfr > 60   Vit D 32.5      10/28/24   Wbc 8.88 hgb 10.3 hematocrit 32.4 plt 284   Glucose 127 bun 13 creat 0.68 bili 0.20 prot 6.2 alb 3.8 sodium 139 potassium 4.2 chl 100 co2 29 alt 7 ast 9 alk phos 72 ca 8.9 gfr > 60      10/25/24   Wbc 7.41 hgb 10.2 hematocrit 33.5 plt 278   Mag 1.7   Glucose 86 bun 10 creat 0.60 bili 0.24 prot 6.0 alb 3.7 sodium 143 potassium 3.9 chl 105 co2 29 alt 11 ast 11 alk phos 72 calcium 8.9 gfr > 60        Discharge Diagnoses w/ current management:    Abnormal UA/ Mod hydroureteronephrosis/Candiduria/ Retention / Suprapubic catheter / UTI 11/11/24 ESBL/E coli   In hospital- ID and Urology consulted. Suprapubic catheter was exchanged. Imaging shows moderate hydroureteronephrosis of left collecting system extending to level of the UVJ. Urine cultures were negative for bacteria. Urine showed Candiduria. She was started on Diflucan and discharged with Diflucan.    Vital signs q shift and prn   Labs weekly and prn   Suprapubic catheter care   Follow up with Urology Dr Green  In house ID to follow   Bethanechol 10 mg daily   Fluconazole 200 mg daily- 10/31   Nystatin application tid, Nystatin powder   Oxybutynin 5 mg tid   Phenazopyridine 100 mg tid prn   11/5 more lethargic, not eating.11/5. Obtain UA C & S per ID need Suprapubic cath exchange for best Urine test results- refused. Nursing staff obtained UA C &S without exchange showed E coli/ESBL- ID started Ertapenem IV 11/11-11/17/24 - isolation      Bipolar Anxiety / Insomnia   In house Psych to follow  Clonazepam 0.5 mg 3 tablets TID   Depakote 500 mg bid   Melatonin 3 mg nightly   Quetiapine 200 mg nightly   Trazodone 50 mg nightly prn      Rash bilateral arms   Unknown cause. No new medication.   Topical steroid cream daily   Monitor symptoms      Hx of MRDO   ID follow      Diabetes   10/22/24 A1c 7.8 %   Low carb diet   Accu check ac/hs   Insulin aspart sliding scale 2-8 units   Glargine 6 units  nightly  Metformin 1000 mg bid       Hypertension /Hyperlipidemia  Vital signs q shift and prn   Labs weekly and prn   In house cardiology to follow   Lcosapent Ethyl 2 gm 4x daily   Metoprolol tartrate 25 mg bid      COPD /ANAID   In house Pulm to follow   Albuterol inhaler 2 puff every 6 hrs prn         DVT Prophylaxis / Hx of VTE   Encourage early mobilization and participation in PT/OT as able  Apixaban 5 mg bid      Pain Management  Offer to pre-medicate 30-60 min prior to therapy  Physiatry evaluation with management appreciated  Acetaminophen 650 mg every 6 hrs prn   Hydrocodone- acetaminophen  mg 1 tab every 6 hrs prn      Bowel Management Regimen/Diarrhea per patient   Loperamide prn      Vitamins/supplements as r/t deficiencies  PLACIDO RD to follow while in rehab; supplementation/diet as per PLACIDO RD  May continue home supplements  Ergocalciferol 50,000 units weekly  MVI daily               Medication Reconciliation Completed:  Yes    Follow Up Visits:  PCP within 7 days of Discharge  Follow up with Urology Dr Green   No future appointments.      Greater than 30 min spent in coordination of care in preparation for discharge.    Note to patient: The 21st Century Cures Act makes medical notes like these available to patients in the interest of transparency. However, this is a medical document intended as peer to peer communication. It is written in medical language and may contain abbreviations or verbiage that are unfamiliar. It may appear blunt or direct. Medical documents are intended to carry relevant information, facts as evident, and the clinical opinion of the practitioner who signs the document.      Diana Boo, APRN  11/14/2024

## 2024-11-17 ENCOUNTER — HOSPITAL ENCOUNTER (EMERGENCY)
Facility: HOSPITAL | Age: 54
Discharge: HOME | DRG: 389 | End: 2024-11-18
Attending: EMERGENCY MEDICINE
Payer: MEDICARE

## 2024-11-17 ENCOUNTER — HOSPITAL ENCOUNTER (EMERGENCY)
Facility: HOSPITAL | Age: 54
Discharge: HOME | End: 2024-11-18
Attending: EMERGENCY MEDICINE
Payer: MEDICARE

## 2024-11-17 DIAGNOSIS — R10.9 ABDOMINAL PAIN OF UNKNOWN ETIOLOGY: Primary | ICD-10-CM

## 2024-11-17 PROCEDURE — 93010 ELECTROCARDIOGRAM REPORT: CPT

## 2024-11-17 PROCEDURE — 80053 COMPREHEN METABOLIC PANEL: CPT | Performed by: EMERGENCY MEDICINE

## 2024-11-17 PROCEDURE — 85025 COMPLETE CBC W/AUTO DIFF WBC: CPT | Performed by: EMERGENCY MEDICINE

## 2024-11-17 PROCEDURE — 99285 EMERGENCY DEPT VISIT HI MDM: CPT

## 2024-11-17 PROCEDURE — 93005 ELECTROCARDIOGRAM TRACING: CPT

## 2024-11-17 RX ORDER — SODIUM CHLORIDE 9 MG/ML
INJECTION, SOLUTION INTRAVENOUS CONTINUOUS
Status: DISCONTINUED | OUTPATIENT
Start: 2024-11-18 | End: 2024-11-17

## 2024-11-18 ENCOUNTER — APPOINTMENT (OUTPATIENT)
Dept: CT IMAGING | Facility: HOSPITAL | Age: 54
DRG: 389 | End: 2024-11-18
Attending: EMERGENCY MEDICINE
Payer: MEDICARE

## 2024-11-18 ENCOUNTER — HOSPITAL ENCOUNTER (INPATIENT)
Facility: HOSPITAL | Age: 54
Discharge: NURSING, REHAB OR LONG-TERM CARE | End: 2024-11-18
Attending: EMERGENCY MEDICINE | Admitting: HOSPITALIST
Payer: MEDICARE

## 2024-11-18 ENCOUNTER — APPOINTMENT (OUTPATIENT)
Dept: CT IMAGING | Facility: HOSPITAL | Age: 54
End: 2024-11-18
Attending: EMERGENCY MEDICINE
Payer: MEDICARE

## 2024-11-18 ENCOUNTER — HOSPITAL ENCOUNTER (INPATIENT)
Facility: HOSPITAL | Age: 54
LOS: 11 days | Discharge: NURSING, REHAB OR LONG-TERM CARE | DRG: 389 | End: 2024-11-30
Attending: EMERGENCY MEDICINE | Admitting: HOSPITALIST
Payer: MEDICARE

## 2024-11-18 VITALS
RESPIRATION RATE: 16 BRPM | WEIGHT: 190 LBS | DIASTOLIC BLOOD PRESSURE: 93 MMHG | OXYGEN SATURATION: 95 % | HEART RATE: 84 BPM | BODY MASS INDEX: 37.3 KG/M2 | HEIGHT: 60 IN | TEMPERATURE: 97 F | SYSTOLIC BLOOD PRESSURE: 144 MMHG

## 2024-11-18 DIAGNOSIS — R10.9 ABDOMINAL PAIN, ACUTE: ICD-10-CM

## 2024-11-18 DIAGNOSIS — K59.00 CONSTIPATION, UNSPECIFIED CONSTIPATION TYPE: Primary | ICD-10-CM

## 2024-11-18 LAB
ALBUMIN SERPL-MCNC: 3.9 G/DL (ref 3.2–4.8)
ALBUMIN/GLOB SERPL: 1.3 {RATIO} (ref 1–2)
ALP LIVER SERPL-CCNC: 72 U/L
ALT SERPL-CCNC: 8 U/L
ANION GAP SERPL CALC-SCNC: 6 MMOL/L (ref 0–18)
AST SERPL-CCNC: 11 U/L (ref ?–34)
ATRIAL RATE: 91 BPM
BASOPHILS # BLD AUTO: 0.09 X10(3) UL (ref 0–0.2)
BASOPHILS NFR BLD AUTO: 0.5 %
BILIRUB SERPL-MCNC: 0.3 MG/DL (ref 0.3–1.2)
BILIRUB UR QL STRIP.AUTO: NEGATIVE
BUN BLD-MCNC: 8 MG/DL (ref 9–23)
CALCIUM BLD-MCNC: 9.1 MG/DL (ref 8.7–10.4)
CHLORIDE SERPL-SCNC: 102 MMOL/L (ref 98–112)
CLARITY UR REFRACT.AUTO: CLEAR
CO2 SERPL-SCNC: 26 MMOL/L (ref 21–32)
CREAT BLD-MCNC: 0.67 MG/DL
EGFRCR SERPLBLD CKD-EPI 2021: 104 ML/MIN/1.73M2 (ref 60–?)
EOSINOPHIL # BLD AUTO: 0.47 X10(3) UL (ref 0–0.7)
EOSINOPHIL NFR BLD AUTO: 2.5 %
ERYTHROCYTE [DISTWIDTH] IN BLOOD BY AUTOMATED COUNT: 16.1 %
GLOBULIN PLAS-MCNC: 3.1 G/DL (ref 2–3.5)
GLUCOSE BLD-MCNC: 161 MG/DL (ref 70–99)
GLUCOSE UR STRIP.AUTO-MCNC: NORMAL MG/DL
HCT VFR BLD AUTO: 36.8 %
HGB BLD-MCNC: 11.9 G/DL
IMM GRANULOCYTES # BLD AUTO: 0.14 X10(3) UL (ref 0–1)
IMM GRANULOCYTES NFR BLD: 0.7 %
KETONES UR STRIP.AUTO-MCNC: NEGATIVE MG/DL
LEUKOCYTE ESTERASE UR QL STRIP.AUTO: 250
LYMPHOCYTES # BLD AUTO: 4.38 X10(3) UL (ref 1–4)
LYMPHOCYTES NFR BLD AUTO: 23.2 %
MCH RBC QN AUTO: 23.8 PG (ref 26–34)
MCHC RBC AUTO-ENTMCNC: 32.3 G/DL (ref 31–37)
MCV RBC AUTO: 73.6 FL
MONOCYTES # BLD AUTO: 0.61 X10(3) UL (ref 0.1–1)
MONOCYTES NFR BLD AUTO: 3.2 %
NEUTROPHILS # BLD AUTO: 13.22 X10 (3) UL (ref 1.5–7.7)
NEUTROPHILS # BLD AUTO: 13.22 X10(3) UL (ref 1.5–7.7)
NEUTROPHILS NFR BLD AUTO: 69.9 %
NITRITE UR QL STRIP.AUTO: NEGATIVE
OSMOLALITY SERPL CALC.SUM OF ELEC: 280 MOSM/KG (ref 275–295)
P AXIS: 44 DEGREES
P-R INTERVAL: 182 MS
PH UR STRIP.AUTO: 6 [PH] (ref 5–8)
PLATELET # BLD AUTO: 386 10(3)UL (ref 150–450)
POTASSIUM SERPL-SCNC: 3.5 MMOL/L (ref 3.5–5.1)
PROT SERPL-MCNC: 7 G/DL (ref 5.7–8.2)
PROT UR STRIP.AUTO-MCNC: NEGATIVE MG/DL
Q-T INTERVAL: 374 MS
QRS DURATION: 82 MS
QTC CALCULATION (BEZET): 460 MS
R AXIS: -35 DEGREES
RBC # BLD AUTO: 5 X10(6)UL
SODIUM SERPL-SCNC: 134 MMOL/L (ref 136–145)
SP GR UR STRIP.AUTO: 1.01 (ref 1–1.03)
T AXIS: 48 DEGREES
UROBILINOGEN UR STRIP.AUTO-MCNC: NORMAL MG/DL
VENTRICULAR RATE: 91 BPM
WBC # BLD AUTO: 18.9 X10(3) UL (ref 4–11)

## 2024-11-18 PROCEDURE — 81001 URINALYSIS AUTO W/SCOPE: CPT | Performed by: EMERGENCY MEDICINE

## 2024-11-18 PROCEDURE — 96361 HYDRATE IV INFUSION ADD-ON: CPT

## 2024-11-18 PROCEDURE — 99223 1ST HOSP IP/OBS HIGH 75: CPT | Performed by: HOSPITALIST

## 2024-11-18 PROCEDURE — 74176 CT ABD & PELVIS W/O CONTRAST: CPT | Performed by: EMERGENCY MEDICINE

## 2024-11-18 PROCEDURE — 87086 URINE CULTURE/COLONY COUNT: CPT | Performed by: EMERGENCY MEDICINE

## 2024-11-18 PROCEDURE — 96374 THER/PROPH/DIAG INJ IV PUSH: CPT

## 2024-11-18 RX ORDER — ONDANSETRON 2 MG/ML
4 INJECTION INTRAMUSCULAR; INTRAVENOUS ONCE
Status: COMPLETED | OUTPATIENT
Start: 2024-11-18 | End: 2024-11-19

## 2024-11-18 RX ORDER — MORPHINE SULFATE 4 MG/ML
4 INJECTION, SOLUTION INTRAMUSCULAR; INTRAVENOUS EVERY 30 MIN PRN
Status: DISCONTINUED | OUTPATIENT
Start: 2024-11-18 | End: 2024-11-30

## 2024-11-18 RX ORDER — KETOROLAC TROMETHAMINE 15 MG/ML
15 INJECTION, SOLUTION INTRAMUSCULAR; INTRAVENOUS ONCE
Status: COMPLETED | OUTPATIENT
Start: 2024-11-18 | End: 2024-11-18

## 2024-11-18 NOTE — ED PROVIDER NOTES
Patient Seen in: [facility] Emergency Department      History     Chief Complaint   Patient presents with    Abdomen/Flank Pain     Stated Complaint: abdominal pain    Subjective:   HPI      54-year-old female presenting to the emergency department for abdominal distention.  She says she has been constipated has not had a bowel movement last 2 days she is extremely bloated feels uncomfortable especially left side of her abdomen.  She is on day 7 of treatment for urinary tract infection she denies fevers chills difficulty breathing chest pain or any other exacerbating relieving factors or associated symptoms    Objective:     Past Medical History:    Bipolar 1 disorder (HCC)    Dental caries    Diabetes (HCC)    MIKE (generalized anxiety disorder)    High blood pressure    History of diverticulitis of colon    Manipulative behavior    Neurogenic bladder    Obesity (BMI 30-39.9)    Paraplegia ([facility]ly [facility])    Sepsis following intra-abdominal surgery (Formerly [facility])    Serotonin syndrome    Sleep apnea    Suprapubic catheter (Formerly [facility])    Transverse myelitis (HCC)              Past Surgical History:   Procedure Laterality Date    Arthroscopy of joint unlisted      knee          Cholecystectomy      Part removal colon w end colostomy      2013    Tonsillectomy                  Social History     Socioeconomic History    Marital status: Single   Tobacco Use    Smoking status: Former     Current packs/day: 1.00     Average packs/day: 1 pack/day for 5.0 years (5.0 ttl pk-yrs)     Types: Cigarettes    Tobacco comments:     quit 2006   Vaping Use    Vaping status: Never Used   Substance and Sexual Activity    Alcohol use: No    Drug use: No     Social Drivers of Health     Food Insecurity: No Food Insecurity (10/22/2024)    Food Insecurity     Food Insecurity: Never true   Transportation Needs: No Transportation Needs (10/22/2024)    Transportation Needs     Lack of Transportation: No   Housing Stability: Low Risk  (10/22/2024)     Housing Stability     Housing Instability: No   Recent Concern: Housing Stability - High Risk (10/9/2024)    Received from [facility]    Housing Stability     Are you concerned about having a safe and reliable place to live?: Yes                  Physical Exam     ED Triage Vitals [11/17/24 9767]   BP (!) 144/93   Pulse 95   Resp 18   Temp 97.2 °F (36.2 °C)   Temp src Temporal   SpO2 96 %   O2 Device None (Room air)       Current Vitals:   Vital Signs  BP: (!) 144/93  Pulse: 90  Resp: 18  Temp: 97.2 °F (36.2 °C)  Temp src: Temporal    Oxygen Therapy  SpO2: 97 %  O2 Device: None (Room air)        Physical Exam  Awake alert patient appears no distress HEENT exam is normal eyes are clear cardiovascular exam rhythm abdomen shows no COVID cyanosis 1+ pitting edema lower extremities moving all EXTR with no focal deficits    ED Course     Labs Reviewed   CBC WITH DIFFERENTIAL WITH PLATELET - Abnormal; Notable for the following components:       Result Value    WBC 18.9 (*)     HGB 11.9 (*)     MCV 73.6 (*)     MCH 23.8 (*)     Neutrophil Absolute Prelim 13.22 (*)     Neutrophil Absolute 13.22 (*)     Lymphocyte Absolute 4.38 (*)     All other components within normal limits   COMP METABOLIC PANEL (14) - Abnormal; Notable for the following components:    Glucose 161 (*)     Sodium 134 (*)     BUN 8 (*)     ALT 8 (*)     All other components within normal limits   URINALYSIS WITH CULTURE REFLEX - Abnormal; Notable for the following components:    Blood Urine 2+ (*)     Leukocyte Esterase Urine 250 (*)     WBC Urine 21-50 (*)     RBC Urine 3-5 (*)     Bacteria Urine Rare (*)     Squamous Epi. Cells Few (*)     All other components within normal limits   RAINBOW DRAW LAVENDER   RAINBOW DRAW LIGHT GREEN   RAINBOW DRAW BLUE   URINE CULTURE, ROUTINE     EKG    Rate, intervals and axes as noted on EKG Report.  Rate: 91  Rhythm: Sinus Rhythm  Reading: No areas of acute ST segment elevation impression                 Differential diagnosis includes obstruction, constipation       MDM              Medical Decision Making  54-year-old female presenting to the emergency department for abdominal ascension IV established cardiac monitor shows a sinus rhythm pulse ox shows no signs of hypoxia.  CBC shows a slightly elevated white cell count urinalysis no gross infection or signs of pyelonephritis the patient's metabolic panel shows stable renal function.  Independent interpretation by ED physician of CT scan shows constipation no signs of obstruction or perforation.  I have recommended close follow-up with GI services for colonoscopy which she has not had for period of time and also recommended general primary care such as mammogram she says she was due to have it during COVID but she never got rescheduled.  She is to return emerged part worsening symptoms other complaints patient was given milk of magnesia here in the emergency department for her constipation she is requesting a sandwich to eat.  She has no further vomiting the patient was screened and evaluated during this visit.  As a treating physician attending to the patient, I determined, within reasonable clinical confidence and prior to discharge, that an emergency medical condition was not or was no longer present.  There was no indication for further evaluation, treatment or admission on an emergency basis.    The usual and customary discharge instructions were discussed given the patient's ER course.  We discussed signs and symptoms that should prompt the patient's immediate return to the emergency department.  Reasonable over-the-counter and prescription treatment options and physician follow-up plan was discussed.  Patient was discharged home in good condition    This note was prepared using Dragon Medical voice recognition dictation software.  As a result errors may occur.  When identified to these areas have been corrected.  While every attempt is made to  correct errors during dictation discrepancies may still exist.  Please contact if there are any errors    Problems Addressed:  Abdominal pain of unknown etiology: acute illness or injury    Amount and/or Complexity of Data Reviewed  Labs: ordered. Decision-making details documented in ED Course.  Radiology: ordered and independent interpretation performed. Decision-making details documented in ED Course.  ECG/medicine tests: ordered and independent interpretation performed. Decision-making details documented in ED Course.        Disposition and Plan     Clinical Impression:  1. Abdominal pain of unknown etiology         Disposition:  Discharge  11/18/2024  1:03 am    Follow-up:  Mauricio Abad MD  [address], [address] [facility] [address]  [phone]    Follow up in 1 week(s)            Medications Prescribed:  Current Discharge Medication List              Supplementary Documentation:

## 2024-11-18 NOTE — ED QUICK NOTES
[facility] ETA 10-15min  
Patient requesting sandwich.  [place] provided.  
[facility] [place]. Pt c/o constipation was given enema, colace and miralax at facility before arrival.  
hard copy, drawn during this pregnancy

## 2024-11-18 NOTE — ED INITIAL ASSESSMENT (HPI)
Patient here with c/o left sided abdominal pain for a couple days, worse today.  Patient also reports being constipated and having a distended abdomen.  Patient is currently on abx for UTI. Patient has suprapubic catheter. Patient also feels dizzy/lightheaded.

## 2024-11-19 ENCOUNTER — APPOINTMENT (OUTPATIENT)
Dept: GENERAL RADIOLOGY | Facility: HOSPITAL | Age: 54
DRG: 389 | End: 2024-11-19
Attending: HOSPITALIST
Payer: MEDICARE

## 2024-11-19 ENCOUNTER — APPOINTMENT (OUTPATIENT)
Dept: GENERAL RADIOLOGY | Facility: HOSPITAL | Age: 54
End: 2024-11-19
Attending: HOSPITALIST
Payer: MEDICARE

## 2024-11-19 ENCOUNTER — APPOINTMENT (OUTPATIENT)
Dept: GENERAL RADIOLOGY | Facility: HOSPITAL | Age: 54
DRG: 389 | End: 2024-11-19
Attending: FAMILY MEDICINE
Payer: MEDICARE

## 2024-11-19 ENCOUNTER — APPOINTMENT (OUTPATIENT)
Dept: GENERAL RADIOLOGY | Facility: HOSPITAL | Age: 54
End: 2024-11-19
Attending: FAMILY MEDICINE
Payer: MEDICARE

## 2024-11-19 PROBLEM — R10.9 ABDOMINAL PAIN, ACUTE: Status: ACTIVE | Noted: 2024-11-19

## 2024-11-19 LAB
ALBUMIN SERPL-MCNC: 4.1 G/DL (ref 3.2–4.8)
ALBUMIN/GLOB SERPL: 1.5 {RATIO} (ref 1–2)
ALP LIVER SERPL-CCNC: 73 U/L
ALT SERPL-CCNC: 8 U/L
ANION GAP SERPL CALC-SCNC: 9 MMOL/L (ref 0–18)
AST SERPL-CCNC: 18 U/L (ref ?–34)
BASOPHILS # BLD AUTO: 0.07 X10(3) UL (ref 0–0.2)
BASOPHILS NFR BLD AUTO: 0.5 %
BILIRUB SERPL-MCNC: 0.3 MG/DL (ref 0.3–1.2)
BUN BLD-MCNC: 11 MG/DL (ref 9–23)
CALCIUM BLD-MCNC: 9.3 MG/DL (ref 8.7–10.4)
CHLORIDE SERPL-SCNC: 97 MMOL/L (ref 98–112)
CO2 SERPL-SCNC: 29 MMOL/L (ref 21–32)
CREAT BLD-MCNC: 0.83 MG/DL
EGFRCR SERPLBLD CKD-EPI 2021: 84 ML/MIN/1.73M2 (ref 60–?)
EOSINOPHIL # BLD AUTO: 0.35 X10(3) UL (ref 0–0.7)
EOSINOPHIL NFR BLD AUTO: 2.3 %
ERYTHROCYTE [DISTWIDTH] IN BLOOD BY AUTOMATED COUNT: 16.2 %
GLOBULIN PLAS-MCNC: 2.8 G/DL (ref 2–3.5)
GLUCOSE BLD-MCNC: 131 MG/DL (ref 70–99)
GLUCOSE BLD-MCNC: 160 MG/DL (ref 70–99)
GLUCOSE BLD-MCNC: 172 MG/DL (ref 70–99)
GLUCOSE BLD-MCNC: 176 MG/DL (ref 70–99)
GLUCOSE BLD-MCNC: 177 MG/DL (ref 70–99)
HCT VFR BLD AUTO: 34.9 %
HGB BLD-MCNC: 11.6 G/DL
IMM GRANULOCYTES # BLD AUTO: 0.11 X10(3) UL (ref 0–1)
IMM GRANULOCYTES NFR BLD: 0.7 %
LIPASE SERPL-CCNC: 19 U/L (ref 12–53)
LYMPHOCYTES # BLD AUTO: 4.1 X10(3) UL (ref 1–4)
LYMPHOCYTES NFR BLD AUTO: 26.8 %
MCH RBC QN AUTO: 24.2 PG (ref 26–34)
MCHC RBC AUTO-ENTMCNC: 33.2 G/DL (ref 31–37)
MCV RBC AUTO: 72.7 FL
MONOCYTES # BLD AUTO: 0.51 X10(3) UL (ref 0.1–1)
MONOCYTES NFR BLD AUTO: 3.3 %
NEUTROPHILS # BLD AUTO: 10.15 X10 (3) UL (ref 1.5–7.7)
NEUTROPHILS # BLD AUTO: 10.15 X10(3) UL (ref 1.5–7.7)
NEUTROPHILS NFR BLD AUTO: 66.4 %
OSMOLALITY SERPL CALC.SUM OF ELEC: 283 MOSM/KG (ref 275–295)
PLATELET # BLD AUTO: 331 10(3)UL (ref 150–450)
POTASSIUM SERPL-SCNC: 4 MMOL/L (ref 3.5–5.1)
PROT SERPL-MCNC: 6.9 G/DL (ref 5.7–8.2)
RBC # BLD AUTO: 4.8 X10(6)UL
SARS-COV-2 RNA RESP QL NAA+PROBE: NOT DETECTED
SODIUM SERPL-SCNC: 135 MMOL/L (ref 136–145)
WBC # BLD AUTO: 15.3 X10(3) UL (ref 4–11)

## 2024-11-19 PROCEDURE — 71045 X-RAY EXAM CHEST 1 VIEW: CPT | Performed by: HOSPITALIST

## 2024-11-19 PROCEDURE — 99233 SBSQ HOSP IP/OBS HIGH 50: CPT | Performed by: HOSPITALIST

## 2024-11-19 RX ORDER — TRAZODONE HYDROCHLORIDE 50 MG/1
50 TABLET, FILM COATED ORAL NIGHTLY PRN
Status: DISCONTINUED | OUTPATIENT
Start: 2024-11-19 | End: 2024-11-30

## 2024-11-19 RX ORDER — NICOTINE POLACRILEX 4 MG
15 LOZENGE BUCCAL
Status: DISCONTINUED | OUTPATIENT
Start: 2024-11-19 | End: 2024-11-30

## 2024-11-19 RX ORDER — ENOXAPARIN SODIUM 100 MG/ML
1 INJECTION SUBCUTANEOUS EVERY 12 HOURS SCHEDULED
Status: DISCONTINUED | OUTPATIENT
Start: 2024-11-19 | End: 2024-11-25

## 2024-11-19 RX ORDER — DEXTROSE MONOHYDRATE 25 G/50ML
50 INJECTION, SOLUTION INTRAVENOUS
Status: DISCONTINUED | OUTPATIENT
Start: 2024-11-19 | End: 2024-11-30

## 2024-11-19 RX ORDER — ONDANSETRON 2 MG/ML
4 INJECTION INTRAMUSCULAR; INTRAVENOUS EVERY 6 HOURS PRN
Status: DISCONTINUED | OUTPATIENT
Start: 2024-11-19 | End: 2024-11-30

## 2024-11-19 RX ORDER — LORAZEPAM 2 MG/ML
1 INJECTION INTRAMUSCULAR ONCE
Status: COMPLETED | OUTPATIENT
Start: 2024-11-19 | End: 2024-11-19

## 2024-11-19 RX ORDER — METOPROLOL TARTRATE 25 MG/1
25 TABLET, FILM COATED ORAL
Status: DISCONTINUED | OUTPATIENT
Start: 2024-11-19 | End: 2024-11-30

## 2024-11-19 RX ORDER — NICOTINE POLACRILEX 4 MG
30 LOZENGE BUCCAL
Status: DISCONTINUED | OUTPATIENT
Start: 2024-11-19 | End: 2024-11-30

## 2024-11-19 RX ORDER — METOPROLOL TARTRATE 25 MG/1
25 TABLET, FILM COATED ORAL 2 TIMES DAILY
Status: DISCONTINUED | OUTPATIENT
Start: 2024-11-19 | End: 2024-11-19

## 2024-11-19 RX ORDER — NYSTATIN AND TRIAMCINOLONE ACETONIDE 100000; 1 [USP'U]/G; MG/G
CREAM TOPICAL 2 TIMES DAILY
Status: DISCONTINUED | OUTPATIENT
Start: 2024-11-19 | End: 2024-11-30

## 2024-11-19 RX ORDER — DIVALPROEX SODIUM 250 MG/1
500 TABLET, FILM COATED, EXTENDED RELEASE ORAL 2 TIMES DAILY
Status: DISCONTINUED | OUTPATIENT
Start: 2024-11-19 | End: 2024-11-30

## 2024-11-19 RX ORDER — SODIUM CHLORIDE 9 MG/ML
INJECTION, SOLUTION INTRAVENOUS CONTINUOUS
Status: DISCONTINUED | OUTPATIENT
Start: 2024-11-19 | End: 2024-11-20

## 2024-11-19 RX ORDER — MORPHINE SULFATE 2 MG/ML
1 INJECTION, SOLUTION INTRAMUSCULAR; INTRAVENOUS EVERY 2 HOUR PRN
Status: DISCONTINUED | OUTPATIENT
Start: 2024-11-19 | End: 2024-11-30

## 2024-11-19 RX ORDER — CLONAZEPAM 0.5 MG/1
1.5 TABLET ORAL 3 TIMES DAILY
Status: DISCONTINUED | OUTPATIENT
Start: 2024-11-19 | End: 2024-11-30

## 2024-11-19 RX ORDER — MORPHINE SULFATE 2 MG/ML
2 INJECTION, SOLUTION INTRAMUSCULAR; INTRAVENOUS EVERY 2 HOUR PRN
Status: DISCONTINUED | OUTPATIENT
Start: 2024-11-19 | End: 2024-11-30

## 2024-11-19 RX ORDER — MORPHINE SULFATE 4 MG/ML
4 INJECTION, SOLUTION INTRAMUSCULAR; INTRAVENOUS EVERY 2 HOUR PRN
Status: DISCONTINUED | OUTPATIENT
Start: 2024-11-19 | End: 2024-11-30

## 2024-11-19 RX ORDER — INSULIN DEGLUDEC 100 U/ML
5 INJECTION, SOLUTION SUBCUTANEOUS NIGHTLY
Status: DISCONTINUED | OUTPATIENT
Start: 2024-11-19 | End: 2024-11-26

## 2024-11-19 RX ORDER — ACETAMINOPHEN 500 MG
500 TABLET ORAL EVERY 4 HOURS PRN
Status: DISCONTINUED | OUTPATIENT
Start: 2024-11-19 | End: 2024-11-30

## 2024-11-19 RX ORDER — OXYBUTYNIN CHLORIDE 5 MG/1
5 TABLET ORAL 3 TIMES DAILY
Status: DISCONTINUED | OUTPATIENT
Start: 2024-11-19 | End: 2024-11-30

## 2024-11-19 RX ORDER — PROCHLORPERAZINE EDISYLATE 5 MG/ML
5 INJECTION INTRAMUSCULAR; INTRAVENOUS EVERY 8 HOURS PRN
Status: DISCONTINUED | OUTPATIENT
Start: 2024-11-19 | End: 2024-11-30

## 2024-11-19 RX ORDER — QUETIAPINE FUMARATE 100 MG/1
200 TABLET, FILM COATED ORAL NIGHTLY
Status: DISCONTINUED | OUTPATIENT
Start: 2024-11-19 | End: 2024-11-30

## 2024-11-19 RX ORDER — ALBUTEROL SULFATE 90 UG/1
2 INHALANT RESPIRATORY (INHALATION) EVERY 6 HOURS PRN
Status: DISCONTINUED | OUTPATIENT
Start: 2024-11-19 | End: 2024-11-30

## 2024-11-19 RX ORDER — DULOXETIN HYDROCHLORIDE 60 MG/1
60 CAPSULE, DELAYED RELEASE ORAL DAILY
Status: DISCONTINUED | OUTPATIENT
Start: 2024-11-19 | End: 2024-11-30

## 2024-11-19 NOTE — PROGRESS NOTES
11/19/24 1308   MIKE 7 Over the last 2 weeks, how often have you been bothered by the following problems?   Feeling nervous, anxious, or on edge 3   Not being able to stop or control worrying 0   Worrying too much about different things    3   Trouble relaxing 3   Being so restless that it's hard to sit still 3   Becoming easily annoyed or irritable 3   Feeling afraid as if something awful might happen 3   MIKE 7 Total Score 18   If you checked off any problems, how difficult have these made it for you to do your work, take care of things at home, or get along with other people? Somewhat difficult     PT stated that she has Bipolar Disorder and feels like she is currently manic. PT stated that she has racing thoughts and insomnia. PT stated that she has been having health issues and issues at her nursing home. PT stated that she does not know if she can go back because she called 911 to come to the hospital. PT stated that she is always anxious. PT denied SI, HI, and AVH. PT scored an 18 on the MIKE-7. PT stated that once they figure out her medical conditions she will feel better. PT was A&Ox4. PT declined referrals at this time due to not knowing where she will be at and stating that the facility should have a psychiatrist.

## 2024-11-19 NOTE — CM/SW NOTE
11/19/24 0900   CM/SW Referral Data   Referral Source Social Work (self-referral)   Reason for Referral Discharge planning   Informant EMR;Clinical Staff Member   Patient Info   Patient's Current Mental Status at Time of Assessment Alert;Oriented   Patient's Home Environment Long Term Care Facility   Post Acute Care Provider Upon Admission Shannon Narayan   Discharge Needs   Anticipated D/C needs Long term care facility;Transportation services       Patient is a 55 y/o woman with history of bipolar disorder and manipulative behavior currently admitted due to constipation.  Pt is a known long term care resident at [facility] in [place].  Anticipate return to NH when medically cleared.  Updates sent to NH via A[facility].  Await medical clearance for discharge.  / to remain available for support and/or discharge planning.     [facility]  P:[phone]  F:[phone]    Theresa Oseguera [facility]  Discharge Planner  [phone]

## 2024-11-19 NOTE — PROGRESS NOTES
No reddness noted to sacrum, elbows or heels.  Abdominal skin folds dry and intact. Suprapubic site reddened, in abdominal fold. Area cleansed with mark care wipe, Nystatin cream and dry dressing applied

## 2024-11-19 NOTE — ED INITIAL ASSESSMENT (HPI)
Pt presents to ED arrived via EMS with c/o of abdominal pain. Pt states that she was not able to pass a stool for over a week. Hx of blood clots in the left leg, CHF, COPD, DM type 2, GERD, Chronic Obstructive Sleep Apnea, Anxiety. Pt has suprapubic catheter, power port on her left side of the chest. Pt states her abdomen hurts when being touched or palpated, denies nausea and vomiting.

## 2024-11-19 NOTE — H&P
[facility]     Clari Rosa Patient Status:  Emergency    1970 MRN [id number]   Location [facility] EMERGENCY DEPARTMENT Attending Ole Martinez MD   Hosp Day # 0 PCP Macrina Galvin MD     Chief Complaint: Abdominal pain    Subjective:    History of Present Illness:     Clari Rosa is a 54 year old female with history of bipolar, type 2 diabetes, anxiety, hypertension, sleep apnea, transverse myelitis with a suprapubic catheter presents emergency room with abdominal pain this dull and constant with intermittent cramping that she rates 9 out of 10.  Patient stated this started 3 days ago with abdominal distention and then had been constipated not having a bowel movement for 3 days.  Patient currently on the seventh day of antibiotics for urinary tract infection.  Patient denies any nausea, vomiting.  No diarrhea.  No chest pain, shortness of breath.  No melena, hematochezia, hematuria.  Patient was in the emergency room yesterday when she had a CT scan at that time showed no obstruction or perforation so she was discharged home with follow-up with GI for colonoscopy.  Patient came back because of the pain continued and was intolerable at home.    History/Other:    Past Medical History:  Past Medical History:    Bipolar 1 disorder (HCC)    Dental caries    Diabetes (HCC)    MIKE (generalized anxiety disorder)    High blood pressure    History of diverticulitis of colon    Manipulative behavior    Neurogenic bladder    Obesity (BMI 30-39.9)    Paraplegia (HCC)    Sepsis following intra-abdominal surgery (HCC)    Serotonin syndrome    Sleep apnea    Suprapubic catheter (HCC)    Transverse myelitis (HCC)     Past Surgical History:   Past Surgical History:   Procedure Laterality Date    Arthroscopy of joint unlisted      knee          Cholecystectomy      Part removal colon w end colostomy      2013    Tonsillectomy        Family History:   Family History   Adopted:  Yes     Social History:    reports that she has quit smoking. Her smoking use included cigarettes. She has a 5 pack-year smoking history. She does not have any smokeless tobacco history on file. She reports that she does not drink alcohol and does not use drugs.     Allergies: Allergies[1]    Medications:  Medications Ordered Prior to Encounter[2]    Review of Systems:   A comprehensive review of systems was completed.    Pertinent positives and negatives noted in the HPI.    Objective:   Physical Exam:    BP (!) 124/96   Pulse 104   Temp 98.3 °F (36.8 °C) (Temporal)   Resp 15   Ht 5' (1.524 m)   Wt 190 lb (86.2 kg)   SpO2 97%   BMI 37.11 kg/m²   General: No acute distress, Alert  Respiratory: No rhonchi, no wheezes  Cardiovascular: S1, S2. Regular rate and rhythm  Abdomen: Soft, Non-tender, non-distended, positive bowel sounds  Neuro: No new focal deficits  Extremities: No edema      Results:    Labs:      Labs Last 24 Hours:    Recent Labs   Lab 11/17/24  2318   RBC 5.00   HGB 11.9*   HCT 36.8   MCV 73.6*   MCH 23.8*   MCHC 32.3   RDW 16.1   NEPRELIM 13.22*   WBC 18.9*   .0       Recent Labs   Lab 11/17/24  2318   *   BUN 8*   CREATSERUM 0.67   EGFRCR 104   CA 9.1   ALB 3.9   *   K 3.5      CO2 26.0   ALKPHO 72   AST 11   ALT 8*   BILT 0.3   TP 7.0       No results found for: \"PT\", \"INR\"    No results for input(s): \"TROP\", \"TROPHS\", \"CK\" in the last 168 hours.    No results for input(s): \"TROP\", \"PBNP\" in the last 168 hours.    No results for input(s): \"PCT\" in the last 168 hours.    Imaging: Imaging data reviewed in Epic.    Assessment & Plan:      #Abdominal pain  - CT shows dilated colon with narrowing at rectosigmoid junction  - NPO  - GI consult  - PRN pain meds    # Type 2 diabetes  - Will plae on hyperglycemia protocol with long actin and correction factor insulin.    # Anxiety  - Prn IV benozo's    # BIpolar  - Will continue on depakote and seroquel    # Hypertension  -  Will continue on metoprolol    # Neurogenic bladder  - Patient has chronic mark.    # Hx DVT  - Will continue on eliquis.    # ANAID  - Will place on ANAID  protocol.      Plan of care discussed with patient at bedside    Rogelio Boucher DO    Supplementary Documentation:     The  Cent[employer] Cures Act makes medical notes like these available to patients in the interest of transparency. Please be advised this is a medical document. Medical documents are intended to carry relevant information, facts as evident, and the clinical opinion of the practitioner. The medical note is intended as peer to peer communication and may appear blunt or direct. It is written in medical language and may contain abbreviations or verbiage that are unfamiliar.                                     [1]   Allergies  Allergen Reactions    Pcn [Penicillins] HIVES    Sulfa Antibiotics HIVES    Radiology Contrast Iodinated Dyes DIZZINESS    Keflex [Cephalexin] UNKNOWN   [2]   Current Facility-Administered Medications on File Prior to Encounter   Medication Dose Route Frequency Provider Last Rate Last Admin    [COMPLETED] ketorolac (Toradol) 15 MG/ML injection 15 mg  15 mg Intravenous Once Nj Ellison MD   15 mg at 24 0036    [COMPLETED] sodium chloride 0.9 % IV bolus 500 mL  500 mL Intravenous Once Nj Ellison MD   Stopped at 24 0105    [COMPLETED] morphINE PF 4 MG/ML injection 4 mg  4 mg Intravenous Q30 Min PRN Karsten Beatty MD   4 mg at 10/22/24 1105    [] sodium chloride 0.9% infusion   Intravenous Continuous Karsten Beatty MD        [] HYDROmorphone (Dilaudid) 1 MG/ML injection 0.5 mg  0.5 mg Intravenous Q30 Min PRN Karsten eBatty MD        [COMPLETED] alum-mag hydroxide-simethicone (Maalox) 200-200-20 MG/5ML oral suspension 30 mL  30 mL Oral Once Jay Napoles MD   30 mL at 10/18/24 0900    [COMPLETED] meropenem (Merrem) 500 mg in sodium chloride 0.9% 100 mL IVPB-MBP  500 mg  Intravenous Q8H Romulo Ingram MD 33.3 mL/hr at 10/18/24 1156 500 mg at 10/18/24 1156    [] phenazopyridine (Pyridium) tab 100 mg  100 mg Oral TID CC Jay Napoles MD   100 mg at 10/18/24 1705    [COMPLETED] levoFLOXacin in dextrose 5% (Levaquin) 750 mg/150mL IVPB premix 750 mg  750 mg Intravenous Once Nj Ellison MD   Stopped at 10/17/24 0700    [COMPLETED] meropenem (Merrem) 1 g in sodium chloride 0.9% 100 mL IVPB-MBP  1 g Intravenous Once Nj Ellison MD   Stopped at 10/17/24 0700    [COMPLETED] magnesium oxide (Mag-Ox) tab 400 mg  400 mg Oral Once Stefan Haque MD   400 mg at 24 1007    [COMPLETED] ertapenem (Invanz) 1 g in sodium chloride 0.9% 100 mL IVPB-MBP  1 g Intravenous Once Terrell Sparrow  mL/hr at 24 1542 1 g at 24 1542    [COMPLETED] heparin (Porcine) 100 Units/mL lock flush 500 Units  5 mL Intracatheter Once Stefan Haque MD   500 Units at 24 1649    [COMPLETED] LORazepam (Ativan) tab 1 mg  1 mg Oral Once Boyd Lucia MD   1 mg at 24 1913    [COMPLETED] meclizine (Antivert) tab 25 mg  25 mg Oral Once Boyd Lucia MD   25 mg at 24 2027    [COMPLETED] sodium chloride 0.9 % IV bolus 2,646 mL  30 mL/kg Intravenous Once Boyd Lucia MD   Stopped at 24 2345    [COMPLETED] cefTRIAXone (Rocephin) 1 g in sodium chloride 0.9% 100 mL IVPB-ADDV  1 g Intravenous Once Boyd Lucia MD   Stopped at 24 2332    [COMPLETED] ondansetron (Zofran) 4 MG/2ML injection 4 mg  4 mg Intravenous Once Doc Pradhan MD   4 mg at 24 0629    [COMPLETED] ondansetron (Zofran) 4 MG/2ML injection 4 mg  4 mg Intravenous Once Doc Pradhan MD   4 mg at 24 0814    [COMPLETED] heparin (Porcine) 100 Units/mL lock flush 500 Units  5 mL Intravenous Once Doc Pradhan MD   500 Units at 24 0816    [COMPLETED] magnesium oxide (Mag-Ox) tab 400 mg  400 mg Oral Once Tamara Jackson DO   400 mg at 24 1001    [COMPLETED]  heparin (Porcine) 100 Units/mL lock flush 500 Units  5 mL Intracatheter Once Tamara Jackson, DO   500 Units at 24 1431    [COMPLETED] magnesium oxide (Mag-Ox) tab 400 mg  400 mg Oral Once JacksonTamara DO   400 mg at 24 0829    [COMPLETED] magnesium oxide (Mag-Ox) tab 400 mg  400 mg Oral Once JacksonTamara DO   400 mg at 24 0931    [COMPLETED] cefTRIAXone (Rocephin) 2 g in sodium chloride 0.9% 100 mL IVPB-ADDV  2 g Intravenous Once Boyd Orozco  mL/hr at 24 0115 2 g at 24 0115    [COMPLETED] sodium chloride 0.9 % IV bolus 2,763 mL  30 mL/kg Intravenous Once Boyd Orozco  mL/hr at 24 0027 2,763 mL at 24 0027    [COMPLETED] magnesium oxide (Mag-Ox) tab 400 mg  400 mg Oral Once Loni Rodriguez MD   400 mg at 24 0528    [COMPLETED] sodium chloride 0.9 % IV bolus 1,000 mL  1,000 mL Intravenous Once Loni Rodriguez MD 1,000 mL/hr at 24 0525 1,000 mL at 24 0525    [COMPLETED] sodium chloride 0.9 % IV bolus 1,000 mL  1,000 mL Intravenous Once Boyd Orozco DO   Stopped at 24 0051     Current Outpatient Medications on File Prior to Encounter   Medication Sig Dispense Refill    hydrocortisone 1 % External Cream Apply 1 Application topically 2 (two) times daily.      sodium chloride 0.9% SOLN 100 mL with ertapenem 1 g SOLR 1 g Inject 1 g into the vein daily.      oxybutynin 5 MG Oral Tab Take 1 tablet (5 mg total) by mouth 3 (three) times daily.      HYDROcodone-acetaminophen  MG Oral Tab Take 1 tablet by mouth every 6 (six) hours as needed for Pain. 20 tablet 0    [] fluconazole 200 MG Oral Tab Take 1 tablet (200 mg total) by mouth daily for 7 days. 7 tablet 0    clonazePAM 0.5 MG Oral Tab Take 3 tablets (1.5 mg total) by mouth 3 (three) times daily as needed for Anxiety. (Patient taking differently: Take 3 tablets (1.5 mg total) by mouth in the morning, at noon, and at bedtime.) 15 tablet 0     nystatin 100,000 Units/g External Cream Apply 1 Application topically in the morning, at noon, and at bedtime.      loperamide 2 MG Oral Cap Take 1 capsule (2 mg total) by mouth 4 (four) times daily as needed for Diarrhea.      meclizine 25 MG Oral Tab Take 1 tablet (25 mg total) by mouth 3 (three) times daily as needed.      DULoxetine 60 MG Oral Cap DR Particles Take 1 capsule (60 mg total) by mouth daily.      [] magnesium oxide 400 MG Oral Tab Take 1 tablet (400 mg total) by mouth daily. 30 tablet 0    apixaban 5 MG Oral Tab Take 1 tablet (5 mg total) by mouth 2 (two) times daily.      QUEtiapine 100 MG Oral Tab Take 2 tablets (200 mg total) by mouth nightly.      insulin glargine 100 UNIT/ML Subcutaneous Solution Inject 6 Units into the skin nightly.      insulin aspart 100 Units/mL Subcutaneous Solution Pen-injector Inject 2-8 Units into the skin 3 (three) times daily with meals.      phenazopyridine 100 MG Oral Tab Take 1 tablet (100 mg total) by mouth 3 (three) times daily as needed for Pain. 5 tablet 0    Insulin Pen Needle 32G X 4 MM Does not apply Misc May substitute if not on insurance formulary (Patient not taking: Reported on 10/28/2024) 100 each 5    metoprolol tartrate 25 MG Oral Tab Take 1 tablet (25 mg total) by mouth 2 (two) times daily.      traZODone 50 MG Oral Tab Take 1 tablet (50 mg total) by mouth nightly as needed for Sleep.      Nystatin 741384 UNIT/GM External Powder Apply 1 Application topically as needed for Dry Skin. Under breast and groin area      acetaminophen 325 MG Oral Tab Take 2 tablets (650 mg total) by mouth every 6 (six) hours as needed for Pain.      Icosapent Ethyl 1 g Oral Cap Take 2 capsules by mouth 4 (four) times daily.      Melatonin 3 MG Oral Cap Take 1 capsule (3 mg total) by mouth at bedtime.      ergocalciferol 1.25 MG (19161 UT) Oral Cap Take 1 capsule (50,000 Units total) by mouth every 7 days. Every monday      bethanechol 10 MG Oral Tab Take 1 tablet (10  mg total) by mouth daily.      albuterol 108 (90 Base) MCG/ACT Inhalation Aero Soln Inhale 2 puffs into the lungs every 6 (six) hours as needed for Wheezing or Shortness of Breath.      Multiple Vitamins-Minerals (THERA-M) Oral Tab Take 1 tablet by mouth daily.      divalproex Sodium  MG Oral Tablet 24 Hr Take 1 tablet (500 mg total) by mouth 2 (two) times daily.      ondansetron 4 MG Oral Tablet Dispersible Take 1 tablet (4 mg total) by mouth every 8 (eight) hours as needed for Nausea.      metFORMIN  MG Oral Tablet 24 Hr Take 2 tablets (1,000 mg total) by mouth 2 (two) times daily with meals. 30 tablet 0

## 2024-11-19 NOTE — PLAN OF CARE
Recd pt from ER axox4.  Pt c/o severe abdominal pain, and constipation.  9/10, cramping, dull and constant.  Pt denies anything improving pain, states palpation and movement increase pain.  Abdomen distended, tender.  Pt instructed on strict NPO.  Pt verbalizes understanding.  Admitting orders reviewed.  Pt connected to tele and .  Monitoring ongoing.  Spo2 on room air 84-86% on room air, pt place on 2li nc.  Scds placed on patient.  Oriented to call light and plan of care.  Bed in lowest position.  Call light within reach.  Denies needs at this time.

## 2024-11-19 NOTE — PLAN OF CARE
Patient is oriented x4, VSS on RA,  denies pain at rest, Bowel sounds hypoactive,  Tenderness to entire abdomen per patient, denies nausea, Strict NPO, Call light and belongings within reach.     NG tube placed, await X-ray confirmation- will be set to LIS pending confirmation of placement.

## 2024-11-19 NOTE — CONSULTS
Consultation Note        Clari Rosa Patient Status:  Observation    1970 MRN [id number]   [facility] 3SW-A Attending Bhanu Mckinney MD   Hosp Day # 0 PCP Macrina Galvin MD       Reason for Consultation   Colon dilation, abnormal CT scan        History of Present Illness     Clari Rosa is a 54 year old female with PmHx of bipolar, type II DM, anxiety, HTN, transverse myelitis w/ suprapubic catheter, history of bowel perforation s/p right hemicolectomy complicated by sepsis and paraplegia () who presented to ER from [facility] with abdominal pain and distention. Patient is a poor historian. She reports abdominal pain is diffuse, can be severe at time. Prior to arrival, no BM for 4 days. Has history of chronic constipation, typically responds to OTC measures/laxatives. Occasional diarrhea for which she takes loperamide PRN.  She denies current nausea, vomiting, melena, hematochezia. She had a CT scan yesterday revealing surgical changes of right hemicolectomy, large amount of liquid stool throughout. Colon dilated up to 9.5cm with focal area of narrowing at the rectosigmoid junction. Mild dilation of the small bowel, up to 2.8cm, related to colon dilation. Free fluid noted in all 4 quadrants. She had a CT 1 month ago without concerning findings of the GI tract. Hgb at time of admission 11.9. Leukocytosis with neutrophilia noted. Some decrease in WBC overnight. Patient also has associated UTI.       PMH/MEDS/ALLERGIES/SH/FH:     Past Medical History:    Bipolar 1 disorder (HCC)    Dental caries    Diabetes (HCC)    MIKE (generalized anxiety disorder)    High blood pressure    History of diverticulitis of colon    Manipulative behavior    Neurogenic bladder    Obesity (BMI 30-39.9)    Paraplegia (HCC)    Sepsis following intra-abdominal surgery (HCC)    Serotonin syndrome    Sleep apnea    Suprapubic catheter (HCC)    Transverse myelitis (HCC)      Past Surgical History:    Procedure Laterality Date    Arthroscopy of joint unlisted      knee          Cholecystectomy      Part removal colon w end colostomy      2013    Tonsillectomy          No recently discontinued medications to reconcile   Medications Ordered Prior to Encounter[1]    Current Allergies: Allergies[2]    Social History     Occupational History    Not on file   Tobacco Use    Smoking status: Former     Current packs/day: 1.00     Average packs/day: 1 pack/day for 5.0 years (5.0 ttl pk-yrs)     Types: Cigarettes    Smokeless tobacco: Not on file    Tobacco comments:     quit    Vaping Use    Vaping status: Never Used   Substance and Sexual Activity    Alcohol use: No    Drug use: No    Sexual activity: Not on file           Family History   Adopted: Yes              MEDICATIONS      albuterol (Ventolin HFA) 108 (90 Base) MCG/ACT inhaler 2 puff  2 puff Inhalation Q6H PRN    [Held by provider] apixaban (Eliquis) tab 5 mg  5 mg Oral BID    clonazePAM (KlonoPIN) tab 1.5 mg  1.5 mg Oral TID    divalproex ER (Depakote ER) 24 hr tab 500 mg  500 mg Oral BID    DULoxetine (Cymbalta) DR cap 60 mg  60 mg Oral Daily    oxybutynin (Ditropan) tab 5 mg  5 mg Oral TID    QUEtiapine (SEROquel) tab 200 mg  200 mg Oral Nightly    traZODone (Desyrel) tab 50 mg  50 mg Oral Nightly PRN    glucose (Dex4) 15 GM/59ML oral liquid 15 g  15 g Oral Q15 Min PRN    Or    glucose (Glutose) 40% oral gel 15 g  15 g Oral Q15 Min PRN    Or    glucose-vitamin C (Dex-4) chewable tab 4 tablet  4 tablet Oral Q15 Min PRN    Or    dextrose 50% injection 50 mL  50 mL Intravenous Q15 Min PRN    Or    glucose (Dex4) 15 GM/59ML oral liquid 30 g  30 g Oral Q15 Min PRN    Or    glucose (Glutose) 40% oral gel 30 g  30 g Oral Q15 Min PRN    Or    glucose-vitamin C (Dex-4) chewable tab 8 tablet  8 tablet Oral Q15 Min PRN    melatonin tab 3 mg  3 mg Oral Nightly PRN    sodium chloride 0.9% infusion   Intravenous Continuous    acetaminophen (Tylenol Extra  Strength) tab 500 mg  500 mg Oral Q4H PRN    morphINE PF 2 MG/ML injection 1 mg  1 mg Intravenous Q2H PRN    Or    morphINE PF 2 MG/ML injection 2 mg  2 mg Intravenous Q2H PRN    Or    morphINE PF 4 MG/ML injection 4 mg  4 mg Intravenous Q2H PRN    ondansetron (Zofran) 4 MG/2ML injection 4 mg  4 mg Intravenous Q6H PRN    prochlorperazine (Compazine) 10 MG/2ML injection 5 mg  5 mg Intravenous Q8H PRN    insulin degludec (Tresiba) 100 units/mL flextouch 5 Units  5 Units Subcutaneous Nightly    insulin aspart (NovoLOG) 100 Units/mL FlexPen 1-10 Units  1-10 Units Subcutaneous TID AC and HS    heparin (Porcine) 100 Units/mL lock flush 500 Units  5 mL Intravenous PRN    metoprolol tartrate (Lopressor) tab 25 mg  25 mg Oral 2x Daily(Beta Blocker)    nystatin-triamcinolone (Mycolog II) 100,000-0.1 Units/g-% cream   Topical BID    enoxaparin (Lovenox) 100 MG/ML SUBQ injection 90 mg  1 mg/kg Subcutaneous 2 times per day    [COMPLETED] ketorolac (Toradol) 15 MG/ML injection 15 mg  15 mg Intravenous Once    morphINE PF 4 MG/ML injection 4 mg  4 mg Intravenous Q30 Min PRN    [COMPLETED] ondansetron (Zofran) 4 MG/2ML injection 4 mg  4 mg Intravenous Once    [COMPLETED] sodium chloride 0.9 % IV bolus 500 mL  500 mL Intravenous Once              ROS:     A comprehensive 14 point review of systems was completed.  Pertinent positives and negatives noted in the the HPI.          Physical Exam     Vital signs:  /71 (BP Location: Left arm)   Pulse 93   Temp 98.3 °F (36.8 °C) (Oral)   Resp 16   Ht 5' (1.524 m)   Wt 190 lb (86.2 kg)   SpO2 98%   BMI 37.11 kg/m²         Physical Exam        General: Appears alert, oriented x 3 and in no acute distress.  HEENT: Normal. No scleral icterus.   NECK: Supple. No neck vein distention.   CV: S1 and S2 normal. No murmurs or gallops.  LUNGS: Clear to auscultation.  ABDOMEN: firmness and distention noted. Moderate tenderness throughout.  No masses. Bowel sounds are present.  EXTREMITIES:  No edema, cyanosis or clubbing.  SKIN: Warm and dry.         IMAGING/LABS       Labs:   Lab Results   Component Value Date    WBC 15.3 11/18/2024    HGB 11.6 11/18/2024    HCT 34.9 11/18/2024    .0 11/18/2024    CREATSERUM 0.83 11/18/2024    BUN 11 11/18/2024     11/18/2024    K 4.0 11/18/2024    CL 97 11/18/2024    CO2 29.0 11/18/2024     11/18/2024    CA 9.3 11/18/2024    ALB 4.1 11/18/2024    ALKPHO 73 11/18/2024    BILT 0.3 11/18/2024    AST 18 11/18/2024    ALT 8 11/18/2024    LIP 19 11/18/2024     Recent Labs   Lab 11/17/24  2318 11/18/24  2355   * 160*   BUN 8* 11   CREATSERUM 0.67 0.83   CA 9.1 9.3   * 135*   K 3.5 4.0    97*   CO2 26.0 29.0     Recent Labs   Lab 11/18/24  2355   RBC 4.80   HGB 11.6*   HCT 34.9*   MCV 72.7*   MCH 24.2*   MCHC 33.2   RDW 16.2   NEPRELIM 10.15*   WBC 15.3*   .0       Recent Labs   Lab 11/17/24  2318 11/18/24  2355   ALT 8* 8*   AST 11 18       Imaging:   CT ABDOMEN+PELVIS(CPT=74176)  Narrative: PROCEDURE:  CT ABDOMEN+PELVIS (CPT=74176)     COMPARISON:  [facility] , CT, CT ABDOMEN+PELVIS KIDNEYSTONE 2D RNDR(NO IV,NO ORAL)(CPT=74176), 10/22/2024, 10:36 AM.     INDICATIONS:  abdominal pain     TECHNIQUE:  Unenhanced multislice CT scanning was performed from the dome of the diaphragm to the pubic symphysis.  Dose reduction techniques were used. Dose information is transmitted to the ACR (American College of Radiology) NRDR (National Radiology   Data Registry) which includes the Dose Index Registry.     PATIENT STATED HISTORY: (As transcribed by Technologist)  Patient presents with generalized abdominal pain.         FINDINGS:    This scan performed without IV or oral contrast, with limitations thereof.     Preliminary reading provided by radiologist from [facility].         LIVER:  Unremarkable.      BILIARY:  Cholecystectomy.     PANCREAS:  Unremarkable.     SPLEEN:  Unremarkable.      KIDNEYS:  Atrophy left kidney.  Scarring  bilateral kidney.  No hydronephrosis.     ADRENALS:  Unremarkable.     AORTA/VASCULAR:  No aortic aneurysm.      RETROPERITONEUM:  Unremarkable.     BOWEL/MESENTERY:  Partial right hemicolectomy redemonstrated.  Large amount of primarily liquid stool throughout the colon, dilated up to 9.5 cm, with a focal area of relative narrowing at the rectosigmoid junction for example image 87 measuring about 3   cm in length.  Although this could reflect a focal peristalsis at the time of the scan it appears distinct from the rest of the colon which is otherwise markedly dilated, and this could reflect a stricture.  Mild dilation small bowel up to 2.8 cm a   related to the colonic dilation.  No large or drainable ascites but there is a small amount of free fluid in all 4 quadrants.  No free air.     ABDOMINAL WALL:  Unremarkable.     URINARY BLADDER:  Unremarkable.   Decompressed with a suprapubic catheter.  Some air and trace amount of urine in the bladder.  Bladder shows wall thickening.  LYMPH NODES PELVIS:  Unremarkable.     PELVIC ORGANS:  No acute process.      LUNG BASES:  No acute process.      BONES:  No acute abnormality.                          Impression: CONCLUSION:  Large amount of primarily liquid stool throughout the colon, but a focus of notable narrowing of the rectosigmoid junction is seen, uncertain if this reflects a lesion, or peristaltic contraction at the time of the scan; advise colonoscopy   when the patient is stable for further assessment, to exclude stricture in this area including inflammatory or malignant stricture.  The small bowel is also dilated, most likely secondary to the colonic dilation.  No free air.  No large or drainable   ascites, with a trace amount of fluid all 4 quadrants.  Other nonacute findings as above.        LOCATION:  [facility]        Dictated by (CST): Parish Brand MD on 11/18/2024 at 6:28 AM       Finalized by (CST): Parish Brand MD on 11/18/2024 at 6:33 AM                IMPRESSION:     Clari Rosa is a 54 year old female with PmHx of bipolar, type II DM, anxiety, HTN, transverse myelitis w/ suprapubic catheter, history of bowel perforation s/p right hemicolectomy complicated by sepsis and paraplegia (2014) who presented to ER from [facility] with abdominal pain and distention.  Symptoms of diffuse abdominal pain, severe at times. Abdominal distention and tenderness on exam. No GI bleeding. She had a CT scan yesterday revealing surgical changes of right hemicolectomy, large amount of liquid stool throughout. Colon dilated up to 9.5cm with focal area of narrowing at the rectosigmoid junction. Mild dilation of the small bowel, up to 2.8cm, related to colon dilation. Free fluid noted in all 4 quadrants. She had a CT 1 month ago without concerning findings of the GI tract. Hgb at time of admission 11.9. Leukocytosis with neutrophilia noted. Some decrease in WBC overnight. Patient also has associated UTI.  Abnormal CT imaging w/ concerns for rectal stricture and colon dilation with subsequent small bowel dilation. Unclear etiology of stricture vs mass, need to r/o neoplasm  Constipation: r/t #1  Abdominal distention and pain-r/t #1  Leukocytosis-consider r/t #1 vs UTI           PLAN:     Strict NPO  Place NG tube to LIS  General surgery consult, appreciate recommendations  Hold Teresita if Ok'd with primary team  Flexible sigmoidoscopy with Dr. Abad tomorrow, no prep  IV fluids, antiemetics, and analgesics per primary team         AMBAR Ha  12:28 PM  11/19/2024  [facility]  [phone]      Thank you for the consult. Addendum from Dr. Abad to follow          GI Attending Addendum:  I have personally seen and examined this patient and agree with above nurse practitioner's history, physical exam, assessment and recommendations with the following modifications and/or additions:   Informed Consent:   The planned procedure(s), the explanation of the  procedure, its expected benefits, the potential complications and risks, and possible alternatives (including their benefits and risks) were discussed with the patient and patient's POA/motherFlor in full. The discussion of risks, not limited to but including bleeding, infection, perforation or tear in the gastrointestinal tract, adverse effects from anesthesia, and possible prolonged hospitalization, emergency surgery, risk of morbidity or mortality, and risk of missed lesion(s) were discussed with patient and patient's POA/motherFlor.  The risks and benefits of not undergoing the procedure(s), and associated risk of potential missed or delay in diagnosis reviewed. Patient and patient's POA/motherFlor understood the proposed procedure(s), and elected to proceed with the procedure(s) with possible intervention and endotherapy (such as polypectomy, biopsy, control of bleeding, etc.) and its risks, benefits and alternatives and wish to proceed with procedure(s). All questions answered in full to patient's satisfaction. Ample time was given to patient to ask all questions in full.       Patient is a 54 year DM, HTN, hx of diverticulitis / bowel perforation in the past s/p right hemicolectomy. Patient c/o nausea. No vomiting. Abdominal discomfort, denies any pain at this time. Complains of bloating. On exam abd soft, although mildly distended, without tenderness or rebound tenderness or guarding.   Reviewed CT - colon wall dilation. Narrowing at rectosigmoid junction. R/o malignancy vs. Surgical stricture. Colon dilation noted. SB dilation  Patient is on eliquis, which changes timing of endoscopy with endotherapy from today to tomorrow    Plan for flex sigmoidoscopy tomorrow, given last eliquis today  No plan for colonoscopy or bowel prep given above  Eliquis management per primary team  Keep NPO  NGT for suction, LIWS  Gen surgery consult   IVF, analgesia, anti-emetics per primary team    D/w primary team   Caroline Abad MD  11/19/2024  [facility]           [1]   No current facility-administered medications on file prior to encounter.     Current Outpatient Medications on File Prior to Encounter   Medication Sig Dispense Refill    HYDROcodone-acetaminophen  MG Oral Tab Take 1 tablet by mouth every 6 (six) hours as needed for Pain. 20 tablet 0    clonazePAM 0.5 MG Oral Tab Take 3 tablets (1.5 mg total) by mouth 3 (three) times daily as needed for Anxiety. (Patient taking differently: Take 3 tablets (1.5 mg total) by mouth in the morning, at noon, and at bedtime.) 15 tablet 0    nystatin 100,000 Units/g External Cream Apply 1 Application topically in the morning, at noon, and at bedtime.      DULoxetine 60 MG Oral Cap DR Particles Take 1 capsule (60 mg total) by mouth daily.      apixaban 5 MG Oral Tab Take 1 tablet (5 mg total) by mouth 2 (two) times daily.      QUEtiapine 100 MG Oral Tab Take 2 tablets (200 mg total) by mouth nightly.      insulin glargine 100 UNIT/ML Subcutaneous Solution Inject 6 Units into the skin nightly.      insulin aspart 100 Units/mL Subcutaneous Solution Pen-injector Inject 2-8 Units into the skin 3 (three) times daily with meals.      metoprolol tartrate 25 MG Oral Tab Take 1 tablet (25 mg total) by mouth 2 (two) times daily.      traZODone 50 MG Oral Tab Take 1 tablet (50 mg total) by mouth nightly as needed for Sleep.      Melatonin 3 MG Oral Cap Take 1 capsule (3 mg total) by mouth at bedtime.      albuterol 108 (90 Base) MCG/ACT Inhalation Aero Soln Inhale 2 puffs into the lungs every 6 (six) hours as needed for Wheezing or Shortness of Breath.      Multiple Vitamins-Minerals (THERA-M) Oral Tab Take 1 tablet by mouth daily.      divalproex Sodium  MG Oral Tablet 24 Hr Take 1 tablet (500 mg total) by mouth 2 (two) times daily.      hydrocortisone 1 % External Cream Apply 1 Application topically 2 (two) times daily.      oxybutynin 5 MG Oral Tab Take  1 tablet (5 mg total) by mouth 3 (three) times daily.      loperamide 2 MG Oral Cap Take 1 capsule (2 mg total) by mouth 4 (four) times daily as needed for Diarrhea.      meclizine 25 MG Oral Tab Take 1 tablet (25 mg total) by mouth 3 (three) times daily as needed.      phenazopyridine 100 MG Oral Tab Take 1 tablet (100 mg total) by mouth 3 (three) times daily as needed for Pain. 5 tablet 0    Insulin Pen Needle 32G X 4 MM Does not apply Misc May substitute if not on insurance formulary (Patient not taking: Reported on 10/28/2024) 100 each 5    Nystatin 572239 UNIT/GM External Powder Apply 1 Application topically as needed for Dry Skin. Under breast and groin area      acetaminophen 325 MG Oral Tab Take 2 tablets (650 mg total) by mouth every 6 (six) hours as needed for Pain.      Icosapent Ethyl 1 g Oral Cap Take 2 capsules by mouth 4 (four) times daily.      ergocalciferol 1.25 MG (94764 UT) Oral Cap Take 1 capsule (50,000 Units total) by mouth every 7 days. Every monday      bethanechol 10 MG Oral Tab Take 1 tablet (10 mg total) by mouth daily.      ondansetron 4 MG Oral Tablet Dispersible Take 1 tablet (4 mg total) by mouth every 8 (eight) hours as needed for Nausea.      metFORMIN  MG Oral Tablet 24 Hr Take 2 tablets (1,000 mg total) by mouth 2 (two) times daily with meals. 30 tablet 0   [2]   Allergies  Allergen Reactions    Pcn [Penicillins] HIVES    Sulfa Antibiotics HIVES    Radiology Contrast Iodinated Dyes DIZZINESS    Keflex [Cephalexin] UNKNOWN

## 2024-11-19 NOTE — OCCUPATIONAL THERAPY NOTE
OT orders received, chart reviewed. Per previous notes, patient is a LTC resident at [facility], complete care and bedbound since 2016. OT will complete current orders and sign off.    Thank you for allowing me to care for this patient,   Corazon ZAVALETA, OTR/L   Occupational Therapist   [facility]

## 2024-11-19 NOTE — ED QUICK NOTES
Orders for admission, patient is aware of plan and ready to go upstairs. Any questions, please call ED RN Arnold  at extension [phone].     Patient Covid vaccination status: Unvaccinated     COVID Test Ordered in ED: None    COVID Suspicion at Admission: N/A    Running Infusions:  None    Mental Status/LOC at time of transport: A&O x 4    Other pertinent information:   CIWA score: N/A   NIH score:  N/A

## 2024-11-19 NOTE — PROGRESS NOTES
[facility]   part of [facility]     Hospitalist Progress Note     Clari Rosa Patient Status:  Observation    1970 MRN [id number]   Location [facility] 3SW-A Attending Bhanu Mckinney MD   Hosp Day # 0 PCP Macrina Galvin MD     Chief Complaint: abd pain    Subjective:     Patient still w/ pain and distended abd. anxious    Objective:    Review of Systems:   A comprehensive review of systems was completed; pertinent positive and negatives stated in subjective.    Vital signs:  Temp:  [98.2 °F (36.8 °C)-98.6 °F (37 °C)] 98.6 °F (37 °C)  Pulse:  [] 98  Resp:  [15-22] 16  BP: (103-124)/(69-96) 105/75  SpO2:  [90 %-100 %] 97 %    Physical Exam:    General: No acute distress  Respiratory: No wheezes, no rhonchi  Cardiovascular: S1, S2, regular rate and rhythm  Abdomen: Soft, tender, distended, positive bowel sounds  Neuro: No new focal deficits.   Extremities: No edema      Diagnostic Data:    Labs:  Recent Labs   Lab 248 24  2355   WBC 18.9* 15.3*   HGB 11.9* 11.6*   MCV 73.6* 72.7*   .0 331.0       Recent Labs   Lab 248 24  2355   * 160*   BUN 8* 11   CREATSERUM 0.67 0.83   CA 9.1 9.3   ALB 3.9 4.1   * 135*   K 3.5 4.0    97*   CO2 26.0 29.0   ALKPHO 72 73   AST 11 18   ALT 8* 8*   BILT 0.3 0.3   TP 7.0 6.9       Estimated Creatinine Clearance: 55.7 mL/min (based on SCr of 0.83 mg/dL).    No results for input(s): \"TROP\", \"TROPHS\", \"CK\" in the last 168 hours.    No results for input(s): \"PTP\", \"INR\" in the last 168 hours.               Microbiology    No results found for this visit on 24.      Imaging: Reviewed in Epic.    Medications:    [Held by provider] apixaban  5 mg Oral BID    clonazePAM  1.5 mg Oral TID    divalproex ER  500 mg Oral BID    DULoxetine  60 mg Oral Daily    oxybutynin  5 mg Oral TID    QUEtiapine  200 mg Oral Nightly    insulin degludec  5 Units Subcutaneous Nightly    insulin aspart  1-10 Units  Subcutaneous TID AC and HS    metoprolol tartrate  25 mg Oral 2x Daily(Beta Blocker)    nystatin-triamcinolone   Topical BID    enoxaparin  1 mg/kg Subcutaneous 2 times per day       Assessment & Plan:      #Abdominal pain  #bowel obstruction  #rectal stricture  - CT shows dilated colon with narrowing at rectosigmoid junction  - NPO  - GI consult  - PRN pain meds  -d/w GI  -Ngt to IS. Give 1 mg IV ativan prior to placement  -plan flex sig tomorrow to decompress  -gen surg consult  -hold eliquis. Start FD lovenox and hold prior to procedure      # Type 2 diabetes  - Will plae on hyperglycemia protocol with long actin and correction factor insulin.     # Anxiety  - Prn IV benozo's     # BIpolar  - Will continue on depakote and seroquel     # Hypertension  - Will continue on metoprolol     # Neurogenic bladder  - Patient has chronic mark.     # Hx DVT  - Will continue on eliquis.     # ANAID  - Will place on ANAID  protocol.      Bhanu Mckinney MD    Supplementary Documentation:     Quality:  DVT Mechanical Prophylaxis:        DVT Pharmacologic Prophylaxis   Medication    [Held by provider] apixaban (Eliquis) tab 5 mg    heparin (Porcine) 100 Units/mL lock flush 500 Units    enoxaparin (Lovenox) 100 MG/ML SUBQ injection 90 mg                Code Status: Full Code  Mark: Suprapubic catheter in place  Mark Duration (in days):   Central line:    MARINA:     Discharge is dependent on: course  At this point Ms. Rosa is expected to be discharge to: home    The 21st Century Cures Act makes medical notes like these available to patients in the interest of transparency. Please be advised this is a medical document. Medical documents are intended to carry relevant information, facts as evident, and the clinical opinion of the practitioner. The medical note is intended as peer to peer communication and may appear blunt or direct. It is written in medical language and may contain abbreviations or verbiage that are unfamiliar.

## 2024-11-19 NOTE — PROGRESS NOTES
11/19/24 0600   BiPAP   $ RT Standby Charge (per 15 min) 1   $ ANAID set up charge Yes   Device Dream Station   BiPAP bacteria filter Yes   BiPAP Pre-use check ok? Yes   BIPAP plugged into main power? Yes   Mode AutoPAP   Interface Full face mask   Mask Size Large   Control Settings   Max P 20   Min P 5   BiPAP/CPAP Monitored Parameters   ANAID Protocol Yes   Toleration Well     Patient did not wear CPAP on 11/19/24. Patient states that she is willing to try CPAP at night as long as the CPAP has humidity which the dream station has humidity. The CPAP machine is bed side and ready for night time use.

## 2024-11-19 NOTE — PHYSICAL THERAPY NOTE
Order received for PT eval per functional mobility screen and chart reviewed. Pt known to rehab department from previous admissions. Pt is a LTC resident at [facility]. Pt is dependent for ADL and mobility at baseline. Pt with no skilled therapy goals. PT will sign off.

## 2024-11-20 ENCOUNTER — ANESTHESIA (OUTPATIENT)
Dept: ENDOSCOPY | Facility: HOSPITAL | Age: 54
End: 2024-11-20
Payer: MEDICARE

## 2024-11-20 ENCOUNTER — ANESTHESIA EVENT (OUTPATIENT)
Dept: ENDOSCOPY | Facility: HOSPITAL | Age: 54
End: 2024-11-20
Payer: MEDICARE

## 2024-11-20 LAB
ADENOVIRUS F 40/41 PCR: NEGATIVE
ANION GAP SERPL CALC-SCNC: 1 MMOL/L (ref 0–18)
ASTROVIRUS PCR: NEGATIVE
BASOPHILS # BLD AUTO: 0.03 X10(3) UL (ref 0–0.2)
BASOPHILS NFR BLD AUTO: 0.4 %
BUN BLD-MCNC: 6 MG/DL (ref 9–23)
C CAYETANENSIS DNA SPEC QL NAA+PROBE: NEGATIVE
C DIFF TOX B STL QL: NEGATIVE
CALCIUM BLD-MCNC: 8.1 MG/DL (ref 8.7–10.4)
CAMPY SP DNA.DIARRHEA STL QL NAA+PROBE: NEGATIVE
CHLORIDE SERPL-SCNC: 106 MMOL/L (ref 98–112)
CO2 SERPL-SCNC: 30 MMOL/L (ref 21–32)
CREAT BLD-MCNC: 0.57 MG/DL
CRYPTOSP DNA SPEC QL NAA+PROBE: NEGATIVE
EAEC PAA PLAS AGGR+AATA ST NAA+NON-PRB: NEGATIVE
EC STX1+STX2 + H7 FLIC SPEC NAA+PROBE: NEGATIVE
EGFRCR SERPLBLD CKD-EPI 2021: 108 ML/MIN/1.73M2 (ref 60–?)
ENTAMOEBA HISTOLYTICA PCR: NEGATIVE
EOSINOPHIL # BLD AUTO: 0.41 X10(3) UL (ref 0–0.7)
EOSINOPHIL NFR BLD AUTO: 5 %
EPEC EAE GENE STL QL NAA+NON-PROBE: NEGATIVE
ERYTHROCYTE [DISTWIDTH] IN BLOOD BY AUTOMATED COUNT: 16.4 %
ETEC LTA+ST1A+ST1B TOX ST NAA+NON-PROBE: NEGATIVE
GIARDIA LAMBLIA PCR: NEGATIVE
GLUCOSE BLD-MCNC: 107 MG/DL (ref 70–99)
GLUCOSE BLD-MCNC: 117 MG/DL (ref 70–99)
GLUCOSE BLD-MCNC: 122 MG/DL (ref 70–99)
GLUCOSE BLD-MCNC: 94 MG/DL (ref 70–99)
GLUCOSE BLD-MCNC: 96 MG/DL (ref 70–99)
HCT VFR BLD AUTO: 29.8 %
HGB BLD-MCNC: 9.5 G/DL
IMM GRANULOCYTES # BLD AUTO: 0.07 X10(3) UL (ref 0–1)
IMM GRANULOCYTES NFR BLD: 0.9 %
LYMPHOCYTES # BLD AUTO: 2.47 X10(3) UL (ref 1–4)
LYMPHOCYTES NFR BLD AUTO: 30.3 %
MCH RBC QN AUTO: 24.4 PG (ref 26–34)
MCHC RBC AUTO-ENTMCNC: 31.9 G/DL (ref 31–37)
MCV RBC AUTO: 76.6 FL
MONOCYTES # BLD AUTO: 0.24 X10(3) UL (ref 0.1–1)
MONOCYTES NFR BLD AUTO: 2.9 %
NEUTROPHILS # BLD AUTO: 4.92 X10 (3) UL (ref 1.5–7.7)
NEUTROPHILS # BLD AUTO: 4.92 X10(3) UL (ref 1.5–7.7)
NEUTROPHILS NFR BLD AUTO: 60.5 %
NOROVIRUS GI/GII PCR: NEGATIVE
OSMOLALITY SERPL CALC.SUM OF ELEC: 283 MOSM/KG (ref 275–295)
P SHIGELLOIDES DNA STL QL NAA+PROBE: NEGATIVE
PLATELET # BLD AUTO: 216 10(3)UL (ref 150–450)
POTASSIUM SERPL-SCNC: 3.5 MMOL/L (ref 3.5–5.1)
RBC # BLD AUTO: 3.89 X10(6)UL
ROTAVIRUS A PCR: NEGATIVE
SALMONELLA DNA SPEC QL NAA+PROBE: NEGATIVE
SAPOVIRUS PCR: NEGATIVE
SHIGELLA SP+EIEC IPAH ST NAA+NON-PROBE: NEGATIVE
SODIUM SERPL-SCNC: 137 MMOL/L (ref 136–145)
V CHOLERAE DNA SPEC QL NAA+PROBE: NEGATIVE
VIBRIO DNA SPEC NAA+PROBE: NEGATIVE
WBC # BLD AUTO: 8.1 X10(3) UL (ref 4–11)
YERSINIA DNA SPEC NAA+PROBE: NEGATIVE

## 2024-11-20 PROCEDURE — 0DJD8ZZ INSPECTION OF LOWER INTESTINAL TRACT, VIA NATURAL OR ARTIFICIAL OPENING ENDOSCOPIC: ICD-10-PCS | Performed by: STUDENT IN AN ORGANIZED HEALTH CARE EDUCATION/TRAINING PROGRAM

## 2024-11-20 PROCEDURE — 99233 SBSQ HOSP IP/OBS HIGH 50: CPT | Performed by: HOSPITALIST

## 2024-11-20 RX ORDER — LORAZEPAM 2 MG/ML
1 INJECTION INTRAMUSCULAR EVERY 6 HOURS PRN
Status: DISCONTINUED | OUTPATIENT
Start: 2024-11-20 | End: 2024-11-21

## 2024-11-20 RX ORDER — NALOXONE HYDROCHLORIDE 0.4 MG/ML
0.08 INJECTION, SOLUTION INTRAMUSCULAR; INTRAVENOUS; SUBCUTANEOUS ONCE AS NEEDED
Status: ACTIVE | OUTPATIENT
Start: 2024-11-20 | End: 2024-11-20

## 2024-11-20 RX ORDER — DEXTROSE MONOHYDRATE AND SODIUM CHLORIDE 5; .45 G/100ML; G/100ML
INJECTION, SOLUTION INTRAVENOUS CONTINUOUS
Status: DISCONTINUED | OUTPATIENT
Start: 2024-11-20 | End: 2024-11-25

## 2024-11-20 RX ORDER — HYDROMORPHONE HYDROCHLORIDE 1 MG/ML
0.4 INJECTION, SOLUTION INTRAMUSCULAR; INTRAVENOUS; SUBCUTANEOUS EVERY 2 HOUR PRN
Status: DISCONTINUED | OUTPATIENT
Start: 2024-11-20 | End: 2024-11-30

## 2024-11-20 RX ORDER — HYDROMORPHONE HYDROCHLORIDE 1 MG/ML
0.8 INJECTION, SOLUTION INTRAMUSCULAR; INTRAVENOUS; SUBCUTANEOUS EVERY 2 HOUR PRN
Status: DISCONTINUED | OUTPATIENT
Start: 2024-11-20 | End: 2024-11-30

## 2024-11-20 RX ORDER — PHENYLEPHRINE HCL 10 MG/ML
VIAL (ML) INJECTION AS NEEDED
Status: DISCONTINUED | OUTPATIENT
Start: 2024-11-20 | End: 2024-11-20 | Stop reason: SURG

## 2024-11-20 RX ORDER — SODIUM CHLORIDE, SODIUM LACTATE, POTASSIUM CHLORIDE, CALCIUM CHLORIDE 600; 310; 30; 20 MG/100ML; MG/100ML; MG/100ML; MG/100ML
INJECTION, SOLUTION INTRAVENOUS CONTINUOUS PRN
Status: DISCONTINUED | OUTPATIENT
Start: 2024-11-20 | End: 2024-11-20 | Stop reason: SURG

## 2024-11-20 RX ORDER — HYDROMORPHONE HYDROCHLORIDE 1 MG/ML
0.2 INJECTION, SOLUTION INTRAMUSCULAR; INTRAVENOUS; SUBCUTANEOUS EVERY 2 HOUR PRN
Status: DISCONTINUED | OUTPATIENT
Start: 2024-11-20 | End: 2024-11-30

## 2024-11-20 RX ORDER — LORAZEPAM 2 MG/ML
0.5 INJECTION INTRAMUSCULAR EVERY 6 HOURS PRN
Status: DISCONTINUED | OUTPATIENT
Start: 2024-11-20 | End: 2024-11-21

## 2024-11-20 RX ORDER — SODIUM CHLORIDE, SODIUM LACTATE, POTASSIUM CHLORIDE, CALCIUM CHLORIDE 600; 310; 30; 20 MG/100ML; MG/100ML; MG/100ML; MG/100ML
INJECTION, SOLUTION INTRAVENOUS CONTINUOUS
Status: DISCONTINUED | OUTPATIENT
Start: 2024-11-20 | End: 2024-11-20

## 2024-11-20 RX ADMIN — SODIUM CHLORIDE, SODIUM LACTATE, POTASSIUM CHLORIDE, CALCIUM CHLORIDE: 600; 310; 30; 20 INJECTION, SOLUTION INTRAVENOUS at 11:19:00

## 2024-11-20 RX ADMIN — PHENYLEPHRINE HCL 100 MCG: 10 MG/ML VIAL (ML) INJECTION at 11:31:00

## 2024-11-20 NOTE — CM/SW NOTE
Spoke with Livia from [facility] in [place].  Pt has had multiple ED/hospital admissions since she moved to [facility].  Anticipate pt's needs would be better met in NH facility with more psychiatric support available.  [facility] has reached out to [facility] who have requested clinical records.  Referral sent via AIDIN to [facility] as well as [facility] and [facility].  [facility] will accept pt for readmission at [place] if these facilities cannot accept and/or if pt/family prefer to return to [facility].  Await responses.  / to remain available for support and/or discharge planning.     Theresa Oseguera, [facility]  Discharge Planner  [phone]

## 2024-11-20 NOTE — PLAN OF CARE
A&Ox4.  , telemetry monitoring.  Pt had large, loose BM 11/20.  Bowel sounds active.  NG tube in place and set to low intermittent suction with clear/tan output.  Pain managed with PRN medication.    Safety precautions in place, patient encouraged to call for assistance.  Call light within reach.

## 2024-11-20 NOTE — PROGRESS NOTES
11/19/24 2300   BiPAP   $ RT Standby Charge (per 15 min) 1   BiPAP/CPAP Monitored Parameters   Toleration Refused     Patient refused CPAP tonight due to NG tube being in place.

## 2024-11-20 NOTE — ANESTHESIA POSTPROCEDURE EVALUATION
[facility]    Clari Rosa Patient Status:  Inpatient   Age/Gender 54 year old female MRN [id number]   Location [facility] ENDOSCOPY PAIN CENTER Attending Bhanu Mckinney MD   Hosp Day # 1 PCP Macrina Galvin MD       Anesthesia Post-op Note    FLEXIBLE SIGMOIDOSCOPY    Procedure Summary       Date: 11/20/24 Room / Location:  ENDOSCOPY 04 / [facility] ENDOSCOPY    Anesthesia Start: 1119 Anesthesia Stop: 1144    Procedure: FLEXIBLE SIGMOIDOSCOPY Diagnosis:       Abnormal CT of the abdomen      Rectal stricture      (inadequete prep)    Surgeons: Mauricio Abad MD Anesthesiologist: Toni Hutchison MD    Anesthesia Type: MAC ASA Status: 3            Anesthesia Type: MAC    Vitals Value Taken Time   /66 11/20/24 1145   Temp  11/20/24 1145   Pulse 85 11/20/24 1145   Resp 18 11/20/24 1145   SpO2 95 11/20/24 1145       Patient Location: Endoscopy    Anesthesia Type: MAC    Postop Pain Control: adequate    Mental Status: mildly sedated but able to meaningfully participate in the post-anesthesia evaluation    Nausea/Vomiting: none    Cardiopulmonary/Hydration status: stable euvolemic    Complications: no apparent anesthesia related complications    Postop vital signs: stable    Dental Exam: Unchanged from Preop    Patient to be discharged from PACU when criteria met.

## 2024-11-20 NOTE — OPERATIVE REPORT
Flexible Sigmoidoscopy Operative Report  Patient Name: Clari Rosa  YOB: 1970  MRN: [id number]  Procedure: Flexible Sigmoidoscopy   Pre-operative Diagnosis & Indication:   Dilated colon   Abnormal GI tract on imaging  Post-operative Diagnosis:   Dilated colon s/p lavage and suction  Colon filled with solid stool  Attending Endoscopist: Mauricio Abad M.D.  Informed Consent: Informed Consent:   The planned procedure(s), the explanation of the procedure, its expected benefits, the potential complications and risks, and possible alternatives (including their benefits and risks) were discussed with the patient and/or patient's POA in full. The discussion of risks, not limited to but including bleeding, infection, perforation or tear in the gastrointestinal tract,  adverse effects from anesthesia, and possible prolonged hospitalization, emergency surgery, risk of morbidity or mortality, and risk of missed lesion(s) were discussed with patient and/or patient's POA. Patient and/or patient's POA understands the missed rate of flexible sigmoidoscopy of polyps, lesions of 5-10% even in the best of circumstances and that although this is an accurate test, there are limitations to this procedure, and that is not an a complete evaluation of the colon, by nature of this examination/evaluation. Patient and/or patient's POA understood the proposed procedure(s), and elected to proceed with the procedure(s) with possible intervention (such as polypectomy, biopsy, control of bleeding, etc.) and its risks, benefits and alternatives and wish to proceed with procedure(s). All questions answered in full to patient's satisfaction. Ample time was given to patient to ask all questions in full.   Physical Exam: Heart: regular rate and rhythm. No rubs, murmurs, or gallops. Lungs: Clear to auscultation bilaterally. Abdomen: Soft, non-tender, non distended. Positive bowel sounds. No rebound tenderness, no guarding.   A TIME OUT WAS  COMPLETED prior to the procedure to confirm the patient, procedure and complete endoscopy safety procedure.   Sedation: Monitored Anesthesia Care; ASA class per anesthesiology team   Monitoring: Pulsoximetry, pulse, respirations, and blood pressure, vitals were monitored throughout the entire procedure under monitored anesthesia care.   Preparation Quality:  [place] Bowel Prep Score: Transverse 0 / Left 0   Procedure: After achieving adequate sedation, and placing the patient in the left lateral decubitus position, a digital rectal examination was performed.  The lubricated tip of the lower endoscope was then introduced into the rectum and advanced to the distal colon, approximately 70cm from the anal verge, although solid stool limited evaluation.   During the procedure, CO2 was used for insufflation of the colon to achieve visualization. The endoscope was then carefully withdrawn from the patient with careful visualization of the colonic mucosa to the best of my ability, with bowel preparation quality as noted above.  CO2 was suctioned to the best of my ability, during withdrawal of the endoscope.  When the endoscope reached the rectum, it was placed in a retroflexed position, and the rectal bulb was thus visualized.  The endoscope was righted, and CO2 was suctioned from the colon to the best of my ability, as it was during withdrawn from the colon.  The endoscope was then removed from the patient.  The patient tolerated the procedure without apparent procedural complications.  The patient left the procedure room in stable condition for recovery.  Toleration: Patient tolerated procedure well   Complications: No immediate complications   Technical Difficulty:  The procedure was not technically difficult   Estimated Blood Loss: Minimal, less than 5mL of estimated blood loss.   Findings and Therapeutics:  Colon:   Colon filled with solid stool. Able to irrigate and lavage colon with water. Dilated portion of the colon  visualization, with air suctioned as much as possible. No obvious gangrene visualized although, severely limited visualization due to solid stool.   No obstructive mass, although unable to rule out stricture due solid stool.   Recommendations:    Post Flexible Sigmoidoscopy /polypectomy precautions, watch for bleeding, infection, perforation, adverse drug reactions, abdominal pain.   CBC, CMP in am  Keep NPO, NGT to LIWS  Lower double contrast gastrografin imaging to evaluate for narrowing on rectosigmoid junction as noted on previous CT  General surgery on consult as well  Abdominal XRAY in the am    GI will follow, please page with questions     Mauricio Abad MD  11/20/2024  11:46 AM

## 2024-11-20 NOTE — ANESTHESIA PREPROCEDURE EVALUATION
PRE-OP EVALUATION    Patient Name: Clari Rosa    Admit Diagnosis: Abdominal pain, acute [R10.9]  Constipation, unspecified constipation type [K59.00]    Pre-op Diagnosis: Abnormal CT of the abdomen [R93.5]  Rectal stricture [K62.4]    FLEXIBLE SIGMOIDOSCOPY    Anesthesia Procedure: FLEXIBLE SIGMOIDOSCOPY    Surgeons and Role:     * Mauricio Abad MD - Primary    Pre-op vitals reviewed.  Temp: 97.8 °F (36.6 °C)  Pulse: 72  Resp: 12  BP: 94/63  SpO2: 100 %  Body mass index is 37.11 kg/m².    Current medications reviewed.  Hospital Medications:   albuterol (Ventolin HFA) 108 (90 Base) MCG/ACT inhaler 2 puff  2 puff Inhalation Q6H PRN    [Held by provider] apixaban (Eliquis) tab 5 mg  5 mg Oral BID    clonazePAM (KlonoPIN) tab 1.5 mg  1.5 mg Oral TID    divalproex ER (Depakote ER) 24 hr tab 500 mg  500 mg Oral BID    DULoxetine (Cymbalta) DR cap 60 mg  60 mg Oral Daily    oxybutynin (Ditropan) tab 5 mg  5 mg Oral TID    QUEtiapine (SEROquel) tab 200 mg  200 mg Oral Nightly    traZODone (Desyrel) tab 50 mg  50 mg Oral Nightly PRN    glucose (Dex4) 15 GM/59ML oral liquid 15 g  15 g Oral Q15 Min PRN    Or    glucose (Glutose) 40% oral gel 15 g  15 g Oral Q15 Min PRN    Or    glucose-vitamin C (Dex-4) chewable tab 4 tablet  4 tablet Oral Q15 Min PRN    Or    dextrose 50% injection 50 mL  50 mL Intravenous Q15 Min PRN    Or    glucose (Dex4) 15 GM/59ML oral liquid 30 g  30 g Oral Q15 Min PRN    Or    glucose (Glutose) 40% oral gel 30 g  30 g Oral Q15 Min PRN    Or    glucose-vitamin C (Dex-4) chewable tab 8 tablet  8 tablet Oral Q15 Min PRN    melatonin tab 3 mg  3 mg Oral Nightly PRN    sodium chloride 0.9% infusion   Intravenous Continuous    acetaminophen (Tylenol Extra Strength) tab 500 mg  500 mg Oral Q4H PRN    morphINE PF 2 MG/ML injection 1 mg  1 mg Intravenous Q2H PRN    Or    morphINE PF 2 MG/ML injection 2 mg  2 mg Intravenous Q2H PRN    Or    morphINE PF 4 MG/ML injection 4 mg  4 mg Intravenous Q2H PRN     ondansetron (Zofran) 4 MG/2ML injection 4 mg  4 mg Intravenous Q6H PRN    prochlorperazine (Compazine) 10 MG/2ML injection 5 mg  5 mg Intravenous Q8H PRN    insulin degludec (Tresiba) 100 units/mL flextouch 5 Units  5 Units Subcutaneous Nightly    insulin aspart (NovoLOG) 100 Units/mL FlexPen 1-10 Units  1-10 Units Subcutaneous TID AC and HS    heparin (Porcine) 100 Units/mL lock flush 500 Units  5 mL Intravenous PRN    metoprolol tartrate (Lopressor) tab 25 mg  25 mg Oral 2x Daily(Beta Blocker)    nystatin-triamcinolone (Mycolog II) 100,000-0.1 Units/g-% cream   Topical BID    [Held by provider] enoxaparin (Lovenox) 100 MG/ML SUBQ injection 90 mg  1 mg/kg Subcutaneous 2 times per day    [COMPLETED] LORazepam (Ativan) 2 mg/mL injection 1 mg  1 mg Intravenous Once    [COMPLETED] ketorolac (Toradol) 15 MG/ML injection 15 mg  15 mg Intravenous Once    morphINE PF 4 MG/ML injection 4 mg  4 mg Intravenous Q30 Min PRN    [COMPLETED] ondansetron (Zofran) 4 MG/2ML injection 4 mg  4 mg Intravenous Once    [COMPLETED] sodium chloride 0.9 % IV bolus 500 mL  500 mL Intravenous Once       Outpatient Medications:   Prescriptions Prior to Admission[1]    Allergies: Pcn [penicillins], Sulfa antibiotics, Radiology contrast iodinated dyes, and Keflex [cephalexin]      Anesthesia Evaluation        Anesthetic Complications           GI/Hepatic/Renal             (+) chronic renal disease and CRI                   Cardiovascular      ECG reviewed.            (+) hypertension                                     Endo/Other      (+) diabetes and well controlled, type 2, using insulin                         Pulmonary        (+) COPD and mild  COPD not requiring home oxygen.         (+) sleep apnea and CPAP and noncompliant      Neuro/Psych      (+) depression  (+) anxiety                      Bipolar disorder.    12/2023 ECHO findings:  Conclusions:     1. Left ventricle: The cavity size was normal. Wall thickness was normal.      Systolic  function was normal. The estimated ejection fraction was 65-70%,      by visual assessment. Wall motion is normal; there are no regional wall      motion abnormalities. Doppler parameters are consistent with abnormal      left ventricular relaxation - grade 1 diastolic dysfunction.   2. Right ventricle: The cavity size was normal. Systolic function was      normal.   3. Pulmonary arteries: The peak systolic pressure is 22mm Hg.   4. Pericardium, extracardiac: A trivial pericardial effusion was identified.           Past Surgical History:   Procedure Laterality Date    Arthroscopy of joint unlisted      knee          Cholecystectomy      Part removal colon w end colostomy      2013    Tonsillectomy       Social History     Socioeconomic History    Marital status: Single   Tobacco Use    Smoking status: Former     Current packs/day: 1.00     Average packs/day: 1 pack/day for 5.0 years (5.0 ttl pk-yrs)     Types: Cigarettes    Tobacco comments:     quit    Vaping Use    Vaping status: Never Used   Substance and Sexual Activity    Alcohol use: No    Drug use: No     History   Drug Use No     Available pre-op labs reviewed.  Lab Results   Component Value Date    WBC 8.1 2024    RBC 3.89 2024    HGB 9.5 (L) 2024    HCT 29.8 (L) 2024    MCV 76.6 (L) 2024    MCH 24.4 (L) 2024    MCHC 31.9 2024    RDW 16.4 2024    .0 2024     Lab Results   Component Value Date     2024    K 3.5 2024     2024    CO2 30.0 2024    BUN 6 (L) 2024    CREATSERUM 0.57 2024     (H) 2024    CA 8.1 (L) 2024            Airway      Mallampati: IV  Mouth opening: >3 FB  TM distance: > 6 cm  Neck ROM: full Cardiovascular    Cardiovascular exam normal.         Dental    Dentition appears grossly intact         Pulmonary    Pulmonary exam normal.                 Other findings  Only  one right upper tooth present;  missing all other tooth.            ASA: 3   Plan: MAC  NPO status verified and patient meets guidelines.          Plan/risks discussed with: patient                Present on Admission:   Constipation, unspecified constipation type   Abdominal pain, acute   Transverse myelitis (HCC)   Type 2 diabetes mellitus without complication, with long-term current use of insulin (HCC)   Generalized anxiety disorder with panic attacks   Severe episode of recurrent major depressive disorder, without psychotic features (HCC)   Benign essential hypertension             [1]   Medications Prior to Admission   Medication Sig Dispense Refill Last Dose/Taking    HYDROcodone-acetaminophen  MG Oral Tab Take 1 tablet by mouth every 6 (six) hours as needed for Pain. 20 tablet 0 Past Week    clonazePAM 0.5 MG Oral Tab Take 3 tablets (1.5 mg total) by mouth 3 (three) times daily as needed for Anxiety. (Patient taking differently: Take 3 tablets (1.5 mg total) by mouth in the morning, at noon, and at bedtime.) 15 tablet 0 11/19/2024 Morning    nystatin 100,000 Units/g External Cream Apply 1 Application topically in the morning, at noon, and at bedtime.   11/19/2024 Morning    DULoxetine 60 MG Oral Cap DR Particles Take 1 capsule (60 mg total) by mouth daily.   11/19/2024 Morning    apixaban 5 MG Oral Tab Take 1 tablet (5 mg total) by mouth 2 (two) times daily.   11/19/2024 Morning    QUEtiapine 100 MG Oral Tab Take 2 tablets (200 mg total) by mouth nightly.   11/19/2024 Morning    insulin glargine 100 UNIT/ML Subcutaneous Solution Inject 6 Units into the skin nightly.   11/19/2024 Morning    insulin aspart 100 Units/mL Subcutaneous Solution Pen-injector Inject 2-8 Units into the skin 3 (three) times daily with meals.   11/19/2024 Morning    metoprolol tartrate 25 MG Oral Tab Take 1 tablet (25 mg total) by mouth 2 (two) times daily.   11/19/2024 Morning    traZODone 50 MG Oral Tab Take 1 tablet (50 mg total) by mouth nightly as needed  for Sleep.   2024 Morning    Melatonin 3 MG Oral Cap Take 1 capsule (3 mg total) by mouth at bedtime.   2024 Morning    albuterol 108 (90 Base) MCG/ACT Inhalation Aero Soln Inhale 2 puffs into the lungs every 6 (six) hours as needed for Wheezing or Shortness of Breath.   Past Month    Multiple Vitamins-Minerals (THERA-M) Oral Tab Take 1 tablet by mouth daily.   2024 Morning    divalproex Sodium  MG Oral Tablet 24 Hr Take 1 tablet (500 mg total) by mouth 2 (two) times daily.   2024 Morning    hydrocortisone 1 % External Cream Apply 1 Application topically 2 (two) times daily.   Unknown    [] sodium chloride 0.9% SOLN 100 mL with ertapenem 1 g SOLR 1 g Inject 1 g into the vein daily.       oxybutynin 5 MG Oral Tab Take 1 tablet (5 mg total) by mouth 3 (three) times daily.   Unknown    [] fluconazole 200 MG Oral Tab Take 1 tablet (200 mg total) by mouth daily for 7 days. 7 tablet 0     loperamide 2 MG Oral Cap Take 1 capsule (2 mg total) by mouth 4 (four) times daily as needed for Diarrhea.   Unknown    meclizine 25 MG Oral Tab Take 1 tablet (25 mg total) by mouth 3 (three) times daily as needed.   Unknown    [] magnesium oxide 400 MG Oral Tab Take 1 tablet (400 mg total) by mouth daily. 30 tablet 0     phenazopyridine 100 MG Oral Tab Take 1 tablet (100 mg total) by mouth 3 (three) times daily as needed for Pain. 5 tablet 0 Unknown    Insulin Pen Needle 32G X 4 MM Does not apply Misc May substitute if not on insurance formulary (Patient not taking: Reported on 10/28/2024) 100 each 5 Unknown    Nystatin 279803 UNIT/GM External Powder Apply 1 Application topically as needed for Dry Skin. Under breast and groin area   Unknown    acetaminophen 325 MG Oral Tab Take 2 tablets (650 mg total) by mouth every 6 (six) hours as needed for Pain.   Unknown    Icosapent Ethyl 1 g Oral Cap Take 2 capsules by mouth 4 (four) times daily.   Unknown    ergocalciferol 1.25 MG (66360 UT)  Oral Cap Take 1 capsule (50,000 Units total) by mouth every 7 days. Every monday   Unknown    bethanechol 10 MG Oral Tab Take 1 tablet (10 mg total) by mouth daily.   Unknown    ondansetron 4 MG Oral Tablet Dispersible Take 1 tablet (4 mg total) by mouth every 8 (eight) hours as needed for Nausea.   Unknown    metFORMIN  MG Oral Tablet 24 Hr Take 2 tablets (1,000 mg total) by mouth 2 (two) times daily with meals. 30 tablet 0 Unknown

## 2024-11-20 NOTE — PLAN OF CARE
Patient A&Ox4, VSS on RA. SpO2 98% on RA. Requests need for O2. NC placed for comfort. Reports severe pain to abdomen; IV pain medications given. Nystatin cream applied to abdominal folds and under breasts. Skin to sacrum intact. NG to left nare intact to LIS. Patient to continue NPO status. Abdominal xray ordered for tomorrow. Plan of care reviewed with patient.

## 2024-11-20 NOTE — PROGRESS NOTES
[facility]   part of [facility]     Hospitalist Progress Note     Clari Rosa Patient Status:  Observation    1970 MRN [id number]   Location [facility] 3SW-A Attending Bhanu Mckinney MD   Hosp Day # 1 PCP Macrina Galvin MD     Chief Complaint: abd pain    Subjective:     Patient still w/ pain and distended abd. Anxious and c/o having a manic episode. Denies SI/HI.     Objective:    Review of Systems:   A comprehensive review of systems was completed; pertinent positive and negatives stated in subjective.    Vital signs:  Temp:  [97.5 °F (36.4 °C)-98.6 °F (37 °C)] 97.8 °F (36.6 °C)  Pulse:  [] 68  Resp:  [12-18] 16  BP: ()/(62-81) 106/62  SpO2:  [90 %-100 %] 95 %    Physical Exam:    General: No acute distress  Respiratory: No wheezes, no rhonchi  Cardiovascular: S1, S2, regular rate and rhythm  Abdomen: Soft, tender, distended, positive bowel sounds  Neuro: No new focal deficits.   Extremities: No edema      Diagnostic Data:    Labs:  Recent Labs   Lab 245 24  0539   WBC 18.9* 15.3* 8.1   HGB 11.9* 11.6* 9.5*   MCV 73.6* 72.7* 76.6*   .0 331.0 216.0       Recent Labs   Lab 245 24  0539   * 160* 117*   BUN 8* 11 6*   CREATSERUM 0.67 0.83 0.57   CA 9.1 9.3 8.1*   ALB 3.9 4.1  --    * 135* 137   K 3.5 4.0 3.5    97* 106   CO2 26.0 29.0 30.0   ALKPHO 72 73  --    AST 11 18  --    ALT 8* 8*  --    BILT 0.3 0.3  --    TP 7.0 6.9  --        Estimated Creatinine Clearance: 81 mL/min (based on SCr of 0.57 mg/dL).    No results for input(s): \"TROP\", \"TROPHS\", \"CK\" in the last 168 hours.    No results for input(s): \"PTP\", \"INR\" in the last 168 hours.               Microbiology    No results found for this visit on 24.      Imaging: Reviewed in Epic.    Medications:    [Held by provider] apixaban  5 mg Oral BID    clonazePAM  1.5 mg Oral TID    divalproex ER  500 mg Oral BID    DULoxetine  60 mg  Oral Daily    oxybutynin  5 mg Oral TID    QUEtiapine  200 mg Oral Nightly    insulin degludec  5 Units Subcutaneous Nightly    insulin aspart  1-10 Units Subcutaneous TID AC and HS    metoprolol tartrate  25 mg Oral 2x Daily(Beta Blocker)    nystatin-triamcinolone   Topical BID    enoxaparin  1 mg/kg Subcutaneous 2 times per day       Assessment & Plan:      #Abdominal pain  #bowel obstruction  #rectal stricture  - CT shows dilated colon with narrowing at rectosigmoid junction  - NPO  - GI consult  - PRN pain med  -Ngt to IS  -sp flex sig for decompression. D/w GI. Still distended  -gen surg consult. May need to consider surgical decompression if does not improve. Wait to see if starts to have BM's  -KUB tomorrow  -hold eliquis. cont FD lovenox      # Type 2 diabetes  - Will plae on hyperglycemia protocol with long actin and correction factor insulin.     # Anxiety  - Prn IV benozo's     # Bipolar w/ carmel  - Will continue on depakote and seroquel  -seems to be having a manic episode. Pt requesting to go to [facility] after  -consult psych liaison      # Hypertension  - Will continue on metoprolol     # Neurogenic bladder  - Patient has chronic mark.     # Hx DVT  - Will continue on eliquis.     # ANAID  - Will place on ANAID  protocol.      Bhanu Mckinney MD    Supplementary Documentation:     Quality:  DVT Mechanical Prophylaxis:        DVT Pharmacologic Prophylaxis   Medication    [Held by provider] apixaban (Eliquis) tab 5 mg    heparin (Porcine) 100 Units/mL lock flush 500 Units    enoxaparin (Lovenox) 100 MG/ML SUBQ injection 90 mg                Code Status: Full Code  Mark: Suprapubic catheter in place  Mark Duration (in days):   Central line:    MARINA:     Discharge is dependent on: course  At this point Ms. Rosa is expected to be discharge to: home    The 21st Century Cures Act makes medical notes like these available to patients in the interest of transparency. Please be advised this is a medical document. Medical  documents are intended to carry relevant information, facts as evident, and the clinical opinion of the practitioner. The medical note is intended as peer to peer communication and may appear blunt or direct. It is written in medical language and may contain abbreviations or verbiage that are unfamiliar.

## 2024-11-20 NOTE — CONSULTS
[facility]  Report of Consultation    Clari Rosa Patient Status:  Inpatient    1970 MRN [id number]   Location [facility] 3SW-A Attending Bhanu Mckinney MD   Hosp Day # 1 PCP Macrina Galvin MD     2024    Reason for Consultation:    Surgical Consultation     CC:   Chief Complaint   Patient presents with    Abdomen/Flank Pain        History of Present Illness:    Clari Rosa is a a(n) 54 year old female. Patient complains of abdominal pain and distension for 1 week.  No nausea or emesis.   Patient reports her pain was intermittent, sharp pain,diffuse though greatest on the left.   Right hemicolectomy approx 10 years ago for perforation (per patient)  CT scan obtained revealing liquid stool throughout colon, dilated colon, focal narrowing at rectosigmoid junction.   Patient reports she has had loose bm last night  NG tube inserted though no improvement of pain  She does not feel her abdominal pain has improved since admission   Patient takes eliquis for h/o PE/DVT  She is scheduled for flex sig today.      Past Medical History:    Past Medical History:    Bipolar 1 disorder (HCC)    Dental caries    Diabetes (HCC)    MIKE (generalized anxiety disorder)    High blood pressure    History of diverticulitis of colon    Manipulative behavior    Neurogenic bladder    Obesity (BMI 30-39.9)    Paraplegia (HCC)    Sepsis following intra-abdominal surgery (HCC)    Serotonin syndrome    Sleep apnea    Suprapubic catheter (HCC)    Transverse myelitis (HCC)       Past Surgical History:    Past Surgical History:   Procedure Laterality Date    Arthroscopy of joint unlisted      knee          Cholecystectomy      Part removal colon w end colostomy      2013    Tonsillectomy         Family History:    Family History   Adopted: Yes       Social History:     reports that she has quit smoking. Her smoking use included cigarettes. She has a 5 pack-year smoking history. She does not have any smokeless  tobacco history on file. She reports that she does not drink alcohol and does not use drugs.    Allergies:    Allergies[1]    Current Medications:      Current Facility-Administered Medications:     albuterol (Ventolin HFA) 108 (90 Base) MCG/ACT inhaler 2 puff, 2 puff, Inhalation, Q6H PRN    [Held by provider] apixaban (Eliquis) tab 5 mg, 5 mg, Oral, BID    clonazePAM (KlonoPIN) tab 1.5 mg, 1.5 mg, Oral, TID    divalproex ER (Depakote ER) 24 hr tab 500 mg, 500 mg, Oral, BID    DULoxetine (Cymbalta) DR cap 60 mg, 60 mg, Oral, Daily    oxybutynin (Ditropan) tab 5 mg, 5 mg, Oral, TID    QUEtiapine (SEROquel) tab 200 mg, 200 mg, Oral, Nightly    traZODone (Desyrel) tab 50 mg, 50 mg, Oral, Nightly PRN    glucose (Dex4) 15 GM/59ML oral liquid 15 g, 15 g, Oral, Q15 Min PRN **OR** glucose (Glutose) 40% oral gel 15 g, 15 g, Oral, Q15 Min PRN **OR** glucose-vitamin C (Dex-4) chewable tab 4 tablet, 4 tablet, Oral, Q15 Min PRN **OR** dextrose 50% injection 50 mL, 50 mL, Intravenous, Q15 Min PRN **OR** glucose (Dex4) 15 GM/59ML oral liquid 30 g, 30 g, Oral, Q15 Min PRN **OR** glucose (Glutose) 40% oral gel 30 g, 30 g, Oral, Q15 Min PRN **OR** glucose-vitamin C (Dex-4) chewable tab 8 tablet, 8 tablet, Oral, Q15 Min PRN    melatonin tab 3 mg, 3 mg, Oral, Nightly PRN    sodium chloride 0.9% infusion, , Intravenous, Continuous    acetaminophen (Tylenol Extra Strength) tab 500 mg, 500 mg, Oral, Q4H PRN    morphINE PF 2 MG/ML injection 1 mg, 1 mg, Intravenous, Q2H PRN **OR** morphINE PF 2 MG/ML injection 2 mg, 2 mg, Intravenous, Q2H PRN **OR** morphINE PF 4 MG/ML injection 4 mg, 4 mg, Intravenous, Q2H PRN    ondansetron (Zofran) 4 MG/2ML injection 4 mg, 4 mg, Intravenous, Q6H PRN    prochlorperazine (Compazine) 10 MG/2ML injection 5 mg, 5 mg, Intravenous, Q8H PRN    insulin degludec (Tresiba) 100 units/mL flextouch 5 Units, 5 Units, Subcutaneous, Nightly    insulin aspart (NovoLOG) 100 Units/mL FlexPen 1-10 Units, 1-10 Units,  Subcutaneous, TID AC and HS    heparin (Porcine) 100 Units/mL lock flush 500 Units, 5 mL, Intravenous, PRN    metoprolol tartrate (Lopressor) tab 25 mg, 25 mg, Oral, 2x Daily(Beta Blocker)    nystatin-triamcinolone (Mycolog II) 100,000-0.1 Units/g-% cream, , Topical, BID    [Held by provider] enoxaparin (Lovenox) 100 MG/ML SUBQ injection 90 mg, 1 mg/kg, Subcutaneous, 2 times per day    morphINE PF 4 MG/ML injection 4 mg, 4 mg, Intravenous, Q30 Min PRN    Home Medications:    Medications Ordered Prior to Encounter[2]    Review of Systems:    10 point review performed pertinent positives and negatives per history of present illness.    Physical Exam:    Blood pressure 101/66, pulse 79, temperature 98.5 °F (36.9 °C), temperature source Oral, resp. rate 18, height 5' (1.524 m), weight 190 lb (86.2 kg), SpO2 97%.    GENERAL: Alert and oriented x 3, well developed, well nourished female, in no apparent distress    SKIN: anicteric    RESPIRATORY: non labored breathing    CARDIOVASCULAR: RRR    ABDOMEN: distended, diffuse mild tenderness    LYMPHATIC: no lymphadenopathy    EXTREMITIES: no cyanosis, clubbing or edema    .    Laboratory Data:  Recent Labs   Lab 11/17/24 2318 11/18/24 2355 11/20/24  0539   WBC 18.9* 15.3* 8.1   HGB 11.9* 11.6* 9.5*   MCV 73.6* 72.7* 76.6*   .0 331.0 216.0       Recent Labs   Lab 11/17/24 2318 11/18/24 2355 11/20/24  0539   * 135* 137   K 3.5 4.0 3.5    97* 106   CO2 26.0 29.0 30.0   BUN 8* 11 6*   CREATSERUM 0.67 0.83 0.57   * 160* 117*   CA 9.1 9.3 8.1*       Recent Labs   Lab 11/17/24 2318 11/18/24  2355   ALT 8* 8*   AST 11 18   ALB 3.9 4.1       No results for input(s): \"TROP\" in the last 168 hours.          Radiology:    XR CHEST AP/PA (1 VIEW) (CPT=71045)    Result Date: 11/19/2024  PROCEDURE:  XR CHEST AP/PA (1 VIEW) (CPT=71045)  TECHNIQUE:  AP chest radiograph was obtained.  COMPARISON:  None.  INDICATIONS:  NG tube placement  PATIENT STATED HISTORY: (As  transcribed by Technologist)  NG tube placement.    FINDINGS:  There is an enteric tube within the expected region of the stomach.  Other support devices appear stable.  Low lung volumes somewhat limit assessment.  No pleural effusion or pneumothorax is seen.            CONCLUSION:  See above.   LOCATION:  [id number]      Dictated by (CST): Lyle Perez MD on 11/19/2024 at 7:16 PM     Finalized by (CST): Lyle Perez MD on 11/19/2024 at 7:17 PM       CT ABDOMEN+PELVIS(CPT=74176)    Result Date: 11/18/2024  PROCEDURE:  CT ABDOMEN+PELVIS (CPT=74176)  COMPARISON:  [facility] , CT, CT ABDOMEN+PELVIS KIDNEYSTONE 2D RNDR(NO IV,NO ORAL)(CPT=74176), 10/22/2024, 10:36 AM.  INDICATIONS:  abdominal pain  TECHNIQUE:  Unenhanced multislice CT scanning was performed from the dome of the diaphragm to the pubic symphysis.  Dose reduction techniques were used. Dose information is transmitted to the ACR (American College of Radiology) NRDR (National Radiology Data Registry) which includes the Dose Index Registry.  PATIENT STATED HISTORY: (As transcribed by Technologist)  Patient presents with generalized abdominal pain.    FINDINGS:  This scan performed without IV or oral contrast, with limitations thereof.  Preliminary reading provided by radiologist from [facility].   LIVER:  Unremarkable.  BILIARY:  Cholecystectomy.  PANCREAS:  Unremarkable.  SPLEEN:  Unremarkable.  KIDNEYS:  Atrophy left kidney.  Scarring bilateral kidney.  No hydronephrosis.  ADRENALS:  Unremarkable.  AORTA/VASCULAR:  No aortic aneurysm.  RETROPERITONEUM:  Unremarkable.  BOWEL/MESENTERY:  Partial right hemicolectomy redemonstrated.  Large amount of primarily liquid stool throughout the colon, dilated up to 9.5 cm, with a focal area of relative narrowing at the rectosigmoid junction for example image 87 measuring about 3 cm in length.  Although this could reflect a focal peristalsis at the time of the scan it appears distinct from the rest of the colon which is  otherwise markedly dilated, and this could reflect a stricture.  Mild dilation small bowel up to 2.8 cm a related to the colonic dilation.  No large or drainable ascites but there is a small amount of free fluid in all 4 quadrants.  No free air.  ABDOMINAL WALL:  Unremarkable.  URINARY BLADDER:  Unremarkable. Decompressed with a suprapubic catheter.  Some air and trace amount of urine in the bladder.  Bladder shows wall thickening. LYMPH NODES PELVIS:  Unremarkable.  PELVIC ORGANS:  No acute process.  LUNG BASES:  No acute process.  BONES:  No acute abnormality.              CONCLUSION:  Large amount of primarily liquid stool throughout the colon, but a focus of notable narrowing of the rectosigmoid junction is seen, uncertain if this reflects a lesion, or peristaltic contraction at the time of the scan; advise colonoscopy when the patient is stable for further assessment, to exclude stricture in this area including inflammatory or malignant stricture.  The small bowel is also dilated, most likely secondary to the colonic dilation.  No free air.  No large or drainable ascites, with a trace amount of fluid all 4 quadrants.  Other nonacute findings as above.   LOCATION:  [facility]   Dictated by (CST): Parish Brand MD on 11/18/2024 at 6:28 AM     Finalized by (CST): Parish Brand MD on 11/18/2024 at 6:33 AM       CT ABDOMEN+PELVIS KIDNEYSTONE 2D RNDR(NO IV,NO ORAL)(CPT=74176)    Result Date: 10/22/2024  PROCEDURE:  CT ABDOMEN+PELVIS KIDNEYSTONE 2D RNDR(NO IV,NO ORAL)(CPT=74176)  COMPARISON:  [facility] , CT, CT ABDOMEN+PELVIS KIDNEYSTONE 2D RNDR(NO IV,NO ORAL)(CPT=74176), 10/16/2024, 10:15 PM.  INDICATIONS:  suprapubic pain  TECHNIQUE:  Unenhanced multislice CT scanning from above the kidneys to below the urinary bladder.  2D rendering are generated on the CT scanner workstation to localize potential stones in the cranio-caudal plane.  Dose reduction techniques were used. Dose information is transmitted to the ACR  (American College of Radiology) NRDR (National Radiology Data Registry) which includes the Dose Index Registry.  PATIENT STATED HISTORY: (As transcribed by Technologist)  Patient presents with abdominal pain all over with UTI symptoms.  Florentino in place. Hx Multiple UTIs.   FINDINGS: Evaluation of the visceral organs is limited due to the lack of IV contrast. LUNG BASE:  Scattered atelectasis/scarring. LIVER:  Unremarkable. BILIARY:  Status post cholecystectomy. SPLEEN:  Unremarkable. PANCREAS:  Unremarkable. ADRENALS:  Unremarkable. KIDNEYS:  There is relatively stable moderate to severe left hydroureteronephrosis without obstructing stone identified.  There is mild left perinephric stranding.  While the findings are concerning for pyelonephritis, a distal obstructing mass or stricture not excluded.  Clinical correlation recommended.  There is mild nonspecific right perinephric stranding.  No urinary calculi identified.  Asymmetric left renal atrophy noted. AORTA/VASCULAR:  Unremarkable. RETROPERITONEUM:  Unremarkable. BOWEL/MESENTERY:  There are postoperative changes from previous right hemicolectomy noted.  Scattered feces in the colon.  Uncomplicated colonic diverticulosis.  No large or small bowel dilatation.  No free air or free fluid. ABDOMINAL WALL:  Suprapubic catheter noted.  Scarring from previous surgery.  Diastasis of the rectus sheath. PELVIC ORGANS:  Suprapubic catheter noted.  Moderate urinary bladder wall thickening with fatty induration is concerning for cystitis.  Clinical correlation recommended.  Unremarkable uterus and ovaries. LYMPH NODES:  No lymphadenopathy in the abdomen or pelvis. BONES:  Degenerative changes in the spine and hips. OTHER:  None.            CONCLUSION:   1. There is relatively stable moderate to severe left hydroureteronephrosis without obstructing stone identified.  There is mild left perinephric stranding.  While the findings are concerning for pyelonephritis, a distal  obstructing mass or stricture not  excluded.  Clinical correlation recommended.  2. Suprapubic catheter noted.  Moderate urinary bladder wall thickening with fatty induration is concerning for cystitis.  Clinical correlation recommended.   3. Uncomplicated colonic diverticulosis.   Please see above for further details.  LOCATION:  [facility]   Dictated by (CST): Allan Spear MD on 10/22/2024 at 10:50 AM     Finalized by (CST): Allan Spear MD on 10/22/2024 at 10:54 AM          Problem List:    Patient Active Problem List   Diagnosis    Paresis of lower extremity (HCC)    Transverse myelitis (HCC)    Bipolar 1 disorder ([facility])    Manipulative behavior    History of diverticulitis of colon    Serotonin syndrome    Obesity (BMI 30-39.9)    Dental caries    Type 2 diabetes mellitus without complication, with long-term current use of insulin (HCC)    Chronic suprapubic catheter (HCC)    Bipolar I disorder depressed with atypical features ([facility])    Other acute pulmonary embolism, unspecified whether acute cor pulmonale present ([facility])    Hyperglycemia    Sepsis due to undetermined organism (HCC)    Severe episode of recurrent major depressive disorder, without psychotic features ([facility])    Generalized anxiety disorder with panic attacks    Sepsis due to urinary tract infection (HCC)    Lactic acid acidosis    Constipation, unspecified constipation type    Moderate depressed bipolar I disorder (HCC)    Acute chest pain    Depression, unspecified depression type    Cellulitis    Bipolar disorder, current episode mixed, severe, without psychotic features (HCC)    Chest pain    Acute cystitis without hematuria    Leukocytosis    Leukocytosis, unspecified type    Benign essential hypertension    COPD (chronic obstructive pulmonary disease) (HCC)    Neurogenic bladder    Urinary tract infection associated with catheterization of urinary tract, unspecified indwelling urinary catheter type, initial encounter ([facility])    Weakness  generalized    Anemia    Cystitis    UTI (urinary tract infection)    Chronic abdominal pain    Abdominal pain, acute       Impression:    55 y/o with colonic narrowing, abdominal distension and pain   CT scan as noted above  Afebrile, no leukocytosis  Soft, distended, diffuse tenderness without peritonitis     Plan:  Flex sig per GI  NPO  NG to LIS  Ambulation  Hold eliquis  Further orders pending flex sig  All questions answered  Will discuss with LISA Faye  [facility]  2024         [1]   Allergies  Allergen Reactions    Pcn [Penicillins] HIVES    Sulfa Antibiotics HIVES    Radiology Contrast Iodinated Dyes DIZZINESS    Keflex [Cephalexin] UNKNOWN   [2]   Current Facility-Administered Medications on File Prior to Encounter   Medication Dose Route Frequency Provider Last Rate Last Admin    [COMPLETED] ketorolac (Toradol) 15 MG/ML injection 15 mg  15 mg Intravenous Once Nj Ellison MD   15 mg at 24 0036    [COMPLETED] sodium chloride 0.9 % IV bolus 500 mL  500 mL Intravenous Once Nj Ellison MD   Stopped at 24 0105    [COMPLETED] morphINE PF 4 MG/ML injection 4 mg  4 mg Intravenous Q30 Min PRN Karsten Beatty MD   4 mg at 10/22/24 1105    [] sodium chloride 0.9% infusion   Intravenous Continuous Karsten Beatty MD        [] HYDROmorphone (Dilaudid) 1 MG/ML injection 0.5 mg  0.5 mg Intravenous Q30 Min PRN Karsten Beatty MD        [COMPLETED] alum-mag hydroxide-simethicone (Maalox) 200-200-20 MG/5ML oral suspension 30 mL  30 mL Oral Once Jay Napoles MD   30 mL at 10/18/24 0900    [COMPLETED] meropenem (Merrem) 500 mg in sodium chloride 0.9% 100 mL IVPB-MBP  500 mg Intravenous Q8H Romulo Ingram MD 33.3 mL/hr at 10/18/24 1156 500 mg at 10/18/24 1156    [] phenazopyridine (Pyridium) tab 100 mg  100 mg Oral TID CC Jay Napoles MD   100 mg at 10/18/24 1705    [COMPLETED] levoFLOXacin in dextrose 5%  (Levaquin) 750 mg/150mL IVPB premix 750 mg  750 mg Intravenous Once Nj Ellison MD   Stopped at 10/17/24 0700    [COMPLETED] meropenem (Merrem) 1 g in sodium chloride 0.9% 100 mL IVPB-MBP  1 g Intravenous Once Nj Ellison MD   Stopped at 10/17/24 0700    [COMPLETED] magnesium oxide (Mag-Ox) tab 400 mg  400 mg Oral Once Stefan Haque MD   400 mg at 09/20/24 1007    [COMPLETED] ertapenem (Invanz) 1 g in sodium chloride 0.9% 100 mL IVPB-MBP  1 g Intravenous Once Terrell Sparrow  mL/hr at 09/20/24 1542 1 g at 09/20/24 1542    [COMPLETED] heparin (Porcine) 100 Units/mL lock flush 500 Units  5 mL Intracatheter Once Stefan Haque MD   500 Units at 09/20/24 1649    [COMPLETED] LORazepam (Ativan) tab 1 mg  1 mg Oral Once Boyd Lucia MD   1 mg at 09/17/24 1913    [COMPLETED] meclizine (Antivert) tab 25 mg  25 mg Oral Once Boyd Lucia MD   25 mg at 09/17/24 2027    [COMPLETED] sodium chloride 0.9 % IV bolus 2,646 mL  30 mL/kg Intravenous Once Boyd Lucia MD   Stopped at 09/17/24 2345    [COMPLETED] cefTRIAXone (Rocephin) 1 g in sodium chloride 0.9% 100 mL IVPB-ADDV  1 g Intravenous Once Boyd Lucia MD   Stopped at 09/17/24 2332    [COMPLETED] ondansetron (Zofran) 4 MG/2ML injection 4 mg  4 mg Intravenous Once Doc Pradhan MD   4 mg at 09/06/24 0629    [COMPLETED] ondansetron (Zofran) 4 MG/2ML injection 4 mg  4 mg Intravenous Once Doc Pradhan MD   4 mg at 09/06/24 0814    [COMPLETED] heparin (Porcine) 100 Units/mL lock flush 500 Units  5 mL Intravenous Once Doc Pradhan MD   500 Units at 09/06/24 0816    [COMPLETED] magnesium oxide (Mag-Ox) tab 400 mg  400 mg Oral Once Tamara Jackson DO   400 mg at 08/24/24 1001    [COMPLETED] heparin (Porcine) 100 Units/mL lock flush 500 Units  5 mL Intracatheter Once Tamara Jackson DO   500 Units at 08/24/24 1431    [COMPLETED] magnesium oxide (Mag-Ox) tab 400 mg  400 mg Oral Once Tamara Jackson DO   400 mg at 08/23/24 0829     [COMPLETED] magnesium oxide (Mag-Ox) tab 400 mg  400 mg Oral Once Tamara Jackson DO   400 mg at 24 0931     Current Outpatient Medications on File Prior to Encounter   Medication Sig Dispense Refill    HYDROcodone-acetaminophen  MG Oral Tab Take 1 tablet by mouth every 6 (six) hours as needed for Pain. 20 tablet 0    clonazePAM 0.5 MG Oral Tab Take 3 tablets (1.5 mg total) by mouth 3 (three) times daily as needed for Anxiety. (Patient taking differently: Take 3 tablets (1.5 mg total) by mouth in the morning, at noon, and at bedtime.) 15 tablet 0    nystatin 100,000 Units/g External Cream Apply 1 Application topically in the morning, at noon, and at bedtime.      DULoxetine 60 MG Oral Cap DR Particles Take 1 capsule (60 mg total) by mouth daily.      apixaban 5 MG Oral Tab Take 1 tablet (5 mg total) by mouth 2 (two) times daily.      QUEtiapine 100 MG Oral Tab Take 2 tablets (200 mg total) by mouth nightly.      insulin glargine 100 UNIT/ML Subcutaneous Solution Inject 6 Units into the skin nightly.      insulin aspart 100 Units/mL Subcutaneous Solution Pen-injector Inject 2-8 Units into the skin 3 (three) times daily with meals.      metoprolol tartrate 25 MG Oral Tab Take 1 tablet (25 mg total) by mouth 2 (two) times daily.      traZODone 50 MG Oral Tab Take 1 tablet (50 mg total) by mouth nightly as needed for Sleep.      Melatonin 3 MG Oral Cap Take 1 capsule (3 mg total) by mouth at bedtime.      albuterol 108 (90 Base) MCG/ACT Inhalation Aero Soln Inhale 2 puffs into the lungs every 6 (six) hours as needed for Wheezing or Shortness of Breath.      Multiple Vitamins-Minerals (THERA-M) Oral Tab Take 1 tablet by mouth daily.      divalproex Sodium  MG Oral Tablet 24 Hr Take 1 tablet (500 mg total) by mouth 2 (two) times daily.      hydrocortisone 1 % External Cream Apply 1 Application topically 2 (two) times daily.      [] sodium chloride 0.9% SOLN 100 mL with ertapenem 1 g SOLR 1 g  Inject 1 g into the vein daily.      oxybutynin 5 MG Oral Tab Take 1 tablet (5 mg total) by mouth 3 (three) times daily.      [] fluconazole 200 MG Oral Tab Take 1 tablet (200 mg total) by mouth daily for 7 days. 7 tablet 0    loperamide 2 MG Oral Cap Take 1 capsule (2 mg total) by mouth 4 (four) times daily as needed for Diarrhea.      meclizine 25 MG Oral Tab Take 1 tablet (25 mg total) by mouth 3 (three) times daily as needed.      [] magnesium oxide 400 MG Oral Tab Take 1 tablet (400 mg total) by mouth daily. 30 tablet 0    phenazopyridine 100 MG Oral Tab Take 1 tablet (100 mg total) by mouth 3 (three) times daily as needed for Pain. 5 tablet 0    Insulin Pen Needle 32G X 4 MM Does not apply Misc May substitute if not on insurance formulary (Patient not taking: Reported on 10/28/2024) 100 each 5    Nystatin 041406 UNIT/GM External Powder Apply 1 Application topically as needed for Dry Skin. Under breast and groin area      acetaminophen 325 MG Oral Tab Take 2 tablets (650 mg total) by mouth every 6 (six) hours as needed for Pain.      Icosapent Ethyl 1 g Oral Cap Take 2 capsules by mouth 4 (four) times daily.      ergocalciferol 1.25 MG (78388 UT) Oral Cap Take 1 capsule (50,000 Units total) by mouth every 7 days. Every monday      bethanechol 10 MG Oral Tab Take 1 tablet (10 mg total) by mouth daily.      ondansetron 4 MG Oral Tablet Dispersible Take 1 tablet (4 mg total) by mouth every 8 (eight) hours as needed for Nausea.      metFORMIN  MG Oral Tablet 24 Hr Take 2 tablets (1,000 mg total) by mouth 2 (two) times daily with meals. 30 tablet 0

## 2024-11-21 ENCOUNTER — APPOINTMENT (OUTPATIENT)
Dept: GENERAL RADIOLOGY | Facility: HOSPITAL | Age: 54
DRG: 389 | End: 2024-11-21
Attending: FAMILY MEDICINE
Payer: MEDICARE

## 2024-11-21 ENCOUNTER — APPOINTMENT (OUTPATIENT)
Dept: GENERAL RADIOLOGY | Facility: HOSPITAL | Age: 54
End: 2024-11-21
Attending: FAMILY MEDICINE
Payer: MEDICARE

## 2024-11-21 LAB
ALBUMIN SERPL-MCNC: 3.5 G/DL (ref 3.2–4.8)
ALBUMIN/GLOB SERPL: 1.4 {RATIO} (ref 1–2)
ALP LIVER SERPL-CCNC: 162 U/L
ALT SERPL-CCNC: 198 U/L
ANION GAP SERPL CALC-SCNC: 4 MMOL/L (ref 0–18)
AST SERPL-CCNC: 67 U/L (ref ?–34)
ATRIAL RATE: 92 BPM
BASOPHILS # BLD AUTO: 0.04 X10(3) UL (ref 0–0.2)
BASOPHILS NFR BLD AUTO: 0.4 %
BILIRUB SERPL-MCNC: 0.3 MG/DL (ref 0.3–1.2)
BUN BLD-MCNC: <5 MG/DL (ref 9–23)
CALCIUM BLD-MCNC: 8.6 MG/DL (ref 8.7–10.4)
CHLORIDE SERPL-SCNC: 104 MMOL/L (ref 98–112)
CO2 SERPL-SCNC: 30 MMOL/L (ref 21–32)
CREAT BLD-MCNC: 0.6 MG/DL
EGFRCR SERPLBLD CKD-EPI 2021: 107 ML/MIN/1.73M2 (ref 60–?)
EOSINOPHIL # BLD AUTO: 0.37 X10(3) UL (ref 0–0.7)
EOSINOPHIL NFR BLD AUTO: 3.5 %
ERYTHROCYTE [DISTWIDTH] IN BLOOD BY AUTOMATED COUNT: 16.1 %
GLOBULIN PLAS-MCNC: 2.5 G/DL (ref 2–3.5)
GLUCOSE BLD-MCNC: 139 MG/DL (ref 70–99)
GLUCOSE BLD-MCNC: 155 MG/DL (ref 70–99)
GLUCOSE BLD-MCNC: 193 MG/DL (ref 70–99)
GLUCOSE BLD-MCNC: 201 MG/DL (ref 70–99)
GLUCOSE BLD-MCNC: 233 MG/DL (ref 70–99)
GLUCOSE BLD-MCNC: 239 MG/DL (ref 70–99)
HCT VFR BLD AUTO: 29.7 %
HGB BLD-MCNC: 9.5 G/DL
IMM GRANULOCYTES # BLD AUTO: 0.07 X10(3) UL (ref 0–1)
IMM GRANULOCYTES NFR BLD: 0.7 %
LYMPHOCYTES # BLD AUTO: 2.04 X10(3) UL (ref 1–4)
LYMPHOCYTES NFR BLD AUTO: 19.1 %
MCH RBC QN AUTO: 24.2 PG (ref 26–34)
MCHC RBC AUTO-ENTMCNC: 32 G/DL (ref 31–37)
MCV RBC AUTO: 75.8 FL
MONOCYTES # BLD AUTO: 0.31 X10(3) UL (ref 0.1–1)
MONOCYTES NFR BLD AUTO: 2.9 %
NEUTROPHILS # BLD AUTO: 7.85 X10 (3) UL (ref 1.5–7.7)
NEUTROPHILS # BLD AUTO: 7.85 X10(3) UL (ref 1.5–7.7)
NEUTROPHILS NFR BLD AUTO: 73.4 %
P AXIS: 5 DEGREES
P-R INTERVAL: 188 MS
PLATELET # BLD AUTO: 256 10(3)UL (ref 150–450)
POTASSIUM SERPL-SCNC: 3.5 MMOL/L (ref 3.5–5.1)
PROT SERPL-MCNC: 6 G/DL (ref 5.7–8.2)
Q-T INTERVAL: 390 MS
QRS DURATION: 90 MS
QTC CALCULATION (BEZET): 482 MS
R AXIS: 41 DEGREES
RBC # BLD AUTO: 3.92 X10(6)UL
SODIUM SERPL-SCNC: 138 MMOL/L (ref 136–145)
T AXIS: 4 DEGREES
VENTRICULAR RATE: 92 BPM
WBC # BLD AUTO: 10.7 X10(3) UL (ref 4–11)

## 2024-11-21 PROCEDURE — 74018 RADEX ABDOMEN 1 VIEW: CPT | Performed by: FAMILY MEDICINE

## 2024-11-21 PROCEDURE — 99233 SBSQ HOSP IP/OBS HIGH 50: CPT | Performed by: HOSPITALIST

## 2024-11-21 RX ORDER — METHYLPREDNISOLONE SODIUM SUCCINATE 40 MG/ML
40 INJECTION INTRAMUSCULAR; INTRAVENOUS EVERY 6 HOURS
Status: DISCONTINUED | OUTPATIENT
Start: 2024-11-21 | End: 2024-11-21

## 2024-11-21 RX ORDER — LORAZEPAM 2 MG/ML
0.5 INJECTION INTRAMUSCULAR EVERY 4 HOURS PRN
Status: DISCONTINUED | OUTPATIENT
Start: 2024-11-21 | End: 2024-11-30

## 2024-11-21 RX ORDER — METHYLPREDNISOLONE SODIUM SUCCINATE 40 MG/ML
40 INJECTION INTRAMUSCULAR; INTRAVENOUS EVERY 6 HOURS
Status: COMPLETED | OUTPATIENT
Start: 2024-11-21 | End: 2024-11-22

## 2024-11-21 RX ORDER — DIPHENHYDRAMINE HYDROCHLORIDE 50 MG/ML
50 INJECTION INTRAMUSCULAR; INTRAVENOUS ONCE
Status: COMPLETED | OUTPATIENT
Start: 2024-11-21 | End: 2024-11-22

## 2024-11-21 RX ORDER — LORAZEPAM 2 MG/ML
1 INJECTION INTRAMUSCULAR EVERY 4 HOURS PRN
Status: DISCONTINUED | OUTPATIENT
Start: 2024-11-21 | End: 2024-11-30

## 2024-11-21 NOTE — PLAN OF CARE
Patient A&Ox4. VSS. O2 2L NC. NG to LIS. Suprapubic cath intact.  2 person skin check completed with Vilma IZAGUIRRE. Redness under abdominal folds and breast folds. Nystatin cream applied as ordered. Redness noted to buttocks. Skin barrier cream applied.  Patient reports severe pain, not managed by Morphine IV. Notified Dr. Boucher, received order for Dilaudid IV PRN. Fall precautions in place. Call light in reach.

## 2024-11-21 NOTE — CM/SW NOTE
Received AIDIN responses from NH facilities: [facility] and [facility] are not able to accept.  Liaison from [facility] will be doing on site visit with pt today.    Met with pt and updated her as above.  Pt appreciative of assistance in trying to find a LTC NH facility better able to manage her mental health needs.  Informed pt that if new facility is not identified during this admission she would return to [facility] and they would continue looking for other options.  Pt agreeable with plan.  / to remain available for support and/or discharge planning.     Theresa Oseguera, [facility]  Discharge Planner  [phone]

## 2024-11-21 NOTE — DISCHARGE INSTRUCTIONS
Beth Montenegro  Therapist ([facility]eg[facility])  [address] [place], [address]  [email or web address]  [phone]    Sandra Sharma  Psychologist ([facility])  [address], [place], [address]  [email or web address]  [phone]    Ann Domínguez  Therapist ([facility])  [address], [place], [address]  [address], [place], [address]  [email or web address]  [phone]    Sandra Gonzalez  Therapist ([facility])  [address], [place], [address]  [email or web address]/  [phone]

## 2024-11-21 NOTE — PLAN OF CARE
Patient A&Ox4, VSS on 1L NC O2 PRN/ for comfort. Requests need for O2. Reports severe pain to abdomen; IV pain medications given. Nystatin cream applied to abdominal folds, groin and breasts. NG to left nare intact to LIS. Patient to continue NPO status with ice chips per gen surg. Port accessed and intact. Patient reports concern with being off psych meds, MDs paged for orders. Plan for xray tomorrow, protocol meds ordered with instructions. POA updated    1513: psych liaison noted RN that psychiatry to see tomorrow    1815: patient yelling at staff and crying. Patient states she needs to eat now. VSS on 1L NC O2. Hospitlaist paged, see orders. IV ativan, patient relaxed and demanding to speak with hospitalist for labs. Hospitalist paged.

## 2024-11-21 NOTE — PROGRESS NOTES
[facility]  Progress Note    Clari Rosa Patient Status:  Inpatient    1970 MRN [id number]   Location [facility] 3SW-A Attending Bhanu Mckinney MD   Hosp Day # 2 PCP Macrina Galvin MD     Subjective:    Patient continues to have abdominal distension and pain  Patient had bm after flex sig  Xray revealing increase in distension of small bowel and colon     Objective/Physical Exam:    Vital Signs:  Blood pressure 105/66, pulse 85, temperature 98.2 °F (36.8 °C), temperature source Oral, resp. rate 18, height 5' (1.524 m), weight 190 lb (86.2 kg), SpO2 100%.    General:  Alert, orientated x3.  Cooperative.  No apparent distress.    Lungs:  Non labored breathing    Cardiac:  Regular rate and rhythm.     Abdomen:  distended, mild tenderness on left abdomen.     Extremities:  No lower extremity edema noted.  Without clubbing or cyanosis.        Labs:  Reviewed    Lab Results   Component Value Date    WBC 10.7 2024    HGB 9.5 2024    HCT 29.7 2024    .0 2024    CREATSERUM 0.60 2024    BUN <5 2024     2024    K 3.5 2024     2024    CO2 30.0 2024     2024    CA 8.6 2024    ALB 3.5 2024    ALKPHO 162 2024    BILT 0.3 2024    TP 6.0 2024    AST 67 2024     2024    PGLU 155 2024       Xray:  Reviewed    Problem List:  Patient Active Problem List   Diagnosis    Paresis of lower extremity (HCC)    Transverse myelitis (HCC)    Bipolar 1 disorder (HCC)    Manipulative behavior    History of diverticulitis of colon    Serotonin syndrome    Obesity (BMI 30-39.9)    Dental caries    Type 2 diabetes mellitus without complication, with long-term current use of insulin (HCC)    Chronic suprapubic catheter (HCC)    Bipolar I disorder depressed with atypical features (HCC)    Other acute pulmonary embolism, unspecified whether acute cor pulmonale present (HCC)    Hyperglycemia     Sepsis due to undetermined organism (HCC)    Severe episode of recurrent major depressive disorder, without psychotic features ([facility])    Generalized anxiety disorder with panic attacks    Sepsis due to urinary tract infection ([facility])    Lactic acid acidosis    Constipation, unspecified constipation type    Moderate depressed bipolar I disorder ([facility])    Acute chest pain    Depression, unspecified depression type    Cellulitis    Bipolar disorder, current episode mixed, severe, without psychotic features ([facility])    Chest pain    Acute cystitis without hematuria    Leukocytosis    Leukocytosis, unspecified type    Benign essential hypertension    COPD (chronic obstructive pulmonary disease) ([facility])    Neurogenic bladder    Urinary tract infection associated with catheterization of urinary tract, unspecified indwelling urinary catheter type, initial encounter ([facility])    Weakness generalized    Anemia    Cystitis    UTI (urinary tract infection)    Chronic abdominal pain    Abdominal pain, acute           Impression:     53 y/o with colonic distension, colonic narrowing noted on CT, flex sig unable to visualize colonic mucosa 2/2 stool burden  Reviewed with patient will order lower GI with gastrograffin, she has allergy to contrast dye- reviewed premedication- will start today and imaging study tomorrow    Plan:  Reviewed imaging and premedication today with patient  NG to LIS  NPO ice chips ok  Encourage ambulation/IS  IV fluids    ADDEND:  -patient seen and examined this afternoon with Dr Tejeda, she has had another BM since evaluation this morning.     LISA Delvalle  [facility]  General Surgery  11/21/2024

## 2024-11-21 NOTE — PROGRESS NOTES
[facility]   part of [facility]     Hospitalist Progress Note     Clari Rosa Patient Status:  Observation    1970 MRN [id number]   Location [facility] 3SW-A Attending Bhanu Mckinney MD   Hosp Day # 2 PCP Macrina Galvin MD     Chief Complaint: abd pain    Subjective:     Had a large BM today, abd still distended but a bit better    Objective:    Review of Systems:   A comprehensive review of systems was completed; pertinent positive and negatives stated in subjective.    Vital signs:  Temp:  [98 °F (36.7 °C)-98.5 °F (36.9 °C)] 98 °F (36.7 °C)  Pulse:  [] 87  Resp:  [18] 18  BP: ()/(58-84) 130/84  SpO2:  [90 %-100 %] 98 %    Physical Exam:    General: No acute distress  Respiratory: No wheezes, no rhonchi  Cardiovascular: S1, S2, regular rate and rhythm  Abdomen: Soft, tender, distended, positive bowel sounds  Neuro: No new focal deficits.   Extremities: No edema      Diagnostic Data:    Labs:  Recent Labs   Lab 24  0539 24  0538   WBC 18.9* 15.3* 8.1 10.7   HGB 11.9* 11.6* 9.5* 9.5*   MCV 73.6* 72.7* 76.6* 75.8*   .0 331.0 216.0 256.0       Recent Labs   Lab 24  0539 24  0538   * 160* 117* 139*   BUN 8* 11 6* <5*   CREATSERUM 0.67 0.83 0.57 0.60   CA 9.1 9.3 8.1* 8.6*   ALB 3.9 4.1  --  3.5   * 135* 137 138   K 3.5 4.0 3.5 3.5    97* 106 104   CO2 26.0 29.0 30.0 30.0   ALKPHO 72 73  --  162*   AST 11 18  --  67*   ALT 8* 8*  --  198*   BILT 0.3 0.3  --  0.3   TP 7.0 6.9  --  6.0       Estimated Creatinine Clearance: 77 mL/min (based on SCr of 0.6 mg/dL).    No results for input(s): \"TROP\", \"TROPHS\", \"CK\" in the last 168 hours.    No results for input(s): \"PTP\", \"INR\" in the last 168 hours.               Microbiology    No results found for this visit on 24.      Imaging: Reviewed in Epic.    Medications:    diphenhydrAMINE  50 mg Intravenous Once     methylPREDNISolone  40 mg Intravenous Q6H    [Held by provider] apixaban  5 mg Oral BID    clonazePAM  1.5 mg Oral TID    divalproex ER  500 mg Oral BID    DULoxetine  60 mg Oral Daily    oxybutynin  5 mg Oral TID    QUEtiapine  200 mg Oral Nightly    insulin degludec  5 Units Subcutaneous Nightly    insulin aspart  1-10 Units Subcutaneous TID AC and HS    [Held by provider] metoprolol tartrate  25 mg Oral 2x Daily(Beta Blocker)    nystatin-triamcinolone   Topical BID    enoxaparin  1 mg/kg Subcutaneous 2 times per day       Assessment & Plan:      #Abdominal pain  #bowel obstruction  #rectal stricture  - CT shows dilated colon with narrowing at rectosigmoid junction  - NPO  - GI consult  - PRN pain med  -Ngt to IS  -sp flex sig for decompression. D/w GI. Still distended but now having BM's  -gen surg consult. May need to consider surgical decompression if does not improve.  -KUB repeat today w/ allergy pretreatment  -hold eliquis. cont FD lovenox      # Type 2 diabetes  - Will plae on hyperglycemia protocol with long actin and correction factor insulin.  -on D51/2 NS     # Anxiety  - Prn IV benozo's     # Bipolar w/ carmel  - Will continue on depakote and seroquel  -seems to be having a manic episode. Pt requesting to go to [facility] after  -consult psych liaison - declined. Does not qualify for inpt [facility] stay     # Hypertension  - Will continue on metoprolol     # Neurogenic bladder  - Patient has chronic mark.    #dysuria  -previous UA contaminated but Ucx neg. Pt c/o persistent sx's. Will repeat     # Hx DVT  - Will continue on eliquis.     # ANAID  - Will place on ANAID  protocol.    62 min spent    Bhanu Mckinney MD    Supplementary Documentation:     Quality:  DVT Mechanical Prophylaxis:        DVT Pharmacologic Prophylaxis   Medication    [Held by provider] apixaban (Eliquis) tab 5 mg    heparin (Porcine) 100 Units/mL lock flush 500 Units    enoxaparin (Lovenox) 100 MG/ML SUBQ injection 90 mg                Code Status:  Full Code  Florentino: Suprapubic catheter in place  Florentino Duration (in days):   Central line:    MARINA:     Discharge is dependent on: course  At this point Ms. Rosa is expected to be discharge to: home    The 21st Century Cures Act makes medical notes like these available to patients in the interest of transparency. Please be advised this is a medical document. Medical documents are intended to carry relevant information, facts as evident, and the clinical opinion of the practitioner. The medical note is intended as peer to peer communication and may appear blunt or direct. It is written in medical language and may contain abbreviations or verbiage that are unfamiliar.

## 2024-11-21 NOTE — PROGRESS NOTES
[facility]  GASTROENTEROLOGY PROGRESS NOTE   [facility]     Clari Rosa Patient Status:  Inpatient    1970 MRN [id number]   Location [facility] 3SW-A Attending Bhanu Mckinney MD   Hosp Day # 2 PCP Macrina Galvin MD       Reason for Consultation:   Dilated colon on imaging  Abnormal CT scan  Abdominal pain    Subjective:   Abdominal pain overnight and early this morning, however at time of my evaluation patient stated pain slightly improved.   +nausea, no emesis  NGT in place  Passing flatus;     Patient Active Problem List   Diagnosis    Paresis of lower extremity (HCC)    Transverse myelitis (HCC)    Bipolar 1 disorder ([facility])    Manipulative behavior    History of diverticulitis of colon    Serotonin syndrome    Obesity (BMI 30-39.9)    Dental caries    Type 2 diabetes mellitus without complication, with long-term current use of insulin ([facility])    Chronic suprapubic catheter ([facility])    Bipolar I disorder depressed with atypical features ([facility])    Other acute pulmonary embolism, unspecified whether acute cor pulmonale present ([facility])    Hyperglycemia    Sepsis due to undetermined organism ([facility])    Severe episode of recurrent major depressive disorder, without psychotic features ([facility])    Generalized anxiety disorder with panic attacks    Sepsis due to urinary tract infection ([facility])    Lactic acid acidosis    Constipation, unspecified constipation type    Moderate depressed bipolar I disorder (HCC)    Acute chest pain    Depression, unspecified depression type    Cellulitis    Bipolar disorder, current episode mixed, severe, without psychotic features (HCC)    Chest pain    Acute cystitis without hematuria    Leukocytosis    Leukocytosis, unspecified type    Benign essential hypertension    COPD (chronic obstructive pulmonary disease) (HCC)    Neurogenic bladder    Urinary tract infection associated with catheterization of urinary tract, unspecified indwelling urinary catheter type, initial  encounter (HCC)    Weakness generalized    Anemia    Cystitis    UTI (urinary tract infection)    Chronic abdominal pain    Abdominal pain, acute       PMH, PSH, Fhx, Shx as per initial consult note    Review of Systems    12 point Review of Systems negative unless otherwise mentioned in Subjective.     Physical Exam  /66 (BP Location: Left arm)   Pulse 85   Temp 98.2 °F (36.8 °C) (Oral)   Resp 18   Ht 5' (1.524 m)   Wt 190 lb (86.2 kg)   SpO2 100%   BMI 37.11 kg/m²   Body mass index is 37.11 kg/m².      Gen: No acute distress  Resp: no respiratory distress  Abd: Soft, mild epigastric -tender, mild to moderate -distended. No rebound tenderness, no guarding.   Neuro: Aox3.     Diagnostic Data:      Labs:  Recent Labs   Lab 11/18/24  2355 11/20/24  0539 11/21/24  0538   WBC 15.3* 8.1 10.7   HGB 11.6* 9.5* 9.5*   MCV 72.7* 76.6* 75.8*   .0 216.0 256.0       Recent Labs   Lab 11/17/24  2318 11/18/24  2355 11/20/24  0539 11/21/24  0538   * 160* 117* 139*   BUN 8* 11 6* <5*   CREATSERUM 0.67 0.83 0.57 0.60   CA 9.1 9.3 8.1* 8.6*   ALB 3.9 4.1  --  3.5   * 135* 137 138   K 3.5 4.0 3.5 3.5    97* 106 104   CO2 26.0 29.0 30.0 30.0   ALKPHO 72 73  --  162*   AST 11 18  --  67*   ALT 8* 8*  --  198*   BILT 0.3 0.3  --  0.3   TP 7.0 6.9  --  6.0       No results for input(s): \"PTP\", \"INR\" in the last 168 hours.    No data recorded        Imaging: Imaging data reviewed in Epic.    Medications:    [Held by provider] apixaban  5 mg Oral BID    clonazePAM  1.5 mg Oral TID    divalproex ER  500 mg Oral BID    DULoxetine  60 mg Oral Daily    oxybutynin  5 mg Oral TID    QUEtiapine  200 mg Oral Nightly    insulin degludec  5 Units Subcutaneous Nightly    insulin aspart  1-10 Units Subcutaneous TID AC and HS    [Held by provider] metoprolol tartrate  25 mg Oral 2x Daily(Beta Blocker)    nystatin-triamcinolone   Topical BID    enoxaparin  1 mg/kg Subcutaneous 2 times per day        Allergies:  Allergies[1]    Imaging:  I have personally reviewed all pertinent available imaging.       ASSESSMENT / PLAN:     Clari Rosa is a 54 year old female with GI consult for abdominal pain, dilated GI tract and colon on imaging.   CT w/ focus of narrowing at the rectosigmoid junction.     Flex sig 11/20 - with stool, and air/liquid suctioned.  XRAY today 11/21 w/ recurrent and increase distension of the small and large intestine.   No free air noted. Exam without peritoneal signs.       I discussed case with surgery team - repeat flex sig with decompression  may not be definitive therapy, especially given recurrence,  and given abdominal xray - repeat flex sig presents higher risk of perforation when compared to definitive benefit.  Given the risks vs. Benefits, alteratives - patient does not want flex sig at this point, and interested in surgical options.       Recommendations:   General surgery on consult - has ordered imaging, gastrograffin lower colon evaluation  No plans for flex sig as above, patient interested in surgical options.   IVF, analgesia, anti-emetics per primary team; avoid gut slowing analgesia  Keep NPO; NGT in place   CBC, CMP, INR    I discussed case with surgery team, and also patient's POA/mother, Flor - all questions answered in full.       Mauricio Abad MD  [facility]               [1]   Allergies  Allergen Reactions    Pcn [Penicillins] HIVES    Sulfa Antibiotics HIVES    Radiology Contrast Iodinated Dyes DIZZINESS    Keflex [Cephalexin] UNKNOWN

## 2024-11-21 NOTE — PROGRESS NOTES
Pt c/o feeling weak and lightheaded. /67, SR on tele, accucheck 155, SPO2 98% on 2L/NC.  EKG done- NSR. Message sent to Dr Dc, endorsed to oncoming RN.

## 2024-11-22 ENCOUNTER — APPOINTMENT (OUTPATIENT)
Dept: CV DIAGNOSTICS | Facility: HOSPITAL | Age: 54
End: 2024-11-22
Attending: HOSPITALIST
Payer: MEDICARE

## 2024-11-22 ENCOUNTER — APPOINTMENT (OUTPATIENT)
Dept: GENERAL RADIOLOGY | Facility: HOSPITAL | Age: 54
DRG: 389 | End: 2024-11-22
Attending: PHYSICIAN ASSISTANT
Payer: MEDICARE

## 2024-11-22 ENCOUNTER — APPOINTMENT (OUTPATIENT)
Dept: GENERAL RADIOLOGY | Facility: HOSPITAL | Age: 54
End: 2024-11-22
Attending: PHYSICIAN ASSISTANT
Payer: MEDICARE

## 2024-11-22 ENCOUNTER — APPOINTMENT (OUTPATIENT)
Dept: CV DIAGNOSTICS | Facility: HOSPITAL | Age: 54
DRG: 389 | End: 2024-11-22
Attending: HOSPITALIST
Payer: MEDICARE

## 2024-11-22 PROBLEM — F31.61 MILD MIXED BIPOLAR I DISORDER (HCC): Status: ACTIVE | Noted: 2024-11-22

## 2024-11-22 LAB
ALBUMIN SERPL-MCNC: 3.7 G/DL (ref 3.2–4.8)
ALBUMIN/GLOB SERPL: 1.5 {RATIO} (ref 1–2)
ALP LIVER SERPL-CCNC: 133 U/L
ALT SERPL-CCNC: 112 U/L
ANION GAP SERPL CALC-SCNC: 6 MMOL/L (ref 0–18)
AST SERPL-CCNC: 17 U/L (ref ?–34)
ATRIAL RATE: 42 BPM
BASOPHILS # BLD AUTO: 0.01 X10(3) UL (ref 0–0.2)
BASOPHILS NFR BLD AUTO: 0.1 %
BILIRUB SERPL-MCNC: 0.2 MG/DL (ref 0.3–1.2)
BILIRUB UR QL STRIP.AUTO: NEGATIVE
BUN BLD-MCNC: 8 MG/DL (ref 9–23)
CALCIUM BLD-MCNC: 8.9 MG/DL (ref 8.7–10.4)
CHLORIDE SERPL-SCNC: 102 MMOL/L (ref 98–112)
CLARITY UR REFRACT.AUTO: CLEAR
CO2 SERPL-SCNC: 32 MMOL/L (ref 21–32)
COLOR UR AUTO: YELLOW
CREAT BLD-MCNC: 0.63 MG/DL
EGFRCR SERPLBLD CKD-EPI 2021: 105 ML/MIN/1.73M2 (ref 60–?)
EOSINOPHIL # BLD AUTO: 0 X10(3) UL (ref 0–0.7)
EOSINOPHIL NFR BLD AUTO: 0 %
ERYTHROCYTE [DISTWIDTH] IN BLOOD BY AUTOMATED COUNT: 16.1 %
GLOBULIN PLAS-MCNC: 2.5 G/DL (ref 2–3.5)
GLUCOSE BLD-MCNC: 153 MG/DL (ref 70–99)
GLUCOSE BLD-MCNC: 215 MG/DL (ref 70–99)
GLUCOSE BLD-MCNC: 232 MG/DL (ref 70–99)
GLUCOSE BLD-MCNC: 262 MG/DL (ref 70–99)
GLUCOSE BLD-MCNC: 264 MG/DL (ref 70–99)
GLUCOSE UR STRIP.AUTO-MCNC: >1000 MG/DL
HCT VFR BLD AUTO: 29.1 %
HGB BLD-MCNC: 9.4 G/DL
IMM GRANULOCYTES # BLD AUTO: 0.07 X10(3) UL (ref 0–1)
IMM GRANULOCYTES NFR BLD: 0.8 %
KETONES UR STRIP.AUTO-MCNC: 20 MG/DL
LEUKOCYTE ESTERASE UR QL STRIP.AUTO: 75
LYMPHOCYTES # BLD AUTO: 1.29 X10(3) UL (ref 1–4)
LYMPHOCYTES NFR BLD AUTO: 15.6 %
MAGNESIUM SERPL-MCNC: 1.8 MG/DL (ref 1.6–2.6)
MCH RBC QN AUTO: 24 PG (ref 26–34)
MCHC RBC AUTO-ENTMCNC: 32.3 G/DL (ref 31–37)
MCV RBC AUTO: 74.2 FL
MONOCYTES # BLD AUTO: 0.14 X10(3) UL (ref 0.1–1)
MONOCYTES NFR BLD AUTO: 1.7 %
NEUTROPHILS # BLD AUTO: 6.76 X10 (3) UL (ref 1.5–7.7)
NEUTROPHILS # BLD AUTO: 6.76 X10(3) UL (ref 1.5–7.7)
NEUTROPHILS NFR BLD AUTO: 81.8 %
NITRITE UR QL STRIP.AUTO: NEGATIVE
OSMOLALITY SERPL CALC.SUM OF ELEC: 296 MOSM/KG (ref 275–295)
P AXIS: 24 DEGREES
P-R INTERVAL: 158 MS
PH UR STRIP.AUTO: 7 [PH] (ref 5–8)
PHOSPHATE SERPL-MCNC: 2.7 MG/DL (ref 2.4–5.1)
PLATELET # BLD AUTO: 246 10(3)UL (ref 150–450)
POTASSIUM SERPL-SCNC: 3.4 MMOL/L (ref 3.5–5.1)
PROT SERPL-MCNC: 6.2 G/DL (ref 5.7–8.2)
Q-T INTERVAL: 586 MS
QRS DURATION: 86 MS
QTC CALCULATION (BEZET): 489 MS
R AXIS: 42 DEGREES
RBC # BLD AUTO: 3.92 X10(6)UL
SODIUM SERPL-SCNC: 140 MMOL/L (ref 136–145)
SP GR UR STRIP.AUTO: 1.01 (ref 1–1.03)
T AXIS: 29 DEGREES
TROPONIN I SERPL HS-MCNC: <3 NG/L
UROBILINOGEN UR STRIP.AUTO-MCNC: NORMAL MG/DL
VENTRICULAR RATE: 42 BPM
WBC # BLD AUTO: 8.3 X10(3) UL (ref 4–11)

## 2024-11-22 PROCEDURE — 90792 PSYCH DIAG EVAL W/MED SRVCS: CPT | Performed by: OTHER

## 2024-11-22 PROCEDURE — 99291 CRITICAL CARE FIRST HOUR: CPT | Performed by: HOSPITALIST

## 2024-11-22 PROCEDURE — 93306 TTE W/DOPPLER COMPLETE: CPT | Performed by: HOSPITALIST

## 2024-11-22 PROCEDURE — 74270 X-RAY XM COLON 1CNTRST STD: CPT | Performed by: PHYSICIAN ASSISTANT

## 2024-11-22 RX ORDER — MAGNESIUM SULFATE HEPTAHYDRATE 40 MG/ML
2 INJECTION, SOLUTION INTRAVENOUS ONCE
Status: COMPLETED | OUTPATIENT
Start: 2024-11-22 | End: 2024-11-22

## 2024-11-22 RX ORDER — CIPROFLOXACIN 2 MG/ML
400 INJECTION, SOLUTION INTRAVENOUS EVERY 12 HOURS
Status: DISCONTINUED | OUTPATIENT
Start: 2024-11-22 | End: 2024-11-25

## 2024-11-22 NOTE — CONSULTS
[facility]  Cardiology Consultation    Clari Rosa Patient Status:  Inpatient    1970 MRN [id number]   Location [facility] 3NE-A Attending Bhanu Mckinney MD   Hosp Day # 3 PCP Macrina Galvin MD     IP Consult to Cardiology  Consult performed by: Carlee Bae MD  Consult ordered by: Bhanu Mckinney MD            Reason for Consultation     Bradycardia        History of Present Illness     Clari Rosa is a a(n) 54 year old female with HTN, T2DM, ANAID, Obesity, Anxiety, bipolar disorder, transverse myelitis with suprapubic catheter who presented with abdominal pain.    Dx with a dilated colon. Flex sig     Cardiology consultecd for sinus bradycardia.    During my exam, she fell asleep every 10 seconds. Her HR while awake was 50, while asleep 38-50. She had do be prompted to wake up for almost every question.    She had been on metoprolol. Possibly for HTN, possibly for an irregular rhythm. She reports having a LE DVT in the past.      History:     Past Medical History:    Bipolar 1 disorder (HCC)    Dental caries    Diabetes (HCC)    MIKE (generalized anxiety disorder)    High blood pressure    History of diverticulitis of colon    Manipulative behavior    Neurogenic bladder    Obesity (BMI 30-39.9)    Paraplegia (HCC)    Sepsis following intra-abdominal surgery (HCC)    Serotonin syndrome    Sleep apnea    Suprapubic catheter (HCC)    Transverse myelitis (HCC)     Past Surgical History:   Procedure Laterality Date    Arthroscopy of joint unlisted      knee          Cholecystectomy      Part removal colon w end colostomy      2013    Tonsillectomy       Family History   Adopted: Yes      reports that she has quit smoking. Her smoking use included cigarettes. She has a 5 pack-year smoking history. She does not have any smokeless tobacco history on file. She reports that she does not drink alcohol and does not use drugs.      Allergies:     Allergies[1]      Medications       Current  Facility-Administered Medications:     ciprofloxacin in dextrose 5% (Cipro) 400 mg/200mL IVPB premix 400 mg, 400 mg, Intravenous, Q12H    valproate (Depacon) 500 mg in sodium chloride 0.9% 50 mL IVPB, 500 mg, Intravenous, Q12H    LORazepam (Ativan) 2 mg/mL injection 0.5 mg, 0.5 mg, Intravenous, Q4H PRN **OR** LORazepam (Ativan) 2 mg/mL injection 1 mg, 1 mg, Intravenous, Q4H PRN    dextrose 5%-sodium chloride 0.45% infusion, , Intravenous, Continuous    HYDROmorphone (Dilaudid) 1 MG/ML injection 0.2 mg, 0.2 mg, Intravenous, Q2H PRN **OR** HYDROmorphone (Dilaudid) 1 MG/ML injection 0.4 mg, 0.4 mg, Intravenous, Q2H PRN **OR** HYDROmorphone (Dilaudid) 1 MG/ML injection 0.8 mg, 0.8 mg, Intravenous, Q2H PRN    albuterol (Ventolin HFA) 108 (90 Base) MCG/ACT inhaler 2 puff, 2 puff, Inhalation, Q6H PRN    [Held by provider] apixaban (Eliquis) tab 5 mg, 5 mg, Oral, BID    clonazePAM (KlonoPIN) tab 1.5 mg, 1.5 mg, Oral, TID    divalproex ER (Depakote ER) 24 hr tab 500 mg, 500 mg, Oral, BID    DULoxetine (Cymbalta) DR cap 60 mg, 60 mg, Oral, Daily    oxybutynin (Ditropan) tab 5 mg, 5 mg, Oral, TID    QUEtiapine (SEROquel) tab 200 mg, 200 mg, Oral, Nightly    traZODone (Desyrel) tab 50 mg, 50 mg, Oral, Nightly PRN    glucose (Dex4) 15 GM/59ML oral liquid 15 g, 15 g, Oral, Q15 Min PRN **OR** glucose (Glutose) 40% oral gel 15 g, 15 g, Oral, Q15 Min PRN **OR** glucose-vitamin C (Dex-4) chewable tab 4 tablet, 4 tablet, Oral, Q15 Min PRN **OR** dextrose 50% injection 50 mL, 50 mL, Intravenous, Q15 Min PRN **OR** glucose (Dex4) 15 GM/59ML oral liquid 30 g, 30 g, Oral, Q15 Min PRN **OR** glucose (Glutose) 40% oral gel 30 g, 30 g, Oral, Q15 Min PRN **OR** glucose-vitamin C (Dex-4) chewable tab 8 tablet, 8 tablet, Oral, Q15 Min PRN    melatonin tab 3 mg, 3 mg, Oral, Nightly PRN    acetaminophen (Tylenol Extra Strength) tab 500 mg, 500 mg, Oral, Q4H PRN    morphINE PF 2 MG/ML injection 1 mg, 1 mg, Intravenous, Q2H PRN **OR** morphINE PF 2  MG/ML injection 2 mg, 2 mg, Intravenous, Q2H PRN **OR** morphINE PF 4 MG/ML injection 4 mg, 4 mg, Intravenous, Q2H PRN    ondansetron (Zofran) 4 MG/2ML injection 4 mg, 4 mg, Intravenous, Q6H PRN    prochlorperazine (Compazine) 10 MG/2ML injection 5 mg, 5 mg, Intravenous, Q8H PRN    insulin degludec (Tresiba) 100 units/mL flextouch 5 Units, 5 Units, Subcutaneous, Nightly    insulin aspart (NovoLOG) 100 Units/mL FlexPen 1-10 Units, 1-10 Units, Subcutaneous, TID AC and HS    heparin (Porcine) 100 Units/mL lock flush 500 Units, 5 mL, Intravenous, PRN    [Held by provider] metoprolol tartrate (Lopressor) tab 25 mg, 25 mg, Oral, 2x Daily(Beta Blocker)    nystatin-triamcinolone (Mycolog II) 100,000-0.1 Units/g-% cream, , Topical, BID    enoxaparin (Lovenox) 100 MG/ML SUBQ injection 90 mg, 1 mg/kg, Subcutaneous, 2 times per day    morphINE PF 4 MG/ML injection 4 mg, 4 mg, Intravenous, Q30 Min PRN      Review of Systems     10 point ROS was negative except  nauseas      Telemetry:         Telemetry: Sinus cornel while sleeping      Physical Exam     Physical Exam   Blood pressure 124/56, pulse (!) 41, temperature 98.6 °F (37 °C), temperature source Oral, resp. rate 16, height 5' (1.524 m), weight 190 lb (86.2 kg), SpO2 98%.  Temp (24hrs), Av.4 °F (36.9 °C), Min:98.2 °F (36.8 °C), Max:98.6 °F (37 °C)    Wt Readings from Last 3 Encounters:   24 190 lb (86.2 kg)   24 190 lb (86.2 kg)   10/22/24 189 lb (85.7 kg)     Drowsy  NAD  PERRLA/EOMI  Neck veins not elevated  Carotids- no bruits  CTA bilaterally  Cardiac- Cornel S1 S2  Abdomen- Soft,Nontender, normal BS  Extremities- pulses normal  Edema- none  Mood /Affect Congruent  Skin- no lesions            Lab/Radiology Results     Recent Labs   Lab 24  0539 24  0538 24  1011   * 139* 232*   BUN 6* <5* 8*   CREATSERUM 0.57 0.60 0.63   EGFRCR 108 107 105   CA 8.1* 8.6* 8.9    138 140   K 3.5 3.5 3.4*    104 102   CO2 30.0 30.0 32.0      Recent Labs   Lab 11/20/24  0539 11/21/24  0538 11/22/24  1011   RBC 3.89 3.92 3.92   HGB 9.5* 9.5* 9.4*   HCT 29.8* 29.7* 29.1*   MCV 76.6* 75.8* 74.2*   MCH 24.4* 24.2* 24.0*   MCHC 31.9 32.0 32.3   RDW 16.4 16.1 16.1   NEPRELIM 4.92 7.85* 6.76   WBC 8.1 10.7 8.3   .0 256.0 246.0         [unfilled]  No results for input(s): \"BNP\" in the last 168 hours.  No results found for: \"PT\", \"INR\"  No results found for: \"TROP\"      XR COLON SINGLE CONTRAST (CPT=74270)    Result Date: 11/22/2024  CONCLUSION:  Only the sigmoid and descending colon were identified but there is no evidence of stricture.   LOCATION:  [facility]    Dictated by (CST): Rob Pedraza MD on 11/22/2024 at 10:44 AM     Finalized by (CST): Rob Pedraza MD on 11/22/2024 at 10:46 AM       EKG 12 Lead    Result Date: 11/22/2024  Marked sinus bradycardia Septal infarct , age undetermined Possible Lateral infarct (cited on or before 21-NOV-2024) Abnormal ECG When compared with ECG of 21-NOV-2024 07:11, Vent. rate has decreased BY  50 BPM Septal infarct is now Present Questionable change in initial forces of Lateral leads    EKG 12 Lead    Result Date: 11/21/2024  Normal sinus rhythm Possible Lateral infarct , age undetermined Nonspecific T wave abnormality Abnormal ECG When compared with ECG of 17-NOV-2024 23:22, Nonspecific T wave abnormality now evident in Inferior leads Confirmed by ANNA PENA, IVON ([id number]) on 11/21/2024 5:09:53 PM       Problem List     Patient Active Problem List   Diagnosis    Paresis of lower extremity (HCC)    Transverse myelitis (HCC)    Bipolar 1 disorder (HCC)    Manipulative behavior    History of diverticulitis of colon    Serotonin syndrome    Obesity (BMI 30-39.9)    Dental caries    Type 2 diabetes mellitus without complication, with long-term current use of insulin (HCC)    Chronic suprapubic catheter (HCC)    Bipolar I disorder depressed with atypical features (HCC)    Other acute pulmonary embolism, unspecified  whether acute cor pulmonale present (HCC)    Hyperglycemia    Sepsis due to undetermined organism (HCC)    Severe episode of recurrent major depressive disorder, without psychotic features (HCC)    Generalized anxiety disorder with panic attacks    Sepsis due to urinary tract infection (HCC)    Lactic acid acidosis    Constipation, unspecified constipation type    Moderate depressed bipolar I disorder (HCC)    Acute chest pain    Depression, unspecified depression type    Cellulitis    Bipolar disorder, current episode mixed, severe, without psychotic features (HCC)    Chest pain    Acute cystitis without hematuria    Leukocytosis    Leukocytosis, unspecified type    Benign essential hypertension    COPD (chronic obstructive pulmonary disease) (HCC)    Neurogenic bladder    Urinary tract infection associated with catheterization of urinary tract, unspecified indwelling urinary catheter type, initial encounter ([facility])    Weakness generalized    Anemia    Cystitis    UTI (urinary tract infection)    Chronic abdominal pain    Abdominal pain, acute       Diagnostic Testing     ECG Sinus bradycardia  TTE Prelim- Normal EF    Assessment     Sinus Bradycardia- mechanism is almost certainly vagal tone, either from ANAID and/or abdominal pain/dilated colon and vagal innervation.   HTN  T2DM  Transeverse Myelitis  Suprapubic cathter  Dilated bowel  ANAID      Plan     Would not treat bradycardia. This is most likely vagal in nature.   If there is concern of cornel during procedures, would pre-treat with either 0.4 mg of atropine before anesthesia or use dopamine during the case to support the HR.        C5      Carlee Bae MD    Cardiac Electrophysiology  [facility]  11/22/2024  1:40 PM         [1]   Allergies  Allergen Reactions    Pcn [Penicillins] HIVES    Radiology Contrast Iodinated Dyes HIVES, DIZZINESS and SHORTNESS OF BREATH    Sulfa Antibiotics HIVES    Keflex [Cephalexin] UNKNOWN

## 2024-11-22 NOTE — CONSULTS
[facility]  Report of Psychiatric Consultation    Clari Rosa Patient Status:  Inpatient    1970 MRN [id number]   Location [facility] 3SW-A Attending Bhanu Mckinney MD   Hosp Day # 3 PCP Macrina Galvin MD     Date of Admission: 2024  Date of Consult: 2024  Reason for Consultation: Medication management    Impression: Patient is a 54-year-old female who presented to the ED originally on 24.     Primary Psychiatric Diagnosis:  Bipolar 1 disorder, current episode mixed, mild.     Personality Traits: Deferred      Pertinent Medical Diagnoses: type 2 diabetes, HTN, sleep apnea, transverse myelitis, neurogenic bladder with suprapubic catheter, hx of bowel perforation s/p hemicolectomy, sepsis, paraplegia, hx of C.Diff infection, hx of ESBL UTI      Recommendations:  While patient is NPO, initiate Depacon 500 mg IV Q12H as ordered by Dr. Light.   Will plan to resume patient's outpatient psychiatric medication regimen once able to resume PO intake and titrate further as warranted.   Due to patient's complex medical status, patient is not appropriate for inpatient psychiatric hospitalization.   Psychiatry will continue to follow on Monday if patient is still in the hospital.     AMBAR Castellanos, [facility]     History of Present Illness:  Patient is a 54-year-old female who initially presented to the [place] ED on  from [facility] due to abdominal distention and constipation. Per chart review, at this time she was on day 7 of treatment for a UTI. Dilated colon noted on CT for which GI was consulted. NGT placed to LIS and flex sig completed on . General surgery consulted due to possible colon obstruction for which lower GI study completed this morning. Of note, rapid response was called on patient this morning due to heart rate dropping to 39. Awaiting transfer to telemetry floor. Currently undergoing clamp trial of NGT.     At the time of visit this  morning, patient states she does not feel well and still has significant abdominal pain. Does endorse feeling helpless, hopeless, and worthless. Reports she has a history of bipolar disorder and states she is currently in a mixed episode. Does endorse daily anxiety with occasional panic attacks. Is unable to state when her last manic episode occurred. Denies any current or previous symptoms of psychosis. Denies suicidal or homicidal ideations. Patient is noted to be tearful throughout the conversation.     Patient seen again this afternoon with Dr. Light. At the time of visit, patient is undergoing a 2D Echo. Noted to be much more drowsy than during visit this morning and falls asleep during conversation. Patient is in agreement with initiation of Depacon IV while she is unable to take her PO medications.     Past Psychiatric History: Per chart review, no history of suicide attempts. Multiple inpatient hospitalizations previously due to her bipolar 1 disorder.     Substance Use History: Denies currently. Per AMBAR Skaggs's psychiatric evaluation on 10/24/24, \"Patient  reports that she has quit smoking. Her smoking use included cigarettes. She has a 5 pack-year smoking history. She does not have any smokeless tobacco history on file. She reports that she does not drink alcohol and does not use drugs.  Patient does note that she did drink alcohol every day 6-8 beers over 20 years ago and since then has maintained sobriety.  Patient did have a cocaine addiction for 2 years, but notes stopping over 20 years ago.  Patient denies any rehab.\"    Psych Family History: Birth mother had bipolar disorder. Blood uncle  by suicide.     Social and Developmental History: Was living at [facility]. She was adopted at 3 months old. Reports that adoptive mother is her POA. Birth parents are . Per AMBAR Skaggs's psychiatric evaluation on 10/24/24, \"Patient has 1 son and was never . She  notes that she was engaged \"1 time \", but they never worked out. She worked multiple jobs. She worked as a  for few years and has a associates degree and regarding it. Then she worked as a  at [employer]. Patient has gone through bankruptcy and has forclosed home.\"     Past Medical History:    Bipolar 1 disorder (HCC)    Dental caries    Diabetes (HCC)    MIKE (generalized anxiety disorder)    High blood pressure    History of diverticulitis of colon    Manipulative behavior    Neurogenic bladder    Obesity (BMI 30-39.9)    Paraplegia (HCC)    Sepsis following intra-abdominal surgery (HCC)    Serotonin syndrome    Sleep apnea    Suprapubic catheter (HCC)    Transverse myelitis (HCC)     Past Surgical History:   Procedure Laterality Date    Arthroscopy of joint unlisted      knee          Cholecystectomy      Part removal colon w end colostomy      2013    Tonsillectomy       Family History   Adopted: Yes      reports that she has quit smoking. Her smoking use included cigarettes. She has a 5 pack-year smoking history. She does not have any smokeless tobacco history on file. She reports that she does not drink alcohol and does not use drugs.    Allergies:  Allergies[1]    Medications:    Current Facility-Administered Medications:     LORazepam (Ativan) 2 mg/mL injection 0.5 mg, 0.5 mg, Intravenous, Q4H PRN **OR** LORazepam (Ativan) 2 mg/mL injection 1 mg, 1 mg, Intravenous, Q4H PRN    dextrose 5%-sodium chloride 0.45% infusion, , Intravenous, Continuous    HYDROmorphone (Dilaudid) 1 MG/ML injection 0.2 mg, 0.2 mg, Intravenous, Q2H PRN **OR** HYDROmorphone (Dilaudid) 1 MG/ML injection 0.4 mg, 0.4 mg, Intravenous, Q2H PRN **OR** HYDROmorphone (Dilaudid) 1 MG/ML injection 0.8 mg, 0.8 mg, Intravenous, Q2H PRN    albuterol (Ventolin HFA) 108 (90 Base) MCG/ACT inhaler 2 puff, 2 puff, Inhalation, Q6H PRN    [Held by provider] apixaban (Eliquis) tab 5 mg, 5 mg, Oral, BID    clonazePAM  (KlonoPIN) tab 1.5 mg, 1.5 mg, Oral, TID    divalproex ER (Depakote ER) 24 hr tab 500 mg, 500 mg, Oral, BID    DULoxetine (Cymbalta) DR cap 60 mg, 60 mg, Oral, Daily    oxybutynin (Ditropan) tab 5 mg, 5 mg, Oral, TID    QUEtiapine (SEROquel) tab 200 mg, 200 mg, Oral, Nightly    traZODone (Desyrel) tab 50 mg, 50 mg, Oral, Nightly PRN    glucose (Dex4) 15 GM/59ML oral liquid 15 g, 15 g, Oral, Q15 Min PRN **OR** glucose (Glutose) 40% oral gel 15 g, 15 g, Oral, Q15 Min PRN **OR** glucose-vitamin C (Dex-4) chewable tab 4 tablet, 4 tablet, Oral, Q15 Min PRN **OR** dextrose 50% injection 50 mL, 50 mL, Intravenous, Q15 Min PRN **OR** glucose (Dex4) 15 GM/59ML oral liquid 30 g, 30 g, Oral, Q15 Min PRN **OR** glucose (Glutose) 40% oral gel 30 g, 30 g, Oral, Q15 Min PRN **OR** glucose-vitamin C (Dex-4) chewable tab 8 tablet, 8 tablet, Oral, Q15 Min PRN    melatonin tab 3 mg, 3 mg, Oral, Nightly PRN    acetaminophen (Tylenol Extra Strength) tab 500 mg, 500 mg, Oral, Q4H PRN    morphINE PF 2 MG/ML injection 1 mg, 1 mg, Intravenous, Q2H PRN **OR** morphINE PF 2 MG/ML injection 2 mg, 2 mg, Intravenous, Q2H PRN **OR** morphINE PF 4 MG/ML injection 4 mg, 4 mg, Intravenous, Q2H PRN    ondansetron (Zofran) 4 MG/2ML injection 4 mg, 4 mg, Intravenous, Q6H PRN    prochlorperazine (Compazine) 10 MG/2ML injection 5 mg, 5 mg, Intravenous, Q8H PRN    insulin degludec (Tresiba) 100 units/mL flextouch 5 Units, 5 Units, Subcutaneous, Nightly    insulin aspart (NovoLOG) 100 Units/mL FlexPen 1-10 Units, 1-10 Units, Subcutaneous, TID AC and HS    heparin (Porcine) 100 Units/mL lock flush 500 Units, 5 mL, Intravenous, PRN    [Held by provider] metoprolol tartrate (Lopressor) tab 25 mg, 25 mg, Oral, 2x Daily(Beta Blocker)    nystatin-triamcinolone (Mycolog II) 100,000-0.1 Units/g-% cream, , Topical, BID    enoxaparin (Lovenox) 100 MG/ML SUBQ injection 90 mg, 1 mg/kg, Subcutaneous, 2 times per day    morphINE PF 4 MG/ML injection 4 mg, 4 mg,  Intravenous, Q30 Min PRN    Review of Systems   Constitutional: Negative.    HENT: Negative.     Eyes: Negative.    Respiratory: Negative.     Cardiovascular: Negative.    Gastrointestinal:  Positive for abdominal pain and diarrhea. Negative for blood in stool, constipation, heartburn, melena, nausea and vomiting.   Genitourinary: Negative.    Musculoskeletal: Negative.    Skin: Negative.    Neurological: Negative.    Endo/Heme/Allergies: Negative.    Psychiatric/Behavioral:  Positive for depression. Negative for hallucinations, memory loss, substance abuse and suicidal ideas. The patient is nervous/anxious and has insomnia.      Psychiatry Review Systems: No voices or visions. No elevated mood, racing thoughts, decreased need for sleep, grandiose thoughts, increased participation in risky behaviors, or history of trauma.     Mental Status Exam:     Risk Assessment  Suicidal ideation: no suicidal ideation  Homicidal ideation: None noted    Appearance: disheveled  Behavior: unremarkable  Attitude: cooperative  Gait: Not observed     Speech: normal rate, rhythm and volume    Mood: depressed and anxious  Affect: Labile    Thought process: logical  Thought content: no delusions, obsessions, or other thought abnormalities  Perceptions: no hallucinations  Associations: Intact    Orientation: Alert and oriented x 3  Attention and Concentration:   difficulty attending to interview  Memory: intact immediate and recent, impaired remote   Language: Intact naming and repetition  Fund of Knowledge: Not assessed     Insight: fair to limited   Judgment: fair to limited     Objective    Vitals:    11/22/24 0405   BP: 122/66   Pulse: (!) 43   Resp: 16   Temp: 98.4 °F (36.9 °C)     Suicide Risk Assessments:  Overall level of suicide risk is low      Risk Factors: bipolar 1 disorder      Environmental Factors: current hospitalization, unable to receive PO medications     Protective Factors: her clinton     Laboratory Data:  Lab Results    Component Value Date    PGLU 262 11/22/2024       STUDIES:    AMBAR Castellanos, PMHNP-BC  11/22/2024  8:05 AM         [1]   Allergies  Allergen Reactions    Pcn [Penicillins] HIVES    Radiology Contrast Iodinated Dyes HIVES, DIZZINESS and SHORTNESS OF BREATH    Sulfa Antibiotics HIVES    Keflex [Cephalexin] UNKNOWN

## 2024-11-22 NOTE — PLAN OF CARE
Alert and oriented x 4. Telemetry monitoring, SB. On 1-2 L O2 NC. Suprapubic catheter in place intact and draining. Last BM 11/22. NPO, NG tube clamped at 10:35 see order. SCD's refused. Porth-a -cath to left upper chest, dressing clean, dry, intact. Tolerating IV antibiotic. Safety precautions in place.     HR this morning on 30'-40', order for transfer to telemetry unit, POA updated.

## 2024-11-22 NOTE — CM/SW NOTE
Spoke with Livia from [facility] regarding DC planning.  Pt accepted to [facility] facility indicating that they are an intermediate behavioral care facility.  They do not provide therapy services but are otherwise similar to SNFs.  Given potential limitations in care available, plan to have [facility] review pt's clinical needs at time of discharge in order to confirm if they can appropriately care for pt's needs.  If not, pt will need to return to [facility].  Livia in agreement.  Will review DC plan closer to discharge.  / to remain available for support and/or discharge planning.     Theresa Oseguera, [facility]  Discharge Planner  [phone]

## 2024-11-22 NOTE — PLAN OF CARE
Assumed care around 1230 after receiving report from Misty IZAGUIRRE.  A&O 4.  Oxygen- 1 L via NC for comfort per pt request.   Fluids- D5/0.45 at 83 mL/hr and scheduled Cipro Q 12. Depacon Q 12 started. See MAR for more details.   NPO, except ice chips. NG tube C/D/I to L jaclyne.   Accuchecks. See MAR for insulin administration details.   Tele- SB/NSR.  VTE- Lovenox. Eliquis on hold.   Echo completed prior to assumption of care. See results for more details.   Non cardiac electrolyte replacement protocol. Magnesium (1.8) and Potassium (3.4) replaced. See MAR and managed orders for more details.   Contact isolation maintained.   1-2 to turn.    Pt oriented to room, safety prec initiated, bed is in the lowest position and call light within reach. Pt updated on the plan of care. All needs met at this time.     1648: MD paged that pt was experiencing nausea. PRN utilized per MAR and NG placed back to LIS per MD order.    Problem: Patient/Family Goals  Goal: Patient/Family Long Term Goal  Description: Patient's Long Term Goal: to be discharged    Interventions:  - continue recommended medication regimen  - continue recommended testing/interventions  - See additional Care Plan goals for specific interventions  Outcome: Progressing  Goal: Patient/Family Short Term Goal  Description: Patient's Short Term Goal: to be free from pain    Interventions:   - prn pain medication  - maintain dietary recommendations  - See additional Care Plan goals for specific interventions  Outcome: Progressing     Problem: METABOLIC/FLUID AND ELECTROLYTES - ADULT  Goal: Glucose maintained within prescribed range  Description: INTERVENTIONS:  - Monitor Blood Glucose as ordered  - Assess for signs and symptoms of hyperglycemia and hypoglycemia  - Administer ordered medications to maintain glucose within target range  - Assess barriers to adequate nutritional intake and initiate nutrition consult as needed  - Instruct patient on self management of  diabetes  Outcome: Progressing  Goal: Electrolytes maintained within normal limits  Description: INTERVENTIONS:  - Monitor labs and rhythm and assess patient for signs and symptoms of electrolyte imbalances  - Administer electrolyte replacement as ordered  - Monitor response to electrolyte replacements, including rhythm and repeat lab results as appropriate  - Fluid restriction as ordered  - Instruct patient on fluid and nutrition restrictions as appropriate  Outcome: Progressing     Problem: NEUROLOGICAL - ADULT  Goal: Achieves stable or improved neurological status  Description: INTERVENTIONS  - Assess for and report changes in neurological status  - Initiate measures to prevent increased intracranial pressure  - Maintain blood pressure and fluid volume within ordered parameters to optimize cerebral perfusion and minimize risk of hemorrhage  - Monitor temperature, glucose, and sodium. Initiate appropriate interventions as ordered  Outcome: Progressing

## 2024-11-22 NOTE — DIETARY NOTE
[facility]   part of [facility]    NUTRITION ASSESSMENT    Unable to diagnose malnutrition criteria at this time.    NUTRITION INTERVENTION:    Meal and Snacks - Monitor and encourage adequate PO intake. NPO x 4 days.  Medical Food Supplements - Will evaluate need when diet is advanced.     Nutrition Support- if diet is unable to advance to at least Full Liquid within the next 48-72 hours, considering starting TPN, as medically feasible.      PATIENT STATUS:     11/22/24- 53 y/o female from [facility] admitted w/ abdominal pain and distention for ~ 1 week PTA. No BM in 4 days.   Pt chart reviewed d/t NPO status x 4 days.  Pt w/ dilated colon on imaging. NG for LIS placed on 11/19 w/ 950 ml output x 24 hours. S/p flex sig on 11/20, which revealed dilated colon, s/p lavage and suction, and colon filled w/ solid stool. Surgery is following. Pt w/ possible colon obstruction. Plans are for lower GI today. Receiving IVF:D5-1/2 NS at 83 ml/hr, which provides: 339 kcal. Will monitor for diet advancement vs need for nutrition support.  PMH: quit smoking, HTN, DM, bipolar, MIKE, transverse myelitis, suprapubic catheter, hx of R colectomy.       ANTHROPOMETRICS:  Ht: 152.4 cm (5')  Wt: 86.2 kg (190 lb).   BMI: Body mass index is 37.11 kg/m².  IBW: 45.5 kg      WEIGHT HISTORY:     Per EMR hx, pt may have lost ~20 lb (9.5%) in about 4 months, which is significant wt loss per standards (210 lb down to 190 lb).      Wt Readings from Last 20 Encounters:   11/19/24 86.2 kg (190 lb)   11/17/24 86.2 kg (190 lb)   10/22/24 85.7 kg (189 lb)   10/19/24 85.7 kg (189 lb)   09/20/24 86.2 kg (190 lb)   09/18/24 86.4 kg (190 lb 6.4 oz)   08/21/24 88.2 kg (194 lb 8 oz)   08/05/24 113.4 kg (250 lb)   08/04/24 113.4 kg (250 lb)   07/24/24 91.1 kg (200 lb 14.4 oz)   07/22/24 95.3 kg (210 lb)   07/19/24 95.3 kg (210 lb)   07/13/24 95.3 kg (210 lb 1.6 oz)   05/10/24 101.2 kg (223 lb)   01/28/24 96 kg (211 lb 10.3 oz)   01/11/24  95.9 kg (211 lb 8 oz)   12/10/23 94.7 kg (208 lb 12.8 oz)   08/15/16 104.3 kg (229 lb 15 oz)       NUTRITION:  Diet:       Procedures    NPO      Food Allergies: No  Cultural/Ethnic/Tenriism Preferences Addressed: Yes    Percent Meals Eaten (last 3 days)       Date/Time Percent Meals Eaten (%)    11/19/24 1137 0 %     Percent Meals Eaten (%): NPO at 11/19/24 1137    11/19/24 1559 0 %     Percent Meals Eaten (%): NPO at 11/19/24 1559          GI system review: constipation, abdominal pain, and NG to LIS.  Last BM Date: 11/22/24  Skin and wounds: skin tear to sacrum per RN documentation    NUTRITION RELATED PHYSICAL FINDINGS:     1. Body Fat/Muscle Mass: unable to assess at this time     2. Fluid Accumulation: lower extremity edema non-pitting    NUTRITION PRESCRIPTION:  45.5 kg- IBW  Calories: 6457-9813 calories/day (30-35 kcal/kg)  Protein:  grams protein/day (2-2.5 grams protein per kg)  Fluid: ~1 ml/kcal or per MD discretion    NUTRITION DIAGNOSIS/PROBLEM:  Inadequate energy intake related to  decreased ability to consume sufficient energy intakes  as evidenced by  NPO status x 4 days.      MONITOR AND EVALUATE/NUTRITION GOALS:  Weight stable within 1 to 2 lbs during admission - New  Return to PO intake or advance diet in 24-48 hrs - New  Start alternative nutrition in 24-48 hrs if diet is not able to advance- New      MEDICATIONS:  IVF:D5-1/2 NS at 83 ml/hr, Insulin, Solumedrol, IV abx, Depacon    LABS:  POC Glu:155-264, Glu:232, K+:3.4, elevated LFT's    Pt is at High nutrition risk    Elva Cline MS, RD, LDN  Clinical Dietitian  Ext:[phone]

## 2024-11-22 NOTE — PROGRESS NOTES
[facility]   part of [facility]     Hospitalist Progress Note     Clari Rosa Patient Status:  Observation    1970 MRN [id number]   Location [facility] 3SW-A Attending Bhanu Mckinney MD   Hosp Day # 3 PCP Macrina Galvin MD     Chief Complaint: abd pain    Subjective:     Rapid response called this AM. Pt became cornel to the 40's starting around 4 AM. Around 10 AM, dropped to mid 30's. Pt manic and it is difficult to get an answer to if she is symptomatic but she does feel sweaty/clammy. I believe she denies CP, SOB but gives vague answers. Denies dizziness/LH. States this has never happened before. No bb in 3 days while she's been NPO. BP was low but immediate repeat showed normotensive. Is having stools now. Went for lower GI this AM but cornel started well before that.     Objective:    Review of Systems:   A comprehensive review of systems was completed; pertinent positive and negatives stated in subjective.    Vital signs:  Temp:  [98 °F (36.7 °C)-98.5 °F (36.9 °C)] 98.3 °F (36.8 °C)  Pulse:  [41-87] 41  Resp:  [16-18] 18  BP: (101-130)/(55-84) 116/55  SpO2:  [94 %-98 %] 98 %    Physical Exam:    General: No acute distress  Respiratory: No wheezes, no rhonchi  Cardiovascular: S1, S2, regular rate and rhythm, bradycardic  Abdomen: Soft, tender, distended, positive bowel sounds  Neuro: No new focal deficits.   Extremities: No edema      Diagnostic Data:    Labs:  Recent Labs   Lab 24  0539 24  0538   WBC 18.9* 15.3* 8.1 10.7   HGB 11.9* 11.6* 9.5* 9.5*   MCV 73.6* 72.7* 76.6* 75.8*   .0 331.0 216.0 256.0       Recent Labs   Lab 24  0539 24  0538   * 160* 117* 139*   BUN 8* 11 6* <5*   CREATSERUM 0.67 0.83 0.57 0.60   CA 9.1 9.3 8.1* 8.6*   ALB 3.9 4.1  --  3.5   * 135* 137 138   K 3.5 4.0 3.5 3.5    97* 106 104   CO2 26.0 29.0 30.0 30.0   ALKPHO 72 73  --  162*   AST 11 18  --  67*    ALT 8* 8*  --  198*   BILT 0.3 0.3  --  0.3   TP 7.0 6.9  --  6.0       Estimated Creatinine Clearance: 77 mL/min (based on SCr of 0.6 mg/dL).    No results for input(s): \"TROP\", \"TROPHS\", \"CK\" in the last 168 hours.    No results for input(s): \"PTP\", \"INR\" in the last 168 hours.               Microbiology    No results found for this visit on 11/18/24.      Imaging: Reviewed in Epic.    Medications:    ciprofloxacin  400 mg Intravenous Q12H    [Held by provider] apixaban  5 mg Oral BID    clonazePAM  1.5 mg Oral TID    divalproex ER  500 mg Oral BID    DULoxetine  60 mg Oral Daily    oxybutynin  5 mg Oral TID    QUEtiapine  200 mg Oral Nightly    insulin degludec  5 Units Subcutaneous Nightly    insulin aspart  1-10 Units Subcutaneous TID AC and HS    [Held by provider] metoprolol tartrate  25 mg Oral 2x Daily(Beta Blocker)    nystatin-triamcinolone   Topical BID    enoxaparin  1 mg/kg Subcutaneous 2 times per day       Assessment & Plan:      #Abdominal pain  #bowel obstruction  #rectal stricture  - CT shows dilated colon with narrowing at rectosigmoid junction  - NPO  - GI consult  - PRN pain med  -Ngt to IS  -sp flex sig for decompression.now having BM's, abd less distended  -gen surg consult  -lower GI today  -hold eliquis. cont FD lovenox     #sinus bradycardia  -consult cards, d/w APN  -EKG reviewed, sinus  -check lytes and trop  -echo  -if becomes clearly symptomatic, will give atropine but can hold off for now  -transfer to tele     # Type 2 diabetes  - Will plae on hyperglycemia protocol with long actin and correction factor insulin.  -on D51/2 NS     # Anxiety  - Prn IV benozo's     # Bipolar w/ carmel  - Will continue on depakote and seroquel  -seems to be having a manic episode. Pt requesting to go to [facility] after  -consult psych APN please as carmel is worsening without her meds while NPO     # Hypertension  - Will continue on metoprolol     # Neurogenic bladder  - Patient has chronic  mark.    #dysuria  -previous UA contaminated but Ucx neg. Pt c/o persistent sx's. Repeat UA pos  -start IV abx. Await UCx     # Hx DVT  - Will continue on eliquis.     # ANAID  - Will place on ANAID  protocol.    Crit care time spent 43 min     Bhanu Mckinney MD    Supplementary Documentation:     Quality:  DVT Mechanical Prophylaxis:        DVT Pharmacologic Prophylaxis   Medication    [Held by provider] apixaban (Eliquis) tab 5 mg    heparin (Porcine) 100 Units/mL lock flush 500 Units    enoxaparin (Lovenox) 100 MG/ML SUBQ injection 90 mg                Code Status: Full Code  Mark: Suprapubic catheter in place  Mark Duration (in days):   Central line:    MARINA:     Discharge is dependent on: course  At this point Ms. Rosa is expected to be discharge to: home    The 21st Century Cures Act makes medical notes like these available to patients in the interest of transparency. Please be advised this is a medical document. Medical documents are intended to carry relevant information, facts as evident, and the clinical opinion of the practitioner. The medical note is intended as peer to peer communication and may appear blunt or direct. It is written in medical language and may contain abbreviations or verbiage that are unfamiliar.

## 2024-11-22 NOTE — PLAN OF CARE
Alert and Oriented x4. 1L O2 via NC. VSS. Tele-NS/SB. Mild Pain controlled by PRN medications, see MAR for actions. N/T to BUE & BLE. Port to Lt Upper chest, transparent dressing C/D/I; infusing. Voiding freely via suprapubic catheter. Tolerating diet. Denies N/V. Call light within reach at this time.    Plan: NPO, IVF, abd xray @ 0800      3820: Contacted hospitalist regarding patients HR being Sinus Bradycardic at times, patient insistent on another EKG, no new orders for an EKG at this time.    0447: Paged hospitalist again regarding HR now reaching as low as 35, no new orders.

## 2024-11-22 NOTE — PROGRESS NOTES
[facility]  GASTROENTEROLOGY PROGRESS NOTE   [facility] Gastroenterology     Clari Rosa Patient Status:  Inpatient    1970 MRN [id number]   Location [facility] 3SW-A Attending Bhanu Mckinney MD   Hosp Day # 3 PCP Macrina Galvin MD       Reason for Consultation:   Dilated colon on imaging  Abnormal CT scan  Abdominal pain    Subjective:   Feels about the same today, abd pain slightly better. +BM,  +flatus.   Gen surgery eval noted this morning.   Plan for lower GI this morning.       Patient Active Problem List   Diagnosis    Paresis of lower extremity (HCC)    Transverse myelitis (HCC)    Bipolar 1 disorder ([facility])    Manipulative behavior    History of diverticulitis of colon    Serotonin syndrome    Obesity (BMI 30-39.9)    Dental caries    Type 2 diabetes mellitus without complication, with long-term current use of insulin ([facility])    Chronic suprapubic catheter ([facility])    Bipolar I disorder depressed with atypical features ([facility])    Other acute pulmonary embolism, unspecified whether acute cor pulmonale present ([facility])    Hyperglycemia    Sepsis due to undetermined organism ([facility])    Severe episode of recurrent major depressive disorder, without psychotic features ([facility])    Generalized anxiety disorder with panic attacks    Sepsis due to urinary tract infection ([facility])    Lactic acid acidosis    Constipation, unspecified constipation type    Moderate depressed bipolar I disorder (HCC)    Acute chest pain    Depression, unspecified depression type    Cellulitis    Bipolar disorder, current episode mixed, severe, without psychotic features ([facility])    Chest pain    Acute cystitis without hematuria    Leukocytosis    Leukocytosis, unspecified type    Benign essential hypertension    COPD (chronic obstructive pulmonary disease) (HCC)    Neurogenic bladder    Urinary tract infection associated with catheterization of urinary tract, unspecified indwelling urinary catheter type, initial encounter ([facility])    Weakness  generalized    Anemia    Cystitis    UTI (urinary tract infection)    Chronic abdominal pain    Abdominal pain, acute       PMH, PSH, Fhx, Shx as per initial consult note    Review of Systems    12 point Review of Systems negative unless otherwise mentioned in Subjective.     Physical Exam  /55 (BP Location: Left arm)   Pulse (!) 41   Temp 98.3 °F (36.8 °C) (Oral)   Resp 18   Ht 5' (1.524 m)   Wt 190 lb (86.2 kg)   SpO2 98%   BMI 37.11 kg/m²   Body mass index is 37.11 kg/m².      Gen: No acute distress  Resp: no respiratory distress  Abd: Soft, non -tender, mildly -distended. No rebound tenderness, no guarding.   Neuro: Aox3.     Diagnostic Data:      Labs:  Recent Labs   Lab 11/18/24 2355 11/20/24  0539 11/21/24  0538   WBC 15.3* 8.1 10.7   HGB 11.6* 9.5* 9.5*   MCV 72.7* 76.6* 75.8*   .0 216.0 256.0       Recent Labs   Lab 11/17/24  2318 11/18/24  2355 11/20/24  0539 11/21/24  0538   * 160* 117* 139*   BUN 8* 11 6* <5*   CREATSERUM 0.67 0.83 0.57 0.60   CA 9.1 9.3 8.1* 8.6*   ALB 3.9 4.1  --  3.5   * 135* 137 138   K 3.5 4.0 3.5 3.5    97* 106 104   CO2 26.0 29.0 30.0 30.0   ALKPHO 72 73  --  162*   AST 11 18  --  67*   ALT 8* 8*  --  198*   BILT 0.3 0.3  --  0.3   TP 7.0 6.9  --  6.0       No results for input(s): \"PTP\", \"INR\" in the last 168 hours.    No data recorded        Imaging: Imaging data reviewed in Epic.    Medications:    [Held by provider] apixaban  5 mg Oral BID    clonazePAM  1.5 mg Oral TID    divalproex ER  500 mg Oral BID    DULoxetine  60 mg Oral Daily    oxybutynin  5 mg Oral TID    QUEtiapine  200 mg Oral Nightly    insulin degludec  5 Units Subcutaneous Nightly    insulin aspart  1-10 Units Subcutaneous TID AC and HS    [Held by provider] metoprolol tartrate  25 mg Oral 2x Daily(Beta Blocker)    nystatin-triamcinolone   Topical BID    enoxaparin  1 mg/kg Subcutaneous 2 times per day       Allergies:  Allergies[1]    Imaging:  I have personally reviewed  all pertinent available imaging.       ASSESSMENT / PLAN:     Clari Rosa is a 54 year old female with GI consult for abdominal pain, dilated GI tract and colon on imaging.   CT w/ focus of narrowing at the rectosigmoid junction.     -Flex sig 11/20 - with stool, and air/liquid suctioned.  -XRAY 11/21 w/ recurrent and increase distension of the small bowel, colon. No free air noted.   -Lower GI xray - planned for today, 11/22    -Clinically, patient improving. Having Bms, flatus today. Less distended on exam.   -Repeat flex sig with decompression may not be definitive therapy, especially given recurrence prior recurrence and XR from yesterday. Discussed risks (including risk for perforation in this setting) vs benefit, alternatives. Patient does not want flex sig at this time, wants to discuss surgical options.       Recommendations:   Appreciate general surgery recommendations   No plans for flex sig at this time, as above  Lower GI XR this morning - can re-consider flex sig if concern for colorectal lesion/mass.   IVF, analgesia, anti-emetics per primary team; avoid gut slowing analgesia  Keep NPO; NGT in place   CBC, CMP, INR    D/w general surgery, Dr Nikhil Abad MD  [facility]               [1]   Allergies  Allergen Reactions    Pcn [Penicillins] HIVES    Radiology Contrast Iodinated Dyes HIVES, DIZZINESS and SHORTNESS OF BREATH    Sulfa Antibiotics HIVES    Keflex [Cephalexin] UNKNOWN

## 2024-11-23 ENCOUNTER — APPOINTMENT (OUTPATIENT)
Dept: GENERAL RADIOLOGY | Facility: HOSPITAL | Age: 54
DRG: 389 | End: 2024-11-23
Attending: STUDENT IN AN ORGANIZED HEALTH CARE EDUCATION/TRAINING PROGRAM
Payer: MEDICARE

## 2024-11-23 ENCOUNTER — APPOINTMENT (OUTPATIENT)
Dept: GENERAL RADIOLOGY | Facility: HOSPITAL | Age: 54
End: 2024-11-23
Attending: STUDENT IN AN ORGANIZED HEALTH CARE EDUCATION/TRAINING PROGRAM
Payer: MEDICARE

## 2024-11-23 LAB
ALBUMIN SERPL-MCNC: 3.3 G/DL (ref 3.2–4.8)
ALBUMIN/GLOB SERPL: 1.1 {RATIO} (ref 1–2)
ALP LIVER SERPL-CCNC: 107 U/L
ALT SERPL-CCNC: 70 U/L
ANION GAP SERPL CALC-SCNC: 4 MMOL/L (ref 0–18)
AST SERPL-CCNC: 10 U/L (ref ?–34)
BASOPHILS # BLD AUTO: 0.02 X10(3) UL (ref 0–0.2)
BASOPHILS NFR BLD AUTO: 0.2 %
BILIRUB SERPL-MCNC: 0.2 MG/DL (ref 0.3–1.2)
BUN BLD-MCNC: 7 MG/DL (ref 9–23)
CALCIUM BLD-MCNC: 8.7 MG/DL (ref 8.7–10.4)
CHLORIDE SERPL-SCNC: 106 MMOL/L (ref 98–112)
CO2 SERPL-SCNC: 31 MMOL/L (ref 21–32)
CREAT BLD-MCNC: 0.7 MG/DL
EGFRCR SERPLBLD CKD-EPI 2021: 103 ML/MIN/1.73M2 (ref 60–?)
EOSINOPHIL # BLD AUTO: 0.06 X10(3) UL (ref 0–0.7)
EOSINOPHIL NFR BLD AUTO: 0.6 %
ERYTHROCYTE [DISTWIDTH] IN BLOOD BY AUTOMATED COUNT: 16.7 %
GLOBULIN PLAS-MCNC: 3 G/DL (ref 2–3.5)
GLUCOSE BLD-MCNC: 138 MG/DL (ref 70–99)
GLUCOSE BLD-MCNC: 149 MG/DL (ref 70–99)
GLUCOSE BLD-MCNC: 166 MG/DL (ref 70–99)
GLUCOSE BLD-MCNC: 173 MG/DL (ref 70–99)
HCT VFR BLD AUTO: 29 %
HGB BLD-MCNC: 9.1 G/DL
IMM GRANULOCYTES # BLD AUTO: 0.03 X10(3) UL (ref 0–1)
IMM GRANULOCYTES NFR BLD: 0.3 %
LYMPHOCYTES # BLD AUTO: 2 X10(3) UL (ref 1–4)
LYMPHOCYTES NFR BLD AUTO: 21.6 %
MAGNESIUM SERPL-MCNC: 2.1 MG/DL (ref 1.6–2.6)
MCH RBC QN AUTO: 24 PG (ref 26–34)
MCHC RBC AUTO-ENTMCNC: 31.4 G/DL (ref 31–37)
MCV RBC AUTO: 76.5 FL
MONOCYTES # BLD AUTO: 0.41 X10(3) UL (ref 0.1–1)
MONOCYTES NFR BLD AUTO: 4.4 %
NEUTROPHILS # BLD AUTO: 6.75 X10 (3) UL (ref 1.5–7.7)
NEUTROPHILS # BLD AUTO: 6.75 X10(3) UL (ref 1.5–7.7)
NEUTROPHILS NFR BLD AUTO: 72.9 %
OSMOLALITY SERPL CALC.SUM OF ELEC: 292 MOSM/KG (ref 275–295)
PHOSPHATE SERPL-MCNC: 3 MG/DL (ref 2.4–5.1)
PLATELET # BLD AUTO: 259 10(3)UL (ref 150–450)
POTASSIUM SERPL-SCNC: 3.7 MMOL/L (ref 3.5–5.1)
POTASSIUM SERPL-SCNC: 3.7 MMOL/L (ref 3.5–5.1)
PROT SERPL-MCNC: 6.3 G/DL (ref 5.7–8.2)
RBC # BLD AUTO: 3.79 X10(6)UL
SODIUM SERPL-SCNC: 141 MMOL/L (ref 136–145)
TRIGL SERPL-MCNC: 243 MG/DL (ref 30–149)
WBC # BLD AUTO: 9.3 X10(3) UL (ref 4–11)

## 2024-11-23 PROCEDURE — 99232 SBSQ HOSP IP/OBS MODERATE 35: CPT | Performed by: HOSPITALIST

## 2024-11-23 PROCEDURE — 3E0336Z INTRODUCTION OF NUTRITIONAL SUBSTANCE INTO PERIPHERAL VEIN, PERCUTANEOUS APPROACH: ICD-10-PCS | Performed by: INTERNAL MEDICINE

## 2024-11-23 PROCEDURE — 74019 RADEX ABDOMEN 2 VIEWS: CPT | Performed by: STUDENT IN AN ORGANIZED HEALTH CARE EDUCATION/TRAINING PROGRAM

## 2024-11-23 NOTE — PROGRESS NOTES
[facility]   part of [facility]     Hospitalist Progress Note     Clari Rosa Patient Status:  Observation    1970 MRN [id number]   Location [facility] 3SW-A Attending Bhanu Mckinney MD   Hosp Day # 4 PCP Macrina Galvin MD     Chief Complaint: abd pain    Subjective:     No BM today but had some yesterday. Is passing gas. HR back to normal.     Objective:    Review of Systems:   A comprehensive review of systems was completed; pertinent positive and negatives stated in subjective.    Vital signs:  Temp:  [97.4 °F (36.3 °C)-98.5 °F (36.9 °C)] 98.5 °F (36.9 °C)  Pulse:  [43-88] 88  Resp:  [13-25] 14  BP: (108-144)/(54-74) 125/74  SpO2:  [98 %-100 %] 100 %    Physical Exam:    General: No acute distress  Respiratory: No wheezes, no rhonchi  Cardiovascular: S1, S2, regular rate and rhythm, bradycardic  Abdomen: Soft, tender, distended, positive bowel sounds  Neuro: No new focal deficits.   Extremities: No edema      Diagnostic Data:    Labs:  Recent Labs   Lab 24  2355 24  0539 24  0538 24  1011 24  1049   WBC 15.3* 8.1 10.7 8.3 9.3   HGB 11.6* 9.5* 9.5* 9.4* 9.1*   MCV 72.7* 76.6* 75.8* 74.2* 76.5*   .0 216.0 256.0 246.0 259.0       Recent Labs   Lab 24  0538 24  1011 24  1049   * 232* 138*   BUN <5* 8* 7*   CREATSERUM 0.60 0.63 0.70   CA 8.6* 8.9 8.7   ALB 3.5 3.7 3.3    140 141   K 3.5 3.4* 3.7  3.7    102 106   CO2 30.0 32.0 31.0   ALKPHO 162* 133* 107   AST 67* 17 10   * 112* 70*   BILT 0.3 0.2* 0.2*   TP 6.0 6.2 6.3       Estimated Creatinine Clearance: 66 mL/min (based on SCr of 0.7 mg/dL).    Recent Labs   Lab 24  1011   TROPHS <3       No results for input(s): \"PTP\", \"INR\" in the last 168 hours.               Microbiology    Hospital Encounter on 24   1. Urine Culture, Routine     Status: Abnormal (Preliminary result)    Collection Time: 24 12:03 AM    Specimen: Urine, clean catch   Result  Value Ref Range    Urine Culture 50,000-99,000 CFU/ML Enterococcus faecalis (A) N/A         Imaging: Reviewed in Epic.    Medications:    ciprofloxacin  400 mg Intravenous Q12H    valproate  500 mg Intravenous Q12H    [Held by provider] apixaban  5 mg Oral BID    clonazePAM  1.5 mg Oral TID    divalproex ER  500 mg Oral BID    DULoxetine  60 mg Oral Daily    oxybutynin  5 mg Oral TID    QUEtiapine  200 mg Oral Nightly    insulin degludec  5 Units Subcutaneous Nightly    insulin aspart  1-10 Units Subcutaneous TID AC and HS    [Held by provider] metoprolol tartrate  25 mg Oral 2x Daily(Beta Blocker)    nystatin-triamcinolone   Topical BID    enoxaparin  1 mg/kg Subcutaneous 2 times per day       Assessment & Plan:      #Abdominal pain  #bowel obstruction  #rectal stricture  - CT shows dilated colon with narrowing at rectosigmoid junction  - NPO  - GI consult  - PRN pain med  -Ngt to IS  -sp flex sig for decompression.now having BM's, abd less distended  -gen surg consult  -lower GI neg for stricture  -hold eliquis. cont FD lovenox   -start TPN today for nutrition. Can DC d5 once started    #sinus bradycardia  -consult cards, d/w APN  -EKG reviewed, sinus  -echo  -likely vasovagal. resolved     # Type 2 diabetes  - Will plae on hyperglycemia protocol with long actin and correction factor insulin.  -on D51/2 NS     # Anxiety  - Prn IV benozo's     # Bipolar w/ carmel  -depakote and seroquel on hold while NPO  -IV depakote  -seems to be having a manic episode. Pt requesting to go to [facility] after  -consult psych APN please as carmel is worsening without her meds while NPO     # Hypertension  - Will continue on metoprolol     # Neurogenic bladder  - Patient has chronic mark.    #UTI  -IV abx.   -Ucx enterococcus.   -allergy to PCN and no nitrofurantoin while NPO. Called lab to run susc.  -cont IV cipro for now      # Hx DVT  - eliquis at DC     # ANAID  - Will place on ANAID  protocol.    Bhanu Mckinney MD    Supplementary  Documentation:     Quality:  DVT Mechanical Prophylaxis:        DVT Pharmacologic Prophylaxis   Medication    [Held by provider] apixaban (Eliquis) tab 5 mg    heparin (Porcine) 100 Units/mL lock flush 500 Units    enoxaparin (Lovenox) 100 MG/ML SUBQ injection 90 mg                Code Status: Full Code  Florentino: Suprapubic catheter in place  Florentino Duration (in days):   Central line:    MARINA:     Discharge is dependent on: course  At this point Ms. Rosa is expected to be discharge to: home    The 21st Century Cures Act makes medical notes like these available to patients in the interest of transparency. Please be advised this is a medical document. Medical documents are intended to carry relevant information, facts as evident, and the clinical opinion of the practitioner. The medical note is intended as peer to peer communication and may appear blunt or direct. It is written in medical language and may contain abbreviations or verbiage that are unfamiliar.

## 2024-11-23 NOTE — PLAN OF CARE
Assumed care at 0730  A/Ox4, 2L NC, CPAP at night, VSS  Tele, NSR/SB  Denies n/v & pain meds per MAR  Ng on LIS  PRN ativan  NPO, ice chips ok, QID accucheck  Suprapubic cath in place, draining   Port cath changed, blood flow return, unit draw  Safety precautions in place  All needs met at this time    1855: 600mL output from NG    Problem: METABOLIC/FLUID AND ELECTROLYTES - ADULT  Goal: Glucose maintained within prescribed range  Description: INTERVENTIONS:  - Monitor Blood Glucose as ordered  - Assess for signs and symptoms of hyperglycemia and hypoglycemia  - Administer ordered medications to maintain glucose within target range  - Assess barriers to adequate nutritional intake and initiate nutrition consult as needed  - Instruct patient on self management of diabetes  Outcome: Progressing  Goal: Electrolytes maintained within normal limits  Description: INTERVENTIONS:  - Monitor labs and rhythm and assess patient for signs and symptoms of electrolyte imbalances  - Administer electrolyte replacement as ordered  - Monitor response to electrolyte replacements, including rhythm and repeat lab results as appropriate  - Fluid restriction as ordered  - Instruct patient on fluid and nutrition restrictions as appropriate  Outcome: Progressing     Problem: NEUROLOGICAL - ADULT  Goal: Achieves stable or improved neurological status  Description: INTERVENTIONS  - Assess for and report changes in neurological status  - Initiate measures to prevent increased intracranial pressure  - Maintain blood pressure and fluid volume within ordered parameters to optimize cerebral perfusion and minimize risk of hemorrhage  - Monitor temperature, glucose, and sodium. Initiate appropriate interventions as ordered  Outcome: Progressing     Problem: Impaired Cognition  Goal: Patient will exhibit improved attention, thought processing and/or memory  Description: Interventions:  - Allow additional time for processing after asking questions or  providing instructions  Outcome: Progressing     Problem: Patient/Family Goals  Goal: Patient/Family Long Term Goal  Description: Patient's Long Term Goal: to be discharged    Interventions:  - continue recommended medication regimen  - continue recommended testing/interventions  - See additional Care Plan goals for specific interventions  Outcome: Progressing  Goal: Patient/Family Short Term Goal  Description: Patient's Short Term Goal: to be free from pain    Interventions:   - prn pain medication  - maintain dietary recommendations  - See additional Care Plan goals for specific interventions  Outcome: Progressing

## 2024-11-23 NOTE — DIETARY NOTE
[facility]   part of [facility]   CLINICAL NUTRITION - TPN FOLLOW UP    Clari Rosa     TPN order for tonight to provide: Infused via Port 500 dextrose calories, 360 lipid calories (30% lipids), 70 grams protein in 1800mL fluid. Provides 1140 kcal, meeting ~ 76% of nutrition prescription.     TPN goal: 750 dextrose calories, 390 lipid calories (26% lipids), 90 grams protein in 1800mL fluid. Provides 1500 kcal.        Intake/Output Summary (Last 24 hours) at 11/23/2024 1255  Last data filed at 11/23/2024 0500  Gross per 24 hour   Intake --   Output 2700 ml   Net -2700 ml       Labs:   Recent Labs   Lab 11/22/24  1011 11/23/24  1049   * 138*    141   K 3.4* 3.7  3.7    106   CO2 32.0 31.0   BUN 8* 7*   CREATSERUM 0.63 0.70   CA 8.9 8.7   PHOS 2.7 3.0   MG 1.8 2.1   ALKPHO 133* 107   AST 17 10   * 70*   BILT 0.2* 0.2*       Glucose POC last 24hr (153-215). Novolog SSC. Received 5 units in past 24 hours    Electrolytes adjusted based on today's labs.   NG to LIS 670ml in last 24 hours  Pt NPO with bowel obstruction   Currently receiving D5/0.45 NS at 83ml/hr- will discontinue when TPN bag hangs tonight     RD will write TPN daily and follow per protocol. See full assessment 11/22.    Pt is at high nutrition risk.    Bibi Johns RD, LDN  Clinical Dietitian  [id number]

## 2024-11-23 NOTE — PROGRESS NOTES
Progress Note  Clari Rosa Patient Status:  Inpatient    1970 MRN [id number]   Location [facility] 3NE-A Attending Bhanu Mckinney MD   Hosp Day # 4 PCP Macrina Galvin MD     Subjective:  In bed, anxious. No cp, sob.     Objective:  /73 (BP Location: Left arm)   Pulse 78   Temp 98.1 °F (36.7 °C) (Oral)   Resp 14   Ht 5' (1.524 m)   Wt 190 lb (86.2 kg)   SpO2 98%   BMI 37.11 kg/m²     Telemetry: nsr      Intake/Output:    Intake/Output Summary (Last 24 hours) at 2024 0943  Last data filed at 2024 0500  Gross per 24 hour   Intake --   Output 2700 ml   Net -2700 ml       Last 3 Weights   24 0120 190 lb (86.2 kg)   24 2301 190 lb (86.2 kg)   24 2317 190 lb (86.2 kg)   10/22/24 1656 189 lb (85.7 kg)       Labs:  Recent Labs   Lab 24  0539 24  0538 24  1011   * 139* 232*   BUN 6* <5* 8*   CREATSERUM 0.57 0.60 0.63   EGFRCR 108 107 105   CA 8.1* 8.6* 8.9    138 140   K 3.5 3.5 3.4*    104 102   CO2 30.0 30.0 32.0     Recent Labs   Lab 24  0539 24  0538 24  1011   RBC 3.89 3.92 3.92   HGB 9.5* 9.5* 9.4*   HCT 29.8* 29.7* 29.1*   MCV 76.6* 75.8* 74.2*   MCH 24.4* 24.2* 24.0*   MCHC 31.9 32.0 32.3   RDW 16.4 16.1 16.1   NEPRELIM 4.92 7.85* 6.76   WBC 8.1 10.7 8.3   .0 256.0 246.0         Recent Labs   Lab 24  1011   TROPHS <3       Review of Systems   Cardiovascular: Negative.    Respiratory: Negative.     Psychiatric/Behavioral:  The patient is nervous/anxious.        Physical Exam:    Gen: alert, oriented x 3, NAD  Heent: pupils equal, reactive. Mucous membranes moist. NG in place.  Neck: no jvd  Cardiac: regular rate and rhythm, normal S1,S2  Lungs: CTA  Abd: soft, NT/ND +bs  Ext: no edema  Skin: Warm, dry  Neuro: No focal deficits        Medications:     ciprofloxacin  400 mg Intravenous Q12H    valproate  500 mg Intravenous Q12H    [Held by provider] apixaban  5 mg Oral BID    clonazePAM  1.5 mg  Oral TID    divalproex ER  500 mg Oral BID    DULoxetine  60 mg Oral Daily    oxybutynin  5 mg Oral TID    QUEtiapine  200 mg Oral Nightly    insulin degludec  5 Units Subcutaneous Nightly    insulin aspart  1-10 Units Subcutaneous TID AC and HS    [Held by provider] metoprolol tartrate  25 mg Oral 2x Daily(Beta Blocker)    nystatin-triamcinolone   Topical BID    enoxaparin  1 mg/kg Subcutaneous 2 times per day      dextrose 5%-sodium chloride 0.45% 83 mL/hr at 11/23/24 0602           Assessment:  Partial SBO, rectal stricture  Surgery following. NG to suction.  GI following.   Abx.   Sinus bradycardia, likely 2/2 vagal tone either from ANAID or abdominal pain/dilated colon  As outlined by Dr. Bae-Would not treat. If concern during procedures, pre treat with either 0.4mg atropine before anesthesia or use dopamine during the case.   Echo 11/22/24-LVEF 65-70%, no rwams. RV function normal.   Cont to hold bb.   HTN-controlled  DMII  ANAID-not wearing cpap  Bipolar-per primary/psych  Hx DVT on eliquis--currently held given above.     Plan:  Bradycardia has resolved today. Cont to hold bb for now  Further management per GI, surgery, psych.     Plan of care discussed with patient, RN.    Anabella Edwards, AMBAR  11/23/2024  9:43 AM      Note reviewed and labs reviewed.  Agree to the assessment and plan with the following additions/changes.  Entire medical decision making & plan performed by myself.      A/P:  Vagally mediated bradycardia.  Avoid rate control agents in the interim.    Cardiology will sign off.    LOC L2    Fan Desai MD  11/23/2024  Interventional Cardiology  [facility]  =======================================================

## 2024-11-23 NOTE — SPIRITUAL CARE NOTE
Spiritual Care Visit Note    Patient Name: Clari Rosa Date of Spiritual Care Visit: 24   : 1970 Primary Dx: Constipation, unspecified constipation type   Referred By: Day shift Glenys pierre.    Spiritual Care Taxonomy:    Intended Effects: Demonstrate caring and concern    Methods: Collaborate with care team member;Offer spiritual/Orthodoxy support    Interventions: Acknowledge current situation;Active listening;Ask guided questions about [place]inton;Ask guided questions;[place] for care team member(s);Communicate patient's needs/concerns to others;Identify supportive relationship(s);Prayer for healing    Visit Type/Summary:  Delivered copy of OT &NT Bible to Clari which she requested when Chaplain Veronica visited.  - Spiritual Care:  remains available as needed for follow up.    Spiritual Care support can be requested via an Epic consult. For urgent/immediate needs, please contact the On Call  at: [facility]: ext [phone]    Cristina Dai MDiv.  Staff

## 2024-11-23 NOTE — PLAN OF CARE
Assumed patient care at 1930  Pt oriented x 4   Sinus/SB on tele, On O2 2lnc   Ng tube to LIS   Prn morphine for pain   NPO ice chips ok   Suprapubic cath in place, draining well   Safety/isolation precautions maintained   Call light is within reach   Will continue poc     Problem: Impaired Cognition  Goal: Patient will exhibit improved attention, thought processing and/or memory  Description: Interventions:  - Allow additional time for processing after asking questions or providing instructions  Outcome: Progressing     Problem: NEUROLOGICAL - ADULT  Goal: Achieves stable or improved neurological status  Description: INTERVENTIONS  - Assess for and report changes in neurological status  - Initiate measures to prevent increased intracranial pressure  - Maintain blood pressure and fluid volume within ordered parameters to optimize cerebral perfusion and minimize risk of hemorrhage  - Monitor temperature, glucose, and sodium. Initiate appropriate interventions as ordered  Outcome: Progressing     Problem: METABOLIC/FLUID AND ELECTROLYTES - ADULT  Goal: Electrolytes maintained within normal limits  Description: INTERVENTIONS:  - Monitor labs and rhythm and assess patient for signs and symptoms of electrolyte imbalances  - Administer electrolyte replacement as ordered  - Monitor response to electrolyte replacements, including rhythm and repeat lab results as appropriate  - Fluid restriction as ordered  - Instruct patient on fluid and nutrition restrictions as appropriate  Outcome: Progressing

## 2024-11-23 NOTE — PROGRESS NOTES
[facility]  Progress Note    Clari Rosa Patient Status:  Inpatient    1970 MRN [id number]   [facility] 3NE-A Attending Bhanu Mckinney MD   Hosp Day # 4 PCP Macrina Galvin MD     Subjective:  Patient has passed some flatus early this morning.  She complained of her sinus and tooth infection and also having anxiety.  No complaints of abdominal pain.      Objective/Physical Exam:  General: No apparent distress.  Vital Signs:  Blood pressure 112/74, pulse 77, temperature 98 °F (36.7 °C), temperature source Oral, resp. rate 13, height 5' (1.524 m), weight 190 lb (86.2 kg), SpO2 99%.    Abdomen:  soft;  ml clear gastric fluid  Extremities:  No calf tenderness       Intake/Output Summary (Last 24 hours) at 2024 0712  Last data filed at 2024 0500  Gross per 24 hour   Intake --   Output 2770 ml   Net -2770 ml       Labs:  Lab Results   Component Value Date    WBC 8.3 2024    HGB 9.4 2024    HCT 29.1 2024    .0 2024    CREATSERUM 0.63 2024    BUN 8 2024     2024    K 3.4 2024     2024    CO2 32.0 2024     2024    CA 8.9 2024    ALB 3.7 2024    ALKPHO 133 2024    BILT 0.2 2024    TP 6.2 2024    AST 17 2024     2024    MG 1.8 2024    PHOS 2.7 2024    PGLU 166 2024     Narrative   PROCEDURE:  XR COLON SINGLE CONTRAST (CPT=74270)     TECHNIQUE:  A single contrast barium enema examination was performed in the usual manner.   abdominal radiograph was performed.     COMPARISON:  [facility] , CT, CT ABDOMEN+PELVIS(CPT=74176), 2024, 0:24 AM.  [facility] , XR, XR ABDOMEN (1 VIEW) (CPT=74018), 2024, 8:48 AM.     INDICATIONS:  colonic distension, colonic narrowing on ct     PATIENT STATED HISTORY: (As transcribed by Technologist)  Patient states she hasn't been able to pass stool properly.      FLUOROSCOPY IMAGES OBTAINED:   10  FLUOROSCOPY TIME:  2 minutes 41 seconds  RADIATION DOSE (AIR KERMA PRODUCT):  2603.9uGy/m2     FLUOROSCOPY TIME:  2 minutes and 41 seconds     FINDINGS:    Gastrografin was given to the patient in order to evaluate for possible stricture within the sigmoid colon.  Patient was pre-medicated due to an iodine allergy.  The patient did have trouble holding the Gastrografin within and 3 attempts were made to try   to instill the Gastrografin into the rectum.     There is no evidence of stricture within the sigmoid colon.  The barium passes easily into the sigmoid and left colon.  There is a moderate amount of stool seen within the colon.  Because the patient could not hold the barium and patient was feeling  pressure and very uncomfortable and her pelvis, the exam was stopped.                   Impression   CONCLUSION:  Only the sigmoid and descending colon were identified but there is no evidence of stricture.          Assessment/Plan: SBO - partial     -keep the NGT to suction  -await decrease in output to start diet  -unable to ambulate due to paralysis      Rosario Bae MD  11/23/2024  7:12 AM

## 2024-11-23 NOTE — SPIRITUAL CARE NOTE
Spiritual Care Visit Note    Patient Name: Clari Rosa Date of Spiritual Care Visit: 24   : 1970 Primary Dx: Constipation, unspecified constipation type       Referred By: Referral From: Patient (As  was passing in the hallway, patient called out to see if she could have a visit.)    Spiritual Care Taxonomy:    Intended Effects: Demonstrate caring and concern    Methods: Collaborate with care team member;Offer spiritual/Jainism support    Interventions: Acknowledge current situation;Active listening;Ask guided questions about [place];Ask guided questions;[place] for care team member(s);Communicate patient's needs/concerns to others;Identify supportive relationship(s);Prayer for healing    Visit Type/Summary:  Patient was participating in a Bible study online when  entered the room.  Clari explained how she was brought up in the Alevism [place] and how relevant this is in her life.  Offered a possible bob that she could download on her phone with Bible stories.  Patient appeared eager for that resource.  Clari would like communion, but currently is NPO except for ice chips (per patient).  Informed patient to contact spiritual care for communion when her dietary status is upgraded enough to receive the communion (per RN/physician).       - Spiritual Care: Responded to a request for spiritual care and assessed the patient for spiritual care needs. Consulted with RN prior to visit. Offered empathic listening and emotional support. Provided support for Patient's spiritual/Jainism requests. Offered prayer. Provided a bible, per Patient's request.  remains available for follow up.    Spiritual Care support can be requested via an Epic consult. For urgent/immediate needs, please contact the On Call  at: [facility]: ext [phone]

## 2024-11-24 ENCOUNTER — APPOINTMENT (OUTPATIENT)
Dept: CT IMAGING | Facility: HOSPITAL | Age: 54
End: 2024-11-24
Attending: STUDENT IN AN ORGANIZED HEALTH CARE EDUCATION/TRAINING PROGRAM
Payer: MEDICARE

## 2024-11-24 ENCOUNTER — APPOINTMENT (OUTPATIENT)
Dept: CT IMAGING | Facility: HOSPITAL | Age: 54
DRG: 389 | End: 2024-11-24
Attending: STUDENT IN AN ORGANIZED HEALTH CARE EDUCATION/TRAINING PROGRAM
Payer: MEDICARE

## 2024-11-24 LAB
ALBUMIN SERPL-MCNC: 3.8 G/DL (ref 3.2–4.8)
ALBUMIN/GLOB SERPL: 1.5 {RATIO} (ref 1–2)
ALP LIVER SERPL-CCNC: 126 U/L
ALT SERPL-CCNC: 79 U/L
ANION GAP SERPL CALC-SCNC: 7 MMOL/L (ref 0–18)
AST SERPL-CCNC: 51 U/L (ref ?–34)
BASOPHILS # BLD AUTO: 0.03 X10(3) UL (ref 0–0.2)
BASOPHILS NFR BLD AUTO: 0.4 %
BILIRUB SERPL-MCNC: 0.4 MG/DL (ref 0.3–1.2)
BUN BLD-MCNC: 8 MG/DL (ref 9–23)
CALCIUM BLD-MCNC: 8.9 MG/DL (ref 8.7–10.4)
CHLORIDE SERPL-SCNC: 103 MMOL/L (ref 98–112)
CO2 SERPL-SCNC: 31 MMOL/L (ref 21–32)
CREAT BLD-MCNC: 0.73 MG/DL
EGFRCR SERPLBLD CKD-EPI 2021: 98 ML/MIN/1.73M2 (ref 60–?)
EOSINOPHIL # BLD AUTO: 0.22 X10(3) UL (ref 0–0.7)
EOSINOPHIL NFR BLD AUTO: 3.2 %
ERYTHROCYTE [DISTWIDTH] IN BLOOD BY AUTOMATED COUNT: 16.1 %
GLOBULIN PLAS-MCNC: 2.6 G/DL (ref 2–3.5)
GLUCOSE BLD-MCNC: 176 MG/DL (ref 70–99)
GLUCOSE BLD-MCNC: 189 MG/DL (ref 70–99)
GLUCOSE BLD-MCNC: 194 MG/DL (ref 70–99)
GLUCOSE BLD-MCNC: 220 MG/DL (ref 70–99)
GLUCOSE BLD-MCNC: 229 MG/DL (ref 70–99)
GLUCOSE BLD-MCNC: 241 MG/DL (ref 70–99)
HCT VFR BLD AUTO: 33.7 %
HGB BLD-MCNC: 10 G/DL
HGB BLD-MCNC: 10.4 G/DL
IMM GRANULOCYTES # BLD AUTO: 0.05 X10(3) UL (ref 0–1)
IMM GRANULOCYTES NFR BLD: 0.7 %
LYMPHOCYTES # BLD AUTO: 2.1 X10(3) UL (ref 1–4)
LYMPHOCYTES NFR BLD AUTO: 30.2 %
MAGNESIUM SERPL-MCNC: 1.9 MG/DL (ref 1.6–2.6)
MCH RBC QN AUTO: 24.1 PG (ref 26–34)
MCHC RBC AUTO-ENTMCNC: 30.9 G/DL (ref 31–37)
MCV RBC AUTO: 78 FL
MONOCYTES # BLD AUTO: 0.31 X10(3) UL (ref 0.1–1)
MONOCYTES NFR BLD AUTO: 4.5 %
NEUTROPHILS # BLD AUTO: 4.25 X10 (3) UL (ref 1.5–7.7)
NEUTROPHILS # BLD AUTO: 4.25 X10(3) UL (ref 1.5–7.7)
NEUTROPHILS NFR BLD AUTO: 61 %
OSMOLALITY SERPL CALC.SUM OF ELEC: 295 MOSM/KG (ref 275–295)
PHOSPHATE SERPL-MCNC: 3.8 MG/DL (ref 2.4–5.1)
PLATELET # BLD AUTO: 278 10(3)UL (ref 150–450)
POTASSIUM SERPL-SCNC: 4 MMOL/L (ref 3.5–5.1)
PROT SERPL-MCNC: 6.4 G/DL (ref 5.7–8.2)
RBC # BLD AUTO: 4.32 X10(6)UL
SODIUM SERPL-SCNC: 141 MMOL/L (ref 136–145)
TRIGL SERPL-MCNC: 252 MG/DL (ref 30–149)
WBC # BLD AUTO: 7 X10(3) UL (ref 4–11)

## 2024-11-24 PROCEDURE — 99233 SBSQ HOSP IP/OBS HIGH 50: CPT | Performed by: HOSPITALIST

## 2024-11-24 PROCEDURE — 74176 CT ABD & PELVIS W/O CONTRAST: CPT | Performed by: STUDENT IN AN ORGANIZED HEALTH CARE EDUCATION/TRAINING PROGRAM

## 2024-11-24 NOTE — PLAN OF CARE
Pt is A&O x4. Highly anxious. VSS- NSR on tele. RA. Lungs clear. NG tube to L nare connected to LIS. TPN infusing as ordered. IV cipro Q12. IV depacon Q12. Pt continues to c/o severe abd pain- PRN pain medications given per MAR. SubQ lovenox given while eliquis is on hold. NPO. Accuchecks Q6. Daily weight. W/c bound. 2 person turn. Suprapubic cath remains patent and draining.     0625- pt c/o severe abd pain despite IV pain medications. GI notified. STAT CT a/p ordered.     Problem: Patient/Family Goals  Goal: Patient/Family Long Term Goal  Description: Patient's Long Term Goal: to be discharged    Interventions:  - continue recommended medication regimen  - continue recommended testing/interventions  - See additional Care Plan goals for specific interventions  Outcome: Progressing  Goal: Patient/Family Short Term Goal  Description: Patient's Short Term Goal: to be free from pain    Interventions:   - prn pain medication  - maintain dietary recommendations  - See additional Care Plan goals for specific interventions  Outcome: Progressing     Problem: METABOLIC/FLUID AND ELECTROLYTES - ADULT  Goal: Glucose maintained within prescribed range  Description: INTERVENTIONS:  - Monitor Blood Glucose as ordered  - Assess for signs and symptoms of hyperglycemia and hypoglycemia  - Administer ordered medications to maintain glucose within target range  - Assess barriers to adequate nutritional intake and initiate nutrition consult as needed  - Instruct patient on self management of diabetes  Outcome: Progressing  Goal: Electrolytes maintained within normal limits  Description: INTERVENTIONS:  - Monitor labs and rhythm and assess patient for signs and symptoms of electrolyte imbalances  - Administer electrolyte replacement as ordered  - Monitor response to electrolyte replacements, including rhythm and repeat lab results as appropriate  - Fluid restriction as ordered  - Instruct patient on fluid and nutrition restrictions as  appropriate  Outcome: Progressing     Problem: NEUROLOGICAL - ADULT  Goal: Achieves stable or improved neurological status  Description: INTERVENTIONS  - Assess for and report changes in neurological status  - Initiate measures to prevent increased intracranial pressure  - Maintain blood pressure and fluid volume within ordered parameters to optimize cerebral perfusion and minimize risk of hemorrhage  - Monitor temperature, glucose, and sodium. Initiate appropriate interventions as ordered  Outcome: Progressing     Problem: Impaired Cognition  Goal: Patient will exhibit improved attention, thought processing and/or memory  Description: Interventions:  - Allow additional time for processing after asking questions or providing instructions  Outcome: Progressing

## 2024-11-24 NOTE — PROGRESS NOTES
[facility]  Report of GI Progress Note    Clari Rosa Patient Status:  Inpatient    1970 MRN [id number]   [facility] ED 3NE-A Attending Bhanu Mckinney MD   Hosp Day # 5 PCP Macrina Galvin MD     Date of Admission:  2024  PCP: Macrina Galvin MD    Reason for Visit:   Abdominal pain, constipation, bowel obstruction    History of Present Illness:   Patient is a 54 year old female who was admitted to the hospital for Constipation, unspecified constipation type:    Notified by nursing staff this AM that pt is having severe L sided pain, worse than before.  Stat CT abd/pelvis without contrast ordered, notable for decompression of colon and small bowel, L sided hematoma.      NG remains in place.  Pt remains on TPN.      Current Medications:  Current Facility-Administered Medications   Medication Dose Route Frequency    adult 3 in 1 TPN   Intravenous Continuous TPN    dextrose 10% infusion (TPN no rate)   Intravenous Continuous PRN    adult 3 in 1 TPN   Intravenous Continuous TPN    ciprofloxacin in dextrose 5% (Cipro) 400 mg/200mL IVPB premix 400 mg  400 mg Intravenous Q12H    valproate (Depacon) 500 mg in sodium chloride 0.9% 50 mL IVPB  500 mg Intravenous Q12H    LORazepam (Ativan) 2 mg/mL injection 0.5 mg  0.5 mg Intravenous Q4H PRN    Or    LORazepam (Ativan) 2 mg/mL injection 1 mg  1 mg Intravenous Q4H PRN    dextrose 5%-sodium chloride 0.45% infusion   Intravenous Continuous    HYDROmorphone (Dilaudid) 1 MG/ML injection 0.2 mg  0.2 mg Intravenous Q2H PRN    Or    HYDROmorphone (Dilaudid) 1 MG/ML injection 0.4 mg  0.4 mg Intravenous Q2H PRN    Or    HYDROmorphone (Dilaudid) 1 MG/ML injection 0.8 mg  0.8 mg Intravenous Q2H PRN    albuterol (Ventolin HFA) 108 (90 Base) MCG/ACT inhaler 2 puff  2 puff Inhalation Q6H PRN    [Held by provider] apixaban (Eliquis) tab 5 mg  5 mg Oral BID    clonazePAM (KlonoPIN) tab 1.5 mg  1.5 mg Oral TID    divalproex ER (Depakote ER) 24 hr tab 500 mg  500  mg Oral BID    DULoxetine (Cymbalta) DR cap 60 mg  60 mg Oral Daily    oxybutynin (Ditropan) tab 5 mg  5 mg Oral TID    QUEtiapine (SEROquel) tab 200 mg  200 mg Oral Nightly    traZODone (Desyrel) tab 50 mg  50 mg Oral Nightly PRN    glucose (Dex4) 15 GM/59ML oral liquid 15 g  15 g Oral Q15 Min PRN    Or    glucose (Glutose) 40% oral gel 15 g  15 g Oral Q15 Min PRN    Or    glucose-vitamin C (Dex-4) chewable tab 4 tablet  4 tablet Oral Q15 Min PRN    Or    dextrose 50% injection 50 mL  50 mL Intravenous Q15 Min PRN    Or    glucose (Dex4) 15 GM/59ML oral liquid 30 g  30 g Oral Q15 Min PRN    Or    glucose (Glutose) 40% oral gel 30 g  30 g Oral Q15 Min PRN    Or    glucose-vitamin C (Dex-4) chewable tab 8 tablet  8 tablet Oral Q15 Min PRN    melatonin tab 3 mg  3 mg Oral Nightly PRN    acetaminophen (Tylenol Extra Strength) tab 500 mg  500 mg Oral Q4H PRN    morphINE PF 2 MG/ML injection 1 mg  1 mg Intravenous Q2H PRN    Or    morphINE PF 2 MG/ML injection 2 mg  2 mg Intravenous Q2H PRN    Or    morphINE PF 4 MG/ML injection 4 mg  4 mg Intravenous Q2H PRN    ondansetron (Zofran) 4 MG/2ML injection 4 mg  4 mg Intravenous Q6H PRN    prochlorperazine (Compazine) 10 MG/2ML injection 5 mg  5 mg Intravenous Q8H PRN    insulin degludec (Tresiba) 100 units/mL flextouch 5 Units  5 Units Subcutaneous Nightly    insulin aspart (NovoLOG) 100 Units/mL FlexPen 1-10 Units  1-10 Units Subcutaneous TID AC and HS    heparin (Porcine) 100 Units/mL lock flush 500 Units  5 mL Intravenous PRN    [Held by provider] metoprolol tartrate (Lopressor) tab 25 mg  25 mg Oral 2x Daily(Beta Blocker)    nystatin-triamcinolone (Mycolog II) 100,000-0.1 Units/g-% cream   Topical BID    [Held by provider] enoxaparin (Lovenox) 100 MG/ML SUBQ injection 90 mg  1 mg/kg Subcutaneous 2 times per day    morphINE PF 4 MG/ML injection 4 mg  4 mg Intravenous Q30 Min PRN       Allergies  Allergies[1]      Physical Exam:   Blood pressure (!) 146/92, pulse 103,  temperature 98.4 °F (36.9 °C), temperature source Oral, resp. rate 13, height 5' (1.524 m), weight 190 lb (86.2 kg), SpO2 94%.    GENERAL: NAD, oriented x 3, pleasant  ABD: Focal tenderness with induration and firmness in the suprapubic area, just L of the midline    Results:     Laboratory Data:  Lab Results   Component Value Date    WBC 7.0 11/24/2024    HGB 10.4 (L) 11/24/2024    .0 11/24/2024    CREATSERUM 0.73 11/24/2024    BUN 8 (L) 11/24/2024     11/24/2024    K 4.0 11/24/2024    CO2 31.0 11/24/2024    CA 8.9 11/24/2024    ALB 3.8 11/24/2024    ALKPHO 126 (H) 11/24/2024    TP 6.4 11/24/2024    AST 51 (H) 11/24/2024    ALT 79 (H) 11/24/2024    BILT 0.4 11/24/2024    PTT 26.4 12/05/2023    LIP 19 11/18/2024    MG 1.9 11/24/2024    PHOS 3.8 11/24/2024    ETOH <3 08/04/2024         No results for input(s): \"URINE\", \"CULTI\", \"BLDSMR\" in the last 168 hours.    Recent Labs   Lab 11/22/24  1011 11/23/24  1049 11/24/24  0705   * 70* 79*   AST 17 10 51*   ALKPHO 133* 107 126*   BILT 0.2* 0.2* 0.4   HGB 9.4* 9.1* 10.4*   WBC 8.3 9.3 7.0       Imaging:  CT ABDOMEN+PELVIS(CPT=74176)    Result Date: 11/24/2024  CONCLUSION:  1. There has been interval development of a left-sided rectus sheath abdominal wall hematoma measuring 7.6 x 3.5 x 5.7 cm. 2. Interval decompression of the colon with uncomplicated diverticular changes of the descending and sigmoid colon.  No acute diverticulitis.  3. Mild left basilar effusion and passive atelectasis. 4. Please see further details above.   LOCATION:  [facility]   Dictated by (CST): Ovidio Craven DO on 11/24/2024 at 7:56 AM     Finalized by (CST): Ovidio Craven DO on 11/24/2024 at 8:05 AM       XR ABDOMEN OBSTRUCTIVE SERIES ROUTINE(2 VW)(CPT=74019)    Result Date: 11/23/2024  CONCLUSION:    Nasogastric tube within stomach.  Stomach decompressed containing mild air.  Continued contrast within the colon, without dilation.  No sign of bowel obstruction.  No signs of free  air.  No excessive stool in the rectum.  Mild-moderate gas  in small bowel.  LOCATION:  [facility]    Dictated by (CST): Parish Brand MD on 11/23/2024 at 8:17 PM     Finalized by (CST): Parish Brand MD on 11/23/2024 at 8:17 PM           Impression:   Partial SBO, improving on imaging with NG in place, still with high output  Question of rectal stricture on initial CT, s/p decompression during flex sig without discrete stricture, neg gastrograffin enema for eval of stricture  L sided abdominal wall hematoma  History of DVT, chronically on Eliquis  Bipolar disorder  UTI w/ enterococcus  Neurogenic bladder  Immobility      Recommendations:  Continue NPO status  Continue TPN  NG to suction, defer to surgery  Consider hot packs and pain control for hematoma; may need repeat imaging with IV contrast if hematoma expanding or pain worsening, discussed with hospitalist    Brody Pradhan MD  11/24/2024       [1]   Allergies  Allergen Reactions    Pcn [Penicillins] HIVES    Radiology Contrast Iodinated Dyes HIVES, DIZZINESS and SHORTNESS OF BREATH    Sulfa Antibiotics HIVES    Keflex [Cephalexin] UNKNOWN

## 2024-11-24 NOTE — PROGRESS NOTES
Assumed care at 7:30 am  Alert and oriented x 4, currently on bedrest  States she has a lot of pain on left side of abdomen- CT ordered. Per Surg, possible hematoma due to Lovenox. Will avoid injections on that side. Pain medications given to patient as needed.   Medications given via NGT. Tolerated well.   Waiting on US for leg  TPN running as ordered  All safety precautions in place. Will continue to monitor patient.

## 2024-11-24 NOTE — PROGRESS NOTES
[facility]  Progress Note    Clari Rosa Patient Status:  Inpatient    1970 MRN [id number]   Location [facility] 3NE-A Attending Bhanu Mckinney MD   Hosp Day # 5 PCP Macrina Galvin MD     Subjective:  Patient is awake and complained about the pain on the left side of the abdomen.  She had CT scan this morning.  She usually gets lovenox subcutaneous injection.    Objective/Physical Exam:  General: No apparent distress.  Vital Signs:  Blood pressure 121/64, pulse 106, temperature 98.2 °F (36.8 °C), temperature source Oral, resp. rate 18, height 5' (1.524 m), weight 190 lb (86.2 kg), SpO2 (!) 84%.    Abdomen:  soft but fullness and tenderness over the previous colostomy incision site without any erythema;  ml decreased from yesterday         Intake/Output Summary (Last 24 hours) at 2024 0722  Last data filed at 2024 0600  Gross per 24 hour   Intake 8077.5 ml   Output 4175 ml   Net 3902.5 ml       Labs:  Lab Results   Component Value Date    WBC 9.3 2024    HGB 9.1 2024    HCT 29.0 2024    .0 2024    CREATSERUM 0.70 2024    BUN 7 2024     2024    K 3.7 2024    K 3.7 2024     2024    CO2 31.0 2024     2024    CA 8.7 2024    ALB 3.3 2024    ALKPHO 107 2024    BILT 0.2 2024    TP 6.3 2024    AST 10 2024    ALT 70 2024    MG 2.1 2024    PHOS 3.0 2024    PGLU 189 2024     Narrative   PROCEDURE:  CT ABDOMEN+PELVIS (CPT=74176)     COMPARISON:  SHIRA DANIEL, CT ABDOMEN+PELVIS(CPT=74176), 2024, 0:24 AM.     INDICATIONS:  Patient presents with progressive bilateral lower quadrant abdominal pain.  History of surgery/right hemicolectomy.     TECHNIQUE:  Unenhanced multislice CT scanning was performed from the dome of the diaphragm to the pubic symphysis.  Dose reduction techniques were used. Dose information is transmitted to the ACR  (American College of Radiology) NRDR (National Radiology  Data Registry) which includes the Dose Index Registry.     PATIENT STATED HISTORY: (As transcribed by Technologist)  Worsening abdominal pain in right and left lower quadrants.         FINDINGS:    LIVER:  No enlargement, atrophy, abnormal density, or significant focal lesion.    BILIARY:  Patient is status post cholecystectomy.  No biliary ductal dilatation.  PANCREAS:  Diffuse pancreatic atrophy and fatty infiltration is stable from prior imaging.  No acute pancreatitis.  SPLEEN:  The spleen is mildly enlarged measuring 13.8 x 13.3 cm in AP and craniocaudal dimensions respectively.  This is stable upon retrospective review with the prior exam.  No focal splenic lesions are noted.    KIDNEYS:  No mass, obstruction, or calcification.    ADRENALS:  No mass or enlargement.    AORTA/VASCULAR:    Unremarkable as seen on non-contrast imaging.  RETROPERITONEUM:  No mass or adenopathy.    BOWEL/MESENTERY:  The stomach, duodenum sweep and small bowel the are decompressed.  There is a NG tube located within the stomach, which is been placed in the interim since the previous study.  There has been interval decompression of the colon.  There  are diverticular changes of the descending and sigmoid regions.  No acute diverticulitis or colitis.  No free intraperitoneal air, fluid or inflammatory changes of the peritoneal cavity noted.  ABDOMINAL WALL:  There has been interval development of a hematoma within the left lower quadrant anterior abdominal wall, located primarily within the left rectus sheath and measuring 7.6 x 3.5 x 5.7 cm in maximal dimensions.  There is an internal  blood/hematocrit level identified within this hematoma best seen on axial imaging.  URINARY BLADDER:  There is a suprapubic catheter decompressing the bladder, stable in position from the prior study.  PELVIC NODES:  No adenopathy.    PELVIC ORGANS:  No visible mass.  Pelvic organs appropriate  for patient age.    BONES:  No acute osseous abnormalities.  Mild to moderate multilevel posterior articular facet joint arthropathy of the thoracolumbar spine.  Mild multilevel disc disease of the thoracolumbar spine.  Mild to moderate bilateral hip arthropathy and SI  joint arthropathy.  LUNG BASES:  Interval development of a mild left effusion and passive atelectasis of the left lung base.                      Impression   CONCLUSION:    1. There has been interval development of a left-sided rectus sheath abdominal wall hematoma measuring 7.6 x 3.5 x 5.7 cm.  2. Interval decompression of the colon with uncomplicated diverticular changes of the descending and sigmoid colon.  No acute diverticulitis.    3. Mild left basilar effusion and passive atelectasis.  4. Please see further details above.        Assessment/Plan:  1. SBO - decrease in output iin the NGT     2. Left abdominal pain - secondary to lovenox injection with hematoma    -no surgical issues  -ok to give medication per NGT   -will continue to NGT to suction   -if the output does not decrease then may need SBFT study tomorrow  -continue with pain medication  -voiced understanding    Rosario Bae MD  11/24/2024  7:22 AM

## 2024-11-24 NOTE — PROGRESS NOTES
[facility]   part of [facility]     Hospitalist Progress Note     Clari Rosa Patient Status:  Observation    1970 MRN [id number]   Location [facility] 3SW-A Attending Bhanu Mckinney MD   Hosp Day # 5 PCP Macrina Galvin MD     Chief Complaint: abd pain    Subjective:     Pain worsening over last day or 2    Objective:    Review of Systems:   A comprehensive review of systems was completed; pertinent positive and negatives stated in subjective.    Vital signs:  Temp:  [98.2 °F (36.8 °C)-98.4 °F (36.9 °C)] 98.3 °F (36.8 °C)  Pulse:  [] 106  Resp:  [13-23] 14  BP: (121-152)/(64-92) 152/91  SpO2:  [84 %-96 %] 94 %    Physical Exam:    General: No acute distress  Respiratory: No wheezes, no rhonchi  Cardiovascular: S1, S2, regular rate and rhythm, bradycardic  Abdomen: Soft, tender, distended, positive bowel sounds  Neuro: No new focal deficits.   Extremities: No edema      Diagnostic Data:    Labs:  Recent Labs   Lab 24  0539 24  0538 24  1011 24  1049 24  0705   WBC 8.1 10.7 8.3 9.3 7.0   HGB 9.5* 9.5* 9.4* 9.1* 10.4*   MCV 76.6* 75.8* 74.2* 76.5* 78.0*   .0 256.0 246.0 259.0 278.0       Recent Labs   Lab 24  1011 24  1049 24  0705   * 138* 176*   BUN 8* 7* 8*   CREATSERUM 0.63 0.70 0.73   CA 8.9 8.7 8.9   ALB 3.7 3.3 3.8    141 141   K 3.4* 3.7  3.7 4.0    106 103   CO2 32.0 31.0 31.0   ALKPHO 133* 107 126*   AST 17 10 51*   * 70* 79*   BILT 0.2* 0.2* 0.4   TP 6.2 6.3 6.4       Estimated Creatinine Clearance: 63.3 mL/min (based on SCr of 0.73 mg/dL).    Recent Labs   Lab 24  1011   TROPHS <3       No results for input(s): \"PTP\", \"INR\" in the last 168 hours.               Microbiology    Hospital Encounter on 24   1. Urine Culture, Routine     Status: Abnormal    Collection Time: 24 12:03 AM    Specimen: Urine, clean catch   Result Value Ref Range    Urine Culture 50,000-99,000 CFU/ML  Enterococcus faecalis (A) N/A       Susceptibility    Enterococcus faecalis -  (no method available)     Nitrofurantoin <=32 Sensitive      Vancomycin 1 Sensitive      Ampicillin <=2 Sensitive          Imaging: Reviewed in Epic.    Medications:    ciprofloxacin  400 mg Intravenous Q12H    valproate  500 mg Intravenous Q12H    [Held by provider] apixaban  5 mg Oral BID    clonazePAM  1.5 mg Oral TID    divalproex ER  500 mg Oral BID    DULoxetine  60 mg Oral Daily    oxybutynin  5 mg Oral TID    QUEtiapine  200 mg Oral Nightly    insulin degludec  5 Units Subcutaneous Nightly    insulin aspart  1-10 Units Subcutaneous TID AC and HS    [Held by provider] metoprolol tartrate  25 mg Oral 2x Daily(Beta Blocker)    nystatin-triamcinolone   Topical BID    [Held by provider] enoxaparin  1 mg/kg Subcutaneous 2 times per day       Assessment & Plan:      #Abdominal pain  #bowel obstruction  #rectal stricture  - CT shows dilated colon with narrowing at rectosigmoid junction  - NPO  - GI consult  - PRN pain med  -Ngt to IS. Output decreasing  -sp flex sig for decompression.now having BM's, abd less distended  -gen surg consult  -lower GI neg for stricture  -hold eliquis. hold FD lovenox   -TPN for nutrition    #rectus sheath hematoma  -d/w GI, d/w gen surg. Non surgical per surgery  -repeat hgb this after noon. If stable can monitor.  -if hgb drops, will get CTA  -hold FD lovenox for now. Per surg, can resume if hgb stable by tomorrow  -check doppler legs to ensure DVT resolution. If unable to resume lovenox and DVT persists, would need IVC filter    #sinus bradycardia - resolved  -consult cards, d/w APN  -EKG reviewed, sinus  -echo  -likely vasovagal. resolved     # Type 2 diabetes  - Will plae on hyperglycemia protocol with long actin and correction factor insulin.  -on D51/2 NS     # Anxiety  - Prn IV benozo's     # Bipolar w/ carmel  -depakote and seroquel on hold while NPO  -IV depakote  -seems to be having a manic episode.  Pt requesting to go to [facility] after  -consult psych APN please as carmel is worsening without her meds while NPO     # Hypertension  - Will continue on metoprolol     # Neurogenic bladder  - Patient has chronic mark.    #UTI  -IV abx.   -Ucx enterococcus.   -allergy to PCN and no nitrofurantoin while NPO. Called lab to run susc. pending  -cont IV cipro for now      # Hx DVT  - eliquis at DC     # ANAID  - Will place on ANAID  protocol.    Bhanu Mckinney MD    Supplementary Documentation:     Quality:  DVT Mechanical Prophylaxis:        DVT Pharmacologic Prophylaxis   Medication    [Held by provider] apixaban (Eliquis) tab 5 mg    heparin (Porcine) 100 Units/mL lock flush 500 Units    [Held by provider] enoxaparin (Lovenox) 100 MG/ML SUBQ injection 90 mg                Code Status: Full Code  Mark: Suprapubic catheter in place  Mark Duration (in days):   Central line:    MARINA:     Discharge is dependent on: course  At this point Ms. Rosa is expected to be discharge to: home    The 21st Century Cures Act makes medical notes like these available to patients in the interest of transparency. Please be advised this is a medical document. Medical documents are intended to carry relevant information, facts as evident, and the clinical opinion of the practitioner. The medical note is intended as peer to peer communication and may appear blunt or direct. It is written in medical language and may contain abbreviations or verbiage that are unfamiliar.

## 2024-11-24 NOTE — PROGRESS NOTES
[facility]  Report of GI Progress Note    Clari Rosa Patient Status:  Inpatient    1970 MRN [id number]   [facility] 3NE-A Attending Bhanu Mckinney MD   Hosp Day # 4 PCP Macrina Galvin MD     Date of Admission:  2024  PCP: Macrina Galvin MD    Reason for Visit:   SBO    History of Present Illness:   Patient is a 54 year old female who was admitted to the hospital for Constipation, unspecified constipation type:    NG remains to suction  No vomiting  Minimal pain in abdomen  No new issues overnight      Current Medications:  Current Facility-Administered Medications   Medication Dose Route Frequency    dextrose 10% infusion (TPN no rate)   Intravenous Continuous PRN    adult 3 in 1 TPN   Intravenous Continuous TPN    ciprofloxacin in dextrose 5% (Cipro) 400 mg/200mL IVPB premix 400 mg  400 mg Intravenous Q12H    valproate (Depacon) 500 mg in sodium chloride 0.9% 50 mL IVPB  500 mg Intravenous Q12H    LORazepam (Ativan) 2 mg/mL injection 0.5 mg  0.5 mg Intravenous Q4H PRN    Or    LORazepam (Ativan) 2 mg/mL injection 1 mg  1 mg Intravenous Q4H PRN    dextrose 5%-sodium chloride 0.45% infusion   Intravenous Continuous    HYDROmorphone (Dilaudid) 1 MG/ML injection 0.2 mg  0.2 mg Intravenous Q2H PRN    Or    HYDROmorphone (Dilaudid) 1 MG/ML injection 0.4 mg  0.4 mg Intravenous Q2H PRN    Or    HYDROmorphone (Dilaudid) 1 MG/ML injection 0.8 mg  0.8 mg Intravenous Q2H PRN    albuterol (Ventolin HFA) 108 (90 Base) MCG/ACT inhaler 2 puff  2 puff Inhalation Q6H PRN    [Held by provider] apixaban (Eliquis) tab 5 mg  5 mg Oral BID    clonazePAM (KlonoPIN) tab 1.5 mg  1.5 mg Oral TID    divalproex ER (Depakote ER) 24 hr tab 500 mg  500 mg Oral BID    DULoxetine (Cymbalta) DR cap 60 mg  60 mg Oral Daily    oxybutynin (Ditropan) tab 5 mg  5 mg Oral TID    QUEtiapine (SEROquel) tab 200 mg  200 mg Oral Nightly    traZODone (Desyrel) tab 50 mg  50 mg Oral Nightly PRN    glucose (Dex4) 15 GM/59ML  oral liquid 15 g  15 g Oral Q15 Min PRN    Or    glucose (Glutose) 40% oral gel 15 g  15 g Oral Q15 Min PRN    Or    glucose-vitamin C (Dex-4) chewable tab 4 tablet  4 tablet Oral Q15 Min PRN    Or    dextrose 50% injection 50 mL  50 mL Intravenous Q15 Min PRN    Or    glucose (Dex4) 15 GM/59ML oral liquid 30 g  30 g Oral Q15 Min PRN    Or    glucose (Glutose) 40% oral gel 30 g  30 g Oral Q15 Min PRN    Or    glucose-vitamin C (Dex-4) chewable tab 8 tablet  8 tablet Oral Q15 Min PRN    melatonin tab 3 mg  3 mg Oral Nightly PRN    acetaminophen (Tylenol Extra Strength) tab 500 mg  500 mg Oral Q4H PRN    morphINE PF 2 MG/ML injection 1 mg  1 mg Intravenous Q2H PRN    Or    morphINE PF 2 MG/ML injection 2 mg  2 mg Intravenous Q2H PRN    Or    morphINE PF 4 MG/ML injection 4 mg  4 mg Intravenous Q2H PRN    ondansetron (Zofran) 4 MG/2ML injection 4 mg  4 mg Intravenous Q6H PRN    prochlorperazine (Compazine) 10 MG/2ML injection 5 mg  5 mg Intravenous Q8H PRN    insulin degludec (Tresiba) 100 units/mL flextouch 5 Units  5 Units Subcutaneous Nightly    insulin aspart (NovoLOG) 100 Units/mL FlexPen 1-10 Units  1-10 Units Subcutaneous TID AC and HS    heparin (Porcine) 100 Units/mL lock flush 500 Units  5 mL Intravenous PRN    [Held by provider] metoprolol tartrate (Lopressor) tab 25 mg  25 mg Oral 2x Daily(Beta Blocker)    nystatin-triamcinolone (Mycolog II) 100,000-0.1 Units/g-% cream   Topical BID    enoxaparin (Lovenox) 100 MG/ML SUBQ injection 90 mg  1 mg/kg Subcutaneous 2 times per day    morphINE PF 4 MG/ML injection 4 mg  4 mg Intravenous Q30 Min PRN       Allergies  Allergies[1]      Physical Exam:   Blood pressure 144/70, pulse 85, temperature 98.4 °F (36.9 °C), temperature source Oral, resp. rate 15, height 5' (1.524 m), weight 190 lb (86.2 kg), SpO2 93%.    GEN: Laying in bed, oriented x 3  ABD: Distended, hypoactive BS, non-tender    Results:     Laboratory Data:  Lab Results   Component Value Date    WBC 9.3  11/23/2024    HGB 9.1 (L) 11/23/2024    .0 11/23/2024    CREATSERUM 0.70 11/23/2024    BUN 7 (L) 11/23/2024     11/23/2024    K 3.7 11/23/2024    K 3.7 11/23/2024    CO2 31.0 11/23/2024    CA 8.7 11/23/2024    ALB 3.3 11/23/2024    ALKPHO 107 11/23/2024    TP 6.3 11/23/2024    AST 10 11/23/2024    ALT 70 (H) 11/23/2024    BILT 0.2 (L) 11/23/2024    PTT 26.4 12/05/2023    LIP 19 11/18/2024    MG 2.1 11/23/2024    PHOS 3.0 11/23/2024    ETOH <3 08/04/2024         No results for input(s): \"URINE\", \"CULTI\", \"BLDSMR\" in the last 168 hours.    Recent Labs   Lab 11/21/24  0538 11/22/24  1011 11/23/24  1049   * 112* 70*   AST 67* 17 10   ALKPHO 162* 133* 107   BILT 0.3 0.2* 0.2*   HGB 9.5* 9.4* 9.1*   WBC 10.7 8.3 9.3       Imaging:  XR ABDOMEN OBSTRUCTIVE SERIES ROUTINE(2 VW)(CPT=74019)    Result Date: 11/23/2024  CONCLUSION:    Nasogastric tube within stomach.  Stomach decompressed containing mild air.  Continued contrast within the colon, without dilation.  No sign of bowel obstruction.  No signs of free air.  No excessive stool in the rectum.  Mild-moderate gas  in small bowel.  LOCATION:  [facility]    Dictated by (CST): Parish Brand MD on 11/23/2024 at 8:17 PM     Finalized by (CST): Parish Brand MD on 11/23/2024 at 8:17 PM       XR COLON SINGLE CONTRAST (CPT=74270)    Result Date: 11/22/2024  CONCLUSION:  Only the sigmoid and descending colon were identified but there is no evidence of stricture.   LOCATION:  [facility]    Dictated by (CST): Rob Pedraza MD on 11/22/2024 at 10:44 AM     Finalized by (CST): Rob Pedraza MD on 11/22/2024 at 10:46 AM           Impression:   Partial SBO  Question of rectal stricture on initial CT, s/p decompression during flex sig without discrete stricture, neg gastrograffin enema for eval of stricture  History of DVT, chronically on Eliquis  Bipolar disorder  UTI w/ enterococcus  Neurogenic bladder  Immobility     Recommendations:  Continue NPO status  Continue  TPN  NG to suction  Update imaging with obstructive series  If indeterminate, consider repeat CT in coming days      Brody Pradhan MD   11/23/2024       [1]   Allergies  Allergen Reactions    Pcn [Penicillins] HIVES    Radiology Contrast Iodinated Dyes HIVES, DIZZINESS and SHORTNESS OF BREATH    Sulfa Antibiotics HIVES    Keflex [Cephalexin] UNKNOWN

## 2024-11-24 NOTE — DIETARY NOTE
[facility]   part of [facility]   CLINICAL NUTRITION - TPN FOLLOW UP    Clari Rsoa     TPN order for tonight to provide: Infused via Port 750 dextrose calories, 400 lipid calories (26.5% lipids), 90 grams protein in 1800mL fluid. Provides 1510 kcal, meeting ~ 100% of nutrition prescription.     TPN goal: 750 dextrose calories, 400 lipid calories (26% lipids), 90 grams protein in 1800mL fluid. Provides 1510 kcal.        Intake/Output Summary (Last 24 hours) at 11/24/2024 1052  Last data filed at 11/24/2024 0820  Gross per 24 hour   Intake 8077.5 ml   Output 4425 ml   Net 3652.5 ml       Labs:   Recent Labs   Lab 11/23/24  1049 11/23/24  1615 11/24/24  0705   *  --  176*     --  141   K 3.7  3.7  --  4.0     --  103   CO2 31.0  --  31.0   BUN 7*  --  8*   CREATSERUM 0.70  --  0.73   CA 8.7  --  8.9   PHOS 3.0  --  3.8   MG 2.1  --  1.9   ALKPHO 107  --  126*   AST 10  --  51*   ALT 70*  --  79*   BILT 0.2*  --  0.4   TRIG  --    < > 252*    < > = values in this interval not displayed.       Glucose POC last 24hr (149-194). Novolog SSC. Received 4 units in past 24 hours    Electrolytes adjusted based on today's labs.   NG to LIS 900ml in last 24 hours  Pt NPO with bowel obstruction       RD will write TPN daily and follow per protocol. See full assessment 11/22.    Pt is at high nutrition risk.    Bibi Johns,RD, LDN  Clinical Dietitian  [phone]

## 2024-11-25 ENCOUNTER — APPOINTMENT (OUTPATIENT)
Dept: ULTRASOUND IMAGING | Facility: HOSPITAL | Age: 54
DRG: 389 | End: 2024-11-25
Attending: HOSPITALIST
Payer: MEDICARE

## 2024-11-25 ENCOUNTER — APPOINTMENT (OUTPATIENT)
Dept: GENERAL RADIOLOGY | Facility: HOSPITAL | Age: 54
DRG: 389 | End: 2024-11-25
Attending: INTERNAL MEDICINE
Payer: MEDICARE

## 2024-11-25 ENCOUNTER — APPOINTMENT (OUTPATIENT)
Dept: GENERAL RADIOLOGY | Facility: HOSPITAL | Age: 54
End: 2024-11-25
Attending: INTERNAL MEDICINE
Payer: MEDICARE

## 2024-11-25 ENCOUNTER — APPOINTMENT (OUTPATIENT)
Dept: ULTRASOUND IMAGING | Facility: HOSPITAL | Age: 54
End: 2024-11-25
Attending: HOSPITALIST
Payer: MEDICARE

## 2024-11-25 LAB
ANION GAP SERPL CALC-SCNC: 4 MMOL/L (ref 0–18)
ANION GAP SERPL CALC-SCNC: 7 MMOL/L (ref 0–18)
APTT PPP: 46.3 SECONDS (ref 23–36)
APTT PPP: >240 SECONDS (ref 23–36)
BASOPHILS # BLD AUTO: 0.02 X10(3) UL (ref 0–0.2)
BASOPHILS NFR BLD AUTO: 0.3 %
BUN BLD-MCNC: 10 MG/DL (ref 9–23)
BUN BLD-MCNC: 16 MG/DL (ref 9–23)
CALCIUM BLD-MCNC: 9 MG/DL (ref 8.7–10.4)
CALCIUM BLD-MCNC: 9.2 MG/DL (ref 8.7–10.4)
CHLORIDE SERPL-SCNC: 100 MMOL/L (ref 98–112)
CHLORIDE SERPL-SCNC: 102 MMOL/L (ref 98–112)
CO2 SERPL-SCNC: 27 MMOL/L (ref 21–32)
CO2 SERPL-SCNC: 29 MMOL/L (ref 21–32)
CREAT BLD-MCNC: 0.68 MG/DL
CREAT BLD-MCNC: 0.74 MG/DL
EGFRCR SERPLBLD CKD-EPI 2021: 103 ML/MIN/1.73M2 (ref 60–?)
EGFRCR SERPLBLD CKD-EPI 2021: 96 ML/MIN/1.73M2 (ref 60–?)
EOSINOPHIL # BLD AUTO: 0.3 X10(3) UL (ref 0–0.7)
EOSINOPHIL NFR BLD AUTO: 4 %
ERYTHROCYTE [DISTWIDTH] IN BLOOD BY AUTOMATED COUNT: 16 %
ERYTHROCYTE [DISTWIDTH] IN BLOOD BY AUTOMATED COUNT: 16 %
GLUCOSE BLD-MCNC: 243 MG/DL (ref 70–99)
GLUCOSE BLD-MCNC: 263 MG/DL (ref 70–99)
GLUCOSE BLD-MCNC: 278 MG/DL (ref 70–99)
GLUCOSE BLD-MCNC: 286 MG/DL (ref 70–99)
GLUCOSE BLD-MCNC: 330 MG/DL (ref 70–99)
GLUCOSE BLD-MCNC: 358 MG/DL (ref 70–99)
HCT VFR BLD AUTO: 29.1 %
HCT VFR BLD AUTO: 32 %
HGB BLD-MCNC: 10 G/DL
HGB BLD-MCNC: 9.3 G/DL
HGB BLD-MCNC: 9.4 G/DL
IMM GRANULOCYTES # BLD AUTO: 0.05 X10(3) UL (ref 0–1)
IMM GRANULOCYTES NFR BLD: 0.7 %
LYMPHOCYTES # BLD AUTO: 1.64 X10(3) UL (ref 1–4)
LYMPHOCYTES NFR BLD AUTO: 22 %
MAGNESIUM SERPL-MCNC: 1.9 MG/DL (ref 1.6–2.6)
MCH RBC QN AUTO: 23.8 PG (ref 26–34)
MCH RBC QN AUTO: 24.2 PG (ref 26–34)
MCHC RBC AUTO-ENTMCNC: 31.3 G/DL (ref 31–37)
MCHC RBC AUTO-ENTMCNC: 32 G/DL (ref 31–37)
MCV RBC AUTO: 75.6 FL
MCV RBC AUTO: 76 FL
MONOCYTES # BLD AUTO: 0.34 X10(3) UL (ref 0.1–1)
MONOCYTES NFR BLD AUTO: 4.6 %
NEUTROPHILS # BLD AUTO: 5.11 X10 (3) UL (ref 1.5–7.7)
NEUTROPHILS # BLD AUTO: 5.11 X10(3) UL (ref 1.5–7.7)
NEUTROPHILS NFR BLD AUTO: 68.4 %
OSMOLALITY SERPL CALC.SUM OF ELEC: 288 MOSM/KG (ref 275–295)
OSMOLALITY SERPL CALC.SUM OF ELEC: 289 MOSM/KG (ref 275–295)
PHOSPHATE SERPL-MCNC: 3.2 MG/DL (ref 2.4–5.1)
PLATELET # BLD AUTO: 244 10(3)UL (ref 150–450)
PLATELET # BLD AUTO: 271 10(3)UL (ref 150–450)
PLATELETS.RETICULATED NFR BLD AUTO: 1.3 % (ref 0–7)
POTASSIUM SERPL-SCNC: 4.5 MMOL/L (ref 3.5–5.1)
POTASSIUM SERPL-SCNC: 4.6 MMOL/L (ref 3.5–5.1)
RBC # BLD AUTO: 3.85 X10(6)UL
RBC # BLD AUTO: 4.21 X10(6)UL
SODIUM SERPL-SCNC: 133 MMOL/L (ref 136–145)
SODIUM SERPL-SCNC: 136 MMOL/L (ref 136–145)
WBC # BLD AUTO: 10.4 X10(3) UL (ref 4–11)
WBC # BLD AUTO: 7.5 X10(3) UL (ref 4–11)

## 2024-11-25 PROCEDURE — 74018 RADEX ABDOMEN 1 VIEW: CPT | Performed by: INTERNAL MEDICINE

## 2024-11-25 PROCEDURE — 93970 EXTREMITY STUDY: CPT | Performed by: HOSPITALIST

## 2024-11-25 PROCEDURE — 99232 SBSQ HOSP IP/OBS MODERATE 35: CPT | Performed by: OTHER

## 2024-11-25 PROCEDURE — 99233 SBSQ HOSP IP/OBS HIGH 50: CPT | Performed by: HOSPITALIST

## 2024-11-25 RX ORDER — HEPARIN SODIUM AND DEXTROSE 10000; 5 [USP'U]/100ML; G/100ML
18 INJECTION INTRAVENOUS ONCE
Status: COMPLETED | OUTPATIENT
Start: 2024-11-25 | End: 2024-11-25

## 2024-11-25 RX ORDER — BISACODYL 10 MG
10 SUPPOSITORY, RECTAL RECTAL ONCE
Status: COMPLETED | OUTPATIENT
Start: 2024-11-25 | End: 2024-11-25

## 2024-11-25 RX ORDER — HEPARIN SODIUM 1000 [USP'U]/ML
30 INJECTION, SOLUTION INTRAVENOUS; SUBCUTANEOUS ONCE
Status: COMPLETED | OUTPATIENT
Start: 2024-11-25 | End: 2024-11-25

## 2024-11-25 RX ORDER — HEPARIN SODIUM AND DEXTROSE 10000; 5 [USP'U]/100ML; G/100ML
INJECTION INTRAVENOUS CONTINUOUS
Status: DISCONTINUED | OUTPATIENT
Start: 2024-11-25 | End: 2024-11-29

## 2024-11-25 RX ORDER — NITROFURANTOIN 25; 75 MG/1; MG/1
100 CAPSULE ORAL 2 TIMES DAILY WITH MEALS
Status: DISCONTINUED | OUTPATIENT
Start: 2024-11-25 | End: 2024-11-30

## 2024-11-25 RX ORDER — HEPARIN SODIUM 1000 [USP'U]/ML
80 INJECTION, SOLUTION INTRAVENOUS; SUBCUTANEOUS ONCE
Status: COMPLETED | OUTPATIENT
Start: 2024-11-25 | End: 2024-11-25

## 2024-11-25 NOTE — PROGRESS NOTES
Gastroenterology Progress Note  Patient Name: Clari Rosa  Chief Complaint: Abdominal pain, partial SBO, constipation  S: The patient reports continued pain in the left lower quadrant. No n/v.  She is tolerating small amounts of clear liquids, and has since had the NG tube removed.  She reports that she is passing small amounts of flatus.  No BM.    O: BP (!) 111/99 (BP Location: Right arm)   Pulse 94   Temp 99.9 °F (37.7 °C) (Axillary)   Resp 14   Ht 5' (1.524 m)   Wt 183 lb 1.6 oz (83.1 kg)   SpO2 100%   BMI 35.76 kg/m²   Gen: AAOx3  Abd: (+)BS, soft, non-tender in the upper abdomen, but tender with firmness in the left lower quadrant consistent with large rectus sheath hematoma; non-distended; no rebound or guarding  Ext: No edema or cyanosis  Skin: Warm and dry  Laboratory Data:   Lab Results   Component Value Date    WBC 7.5 11/25/2024    HGB 10.0 11/25/2024    HCT 32.0 11/25/2024    .0 11/25/2024    CREATSERUM 0.74 11/25/2024    BUN 10 11/25/2024     11/25/2024    K 4.6 11/25/2024     11/25/2024    CO2 27.0 11/25/2024     11/25/2024    CA 9.0 11/25/2024     Recent Labs   Lab 11/23/24  1049 11/24/24  0705 11/25/24  0715   * 176* 243*   BUN 7* 8* 10   CREATSERUM 0.70 0.73 0.74   CA 8.7 8.9 9.0    141 136   K 3.7  3.7 4.0 4.6    103 102   CO2 31.0 31.0 27.0     Recent Labs   Lab 11/25/24  0715   RBC 4.21   HGB 10.0*   HCT 32.0*   MCV 76.0*   MCH 23.8*   MCHC 31.3   RDW 16.0   NEPRELIM 5.11   WBC 7.5   .0       Recent Labs   Lab 11/22/24  1011 11/23/24  1049 11/24/24  0705   * 70* 79*   AST 17 10 51*       Assessment: This is a patient who presented with a partial SBO and evidence of constipation with suspicion of colonic stricture not subsequently identified on gastrograffin enema.  She did undergo endoscopic decompression and is passing flatus, ableit in small amounts and infrequently.   She has no n/v and starting to tolerate clear liquids.     Plan:   1) Continue clear liquid diet  2) Repeat KUB to assess for ongoing colonic distention  3) Mobilize out of bed of possible  4) Daily hgb given large rectus sheath hematoma

## 2024-11-25 NOTE — PROGRESS NOTES
11/24/24 8025   BiPAP   $ RT Standby Charge (per 15 min) 1   $ ANAID Follow up charge Yes   BiPAP/CPAP Monitored Parameters   Toleration Refused     Patient refused.

## 2024-11-25 NOTE — BH PROGRESS NOTE
11/25/2024    Seen in person at [facility] floor    Interval Chief Complaint: Follow-up on bipolar, anxiety    Subjective:  Seen and examined.  Patient has been able to restart overall home meds and feels much better since restarting them.  She is off IV Depakote.  She is able to sleep last night and appears much calmer today compared to prior visit.  She reports some sadness related to being in the hospital but denies a current hopelessness or hopelessness that there is no suicidal or homicidal thoughts and she wants to live.  There is no hallucinations or paranoia.  No agitation.  She is grateful that she is able to at least taking clear liquids and have her home meds.  We talked about the importance of focusing what she has control over and taking 1 day at a time.  She was asking about antidepressants to help her with her mood but advised her that those with bipolar tend to react very poorly with antidepressants and may actually worsen her mood.  We agreed to keep her meds the same for now and then reassess possibly increasing her Seroquel or switching to a different atypical antipsychotic if her mood deteriorates.  She will be going to a new long-term care facility and is trying to keep an open mind.  She reports her anxiety is better since restarting her Klonopin and appears much more relaxed compared to her last visit.  No current panic symptoms. Validation provided for her efforts.    MEDS:    [COMPLETED] bisacodyl (Dulcolax) 10 MG rectal suppository 10 mg  10 mg Rectal Once    nitrofurantoin monohydrate macro (Macrobid) cap 100 mg  100 mg Oral BID with meals    [COMPLETED] heparin (Porcine) 1000 UNIT/ML injection - BOLUS IV 6,600 Units  80 Units/kg Intravenous Once    [COMPLETED] heparin (Porcine) 25139 units/250mL infusion (PE/DVT/THROMBUS) INITIAL DOSE  18 Units/kg/hr Intravenous Once    heparin (Porcine) 58144 units/250mL infusion PE/DVT/THROMBUS CONTINUOUS  200-3,000 Units/hr Intravenous Continuous     adult 3 in 1 TPN   Intravenous Cyclic TPN    adult 3 in 1 TPN   Intravenous Continuous TPN    dextrose 10% infusion (TPN no rate)   Intravenous Continuous PRN    [] adult 3 in 1 TPN   Intravenous Continuous TPN    [COMPLETED] magnesium sulfate in sterile water for injection 2 g/50mL IVPB premix 2 g  2 g Intravenous Once    [COMPLETED] potassium chloride 40 mEq in 250mL sodium chloride 0.9% IVPB premix  40 mEq Intravenous Once    [COMPLETED] diphenhydrAMINE (Benadryl) 50 mg/mL  injection 50 mg  50 mg Intravenous Once    [COMPLETED] methylPREDNISolone sodium succinate (Solu-MEDROL) injection 40 mg  40 mg Intravenous Q6H    LORazepam (Ativan) 2 mg/mL injection 0.5 mg  0.5 mg Intravenous Q4H PRN    Or    LORazepam (Ativan) 2 mg/mL injection 1 mg  1 mg Intravenous Q4H PRN    [] naloxone (Narcan) 0.4 MG/ML injection 0.08 mg  0.08 mg Intravenous Once PRN    HYDROmorphone (Dilaudid) 1 MG/ML injection 0.2 mg  0.2 mg Intravenous Q2H PRN    Or    HYDROmorphone (Dilaudid) 1 MG/ML injection 0.4 mg  0.4 mg Intravenous Q2H PRN    Or    HYDROmorphone (Dilaudid) 1 MG/ML injection 0.8 mg  0.8 mg Intravenous Q2H PRN    albuterol (Ventolin HFA) 108 (90 Base) MCG/ACT inhaler 2 puff  2 puff Inhalation Q6H PRN    [Held by provider] apixaban (Eliquis) tab 5 mg  5 mg Oral BID    clonazePAM (KlonoPIN) tab 1.5 mg  1.5 mg Oral TID    divalproex ER (Depakote ER) 24 hr tab 500 mg  500 mg Oral BID    DULoxetine (Cymbalta) DR cap 60 mg  60 mg Oral Daily    oxybutynin (Ditropan) tab 5 mg  5 mg Oral TID    QUEtiapine (SEROquel) tab 200 mg  200 mg Oral Nightly    traZODone (Desyrel) tab 50 mg  50 mg Oral Nightly PRN    glucose (Dex4) 15 GM/59ML oral liquid 15 g  15 g Oral Q15 Min PRN    Or    glucose (Glutose) 40% oral gel 15 g  15 g Oral Q15 Min PRN    Or    glucose-vitamin C (Dex-4) chewable tab 4 tablet  4 tablet Oral Q15 Min PRN    Or    dextrose 50% injection 50 mL  50 mL Intravenous Q15 Min PRN    Or    glucose (Dex4) 15  GM/59ML oral liquid 30 g  30 g Oral Q15 Min PRN    Or    glucose (Glutose) 40% oral gel 30 g  30 g Oral Q15 Min PRN    Or    glucose-vitamin C (Dex-4) chewable tab 8 tablet  8 tablet Oral Q15 Min PRN    melatonin tab 3 mg  3 mg Oral Nightly PRN    acetaminophen (Tylenol Extra Strength) tab 500 mg  500 mg Oral Q4H PRN    morphINE PF 2 MG/ML injection 1 mg  1 mg Intravenous Q2H PRN    Or    morphINE PF 2 MG/ML injection 2 mg  2 mg Intravenous Q2H PRN    Or    morphINE PF 4 MG/ML injection 4 mg  4 mg Intravenous Q2H PRN    ondansetron (Zofran) 4 MG/2ML injection 4 mg  4 mg Intravenous Q6H PRN    prochlorperazine (Compazine) 10 MG/2ML injection 5 mg  5 mg Intravenous Q8H PRN    insulin degludec (Tresiba) 100 units/mL flextouch 5 Units  5 Units Subcutaneous Nightly    insulin aspart (NovoLOG) 100 Units/mL FlexPen 1-10 Units  1-10 Units Subcutaneous TID AC and HS    heparin (Porcine) 100 Units/mL lock flush 500 Units  5 mL Intravenous PRN    [Held by provider] metoprolol tartrate (Lopressor) tab 25 mg  25 mg Oral 2x Daily(Beta Blocker)    nystatin-triamcinolone (Mycolog II) 100,000-0.1 Units/g-% cream   Topical BID    [COMPLETED] LORazepam (Ativan) 2 mg/mL injection 1 mg  1 mg Intravenous Once    [COMPLETED] ketorolac (Toradol) 15 MG/ML injection 15 mg  15 mg Intravenous Once    morphINE PF 4 MG/ML injection 4 mg  4 mg Intravenous Q30 Min PRN    [COMPLETED] ondansetron (Zofran) 4 MG/2ML injection 4 mg  4 mg Intravenous Once    [COMPLETED] sodium chloride 0.9 % IV bolus 500 mL  500 mL Intravenous Once      Vitals:    11/25/24 1602   BP: 106/68   Pulse: 112   Resp: 13   Temp: 98.5 °F (36.9 °C)     ROS:   As above. Otherwise negative on 14 system exam.    MSE:   Conscious/Orientation: A + O x person, place, time, situation  Appearance: good grooming  Behavior: no psychomotor abnormalities, good eye contact and engagement with interview.  Speech: normal rate, rhythm, and volume  Mood: \" A little bit better\"  Affect:  congruent, reactive  Thought process: linear, logical, goal directed  Thought content:   No delusions, obsessions, or other thought abnormalities  Suicidal ideation: denies  Homicidal ideation: denies  Perceptions: no hallucinations  Insight: fair  Judgment: fair  Loosening of associations: None    Bipolar 1 disorder mixed state mild, generalized anxiety disorder-still improving    Plan:  Continue current home meds.  Titrate further as warranted  Check a Depakote level in the next few days to ensure therapeutic level  Medication teaching done.  Will avoid increasing her antidepressant and this may worsen her underlying hypomanic state  Will follow  No need for inpatient psychiatric care and she is safe to discharge to long-term care facility once medically cleared  ~35min spent on case with >50% time spent on counseling/coordination of care.     Sandor iLght DO  Board Certified Adult and Geriatric Psychiatrist  Medical Director, Geriatric Psychiatry, [facility]   Medical Director, Psychiatric Consultation Service, [facility]

## 2024-11-25 NOTE — PHYSICAL THERAPY NOTE
Duplicate PT order received. Chart reviewed. Pt known to rehab department from previous admissions. Pt is LTC resident at [facility]. Pt is dependent for ADL and mobility at baseline. Pt with no skilled IP PT therapy goals. Will sign off.

## 2024-11-25 NOTE — PROGRESS NOTES
Assumed care of patient at 0700. IV abx changed to PO on shift. X-ray ABD completed. Continues with high levels of abd pain. Prn pain meds on shift. Occasional cough that increases Abd pain.  Left abd hematoma stable. Started on hep gtt on shift for VTE prevention.  Continues clear liq diet. Consider advancement tomorrow per GI. Continues on TPN to left upper chest port. Updated on plan of care and condition update. All needs met on shift.

## 2024-11-25 NOTE — DIETARY NOTE
[facility]   part of [facility]   CLINICAL NUTRITION - TPN FOLLOW UP    Clari Rosa     TPN order for tonight to provide: Infused via Port 750 dextrose calories, 400 lipid calories (26.5% lipids), 90 grams protein in 1800mL fluid. Provides 1510 kcal, meeting ~ 100% of nutrition prescription.          Intake/Output Summary (Last 24 hours) at 11/25/2024 1255  Last data filed at 11/25/2024 1021  Gross per 24 hour   Intake 1342.8 ml   Output 3900 ml   Net -2557.2 ml       Labs:   Recent Labs   Lab 11/23/24  1049 11/23/24  1615 11/24/24  0705 11/25/24  0715   *  --  176* 243*     --  141 136   K 3.7  3.7  --  4.0 4.6     --  103 102   CO2 31.0  --  31.0 27.0   BUN 7*  --  8* 10   CREATSERUM 0.70  --  0.73 0.74   CA 8.7  --  8.9 9.0   PHOS 3.0  --  3.8 3.2   MG 2.1  --  1.9 1.9   ALKPHO 107  --  126*  --    AST 10  --  51*  --    ALT 70*  --  79*  --    BILT 0.2*  --  0.4  --    TRIG  --    < > 252*  --     < > = values in this interval not displayed.       Glucose POC last 24hr (189-330). Novolog SSC. Received 24 units in past 24 hours    Electrolytes adjusted based on today's labs.   NGT removed and started on CL  Repeat KUB to assess for ongoing colonic distention - possible diet advancement.   Cycled TPN x 20h tonight in anticipation of possible diet advancement    RD will write TPN daily and follow per protocol. See full assessment 11/22.    Pt is at high nutrition risk.    Haleigh Scott RD, LDN, [facility]  Clinical Dietitian  Phone [phone]

## 2024-11-25 NOTE — CM/SW NOTE
Pt discussed in AM rounds and per RN, pt started TPN on 11/23. RN stated pt is on a clear liquid diet and if pt tolerates clear liquids her diet should advance tomorrow.     Per previous notes, plan is to have [facility] review pt's clinicals at discharge to confirm if they can accept pt. SW attached updated clinicals to referral in [facility]IN. JAH messaged [facility] in [facility] and informed the facility this writer will notify the facility when pt is cleared for discharge. JAH will continue to follow.     GURINDER Beltre  Discharge Planner

## 2024-11-25 NOTE — PROGRESS NOTES
[facility]  Progress Note    Clari Rosa Patient Status:  Inpatient    1970 MRN [id number]   Location [facility] 3NE-A Attending Bhanu Mckinney MD   Hosp Day # 6 PCP Macrina Galvin MD     Subjective:    Patient tolerating clear liquids, no nausea  She does have pain on the left abdomen where hematoma has developed.   Reports passing flatus    Objective/Physical Exam:    Vital Signs:  Blood pressure 96/74, pulse 103, temperature 98.3 °F (36.8 °C), temperature source Oral, resp. rate 15, height 5' (1.524 m), weight 183 lb 1.6 oz (83.1 kg), SpO2 98%.    General:  Alert, orientated x3.  Cooperative.  No apparent distress.    Lungs:  Non labored breathing    Cardiac:  Regular rate and rhythm.     Abdomen:  soft, tenderness at site of hematoma    Extremities:  No lower extremity edema noted.  Without clubbing or cyanosis.        Labs:  Reviewed    Lab Results   Component Value Date    WBC 7.5 2024    HGB 10.0 2024    HCT 32.0 2024    .0 2024    CREATSERUM 0.74 2024    BUN 10 2024     2024    K 4.6 2024     2024    CO2 27.0 2024     2024    CA 9.0 2024    MG 1.9 2024    PHOS 3.2 2024    PGLU 330 2024     Problem List:  Patient Active Problem List   Diagnosis    Paresis of lower extremity (HCC)    Transverse myelitis (HCC)    Bipolar 1 disorder ([facility])    Manipulative behavior    History of diverticulitis of colon    Serotonin syndrome    Obesity (BMI 30-39.9)    Dental caries    Type 2 diabetes mellitus without complication, with long-term current use of insulin (HCC)    Chronic suprapubic catheter ([facility])    Bipolar I disorder depressed with atypical features ([facility])    Other acute pulmonary embolism, unspecified whether acute cor pulmonale present ([facility])    Hyperglycemia    Sepsis due to undetermined organism ([facility])    Severe episode of recurrent major depressive disorder, without psychotic  features ([facility])    Generalized anxiety disorder with panic attacks    Sepsis due to urinary tract infection ([facility])    Lactic acid acidosis    Constipation, unspecified constipation type    Moderate depressed bipolar I disorder ([facility])    Acute chest pain    Depression, unspecified depression type    Cellulitis    Bipolar disorder, current episode mixed, severe, without psychotic features ([facility])    Chest pain    Acute cystitis without hematuria    Leukocytosis    Leukocytosis, unspecified type    Benign essential hypertension    COPD (chronic obstructive pulmonary disease) ([facility])    Neurogenic bladder    Urinary tract infection associated with catheterization of urinary tract, unspecified indwelling urinary catheter type, initial encounter ([facility])    Weakness generalized    Anemia    Cystitis    UTI (urinary tract infection)    Chronic abdominal pain    Abdominal pain, acute    Mild mixed bipolar I disorder ([facility])       Impression:     55 y/o with abdominal pain, distension  No evidence of colon stricture  NG tube is out and she is tolerating clears  KUB revealing colonic and small bowel distension consistent with ileus  CT with left rectus sheath hematoma  Tolerating liquids  UTI enterococcus     Plan:    Antibiotics per primary service for UTI  No plans for surgical management  Continue medical management  All questions answered  Will sign off  LISA Curtis DR  [facility]  General Surgery  11/25/2024

## 2024-11-25 NOTE — PROGRESS NOTES
[facility]   part of [facility]     Hospitalist Progress Note     Clari Rosa Patient Status:  Observation    1970 MRN [id number]   Location [facility] 3SW-A Attending Bhanu Mckinney MD   Hosp Day # 6 PCP Macrina Galvin MD     Chief Complaint: abd pain    Subjective:     Still no BM. Is passing gas from below. Ngt out. Tolerating cld    Objective:    Review of Systems:   A comprehensive review of systems was completed; pertinent positive and negatives stated in subjective.    Vital signs:  Temp:  [97.4 °F (36.3 °C)-99.9 °F (37.7 °C)] 98.3 °F (36.8 °C)  Pulse:  [] 103  Resp:  [13-24] 15  BP: ()/(74-99) 96/74  SpO2:  [95 %-100 %] 98 %    Physical Exam:    General: No acute distress  Respiratory: No wheezes, no rhonchi  Cardiovascular: S1, S2, regular rate and rhythm, bradycardic  Abdomen: Soft, tender, distended, positive bowel sounds  Neuro: No new focal deficits.   Extremities: No edema      Diagnostic Data:    Labs:  Recent Labs   Lab 24  0538 24  1011 24  1049 24  0705 24  1700 24  0715   WBC 10.7 8.3 9.3 7.0  --  7.5   HGB 9.5* 9.4* 9.1* 10.4* 10.0* 10.0*   MCV 75.8* 74.2* 76.5* 78.0*  --  76.0*   .0 246.0 259.0 278.0  --  244.0       Recent Labs   Lab 24  1011 24  1049 24  0705 24  0715   * 138* 176* 243*   BUN 8* 7* 8* 10   CREATSERUM 0.63 0.70 0.73 0.74   CA 8.9 8.7 8.9 9.0   ALB 3.7 3.3 3.8  --     141 141 136   K 3.4* 3.7  3.7 4.0 4.6    106 103 102   CO2 32.0 31.0 31.0 27.0   ALKPHO 133* 107 126*  --    AST 17 10 51*  --    * 70* 79*  --    BILT 0.2* 0.2* 0.4  --    TP 6.2 6.3 6.4  --        Estimated Creatinine Clearance: 62.4 mL/min (based on SCr of 0.74 mg/dL).    Recent Labs   Lab 24  1011   TROPHS <3       No results for input(s): \"PTP\", \"INR\" in the last 168 hours.               Microbiology    Hospital Encounter on 24   1. Urine Culture, Routine     Status:  cigarettes Abnormal    Collection Time: 11/22/24 12:03 AM    Specimen: Urine, clean catch   Result Value Ref Range    Urine Culture 50,000-99,000 CFU/ML Enterococcus faecalis (A) N/A       Susceptibility    Enterococcus faecalis -  (no method available)     Nitrofurantoin <=32 Sensitive      Vancomycin 1 Sensitive      Ampicillin <=2 Sensitive          Imaging: Reviewed in Epic.    Medications:    bisacodyl  10 mg Rectal Once    nitrofurantoin monohydrate macro  100 mg Oral BID with meals    [Held by provider] apixaban  5 mg Oral BID    clonazePAM  1.5 mg Oral TID    divalproex ER  500 mg Oral BID    DULoxetine  60 mg Oral Daily    oxybutynin  5 mg Oral TID    QUEtiapine  200 mg Oral Nightly    insulin degludec  5 Units Subcutaneous Nightly    insulin aspart  1-10 Units Subcutaneous TID AC and HS    [Held by provider] metoprolol tartrate  25 mg Oral 2x Daily(Beta Blocker)    nystatin-triamcinolone   Topical BID    [Held by provider] enoxaparin  1 mg/kg Subcutaneous 2 times per day       Assessment & Plan:      #Abdominal pain  #bowel obstruction  #rectal stricture ruled out  #ileus  - CT shows dilated colon with narrowing at rectosigmoid junction  - GI consult  - PRN pain med  -Ngt to IS. Output decreasing  -sp flex sig for decompression  -gen surg consult  -lower GI neg for stricture  -hold eliquis. hold FD lovenox   -TPN for nutrition. Can dc once tolerating more diet  -XR c/w ileus again  -d/w GI. Trial suppository     #rectus sheath hematoma  -d/w GI, d/w gen surg. Non surgical per surgery  -hgb stable  -if hgb drops, will get CTA  -dc FD lovenox. Per surg, can resume if hgb stable. Will resume AC today. Opt for hep gtt in case bleeding worsens. Cont hold eliquis consider absorption and in case needs surgical intervention   -check doppler legs to ensure DVT resolution. If unable to resume lovenox and DVT persists, would need IVC filter    #sinus bradycardia - resolved  -consult cards, d/w APN  -EKG reviewed,  sinus  -echo  -likely vasovagal. resolved     # Type 2 diabetes  - Will plae on hyperglycemia protocol with long actin and correction factor insulin.  -on D51/2 NS     # Anxiety  - Prn IV benozo's     # Bipolar w/ carmel  -depakote and seroquel resume PO   -IV depakote dced  -seems to be having a manic episode. Pt requesting to go to [facility] after  -consult psych APN     # Hypertension  - Will continue on metoprolol     # Neurogenic bladder  - Patient has chronic mark.    #UTI  -IV abx.   -Ucx enterococcus.   -allergy to PCN  -can now take po. Dc cipro. Start macrobid PO      # Hx DVT  - eliquis at DC     # ANAID  - Will place on ANAID  protocol.    Bhanu Mckinney MD    Supplementary Documentation:     Quality:  DVT Mechanical Prophylaxis:        DVT Pharmacologic Prophylaxis   Medication    [Held by provider] apixaban (Eliquis) tab 5 mg    heparin (Porcine) 100 Units/mL lock flush 500 Units    [Held by provider] enoxaparin (Lovenox) 100 MG/ML SUBQ injection 90 mg                Code Status: Full Code  Mark: Suprapubic catheter in place  Mark Duration (in days):   Central line:    MARINA:     Discharge is dependent on: course  At this point Ms. Rosa is expected to be discharge to: home    The 21st Century Cures Act makes medical notes like these available to patients in the interest of transparency. Please be advised this is a medical document. Medical documents are intended to carry relevant information, facts as evident, and the clinical opinion of the practitioner. The medical note is intended as peer to peer communication and may appear blunt or direct. It is written in medical language and may contain abbreviations or verbiage that are unfamiliar.

## 2024-11-25 NOTE — PLAN OF CARE
Assumed patient care @ 1930  A&O x 3-4  1.5 L o2 via  nc  Nsr on tele  Prn pain medications given with relieving results - see MAR for more info  Suprapubic cath + brief in place  NGT removed by morning RN. Pt on a clear liquid diet.  Accuchecks  Port a cath L subclavian infusing TPN @ 75 ML/HR  PIV L WRIST SL  Contact iso - ESBL in urine, hx of cdiff  Safety precautions are in place, bed in lowest position, updated on POC, call light within reach, all needs met at this time.       Problem: METABOLIC/FLUID AND ELECTROLYTES - ADULT  Goal: Glucose maintained within prescribed range  Description: INTERVENTIONS:  - Monitor Blood Glucose as ordered  - Assess for signs and symptoms of hyperglycemia and hypoglycemia  - Administer ordered medications to maintain glucose within target range  - Assess barriers to adequate nutritional intake and initiate nutrition consult as needed  - Instruct patient on self management of diabetes  Outcome: Progressing  Goal: Electrolytes maintained within normal limits  Description: INTERVENTIONS:  - Monitor labs and rhythm and assess patient for signs and symptoms of electrolyte imbalances  - Administer electrolyte replacement as ordered  - Monitor response to electrolyte replacements, including rhythm and repeat lab results as appropriate  - Fluid restriction as ordered  - Instruct patient on fluid and nutrition restrictions as appropriate  Outcome: Progressing     Problem: NEUROLOGICAL - ADULT  Goal: Achieves stable or improved neurological status  Description: INTERVENTIONS  - Assess for and report changes in neurological status  - Initiate measures to prevent increased intracranial pressure  - Maintain blood pressure and fluid volume within ordered parameters to optimize cerebral perfusion and minimize risk of hemorrhage  - Monitor temperature, glucose, and sodium. Initiate appropriate interventions as ordered  Outcome: Progressing     Problem: Impaired Cognition  Goal: Patient will  exhibit improved attention, thought processing and/or memory  Description: Interventions:  - Allow additional time for processing after asking questions or providing instructions  Outcome: Progressing

## 2024-11-26 LAB
ANION GAP SERPL CALC-SCNC: 4 MMOL/L (ref 0–18)
APTT PPP: 63.1 SECONDS (ref 23–36)
APTT PPP: 76.4 SECONDS (ref 23–36)
BUN BLD-MCNC: 19 MG/DL (ref 9–23)
CALCIUM BLD-MCNC: 8.8 MG/DL (ref 8.7–10.4)
CHLORIDE SERPL-SCNC: 99 MMOL/L (ref 98–112)
CO2 SERPL-SCNC: 30 MMOL/L (ref 21–32)
CREAT BLD-MCNC: 0.72 MG/DL
EGFRCR SERPLBLD CKD-EPI 2021: 99 ML/MIN/1.73M2 (ref 60–?)
ERYTHROCYTE [DISTWIDTH] IN BLOOD BY AUTOMATED COUNT: 16 %
GLUCOSE BLD-MCNC: 191 MG/DL (ref 70–99)
GLUCOSE BLD-MCNC: 209 MG/DL (ref 70–99)
GLUCOSE BLD-MCNC: 252 MG/DL (ref 70–99)
GLUCOSE BLD-MCNC: 276 MG/DL (ref 70–99)
GLUCOSE BLD-MCNC: 346 MG/DL (ref 70–99)
GLUCOSE BLD-MCNC: 354 MG/DL (ref 70–99)
HCT VFR BLD AUTO: 27.3 %
HGB BLD-MCNC: 8.6 G/DL
HGB BLD-MCNC: 8.7 G/DL
HGB BLD-MCNC: 8.7 G/DL
HGB BLD-MCNC: 9.1 G/DL
MAGNESIUM SERPL-MCNC: 1.9 MG/DL (ref 1.6–2.6)
MCH RBC QN AUTO: 24.2 PG (ref 26–34)
MCHC RBC AUTO-ENTMCNC: 31.9 G/DL (ref 31–37)
MCV RBC AUTO: 75.8 FL
OSMOLALITY SERPL CALC.SUM OF ELEC: 292 MOSM/KG (ref 275–295)
PHOSPHATE SERPL-MCNC: 3.5 MG/DL (ref 2.4–5.1)
PLATELET # BLD AUTO: 277 10(3)UL (ref 150–450)
POTASSIUM SERPL-SCNC: 4.4 MMOL/L (ref 3.5–5.1)
RBC # BLD AUTO: 3.6 X10(6)UL
SODIUM SERPL-SCNC: 133 MMOL/L (ref 136–145)
WBC # BLD AUTO: 8.9 X10(3) UL (ref 4–11)

## 2024-11-26 PROCEDURE — 99232 SBSQ HOSP IP/OBS MODERATE 35: CPT | Performed by: OTHER

## 2024-11-26 PROCEDURE — 99233 SBSQ HOSP IP/OBS HIGH 50: CPT | Performed by: HOSPITALIST

## 2024-11-26 RX ORDER — HYDROCODONE BITARTRATE AND ACETAMINOPHEN 5; 325 MG/1; MG/1
1 TABLET ORAL EVERY 4 HOURS PRN
Status: DISCONTINUED | OUTPATIENT
Start: 2024-11-26 | End: 2024-11-30

## 2024-11-26 RX ORDER — DOXEPIN HYDROCHLORIDE 50 MG/1
1 CAPSULE ORAL DAILY
Status: DISCONTINUED | OUTPATIENT
Start: 2024-11-27 | End: 2024-11-30

## 2024-11-26 RX ORDER — INSULIN DEGLUDEC 100 U/ML
6 INJECTION, SOLUTION SUBCUTANEOUS NIGHTLY
Status: DISCONTINUED | OUTPATIENT
Start: 2024-11-26 | End: 2024-11-29

## 2024-11-26 RX ORDER — POLYETHYLENE GLYCOL 3350 17 G/17G
17 POWDER, FOR SOLUTION ORAL DAILY PRN
Qty: 30 EACH | Refills: 0
Start: 2024-11-26 | End: 2024-12-26

## 2024-11-26 RX ORDER — HYDROCODONE BITARTRATE AND ACETAMINOPHEN 5; 325 MG/1; MG/1
2 TABLET ORAL EVERY 4 HOURS PRN
Status: DISCONTINUED | OUTPATIENT
Start: 2024-11-26 | End: 2024-11-30

## 2024-11-26 RX ORDER — POLYETHYLENE GLYCOL 3350 17 G/17G
17 POWDER, FOR SOLUTION ORAL DAILY
Status: DISCONTINUED | OUTPATIENT
Start: 2024-11-26 | End: 2024-11-30

## 2024-11-26 NOTE — DIETARY NOTE
[facility]   part of [facility]    NUTRITION ASSESSMENT    Unable to diagnose malnutrition criteria at this time.    NUTRITION INTERVENTION:    Meal and Snacks - Monitor and encourage adequate PO intake. Add Carb Controlled diet/ Low Fiber Diet   Medical Food Supplements - Glucerna Daily.   Nutrition Support- TPN dc'd per Hospitalist today      PATIENT STATUS:   11/26 - Pt started on TPN due to ileus.  KUB indicating ongoing colonic distention, but per GI, suspect aspect of chronicity.  Pt otherwise tolerating clear liquids, having Bms.  Diet advanced to low fiber this date, but has been unable to eat anything. TPN dc'd per Hospitalist this AM. RD entered pt's room, and attempted multiple times to arouse pt and encourage pt to eat.  RN reporting pt receiving medication that may contribute to drowsiness.  Suggested automatic trays, Will add ONS to maximize intact.      11/22/24- 53 y/o female from [facility] admitted w/ abdominal pain and distention for ~ 1 week PTA. No BM in 4 days.   Pt chart reviewed d/t NPO status x 4 days.  Pt w/ dilated colon on imaging. NG for LIS placed on 11/19 w/ 950 ml output x 24 hours. S/p flex sig on 11/20, which revealed dilated colon, s/p lavage and suction, and colon filled w/ solid stool. Surgery is following. Pt w/ possible colon obstruction. Plans are for lower GI today. Receiving IVF:D5-1/2 NS at 83 ml/hr, which provides: 339 kcal. Will monitor for diet advancement vs need for nutrition support.  PMH: quit smoking, HTN, DM, bipolar, MIKE, transverse myelitis, suprapubic catheter, hx of R colectomy.       ANTHROPOMETRICS:  Ht: 152.4 cm (5')  Wt: 83.1 kg (183 lb 1.6 oz).   BMI: Body mass index is 35.76 kg/m².  IBW: 45.5 kg      WEIGHT HISTORY:     Per EMR hx, pt may have lost ~20 lb (9.5%) in about 4 months, which is significant wt loss per standards (210 lb down to 190 lb).      Wt Readings from Last 20 Encounters:   11/25/24 83.1 kg (183 lb 1.6 oz)   11/17/24  86.2 kg (190 lb)   10/22/24 85.7 kg (189 lb)   10/19/24 85.7 kg (189 lb)   09/20/24 86.2 kg (190 lb)   09/18/24 86.4 kg (190 lb 6.4 oz)   08/21/24 88.2 kg (194 lb 8 oz)   08/05/24 113.4 kg (250 lb)   08/04/24 113.4 kg (250 lb)   07/24/24 91.1 kg (200 lb 14.4 oz)   07/22/24 95.3 kg (210 lb)   07/19/24 95.3 kg (210 lb)   07/13/24 95.3 kg (210 lb 1.6 oz)   05/10/24 101.2 kg (223 lb)   01/28/24 96 kg (211 lb 10.3 oz)   01/11/24 95.9 kg (211 lb 8 oz)   12/10/23 94.7 kg (208 lb 12.8 oz)   08/15/16 104.3 kg (229 lb 15 oz)       NUTRITION:  Diet:       Procedures    Low Fiber/Soft diet Low Fiber/Soft; Calorie Restriction/Carb Controlled: 2200 kcal/90 grams; Is Patient on Accuchecks? Yes      Food Allergies: No  Cultural/Ethnic/Holiness Preferences Addressed: Yes    Percent Meals Eaten (last 3 days)       Date/Time Percent Meals Eaten (%)    11/25/24 1300 100 %     Percent Meals Eaten (%): tolerated clear liq diet at 11/25/24 1300    11/26/24 0900 0 %          GI system review: WNL Last BM Date: 11/22/24  Skin and wounds: skin tear to sacrum per RN documentation    NUTRITION RELATED PHYSICAL FINDINGS:     1. Body Fat/Muscle Mass: no wasting noted / well nourished at this time     2. Fluid Accumulation: lower extremity edema non-pitting    NUTRITION PRESCRIPTION:  45.5 kg- IBW  Calories: 7820-1290 calories/day (25-30 kcal/kg)  Protein: 68-91 grams protein/day (1.5-2.0 grams protein per kg)  Fluid: ~1 ml/kcal or per MD discretion    NUTRITION DIAGNOSIS/PROBLEM:  Inadequate energy intake related to insufficient appetite resulting in inadequate nutrition intake as evidenced by documented/reported insufficient oral intake      MONITOR AND EVALUATE/NUTRITION GOALS:  PO intake of 75% of meals TID - New  PO intake of 75% of oral nutrition supplement/s - New  Weight stable within 1 to 2 lbs during admission - Ongoing  Return to PO intake or advance diet in 24-48 hrs - Resolved  Start alternative nutrition in 24-48 hrs if diet is not  able to advance- Resolved      MEDICATIONS:  Novolog, Tresiba,     LABS:  Glu 354; Na 133    Pt is at Moderate nutrition risk    Haleigh Scott RD, LDN, [facility]  Clinical Dietitian  Phone [phone]

## 2024-11-26 NOTE — BH PROGRESS NOTE
11/26/2024    Seen in person [facility] medical floor    Interval Chief Complaint: Follow-up on bipolar, anxiety    Subjective:  Noted to be somnolent to take an exam despite it being 1:00 in the afternoon.  Nursing reports that patient did sleep overnight till 9 AM and then they gave her morning dose of Klonopin along with Norco and she fell asleep and has been difficult to arouse.  She is highly somnolent when I arrived but was able to wake up and start eating her lunch.  She not suicidal or homicidal and does report her mood to be better.  She blames her somnolence on \"not sleeping for several days in a row.  But advised her this is likely more medication interaction.  There is no euphoria or grandiosity.  She is agreeable discharging to long-term care facility and is hopeful discharged soon.  No agitation reported.    MEDS:    HYDROcodone-acetaminophen (Norco) 5-325 MG per tab 1 tablet  1 tablet Oral Q4H PRN    Or    HYDROcodone-acetaminophen (Norco) 5-325 MG per tab 2 tablet  2 tablet Oral Q4H PRN    polyethylene glycol (PEG 3350) (Miralax) 17 g oral packet 17 g  17 g Oral Daily    insulin aspart (NovoLOG) 100 Units/mL FlexPen 1-68 Units  1-68 Units Subcutaneous TID CC    insulin degludec (Tresiba) 100 units/mL flextouch 6 Units  6 Units Subcutaneous Nightly    [START ON 11/27/2024] multivitamin (Tab-A-Masood/Beta Carotene) tab 1 tablet  1 tablet Oral Daily    [COMPLETED] bisacodyl (Dulcolax) 10 MG rectal suppository 10 mg  10 mg Rectal Once    nitrofurantoin monohydrate macro (Macrobid) cap 100 mg  100 mg Oral BID with meals    [COMPLETED] heparin (Porcine) 1000 UNIT/ML injection - BOLUS IV 6,600 Units  80 Units/kg Intravenous Once    [COMPLETED] heparin (Porcine) 02253 units/250mL infusion (PE/DVT/THROMBUS) INITIAL DOSE  18 Units/kg/hr Intravenous Once    heparin (Porcine) 87329 units/250mL infusion PE/DVT/THROMBUS CONTINUOUS  200-3,000 Units/hr Intravenous Continuous    [COMPLETED] heparin (Porcine) 1000 UNIT/ML  injection - BOLUS IV 2,500 Units  30 Units/kg Intravenous Once    [] adult 3 in 1 TPN   Intravenous Continuous TPN    dextrose 10% infusion (TPN no rate)   Intravenous Continuous PRN    [] adult 3 in 1 TPN   Intravenous Continuous TPN    [COMPLETED] magnesium sulfate in sterile water for injection 2 g/50mL IVPB premix 2 g  2 g Intravenous Once    [COMPLETED] potassium chloride 40 mEq in 250mL sodium chloride 0.9% IVPB premix  40 mEq Intravenous Once    [COMPLETED] diphenhydrAMINE (Benadryl) 50 mg/mL  injection 50 mg  50 mg Intravenous Once    [COMPLETED] methylPREDNISolone sodium succinate (Solu-MEDROL) injection 40 mg  40 mg Intravenous Q6H    LORazepam (Ativan) 2 mg/mL injection 0.5 mg  0.5 mg Intravenous Q4H PRN    Or    LORazepam (Ativan) 2 mg/mL injection 1 mg  1 mg Intravenous Q4H PRN    [] naloxone (Narcan) 0.4 MG/ML injection 0.08 mg  0.08 mg Intravenous Once PRN    HYDROmorphone (Dilaudid) 1 MG/ML injection 0.2 mg  0.2 mg Intravenous Q2H PRN    Or    HYDROmorphone (Dilaudid) 1 MG/ML injection 0.4 mg  0.4 mg Intravenous Q2H PRN    Or    HYDROmorphone (Dilaudid) 1 MG/ML injection 0.8 mg  0.8 mg Intravenous Q2H PRN    albuterol (Ventolin HFA) 108 (90 Base) MCG/ACT inhaler 2 puff  2 puff Inhalation Q6H PRN    [Held by provider] apixaban (Eliquis) tab 5 mg  5 mg Oral BID    [Held by provider] clonazePAM (KlonoPIN) tab 1.5 mg  1.5 mg Oral TID    divalproex ER (Depakote ER) 24 hr tab 500 mg  500 mg Oral BID    DULoxetine (Cymbalta) DR cap 60 mg  60 mg Oral Daily    oxybutynin (Ditropan) tab 5 mg  5 mg Oral TID    QUEtiapine (SEROquel) tab 200 mg  200 mg Oral Nightly    traZODone (Desyrel) tab 50 mg  50 mg Oral Nightly PRN    glucose (Dex4) 15 GM/59ML oral liquid 15 g  15 g Oral Q15 Min PRN    Or    glucose (Glutose) 40% oral gel 15 g  15 g Oral Q15 Min PRN    Or    glucose-vitamin C (Dex-4) chewable tab 4 tablet  4 tablet Oral Q15 Min PRN    Or    dextrose 50% injection 50 mL  50 mL  Intravenous Q15 Min PRN    Or    glucose (Dex4) 15 GM/59ML oral liquid 30 g  30 g Oral Q15 Min PRN    Or    glucose (Glutose) 40% oral gel 30 g  30 g Oral Q15 Min PRN    Or    glucose-vitamin C (Dex-4) chewable tab 8 tablet  8 tablet Oral Q15 Min PRN    melatonin tab 3 mg  3 mg Oral Nightly PRN    acetaminophen (Tylenol Extra Strength) tab 500 mg  500 mg Oral Q4H PRN    morphINE PF 2 MG/ML injection 1 mg  1 mg Intravenous Q2H PRN    Or    morphINE PF 2 MG/ML injection 2 mg  2 mg Intravenous Q2H PRN    Or    morphINE PF 4 MG/ML injection 4 mg  4 mg Intravenous Q2H PRN    ondansetron (Zofran) 4 MG/2ML injection 4 mg  4 mg Intravenous Q6H PRN    prochlorperazine (Compazine) 10 MG/2ML injection 5 mg  5 mg Intravenous Q8H PRN    insulin aspart (NovoLOG) 100 Units/mL FlexPen 1-10 Units  1-10 Units Subcutaneous TID AC and HS    heparin (Porcine) 100 Units/mL lock flush 500 Units  5 mL Intravenous PRN    [Held by provider] metoprolol tartrate (Lopressor) tab 25 mg  25 mg Oral 2x Daily(Beta Blocker)    nystatin-triamcinolone (Mycolog II) 100,000-0.1 Units/g-% cream   Topical BID    [COMPLETED] LORazepam (Ativan) 2 mg/mL injection 1 mg  1 mg Intravenous Once    [COMPLETED] ketorolac (Toradol) 15 MG/ML injection 15 mg  15 mg Intravenous Once    morphINE PF 4 MG/ML injection 4 mg  4 mg Intravenous Q30 Min PRN    [COMPLETED] ondansetron (Zofran) 4 MG/2ML injection 4 mg  4 mg Intravenous Once    [COMPLETED] sodium chloride 0.9 % IV bolus 500 mL  500 mL Intravenous Once        Vitals:    11/26/24 1100   BP:    Pulse: 109   Resp: 22   Temp:      ROS:   As above. Otherwise negative on 14 system exam.    MSE:   Conscious/Orientation: A + O x person, place, time, situation  Appearance: good grooming  Behavior: no psychomotor abnormalities, poor eye contact and engagement with interview.  Speech: normal rate, rhythm, and volume  Mood: \" Fine\"  Affect: incongruent, somnolent  Thought process: linear, logical, goal directed  Thought  content:   No delusions, obsessions, or other thought abnormalities  Suicidal ideation: denies  Homicidal ideation: denies  Perceptions: no hallucinations  Insight: fair  Judgment: fair  Loosening of associations: none    Bipolar 1 disorder most recent mixed state moderate, generalized anxiety disorder slowly improving     Plan:  Space out Klonopin and opiates at least 2 hours in between to minimize somnolence.  Continue rest of medicines  Okay to discharge to a long-term care facility once medically cleared  No need for inpatient psychiatric care and she can manage safely as an outpatient  Case discussed with nursing staff on the floor as well as attending physician messaged.   Approximately 35 minutes total time spent on case with lakesha the time spent in urgent care    Sandor Light DO  Board Certified Adult and Geriatric Psychiatrist  Medical Director, Geriatric Psychiatry, [facility]   Medical Director, Psychiatric Consultation Service, [facility]

## 2024-11-26 NOTE — PLAN OF CARE
Assumed pt care at 2330  Aox4, RA, VSS  Tele-ST  Reported no pain; no n/v/d  TPN gtt  Heparin gtt  Contact Isolation in place-Cdiff and ESBL +  Clear liquid diert  Qid accu check  Daily weights  Noncardiac electrolyte replacement protocol  Suprapubic catheter in place  PICC line clean and intact  Paraplegic   Safety precautions in place  Bed in lowest position and call light within reach  Updated with plan of care  All needs met at this time  Will continue plan of care              Problem: METABOLIC/FLUID AND ELECTROLYTES - ADULT  Goal: Glucose maintained within prescribed range  Description: INTERVENTIONS:  - Monitor Blood Glucose as ordered  - Assess for signs and symptoms of hyperglycemia and hypoglycemia  - Administer ordered medications to maintain glucose within target range  - Assess barriers to adequate nutritional intake and initiate nutrition consult as needed  - Instruct patient on self management of diabetes  Outcome: Progressing  Goal: Electrolytes maintained within normal limits  Description: INTERVENTIONS:  - Monitor labs and rhythm and assess patient for signs and symptoms of electrolyte imbalances  - Administer electrolyte replacement as ordered  - Monitor response to electrolyte replacements, including rhythm and repeat lab results as appropriate  - Fluid restriction as ordered  - Instruct patient on fluid and nutrition restrictions as appropriate  Outcome: Progressing     Problem: NEUROLOGICAL - ADULT  Goal: Achieves stable or improved neurological status  Description: INTERVENTIONS  - Assess for and report changes in neurological status  - Initiate measures to prevent increased intracranial pressure  - Maintain blood pressure and fluid volume within ordered parameters to optimize cerebral perfusion and minimize risk of hemorrhage  - Monitor temperature, glucose, and sodium. Initiate appropriate interventions as ordered  Outcome: Progressing     Problem: Impaired Cognition  Goal: Patient will  exhibit improved attention, thought processing and/or memory  Description: Interventions:  - Allow additional time for processing after asking questions or providing instructions  Outcome: Progressing

## 2024-11-26 NOTE — CM/SW NOTE
Chart reviewed and noted pt's diet has advanced to soft per GI note. Per dietary note, TPN was discontinued this AM.     JAH attached updated clinicals to [facility] in [place] and messaged the facility to inquire if they are able to accept pt. Await response.    JAH also spoke with Livia at [facility] via phone. Livia stated pt will be able to return to [facility] if [facility] declines pt.     Await response from [facility]. JAH will continue to follow.     GURINDER Beltre  Discharge Planner

## 2024-11-26 NOTE — PROGRESS NOTES
[facility]   part of [facility]     Hospitalist Progress Note     Clari Rosa Patient Status:  Observation    1970 MRN [id number]   [facility] 3SW-A Attending Bhanu Mckinney MD   Hosp Day # 7 PCP Macrina Galvin MD     Chief Complaint: abd pain    Subjective:     Having BM's. A bit somnolent today     Objective:    Review of Systems:   A comprehensive review of systems was completed; pertinent positive and negatives stated in subjective.    Vital signs:  Temp:  [97.9 °F (36.6 °C)-98.9 °F (37.2 °C)] 97.9 °F (36.6 °C)  Pulse:  [100-115] 104  Resp:  [12-22] 13  BP: ()/(68-83) 96/75  SpO2:  [96 %-98 %] 96 %    Physical Exam:    General: No acute distress  Respiratory: No wheezes, no rhonchi  Cardiovascular: S1, S2, regular rate and rhythm, bradycardic  Abdomen: Soft, tender, distended, positive bowel sounds  Neuro: No new focal deficits.   Extremities: No edema      Diagnostic Data:    Labs:  Recent Labs   Lab 24  1049 24  0705 24  1700 24  0715 24  1333 24  1810 24  0629 24  1329   WBC 9.3 7.0  --  7.5 10.4  --  8.9  --    HGB 9.1* 10.4*   < > 10.0* 9.3* 9.4* 8.7*  8.7* 9.1*   MCV 76.5* 78.0*  --  76.0* 75.6*  --  75.8*  --    .0 278.0  --  244.0 271.0  --  277.0  --     < > = values in this interval not displayed.       Recent Labs   Lab 24  1011 24  1049 24  0705 24  0715 24  1333 24  0629   * 138* 176* 243* 286* 354*   BUN 8* 7* 8* 10 16 19   CREATSERUM 0.63 0.70 0.73 0.74 0.68 0.72   CA 8.9 8.7 8.9 9.0 9.2 8.8   ALB 3.7 3.3 3.8  --   --   --     141 141 136 133* 133*   K 3.4* 3.7  3.7 4.0 4.6 4.5 4.4    106 103 102 100 99   CO2 32.0 31.0 31.0 27.0 29.0 30.0   ALKPHO 133* 107 126*  --   --   --    AST 17 10 51*  --   --   --    * 70* 79*  --   --   --    BILT 0.2* 0.2* 0.4  --   --   --    TP 6.2 6.3 6.4  --   --   --        Estimated Creatinine Clearance: 64.2  mL/min (based on SCr of 0.72 mg/dL).    Recent Labs   Lab 11/22/24  1011   TROPHS <3       No results for input(s): \"PTP\", \"INR\" in the last 168 hours.               Microbiology    Hospital Encounter on 11/18/24   1. Urine Culture, Routine     Status: Abnormal    Collection Time: 11/22/24 12:03 AM    Specimen: Urine, clean catch   Result Value Ref Range    Urine Culture 50,000-99,000 CFU/ML Enterococcus faecalis (A) N/A       Susceptibility    Enterococcus faecalis -  (no method available)     Nitrofurantoin <=32 Sensitive      Vancomycin 1 Sensitive      Ampicillin <=2 Sensitive          Imaging: Reviewed in Epic.    Medications:    polyethylene glycol (PEG 3350)  17 g Oral Daily    insulin aspart  1-68 Units Subcutaneous TID CC    insulin degludec  6 Units Subcutaneous Nightly    [START ON 11/27/2024] multivitamin  1 tablet Oral Daily    nitrofurantoin monohydrate macro  100 mg Oral BID with meals    [Held by provider] apixaban  5 mg Oral BID    clonazePAM  1.5 mg Oral TID    divalproex ER  500 mg Oral BID    DULoxetine  60 mg Oral Daily    oxybutynin  5 mg Oral TID    QUEtiapine  200 mg Oral Nightly    insulin aspart  1-10 Units Subcutaneous TID AC and HS    [Held by provider] metoprolol tartrate  25 mg Oral 2x Daily(Beta Blocker)    nystatin-triamcinolone   Topical BID       Assessment & Plan:      #Abdominal pain  #bowel obstruction  #rectal stricture ruled out  #ileus  - CT shows dilated colon with narrowing at rectosigmoid junction  - GI consult  - PRN pain med  -Ngt to IS. Output decreasing  -sp flex sig for decompression  -gen surg consult  -lower GI neg for stricture  -hold eliquis. hold FD lovenox   -DC TPN   -XR c/w ileus  -d/w GI. S/p suppository. Now having BM's again   -ADAT    #rectus sheath hematoma  -d/w GI, d/w gen surg. Non surgical per surgery  -hgb dropped this AM but stable in afternoon  -if hgb drops further, consider CTA  -Per surg, can resume AC. hep gtt in case bleeding worsens. Cont hold  eliquis in case needs surgical intervention or embolization   -has persistent DVT  -if hgb continuing to drop, may need to DC AC and place IVC filter  -CBC in AM     #sinus bradycardia - resolved  -consult cards, d/w APN  -EKG reviewed, sinus  -echo ok   -likely vasovagal. resolved     # Type 2 diabetes  - Will plae on hyperglycemia protocol with long actin and correction factor insulin.  -hyperglycemic, DC TPN. Adjust insulin      # Anxiety  -home meds resumed     # Bipolar w/ carmel  -depakote and seroquel resume PO   -IV depakote dced  -seems to be having a manic episode. Pt requesting to go to [facility] after, does not qualify   -consult psych. D/w psych today. Somnolent 2/2 klonopin and norco. Spread out. Take norco 1 tab during day and can take 2 tabs at night     # Hypertension  - Will continue on metoprolol     # Neurogenic bladder  - Patient has chronic mark.    #UTI  -IV abx.   -Ucx enterococcus.   -allergy to PCN  -cont macrobid PO      # Hx DVT  - eliquis at DC     # ANAID  - Will place on ANAID  protocol.    DC next 1-2 days if hgb stable    Bhanu Mckinney MD    Supplementary Documentation:     Quality:  DVT Mechanical Prophylaxis:        DVT Pharmacologic Prophylaxis   Medication    heparin (Porcine) 69203 units/250mL infusion PE/DVT/THROMBUS CONTINUOUS    [Held by provider] apixaban (Eliquis) tab 5 mg    heparin (Porcine) 100 Units/mL lock flush 500 Units                Code Status: Full Code  Mark: Suprapubic catheter in place  Mark Duration (in days):   Central line:    MARINA:     Discharge is dependent on: course  At this point Ms. Rosa is expected to be discharge to: home    The 21st Century Cures Act makes medical notes like these available to patients in the interest of transparency. Please be advised this is a medical document. Medical documents are intended to carry relevant information, facts as evident, and the clinical opinion of the practitioner. The medical note is intended as peer to peer  communication and may appear blunt or direct. It is written in medical language and may contain abbreviations or verbiage that are unfamiliar.

## 2024-11-26 NOTE — PROGRESS NOTES
Gastroenterology Progress Note  Patient Name: Clari Rosa  Chief Complaint: Abdominal distention and abdominal pain  S: The patient has been tolerating a clear liquid diet without nausea/vomiting.  She reports that she continues to have abdominal pain in the left lower quadrant.  She has been passing flatus, and reports having had a bowel movement.    O: BP 96/75 (BP Location: Right arm)   Pulse 108   Temp 97.9 °F (36.6 °C) (Axillary)   Resp 16   Ht 5' (1.524 m)   Wt 183 lb 1.6 oz (83.1 kg)   SpO2 98%   BMI 35.76 kg/m²   Gen: AAOx3  Abd: (+)BS, soft, (+)tender with fullness in the subcutaneous tissue of the left lower quadrant; non-distended; no rebound or guarding  Ext: No edema or cyanosis  Skin: Warm and dry  Laboratory Data:   Lab Results   Component Value Date    WBC 8.9 11/26/2024    HGB 8.7 11/26/2024    HGB 8.7 11/26/2024    HCT 27.3 11/26/2024    .0 11/26/2024    CREATSERUM 0.72 11/26/2024    BUN 19 11/26/2024     11/26/2024    K 4.4 11/26/2024    CL 99 11/26/2024    CO2 30.0 11/26/2024     11/26/2024    CA 8.8 11/26/2024    PTT 76.4 11/26/2024    MG 1.9 11/26/2024    PHOS 3.5 11/26/2024     Recent Labs   Lab 11/25/24  0715 11/25/24  1333 11/26/24  0629   * 286* 354*   BUN 10 16 19   CREATSERUM 0.74 0.68 0.72   CA 9.0 9.2 8.8    133* 133*   K 4.6 4.5 4.4    100 99   CO2 27.0 29.0 30.0     Recent Labs   Lab 11/25/24  0715 11/25/24  1333 11/26/24  0629   RBC 4.21   < > 3.60*   HGB 10.0*   < > 8.7*  8.7*   HCT 32.0*   < > 27.3*   MCV 76.0*   < > 75.8*   MCH 23.8*   < > 24.2*   MCHC 31.3   < > 31.9   RDW 16.0   < > 16.0   NEPRELIM 5.11  --   --    WBC 7.5   < > 8.9   .0   < > 277.0    < > = values in this interval not displayed.       Recent Labs   Lab 11/22/24  1011 11/23/24  1049 11/24/24  0705   * 70* 79*   AST 17 10 51*     XR ABDOMEN (1 VIEW) (CPT=74018) [[id number]] Collected: 11/25/24 1154   Order Status: Completed Updated: 11/25/24 115    Narrative:     PROCEDURE:  XR ABDOMEN (1 VIEW) (CPT=74018)     INDICATIONS:  constipation, colonic distention.  Thank you.     COMPARISON:  [facility] , XR, XR COLON SINGLE CONTRAST (CPT=74270), 11/22/2024, 8:41 AM.  [facility] , XR, XR ABDOMEN OBSTRUCTIVE SERIES ROUTINE(2 VW)(CPT=74019), 11/23/2024, 7:58 PM.  [facility] , XR, XR ABDOMEN (1 VIEW) (CPT=74018), 11/21/2024, 8:48 AM.     TECHNIQUE:  Supine AP view was obtained.     PATIENT STATED HISTORY: (As transcribed by Technologist)  Patient states she has left sided abdominal pain.         FINDINGS:    BOWEL GAS PATTERN:  There is retained contrast in the colon.  There is moderate air-filled distention of the majority of the colon consistent with ileus.  Mild air-filled distention of small bowel is noted and also likely related to diffuse ileus.  CALCIFICATIONS:  None significant.  OTHER:  There are surgical clips in right upper quadrant consistent with prior cholecystectomy.                   Impression:     CONCLUSION:  Diffuse air-filled distention of colon and mild air-filled distention of small bowel are noted consistent with diffuse ileus.        LOCATION:  [facility]        Dictated by (CST): Jayce Steen MD on 11/25/2024 at 11:53 AM      Finalized by (CST): Jayce Steen MD on 11/25/2024 at 11:54 AM         Assessment: This is a patient who had presented with evidence of colonic and small bowel distention.  A colonic stricture was ruled out by gastrograffin enema.  She has been passing flatus, and had a bowel movement yesterday.  She is tolerating a liquid diet.  Given her general immobility as well as her DM, she may have a component of imbalance of the parasympathetic - sympathetic input to the bowel.  As such, a component of colonic dysmotility may be present underlying the acute process.    Plan:   1) Advance diet to soft / low residue  2) Miralax 17 g po daily   3) Mobilize out of bed of possible

## 2024-11-27 PROBLEM — S30.1XXA HEMATOMA OF RECTUS SHEATH: Status: ACTIVE | Noted: 2024-11-27

## 2024-11-27 LAB
ANION GAP SERPL CALC-SCNC: 5 MMOL/L (ref 0–18)
APTT PPP: 86.9 SECONDS (ref 23–36)
BASOPHILS # BLD AUTO: 0.05 X10(3) UL (ref 0–0.2)
BASOPHILS NFR BLD AUTO: 0.6 %
BUN BLD-MCNC: 16 MG/DL (ref 9–23)
CALCIUM BLD-MCNC: 9.2 MG/DL (ref 8.7–10.4)
CHLORIDE SERPL-SCNC: 99 MMOL/L (ref 98–112)
CO2 SERPL-SCNC: 32 MMOL/L (ref 21–32)
CREAT BLD-MCNC: 0.78 MG/DL
EGFRCR SERPLBLD CKD-EPI 2021: 90 ML/MIN/1.73M2 (ref 60–?)
EOSINOPHIL # BLD AUTO: 0.6 X10(3) UL (ref 0–0.7)
EOSINOPHIL NFR BLD AUTO: 7 %
ERYTHROCYTE [DISTWIDTH] IN BLOOD BY AUTOMATED COUNT: 16 %
GLUCOSE BLD-MCNC: 167 MG/DL (ref 70–99)
GLUCOSE BLD-MCNC: 231 MG/DL (ref 70–99)
GLUCOSE BLD-MCNC: 243 MG/DL (ref 70–99)
GLUCOSE BLD-MCNC: 249 MG/DL (ref 70–99)
GLUCOSE BLD-MCNC: 273 MG/DL (ref 70–99)
HCT VFR BLD AUTO: 25.6 %
HGB BLD-MCNC: 7.3 G/DL
HGB BLD-MCNC: 8.1 G/DL
HGB BLD-MCNC: 8.4 G/DL
HGB BLD-MCNC: 8.5 G/DL
IMM GRANULOCYTES # BLD AUTO: 0.16 X10(3) UL (ref 0–1)
IMM GRANULOCYTES NFR BLD: 1.9 %
LYMPHOCYTES # BLD AUTO: 2.79 X10(3) UL (ref 1–4)
LYMPHOCYTES NFR BLD AUTO: 32.6 %
MCH RBC QN AUTO: 24 PG (ref 26–34)
MCHC RBC AUTO-ENTMCNC: 31.6 G/DL (ref 31–37)
MCV RBC AUTO: 75.7 FL
MONOCYTES # BLD AUTO: 0.45 X10(3) UL (ref 0.1–1)
MONOCYTES NFR BLD AUTO: 5.3 %
NEUTROPHILS # BLD AUTO: 4.52 X10 (3) UL (ref 1.5–7.7)
NEUTROPHILS # BLD AUTO: 4.52 X10(3) UL (ref 1.5–7.7)
NEUTROPHILS NFR BLD AUTO: 52.6 %
OSMOLALITY SERPL CALC.SUM OF ELEC: 292 MOSM/KG (ref 275–295)
PLATELET # BLD AUTO: 263 10(3)UL (ref 150–450)
PLATELET # BLD AUTO: 263 10(3)UL (ref 150–450)
POTASSIUM SERPL-SCNC: 4.6 MMOL/L (ref 3.5–5.1)
RBC # BLD AUTO: 3.38 X10(6)UL
SODIUM SERPL-SCNC: 136 MMOL/L (ref 136–145)
WBC # BLD AUTO: 8.6 X10(3) UL (ref 4–11)

## 2024-11-27 PROCEDURE — 99232 SBSQ HOSP IP/OBS MODERATE 35: CPT | Performed by: INTERNAL MEDICINE

## 2024-11-27 NOTE — PLAN OF CARE
Assumed care at 1930. A&OX4, forgetful. RA, refuses CPAP at night. ST/NSR on tele. Low fiber carb controlled diet. QID accuchecks. Non-cardiac electrolyte protocol. PIV L Wrist infusing heparin gtt at 18 mL/hr per orders. Unit draw. L Port clamped. Suprapubic cath in place. Max assist. Safety precautions in place, call light within reach. Will continue with plan of care.      Problem: Patient/Family Goals  Goal: Patient/Family Long Term Goal  Description: Patient's Long Term Goal: to be discharged    Interventions:  - continue recommended medication regimen  - continue recommended testing/interventions  - See additional Care Plan goals for specific interventions  Outcome: Progressing  Goal: Patient/Family Short Term Goal  Description: Patient's Short Term Goal: to be free from pain    Interventions:   - prn pain medication  - maintain dietary recommendations  - See additional Care Plan goals for specific interventions  Outcome: Progressing     Problem: METABOLIC/FLUID AND ELECTROLYTES - ADULT  Goal: Glucose maintained within prescribed range  Description: INTERVENTIONS:  - Monitor Blood Glucose as ordered  - Assess for signs and symptoms of hyperglycemia and hypoglycemia  - Administer ordered medications to maintain glucose within target range  - Assess barriers to adequate nutritional intake and initiate nutrition consult as needed  - Instruct patient on self management of diabetes  Outcome: Progressing  Goal: Electrolytes maintained within normal limits  Description: INTERVENTIONS:  - Monitor labs and rhythm and assess patient for signs and symptoms of electrolyte imbalances  - Administer electrolyte replacement as ordered  - Monitor response to electrolyte replacements, including rhythm and repeat lab results as appropriate  - Fluid restriction as ordered  - Instruct patient on fluid and nutrition restrictions as appropriate  Outcome: Progressing     Problem: NEUROLOGICAL - ADULT  Goal: Achieves stable or  improved neurological status  Description: INTERVENTIONS  - Assess for and report changes in neurological status  - Initiate measures to prevent increased intracranial pressure  - Maintain blood pressure and fluid volume within ordered parameters to optimize cerebral perfusion and minimize risk of hemorrhage  - Monitor temperature, glucose, and sodium. Initiate appropriate interventions as ordered  Outcome: Progressing     Problem: Impaired Cognition  Goal: Patient will exhibit improved attention, thought processing and/or memory  Description: Interventions:  - Allow additional time for processing after asking questions or providing instructions  Outcome: Progressing

## 2024-11-27 NOTE — CM/SW NOTE
JAH received message from Salome at [facility] ([facility]) in [facility]. Salome stated she is aware the NGT has been removed and pt will not admit with any therapy orders. Salome stated if pt is not admitting with any IV needs then the facility should be able to accept pt.     JAH spoke with RN who stated pt will not discharge with any IV needs and her portacath will be de-accessed. JAH informed Salome via [facility] pt will not discharge with any IV needs. Salome requested pt's PASRR.     JAH spoke with [facility] roryison Livia who stated she will request the facility send pt's PASRR to this writer. JAH will attach PASRR to referral in [facility] once available. JAH will continue to follow.     Addendum (12:10pm) - JAH received PASRR from [facility] and [facility]     [facility] is able to accept pt and has a bed available today. Await medical clearance for further discharge planning. JAH will continue to follow.     Addendum (2:30pm) - JAH spoke with RN who stated pt's hbg is trending down and pt will not be cleared for discharge today.     JAH updated Salome at [facility] via [facility]. Salome stated [facility] is still able to accept pt tomorrow and requested to have the JAH/JASON call her if pt is cleared to discharge. Salome provided her cell phone number [phone].     JAH met with pt at bedside to discuss discharge plan. JAH informed pt [facility] is able to accept her at discharge, and if [facility] is unable to accept pt will return to [facility]. Pt is agreeable with discharge plan.     Await medical clearance for further discharge planning. JAH will continue to follow.     GURINDER Beltre  Discharge Planner

## 2024-11-27 NOTE — PLAN OF CARE
Assumed care of patient at 0700  PRN Alexander for pain. Patient very somnolent after administration of Norco and scheduled Klonopin. Hospitalist and Psych notified. Orders to space doses out by at least 2 hours and to give 1 tablet of Norco during the day.   Diet advanced to low fiber, tolerating well  TPN dc'd.   Continue heparin gtt. Q8 Hgb.   Patient currently resting in bed, call light within reach      Problem: Patient/Family Goals  Goal: Patient/Family Long Term Goal  Description: Patient's Long Term Goal: to be discharged    Interventions:  - continue recommended medication regimen  - continue recommended testing/interventions  - See additional Care Plan goals for specific interventions  Outcome: Progressing  Goal: Patient/Family Short Term Goal  Description: Patient's Short Term Goal: to be free from pain    Interventions:   - prn pain medication  - maintain dietary recommendations  - See additional Care Plan goals for specific interventions  Outcome: Progressing     Problem: METABOLIC/FLUID AND ELECTROLYTES - ADULT  Goal: Glucose maintained within prescribed range  Description: INTERVENTIONS:  - Monitor Blood Glucose as ordered  - Assess for signs and symptoms of hyperglycemia and hypoglycemia  - Administer ordered medications to maintain glucose within target range  - Assess barriers to adequate nutritional intake and initiate nutrition consult as needed  - Instruct patient on self management of diabetes  Outcome: Progressing  Goal: Electrolytes maintained within normal limits  Description: INTERVENTIONS:  - Monitor labs and rhythm and assess patient for signs and symptoms of electrolyte imbalances  - Administer electrolyte replacement as ordered  - Monitor response to electrolyte replacements, including rhythm and repeat lab results as appropriate  - Fluid restriction as ordered  - Instruct patient on fluid and nutrition restrictions as appropriate  Outcome: Progressing     Problem: NEUROLOGICAL -  ADULT  Goal: Achieves stable or improved neurological status  Description: INTERVENTIONS  - Assess for and report changes in neurological status  - Initiate measures to prevent increased intracranial pressure  - Maintain blood pressure and fluid volume within ordered parameters to optimize cerebral perfusion and minimize risk of hemorrhage  - Monitor temperature, glucose, and sodium. Initiate appropriate interventions as ordered  Outcome: Progressing     Problem: Impaired Cognition  Goal: Patient will exhibit improved attention, thought processing and/or memory  Description: Interventions:  - Allow additional time for processing after asking questions or providing instructions  Outcome: Progressing

## 2024-11-27 NOTE — PROGRESS NOTES
Gastroenterology Progress Note  Patient Name: Clari Rosa  Chief Complaint: Abdominal pain, n/v  S: The patient has had no further n/v in the past 2 days.  She is having bowel movements.  She continues to have pain in the left lower quadrant at the site of the known rectus sheath hematoma.    O: BP 97/62 (BP Location: Right arm)   Pulse 88   Temp 98 °F (36.7 °C) (Axillary)   Resp 15   Ht 5' (1.524 m)   Wt 181 lb 7 oz (82.3 kg)   SpO2 95%   BMI 35.43 kg/m²   Gen: Drowsy but arousable  Abd: (+)BS, soft, (+)tender over palpable hematoma in the left lower quadrant; overall less distended; no rebound or guarding  Ext: No edema or cyanosis  Skin: Warm and dry  Laboratory Data:   Lab Results   Component Value Date    WBC 8.6 11/27/2024    HGB 7.3 11/27/2024    HCT 25.6 11/27/2024    .0 11/27/2024    .0 11/27/2024    CREATSERUM 0.78 11/27/2024    BUN 16 11/27/2024     11/27/2024    K 4.6 11/27/2024    CL 99 11/27/2024    CO2 32.0 11/27/2024     11/27/2024    CA 9.2 11/27/2024    PTT 86.9 11/27/2024     Recent Labs   Lab 11/25/24  1333 11/26/24  0629 11/27/24  0546   * 354* 249*   BUN 16 19 16   CREATSERUM 0.68 0.72 0.78   CA 9.2 8.8 9.2   * 133* 136   K 4.5 4.4 4.6    99 99   CO2 29.0 30.0 32.0     Recent Labs   Lab 11/27/24  0546 11/27/24  1012   RBC 3.38*  --    HGB 8.1* 7.3*   HCT 25.6*  --    MCV 75.7*  --    MCH 24.0*  --    MCHC 31.6  --    RDW 16.0  --    NEPRELIM 4.52  --    WBC 8.6  --    .0  263.0  --        Recent Labs   Lab 11/22/24  1011 11/23/24  1049 11/24/24  0705   * 70* 79*   AST 17 10 51*       Assessment: This is a patient who had presented with distended abdomen and concern for colonic stricture, which was subsequently ruled out by gastrograffin enema.  She responded to nasogastric decompression, and has had no n/v in the past few days, and is tolerating a full liquid diet.  She has had some degree of large bowel distention, but is now  passing stool.  She has ongoing abdominal pain related to a rectus sheath hematoma, and has had some mild changes in hgb, but no GI bleeding evident.   Plan: From a GI standpoint, would continue miralax daily, but we will sign-off for now.             Will defer management of rectus sheath hematoma to Dr. Beatty and Gen Surgery

## 2024-11-28 LAB
ANION GAP SERPL CALC-SCNC: 3 MMOL/L (ref 0–18)
ANION GAP SERPL CALC-SCNC: 4 MMOL/L (ref 0–18)
APTT PPP: 141.6 SECONDS (ref 23–36)
APTT PPP: 69.5 SECONDS (ref 23–36)
BUN BLD-MCNC: 16 MG/DL (ref 9–23)
BUN BLD-MCNC: 17 MG/DL (ref 9–23)
CALCIUM BLD-MCNC: 9.3 MG/DL (ref 8.7–10.4)
CALCIUM BLD-MCNC: 9.8 MG/DL (ref 8.7–10.4)
CHLORIDE SERPL-SCNC: 97 MMOL/L (ref 98–112)
CHLORIDE SERPL-SCNC: 99 MMOL/L (ref 98–112)
CO2 SERPL-SCNC: 31 MMOL/L (ref 21–32)
CO2 SERPL-SCNC: 32 MMOL/L (ref 21–32)
CREAT BLD-MCNC: 0.76 MG/DL
CREAT BLD-MCNC: 0.87 MG/DL
EGFRCR SERPLBLD CKD-EPI 2021: 79 ML/MIN/1.73M2 (ref 60–?)
EGFRCR SERPLBLD CKD-EPI 2021: 93 ML/MIN/1.73M2 (ref 60–?)
ERYTHROCYTE [DISTWIDTH] IN BLOOD BY AUTOMATED COUNT: 16.2 %
GLUCOSE BLD-MCNC: 212 MG/DL (ref 70–99)
GLUCOSE BLD-MCNC: 231 MG/DL (ref 70–99)
GLUCOSE BLD-MCNC: 239 MG/DL (ref 70–99)
GLUCOSE BLD-MCNC: 241 MG/DL (ref 70–99)
GLUCOSE BLD-MCNC: 246 MG/DL (ref 70–99)
GLUCOSE BLD-MCNC: 258 MG/DL (ref 70–99)
HCT VFR BLD AUTO: 26.2 %
HGB BLD-MCNC: 8.4 G/DL
HGB BLD-MCNC: 8.5 G/DL
MCH RBC QN AUTO: 24.1 PG (ref 26–34)
MCHC RBC AUTO-ENTMCNC: 32.1 G/DL (ref 31–37)
MCV RBC AUTO: 75.1 FL
OSMOLALITY SERPL CALC.SUM OF ELEC: 283 MOSM/KG (ref 275–295)
OSMOLALITY SERPL CALC.SUM OF ELEC: 287 MOSM/KG (ref 275–295)
PLATELET # BLD AUTO: 298 10(3)UL (ref 150–450)
PLATELET # BLD AUTO: 305 10(3)UL (ref 150–450)
POTASSIUM SERPL-SCNC: 4.5 MMOL/L (ref 3.5–5.1)
POTASSIUM SERPL-SCNC: 4.6 MMOL/L (ref 3.5–5.1)
RBC # BLD AUTO: 3.49 X10(6)UL
SODIUM SERPL-SCNC: 132 MMOL/L (ref 136–145)
SODIUM SERPL-SCNC: 134 MMOL/L (ref 136–145)
WBC # BLD AUTO: 8.7 X10(3) UL (ref 4–11)

## 2024-11-28 PROCEDURE — 99232 SBSQ HOSP IP/OBS MODERATE 35: CPT | Performed by: INTERNAL MEDICINE

## 2024-11-28 RX ORDER — PHENAZOPYRIDINE HYDROCHLORIDE 100 MG/1
100 TABLET, FILM COATED ORAL 3 TIMES DAILY PRN
Status: DISCONTINUED | OUTPATIENT
Start: 2024-11-28 | End: 2024-11-30

## 2024-11-28 NOTE — PLAN OF CARE
Assumed pt care this morning at 0730.  Pt is A&Ox4, following commands, occasional forgetfulness   Pt on room air, VSS.  NSR  Pt reports chronic L side abd pain. Repositioned and pain meds offered per MAR  Pt max assist.   Pt on low fiber/soft diet. Missing teeth. Tolerating diet and tolerating diet.   Miralax schedueld and given per MAR.   Port a cath in L subclavian, infusing Heparin gtt at 15.5 mL/hr. Redraw at 1200.  Pt in unit draw.  Suprapubic cath for neurogenic bladder. Site is c/d/I.  Bed in lowest position, call light in reach.        Problem: Patient/Family Goals  Goal: Patient/Family Long Term Goal  Description: Patient's Long Term Goal: to be discharged    Interventions:  - continue recommended medication regimen  - continue recommended testing/interventions  - See additional Care Plan goals for specific interventions  Outcome: Progressing  Goal: Patient/Family Short Term Goal  Description: Patient's Short Term Goal: to be free from pain    Interventions:   - prn pain medication  - maintain dietary recommendations  - See additional Care Plan goals for specific interventions  Outcome: Progressing     Problem: METABOLIC/FLUID AND ELECTROLYTES - ADULT  Goal: Glucose maintained within prescribed range  Description: INTERVENTIONS:  - Monitor Blood Glucose as ordered  - Assess for signs and symptoms of hyperglycemia and hypoglycemia  - Administer ordered medications to maintain glucose within target range  - Assess barriers to adequate nutritional intake and initiate nutrition consult as needed  - Instruct patient on self management of diabetes  Outcome: Progressing  Goal: Electrolytes maintained within normal limits  Description: INTERVENTIONS:  - Monitor labs and rhythm and assess patient for signs and symptoms of electrolyte imbalances  - Administer electrolyte replacement as ordered  - Monitor response to electrolyte replacements, including rhythm and repeat lab results as appropriate  - Fluid restriction  as ordered  - Instruct patient on fluid and nutrition restrictions as appropriate  Outcome: Progressing     Problem: NEUROLOGICAL - ADULT  Goal: Achieves stable or improved neurological status  Description: INTERVENTIONS  - Assess for and report changes in neurological status  - Initiate measures to prevent increased intracranial pressure  - Maintain blood pressure and fluid volume within ordered parameters to optimize cerebral perfusion and minimize risk of hemorrhage  - Monitor temperature, glucose, and sodium. Initiate appropriate interventions as ordered  Outcome: Progressing     Problem: Impaired Cognition  Goal: Patient will exhibit improved attention, thought processing and/or memory  Description: Interventions:  - Allow additional time for processing after asking questions or providing instructions  Outcome: Progressing

## 2024-11-28 NOTE — PLAN OF CARE
Assumed patient care @1930  A&O x4, forgetful at times and anxious  VSS, NSR on tele, on RA  Refused CPAP for night  Non-cardiac electrolyte replacement protocol  Heparin gtt @18, therapeutic and redraw in AM  Hemoglobins Q8  Daily weights  Low fiber soft/carbohydrate controlled diet  QID Accu checks  Incontinent x1  Suprapubic catheter in place  Moderate complaints of pain, morphine given per MAR  Up total assist  Fall, bleeding and skin precautions in place  Bed alarm on, in lowest position, call light within reach and all needs met at this time    Problem: METABOLIC/FLUID AND ELECTROLYTES - ADULT  Goal: Glucose maintained within prescribed range  Description: INTERVENTIONS:  - Monitor Blood Glucose as ordered  - Assess for signs and symptoms of hyperglycemia and hypoglycemia  - Administer ordered medications to maintain glucose within target range  - Assess barriers to adequate nutritional intake and initiate nutrition consult as needed  - Instruct patient on self management of diabetes  Outcome: Progressing  Goal: Electrolytes maintained within normal limits  Description: INTERVENTIONS:  - Monitor labs and rhythm and assess patient for signs and symptoms of electrolyte imbalances  - Administer electrolyte replacement as ordered  - Monitor response to electrolyte replacements, including rhythm and repeat lab results as appropriate  - Fluid restriction as ordered  - Instruct patient on fluid and nutrition restrictions as appropriate  Outcome: Progressing     Problem: NEUROLOGICAL - ADULT  Goal: Achieves stable or improved neurological status  Description: INTERVENTIONS  - Assess for and report changes in neurological status  - Initiate measures to prevent increased intracranial pressure  - Maintain blood pressure and fluid volume within ordered parameters to optimize cerebral perfusion and minimize risk of hemorrhage  - Monitor temperature, glucose, and sodium. Initiate appropriate interventions as  ordered  Outcome: Progressing     Problem: Impaired Cognition  Goal: Patient will exhibit improved attention, thought processing and/or memory  Description: Interventions:  - Allow additional time for processing after asking questions or providing instructions  Outcome: Progressing

## 2024-11-28 NOTE — PROGRESS NOTES
[facility]   part of [facility]     Hospitalist Progress Note     Clari Rosa Patient Status:  Observation    1970 MRN [id number]   Location [facility] 3SW-A Attending Bhanu Mckinney MD   Hosp Day # 9 PCP Macrina Galvin MD     Chief Complaint: abd pain    Subjective:     Complains of persisting left lower quadrant abdominal pain, required IV morphine and IV Dilaudid this morning for the pain.  Passing gas.  Had liquid bowel movement with MiraLAX according to patient    Objective:    Review of Systems:   A comprehensive review of systems was completed; pertinent positive and negatives stated in subjective.    Vital signs:  Temp:  [98 °F (36.7 °C)-98.5 °F (36.9 °C)] 98.5 °F (36.9 °C)  Pulse:  [] 87  Resp:  [15-22] 15  BP: ()/(60-91) 111/76  SpO2:  [93 %-98 %] 94 %    Physical Exam:    /76 (BP Location: Right arm)   Pulse 87   Temp 98.5 °F (36.9 °C) (Oral)   Resp 15   Ht 5' (1.524 m)   Wt 181 lb 7 oz (82.3 kg)   SpO2 94%   BMI 35.43 kg/m²     General: No acute distress  Respiratory: No wheezes, no rhonchi  Cardiovascular: S1, S2, regular rate and rhythm  Abdomen: Soft, left lower quadrant tender, distended, positive bowel sounds  Neuro: No new focal deficits.   Extremities: No edema      Diagnostic Data:    Labs:  Recent Labs   Lab 24  0715 24  1333 24  1810 24  0629 24  1329 24  0546 24  1012 24  1752 24  0510 24  0829 24  1240   WBC 7.5 10.4  --  8.9  --  8.6  --   --   --   --  8.7   HGB 10.0* 9.3*   < > 8.7*  8.7*   < > 8.1* 7.3* 8.5* 8.4* 8.4* 8.4*   MCV 76.0* 75.6*  --  75.8*  --  75.7*  --   --   --   --  75.1*   .0 271.0  --  277.0  --  263.0  263.0  --   --  305.0  --  298.0    < > = values in this interval not displayed.       Recent Labs   Lab 24  1011 24  1049 24  0705 24  0715 24  0546 24  0510 24  1240   * 138* 176*   < > 249* 239*  241*   BUN 8* 7* 8*   < > 16 17 16   CREATSERUM 0.63 0.70 0.73   < > 0.78 0.76 0.87   CA 8.9 8.7 8.9   < > 9.2 9.8 9.3   ALB 3.7 3.3 3.8  --   --   --   --     141 141   < > 136 134* 132*   K 3.4* 3.7  3.7 4.0   < > 4.6 4.5 4.6    106 103   < > 99 99 97*   CO2 32.0 31.0 31.0   < > 32.0 31.0 32.0   ALKPHO 133* 107 126*  --   --   --   --    AST 17 10 51*  --   --   --   --    * 70* 79*  --   --   --   --    BILT 0.2* 0.2* 0.4  --   --   --   --    TP 6.2 6.3 6.4  --   --   --   --     < > = values in this interval not displayed.       Estimated Creatinine Clearance: 53.1 mL/min (based on SCr of 0.87 mg/dL).    Recent Labs   Lab 11/22/24  1011   TROPHS <3       No results for input(s): \"PTP\", \"INR\" in the last 168 hours.               Microbiology    Hospital Encounter on 11/18/24   1. Urine Culture, Routine     Status: Abnormal    Collection Time: 11/22/24 12:03 AM    Specimen: Urine, clean catch   Result Value Ref Range    Urine Culture 50,000-99,000 CFU/ML Enterococcus faecalis (A) N/A       Susceptibility    Enterococcus faecalis -  (no method available)     Nitrofurantoin <=32 Sensitive      Vancomycin 1 Sensitive      Ampicillin <=2 Sensitive          Imaging: Reviewed in Epic.    Medications:    polyethylene glycol (PEG 3350)  17 g Oral Daily    insulin aspart  1-68 Units Subcutaneous TID CC    insulin degludec  6 Units Subcutaneous Nightly    multivitamin  1 tablet Oral Daily    nitrofurantoin monohydrate macro  100 mg Oral BID with meals    [Held by provider] apixaban  5 mg Oral BID    clonazePAM  1.5 mg Oral TID    divalproex ER  500 mg Oral BID    DULoxetine  60 mg Oral Daily    oxybutynin  5 mg Oral TID    QUEtiapine  200 mg Oral Nightly    insulin aspart  1-10 Units Subcutaneous TID AC and HS    [Held by provider] metoprolol tartrate  25 mg Oral 2x Daily(Beta Blocker)    nystatin-triamcinolone   Topical BID       Assessment & Plan:      #Abdominal pain  #bowel obstruction  #rectal  stricture ruled out  #ileus  - CT shows dilated colon with narrowing at rectosigmoid junction  - GI consult appreciated  - PRN pain med  -Ngt to IS. Output decreasing  -sp flex sig for decompression  -gen surg consult appreciated  -lower GI neg for stricture  -hold eliquis. hold FD lovenox   -DC TPN   -XR c/w ileus  -d/w GI. S/p suppository. Now having BM's again   -ADAT    #rectus sheath hematoma  #Left lower quadrant abdominal pain due to rectus sheath hematoma  -d/w GI, d/w gen surg. Non surgical per surgery  -hgb dropped this AM but stable in afternoon  -if hgb drops further, consider CTA  -Per surg, can resume AC. hep gtt in case bleeding worsens. Cont hold eliquis in case needs surgical intervention or embolization   -has persistent DVT  -if hgb continuing to drop, may need to DC AC and place IVC filter  -CBC in AM     #sinus bradycardia likely vasovagal- resolved  -consult cards appreciated  -EKG done on 11/22/2024 showed sinus bradycardia   -echo done on 11/22/2024 showed EF of 65 to 70%.  No regional wall motion abnormalities.  -likely vasovagal. resolved     # Type 2 diabetes mellitus  -  on hyperglycemia protocol with long actin and correction factor insulin.  -hyperglycemic, discontinued TPN earlier during admission.  -Will increase insulin due to persistent hyperglycemia     # Anxiety  -home meds resumed     # Bipolar w/ carmel  -depakote and seroquel resume PO   -IV depakote dced  -seems to be having a manic episode. Pt requesting to go to [facility] after, does not qualify   -Psych consult appreciated. Somnolent 2/2 klonopin and norco. Spread out. Take norco 1 tab during day and can take 2 tabs at night     # Hypertension  - Will continue on metoprolol     # Neurogenic bladder  - Patient has chronic mark.    #UTI  -IV abx.   -Ucx enterococcus.   -allergy to PCN  -cont macrobid PO      # Hx DVT  - eliquis at DC     # ANAID  - Will place on ANAID  protocol.    DC next 1-2 days if hgb stable and pain  improve    Discussed with patient, RN at bedside  Tried to reach patient's mom POA to update regarding the patient at below Flor Atkinson Mother [phone]       Fiorella Beatty MD      Supplementary Documentation:     Quality:  DVT Mechanical Prophylaxis:        DVT Pharmacologic Prophylaxis   Medication    heparin (Porcine) 93100 units/250mL infusion PE/DVT/THROMBUS CONTINUOUS    [Held by provider] apixaban (Eliquis) tab 5 mg    heparin (Porcine) 100 Units/mL lock flush 500 Units                Code Status: Full Code  Florentino: Suprapubic catheter in place  Florentino Duration (in days):   Central line:    MARINA: 11/29/2024    Discharge is dependent on: course  At this point Ms. Rosa is expected to be discharge to: home    The 21st Century Cures Act makes medical notes like these available to patients in the interest of transparency. Please be advised this is a medical document. Medical documents are intended to carry relevant information, facts as evident, and the clinical opinion of the practitioner. The medical note is intended as peer to peer communication and may appear blunt or direct. It is written in medical language and may contain abbreviations or verbiage that are unfamiliar.

## 2024-11-28 NOTE — PROGRESS NOTES
Assumed care at 7:30am  Patient is alert and oriented x4  Currently bedrest  States she has a lot of pain. Medications given  Heparin Drip running at 18c/hr  All safety precautions in place. Will continue to monitor patient.

## 2024-11-28 NOTE — SPIRITUAL CARE NOTE
Spiritual Care Visit Note    Patient Name: Clari Rosa Date of Spiritual Care Visit: 24   : 1970 Primary Dx: Constipation, unspecified constipation type       Referred By: Referral From: Patient (As  was passing in the hallway, patient called out to see if she could have a visit.)    Spiritual Care Taxonomy:    Intended Effects: Demonstrate caring and concern    Methods: Demonstrate acceptance;Offer spiritual/Anabaptism support;Explore spiritual/Anabaptism beliefs    Interventions: Active listening;Ask guided questions;Explain  role    Visit Type/Summary:     - Spiritual Care: Offered empathic listening and emotional support. Provided information regarding how to contact Spiritual Care and left a Spiritual Care information card.  remains available as needed for follow up. Patient stated that she enjoys reading biblical text and praying daily. Patient is supported by her parents.     Spiritual Care support can be requested via an Epic consult. For urgent/immediate needs, please contact the On Call  at: [place]: ext [phone]           Chaplain Resident Teresita Massey MA

## 2024-11-28 NOTE — PROGRESS NOTES
[facility]   part of [facility]     Hospitalist Progress Note     Clari Rosa Patient Status:  Observation    1970 MRN [id number]   Location [place] 3SW-A Attending Bhanu Mckinney MD   Hosp Day # 8 PCP Macrnia Galvin MD     Chief Complaint: abd pain    Subjective:     Complains of persisting left lower quadrant abdominal pain.  Passing gas.  Burping according to patient.  Had a small bowel movement as reported by RN at bedside.    Objective:    Review of Systems:   A comprehensive review of systems was completed; pertinent positive and negatives stated in subjective.    Vital signs:  Temp:  [97.6 °F (36.4 °C)-98.6 °F (37 °C)] 98.4 °F (36.9 °C)  Pulse:  [88-99] 89  Resp:  [15-22] 22  BP: ()/(62-84) 113/67  SpO2:  [93 %-99 %] 98 %    Physical Exam:    /67 (BP Location: Right arm)   Pulse 89   Temp 98.4 °F (36.9 °C) (Oral)   Resp 22   Ht 5' (1.524 m)   Wt 181 lb 7 oz (82.3 kg)   SpO2 98%   BMI 35.43 kg/m²     General: No acute distress  Respiratory: No wheezes, no rhonchi  Cardiovascular: S1, S2, regular rate and rhythm  Abdomen: Soft, left lower quadrant tender, distended, positive bowel sounds  Neuro: No new focal deficits.   Extremities: No edema      Diagnostic Data:    Labs:  Recent Labs   Lab 24  0705 24  1700 24  0715 24  1333 24  1810 24  0629 24  1329 24  1703 24  0004 24  0546 24  1012   WBC 7.0  --  7.5 10.4  --  8.9  --   --   --  8.6  --    HGB 10.4*   < > 10.0* 9.3*   < > 8.7*  8.7* 9.1* 8.6* 8.4* 8.1* 7.3*   MCV 78.0*  --  76.0* 75.6*  --  75.8*  --   --   --  75.7*  --    .0  --  244.0 271.0  --  277.0  --   --   --  263.0  263.0  --     < > = values in this interval not displayed.       Recent Labs   Lab 24  1011 24  1049 24  0705 24  0715 24  1333 24  0629 24  0546   * 138* 176*   < > 286* 354* 249*   BUN 8* 7* 8*   < > 16 19 16    CREATSERUM 0.63 0.70 0.73   < > 0.68 0.72 0.78   CA 8.9 8.7 8.9   < > 9.2 8.8 9.2   ALB 3.7 3.3 3.8  --   --   --   --     141 141   < > 133* 133* 136   K 3.4* 3.7  3.7 4.0   < > 4.5 4.4 4.6    106 103   < > 100 99 99   CO2 32.0 31.0 31.0   < > 29.0 30.0 32.0   ALKPHO 133* 107 126*  --   --   --   --    AST 17 10 51*  --   --   --   --    * 70* 79*  --   --   --   --    BILT 0.2* 0.2* 0.4  --   --   --   --    TP 6.2 6.3 6.4  --   --   --   --     < > = values in this interval not displayed.       Estimated Creatinine Clearance: 59.2 mL/min (based on SCr of 0.78 mg/dL).    Recent Labs   Lab 11/22/24  1011   TROPHS <3       No results for input(s): \"PTP\", \"INR\" in the last 168 hours.               Microbiology    Hospital Encounter on 11/18/24   1. Urine Culture, Routine     Status: Abnormal    Collection Time: 11/22/24 12:03 AM    Specimen: Urine, clean catch   Result Value Ref Range    Urine Culture 50,000-99,000 CFU/ML Enterococcus faecalis (A) N/A       Susceptibility    Enterococcus faecalis -  (no method available)     Nitrofurantoin <=32 Sensitive      Vancomycin 1 Sensitive      Ampicillin <=2 Sensitive          Imaging: Reviewed in Epic.    Medications:    polyethylene glycol (PEG 3350)  17 g Oral Daily    insulin aspart  1-68 Units Subcutaneous TID CC    insulin degludec  6 Units Subcutaneous Nightly    multivitamin  1 tablet Oral Daily    nitrofurantoin monohydrate macro  100 mg Oral BID with meals    [Held by provider] apixaban  5 mg Oral BID    clonazePAM  1.5 mg Oral TID    divalproex ER  500 mg Oral BID    DULoxetine  60 mg Oral Daily    oxybutynin  5 mg Oral TID    QUEtiapine  200 mg Oral Nightly    insulin aspart  1-10 Units Subcutaneous TID AC and HS    [Held by provider] metoprolol tartrate  25 mg Oral 2x Daily(Beta Blocker)    nystatin-triamcinolone   Topical BID       Assessment & Plan:      #Abdominal pain  #bowel obstruction  #rectal stricture ruled out  #ileus  - CT shows  dilated colon with narrowing at rectosigmoid junction  - GI consult appreciated  - PRN pain med  -Ngt to IS. Output decreasing  -sp flex sig for decompression  -gen surg consult appreciated  -lower GI neg for stricture  -hold eliquis. hold FD lovenox   -DC TPN   -XR c/w ileus  -d/w GI. S/p suppository. Now having BM's again   -ADAT    #rectus sheath hematoma  #Left lower quadrant abdominal pain due to rectus sheath hematoma  -d/w GI, d/w gen surg. Non surgical per surgery  -hgb dropped this AM but stable in afternoon  -if hgb drops further, consider CTA  -Per surg, can resume AC. hep gtt in case bleeding worsens. Cont hold eliquis in case needs surgical intervention or embolization   -has persistent DVT  -if hgb continuing to drop, may need to DC AC and place IVC filter  -CBC in AM     #sinus bradycardia likely vasovagal- resolved  -consult cards appreciated  -EKG done on 11/22/2024 showed sinus bradycardia   -echo done on 11/22/2024 showed EF of 65 to 70%.  No regional wall motion abnormalities.  -likely vasovagal. resolved     # Type 2 diabetes mellitus  -  on hyperglycemia protocol with long actin and correction factor insulin.  -hyperglycemic, discontinued TPN earlier during admission.  -Will increase insulin due to persistent hyperglycemia     # Anxiety  -home meds resumed     # Bipolar w/ carmel  -depakote and seroquel resume PO   -IV depakote dced  -seems to be having a manic episode. Pt requesting to go to [facility] after, does not qualify   -Psych consult appreciated. Somnolent 2/2 klonopin and norco. Spread out. Take norco 1 tab during day and can take 2 tabs at night     # Hypertension  - Will continue on metoprolol     # Neurogenic bladder  - Patient has chronic mark.    #UTI  -IV abx.   -Ucx enterococcus.   -allergy to PCN  -cont macrobid PO      # Hx DVT  - eliquis at DC     # ANAID  - Will place on ANAID  protocol.    DC next 1-2 days if hgb stable    Discussed with patient, RN at bedside    Fiorella Beatty,  MD      Supplementary Documentation:     Quality:  DVT Mechanical Prophylaxis:        DVT Pharmacologic Prophylaxis   Medication    heparin (Porcine) 54141 units/250mL infusion PE/DVT/THROMBUS CONTINUOUS    [Held by provider] apixaban (Eliquis) tab 5 mg    heparin (Porcine) 100 Units/mL lock flush 500 Units                Code Status: Full Code  Florentino: Suprapubic catheter in place  Florentino Duration (in days):   Central line:    MARINA:     Discharge is dependent on: course  At this point Ms. Rosa is expected to be discharge to: home    The 21st Century Cures Act makes medical notes like these available to patients in the interest of transparency. Please be advised this is a medical document. Medical documents are intended to carry relevant information, facts as evident, and the clinical opinion of the practitioner. The medical note is intended as peer to peer communication and may appear blunt or direct. It is written in medical language and may contain abbreviations or verbiage that are unfamiliar.

## 2024-11-29 LAB
APTT PPP: 167.6 SECONDS (ref 23–36)
APTT PPP: >240 SECONDS (ref 23–36)
GLUCOSE BLD-MCNC: 174 MG/DL (ref 70–99)
GLUCOSE BLD-MCNC: 178 MG/DL (ref 70–99)
GLUCOSE BLD-MCNC: 220 MG/DL (ref 70–99)
GLUCOSE BLD-MCNC: 228 MG/DL (ref 70–99)
GLUCOSE BLD-MCNC: 232 MG/DL (ref 70–99)
HGB BLD-MCNC: 7.9 G/DL
HGB BLD-MCNC: 8.5 G/DL
HGB BLD-MCNC: 8.6 G/DL

## 2024-11-29 PROCEDURE — 99232 SBSQ HOSP IP/OBS MODERATE 35: CPT | Performed by: INTERNAL MEDICINE

## 2024-11-29 PROCEDURE — 99231 SBSQ HOSP IP/OBS SF/LOW 25: CPT | Performed by: OTHER

## 2024-11-29 RX ORDER — INSULIN DEGLUDEC 100 U/ML
10 INJECTION, SOLUTION SUBCUTANEOUS NIGHTLY
Status: DISCONTINUED | OUTPATIENT
Start: 2024-11-29 | End: 2024-11-30

## 2024-11-29 NOTE — BH PROGRESS NOTE
11/29/2024    Seen and examined in person at [facility] medical floor    Interval Chief Complaint: Follow-up on bipolar, anxiety    Subjective:  Doing well since last visit.  More alert and less somnolent compared to last visit since he spaced out her Klonopin and opiates.  She is still complaining abdominal pain and thus her discharge has been canceled for today.  Her mood is fair and currently denies any current hopelessness or helplessness and is not suicidal or homicidal.  She reports an occasional sadness and worry but advised her she is midline to progress since she first was admitted here and she agrees.  There is no acute safety concerns.  There is no hallucinations or paranoia.  Is no euphoria or grandiosity.  She is clear to discharge from our perspective and advised her to follow up with the psychiatric services in her facility.  Advised against increasing her psychotropic medications at this time as many of her psychotropics can cause constipation as a potential side effect which we want to try to avoid at this time.  Validation provided for her efforts.    MEDS:    phenazopyridine (Pyridium) tab 100 mg  100 mg Oral TID PRN    HYDROcodone-acetaminophen (Norco) 5-325 MG per tab 1 tablet  1 tablet Oral Q4H PRN    Or    HYDROcodone-acetaminophen (Norco) 5-325 MG per tab 2 tablet  2 tablet Oral Q4H PRN    polyethylene glycol (PEG 3350) (Miralax) 17 g oral packet 17 g  17 g Oral Daily    insulin aspart (NovoLOG) 100 Units/mL FlexPen 1-68 Units  1-68 Units Subcutaneous TID CC    insulin degludec (Tresiba) 100 units/mL flextouch 6 Units  6 Units Subcutaneous Nightly    multivitamin (Tab-A-Masood/Beta Carotene) tab 1 tablet  1 tablet Oral Daily    [COMPLETED] bisacodyl (Dulcolax) 10 MG rectal suppository 10 mg  10 mg Rectal Once    nitrofurantoin monohydrate macro (Macrobid) cap 100 mg  100 mg Oral BID with meals    [COMPLETED] heparin (Porcine) 1000 UNIT/ML injection - BOLUS IV 6,600 Units  80 Units/kg  Intravenous Once    [COMPLETED] heparin (Porcine) 23146 units/250mL infusion (PE/DVT/THROMBUS) INITIAL DOSE  18 Units/kg/hr Intravenous Once    [COMPLETED] heparin (Porcine) 1000 UNIT/ML injection - BOLUS IV 2,500 Units  30 Units/kg Intravenous Once    [] adult 3 in 1 TPN   Intravenous Continuous TPN    dextrose 10% infusion (TPN no rate)   Intravenous Continuous PRN    [] adult 3 in 1 TPN   Intravenous Continuous TPN    [COMPLETED] magnesium sulfate in sterile water for injection 2 g/50mL IVPB premix 2 g  2 g Intravenous Once    [COMPLETED] potassium chloride 40 mEq in 250mL sodium chloride 0.9% IVPB premix  40 mEq Intravenous Once    [COMPLETED] diphenhydrAMINE (Benadryl) 50 mg/mL  injection 50 mg  50 mg Intravenous Once    [COMPLETED] methylPREDNISolone sodium succinate (Solu-MEDROL) injection 40 mg  40 mg Intravenous Q6H    LORazepam (Ativan) 2 mg/mL injection 0.5 mg  0.5 mg Intravenous Q4H PRN    Or    LORazepam (Ativan) 2 mg/mL injection 1 mg  1 mg Intravenous Q4H PRN    [] naloxone (Narcan) 0.4 MG/ML injection 0.08 mg  0.08 mg Intravenous Once PRN    HYDROmorphone (Dilaudid) 1 MG/ML injection 0.2 mg  0.2 mg Intravenous Q2H PRN    Or    HYDROmorphone (Dilaudid) 1 MG/ML injection 0.4 mg  0.4 mg Intravenous Q2H PRN    Or    HYDROmorphone (Dilaudid) 1 MG/ML injection 0.8 mg  0.8 mg Intravenous Q2H PRN    albuterol (Ventolin HFA) 108 (90 Base) MCG/ACT inhaler 2 puff  2 puff Inhalation Q6H PRN    apixaban (Eliquis) tab 5 mg  5 mg Oral BID    clonazePAM (KlonoPIN) tab 1.5 mg  1.5 mg Oral TID    divalproex ER (Depakote ER) 24 hr tab 500 mg  500 mg Oral BID    DULoxetine (Cymbalta) DR cap 60 mg  60 mg Oral Daily    oxybutynin (Ditropan) tab 5 mg  5 mg Oral TID    QUEtiapine (SEROquel) tab 200 mg  200 mg Oral Nightly    traZODone (Desyrel) tab 50 mg  50 mg Oral Nightly PRN    glucose (Dex4) 15 GM/59ML oral liquid 15 g  15 g Oral Q15 Min PRN    Or    glucose (Glutose) 40% oral gel 15 g  15 g Oral  Q15 Min PRN    Or    glucose-vitamin C (Dex-4) chewable tab 4 tablet  4 tablet Oral Q15 Min PRN    Or    dextrose 50% injection 50 mL  50 mL Intravenous Q15 Min PRN    Or    glucose (Dex4) 15 GM/59ML oral liquid 30 g  30 g Oral Q15 Min PRN    Or    glucose (Glutose) 40% oral gel 30 g  30 g Oral Q15 Min PRN    Or    glucose-vitamin C (Dex-4) chewable tab 8 tablet  8 tablet Oral Q15 Min PRN    melatonin tab 3 mg  3 mg Oral Nightly PRN    acetaminophen (Tylenol Extra Strength) tab 500 mg  500 mg Oral Q4H PRN    morphINE PF 2 MG/ML injection 1 mg  1 mg Intravenous Q2H PRN    Or    morphINE PF 2 MG/ML injection 2 mg  2 mg Intravenous Q2H PRN    Or    morphINE PF 4 MG/ML injection 4 mg  4 mg Intravenous Q2H PRN    ondansetron (Zofran) 4 MG/2ML injection 4 mg  4 mg Intravenous Q6H PRN    prochlorperazine (Compazine) 10 MG/2ML injection 5 mg  5 mg Intravenous Q8H PRN    insulin aspart (NovoLOG) 100 Units/mL FlexPen 1-10 Units  1-10 Units Subcutaneous TID AC and HS    heparin (Porcine) 100 Units/mL lock flush 500 Units  5 mL Intravenous PRN    [Held by provider] metoprolol tartrate (Lopressor) tab 25 mg  25 mg Oral 2x Daily(Beta Blocker)    nystatin-triamcinolone (Mycolog II) 100,000-0.1 Units/g-% cream   Topical BID    [COMPLETED] LORazepam (Ativan) 2 mg/mL injection 1 mg  1 mg Intravenous Once    [COMPLETED] ketorolac (Toradol) 15 MG/ML injection 15 mg  15 mg Intravenous Once    morphINE PF 4 MG/ML injection 4 mg  4 mg Intravenous Q30 Min PRN    [COMPLETED] ondansetron (Zofran) 4 MG/2ML injection 4 mg  4 mg Intravenous Once    [COMPLETED] sodium chloride 0.9 % IV bolus 500 mL  500 mL Intravenous Once      Vitals:    11/29/24 1130   BP: 104/80   Pulse: 84   Resp: 18   Temp: 98 °F (36.7 °C)     ROS:   As above. Otherwise negative on 14 system exam.    MSE:   Conscious/Orientation: A + O x person, place, time, situation  Appearance: good grooming  Behavior: no psychomotor abnormalities, good eye contact and engagement with  interview.  Speech: normal rate, rhythm, and volume  Mood: \" Okay\"  Affect: congruent, restricted  Thought process: linear, logical, goal directed  Thought content:   No delusions, obsessions, or other thought abnormalities  Suicidal ideation: denies  Homicidal ideation: denies  Perceptions: no hallucinations  Insight: fair  Judgment: fair  Loosening of associations: none    Bipolar 1 disorder mixed state mild, generalized anxiety disorder-currently improved    Plan:  Cont current meds  Ok to dc from psych perspective  Will sign off  F/u with psych services in her facility  Chart reviewed  ~25mins spent on case with >50% time spent on counseling/coordination of care.     Sandor Light DO  Board Certified Adult and Geriatric Psychiatrist  Medical Director, Geriatric Psychiatry, [facility]   Medical Director, Psychiatric Consultation Service, [facility]

## 2024-11-29 NOTE — PLAN OF CARE
Received pt alert and coherent. 2L NC for comfort. Morphine plus zofran for pain per mar. Suprapubic cath. Port a cath. Heparin drip 15.5 ml/hr. Pyridium for urinary pain. Unit draw. Latest hgb - 8.6. [facility] d[facility]. Bed in lowest position, bed alarm on. Safety and fall prec maintained. POC on going      Problem: METABOLIC/FLUID AND ELECTROLYTES - ADULT  Goal: Glucose maintained within prescribed range  Description: INTERVENTIONS:  - Monitor Blood Glucose as ordered  - Assess for signs and symptoms of hyperglycemia and hypoglycemia  - Administer ordered medications to maintain glucose within target range  - Assess barriers to adequate nutritional intake and initiate nutrition consult as needed  - Instruct patient on self management of diabetes  Outcome: Progressing  Goal: Electrolytes maintained within normal limits  Description: INTERVENTIONS:  - Monitor labs and rhythm and assess patient for signs and symptoms of electrolyte imbalances  - Administer electrolyte replacement as ordered  - Monitor response to electrolyte replacements, including rhythm and repeat lab results as appropriate  - Fluid restriction as ordered  - Instruct patient on fluid and nutrition restrictions as appropriate  Outcome: Progressing     Problem: NEUROLOGICAL - ADULT  Goal: Achieves stable or improved neurological status  Description: INTERVENTIONS  - Assess for and report changes in neurological status  - Initiate measures to prevent increased intracranial pressure  - Maintain blood pressure and fluid volume within ordered parameters to optimize cerebral perfusion and minimize risk of hemorrhage  - Monitor temperature, glucose, and sodium. Initiate appropriate interventions as ordered  Outcome: Progressing     Problem: Impaired Cognition  Goal: Patient will exhibit improved attention, thought processing and/or memory  Description: Interventions:  - Allow additional time for processing after asking questions or providing  instructions  Outcome: Progressing

## 2024-11-29 NOTE — PLAN OF CARE
Assumed care at 0730  Alert and oriented x4, forgetful  RA. NSR on tele. VSS  Parrish for pain  IV heparin dc'd this afternoon  Suprapubic catheter intact-site is oozing a little bit  Pt repositioned   Lidocaine patch to left side abdomen  Pt/mother updated on POC  Safety precautions in place

## 2024-11-29 NOTE — PROGRESS NOTES
[facility]   part of [facility]     Hospitalist Progress Note     Clari Rosa Patient Status:  Observation    1970 MRN [id number]   Location [facility] 3SW-A Attending Bhanu Mckinney MD   Hosp Day # 10 PCP Macrina Galvin MD     Chief Complaint: abd pain    Subjective:     Complains of persisting left lower quadrant abdominal pain.  Passing gas.  Had liquid bowel movement with MiraLAX according to patient    Objective:    Review of Systems:   A comprehensive review of systems was completed; pertinent positive and negatives stated in subjective.    Vital signs:  Temp:  [97.5 °F (36.4 °C)-99.2 °F (37.3 °C)] 98 °F (36.7 °C)  Pulse:  [75-94] 84  Resp:  [10-27] 18  BP: ()/(66-82) 104/80  SpO2:  [90 %-100 %] 94 %    Physical Exam:    /80 (BP Location: Right arm)   Pulse 84   Temp 98 °F (36.7 °C) (Oral)   Resp 18   Ht 5' (1.524 m)   Wt 181 lb 7 oz (82.3 kg)   SpO2 94%   BMI 35.43 kg/m²     General: No acute distress  Respiratory: No wheezes, no rhonchi  Cardiovascular: S1, S2, regular rate and rhythm  Abdomen: Soft, left lower quadrant tender, distended, positive bowel sounds  Neuro: No new focal deficits.   Extremities: No edema      Diagnostic Data:    Labs:  Recent Labs   Lab 24  0715 24  1333 24  1810 24  0629 24  1329 24  0546 24  1012 24  0510 24  0829 24  1240 24  1736 24  0108 24  0520 24  0540   WBC 7.5 10.4  --  8.9  --  8.6  --   --   --  8.7  --   --   --   --    HGB 10.0* 9.3*   < > 8.7*  8.7*   < > 8.1*   < > 8.4*   < > 8.4* 8.5* 8.6* 8.5* 7.9*   MCV 76.0* 75.6*  --  75.8*  --  75.7*  --   --   --  75.1*  --   --   --   --    .0 271.0  --  277.0  --  263.0  263.0  --  305.0  --  298.0  --   --   --   --     < > = values in this interval not displayed.       Recent Labs   Lab 24  1049 24  0705 24  0715 24  0546 24  0510 24  1240   *  176*   < > 249* 239* 241*   BUN 7* 8*   < > 16 17 16   CREATSERUM 0.70 0.73   < > 0.78 0.76 0.87   CA 8.7 8.9   < > 9.2 9.8 9.3   ALB 3.3 3.8  --   --   --   --     141   < > 136 134* 132*   K 3.7  3.7 4.0   < > 4.6 4.5 4.6    103   < > 99 99 97*   CO2 31.0 31.0   < > 32.0 31.0 32.0   ALKPHO 107 126*  --   --   --   --    AST 10 51*  --   --   --   --    ALT 70* 79*  --   --   --   --    BILT 0.2* 0.4  --   --   --   --    TP 6.3 6.4  --   --   --   --     < > = values in this interval not displayed.       Estimated Creatinine Clearance: 53.1 mL/min (based on SCr of 0.87 mg/dL).    No results for input(s): \"TROP\", \"TROPHS\", \"CK\" in the last 168 hours.      No results for input(s): \"PTP\", \"INR\" in the last 168 hours.               Microbiology    Hospital Encounter on 11/18/24   1. Urine Culture, Routine     Status: Abnormal    Collection Time: 11/22/24 12:03 AM    Specimen: Urine, clean catch   Result Value Ref Range    Urine Culture 50,000-99,000 CFU/ML Enterococcus faecalis (A) N/A       Susceptibility    Enterococcus faecalis -  (no method available)     Nitrofurantoin <=32 Sensitive      Vancomycin 1 Sensitive      Ampicillin <=2 Sensitive          Imaging: Reviewed in Epic.    Medications:    polyethylene glycol (PEG 3350)  17 g Oral Daily    insulin aspart  1-68 Units Subcutaneous TID CC    insulin degludec  6 Units Subcutaneous Nightly    multivitamin  1 tablet Oral Daily    nitrofurantoin monohydrate macro  100 mg Oral BID with meals    [Held by provider] apixaban  5 mg Oral BID    clonazePAM  1.5 mg Oral TID    divalproex ER  500 mg Oral BID    DULoxetine  60 mg Oral Daily    oxybutynin  5 mg Oral TID    QUEtiapine  200 mg Oral Nightly    insulin aspart  1-10 Units Subcutaneous TID AC and HS    [Held by provider] metoprolol tartrate  25 mg Oral 2x Daily(Beta Blocker)    nystatin-triamcinolone   Topical BID       Assessment & Plan:      #Abdominal pain  #bowel obstruction  #rectal stricture  ruled out  #ileus  - CT shows dilated colon with narrowing at rectosigmoid junction  - GI consult appreciated  - PRN pain med  -Ngt to IS. Output decreasing  -sp flex sig for decompression  -gen surg consult appreciated  -lower GI neg for stricture  -Will discontinue heparin drip today and start back on home Eliquis  -DC TPN   -XR c/w ileus  -d/w GI. S/p suppository. Now having BM's again   -ADAT    #rectus sheath hematoma  #Left lower quadrant abdominal pain due to rectus sheath hematoma  -d/w GI, d/w gen surg. Non surgical per surgery  -hgb dropped this AM but stable in afternoon  -if hgb drops further, consider CTA  -Per surg, can resume AC.  Initially started on heparin drip and monitored  -has persistent DVT  -Hemoglobin remained stable, will DC heparin drip today and restart home Eliquis on 11/29/2024.  -CBC in AM     #sinus bradycardia likely vasovagal- resolved  -consult cards appreciated  -EKG done on 11/22/2024 showed sinus bradycardia   -echo done on 11/22/2024 showed EF of 65 to 70%.  No regional wall motion abnormalities.  -likely vasovagal. resolved     # Type 2 diabetes mellitus  -  on hyperglycemia protocol with long actin and correction factor insulin.  -hyperglycemic, discontinued TPN earlier during admission.  -Will increase insulin due to persistent hyperglycemia     # Anxiety  -home meds resumed     # Bipolar w/ carmel  -depakote and seroquel resume PO   -IV depakote dced  -seems to be having a manic episode. Pt requesting to go to [facility] after, does not qualify   -Psych consult appreciated. Somnolent 2/2 klonopin and norco. Spread out. Take norco 1 tab during day and can take 2 tabs at night     # Hypertension  - Will continue on metoprolol     # Neurogenic bladder  - Patient has chronic mark.    #UTI  -IV abx.   -Ucx enterococcus.   -allergy to PCN  -cont macrobid PO      # Hx DVT  - eliquis at DC     # ANAID  - Will place on ANAID  protocol.    DC planning to skilled nursing facility tomorrow if  hemoglobin remains stable and pain controlled,  working with patient's mom on skilled nursing facility placement.    Discussed with patient, RN at bedside    Fiorlela Beatty MD      Supplementary Documentation:     Quality:  DVT Mechanical Prophylaxis:        DVT Pharmacologic Prophylaxis   Medication    heparin (Porcine) 35289 units/250mL infusion PE/DVT/THROMBUS CONTINUOUS    [Held by provider] apixaban (Eliquis) tab 5 mg    heparin (Porcine) 100 Units/mL lock flush 500 Units                Code Status: Full Code  Florentino: Suprapubic catheter in place  Florentino Duration (in days):   Central line:    MARINA: 11/29/2024    Discharge is dependent on: course  At this point Ms. Rosa is expected to be discharge to: home    The 21st Century Cures Act makes medical notes like these available to patients in the interest of transparency. Please be advised this is a medical document. Medical documents are intended to carry relevant information, facts as evident, and the clinical opinion of the practitioner. The medical note is intended as peer to peer communication and may appear blunt or direct. It is written in medical language and may contain abbreviations or verbiage that are unfamiliar.

## 2024-11-29 NOTE — CM/SW NOTE
JAH spoke with RN who stated pt will not discharging today, MD is still managing pt's pain. RN stated pt's mother is requesting to speak with this writer regarding discharge planning and would like to tour the facility prior to discharge.     JAH spoke with pt's mother Flor via phone. Pt's mother stated she would like to tour [facility] prior to pt's discharge to ensure pt discharges to a 'good facility.' Pt's mother stated she has been dissatisfied with facilities pt has been sent to in the past. JAH informed pt's mother she is able to tour [facility], but if she is dissatisfied with the facility pt would have to return to [facility]. If [facility] is able to accept pt. JAH informed pt's mother pt is not discharging today but might be discharging tomorrow. Pt's mother stated she will tour [facility] this afternoon. JAH will continue to follow.     GURINDER Beltre  Discharge Planner

## 2024-11-30 VITALS
HEIGHT: 60 IN | OXYGEN SATURATION: 98 % | WEIGHT: 182.31 LBS | BODY MASS INDEX: 35.79 KG/M2 | HEART RATE: 107 BPM | RESPIRATION RATE: 17 BRPM | DIASTOLIC BLOOD PRESSURE: 94 MMHG | TEMPERATURE: 98 F | SYSTOLIC BLOOD PRESSURE: 136 MMHG

## 2024-11-30 LAB
ANION GAP SERPL CALC-SCNC: 5 MMOL/L (ref 0–18)
BASOPHILS # BLD AUTO: 0.06 X10(3) UL (ref 0–0.2)
BASOPHILS NFR BLD AUTO: 0.7 %
BUN BLD-MCNC: 17 MG/DL (ref 9–23)
CALCIUM BLD-MCNC: 9.5 MG/DL (ref 8.7–10.4)
CHLORIDE SERPL-SCNC: 97 MMOL/L (ref 98–112)
CO2 SERPL-SCNC: 30 MMOL/L (ref 21–32)
CREAT BLD-MCNC: 0.95 MG/DL
EGFRCR SERPLBLD CKD-EPI 2021: 71 ML/MIN/1.73M2 (ref 60–?)
EOSINOPHIL # BLD AUTO: 0.36 X10(3) UL (ref 0–0.7)
EOSINOPHIL NFR BLD AUTO: 4.4 %
ERYTHROCYTE [DISTWIDTH] IN BLOOD BY AUTOMATED COUNT: 15.7 %
GLUCOSE BLD-MCNC: 208 MG/DL (ref 70–99)
GLUCOSE BLD-MCNC: 298 MG/DL (ref 70–99)
GLUCOSE BLD-MCNC: 303 MG/DL (ref 70–99)
GLUCOSE BLD-MCNC: 349 MG/DL (ref 70–99)
HCT VFR BLD AUTO: 26.2 %
HGB BLD-MCNC: 8.5 G/DL
IMM GRANULOCYTES # BLD AUTO: 0.33 X10(3) UL (ref 0–1)
IMM GRANULOCYTES NFR BLD: 4 %
LYMPHOCYTES # BLD AUTO: 3.03 X10(3) UL (ref 1–4)
LYMPHOCYTES NFR BLD AUTO: 36.8 %
MCH RBC QN AUTO: 24.6 PG (ref 26–34)
MCHC RBC AUTO-ENTMCNC: 32.4 G/DL (ref 31–37)
MCV RBC AUTO: 75.9 FL
MONOCYTES # BLD AUTO: 0.42 X10(3) UL (ref 0.1–1)
MONOCYTES NFR BLD AUTO: 5.1 %
NEUTROPHILS # BLD AUTO: 4.04 X10 (3) UL (ref 1.5–7.7)
NEUTROPHILS # BLD AUTO: 4.04 X10(3) UL (ref 1.5–7.7)
NEUTROPHILS NFR BLD AUTO: 49 %
OSMOLALITY SERPL CALC.SUM OF ELEC: 287 MOSM/KG (ref 275–295)
PLATELET # BLD AUTO: 290 10(3)UL (ref 150–450)
POTASSIUM SERPL-SCNC: 4.6 MMOL/L (ref 3.5–5.1)
RBC # BLD AUTO: 3.45 X10(6)UL
SODIUM SERPL-SCNC: 132 MMOL/L (ref 136–145)
WBC # BLD AUTO: 8.2 X10(3) UL (ref 4–11)

## 2024-11-30 PROCEDURE — 99239 HOSP IP/OBS DSCHRG MGMT >30: CPT | Performed by: INTERNAL MEDICINE

## 2024-11-30 NOTE — PROGRESS NOTES
[facility]   part of [facility]     Hospitalist Progress Note     Clari Rosa Patient Status:  Observation    1970 MRN [id number]   Location [facility] 3SW-A Attending Bhanu Mckinney MD   Hosp Day # 11 PCP Macrina Galvin MD     Chief Complaint: abd pain    Subjective:    left lower quadrant abdominal wall pain present, current pain regimen helping..  Passing gas.  Had good bowel movement.    Objective:    Review of Systems:   A comprehensive review of systems was completed; pertinent positive and negatives stated in subjective.    Vital signs:  Temp:  [97.8 °F (36.6 °C)-98.3 °F (36.8 °C)] 97.9 °F (36.6 °C)  Pulse:  [] 107  Resp:  [15-23] 17  BP: ()/(60-94) 136/94  SpO2:  [96 %-98 %] 98 %    Physical Exam:    BP (!) 136/94 (BP Location: Left arm)   Pulse 107   Temp 97.9 °F (36.6 °C) (Oral)   Resp 17   Ht 5' (1.524 m)   Wt 182 lb 5.1 oz (82.7 kg)   SpO2 98%   BMI 35.61 kg/m²     General: No acute distress  Respiratory: No wheezes, no rhonchi  Cardiovascular: S1, S2, regular rate and rhythm  Abdomen: Soft, left lower quadrant tender, distended, positive bowel sounds  Neuro: No new focal deficits.   Extremities: No edema      Diagnostic Data:    Labs:  Recent Labs   Lab 24  1333 24  1810 24  0629 24  1329 24  0546 24  1012 24  0510 24  0829 24  1240 24  1736 24  0108 24  0520 24  0540 24  0523   WBC 10.4  --  8.9  --  8.6  --   --   --  8.7  --   --   --   --  8.2   HGB 9.3*   < > 8.7*  8.7*   < > 8.1*   < > 8.4*   < > 8.4* 8.5* 8.6* 8.5* 7.9* 8.5*   MCV 75.6*  --  75.8*  --  75.7*  --   --   --  75.1*  --   --   --   --  75.9*   .0  --  277.0  --  263.0  263.0  --  305.0  --  298.0  --   --   --   --  290.0    < > = values in this interval not displayed.       Recent Labs   Lab 24  0705 24  0715 24  0510 24  1240 24  0523   *   < > 239* 241* 303*    BUN 8*   < > 17 16 17   CREATSERUM 0.73   < > 0.76 0.87 0.95   CA 8.9   < > 9.8 9.3 9.5   ALB 3.8  --   --   --   --       < > 134* 132* 132*   K 4.0   < > 4.5 4.6 4.6      < > 99 97* 97*   CO2 31.0   < > 31.0 32.0 30.0   ALKPHO 126*  --   --   --   --    AST 51*  --   --   --   --    ALT 79*  --   --   --   --    BILT 0.4  --   --   --   --    TP 6.4  --   --   --   --     < > = values in this interval not displayed.       Estimated Creatinine Clearance: 48.6 mL/min (based on SCr of 0.95 mg/dL).    No results for input(s): \"TROP\", \"TROPHS\", \"CK\" in the last 168 hours.      No results for input(s): \"PTP\", \"INR\" in the last 168 hours.               Microbiology    Hospital Encounter on 11/18/24   1. Urine Culture, Routine     Status: Abnormal    Collection Time: 11/22/24 12:03 AM    Specimen: Urine, clean catch   Result Value Ref Range    Urine Culture 50,000-99,000 CFU/ML Enterococcus faecalis (A) N/A       Susceptibility    Enterococcus faecalis -  (no method available)     Nitrofurantoin <=32 Sensitive      Vancomycin 1 Sensitive      Ampicillin <=2 Sensitive          Imaging: Reviewed in Epic.    Medications:    lidocaine-menthol  1 patch Transdermal Daily    insulin degludec  10 Units Subcutaneous Nightly    polyethylene glycol (PEG 3350)  17 g Oral Daily    insulin aspart  1-68 Units Subcutaneous TID CC    multivitamin  1 tablet Oral Daily    nitrofurantoin monohydrate macro  100 mg Oral BID with meals    apixaban  5 mg Oral BID    clonazePAM  1.5 mg Oral TID    divalproex ER  500 mg Oral BID    DULoxetine  60 mg Oral Daily    oxybutynin  5 mg Oral TID    QUEtiapine  200 mg Oral Nightly    insulin aspart  1-10 Units Subcutaneous TID AC and HS    [Held by provider] metoprolol tartrate  25 mg Oral 2x Daily(Beta Blocker)    nystatin-triamcinolone   Topical BID       Assessment & Plan:      #Abdominal pain  #bowel obstruction  #rectal stricture ruled out  #ileus  - CT shows dilated colon with  narrowing at rectosigmoid junction  - GI consult appreciated  - PRN pain med  -Ngt to IS. Output decreasing  -sp flex sig for decompression  -gen surg consult appreciated  -lower GI neg for stricture  -Will discontinue heparin drip today and start back on home Eliquis  -DC TPN   -XR c/w ileus  -d/w GI. S/p suppository. Now having BM's again   -ADAT    #rectus sheath hematoma  #Left lower quadrant abdominal pain due to rectus sheath hematoma  -d/w GI, d/w gen surg. Non surgical per surgery  -hgb dropped this AM but stable in afternoon  -if hgb drops further, consider CTA  -Per surg, can resume AC.  Initially started on heparin drip and monitored  -has persistent DVT  -Hemoglobin remained stable, will DC heparin drip today and restart home Eliquis on 11/29/2024.  -CBC in AM     #sinus bradycardia likely vasovagal- resolved  -consult cards appreciated  -EKG done on 11/22/2024 showed sinus bradycardia   -echo done on 11/22/2024 showed EF of 65 to 70%.  No regional wall motion abnormalities.  -likely vasovagal. resolved     # Type 2 diabetes mellitus  -  on hyperglycemia protocol with long actin and correction factor insulin.  -hyperglycemic, discontinued TPN earlier during admission.  -Will increase insulin due to persistent hyperglycemia     # Anxiety  -home meds resumed     # Bipolar w/ carmel  -depakote and seroquel resume PO   -IV depakote dced  -seems to be having a manic episode. Pt requesting to go to [facility] after, does not qualify   -Psych consult appreciated. Somnolent 2/2 klonopin and norco. Spread out. Take norco 1 tab during day and can take 2 tabs at night     # Hypertension  - Will continue on metoprolol     # Neurogenic bladder  - Patient has chronic mark.    # Enterococcus UTI  -IV abx.   -Ucx enterococcus.   -allergy to PCN  -Treated with macrobid PO twice daily for 10 doses so far     # Hx DVT  - eliquis at MI     # ANAID  - Will place on ANAID  protocol.    MI planning to skilled nursing facility today,   working with patient's mom on skilled nursing facility placement.  Physician at skilled nursing facility to follow while there.    Discussed with patient, RN at bedside      Fiorella Beatty MD      Supplementary Documentation:     Quality:  DVT Mechanical Prophylaxis:        DVT Pharmacologic Prophylaxis   Medication    apixaban (Eliquis) tab 5 mg    heparin (Porcine) 100 Units/mL lock flush 500 Units                Code Status: Full Code  Florentino: Suprapubic catheter in place  Florentino Duration (in days):   Central line:    MARINA: 11/30/2024    Discharge is dependent on: course  At this point Ms. Rosa is expected to be discharge to: home    The 21st Century Cures Act makes medical notes like these available to patients in the interest of transparency. Please be advised this is a medical document. Medical documents are intended to carry relevant information, facts as evident, and the clinical opinion of the practitioner. The medical note is intended as peer to peer communication and may appear blunt or direct. It is written in medical language and may contain abbreviations or verbiage that are unfamiliar.

## 2024-11-30 NOTE — SPIRITUAL CARE NOTE
Spiritual Care Visit Note    Patient Name: Clari Rosa Date of Spiritual Care Visit: 24   : 1970 Primary Dx: Constipation, unspecified constipation type       Referred By: Referral From: Patient    Spiritual Care Taxonomy:    Intended Effects: Arleth affirmation    Methods: Offer emotional support    Interventions: Active listening;Ask guided questions;Explain  role    Visit Type/Summary:     - Spiritual Care: Provided support for Patient's spiritual/Orthodoxy requests. Provided Communion and verified NPO status.  remains available for follow up.    Spiritual Care support can be requested via an Epic consult. For urgent/immediate needs, please contact the On Call  at: [facility]: ext [phone]

## 2024-11-30 NOTE — CM/SW NOTE
11/30/24 1701   Discharge disposition   Expected discharge disposition subacute   Post Acute Care Provider Other  ([facility])   Discharge transportation [facility]     Plan for patient discharge to [facility] via [facility] at 6pm, PCS completed for transport.  RN to call facility at [phone] for transfer report.     [facility]  [address] [place] [address]  Phone: [phone]    Aurea Costa RN Case Manager [phone]

## 2024-11-30 NOTE — PLAN OF CARE
Assumed care @ 1930  A&ox4  2L O2 NC   Meds in MAR given  NSR on tele   Suprapubic catheter site cleaned   Soft low fiber 2200cals  QID accucheck, carb coverage  Safety precautions in place  Continuing to meet pt needs  Need further details reference flowsheets  Problem: Patient/Family Goals  Goal: Patient/Family Long Term Goal  Description: Patient's Long Term Goal: to be discharged    Interventions:  - continue recommended medication regimen  - continue recommended testing/interventions  - See additional Care Plan goals for specific interventions  Outcome: Progressing  Goal: Patient/Family Short Term Goal  Description: Patient's Short Term Goal: to be free from pain    Interventions:   - prn pain medication  - maintain dietary recommendations  - See additional Care Plan goals for specific interventions  Outcome: Progressing     Problem: METABOLIC/FLUID AND ELECTROLYTES - ADULT  Goal: Glucose maintained within prescribed range  Description: INTERVENTIONS:  - Monitor Blood Glucose as ordered  - Assess for signs and symptoms of hyperglycemia and hypoglycemia  - Administer ordered medications to maintain glucose within target range  - Assess barriers to adequate nutritional intake and initiate nutrition consult as needed  - Instruct patient on self management of diabetes  Outcome: Progressing  Goal: Electrolytes maintained within normal limits  Description: INTERVENTIONS:  - Monitor labs and rhythm and assess patient for signs and symptoms of electrolyte imbalances  - Administer electrolyte replacement as ordered  - Monitor response to electrolyte replacements, including rhythm and repeat lab results as appropriate  - Fluid restriction as ordered  - Instruct patient on fluid and nutrition restrictions as appropriate  Outcome: Progressing     Problem: NEUROLOGICAL - ADULT  Goal: Achieves stable or improved neurological status  Description: INTERVENTIONS  - Assess for and report changes in neurological status  -  Initiate measures to prevent increased intracranial pressure  - Maintain blood pressure and fluid volume within ordered parameters to optimize cerebral perfusion and minimize risk of hemorrhage  - Monitor temperature, glucose, and sodium. Initiate appropriate interventions as ordered  Outcome: Progressing     Problem: Impaired Cognition  Goal: Patient will exhibit improved attention, thought processing and/or memory  Description: Interventions:  - Allow additional time for processing after asking questions or providing instructions  Outcome: Progressing

## 2024-11-30 NOTE — PLAN OF CARE
Problem: METABOLIC/FLUID AND ELECTROLYTES - ADULT  Goal: Glucose maintained within prescribed range  Description: INTERVENTIONS:  - Monitor Blood Glucose as ordered  - Assess for signs and symptoms of hyperglycemia and hypoglycemia  - Administer ordered medications to maintain glucose within target range  - Assess barriers to adequate nutritional intake and initiate nutrition consult as needed  - Instruct patient on self management of diabetes  Outcome: Adequate for Discharge  Goal: Electrolytes maintained within normal limits  Description: INTERVENTIONS:  - Monitor labs and rhythm and assess patient for signs and symptoms of electrolyte imbalances  - Administer electrolyte replacement as ordered  - Monitor response to electrolyte replacements, including rhythm and repeat lab results as appropriate  - Fluid restriction as ordered  - Instruct patient on fluid and nutrition restrictions as appropriate  Outcome: Adequate for Discharge     Problem: NEUROLOGICAL - ADULT  Goal: Achieves stable or improved neurological status  Description: INTERVENTIONS  - Assess for and report changes in neurological status  - Initiate measures to prevent increased intracranial pressure  - Maintain blood pressure and fluid volume within ordered parameters to optimize cerebral perfusion and minimize risk of hemorrhage  - Monitor temperature, glucose, and sodium. Initiate appropriate interventions as ordered  Outcome: Adequate for Discharge     Problem: Impaired Cognition  Goal: Patient will exhibit improved attention, thought processing and/or memory  Description: Interventions:  - Allow additional time for processing after asking questions or providing instructions  Outcome: Adequate for Discharge

## 2024-11-30 NOTE — DISCHARGE SUMMARY
[facility]  Discharge Summary    Clari Rosa Patient Status:  Inpatient    1970 MRN [id number]   Location [facility] 3NE-A Attending No att. providers found   Hosp Day # 11 PCP Macrina Galvin MD     Date of Admission: 2024    Date of Discharge: 2024     Disposition: SNF Subacute Rehab    Discharge Diagnosis:   #Abdominal distention and pain due to constipation and stool impaction and ileus  #rectal stricture ruled out  #ileus    #rectus sheath hematoma  #Left lower quadrant abdominal pain due to rectus sheath hematoma       #sinus bradycardia likely vasovagal- resolved     # Type 2 diabetes mellitus     # Anxiety     # Bipolar w/ carmel    # Hypertension    # Neurogenic bladder  - Patient has chronic mark.     # Enterococcus UTI     # Hx DVT     # ANAID      Chief Complaint:   Chief Complaint   Patient presents with    Abdomen/Flank Pain       History of Present Illness:   Clari Rosa is a 54 year old female with history of bipolar, type 2 diabetes, anxiety, hypertension, sleep apnea, transverse myelitis with a suprapubic catheter presents emergency room with abdominal pain this dull and constant with intermittent cramping that she rates 9 out of 10.  Patient stated this started 3 days ago with abdominal distention and then had been constipated not having a bowel movement for 3 days.  Patient currently on the seventh day of antibiotics for urinary tract infection.  Patient denies any nausea, vomiting.  No diarrhea.  No chest pain, shortness of breath.  No melena, hematochezia, hematuria.  Patient was in the emergency room yesterday when she had a CT scan at that time showed no obstruction or perforation so she was discharged home with follow-up with GI for colonoscopy.  Patient came back because of the pain continued and was intolerable at home.   Please refer to history and physical done by Dr. Muller for details of admission    Brief Synopsis:   #Abdominal pain  # No bowel  obstruction  #rectal stricture ruled out  #ileus  - CT abdomen and pelvis done on admission on 11/18/2024 early a.m. showed dilated colon with large amount of primarily liquid stool throughout the colon with a focus of notable narrowing of rectosigmoid junction   -Seen by surgeon and GI.  -Given PRN pain med  -Treated with NG tube to IS. Output decreasing  -sp flex sig done on 11/20/2024 by GI Dr. Abad for decompression.  GI advised that colon was filled with solid stool and was able to irrigate and lavage: With water.  No obstructive mass, although unable to rule out stricture due to solid stool according to GI.Lower double contrast gastrografin imaging ordered by GI to evaluate for narrowing on rectosigmoid junction as noted on previous CT which showed no evidence of stricture  -gen surg consult appreciated  -lower GI neg for stricture  -Was bridged with IV heparin drip and home Eliquis held initially   -XR c/w ileus  -S/p suppository per GI. Now having BM's again   -Advance diet as tolerated per GI, patient tolerated this well.     Patient continued to have progressive bilateral lower quadrant abdominal pain.  History of surgery/right hemicolectomy.   -CT done on 11/24/2024 showed interval development of left-sided rectal sheath abdominal wall hematoma measuring 7.6 into 3.5 to 5.7 cm.  Interval decompression of colon with uncomplicated diverticula images of descending and sigmoid colon.  Noted acute diverticulitis.  Mild left basilar effusion and passive atelectasis.      #rectus sheath hematoma  #Left lower quadrant abdominal pain due to rectus sheath hematoma  -Non surgical conservative management per surgery  -Per surg, can resume AC.  Initially started on heparin drip and monitored as has persistent DVT  -Hemoglobin remained stable, discontinued heparin drip and restarted home Eliquis on 11/29/2024.  -Hemoglobin remained stable.     #sinus bradycardia likely vasovagal- resolved  -Seen by cardiology on  consult  -EKG done on 11/22/2024 showed sinus bradycardia   -echo done on 11/22/2024 showed EF of 65 to 70%.  No regional wall motion abnormalities.  -likely vasovagal. resolved     # Type 2 diabetes mellitus  -  on hyperglycemia protocol with long actin and correction factor insulin.  -hyperglycemic, discontinued TPN earlier during admission.  -Will increase insulin due to persistent hyperglycemia     # Anxiety  -home meds resumed     # Bipolar w/ carmel  -depakote and seroquel resume PO   -IV depakote dced  -seems to be having a manic episode. Pt requesting to go to [facility] after, does not qualify   -Psych consult appreciated. Somnolent 2/2 klonopin and norco. Spread out. Take norco 1 tab during day and can take 2 tabs at night     # Hypertension  -Continued on metoprolol     # Neurogenic bladder  - Patient has chronic mark.     # Enterococcus UTI  -Treated with IV abx.   -Ucx enterococcus.   -allergy to PCN  -Treated with macrobid PO twice daily for 10 doses so far     # Hx DVT  - eliquis at DC     # ANAID  - on ANAID  protocol.    Improved clinically and okayed for discharge by all consultants.  Discharged in stable condition.  Follow-up with regular outpatient primary care physician Macrina Galvin MD within 1 week in office    TCM Diagnosis at discharge from Hospital: Other: See above; still recommend for TCM follow-up    Lace+ Score: 77  59-90 High Risk  29-58 Medium Risk  0-28   Low Risk.     TCM Follow-Up Recommendation:Clari Rosa   LACE > 58: High Risk of readmission after discharge from the hospital.      PCP: Macrina Galvin MD    Consultations:   Consultants         Provider   Role Specialty     Carlee Bae MD       Consulting Physician CARDIOLOGY     Sandor Light DO/Agatha Hernandez APRN       Consulting Physician Psychiatry     Cliff Tejeda MD /Harriet Ferrer PA       Consulting Physician SURGERY, GENERAL     Mauricio Abad MD       Consulting Physician GASTROENTEROLOGY              Procedures during hospitalization:   Flexible sigmoidoscopy done by GI Dr. Abad on 11/20/2024    Incidental or significant findings and recommendations (brief descriptions):  None    Lab/Test results pending at Discharge:   None      Discharge Medications:        Discharge Medications        CONTINUE taking these medications        Instructions Prescription details   acetaminophen 325 MG Tabs  Commonly known as: Tylenol      Take 2 tablets (650 mg total) by mouth every 6 (six) hours as needed for Pain.   Refills: 0     albuterol 108 (90 Base) MCG/ACT Aers  Commonly known as: Ventolin HFA      Inhale 2 puffs into the lungs every 6 (six) hours as needed for Wheezing or Shortness of Breath.   Refills: 0     apixaban 5 MG Tabs  Commonly known as: Eliquis      Take 1 tablet (5 mg total) by mouth 2 (two) times daily.   Refills: 0     bethanechol 10 MG Tabs  Commonly known as: Urecholine      Take 1 tablet (10 mg total) by mouth daily.   Refills: 0     clonazePAM 0.5 MG Tabs  Commonly known as: KlonoPIN      Take 3 tablets (1.5 mg total) by mouth 3 (three) times daily as needed for Anxiety.   Quantity: 15 tablet  Refills: 0     divalproex  MG Tb24  Commonly known as: Depakote ER      Take 1 tablet (500 mg total) by mouth 2 (two) times daily.   Refills: 0     DULoxetine 60 MG Cpep  Commonly known as: Cymbalta      Take 1 capsule (60 mg total) by mouth daily.   Refills: 0     ergocalciferol 1.25 MG (07551 UT) Caps  Commonly known as: Vitamin D2      Take 1 capsule (50,000 Units total) by mouth every 7 days. Every monday   Refills: 0     HYDROcodone-acetaminophen  MG Tabs  Commonly known as: Norco      Take 1 tablet by mouth every 6 (six) hours as needed for Pain.   Quantity: 20 tablet  Refills: 0     hydrocortisone 1 % Crea      Apply 1 Application topically 2 (two) times daily.   Refills: 0     Icosapent Ethyl 1 g Caps      Take 2 capsules by mouth 4 (four) times daily.   Refills: 0     insulin  aspart 100 Units/mL Sopn  Commonly known as: NovoLOG      Inject 2-8 Units into the skin 3 (three) times daily with meals.   Refills: 0     insulin glargine 100 UNIT/ML Soln  Commonly known as: Lantus      Inject 6 Units into the skin nightly.   Refills: 0     Insulin Pen Needle 32G X 4 MM Misc      May substitute if not on insurance formulary   Quantity: 100 each  Refills: 5     loperamide 2 MG Caps  Commonly known as: Imodium      Take 1 capsule (2 mg total) by mouth 4 (four) times daily as needed for Diarrhea.   Refills: 0     meclizine 25 MG Tabs  Commonly known as: Antivert      Take 1 tablet (25 mg total) by mouth 3 (three) times daily as needed.   Refills: 0     Melatonin 3 MG Caps      Take 1 capsule (3 mg total) by mouth at bedtime.   Refills: 0     metFORMIN  MG Tb24  Commonly known as: Glucophage XR      Take 2 tablets (1,000 mg total) by mouth 2 (two) times daily with meals.   Quantity: 30 tablet  Refills: 0     metoprolol tartrate 25 MG Tabs  Commonly known as: Lopressor      Take 1 tablet (25 mg total) by mouth 2 (two) times daily.   Refills: 0     nystatin 931874 UNIT/GM Powd      Apply 1 Application topically as needed for Dry Skin. Under breast and groin area   Refills: 0     nystatin 100,000 Units/g Crea  Commonly known as: Mycostatin      Apply 1 Application topically in the morning, at noon, and at bedtime.   Refills: 0     ondansetron 4 MG Tbdp  Commonly known as: Zofran-ODT      Take 1 tablet (4 mg total) by mouth every 8 (eight) hours as needed for Nausea.   Refills: 0     oxybutynin 5 MG Tabs  Commonly known as: Ditropan      Take 1 tablet (5 mg total) by mouth 3 (three) times daily.   Refills: 0     QUEtiapine 100 MG Tabs  Commonly known as: SEROquel      Take 2 tablets (200 mg total) by mouth nightly.   Refills: 0     Thera-M Tabs      Take 1 tablet by mouth daily.   Refills: 0     traZODone 50 MG Tabs  Commonly known as: Desyrel      Take 1 tablet (50 mg total) by mouth nightly as  needed for Sleep.   Refills: 0            STOP taking these medications      fluconazole 200 MG Tabs  Commonly known as: Diflucan        magnesium oxide 400 MG Tabs  Commonly known as: Mag-Ox        phenazopyridine 100 MG Tabs  Commonly known as: Pyridium        sodium chloride 0.9% SOLN 100 mL with ertapenem 1 g SOLR 1 g                  [place] prescription monitoring program data reviewed in epic before prescribing narcotics/controlled substances: Yes    Follow up Visits: Follow-up with Macrina Galvin MD in 1 week    Macrina Galvin MD  [address] [place] [address]   [facility] [address]  [phone]    Schedule an appointment as soon as possible for a visit in 1 week(s)      Appointments for Next 30 Days 12/1/2024 - 12/31/2024      None            Physical Exam:  BP (!) 136/94 (BP Location: Left arm)   Pulse 107   Temp 97.9 °F (36.6 °C) (Oral)   Resp 17   Ht 5' (1.524 m)   Wt 182 lb 5.1 oz (82.7 kg)   SpO2 98%   BMI 35.61 kg/m²     General: No acute distress  Respiratory: No wheezes, no rhonchi  Cardiovascular: S1, S2, regular rate and rhythm  Abdomen: Soft, left lower abdominal wall tenderness improving, distended, positive bowel sounds  Neuro: No new focal deficits.   Extremities: No edema    Discharge Condition: stable    Patient Discharge Instructions: See electronic chart      More than 30 minutes on discharge    Fiorella Beatty MD

## 2024-11-30 NOTE — CM/SW NOTE
CM updated by RN patient is ready for discharge to [facility].  CM messaged [facility] admissions in aidin for update.      1630: JASON spoke with Salome at [facility] for update; facility can accept patient today.  Salome to confirm building can provide oxygen therapy at night and call CM back.  RN updated.      Aurea Costa, ZINA Case Manager [phone]

## 2024-12-01 NOTE — PROGRESS NOTES
NURSING DISCHARGE NOTE    Discharged Nursing home via Ambulance.  Accompanied by Support staff  Belongings Taken by patient/family.

## 2024-12-04 NOTE — ED PROVIDER NOTES
Patient Seen in: [facility]      History     Chief Complaint   Patient presents with    Abdomen/Flank Pain     Stated Complaint: ambdominal pain    Subjective:   HPI      Patient is a 54-year-old female presents to ED for evaluation of abdominal pain, constipation.  Patient states she has not had a bowel movement for over a week.  Seen by my partner yesterday and a CT scan of the abdomen pelvis was performed showing large amount of liquid stool throughout the colon but focus of notable narrowing at the rectosigmoid junction uncertain if this reflects a lesion or peristaltic contraction.  Colonoscopy was advised.  Patient denies vomiting.    Objective:     Past Medical History:    Bipolar 1 disorder (HCC)    Dental caries    Diabetes (HCC)    MIKE (generalized anxiety disorder)    High blood pressure    History of diverticulitis of colon    Manipulative behavior    Neurogenic bladder    Obesity (BMI 30-39.9)    Paraplegia ([facility])    Sepsis following intra-abdominal surgery ([facility])    Serotonin syndrome    Sleep apnea    Suprapubic catheter (HCC)    Transverse myelitis (HCC)              Past Surgical History:   Procedure Laterality Date    Arthroscopy of joint unlisted      knee          Cholecystectomy      Part removal colon w end colostomy      2013    Tonsillectomy                  Social History     Socioeconomic History    Marital status: Single   Tobacco Use    Smoking status: Former     Current packs/day: 1.00     Average packs/day: 1 pack/day for 5.0 years (5.0 ttl pk-yrs)     Types: Cigarettes    Tobacco comments:     quit    Vaping Use    Vaping status: Never Used   Substance and Sexual Activity    Alcohol use: No    Drug use: No     Social Drivers of Health     Food Insecurity: No Food Insecurity (2024)    Food Insecurity     Food Insecurity: Never true   Transportation Needs: No Transportation Needs (2024)    Transportation Needs     Lack of Transportation: No   Housing  Stability: Low Risk  (11/19/2024)    Housing Stability     Housing Instability: No   Recent Concern: Housing Stability - High Risk (10/9/2024)    Received from [facility]    Housing Stability     Are you concerned about having a safe and reliable place to live?: Yes                  Physical Exam     ED Triage Vitals [11/18/24 2301]   BP (!) 124/96   Pulse 104   Resp 15   Temp 98.3 °F (36.8 °C)   Temp src Temporal   SpO2 97 %   O2 Device None (Room air)       Current Vitals:   No data recorded    Physical Exam  CONSTITUTIONAL: Well appearing, in no acute distress  LUNGS: Clear to auscultation bilaterally  CARDIOVASCULAR: Regular rate and rhythm, normal S1-S2  ABDOMINAL: Soft, nondistended, mild tenderness across the lower abdomen  SKIN: Warm and dry    ED Course                     MDM    Patient is a 54-year-old female presents to ED for evaluation of constipation, lack of bowel movement.  Differential constipation, bowel lesion such as cancer or stricture.  Patient laboratory test performed showing white blood cell count of 15,000.  Hemoglobin of 11.6.  Sodium 135.  Patient had CT scan yesterday performed showing large amount of stool which I reviewed with potential for lesion, stricture.  Given still with lack of bowel movement, recommend admission to hospital for GI consultation.  I workup and results were discussed with patient. Patient has no other questions, complaints or concerns. Patient will be admitted to the hospital for further workup.  Case discussed with hospitalist.      Admission disposition: 11/19/2024 12:26 AM           [facility]    Disposition and Plan     Clinical Impression:  1. Constipation, unspecified constipation type    2. Abdominal pain, acute         Disposition:  Admit  11/19/2024 12:26 am    Follow-up:  Macrina Galvin MD  [address] [place] [address]   [facility] [address]  [phone]    Schedule an appointment as soon as possible for a visit in 1 week(s)            Medications  Prescribed:  Discharge Medication List as of 11/30/2024  5:50 PM              Supplementary Documentation:         Hospital Problems       Present on Admission  Date Reviewed: 11/17/2024            ICD-10-CM Noted POA    * (Principal) Constipation, unspecified constipation type K59.00 7/9/2024 Yes    Abdominal pain, acute R10.9 11/19/2024 Yes    Benign essential hypertension I10 7/10/2014 Yes    Generalized anxiety disorder with panic attacks F41.1, F41.0 1/9/2024 Yes    Hematoma of rectus sheath S30.1XXA 11/27/2024 Unknown    Mild mixed bipolar I disorder (HCC) F31.61 11/22/2024 Unknown    Severe episode of recurrent major depressive disorder, without psychotic features (HCC) F33.2 1/9/2024 Yes    Transverse myelitis (HCC) G37.3 10/26/2014 Yes    Type 2 diabetes mellitus without complication, with long-term current use of insulin (HCC) E11.9, Z79.4 Unknown Yes

## 2024-12-06 NOTE — CDS QUERY
Clinical Documentation Clarification     Dear Doctor Faye,   Can the relationship between the urinary tract infection (UTI) and the chronic suprapubic catheter be further specified?  ( x ) UTI due to /associated with the chronic suprapubic catheter  (  ) UTI is not due to /associated with the chronic suprapubic catheter  (  ) Other, please specify____________________________________________________________      Clinical Indicators:  >11/19  ER NOTE pt is currently on abx for UTI - She is on day 7 of treatment for urinary tract infection  >11/19 RN note - Suprapubic site reddened, in abdominal fold. Area cleansed with mark  care wipe,  >11/19  GI - transverse myelitis w/ suprapubic catheter, comes in w abd pain   >11/22 Hospitalist - dysuria -previous UA contaminated but Ucx neg.Pt c/o persistent sx's. Repeat UA pos -start IV abx. Await UCx  >11/23 Hospitalist- UTI  -IV abx.  -Ucx enterococcus. -cont IV cipro for now  >Dc summary Dc Dx - enterococcus UTI - Neurogenic bladder  - Patient has chronic mark.    11/18/24 Urine culture no growth 18-24 hours  11/22/24 - Urine culture - 50,000-99,000 CFU/ML Enterococcus faecalis     Risk factors: Neurogenic bladder with chronic suprapubic catheter enterococcus.    Treatment IV antibiotic (Cipro IV 11/22-11/25,Macrobid started 11/25? urine culture- Enterococcus faecalis                     Use of terms such as suspected, possible, or probable (associated with a specific diagnosis that is being evaluated, monitored, or treated as if it exists) are acceptable and can be coded in the inpatient setting, when documented at the time of discharge.   Please add any additional documentation to your progress note and continue to document this through discharge.  If you have any questions, please contact Clinical : Sandra Trinh RN /BSN @ [phone]   Thank You!                                  THIS FORM IS A PERMANENT PART OF THE MEDICAL RECORD

## 2024-12-28 PROCEDURE — 93010 ELECTROCARDIOGRAM REPORT: CPT | Performed by: INTERNAL MEDICINE

## 2024-12-29 PROCEDURE — 93010 ELECTROCARDIOGRAM REPORT: CPT | Performed by: INTERNAL MEDICINE

## 2024-12-31 PROCEDURE — 93306 TTE W/DOPPLER COMPLETE: CPT | Performed by: INTERNAL MEDICINE

## 2025-01-07 ENCOUNTER — APPOINTMENT (OUTPATIENT)
Dept: GENERAL RADIOLOGY | Age: 55
End: 2025-01-07
Attending: EMERGENCY MEDICINE

## 2025-01-07 ENCOUNTER — APPOINTMENT (OUTPATIENT)
Dept: CT IMAGING | Age: 55
End: 2025-01-07
Attending: EMERGENCY MEDICINE

## 2025-01-07 ENCOUNTER — HOSPITAL ENCOUNTER (EMERGENCY)
Age: 55
Discharge: HOME | End: 2025-01-07
Attending: EMERGENCY MEDICINE

## 2025-01-07 VITALS
WEIGHT: 194 LBS | DIASTOLIC BLOOD PRESSURE: 68 MMHG | HEART RATE: 70 BPM | SYSTOLIC BLOOD PRESSURE: 121 MMHG | TEMPERATURE: 98.6 F | OXYGEN SATURATION: 98 % | RESPIRATION RATE: 14 BRPM | BODY MASS INDEX: 38.09 KG/M2 | HEIGHT: 60 IN

## 2025-01-07 DIAGNOSIS — K59.00 CONSTIPATION, UNSPECIFIED CONSTIPATION TYPE: ICD-10-CM

## 2025-01-07 DIAGNOSIS — R07.9 CHEST PAIN, UNSPECIFIED TYPE: Primary | ICD-10-CM

## 2025-01-07 LAB
ALBUMIN SERPL-MCNC: 3 G/DL (ref 3.4–5)
ALBUMIN/GLOB SERPL: 0.8 {RATIO} (ref 1–2.4)
ALP SERPL-CCNC: 73 UNITS/L (ref 45–117)
ALT SERPL-CCNC: 9 UNITS/L
ANION GAP SERPL CALC-SCNC: 14 MMOL/L (ref 7–19)
APPEARANCE UR: CLEAR
AST SERPL-CCNC: 22 UNITS/L
BACTERIA #/AREA URNS HPF: ABNORMAL /HPF
BASOPHILS # BLD: 0 K/MCL (ref 0–0.3)
BASOPHILS NFR BLD: 1 %
BILIRUB SERPL-MCNC: 0.4 MG/DL (ref 0.2–1)
BILIRUB UR QL STRIP: NEGATIVE
BUN SERPL-MCNC: 7 MG/DL (ref 6–20)
BUN/CREAT SERPL: 12 (ref 7–25)
CALCIUM SERPL-MCNC: 9.1 MG/DL (ref 8.4–10.2)
CHLORIDE SERPL-SCNC: 101 MMOL/L (ref 97–110)
CO2 SERPL-SCNC: 26 MMOL/L (ref 21–32)
COLOR UR: COLORLESS
CREAT SERPL-MCNC: 0.6 MG/DL (ref 0.51–0.95)
DEPRECATED RDW RBC: 43.5 FL (ref 39–50)
EGFRCR SERPLBLD CKD-EPI 2021: >90 ML/MIN/{1.73_M2}
EOSINOPHIL # BLD: 0.2 K/MCL (ref 0–0.5)
EOSINOPHIL NFR BLD: 4 %
ERYTHROCYTE [DISTWIDTH] IN BLOOD: 15.9 % (ref 11–15)
FASTING DURATION TIME PATIENT: ABNORMAL H
FLUAV RNA RESP QL NAA+PROBE: NOT DETECTED
FLUBV RNA RESP QL NAA+PROBE: NOT DETECTED
GLOBULIN SER-MCNC: 4 G/DL (ref 2–4)
GLUCOSE SERPL-MCNC: 158 MG/DL (ref 70–99)
GLUCOSE UR STRIP-MCNC: NEGATIVE MG/DL
HCT VFR BLD CALC: 31.3 % (ref 36–46.5)
HGB BLD-MCNC: 9.8 G/DL (ref 12–15.5)
HGB UR QL STRIP: ABNORMAL
HYALINE CASTS #/AREA URNS LPF: ABNORMAL /LPF
IMM GRANULOCYTES # BLD AUTO: 0 K/MCL (ref 0–0.2)
IMM GRANULOCYTES # BLD: 1 %
KETONES UR STRIP-MCNC: NEGATIVE MG/DL
LEUKOCYTE ESTERASE UR QL STRIP: NEGATIVE
LYMPHOCYTES # BLD: 1.6 K/MCL (ref 1–4)
LYMPHOCYTES NFR BLD: 26 %
MAGNESIUM SERPL-MCNC: 1.7 MG/DL (ref 1.7–2.4)
MCH RBC QN AUTO: 23.5 PG (ref 26–34)
MCHC RBC AUTO-ENTMCNC: 31.3 G/DL (ref 32–36.5)
MCV RBC AUTO: 75.1 FL (ref 78–100)
MONOCYTES # BLD: 0.2 K/MCL (ref 0.3–0.9)
MONOCYTES NFR BLD: 4 %
NEUTROPHILS # BLD: 4.2 K/MCL (ref 1.8–7.7)
NEUTROPHILS NFR BLD: 64 %
NITRITE UR QL STRIP: NEGATIVE
NRBC BLD MANUAL-RTO: 0 /100 WBC
PH UR STRIP: 7 [PH] (ref 5–7)
PLATELET # BLD AUTO: 249 K/MCL (ref 140–450)
POTASSIUM SERPL-SCNC: 4.4 MMOL/L (ref 3.4–5.1)
PROT SERPL-MCNC: 7 G/DL (ref 6.4–8.2)
PROT UR STRIP-MCNC: NEGATIVE MG/DL
RBC # BLD: 4.17 MIL/MCL (ref 4–5.2)
RBC #/AREA URNS HPF: ABNORMAL /HPF
RSV AG NPH QL IA.RAPID: NOT DETECTED
SARS-COV-2 RNA RESP QL NAA+PROBE: NOT DETECTED
SERVICE CMNT-IMP: NORMAL
SERVICE CMNT-IMP: NORMAL
SODIUM SERPL-SCNC: 137 MMOL/L (ref 135–145)
SP GR UR STRIP: <1.005 (ref 1–1.03)
SQUAMOUS #/AREA URNS HPF: ABNORMAL /HPF
TROPONIN I SERPL DL<=0.01 NG/ML-MCNC: <4 NG/L
UROBILINOGEN UR STRIP-MCNC: 0.2 MG/DL
WBC # BLD: 6.3 K/MCL (ref 4.2–11)
WBC #/AREA URNS HPF: ABNORMAL /HPF

## 2025-01-07 PROCEDURE — 10002800 HB RX 250 W HCPCS: Performed by: EMERGENCY MEDICINE

## 2025-01-07 PROCEDURE — 99285 EMERGENCY DEPT VISIT HI MDM: CPT

## 2025-01-07 PROCEDURE — 93005 ELECTROCARDIOGRAM TRACING: CPT | Performed by: EMERGENCY MEDICINE

## 2025-01-07 PROCEDURE — 81001 URINALYSIS AUTO W/SCOPE: CPT | Performed by: EMERGENCY MEDICINE

## 2025-01-07 PROCEDURE — 83735 ASSAY OF MAGNESIUM: CPT | Performed by: EMERGENCY MEDICINE

## 2025-01-07 PROCEDURE — 74176 CT ABD & PELVIS W/O CONTRAST: CPT

## 2025-01-07 PROCEDURE — 84484 ASSAY OF TROPONIN QUANT: CPT | Performed by: EMERGENCY MEDICINE

## 2025-01-07 PROCEDURE — 0241U COVID/FLU/RSV PANEL: CPT | Performed by: EMERGENCY MEDICINE

## 2025-01-07 PROCEDURE — 10002803 HB RX 637: Performed by: EMERGENCY MEDICINE

## 2025-01-07 PROCEDURE — 10002801 HB RX 250 W/O HCPCS: Performed by: EMERGENCY MEDICINE

## 2025-01-07 PROCEDURE — 93010 ELECTROCARDIOGRAM REPORT: CPT | Performed by: INTERNAL MEDICINE

## 2025-01-07 PROCEDURE — 71045 X-RAY EXAM CHEST 1 VIEW: CPT

## 2025-01-07 PROCEDURE — 85025 COMPLETE CBC W/AUTO DIFF WBC: CPT | Performed by: EMERGENCY MEDICINE

## 2025-01-07 PROCEDURE — 80053 COMPREHEN METABOLIC PANEL: CPT | Performed by: EMERGENCY MEDICINE

## 2025-01-07 PROCEDURE — 96374 THER/PROPH/DIAG INJ IV PUSH: CPT

## 2025-01-07 RX ORDER — ONDANSETRON 2 MG/ML
4 INJECTION INTRAMUSCULAR; INTRAVENOUS ONCE
Status: COMPLETED | OUTPATIENT
Start: 2025-01-07 | End: 2025-01-07

## 2025-01-07 RX ORDER — MECLIZINE HCL 12.5 MG 12.5 MG/1
25 TABLET ORAL ONCE
Status: COMPLETED | OUTPATIENT
Start: 2025-01-07 | End: 2025-01-07

## 2025-01-07 RX ORDER — FAMOTIDINE 20 MG/1
20 TABLET, FILM COATED ORAL ONCE
Status: COMPLETED | OUTPATIENT
Start: 2025-01-07 | End: 2025-01-07

## 2025-01-07 RX ADMIN — Medication 15 ML: at 16:37

## 2025-01-07 RX ADMIN — FAMOTIDINE 20 MG: 20 TABLET ORAL at 16:37

## 2025-01-07 RX ADMIN — ONDANSETRON 4 MG: 2 INJECTION, SOLUTION INTRAMUSCULAR; INTRAVENOUS at 16:38

## 2025-01-07 RX ADMIN — MECLIZINE HCL 12.5 MG 25 MG: 12.5 TABLET ORAL at 16:37

## 2025-01-07 ASSESSMENT — PAIN SCALES - GENERAL: PAINLEVEL_OUTOF10: 10

## 2025-01-08 LAB
ATRIAL RATE (BPM): 73
P AXIS (DEGREES): 31
PR-INTERVAL (MSEC): 170
QRS-INTERVAL (MSEC): 72
QT-INTERVAL (MSEC): 414
QTC: 456
R AXIS (DEGREES): -26
RAINBOW EXTRA TUBES HOLD SPECIMEN: NORMAL
RAINBOW EXTRA TUBES HOLD SPECIMEN: NORMAL
REPORT TEXT: NORMAL
T AXIS (DEGREES): 46
VENTRICULAR RATE EKG/MIN (BPM): 73

## (undated) DEVICE — 10FT COMBINED O2 DELIVERY/CO2 MONITORING. FILTER WITH MICROSTREAM TYPE LUER: Brand: DUAL ADULT NASAL CANNULA

## (undated) DEVICE — V2 SPECIMEN COLLECTION MANIFOLD KIT: Brand: NEPTUNE

## (undated) DEVICE — KIT CUSTOM ENDOPROCEDURE STERIS

## (undated) DEVICE — 3M™ RED DOT™ MONITORING ELECTRODE WITH FOAM TAPE AND STICKY GEL, 50/BAG, 20/CASE, 72/PLT 2570: Brand: RED DOT™

## (undated) DEVICE — 1200CC GUARDIAN II: Brand: GUARDIAN

## (undated) DEVICE — KIT VLV 5 PC AIR H2O SUCT BX ENDOGATOR CONN

## (undated) NOTE — IP AVS SNAPSHOT
Patient Demographics     Address  [address] [address] Phone  [phone] (Home) *Preferred*  [phone] (Mobile) E-mail Address  [email or web address]      Patient Contacts     Name Relation Home Work Mobile    Michelet Rosa Father [phone]      Flor Rosa Mother [phone]        Allergies as of 1/11/2024  Review status set to Review Complete on 1/7/2024       Noted Reaction Type Reactions    Pcn [penicillins] 10/24/2014        Radiology Contrast Iodinated Dyes 10/24/2014        Sulfa Antibiotics 10/24/2014          Code Status Information     Code Status    Full Code        Patient Instructions       Please have patient follow up with Infectious Disease MD, Urologist, and Endocrinologist at [facility] Meds List      TAKE these medications       Instructions Authorizing Provider Morning Afternoon Evening As Needed   acidophilus-pectin Caps  Commonly known as: Probiotic  Next dose due: Continue home regimen       Take 1 capsule by mouth 2 (two) times daily.          albuterol 108 (90 Base) MCG/ACT Aers  Commonly known as: Ventolin HFA      Inhale 2 puffs into the lungs every 6 (six) hours as needed for Wheezing or Shortness of Breath.          atorvastatin 40 MG Tabs  Commonly known as: Lipitor  Next dose due: Tonight       Take 1 tablet (40 mg total) by mouth nightly.          Baqsimi One Pack 3 MG/DOSE nasal powder  Generic drug: glucagon      3 mg by Nasal route once as needed for Low blood glucose.          bethanechol 10 MG Tabs  Commonly known as: Urecholine  Next dose due: This evening      Take 1 tablet (10 mg total) by mouth 3 (three) times daily.          buPROPion  MG Tb24  Commonly known as: Wellbutrin XL  Next dose due: Tomorrow morning      Take 1 tablet (300 mg total) by mouth daily.          cefdinir 300 MG Caps  Commonly known as: Omnicef  Next dose due: Tomorrow       Take 1 capsule (300 mg total) by mouth 2 (two) times  daily for 5 days.  Stop taking on: January 15, 2024   Francine Cervantes         clonazePAM 0.5 MG Tabs  Commonly known as: KlonoPIN  Next dose due: Tonight       Take 1 tablet (0.5 mg total) by mouth 2 (two) times daily.   Nelidaamy Escobar         cyclobenzaprine 10 MG Tabs  Commonly known as: Flexeril      Take 1 tablet (10 mg total) by mouth 3 (three) times daily as needed for Muscle spasms.          divalproex  MG Tb24  Commonly known as: Depakote ER  Next dose due: Tonight       Take 1 tablet (500 mg total) by mouth 2 (two) times daily.          DULoxetine 30 MG Cpep  Commonly known as: Cymbalta  Start taking on: January 12, 2024  Next dose due: Tomorrow morning      Take 1 capsule (30 mg total) by mouth daily.   Nelida Escobar         ergocalciferol 1.25 MG (91822 UT) Caps  Commonly known as: Vitamin D2  Next dose due: Continue home regimen       Take 1 capsule (50,000 Units total) by mouth every 7 days. Every monday          Gabapentin 50 MG Tabs  Next dose due: This evening      Take 300 mg by mouth 3 (three) times daily.   Nelida Escobar         Icosapent Ethyl 1 g Caps  Next dose due: Continue home regimen       Take 1 capsule by mouth 4 (four) times daily.          insulin aspart 100 UNIT/ML Soct  Commonly known as: NovoLOG      SS: 150-200 2units, 201-250 4 units, 251-300 6 units, 301-350 8 units, 351-400 10 units, over 401 call md Matheus Tena         insulin aspart 100 Units/mL Sopn  Commonly known as: NovoLOG  Next dose due: This evening      Inject 24 Units into the skin 3 (three) times daily with meals.   MOOKIE COLLAZO         insulin glargine 100 UNIT/ML Soln  Commonly known as: Lantus  Next dose due: Tonight      Inject 56 Units into the skin nightly.          insulin glargine 100 UNIT/ML Soln  Commonly known as: Lantus  Next dose due: Tomorrow morning      Inject 20 Units into the skin every morning.          loratadine 10 MG Tabs  Commonly known as: Claritin  Next dose due:  Continue home regimen       Take 1 tablet (10 mg total) by mouth daily.          Melatonin 3 MG Caps  Next dose due: Continue home regimen       Take 1 capsule (3 mg total) by mouth at bedtime.          metFORMIN  MG Tb24  Commonly known as: Glucophage XR  Next dose due: Continue home regimen       Take 1 tablet (750 mg total) by mouth daily with breakfast.   Matheus Darinel         methenamine 1 g Tabs  Commonly known as: Hiprex  Next dose due: Continue home regimen       Take 0.5 tablets (0.5 g total) by mouth 2 (two) times daily with meals.          montelukast 10 MG Tabs  Commonly known as: Singulair  Next dose due: Continue home regimen       Take 1 tablet (10 mg total) by mouth daily.          Nystatin 165476 UNIT/GM Powd      Apply 1 Application topically as needed for Dry Skin. Under breast and groin area          ondansetron 4 MG Tbdp  Commonly known as: Zofran-ODT      Take 2 tablets (8 mg total) by mouth every 8 (eight) hours as needed for Nausea.          oxybutynin ER 5 MG Tb24  Commonly known as: Ditropan-XL  Next dose due: Tomorrow morning      Take 1 tablet (5 mg total) by mouth daily.          phenazopyridine 200 MG Tabs  Commonly known as: Pyridum  Next dose due: Tonight       Take 1 tablet (200 mg total) by mouth 3 (three) times daily.          psyllium 28 % Pack  Commonly known as: Metamucil      Take 1 packet by mouth every 8 (eight) hours as needed (constipation).          QUEtiapine 200 MG Tabs  Commonly known as: SEROquel  Next dose due: Tonight       Take 1 tablet (200 mg total) by mouth 2 (two) times daily.   Nelida Escobar         rivaroxaban 20 MG Tabs  Commonly known as: Xarelto  Next dose due: This evening      Take 1 tablet (20 mg total) by mouth daily.   Angelo Chris         sennosides 8.6 MG Tabs  Commonly known as: Senokot  Next dose due: Continue home regimen       Take 2 tablets (17.2 mg total) by mouth 2 (two) times daily.          Thera-M Tabs  Next dose due: Continue home  regimen       Take 1 tablet by mouth daily.          traZODone 100 MG Tabs  Commonly known as: Desyrel      Take 1 tablet (100 mg total) by mouth every 12 (twelve) hours as needed for Sleep.  Stop taking on: January 19, 2024          acetaminophen 325 MG Tabs  Commonly known as: Tylenol      Take 2 tablets (650 mg total) by mouth every 6 (six) hours as needed for Pain.          Tylenol Extra Strength 500 MG Tabs  Generic drug: acetaminophen      Take 325 mg by mouth every 4 (four) hours as needed for Pain or Fever.   Matheus Tena               Where to Get Your Medications      These medications were sent to [place], [place] - [address] [place] [address] [phone], [phone]  [address] [place] [address]    Phone: [phone]   cefdinir 300 MG Caps     Please  your prescriptions at the location directed by your doctor or nurse    Bring a paper prescription for each of these medications  clonazePAM 0.5 MG Tabs           [id number] - MAR ACTION REPORT  (last 48 hrs)    ** SITE UNKNOWN **     Order ID Medication Name Action Time Action Reason Comments    [id number] ARIPiprazole (Abilify) tab 2 mg 01/10/24 0911 Given      [id number] ARIPiprazole (Abilify) tab 2 mg 01/11/24 1155 Given      [id number] QUEtiapine (SEROquel) tab 200 mg 01/10/24 0907 Given      [id number] QUEtiapine (SEROquel) tab 200 mg 01/10/24 2007 Given      [id number] QUEtiapine (SEROquel) tab 200 mg 01/11/24 0800 Given      [id number] atorvastatin (Lipitor) tab 40 mg 01/10/24 2007 Given      [id number] bethanechol (Urecholine) tab 10 mg 01/10/24 0907 Given      [id number] bethanechol (Urecholine) tab 10 mg 01/10/24 1854 Given      [id number] bethanechol (Urecholine) tab 10 mg 01/10/24 2006 Given      [id number] bethanechol (Urecholine) tab 10 mg 01/11/24 0802 Given      [id number] bethanechol (Urecholine) tab 10 mg 01/11/24 1727 Given      [id number] ceFEPIme (Maxpime) 2 g in sodium chloride 0.9% 100  mL IVPB-MBP 01/10/24 0046 New Bag      [id number] ceFEPIme (Maxpime) 2 g in sodium chloride 0.9% 100 mL IVPB-MBP 01/10/24 1202 New Bag      [id number] ceFEPIme (Maxpime) 2 g in sodium chloride 0.9% 100 mL IVPB-MBP 01/11/24 0101 New Bag      [id number] ceFEPIme (Maxpime) 2 g in sodium chloride 0.9% 100 mL IVPB-MBP 01/11/24 1209 New Bag      [id number] cetirizine (ZyrTEC) tab 10 mg 01/10/24 0907 Given      [id number] cetirizine (ZyrTEC) tab 10 mg 01/11/24 0800 Given      [id number] clotrimazole (Lotrimin) 1 % cream 01/09/24 2200 Given      [id number] clotrimazole (Lotrimin) 1 % cream 01/10/24 0908 Given      [id number] clotrimazole (Lotrimin) 1 % cream 01/10/24 2008 Given      [id number] clotrimazole (Lotrimin) 1 % cream 01/11/24 0806 Given      [id number] cyanocobalamin (Vitamin B12) tab 1,000 mcg 01/10/24 0907 Given      [id number] cyanocobalamin (Vitamin B12) tab 1,000 mcg 01/11/24 0800 Given      [id number] divalproex ER (Depakote ER) 24 hr tab 500 mg 01/10/24 0909 Given      [id number] divalproex ER (Depakote ER) 24 hr tab 500 mg 01/10/24 2006 Given      [id number] divalproex ER (Depakote ER) 24 hr tab 500 mg 01/11/24 0800 Given      [id number] gabapentin (Neurontin) tab 300 mg 01/11/24 1728 Given      [id number] guaiFENesin ER (Mucinex) 12 hr tab 600 mg 01/10/24 0909 Given      [id number] guaiFENesin ER (Mucinex) 12 hr tab 600 mg 01/10/24 2007 Given      [id number] guaiFENesin ER (Mucinex) 12 hr tab 600 mg 01/11/24 0801 Given      [id number] ondansetron (Zofran) 4 MG/2ML injection 4 mg 01/11/24 1419 Given      [id number] oxybutynin ER (Ditropan-XL) 24 hr tab 5 mg 01/10/24 0907 Given      [id number] oxybutynin ER (Ditropan-XL) 24 hr tab 5 mg 01/11/24 0802 Given      [id number] phenazopyridine (Pyridium) tab 100 mg 01/10/24 1854 Given      [id number] phenazopyridine (Pyridium) tab 100 mg 01/10/24 2156 Given      [id number] phenazopyridine (Pyridium) tab 100 mg 01/11/24 0800 Given      [id number] phenazopyridine (Pyridium) tab 100 mg  01/11/24 1155 Given      [id number] phenazopyridine (Pyridium) tab 100 mg 01/11/24 1727 Given      [id number] prochlorperazine (Compazine) 10 MG/2ML injection 5 mg 01/09/24 2123 Given      [id number] prochlorperazine (Compazine) 10 MG/2ML injection 5 mg 01/11/24 0814 Given      [id number] prochlorperazine (Compazine) 10 MG/2ML injection 5 mg 01/11/24 1724 Given      [id number] psyllium (Metamucil) 28 % oral packet 1 packet 01/10/24 0909 Given      [id number] psyllium (Metamucil) 28 % oral packet 1 packet 01/11/24 0749 Given      [id number] rivaroxaban (Xarelto) tab 20 mg 01/10/24 1854 Given      [id number] rivaroxaban (Xarelto) tab 20 mg 01/11/24 1727 Given      [id number] sodium chloride 0.9 % IV bolus 1,000 mL 01/10/24 1201 New Bag      [id number] traMADol (Ultram) tab 50 mg 01/10/24 1854 Given      [id number] traMADol (Ultram) tab 50 mg 01/11/24 0101 Given      [id number] traMADol (Ultram) tab 50 mg 01/11/24 0659 Given      [id number] traMADol (Ultram) tab 50 mg 01/11/24 1309 Given      [id number] vancomycin (Vancocin) cap 125 mg 01/10/24 0907 Given      [id number] vancomycin (Vancocin) cap 125 mg 01/11/24 0802 Given            LEFT UPPER ARM     Order ID Medication Name Action Time Action Reason Comments    [id number] insulin aspart (NovoLOG) 100 Units/mL FlexPen 1-11 Units 01/11/24 1211 Given      [id number] insulin aspart (NovoLOG) 100 Units/mL FlexPen 20 Units 01/11/24 1211 Given      [id number] insulin detemir (Levemir) 100 UNIT/ML FlexPen/FlexTouch 25 Units 01/11/24 0804 Given            RIGHT LOWER ABDOMEN     Order ID Medication Name Action Time Action Reason Comments    [id number] insulin aspart (NovoLOG) 100 Units/mL FlexPen 1-11 Units 01/10/24 0820 Given      [id number] insulin aspart (NovoLOG) 100 Units/mL FlexPen 1-11 Units 01/10/24 1203 Given      [id number] insulin aspart (NovoLOG) 100 Units/mL FlexPen 1-11 Units 01/10/24 1828 Given      [id number] insulin aspart (NovoLOG) 100 Units/mL FlexPen 1-11  Units 01/11/24 0750 Given      [id number] insulin aspart (NovoLOG) 100 Units/mL FlexPen 20 Units 01/10/24 0820 Given      [id number] insulin aspart (NovoLOG) 100 Units/mL FlexPen 20 Units 01/10/24 1828 Given      [id number] insulin aspart (NovoLOG) 100 Units/mL FlexPen 20 Units 01/11/24 0750 Given      [id number] insulin detemir (Levemir) 100 UNIT/ML FlexPen/FlexTouch 25 Units 01/10/24 0820 Given      [id number] insulin detemir (Levemir) 100 UNIT/ML FlexPen/FlexTouch 60 Units 01/09/24 2248 Given      [id number] insulin detemir (Levemir) 100 UNIT/ML FlexPen/FlexTouch 60 Units 01/10/24 2013 Given            RIGHT UPPER ARM     Order ID Medication Name Action Time Action Reason Comments    [id number] SUMAtriptan (Imitrex) 6 MG/0.5ML SUBQ injection 6 mg 01/10/24 1447 Given      [id number] insulin aspart (NovoLOG) 100 Units/mL FlexPen 1-11 Units 01/11/24 1726 Given      [id number] insulin aspart (NovoLOG) 100 Units/mL FlexPen 20 Units 01/11/24 1725 Given              Recent Vital Signs    Flowsheet Row Most Recent Value   /85 Filed at 01/11/2024 1715   Pulse 108 Filed at 01/11/2024 1715   Resp 18 Filed at 01/11/2024 1715   Temp 98 °F (36.7 °C) Filed at 01/11/2024 1715   SpO2 97 % Filed at 01/11/2024 1715      Patient's Most Recent Weight    Flowsheet Row Most Recent Value   Patient Weight 95.9 kg (211 lb 8 oz)      CPAP Settings (Inpatient)    Flowsheet Row Most Recent Value   Mode CPAP   Interface Prongs-short   Set CPAP 5  [per PT  comfort]   Flow rate 2 lpm         Lab Results Last 24 Hours      Hemoglobin A1C [[id number]] (Abnormal)  Resulted: 01/11/24 1407, Result status: Final result   Ordering provider: Nelida Escobar MD  01/11/24 1230 Resulting lab: [facility] ([facility])    Specimen Information    Type Source Collected On   Blood — 01/10/24 1138          Components    Component Value Reference Range Flag Lab   HgbA1C 6.6 <5.7 % H [place] [facility] ([facility])   Comment:         Normal HbA1C:      <5.7%      Pre-Diabetic:     5.7 - 6.4%      Diabetic:         >6.4%      Diabetic Control: <7.0%       Estimated Average Glucose 143 68 - 126 mg/dL H [facility])   Comment:        eAG is the estimated average glucose calculated from Hgb A1c according to the formula recommended by the American Diabetes Association. eAG levels reflect the long term average glucose and may not correlate with random or fasting glucose levels since these represent specific points in time.                   Urinalysis with Culture Reflex [[id number]] (Abnormal)  Resulted: 01/10/24 1928, Result status: Final result   Ordering provider: Francine Cervantes DO  01/10/24 1648 Resulting lab: [facility] ([facility])    Specimen Information    Type Source Collected On   Urine Urine, suprapubic 01/10/24 1902          Components    Component Value Reference Range Flag Lab   Urine Color Colorless Yellow A [facility])   Clarity Urine Clear Clear — [facility])   Spec Gravity 1.005 1.005 - 1.030 — [facility])   Glucose Urine 300 Normal mg/dL A [facility])   Bilirubin Urine Negative Negative — [facility])   Ketones Urine Negative Negative mg/dL — [facility]   Blood Urine Trace Negative A [facility])   pH Urine 6.5 5.0 - 8.0 — [facility])   Protein Urine Negative Negative mg/dL — [facility])   Urobilinogen Urine Normal Normal — [facility])   Nitrite Urine Negative Negative — [facility])   Leukocyte Esterase Urine 25 Negative A [facility])   Comment:          Eva/uL Interpretation  25          Trace  75          1+  250         2+  500         3+   WBC Urine 1-5 0 - 5 /HPF — [facility]   RBC Urine 0-2 0 - 2 /HPF — [facility])   Bacteria Urine Rare None Seen /HPF A [facility])   Squamous Epi. Cells None Seen None Seen /HPF — [facility])   Renal Tubular Epithelial Cells None Seen None Seen /HPF — [facility]  ([facility])   Transitional Cells None Seen None Seen /HPF — [facility] ([facility])   Yeast Urine None Seen None Seen /HPF — [facility] ([facility])            Testing Performed By     Lab - Abbreviation Name Director Address Valid Date Range    162 - [facility] ([facility]) [facility] ([facility]) Ender Randle [facility] [id number] 03/19/20 1442 - Present            Microbiology Results (All)     Procedure Component Value Units Date/Time    Urine Culture, Routine [[id number]] Collected: 01/10/24 1902    Order Status: Completed Lab Status: Preliminary result Updated: 01/11/24 1258    Specimen: Urine, suprapubic      Urine Culture No Growth at <18 hours    Blood Culture [[id number]] Collected: 01/06/24 2133    Order Status: Completed Lab Status: Preliminary result Updated: 01/10/24 2301    Specimen: Blood,peripheral      Blood Culture Result No Growth 4 Days    Blood Culture [[id number]] Collected: 01/06/24 2124    Order Status: Completed Lab Status: Preliminary result Updated: 01/10/24 2301    Specimen: Blood,peripheral      Blood Culture Result No Growth 4 Days    Urine Culture, Routine [[id number]]  (Abnormal)  (Susceptibility) Collected: 01/06/24 2017    Order Status: Completed Lab Status: Final result Updated: 01/08/24 0446    Specimen: Urine, clean catch      Urine Culture >100,000 CFU/ML Providencia stuartii    Susceptibility      Providencia stuartii      Not Specified     Amikacin  Resistant     Ampicillin 16  Intermediate     Cefazolin >=64  Resistant     Cefepime <=1  Sensitive     Ceftriaxone <=1  Sensitive     Ciprofloxacin >=4  Resistant     Gentamicin  Resistant     Levofloxacin 4  Intermediate     Meropenem <=0.25  Sensitive     Nitrofurantoin 256  Resistant     Piperacillin + Tazobactam <=4  Sensitive     Tobramycin  Resistant     Trimethoprim/Sulfa <=20  Sensitive                          SARS-CoV-2/Flu A and B/RSV by PCR (GeneXpert) [[id number]]  (Normal) Collected: 01/06/24  1933    Order Status: Completed Lab Status: Final result Updated: 01/06/24 2015    Specimen: Other from Nares      SARS-CoV-2 (COVID-19) - (GeneXpert) Not Detected     Influenza A by PCR Negative     Influenza B by PCR Negative     RSV by PCR Negative    Narrative:      This test is intended for the qualitative detection and differentiation of SARS-CoV-2, influenza A, influenza B, and respiratory syncytial virus (RSV) viral RNA in nasopharyngeal or nares swabs from individuals suspected of respiratory viral infection consistent with COVID-19 by their healthcare provider. Signs and symptoms of respiratory viral infection due to SARS-CoV-2, influenza, and RSV can be similar.    Test performed using the Xpert Xpress SARS-CoV-2/FLU/RSV (real time RT-PCR)  assay on the [employer] instrument, [employer], [place] [address].   This test is being used under the Food and Drug Administration's Emergency Use Authorization.    The authorized Fact Sheet for Healthcare Providers for this assay is available upon request from the laboratory.      Pending Labs     Order Current Status    Blood Culture Preliminary result    Blood Culture Preliminary result    Urine Culture, Routine Preliminary result         H&P - H&P Note      H&P signed by Zeus Rodriguez MD at 1/7/2024  5:03 PM  Version 1 of 1    Author: Zeus Rodriguez MD Service: Hospitalist Author Type: Physician    Filed: 1/7/2024  5:03 PM Date of Service: 1/7/2024 10:36 AM Status: Signed    : Zeus Rodriguez MD (Physician)       [facility]   INTERNAL MEDICINE HISTORY & PHYSICAL      CHIEF COMPLAINT   UTI    HISTORY OF THE PRESENT ILLNESS    Clari Rosa - 53 year old female presenting with above CC.    History obtained from patient w/ additional history from review of outside records in care everywhere.    LTC resident that recently moved to the area    Primarily came in because she thinks she has UTI. States this is her 20th admission for sepsis & she felt septic. Has had  multiple pseudomonas infections in past. Has chronic suprapubic mark, can't tell me when it was changed last. Doesn;t have urologist in area.Has needed zosyn for infections in past    Also with months long sinus infection, has taken multiple abx courses to no avail.    Pain all over, catheter hurts, head hurts - chronic, morphine doesn't usually help    Athletes foot - needs cream    Anxiety - takes klonopin 1mg 4x daily - wants to see psychiatry    In ED, afebrile, , RR 22, lactate 3.7 -> 3.8. CMP w elev glucose. CBC stable. Cov/flu neg. CXR w/o acute process. CT A/P w perivesical stranding. Started on ceftriaxone, admitted for further care.      REVIEW OF SYSTEMS   Remainder of 12-point ROS negative unless discussed in HPI.     PATIENT HISTORIES  PMHx:  She has a past medical history of Bipolar 1 disorder ([facility]), Dental caries (10/26/2014), Diabetes ([facility]), MIKE (generalized anxiety disorder), High blood pressure, History of diverticulitis of colon (10/26/2014), Manipulative behavior (10/26/2014), Neurogenic bladder, Obesity (BMI 30-39.9) (10/26/2014), Paraplegia ([facility]), Sepsis following intra-abdominal surgery ([facility]) (10/26/2014), Serotonin syndrome, Sleep apnea, Suprapubic catheter ([facility]), and Transverse myelitis ([facility]).  PSHx:  She has a past surgical history that includes part removal colon w end colostomy; tonsillectomy; arthroscopy of joint unlisted; cholecystectomy; and .   FHx:  Her family history is not on file. She was adopted.   SHx: She reports that she has quit smoking. She has a 5.00 pack-year smoking history. She does not have any smokeless tobacco history on file. She reports that she does not drink alcohol and does not use drugs.    Allergies: She is allergic to pcn [penicillins], radiology contrast iodinated dyes, and sulfa antibiotics.     Current Outpatient Medications   Medication Instructions    acetaminophen (TYLENOL EXTRA STRENGTH) 325 mg, Oral, Every 4 hours PRN    acetaminophen  (TYLENOL) 650 mg, Oral, Every 6 hours PRN    Acidophilus/Pectin Oral Cap 1 capsule, Oral, 2 times daily    albuterol 108 (90 Base) MCG/ACT Inhalation Aero Soln 2 puffs, Inhalation, Every 6 hours PRN    atorvastatin (LIPITOR) 40 mg, Oral, Nightly    Baqsimi One Pack 3 mg, Nasal, Once as needed    bethanechol (URECHOLINE) 10 mg, Oral, 3 times daily    buPROPion ER (WELLBUTRIN XL) 300 mg, Oral, Daily    clonazePAM (KLONOPIN) 1 mg, Oral, 4 times daily    cyclobenzaprine (FLEXERIL) 10 mg, Oral, 3 times daily PRN    divalproex ER (DEPAKOTE ER) 500 mg, Oral, 2 times daily    ergocalciferol (VITAMIN D2) 50,000 Units, Oral, Every 7 days, Every monday    gabapentin (NEURONTIN) 600 mg, Oral, 3 times daily    Icosapent Ethyl 1 g Oral Cap 1 capsule, Oral, 4 times daily    insulin aspart (NOVOLOG) 24 Units, Subcutaneous, 3 times daily with meals    Insulin Aspart 100 UNIT/ML Subcutaneous Solution Cartridge SS: 150-200 2units, 201-250 4 units, 251-300 6 units, 301-350 8 units, 351-400 10 units, over 401 call md    insulin glargine (LANTUS) 56 Units, Subcutaneous, Nightly    insulin glargine (LANTUS) 20 Units, Subcutaneous, Every morning    levoFLOXacin (LEVAQUIN) 250 mg, Oral, Daily    loratadine (CLARITIN) 10 mg, Oral, Daily    Melatonin 3 mg, Oral, Nightly    metFORMIN ER (GLUCOPHAGE XR) 750 mg, Oral, Daily with breakfast    methenamine (HIPREX) 0.5 g, Oral, 2 times daily with meals    montelukast (SINGULAIR) 10 mg, Oral, Daily    Multiple Vitamins-Minerals (THERA-M) Oral Tab 1 tablet, Oral, Daily    Nystatin [id number] UNIT/GM External Powder 1 Application, Topical, As needed, Under breast and groin area<BR>    ondansetron (ZOFRAN-ODT) 8 mg, Oral, Every 8 hours PRN    oxybutynin ER (DITROPAN-XL) 5 mg, Oral, Daily    phenazopyridine (PYRIDUM) 200 mg, Oral, 3 times daily    psyllium 28 % Oral Powd Pack 1 packet, Oral, Every 8 hours PRN    QUEtiapine (SEROQUEL) 300 mg, Oral, 2 times daily    rivaroxaban (XARELTO) 20 mg, Oral, Every 24  hours    sennosides (SENOKOT) 17.2 mg, Oral, 2 times daily    traZODone (DESYREL) 100 mg, Oral, Every 12 hours PRN       PHYSICAL EXAMINATION   Vitals: /81 (BP Location: Right arm)   Pulse 104   Temp 98.2 °F (36.8 °C) (Oral)   Resp 20   Wt 219 lb 11.2 oz (99.7 kg)   SpO2 95%   BMI 42.91 kg/m²   Gen: NAD, morbidly obese, appears chronically ill, verbose  Eyes: PERRLA, normal conjunctivae  ENMT: Moist mucous membranes, trachea midline, no pharyngeal erythema, no thyromegaly  CV: RRR, no M/R/G, no peripheral edema  Resp: CTAB, non-labored respirations, symmetric expansion  GI: Soft, NT, ND, + BS, no masses, no HSM  MSK:  No C/C/E, normal active/passive ROM in C-spine, 5/5 strength in all extremities  Skin: No rashes, visible skin w/o worrisome lesions   Neuro: CN 2-12 grossly intact, sensation intact   Psych: A&Ox3, appropriate mood, conversant w/ linear thought process     LABORATORY VALUES   Recent Labs   Lab 01/06/24 2018      K 4.6   CL 99   CO2 27.0   BUN 15   CREATSERUM 1.05*   ANIONGAP 11   *   CA 9.4   TP 7.1   ALB 4.0   AST 35*   ALT 20   ALKPHO 91   BILT 0.4   EGFRCR 64     Recent Labs   Lab 01/06/24 2018   WBC 9.0   HGB 11.3*   HCT 36.7   .0   MCV 90.4   MOPERCENT 4.8   EOPERCENT 2.8   BAPERCENT 0.9     ASSESSMENT & PLAN   Clari Rosa - 53 year old female, LTC resident w neurogenic bladder & chronic suprapubic catheter w recurring pseudomonas UTIs, bipolar d/o MIKE, benzo dependence, DM2, ANAID, [place]  admitted 1/6/2024 for recurring UTI    Neurogenic bladder  Indwelling suprapubic catheter  Presumed catheter assoc UTI  H/o recurring pseudomonas UTIs  - Afebrile, not tachycardic, no leukocytosis  - Ua w poss infection  [  ] F/u cx  - Cefepime ( 1/7 - )  - Not established w urology in area. Needs catheter exchanged. Urology consulted, d/w Dr. Golden. 1 time IVP dilaudid for catheter exchange  [  ] Refusing catheter exchange today. PRN dilaudid for tomorrow    Hx. C.diff  - PO vanc  prophy dose for now while on abx for UTI    Chronic sinusitis  - Ongoing for months - terrible headaches  [  ] CT sinus  - abx as above  - Tramadol for headaches    H/o pulmonary embolus  - Continue PTA xarelto      Bipolar  MIKE  Benzo dependence  - Continue PTA meds  - Klonopin  - Pt requesting psych consult     ANAID  - CPAP    Morbid obesity w residential  Chronic hunger  - Body mass index is 42.91 kg/m².  - Healthy diet & wt loss encouraged   - RD consult for diet strategies  - Consider OP endo eval (grehlin/leptin levels checked etc)    Ppx: PTA meds  Dispo  EDoD: Tentative plan for DC back to LTC facility pending UTI tx. Suspect ~      F/u:   - PCP: rBian Bob MD  - Urology  - Psych  - Endo    Available via bubble chat or perfect serve 7A - 7P.     Zeus Rodriguez MD  Internal Medicine - [facility]      Electronically signed by Zeus Rodriguez MD on 2024  5:03 PM              Consults - MD Consult Notes      Consults signed by Víctor Qureshi MD at 2024  9:23 PM     Author: Víctor Qureshi MD Service: Psychiatry Author Type: Physician    Filed: 2024  9:23 PM Date of Service: 2024  1:22 PM Status: Signed    : Víctor Qureshi MD (Physician)     Consult Orders    1. Inpatient consult to PsychIATRY [[id number]] ordered by Zeus Rodriguez MD at 24 [facility]             [facility]    Report of Consultation    Clari Rosa Patient Status:  Inpatient    1970 MRN [id number]   Location [facility] 5SW/SE Attending Zeus Rodriguez MD   Hosp Day # 2 PCP Brian Bob MD     Date of Admission:  2024  Date of Consult: 2024  Reason for Consultation:   Patient presented with anxiety and depression, Zeus Soares MD requested psychiatric consult for evaluation and advice.    Consult Duration     The patient seen for initial psychiatric consult evaluation.   Record reviewed, communication with attending, communication with RN and patient seen face to  face evaluation.    History of Present Illness:   Patient is a 53 year old , single, female with medical history of DM, Bipolar 1 disorder, Depression, MIKE, HTN, Diverticulitis, Neurogenic bladder, Obesity, sepsis, suprapubic catheter and transverse myelitis who was admitted to the hospital for Sepsis due to undetermined organism (HCC): The patient has been demonstrating increased anxiety and depressive symptoms.  Patient indicated for psych consult for evaluation and advise.     Per chart review, the patient presented to the hospital due to concern for UTI. She states this is a common occurrence which if left untreated develops into sepsis. She has a suprapubic catheter. In the ED she also had a CT done showing some perivesical stranding.  Bacteriauria noted without pyuria.  Cultures with >100K providencia with inducible beta lactamases. She has been started on IV cefepime, ceftriaxone, meropenem and was admitted to the floor for ESBL UTI. The patient was demonstrating increased anxiety and depression and requested to see psychiatry.      The patient received the following psychotropic medications Wellbutrin XL 24 hr tab 300mg, Klonopin 1mg q6h, Seroquel 300mg BID,      Labs and imaging reviewed: Glucose 207, AST 35, HGB 11.3      The patient seen today laying in hospital bed mildly distressed, visibly upset and crying.      The patient is alert and oriented to person, place, date and condition. The patient answers questions and engages in appropriate conversation with no impairment in cognition or distortion in thought process.      The patient reporting feelings of hopelessness, helplessness, worthlessness, low mood, low energy, decreased motivation.     The patient reporting increased anxiety, worry, ruminations with impairment in sleep. States she has difficulty falling asleep which causes her to sleep late and wake up late. She displays concerns that she has difficulty waking up and doesn't feel  better until the afternoon.      Patient states she has been living in a nursing home due to her suprapubic catheter and recurrent episodes of UTIs. She is single and has never  but has one living son. States she has been depressed \"for a very long time\" and does not know why her depression has worsened recently. She states the Nursing home has an psychiatrist that comes every 2 weeks and has been managing her depression and anxiety. Since a young age she states she had panic attacks and OCD and would rearrange and clean her room and house everyday.      The patient is not demonstrating any carmel or psychosis  The patient denies auditory or visual hallucinations  The patient denies any current or past suicidal ideations or self harm.      The patient has been demonstrating feelings of constant sadness, restlessness, hopelessness, helplessness, worthlessness, low mood, low energy, decreased motivation, increased anxiety, worry, ruminations with impairment in sleep. The patient denied any history of self harm or any suicidal ideations. She is agreeable to medication changed to help balance her mood and emotions.      Past Psychiatric/Medication History:  1. Prior diagnoses: MIKE, depression, Bipolar 1 disorder, Manipulative behavior. Patient states she also has OCD and Panic attacks sine adolescent years.  2. Past psychiatric inpatient: none reported  3. Past outpatient history: Patient reports seeing a psychiatris       t at Nursing Home   4. Past suicide history: Patient denies   5. Medication history: Wellbutrin XL 24 hr tab 300mg, Klonopin 1mg q6h, Seroquel 300mg BID,      Social History:   The patient states the she lives in a Nursing home due to medical conditions. She is single and has never  but has one living son.      Denies any ETOH abuse, or illicit substance abuse. Previous smoker (5 pack history)     Family History:  No family history reported       Medical History:   Past Medical  History  Past Medical History:   Diagnosis Date    Bipolar 1 disorder (HCC)     Dental caries 10/26/2014    Diabetes (HCC)     MIKE (generalized anxiety disorder)     High blood pressure     History of diverticulitis of colon 10/26/2014    Manipulative behavior 10/26/2014    Neurogenic bladder     Obesity (BMI 30-39.9) 10/26/2014    Paraplegia (HCC)     Sepsis following intra-abdominal surgery (HCC) 10/26/2014    Serotonin syndrome     Sleep apnea     Suprapubic catheter (HCC)     Transverse myelitis (HCC)        Past Surgical History  Past Surgical History:   Procedure Laterality Date    ARTHROSCOPY OF JOINT UNLISTED      knee          CHOLECYSTECTOMY      PART REMOVAL COLON W END COLOSTOMY          TONSILLECTOMY         Family History  Family History   Adopted: Yes       Social History  Social History     Socioeconomic History    Marital status: Single   Tobacco Use    Smoking status: Former     Packs/day: 1.00     Years: 5.00     Additional pack years: 0.00     Total pack years: 5.00     Types: Cigarettes    Tobacco comments:     quit    Substance and Sexual Activity    Alcohol use: No    Drug use: No     Social Determinants of Health     Food Insecurity: No Food Insecurity (2024)    Food Insecurity     Food Insecurity: Never true   Transportation Needs: No Transportation Needs (2024)    Transportation Needs     Lack of Transportation: No   Housing Stability: Low Risk  (2024)    Housing Stability     Housing Instability: No           Current Medications:  Current Facility-Administered Medications   Medication Dose Route Frequency    [START ON 1/10/2024] insulin detemir (Levemir) 100 UNIT/ML FlexPen/FlexTouch 25 Units  25 Units Subcutaneous Daily    insulin detemir (Levemir) 100 UNIT/ML FlexPen/FlexTouch 60 Units  60 Units Subcutaneous Nightly    [START ON 1/10/2024] insulin aspart (NovoLOG) 100 Units/mL FlexPen 20 Units  20 Units Subcutaneous TID CC    insulin aspart (NovoLOG) 100  Units/mL FlexPen 1-11 Units  1-11 Units Subcutaneous TID CC    cyanocobalamin (Vitamin B12) tab 1,000 mcg  1,000 mcg Oral Daily    psyllium (Metamucil) 28 % oral packet 1 packet  1 packet Oral TID    ondansetron (Zofran) 4 MG/2ML injection 4 mg  4 mg Intravenous Q6H PRN    prochlorperazine (Compazine) 10 MG/2ML injection 5 mg  5 mg Intravenous Q8H PRN    albuterol (Ventolin HFA) 108 (90 Base) MCG/ACT inhaler 2 puff  2 puff Inhalation Q6H PRN    atorvastatin (Lipitor) tab 40 mg  40 mg Oral Nightly    bethanechol (Urecholine) tab 10 mg  10 mg Oral TID    buPROPion ER (Wellbutrin XL) 24 hr tab 300 mg  300 mg Oral Daily    divalproex ER (Depakote ER) 24 hr tab 500 mg  500 mg Oral BID    gabapentin (Neurontin) tab 600 mg  600 mg Oral TID    traZODone (Desyrel) tab 100 mg  100 mg Oral Q12H PRN    oxybutynin ER (Ditropan-XL) 24 hr tab 5 mg  5 mg Oral Daily    QUEtiapine (SEROquel) tab 300 mg  300 mg Oral BID    rivaroxaban (Xarelto) tab 20 mg  20 mg Oral QPM    glucose (Dex4) 15 GM/59ML oral liquid 15 g  15 g Oral Q15 Min PRN    Or    glucose (Glutose) 40% oral gel 15 g  15 g Oral Q15 Min PRN    Or    glucose-vitamin C (Dex-4) chewable tab 4 tablet  4 tablet Oral Q15 Min PRN    Or    dextrose 50% injection 50 mL  50 mL Intravenous Q15 Min PRN    Or    glucose (Dex4) 15 GM/59ML oral liquid 30 g  30 g Oral Q15 Min PRN    Or    glucose (Glutose) 40% oral gel 30 g  30 g Oral Q15 Min PRN    Or    glucose-vitamin C (Dex-4) chewable tab 8 tablet  8 tablet Oral Q15 Min PRN    cetirizine (ZyrTEC) tab 10 mg  10 mg Oral Daily    clotrimazole (Lotrimin) 1 % cream   Topical BID    clonazePAM (KlonoPIN) tab 1 mg  1 mg Oral q6h    traMADol (Ultram) tab 50 mg  50 mg Oral Q6H PRN    guaiFENesin ER (Mucinex) 12 hr tab 600 mg  600 mg Oral BID    ceFEPIme (Maxpime) 2 g in sodium chloride 0.9% 100 mL IVPB-MBP  2 g Intravenous Q12H    acetaminophen (Tylenol Extra Strength) tab 500 mg  500 mg Oral Q4H PRN    vancomycin (Vancocin) cap 125 mg  125  mg Oral Daily     Medications Prior to Admission   Medication Sig    psyllium 28 % Oral Powd Pack Take 1 packet by mouth every 8 (eight) hours as needed (constipation).    Nystatin 085993 UNIT/GM External Powder Apply 1 Application topically as needed for Dry Skin. Under breast and groin area    glucagon (BAQSIMI ONE PACK) 3 MG/DOSE Nasal nasal powder 3 mg by Nasal route once as needed for Low blood glucose.    levoFLOXacin 250 MG Oral Tab Take 1 tablet (250 mg total) by mouth daily.    atorvastatin 40 MG Oral Tab Take 1 tablet (40 mg total) by mouth nightly.    sennosides 8.6 MG Oral Tab Take 2 tablets (17.2 mg total) by mouth 2 (two) times daily.    acetaminophen 325 MG Oral Tab Take 2 tablets (650 mg total) by mouth every 6 (six) hours as needed for Pain.    Icosapent Ethyl 1 g Oral Cap Take 1 capsule by mouth 4 (four) times daily.    insulin aspart 100 Units/mL Subcutaneous Solution Pen-injector Inject 24 Units into the skin 3 (three) times daily with meals.    rivaroxaban (XARELTO) 20 MG Oral Tab Take 1 tablet (20 mg total) by mouth daily.    QUEtiapine 300 MG Oral Tab Take 1 tablet (300 mg total) by mouth 2 (two) times daily.    insulin glargine 100 UNIT/ML Subcutaneous Solution Inject 56 Units into the skin nightly.    insulin glargine 100 UNIT/ML Subcutaneous Solution Inject 20 Units into the skin every morning.    Melatonin 3 MG Oral Cap Take 1 capsule (3 mg total) by mouth at bedtime.    ergocalciferol 1.25 MG (50452 UT) Oral Cap Take 1 capsule (50,000 Units total) by mouth every 7 days. Every monday    oxybutynin ER 5 MG Oral Tablet 24 Hr Take 1 tablet (5 mg total) by mouth daily.    methenamine 1 g Oral Tab Take 0.5 tablets (0.5 g total) by mouth 2 (two) times daily with meals.    bethanechol 10 MG Oral Tab Take 1 tablet (10 mg total) by mouth 3 (three) times daily.    loratadine 10 MG Oral Tab Take 1 tablet (10 mg total) by mouth daily.    cyclobenzaprine 10 MG Oral Tab Take 1 tablet (10 mg total) by  mouth 3 (three) times daily as needed for Muscle spasms.    albuterol 108 (90 Base) MCG/ACT Inhalation Aero Soln Inhale 2 puffs into the lungs every 6 (six) hours as needed for Wheezing or Shortness of Breath.    phenazopyridine 200 MG Oral Tab Take 1 tablet (200 mg total) by mouth 3 (three) times daily.    ClonazePAM 1 MG Oral Tab Take 1 tablet (1 mg total) by mouth 4 (four) times daily.    Insulin Aspart 100 UNIT/ML Subcutaneous Solution Cartridge SS: 150-200 2units, 201-250 4 units, 251-300 6 units, 301-350 8 units, 351-400 10 units, over 401 call md    acetaminophen (TYLENOL EXTRA STRENGTH) 500 MG Oral Tab Take 325 mg by mouth every 4 (four) hours as needed for Pain or Fever.    Montelukast Sodium 10 MG Oral Tab Take 1 tablet (10 mg total) by mouth daily.    BuPROPion HCl ER, XL, 300 MG Oral Tablet 24 Hr Take 1 tablet (300 mg total) by mouth daily.    divalproex Sodium  MG Oral Tablet 24 Hr Take 1 tablet (500 mg total) by mouth 2 (two) times daily.    Acidophilus/Pectin Oral Cap Take 1 capsule by mouth 2 (two) times daily.    ondansetron 4 MG Oral Tablet Dispersible Take 2 tablets (8 mg total) by mouth every 8 (eight) hours as needed for Nausea.    TraZODone HCl 100 MG Oral Tab Take 1 tablet (100 mg total) by mouth every 12 (twelve) hours as needed for Sleep.    gabapentin 600 MG Oral Tab Take 1 tablet (600 mg total) by mouth 3 (three) times daily.    MetFORMIN HCl ER (GLUCOPHAGE-XR) 750 MG Oral Tablet 24 Hr Take 1 tablet (750 mg total) by mouth daily with breakfast.    Multiple Vitamins-Minerals (THERA-M) Oral Tab Take 1 tablet by mouth daily.       Allergies  Allergies   Allergen Reactions    Pcn [Penicillins]     Radiology Contrast Iodinated Dyes     Sulfa Antibiotics        Review of Systems:   As by Admitting/Attending    Results:   Laboratory Data:  Lab Results   Component Value Date    WBC 6.2 01/08/2024    HGB 11.3 (L) 01/08/2024    HCT 35.5 01/08/2024    .0 01/08/2024    CREATSERUM 0.94  01/08/2024    BUN 12 01/08/2024     (L) 01/08/2024    K 4.1 01/08/2024     01/08/2024    CO2 22.0 01/08/2024     (H) 01/08/2024    CA 9.3 01/08/2024    ALB 4.0 01/06/2024    ALKPHO 91 01/06/2024    TP 7.1 01/06/2024    AST 35 (H) 01/06/2024    ALT 20 01/06/2024    PTT 26.4 12/05/2023    LIP 36 12/05/2023    DDIMER 0.92 (H) 12/05/2023    MG 1.7 12/12/2023         Imaging:  CT SINUS (CPT=70486)    Result Date: 1/8/2024  CONCLUSION: Minimal mucosal thickening in the right maxillary sinus  [facility] provided a prelim report for this exam. This final report has no significant discrepancies with the [facility] report.    Dictated by (CST): Yordan Guerrero MD on 1/08/2024 at 8:07 AM     Finalized by (CST): Yordan Guerrero MD on 1/08/2024 at 8:14 AM          CT ABDOMEN+PELVIS KIDNEYSTONE 2D RNDR(NO IV,NO ORAL)(CPT=74176)    Result Date: 1/7/2024  CONCLUSION:  1. No urolithiasis or renal obstruction.  Left renal atrophy. 2. Right paramedian suprapubic catheter with decompressed urinary bladder.  Perivesical stranding may be related to cystitis or scarring. 3. Post right hemicolectomy.  Large amount of stool in the colon suggests constipation. 4. Moderate hepatic steatosis and mild hepatomegaly. 5. Mild splenomegaly.    Dictated by (CST): Parish Pritchard MD on 1/07/2024 at 6:27 AM     Finalized by (CST): Parish Pritchard MD on 1/07/2024 at 6:34 AM           Vital Signs:   Blood pressure (!) 142/93, pulse 105, temperature 98.3 °F (36.8 °C), temperature source Oral, resp. rate 18, weight 98.5 kg (217 lb 3.2 oz), SpO2 93%.    Mental Status Exam:   Appearance: Stated age 53, in hospital gown, obese female, in hospital gown laying down in hospital bed.  Psychomotor: No psychomotor agitation, or retardation.   Orientation: Alert and oriented to person, place, time and condition.  Gait: Not evaluated.  Attitude/Coorperation: Cooperative and attentive.  Behavior: Appropriate.  Tearful throughout the evaluation  Speech:  Regular rate and rhythm speech.  Mood: Patient reporting depressed and anxious mood.  Affect: Dysphoric affect, congruent with the mood.  Very tearful  Thought process: Linear and appropriate.  Thought content: Patient denies any suicidal or homicidal ideation. Patient denies any history of self harm.  Otherwise the patient has been reporting hopelessness and helplessness.  Perceptions: Patient denies any auditory or visual hallucinations.  Concentration: Grossly intact.  Memory: Grossly intact.  Intellect: Average.  Judgment and Insight: Questionable.     Impression:     Major depressive disorder, recurrent severe without psychotic feature.  Generalized anxiety order.  Sepsis due to undetermined organism (HCC)      Patient is a 53 year old, , single, female with medical history of DM, Bipolar 1 disorder, Depression, MIKE, HTN, Diverticulitis, Neurogenic bladder, Obesity, sepsis, suprapubic catheter and transverse myelitis who was admitted to the hospital for Sepsis due to undetermined organism (HCC): The patient has been demonstrating increased anxiety and depressive symptoms.     The patient has been demonstrating feelings of constant sadness, restlessness, hopelessness, helplessness, worthlessness, low mood, low energy, decreased motivation, increased anxiety, worry, ruminations with impairment in sleep. The patient denied any history of self harm or any suicidal ideations. She is agreeable to medication changed to help balance her mood and emotions.      Discussed risk and benefit, acknowledging the current symptom and severity.  At this point, I would recommend the following approach:      Focus on safety  Focus on education and support.  Focus on insight orientation helping the patient understand diagnosis and treatment plan.  Start Abilify 2 mg daily.  Lower Seroquel to 200 mg nightly.  Start Cymbalta 30 mg nightly.  Lower Wellbutrin XL to 150 mg daily.  Processed with patient at length, the  initiation of the above psychotropic medications I advised the patient of the risks, benefits, alternatives and potential side effects. The patient consents to administration of the medications and understands the right to refuse medications at any time. The patient verbalized understanding.   Coordinate plan with team    Orders This Visit:  Orders Placed This Encounter   Procedures    CBC With Differential With Platelet    Comp Metabolic Panel (14)    Urinalysis with Culture Reflex    Lactic Acid, Plasma    Lactic Acid Reflex Post Positive    Lactic Acid Post Positive    Lactic Acid, Plasma    Lactic Acid Reflex Post Positive    Lactic Acid Post Positive    Basic Metabolic Panel (8)    CBC With Differential With Platelet    Procalcitonin    SARS-CoV-2/Flu A and B/RSV by PCR (GeneXpert)    Urine Culture, Routine    Blood Culture       Meds This Visit:  Requested Prescriptions      No prescriptions requested or ordered in this encounter       Víctor Qureshi MD  1/9/2024    This note was prepared using Dragon Medical voice recognition dictation software and as a result, errors may occur. When identified, these errors have been corrected. While every attempt is made to correct errors during dictation, discrepancies may still exist.      Electronically signed by Víctor Qureshi MD on 1/9/2024  9:23 PM           D/C Summary    No notes of this type exist for this encounter.     Physical Therapy Notes (last 72 hours)  Notes from 1/8/2024  6:20 PM through 1/11/2024  6:20 PM   No notes of this type exist for this encounter.     Occupational Therapy Notes (last 72 hours)  Notes from 1/8/2024  6:20 PM through 1/11/2024  6:20 PM   No notes of this type exist for this encounter.     Video Swallow Study Notes    No notes of this type exist for this encounter.     SLP Notes    No notes of this type exist for this encounter.     Multidisciplinary Problems     Active Goals     Not on file

## (undated) NOTE — LETTER
[address]  [id number]  Authorization for Surgical Operation and Procedure     Date:___________                                                                                                         Time:__________  I hereby authorize Surgeon(s):  Mauricio Abad MD, my physician and his/her assistants (if applicable), which may include medical students, residents, and/or fellows, to perform the following surgical operation/ procedure and administer such anesthesia as may be determined necessary by my physician:  Operation/Procedure name (s) Procedure(s):  FLEXIBLE SIGMOIDOSCOPY with possible dilation, biopsy, cautery, polypectomy, control of bleed  on Clari Hyte   2.   I recognize that during the surgical operation/procedure, unforeseen conditions may necessitate additional or different procedures than those listed above.  I, therefore, further authorize and request that the above-named surgeon, assistants, or designees perform such procedures as are, in their judgment, necessary and desirable.    3.   My surgeon/physician has discussed prior to my surgery the potential benefits, risks and side effects of this procedure; the likelihood of achieving goals; and potential problems that might occur during recuperation.  They also discussed reasonable alternatives to the procedure, including risks, benefits, and side effects related to the alternatives and risks related to not receiving this procedure.  I have had all my questions answered and I acknowledge that no guarantee has been made as to the result that may be obtained.    4.   Should the need arise during my operation/procedure, which includes change of level of care prior to discharge, I also consent to the administration of blood and/or blood products.  Further, I understand that despite careful testing and screening of blood or blood products by collecting agencies, I may still be subject to ill effects as a result of  receiving a blood transfusion and/or blood products.  The following are some, but not all, of the potential risks that can occur: fever and allergic reactions, hemolytic reactions, transmission of diseases such as Hepatitis, AIDS and Cytomegalovirus (CMV) and fluid overload.  In the event that I wish to have an autologous transfusion of my own blood, or a directed donor transfusion, I will discuss this with my physician.  Check only if Refusing Blood or Blood Products  I understand refusal of blood or blood products as deemed necessary by my physician may have serious consequences to my condition to include possible death. I hereby assume responsibility for my refusal and release the hospital, its personnel, and my physicians from any responsibility for the consequences of my refusal.          o  Refuse      5.   I authorize the use of any specimen, organs, tissues, body parts or foreign objects that may be removed from my body during the operation/procedure for diagnosis, research or teaching purposes and their subsequent disposal by hospital authorities.  I also authorize the release of specimen test results and/or written reports to my treating physician on the hospital medical staff or other referring or consulting physicians involved in my care, at the discretion of the Pathologist or my treating physician.    6.   I consent to the photographing or videotaping of the operations or procedures to be performed, including appropriate portions of my body for medical, scientific, or educational purposes, provided my identity is not revealed by the pictures or by descriptive texts accompanying them.  If the procedure has been photographed/videotaped, the surgeon will obtain the original picture, image, videotape or CD.  The hospital will not be responsible for storage, release or maintenance of the picture, image, tape or CD.    7.   I consent to the presence of a  or observers in the operating room  as deemed necessary by my physician or their designees.    8.   I recognize that in the event my procedure results in extended X-Ray/fluoroscopy time, I may develop a skin reaction.    9. If I have a Do Not Attempt Resuscitation (DNAR) order in place, that status will be suspended while in the operating room, procedural suite, and during the recovery period unless otherwise explicitly stated by me (or a person authorized to consent on my behalf). The surgeon or my attending physician will determine when the applicable recovery period ends for purposes of reinstating the DNAR order.  10. Patients having a sterilization procedure: I understand that if the procedure is successful the results will be permanent and it will therefore be impossible for me to inseminate, conceive, or bear children.  I also understand that the procedure is intended to result in sterility, although the result has not been guaranteed.   11. I acknowledge that my physician has explained sedation/analgesia administration to me including the risk and benefits I consent to the administration of sedation/analgesia as may be necessary or desirable in the judgment of my physician.    I CERTIFY THAT I HAVE READ AND FULLY UNDERSTAND THE ABOVE CONSENT TO OPERATION and/or OTHER PROCEDURE.    _________________________________________  __________________________________  Signature of Patient     Signature of Responsible Person         ___________________________________         Printed Name of Responsible Person           _________________________________                 Relationship to Patient  _________________________________________  ______________________________  Signature of Witness          Date  Time      Patient Name: Clari Rosa     : 1970                 Printed: 2024     Medical Record #: [id number]                     Page 1 of 2                                    [address]   [address]    Consent for Anesthesia    IClari agree to be cared for by an anesthesiologist, who is specially trained to monitor me and give me medicine to put me to sleep or keep me comfortable during my procedure    I understand that my anesthesiologist is not an employee or agent of [facility] or [employer]. He or she works for [employer].    As the patient asking for anesthesia services, I agree to:  Allow the anesthesiologist (anesthesia doctor) to give me medicine and do additional procedures as necessary. Some examples are: Starting or using an “IV” to give me medicine, fluids or blood during my procedure, and having a breathing tube placed to help me breathe when I’m asleep (intubation). In the event that my heart stops working properly, I understand that my anesthesiologist will make every effort to sustain my life, unless otherwise directed by [facility] Do Not Resuscitate documents.  Tell my anesthesia doctor before my procedure:  If I am pregnant.  The last time that I ate or drank.  All of the medicines I take (including prescriptions, herbal supplements, and pills I can buy without a prescription (including street drugs/illegal medications). Failure to inform my anesthesiologist about these medicines may increase my risk of anesthetic complications.  If I am allergic to anything or have had a reaction to anesthesia before.  I understand how the anesthesia medicine will help me (benefits).  I understand that with any type of anesthesia medicine there are risks:  The most common risks are: nausea, vomiting, sore throat, muscle soreness, damage to my eyes, mouth, or teeth (from breathing tube placement).  Rare risks include: remembering what happened during my procedure, allergic reactions to medications, injury to my airway, heart, lungs, vision, nerves, or muscles and in extremely rare instances death.  My doctor has explained to me other choices available to  me for my care (alternatives).  Pregnant Patients (“epidural”):  I understand that the risks of having an epidural (medicine given into my back to help control pain during labor), include itching, low blood pressure, difficulty urinating, headache or slowing of the baby’s heart. Very rare risks include infection, bleeding, seizure, irregular heart rhythms and nerve injury.  Regional Anesthesia (“spinal”, “epidural”, & “nerve blocks”):  I understand that rare but potential complications include headache, bleeding, infection, seizure, irregular heart rhythms, and nerve injury.    I can change my mind about having anesthesia services at any time before I get the medicine.    _____________________________________________________________________________  Patient (or Representative) Signature/Relationship to Patient  Date   Time    _____________________________________________________________________________   Name (if used)    Language/Organization   Time    _____________________________________________________________________________  Anesthesiologist Signature     Date   Time  I have discussed the procedure and information above with the patient (or patient’s representative) and answered their questions. The patient or their representative has agreed to have anesthesia services.    _____________________________________________________________________________  Witness        Date   Time  I have verified that the signature is that of the patient or patient’s representative, and that it was signed before the procedure  Patient Name: Clari Rosa     : 1970                 Printed: 2024     Medical Record #: [id number]                     Page 2 of 2

## (undated) NOTE — LETTER
Date & Time: 9/12/2024, 8:03 AM  Patient: Clari Rosa    Dear Clari Rosa,    After numerous attempts, we have been unable to contact you via telephone. We are trying to reach you to review new test results and discuss an update in your treatment plan.        Please contact the Emergency Department charge nurse at [phone].    Sincerely,    [facility]